# Patient Record
Sex: FEMALE | Race: BLACK OR AFRICAN AMERICAN | Employment: FULL TIME | URBAN - METROPOLITAN AREA
[De-identification: names, ages, dates, MRNs, and addresses within clinical notes are randomized per-mention and may not be internally consistent; named-entity substitution may affect disease eponyms.]

---

## 2017-08-14 ENCOUNTER — OFFICE VISIT (OUTPATIENT)
Dept: INTERNAL MEDICINE CLINIC | Age: 67
End: 2017-08-14

## 2017-08-14 VITALS
DIASTOLIC BLOOD PRESSURE: 83 MMHG | HEART RATE: 92 BPM | OXYGEN SATURATION: 98 % | SYSTOLIC BLOOD PRESSURE: 150 MMHG | TEMPERATURE: 97.9 F | RESPIRATION RATE: 18 BRPM | BODY MASS INDEX: 30.23 KG/M2 | WEIGHT: 204.1 LBS | HEIGHT: 69 IN

## 2017-08-14 DIAGNOSIS — E11.9 CONTROLLED TYPE 2 DIABETES MELLITUS WITHOUT COMPLICATION, WITHOUT LONG-TERM CURRENT USE OF INSULIN (HCC): Primary | ICD-10-CM

## 2017-08-14 DIAGNOSIS — I47.1 SVT (SUPRAVENTRICULAR TACHYCARDIA) (HCC): ICD-10-CM

## 2017-08-14 DIAGNOSIS — Z00.00 PHYSICAL EXAM: ICD-10-CM

## 2017-08-14 DIAGNOSIS — E78.5 DYSLIPIDEMIA: ICD-10-CM

## 2017-08-14 DIAGNOSIS — I10 ESSENTIAL HYPERTENSION WITH GOAL BLOOD PRESSURE LESS THAN 140/90: ICD-10-CM

## 2017-08-14 NOTE — PROGRESS NOTES
Chief Complaint   Patient presents with    Diabetes     routine follow up      1. Have you been to the ER, urgent care clinic since your last visit? Hospitalized since your last visit? No    2. Have you seen or consulted any other health care providers outside of the [address] [place] [address] since your last visit? Include any pap smears or colon screening.  No

## 2017-08-14 NOTE — MR AVS SNAPSHOT
Visit Information Date & Time Provider Department Dept. Phone Encounter #  
 8/14/2017  9:45 AM Nanda Ambriz [facility] [address] [id number] Upcoming Health Maintenance Date Due Hepatitis C Screening 1950 FOOT EXAM Q1 8/20/1959 MICROALBUMIN Q1 8/20/1959 FOBT Q 1 YEAR AGE 50-75 8/20/1999 MEDICARE YEARLY EXAM 8/20/2014 HEMOGLOBIN A1C Q6M 2/28/2017 LIPID PANEL Q1 8/29/2017 BREAST CANCER SCRN MAMMOGRAM 11/14/2017* EYE EXAM RETINAL OR DILATED Q1 12/14/2017* Pneumococcal 65+ Low/Medium Risk (2 of 2 - PPSV23) 8/14/2018 GLAUCOMA SCREENING Q2Y 10/20/2018 DTaP/Tdap/Td series (2 - Td) 8/14/2027 *Topic was postponed. The date shown is not the original due date. Allergies as of 8/14/2017  Review Complete On: 8/14/2017 By: Jimmy Maradiaga No Known Allergies Current Immunizations  Never Reviewed No immunizations on file. Not reviewed this visit You Were Diagnosed With   
  
 Codes Comments Controlled type 2 diabetes mellitus without complication, without long-term current use of insulin ([facility]    -  Primary ICD-10-CM: E11.9 ICD-9-CM: 250.00 Essential hypertension with goal blood pressure less than 140/90     ICD-10-CM: I10 
ICD-9-CM: 401.9 Dyslipidemia     ICD-10-CM: E78.5 ICD-9-CM: 272.4 SVT (supraventricular tachycardia) (HCC)     ICD-10-CM: I47.1 ICD-9-CM: 427.89 Physical exam     ICD-10-CM: Z00.00 ICD-9-CM: V70.9 Vitals BP Pulse Temp Resp Height(growth percentile) Weight(growth percentile) 150/83 (BP 1 Location: Left arm, BP Patient Position: Sitting) 92 97.9 °F (36.6 °C) (Oral) 18 5' 9\" (1.753 m) 204 lb 1.6 oz (92.6 kg) SpO2 BMI OB Status Smoking Status 98% 30.14 kg/m2 Postmenopausal Never Smoker Vitals History BMI and BSA Data Body Mass Index Body Surface Area  
 30.14 kg/m 2 2.12 m 2 Preferred Pharmacy Pharmacy Name Phone [id number] [place], [place] - [address] [facility] [phone] Your Updated Medication List  
  
   
This list is accurate as of: 8/14/17 12:49 PM.  Always use your most recent med list.  
  
  
  
  
 alcohol swabs Padm Commonly known as:  ALCOHOL PREP PADS  
1 Pad by Apply Externally route daily. DX.E11.9  
  
 amLODIPine 10 mg tablet Commonly known as:  Viet Rings Take 1 Tab by mouth daily. atorvastatin 40 mg tablet Commonly known as:  LIPITOR Take 1 Tab by mouth daily. glucose blood VI test strips strip Commonly known as:  Conda Grey Use to test blood sugar once daily. DX.E11.9 Lancets Misc Commonly known as: One Touch Gwynneth Sidles USE TO TEST BLOOD SUGAR ONCE DAILY. DX.E11.9  
  
 metoprolol succinate 50 mg XL tablet Commonly known as:  TOPROL-XL Take 1 Tab by mouth daily. pioglitazone 30 mg tablet Commonly known as:  ACTOS Take 1 tablet daily SITagliptin-metFORMIN 50-1,000 mg Tm24 Commonly known as:  JANUMET XR Take 1 tablet 2 times a day with meals  
  
 spironolactone-hydrochlorothiazide 25-25 mg per tablet Commonly known as:  ALDACTAZIDE Take 1 Tab by mouth daily. We Performed the Following AMB POC EKG ROUTINE W/ 12 LEADS, INTER & REP [73299 CPT(R)] APOLIPOPROTEIN B H288692 CPT(R)] CBC WITH AUTOMATED DIFF [76868 CPT(R)] CRP, HIGH SENSITIVITY [91974 CPT(R)] HEMOGLOBIN A1C WITH EAG [04880 CPT(R)] LIPID PANEL [68708 CPT(R)] METABOLIC PANEL, COMPREHENSIVE [20324 CPT(R)] MICROALBUMIN, UR, RAND W/ MICROALBUMIN/CREA RATIO [id number] CPT(R)] TN COLLECTION VENOUS BLOOD,VENIPUNCTURE [id number] CPT(R)] TSH 3RD GENERATION [34043 CPT(R)] URINALYSIS W/ RFLX MICROSCOPIC [54298 CPT(R)] Introducing [facility]!    
 David Newton introduces [employer] patient portal. Now you can access parts of your medical record, email your doctor's office, and request medication refills online. 1. In your internet browser, go to [email or web address] 2. Click on the First Time User? Click Here link in the Sign In box. You will see the New Member Sign Up page. 3. Enter your [employer] Access Code exactly as it appears below. You will not need to use this code after youve completed the sign-up process. If you do not sign up before the expiration date, you must request a new code. · [employer] Access Code: [email or web address] Expires: 11/12/2017 12:49 PM 
 
4. Enter the last four digits of your Social Security Number (xxxx) and Date of Birth (mm/dd/yyyy) as indicated and click Submit. You will be taken to the next sign-up page. 5. Create a [employer] ID. This will be your [employer] login ID and cannot be changed, so think of one that is secure and easy to remember. 6. Create a [employer] password. You can change your password at any time. 7. Enter your Password Reset Question and Answer. This can be used at a later time if you forget your password. 8. Enter your e-mail address. You will receive e-mail notification when new information is available in [address] 9. Click Sign Up. You can now view and download portions of your medical record. 10. Click the Download Summary menu link to download a portable copy of your medical information. If you have questions, please visit the Frequently Asked Questions section of the [employer] website. Remember, [employer] is NOT to be used for urgent needs. For medical emergencies, dial 911. Now available from your iPhone and Android! Please provide this summary of care documentation to your next provider. Your primary care clinician is listed as PROVIDER UNKNOWN. If you have any questions after today's visit, please call [phone].

## 2017-08-14 NOTE — PROGRESS NOTES
[address] [facility]  [place] [address]  [address] [place] [address]  Phone:  [phone]  Fax: [phone]       Chief Complaint   Patient presents with    Diabetes     routine follow up    . SUBJECTIVE:    Joanne Del Valle is a 75 y.o. female Comes in after a modest absence. She said that she has basically been doing well. Blood sugar has been excellent, usually in the double digit range. She has had no symptomatic hypoglycemia. She remains on Pioglitazone, as well as Janumet. She did not take her statin because of the adverse effect she read about on the package insert. She has been taking her antihypertensive medication as prescribed with no adverse effects. I also put her on a beta-blocker because of increased sympathetic tone manifest by tachycardia. This is also improved. She admits to weight gain. She is now officially obese based on her BMI. We talk about the need to minimize this. Finally, her right ankle is much better now. Current Outpatient Prescriptions   Medication Sig Dispense Refill    pioglitazone (ACTOS) 30 mg tablet Take 1 tablet daily 30 Tab 11    spironolactone-hydrochlorothiazide (ALDACTAZIDE) 25-25 mg per tablet Take 1 Tab by mouth daily. 30 Tab 11    amLODIPine (NORVASC) 10 mg tablet Take 1 Tab by mouth daily. 30 Tab 11    glucose blood VI test strips (ONETOUCH VERIO) strip Use to test blood sugar once daily. DX.E11.9 50 Strip 11    Lancets (ONE TOUCH DELICA) misc USE TO TEST BLOOD SUGAR ONCE DAILY. DX.E11.9 100 Each 11    alcohol swabs (ALCOHOL PREP PADS) padm 1 Pad by Apply Externally route daily. DX.E11.9 30 Pad 11    sitaGLIPtin-metFORMIN (JANUMET XR) 50-1,000 mg TM24 Take 1 tablet 2 times a day with meals 60 Tab 11    metoprolol succinate (TOPROL-XL) 50 mg XL tablet Take 1 Tab by mouth daily. 30 Tab 11    atorvastatin (LIPITOR) 40 mg tablet Take 1 Tab by mouth daily.  30 Tab 11     Past Medical History: Diagnosis Date    Diabetes ([facility]     Fibromyalgia     Treated with alternate medical therapy    Hypertension      Past Surgical History:   Procedure Laterality Date    HX COLONOSCOPY  2015    HX GYN      Pap 2015    HX ORTHOPAEDIC      fractured R ankle     No Known Allergies      REVIEW OF SYSTEMS:  General: negative for - chills or fever  ENT: negative for - headaches, nasal congestion or tinnitus  Respiratory: negative for - cough, hemoptysis, shortness of breath or wheezing  Cardiovascular : negative for - chest pain, edema, palpitations or shortness of breath  Gastrointestinal: negative for - abdominal pain, blood in stools, heartburn or nausea/vomiting  Genito-Urinary: no dysuria, trouble voiding, or hematuria  Musculoskeletal: negative for - gait disturbance, joint pain, joint stiffness or joint swelling  Neurological: no TIA or stroke symptoms  Hematologic: no bruises, no bleeding, no swollen glands  Integument: no lumps, mole changes, nail changes or rash  Endocrine: no malaise/lethargy or unexpected weight changes      Social History     Social History    Marital status: SINGLE     Spouse name: N/A    Number of children: 0    Years of education: N/A     Occupational History    Employed by a nonprofit organization           Social History Main Topics    Smoking status: Never Smoker    Smokeless tobacco: Never Used    Alcohol use No    Drug use: No    Sexual activity: Not Asked     Other Topics Concern    None     Social History Narrative    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    Postgraduate work [employer]    Honorary doctorate Charli Hernandez employed by [employer]     Family History   Problem Relation Age of Onset    Heart Disease Mother     Cancer Father     Heart Disease Brother        OBJECTIVE:    Visit Vitals    /83 (BP 1 Location: Left arm, BP Patient Position: Sitting)    Pulse 92    Temp 97.9 °F (36.6 °C) (Oral)    Resp 18    Ht 5' 9\" (1.753 m)    Wt 204 lb 1.6 oz (92.6 kg)    SpO2 98%    BMI 30.14 kg/m2     CONSTITUTIONAL: well , well nourished, appears age appropriate  EYES: perrla, eom intact  ENMT:moist mucous membranes, pharynx clear  NECK: supple. Thyroid normal  RESPIRATORY: Chest: clear to ascultation and percussion   CARDIOVASCULAR: Heart: regular rate and rhythm  GASTROINTESTINAL: Abdomen: soft, bowel sounds active  HEMATOLOGIC: no pathological lymph nodes palpated  MUSCULOSKELETAL: Extremities: no edema, pulse 1+   INTEGUMENT: No unusual rashes or suspicious skin lesions noted. Nails appear normal.  NEUROLOGIC: non-focal exam   MENTAL STATUS: alert and oriented, appropriate affect      ASSESSMENT:  1. Controlled type 2 diabetes mellitus without complication, without long-term current use of insulin ([address]    2. Essential hypertension with goal blood pressure less than 140/90    3. Dyslipidemia    4. SVT (supraventricular tachycardia) ([address]    5. Physical exam        PLAN:    1. Based on her current diabetic log, it appears that the diabetes is doing well. I will await the results of her hemoglobin A1c.    2. Blood pressure is 135/80. This is excellent. No adjustments are made   3. I am curious to see if her dyslipidemia remains as severe as it was originally at the time when her diabetes was totally out of control. She is definitely at increased risk. 4. Her SVT, was manifesting itself as a sinus tachycardia, has improved significantly since being on the beta-blocker. EKG confirms this, as does her manual heart rate. 5. Finally, I encourage her to remain as physically active as possible.       .  Orders Placed This Encounter    APOLIPOPROTEIN B    CBC WITH AUTOMATED DIFF    CRP, HIGH SENSITIVITY    LIPID PANEL    URINALYSIS W/ RFLX MICROSCOPIC    TSH 3RD GENERATION    METABOLIC PANEL, COMPREHENSIVE    HEMOGLOBIN A1C WITH EAG    MICROALBUMIN, UR, RAND W/ MICROALBUMIN/CREA RATIO    MICROSCOPIC EXAMINATION    AMB POC EKG ROUTINE W/ 12 LEADS, INTER & REP         Follow-up Disposition:  Return in about 4 months (around 12/14/2017).       Quincy Calderon MD

## 2017-08-15 LAB
ALBUMIN SERPL-MCNC: 4.6 G/DL (ref 3.6–4.8)
ALBUMIN/CREAT UR: 4.7 MG/G CREAT (ref 0–30)
ALBUMIN/GLOB SERPL: 1.3 {RATIO} (ref 1.2–2.2)
ALP SERPL-CCNC: 104 IU/L (ref 39–117)
ALT SERPL-CCNC: 11 IU/L (ref 0–32)
APO B SERPL-MCNC: 119 MG/DL (ref 54–133)
APPEARANCE UR: ABNORMAL
AST SERPL-CCNC: 14 IU/L (ref 0–40)
BACTERIA #/AREA URNS HPF: ABNORMAL /[HPF]
BASOPHILS # BLD AUTO: 0 X10E3/UL (ref 0–0.2)
BASOPHILS NFR BLD AUTO: 0 %
BILIRUB SERPL-MCNC: <0.2 MG/DL (ref 0–1.2)
BILIRUB UR QL STRIP: NEGATIVE
BUN SERPL-MCNC: 16 MG/DL (ref 8–27)
BUN/CREAT SERPL: 15 (ref 12–28)
CALCIUM SERPL-MCNC: 10.1 MG/DL (ref 8.7–10.3)
CASTS URNS QL MICRO: ABNORMAL /LPF
CHLORIDE SERPL-SCNC: 96 MMOL/L (ref 96–106)
CHOLEST SERPL-MCNC: 249 MG/DL (ref 100–199)
CO2 SERPL-SCNC: 25 MMOL/L (ref 18–29)
COLOR UR: YELLOW
CREAT SERPL-MCNC: 1.06 MG/DL (ref 0.57–1)
CREAT UR-MCNC: 149.4 MG/DL
CRP SERPL HS-MCNC: 5.15 MG/L (ref 0–3)
EOSINOPHIL # BLD AUTO: 0.2 X10E3/UL (ref 0–0.4)
EOSINOPHIL NFR BLD AUTO: 2 %
EPI CELLS #/AREA URNS HPF: >10 /HPF
ERYTHROCYTE [DISTWIDTH] IN BLOOD BY AUTOMATED COUNT: 13.7 % (ref 12.3–15.4)
EST. AVERAGE GLUCOSE BLD GHB EST-MCNC: 146 MG/DL
GLOBULIN SER CALC-MCNC: 3.6 G/DL (ref 1.5–4.5)
GLUCOSE SERPL-MCNC: 82 MG/DL (ref 65–99)
GLUCOSE UR QL: NEGATIVE
HBA1C MFR BLD: 6.7 % (ref 4.8–5.6)
HCT VFR BLD AUTO: 35.5 % (ref 34–46.6)
HDLC SERPL-MCNC: 61 MG/DL
HGB BLD-MCNC: 11.4 G/DL (ref 11.1–15.9)
HGB UR QL STRIP: NEGATIVE
IMM GRANULOCYTES # BLD: 0 X10E3/UL (ref 0–0.1)
IMM GRANULOCYTES NFR BLD: 0 %
KETONES UR QL STRIP: NEGATIVE
LDLC SERPL CALC-MCNC: 163 MG/DL (ref 0–99)
LEUKOCYTE ESTERASE UR QL STRIP: ABNORMAL
LYMPHOCYTES # BLD AUTO: 2.3 X10E3/UL (ref 0.7–3.1)
LYMPHOCYTES NFR BLD AUTO: 31 %
MCH RBC QN AUTO: 30.1 PG (ref 26.6–33)
MCHC RBC AUTO-ENTMCNC: 32.1 G/DL (ref 31.5–35.7)
MCV RBC AUTO: 94 FL (ref 79–97)
MICRO URNS: ABNORMAL
MICROALBUMIN UR-MCNC: 7 UG/ML
MONOCYTES # BLD AUTO: 0.5 X10E3/UL (ref 0.1–0.9)
MONOCYTES NFR BLD AUTO: 6 %
MUCOUS THREADS URNS QL MICRO: PRESENT
NEUTROPHILS # BLD AUTO: 4.5 X10E3/UL (ref 1.4–7)
NEUTROPHILS NFR BLD AUTO: 61 %
NITRITE UR QL STRIP: NEGATIVE
PH UR STRIP: 5.5 [PH] (ref 5–7.5)
PLATELET # BLD AUTO: 307 X10E3/UL (ref 150–379)
POTASSIUM SERPL-SCNC: 4.3 MMOL/L (ref 3.5–5.2)
PROT SERPL-MCNC: 8.2 G/DL (ref 6–8.5)
PROT UR QL STRIP: NEGATIVE
RBC # BLD AUTO: 3.79 X10E6/UL (ref 3.77–5.28)
RBC #/AREA URNS HPF: ABNORMAL /HPF
SODIUM SERPL-SCNC: 138 MMOL/L (ref 134–144)
SP GR UR: 1.02 (ref 1–1.03)
TRIGL SERPL-MCNC: 127 MG/DL (ref 0–149)
TSH SERPL DL<=0.005 MIU/L-ACNC: 2.37 UIU/ML (ref 0.45–4.5)
UROBILINOGEN UR STRIP-MCNC: 0.2 MG/DL (ref 0.2–1)
VLDLC SERPL CALC-MCNC: 25 MG/DL (ref 5–40)
WBC # BLD AUTO: 7.5 X10E3/UL (ref 3.4–10.8)
WBC #/AREA URNS HPF: ABNORMAL /HPF

## 2017-08-16 ENCOUNTER — TELEPHONE (OUTPATIENT)
Dept: INTERNAL MEDICINE CLINIC | Age: 67
End: 2017-08-16

## 2017-08-16 NOTE — TELEPHONE ENCOUNTER
Patient notified by phone and ask to return to the office for a URINE CULTURE. She says she is still thing about taking a cholesterol pill.

## 2017-08-16 NOTE — PROGRESS NOTES
Need a urine culture. Patient needs to be on a statin but has to make the decision.   If she agrees then start rosuvastatin 10mg

## 2017-08-22 ENCOUNTER — LAB ONLY (OUTPATIENT)
Dept: INTERNAL MEDICINE CLINIC | Age: 67
End: 2017-08-22

## 2017-08-22 DIAGNOSIS — N39.0 URINARY TRACT INFECTION WITHOUT HEMATURIA, SITE UNSPECIFIED: Primary | ICD-10-CM

## 2017-08-24 LAB — BACTERIA UR CULT: NORMAL

## 2018-01-01 ENCOUNTER — HOSPITAL ENCOUNTER (OUTPATIENT)
Age: 68
Setting detail: OUTPATIENT SURGERY
Discharge: HOME | End: 2018-09-12
Attending: SURGERY | Admitting: SURGERY
Payer: COMMERCIAL

## 2018-01-01 ENCOUNTER — ANESTHESIA (OUTPATIENT)
Dept: SURGERY | Age: 68
End: 2018-01-01
Payer: COMMERCIAL

## 2018-01-01 ENCOUNTER — TELEPHONE (OUTPATIENT)
Dept: SURGERY | Age: 68
End: 2018-01-01

## 2018-01-01 ENCOUNTER — HOSPITAL ENCOUNTER (OUTPATIENT)
Dept: CT IMAGING | Age: 68
Discharge: HOME | End: 2018-08-21
Attending: INTERNAL MEDICINE
Payer: COMMERCIAL

## 2018-01-01 ENCOUNTER — APPOINTMENT (OUTPATIENT)
Dept: GENERAL RADIOLOGY | Age: 68
End: 2018-01-01
Attending: SURGERY
Payer: COMMERCIAL

## 2018-01-01 ENCOUNTER — HOSPITAL ENCOUNTER (OUTPATIENT)
Age: 68
Setting detail: OUTPATIENT SURGERY
Discharge: HOME | End: 2018-08-27
Attending: SURGERY | Admitting: SURGERY
Payer: COMMERCIAL

## 2018-01-01 ENCOUNTER — ANESTHESIA EVENT (OUTPATIENT)
Dept: SURGERY | Age: 68
End: 2018-01-01
Payer: COMMERCIAL

## 2018-01-01 ENCOUNTER — OFFICE VISIT (OUTPATIENT)
Dept: SURGERY | Age: 68
End: 2018-01-01

## 2018-01-01 ENCOUNTER — HOSPITAL ENCOUNTER (OUTPATIENT)
Dept: PET IMAGING | Age: 68
Discharge: HOME | End: 2018-09-13
Attending: INTERNAL MEDICINE
Payer: MEDICARE

## 2018-01-01 ENCOUNTER — HOSPITAL ENCOUNTER (OUTPATIENT)
Dept: PET IMAGING | Age: 68
Discharge: HOME | End: 2018-11-15
Attending: INTERNAL MEDICINE
Payer: COMMERCIAL

## 2018-01-01 VITALS
HEART RATE: 80 BPM | BODY MASS INDEX: 27.28 KG/M2 | HEIGHT: 69 IN | RESPIRATION RATE: 16 BRPM | HEIGHT: 68 IN | TEMPERATURE: 97.7 F | WEIGHT: 201.06 LBS | SYSTOLIC BLOOD PRESSURE: 114 MMHG | OXYGEN SATURATION: 95 % | WEIGHT: 180 LBS | BODY MASS INDEX: 29.78 KG/M2 | DIASTOLIC BLOOD PRESSURE: 62 MMHG

## 2018-01-01 VITALS
RESPIRATION RATE: 20 BRPM | DIASTOLIC BLOOD PRESSURE: 81 MMHG | WEIGHT: 203.1 LBS | HEART RATE: 103 BPM | TEMPERATURE: 97 F | BODY MASS INDEX: 30.08 KG/M2 | OXYGEN SATURATION: 99 % | SYSTOLIC BLOOD PRESSURE: 149 MMHG | HEIGHT: 69 IN

## 2018-01-01 VITALS
HEIGHT: 69 IN | OXYGEN SATURATION: 96 % | WEIGHT: 201 LBS | DIASTOLIC BLOOD PRESSURE: 79 MMHG | SYSTOLIC BLOOD PRESSURE: 131 MMHG | BODY MASS INDEX: 29.77 KG/M2 | HEART RATE: 86 BPM | RESPIRATION RATE: 18 BRPM | TEMPERATURE: 98 F

## 2018-01-01 VITALS — WEIGHT: 198 LBS | HEIGHT: 68 IN | BODY MASS INDEX: 30.01 KG/M2

## 2018-01-01 VITALS
OXYGEN SATURATION: 98 % | HEIGHT: 69 IN | RESPIRATION RATE: 20 BRPM | SYSTOLIC BLOOD PRESSURE: 150 MMHG | HEART RATE: 106 BPM | BODY MASS INDEX: 29.65 KG/M2 | WEIGHT: 200.2 LBS | TEMPERATURE: 98.3 F | DIASTOLIC BLOOD PRESSURE: 76 MMHG

## 2018-01-01 DIAGNOSIS — C81.91 HODGKIN LYMPHOMA OF LYMPH NODES OF NECK, UNSPECIFIED HODGKIN LYMPHOMA TYPE (HCC): Primary | ICD-10-CM

## 2018-01-01 DIAGNOSIS — C81.11 NODULAR SCLEROSIS HODGKIN LYMPHOMA, LYMPH NODES OF HEAD, FACE, AND NECK (HCC): ICD-10-CM

## 2018-01-01 DIAGNOSIS — R59.1 LYMPHADENOPATHY: Primary | ICD-10-CM

## 2018-01-01 DIAGNOSIS — R22.2 LUMP IN CHEST: ICD-10-CM

## 2018-01-01 DIAGNOSIS — R22.2 SUPRACLAVICULAR MASS: Primary | ICD-10-CM

## 2018-01-01 DIAGNOSIS — C81.11 HODGKIN'S DISEASE, NODULAR SCLEROSIS, OF LYMPH NODES OF HEAD, FACE, AND NECK (HCC): ICD-10-CM

## 2018-01-01 LAB
ACID FAST STN SPEC: NEGATIVE
ALBUMIN SERPL-MCNC: 3.9 G/DL (ref 3.5–5)
ALBUMIN/GLOB SERPL: 0.8 {RATIO} (ref 1.1–2.2)
ALP SERPL-CCNC: 164 U/L (ref 45–117)
ALT SERPL-CCNC: 22 U/L (ref 12–78)
ANION GAP BLD CALC-SCNC: 17 MMOL/L (ref 10–20)
ANION GAP SERPL CALC-SCNC: 8 MMOL/L (ref 5–15)
APTT PPP: 30.2 SEC (ref 22.1–32)
AST SERPL-CCNC: 17 U/L (ref 15–37)
ATRIAL RATE: 81 BPM
BACTERIA SPEC CULT: NORMAL
BASOPHILS # BLD: 0 K/UL (ref 0–0.1)
BASOPHILS NFR BLD: 1 % (ref 0–1)
BILIRUB SERPL-MCNC: 0.3 MG/DL (ref 0.2–1)
BUN BLD-MCNC: 22 MG/DL (ref 9–20)
BUN SERPL-MCNC: 24 MG/DL (ref 6–20)
BUN/CREAT SERPL: 20 (ref 12–20)
CA-I BLD-MCNC: 1.23 MMOL/L (ref 1.12–1.32)
CALCIUM SERPL-MCNC: 9.7 MG/DL (ref 8.5–10.1)
CALCULATED P AXIS, ECG09: 46 DEGREES
CALCULATED R AXIS, ECG10: -2 DEGREES
CALCULATED T AXIS, ECG11: 34 DEGREES
CHLORIDE BLD-SCNC: 100 MMOL/L (ref 98–107)
CHLORIDE SERPL-SCNC: 100 MMOL/L (ref 97–108)
CO2 BLD-SCNC: 26 MMOL/L (ref 21–32)
CO2 SERPL-SCNC: 27 MMOL/L (ref 21–32)
COMMENT, HOLDF: NORMAL
CREAT BLD-MCNC: 1 MG/DL (ref 0.6–1.3)
CREAT SERPL-MCNC: 1.21 MG/DL (ref 0.55–1.02)
DIAGNOSIS, 93000: NORMAL
DIFFERENTIAL METHOD BLD: ABNORMAL
EOSINOPHIL # BLD: 0.2 K/UL (ref 0–0.4)
EOSINOPHIL NFR BLD: 2 % (ref 0–7)
ERYTHROCYTE [DISTWIDTH] IN BLOOD BY AUTOMATED COUNT: 13.8 % (ref 11.5–14.5)
GLOBULIN SER CALC-MCNC: 5.1 G/DL (ref 2–4)
GLUCOSE BLD STRIP.AUTO-MCNC: 105 MG/DL (ref 65–100)
GLUCOSE BLD STRIP.AUTO-MCNC: 127 MG/DL (ref 65–100)
GLUCOSE BLD STRIP.AUTO-MCNC: 129 MG/DL (ref 65–100)
GLUCOSE BLD-MCNC: 102 MG/DL (ref 65–100)
GLUCOSE SERPL-MCNC: 129 MG/DL (ref 65–100)
HCT VFR BLD AUTO: 32.4 % (ref 35–47)
HCT VFR BLD CALC: 34 % (ref 35–47)
HGB BLD-MCNC: 10.4 G/DL (ref 11.5–16)
IMM GRANULOCYTES # BLD: 0 K/UL (ref 0–0.04)
IMM GRANULOCYTES NFR BLD AUTO: 1 % (ref 0–0.5)
INR PPP: 1.1 (ref 0.9–1.1)
LYMPHOCYTES # BLD: 1.7 K/UL (ref 0.8–3.5)
LYMPHOCYTES NFR BLD: 20 % (ref 12–49)
MCH RBC QN AUTO: 29.5 PG (ref 26–34)
MCHC RBC AUTO-ENTMCNC: 32.1 G/DL (ref 30–36.5)
MCV RBC AUTO: 92 FL (ref 80–99)
MONOCYTES # BLD: 0.6 K/UL (ref 0–1)
MONOCYTES NFR BLD: 7 % (ref 5–13)
MYCOBACTERIUM SPEC QL CULT: NEGATIVE
NEUTS SEG # BLD: 5.7 K/UL (ref 1.8–8)
NEUTS SEG NFR BLD: 69 % (ref 32–75)
NRBC # BLD: 0 K/UL (ref 0–0.01)
NRBC BLD-RTO: 0 PER 100 WBC
P-R INTERVAL, ECG05: 184 MS
PLATELET # BLD AUTO: 284 K/UL (ref 150–400)
PMV BLD AUTO: 10 FL (ref 8.9–12.9)
POTASSIUM BLD-SCNC: 3.9 MMOL/L (ref 3.5–5.1)
POTASSIUM SERPL-SCNC: 4.2 MMOL/L (ref 3.5–5.1)
PROT SERPL-MCNC: 9 G/DL (ref 6.4–8.2)
PROTHROMBIN TIME: 11.7 SEC (ref 9–11.1)
Q-T INTERVAL, ECG07: 368 MS
QRS DURATION, ECG06: 80 MS
QTC CALCULATION (BEZET), ECG08: 427 MS
RBC # BLD AUTO: 3.52 M/UL (ref 3.8–5.2)
SAMPLES BEING HELD,HOLD: NORMAL
SERVICE CMNT-IMP: ABNORMAL
SERVICE CMNT-IMP: NORMAL
SODIUM BLD-SCNC: 138 MMOL/L (ref 136–145)
SODIUM SERPL-SCNC: 135 MMOL/L (ref 136–145)
SPECIMEN PREPARATION: NORMAL
SPECIMEN SOURCE: NORMAL
THERAPEUTIC RANGE,PTTT: NORMAL SECS (ref 58–77)
VENTRICULAR RATE, ECG03: 81 BPM
WBC # BLD AUTO: 8.2 K/UL (ref 3.6–11)

## 2018-01-01 PROCEDURE — C1788 PORT, INDWELLING, IMP: HCPCS | Performed by: SURGERY

## 2018-01-01 PROCEDURE — 77030011265 HC ELECTRD BLD HEX COVD -A: Performed by: SURGERY

## 2018-01-01 PROCEDURE — 77030021352 HC CBL LD SYS DISP COVD -B: Performed by: SURGERY

## 2018-01-01 PROCEDURE — 71260 CT THORAX DX C+: CPT

## 2018-01-01 PROCEDURE — 77030039266 HC ADH SKN EXOFIN S2SG -A: Performed by: SURGERY

## 2018-01-01 PROCEDURE — 77030020255 HC SOL INJ LR 1000ML BG: Performed by: SURGERY

## 2018-01-01 PROCEDURE — 76210000016 HC OR PH I REC 1 TO 1.5 HR: Performed by: SURGERY

## 2018-01-01 PROCEDURE — 74011000250 HC RX REV CODE- 250: Performed by: SURGERY

## 2018-01-01 PROCEDURE — 74011250636 HC RX REV CODE- 250/636: Performed by: SURGERY

## 2018-01-01 PROCEDURE — 82962 GLUCOSE BLOOD TEST: CPT

## 2018-01-01 PROCEDURE — 77030002916 HC SUT ETHLN J&J -A: Performed by: SURGERY

## 2018-01-01 PROCEDURE — 76210000040 HC AMBSU PH I REC FIRST 0.5 HR: Performed by: SURGERY

## 2018-01-01 PROCEDURE — 77030003029 HC SUT VCRL J&J -B: Performed by: SURGERY

## 2018-01-01 PROCEDURE — 80053 COMPREHEN METABOLIC PANEL: CPT | Performed by: SURGERY

## 2018-01-01 PROCEDURE — 74011250636 HC RX REV CODE- 250/636

## 2018-01-01 PROCEDURE — 85730 THROMBOPLASTIN TIME PARTIAL: CPT | Performed by: SURGERY

## 2018-01-01 PROCEDURE — 88342 IMHCHEM/IMCYTCHM 1ST ANTB: CPT | Performed by: SURGERY

## 2018-01-01 PROCEDURE — 74011000258 HC RX REV CODE- 258: Performed by: INTERNAL MEDICINE

## 2018-01-01 PROCEDURE — 76210000057 HC AMBSU PH II REC 1 TO 1.5 HR: Performed by: SURGERY

## 2018-01-01 PROCEDURE — 88341 IMHCHEM/IMCYTCHM EA ADD ANTB: CPT | Performed by: SURGERY

## 2018-01-01 PROCEDURE — 74011636320 HC RX REV CODE- 636/320: Performed by: INTERNAL MEDICINE

## 2018-01-01 PROCEDURE — 74011250636 HC RX REV CODE- 250/636: Performed by: ANESTHESIOLOGY

## 2018-01-01 PROCEDURE — 88305 TISSUE EXAM BY PATHOLOGIST: CPT

## 2018-01-01 PROCEDURE — 77030002986 HC SUT PROL J&J -A: Performed by: SURGERY

## 2018-01-01 PROCEDURE — 77030031139 HC SUT VCRL2 J&J -A: Performed by: SURGERY

## 2018-01-01 PROCEDURE — A9552 F18 FDG: HCPCS

## 2018-01-01 PROCEDURE — 80047 BASIC METABLC PNL IONIZED CA: CPT

## 2018-01-01 PROCEDURE — 88307 TISSUE EXAM BY PATHOLOGIST: CPT | Performed by: SURGERY

## 2018-01-01 PROCEDURE — 77030018836 HC SOL IRR NACL ICUM -A: Performed by: SURGERY

## 2018-01-01 PROCEDURE — 77030011640 HC PAD GRND REM COVD -A: Performed by: SURGERY

## 2018-01-01 PROCEDURE — 87102 FUNGUS ISOLATION CULTURE: CPT | Performed by: SURGERY

## 2018-01-01 PROCEDURE — 87116 MYCOBACTERIA CULTURE: CPT | Performed by: SURGERY

## 2018-01-01 PROCEDURE — 76030000000 HC AMB SURG OR TIME 0.5 TO 1: Performed by: SURGERY

## 2018-01-01 PROCEDURE — 77030008684 HC TU ET CUF COVD -B: Performed by: ANESTHESIOLOGY

## 2018-01-01 PROCEDURE — 77030026438 HC STYL ET INTUB CARD -A: Performed by: ANESTHESIOLOGY

## 2018-01-01 PROCEDURE — 77030018673: Performed by: SURGERY

## 2018-01-01 PROCEDURE — 76000 FLUOROSCOPY <1 HR PHYS/QHP: CPT

## 2018-01-01 PROCEDURE — 85025 COMPLETE CBC W/AUTO DIFF WBC: CPT | Performed by: SURGERY

## 2018-01-01 PROCEDURE — 85610 PROTHROMBIN TIME: CPT | Performed by: SURGERY

## 2018-01-01 PROCEDURE — 77030002933 HC SUT MCRYL J&J -A: Performed by: SURGERY

## 2018-01-01 PROCEDURE — 77030020782 HC GWN BAIR PAWS FLX 3M -B

## 2018-01-01 PROCEDURE — 36415 COLL VENOUS BLD VENIPUNCTURE: CPT | Performed by: SURGERY

## 2018-01-01 PROCEDURE — 77030020256 HC SOL INJ NACL 0.9%  500ML: Performed by: SURGERY

## 2018-01-01 PROCEDURE — 93005 ELECTROCARDIOGRAM TRACING: CPT

## 2018-01-01 PROCEDURE — 76060000061 HC AMB SURG ANES 0.5 TO 1 HR: Performed by: SURGERY

## 2018-01-01 PROCEDURE — 76210000021 HC REC RM PH II 0.5 TO 1 HR: Performed by: SURGERY

## 2018-01-01 PROCEDURE — 77030032490 HC SLV COMPR SCD KNE COVD -B: Performed by: SURGERY

## 2018-01-01 PROCEDURE — 76010000149 HC OR TIME 1 TO 1.5 HR: Performed by: SURGERY

## 2018-01-01 PROCEDURE — 71045 X-RAY EXAM CHEST 1 VIEW: CPT

## 2018-01-01 PROCEDURE — 76060000033 HC ANESTHESIA 1 TO 1.5 HR: Performed by: SURGERY

## 2018-01-01 DEVICE — SYSTEM INFUS PRT CATH 8FR L66CM INTRO 8FR CHST TI SGL LUMN: Type: IMPLANTABLE DEVICE | Site: CHEST | Status: FUNCTIONAL

## 2018-01-01 RX ORDER — SODIUM CHLORIDE 0.9 % (FLUSH) 0.9 %
5-10 SYRINGE (ML) INJECTION AS NEEDED
Status: DISCONTINUED | OUTPATIENT
Start: 2018-01-01 | End: 2018-01-01 | Stop reason: HOSPADM

## 2018-01-01 RX ORDER — MORPHINE SULFATE 10 MG/ML
2 INJECTION, SOLUTION INTRAMUSCULAR; INTRAVENOUS
Status: DISCONTINUED | OUTPATIENT
Start: 2018-01-01 | End: 2018-01-01 | Stop reason: HOSPADM

## 2018-01-01 RX ORDER — BUPIVACAINE HYDROCHLORIDE AND EPINEPHRINE 5; 5 MG/ML; UG/ML
INJECTION, SOLUTION EPIDURAL; INTRACAUDAL; PERINEURAL AS NEEDED
Status: DISCONTINUED | OUTPATIENT
Start: 2018-01-01 | End: 2018-01-01 | Stop reason: HOSPADM

## 2018-01-01 RX ORDER — SODIUM CHLORIDE, SODIUM LACTATE, POTASSIUM CHLORIDE, CALCIUM CHLORIDE 600; 310; 30; 20 MG/100ML; MG/100ML; MG/100ML; MG/100ML
25 INJECTION, SOLUTION INTRAVENOUS CONTINUOUS
Status: DISCONTINUED | OUTPATIENT
Start: 2018-01-01 | End: 2018-01-01 | Stop reason: HOSPADM

## 2018-01-01 RX ORDER — PHENYLEPHRINE HCL IN 0.9% NACL 0.4MG/10ML
SYRINGE (ML) INTRAVENOUS AS NEEDED
Status: DISCONTINUED | OUTPATIENT
Start: 2018-01-01 | End: 2018-01-01 | Stop reason: HOSPADM

## 2018-01-01 RX ORDER — FENTANYL CITRATE 50 UG/ML
INJECTION, SOLUTION INTRAMUSCULAR; INTRAVENOUS AS NEEDED
Status: DISCONTINUED | OUTPATIENT
Start: 2018-01-01 | End: 2018-01-01 | Stop reason: HOSPADM

## 2018-01-01 RX ORDER — ONDANSETRON 2 MG/ML
INJECTION INTRAMUSCULAR; INTRAVENOUS AS NEEDED
Status: DISCONTINUED | OUTPATIENT
Start: 2018-01-01 | End: 2018-01-01 | Stop reason: HOSPADM

## 2018-01-01 RX ORDER — SODIUM CHLORIDE 0.9 % (FLUSH) 0.9 %
5-10 SYRINGE (ML) INJECTION AS NEEDED
Status: CANCELLED | OUTPATIENT
Start: 2018-01-01

## 2018-01-01 RX ORDER — DIPHENHYDRAMINE HYDROCHLORIDE 50 MG/ML
12.5 INJECTION, SOLUTION INTRAMUSCULAR; INTRAVENOUS
Status: DISCONTINUED | OUTPATIENT
Start: 2018-01-01 | End: 2018-01-01 | Stop reason: HOSPADM

## 2018-01-01 RX ORDER — MIDAZOLAM HYDROCHLORIDE 1 MG/ML
INJECTION, SOLUTION INTRAMUSCULAR; INTRAVENOUS AS NEEDED
Status: DISCONTINUED | OUTPATIENT
Start: 2018-01-01 | End: 2018-01-01 | Stop reason: HOSPADM

## 2018-01-01 RX ORDER — LIDOCAINE HYDROCHLORIDE 10 MG/ML
0.1 INJECTION, SOLUTION EPIDURAL; INFILTRATION; INTRACAUDAL; PERINEURAL AS NEEDED
Status: DISCONTINUED | OUTPATIENT
Start: 2018-01-01 | End: 2018-01-01 | Stop reason: HOSPADM

## 2018-01-01 RX ORDER — FLUTICASONE PROPIONATE 50 MCG
2 SPRAY, SUSPENSION (ML) NASAL DAILY
COMMUNITY
End: 2019-01-01

## 2018-01-01 RX ORDER — PROPOFOL 10 MG/ML
INJECTION, EMULSION INTRAVENOUS
Status: DISCONTINUED | OUTPATIENT
Start: 2018-01-01 | End: 2018-01-01 | Stop reason: HOSPADM

## 2018-01-01 RX ORDER — DIPHENHYDRAMINE HYDROCHLORIDE 50 MG/ML
12.5 INJECTION, SOLUTION INTRAMUSCULAR; INTRAVENOUS AS NEEDED
Status: DISCONTINUED | OUTPATIENT
Start: 2018-01-01 | End: 2018-01-01 | Stop reason: HOSPADM

## 2018-01-01 RX ORDER — PROPOFOL 10 MG/ML
INJECTION, EMULSION INTRAVENOUS AS NEEDED
Status: DISCONTINUED | OUTPATIENT
Start: 2018-01-01 | End: 2018-01-01 | Stop reason: HOSPADM

## 2018-01-01 RX ORDER — DEXAMETHASONE SODIUM PHOSPHATE 4 MG/ML
INJECTION, SOLUTION INTRA-ARTICULAR; INTRALESIONAL; INTRAMUSCULAR; INTRAVENOUS; SOFT TISSUE AS NEEDED
Status: DISCONTINUED | OUTPATIENT
Start: 2018-01-01 | End: 2018-01-01 | Stop reason: HOSPADM

## 2018-01-01 RX ORDER — SODIUM CHLORIDE 0.9 % (FLUSH) 0.9 %
5-10 SYRINGE (ML) INJECTION EVERY 8 HOURS
Status: DISCONTINUED | OUTPATIENT
Start: 2018-01-01 | End: 2018-01-01 | Stop reason: HOSPADM

## 2018-01-01 RX ORDER — LIDOCAINE HYDROCHLORIDE 20 MG/ML
INJECTION, SOLUTION EPIDURAL; INFILTRATION; INTRACAUDAL; PERINEURAL AS NEEDED
Status: DISCONTINUED | OUTPATIENT
Start: 2018-01-01 | End: 2018-01-01 | Stop reason: HOSPADM

## 2018-01-01 RX ORDER — ONDANSETRON 2 MG/ML
4 INJECTION INTRAMUSCULAR; INTRAVENOUS AS NEEDED
Status: DISCONTINUED | OUTPATIENT
Start: 2018-01-01 | End: 2018-01-01 | Stop reason: HOSPADM

## 2018-01-01 RX ORDER — SODIUM CHLORIDE 0.9 % (FLUSH) 0.9 %
10 SYRINGE (ML) INJECTION
Status: COMPLETED | OUTPATIENT
Start: 2018-01-01 | End: 2018-01-01

## 2018-01-01 RX ORDER — FENTANYL CITRATE 50 UG/ML
25 INJECTION, SOLUTION INTRAMUSCULAR; INTRAVENOUS
Status: DISCONTINUED | OUTPATIENT
Start: 2018-01-01 | End: 2018-01-01 | Stop reason: HOSPADM

## 2018-01-01 RX ORDER — CEFAZOLIN SODIUM/WATER 2 G/20 ML
2 SYRINGE (ML) INTRAVENOUS ONCE
Status: COMPLETED | OUTPATIENT
Start: 2018-01-01 | End: 2018-01-01

## 2018-01-01 RX ORDER — LIDOCAINE HYDROCHLORIDE 10 MG/ML
0.1 INJECTION, SOLUTION EPIDURAL; INFILTRATION; INTRACAUDAL; PERINEURAL AS NEEDED
Status: CANCELLED | OUTPATIENT
Start: 2018-01-01

## 2018-01-01 RX ORDER — SODIUM CHLORIDE, SODIUM LACTATE, POTASSIUM CHLORIDE, CALCIUM CHLORIDE 600; 310; 30; 20 MG/100ML; MG/100ML; MG/100ML; MG/100ML
25 INJECTION, SOLUTION INTRAVENOUS CONTINUOUS
Status: CANCELLED | OUTPATIENT
Start: 2018-01-01 | End: 2018-01-01

## 2018-01-01 RX ORDER — HYDROMORPHONE HYDROCHLORIDE 1 MG/ML
0.2 INJECTION, SOLUTION INTRAMUSCULAR; INTRAVENOUS; SUBCUTANEOUS
Status: DISCONTINUED | OUTPATIENT
Start: 2018-01-01 | End: 2018-01-01 | Stop reason: HOSPADM

## 2018-01-01 RX ORDER — SUCCINYLCHOLINE CHLORIDE 20 MG/ML
INJECTION INTRAMUSCULAR; INTRAVENOUS AS NEEDED
Status: DISCONTINUED | OUTPATIENT
Start: 2018-01-01 | End: 2018-01-01 | Stop reason: HOSPADM

## 2018-01-01 RX ORDER — SODIUM CHLORIDE 0.9 % (FLUSH) 0.9 %
5-10 SYRINGE (ML) INJECTION EVERY 8 HOURS
Status: CANCELLED | OUTPATIENT
Start: 2018-01-01

## 2018-01-01 RX ORDER — IBUPROFEN 600 MG/1
600 TABLET ORAL
Qty: 40 TAB | Refills: 0 | Status: SHIPPED | OUTPATIENT
Start: 2018-01-01 | End: 2018-01-01 | Stop reason: SDUPTHER

## 2018-01-01 RX ORDER — IBUPROFEN 600 MG/1
600 TABLET ORAL
Qty: 40 TAB | Refills: 0 | Status: SHIPPED | OUTPATIENT
Start: 2018-01-01 | End: 2019-01-01

## 2018-01-01 RX ADMIN — Medication 120 MCG: at 08:59

## 2018-01-01 RX ADMIN — PROPOFOL 140 MCG/KG/MIN: 10 INJECTION, EMULSION INTRAVENOUS at 08:18

## 2018-01-01 RX ADMIN — PROPOFOL 20 MG: 10 INJECTION, EMULSION INTRAVENOUS at 08:29

## 2018-01-01 RX ADMIN — MIDAZOLAM HYDROCHLORIDE 1 MG: 1 INJECTION, SOLUTION INTRAMUSCULAR; INTRAVENOUS at 09:12

## 2018-01-01 RX ADMIN — PROPOFOL 20 MG: 10 INJECTION, EMULSION INTRAVENOUS at 08:26

## 2018-01-01 RX ADMIN — SUCCINYLCHOLINE CHLORIDE 140 MG: 20 INJECTION INTRAMUSCULAR; INTRAVENOUS at 09:12

## 2018-01-01 RX ADMIN — LIDOCAINE HYDROCHLORIDE 60 MG: 20 INJECTION, SOLUTION EPIDURAL; INFILTRATION; INTRACAUDAL; PERINEURAL at 09:12

## 2018-01-01 RX ADMIN — Medication 10 ML: at 15:22

## 2018-01-01 RX ADMIN — PROPOFOL 50 MG: 10 INJECTION, EMULSION INTRAVENOUS at 08:51

## 2018-01-01 RX ADMIN — SODIUM CHLORIDE, SODIUM LACTATE, POTASSIUM CHLORIDE, AND CALCIUM CHLORIDE 25 ML/HR: 600; 310; 30; 20 INJECTION, SOLUTION INTRAVENOUS at 08:31

## 2018-01-01 RX ADMIN — LIDOCAINE HYDROCHLORIDE 60 MG: 20 INJECTION, SOLUTION EPIDURAL; INFILTRATION; INTRACAUDAL; PERINEURAL at 08:17

## 2018-01-01 RX ADMIN — MIDAZOLAM HYDROCHLORIDE 1 MG: 1 INJECTION, SOLUTION INTRAMUSCULAR; INTRAVENOUS at 08:12

## 2018-01-01 RX ADMIN — SODIUM CHLORIDE, SODIUM LACTATE, POTASSIUM CHLORIDE, AND CALCIUM CHLORIDE 25 ML/HR: 600; 310; 30; 20 INJECTION, SOLUTION INTRAVENOUS at 07:20

## 2018-01-01 RX ADMIN — Medication 10 ML: at 11:54

## 2018-01-01 RX ADMIN — PROPOFOL 150 MG: 10 INJECTION, EMULSION INTRAVENOUS at 09:12

## 2018-01-01 RX ADMIN — Medication 2 G: at 08:18

## 2018-01-01 RX ADMIN — FENTANYL CITRATE 25 MCG: 50 INJECTION, SOLUTION INTRAMUSCULAR; INTRAVENOUS at 08:19

## 2018-01-01 RX ADMIN — MIDAZOLAM HYDROCHLORIDE 1 MG: 1 INJECTION, SOLUTION INTRAMUSCULAR; INTRAVENOUS at 08:15

## 2018-01-01 RX ADMIN — PROPOFOL 20 MG: 10 INJECTION, EMULSION INTRAVENOUS at 08:23

## 2018-01-01 RX ADMIN — Medication 2 G: at 09:19

## 2018-01-01 RX ADMIN — Medication 10 ML: at 11:29

## 2018-01-01 RX ADMIN — SODIUM CHLORIDE 100 ML: 900 INJECTION, SOLUTION INTRAVENOUS at 11:54

## 2018-01-01 RX ADMIN — MIDAZOLAM HYDROCHLORIDE 1 MG: 1 INJECTION, SOLUTION INTRAMUSCULAR; INTRAVENOUS at 09:08

## 2018-01-01 RX ADMIN — IOPAMIDOL 100 ML: 612 INJECTION, SOLUTION INTRAVENOUS at 11:54

## 2018-01-01 RX ADMIN — PROPOFOL 50 MG: 10 INJECTION, EMULSION INTRAVENOUS at 08:34

## 2018-01-01 RX ADMIN — Medication 120 MCG: at 08:36

## 2018-01-01 RX ADMIN — DEXAMETHASONE SODIUM PHOSPHATE 4 MG: 4 INJECTION, SOLUTION INTRA-ARTICULAR; INTRALESIONAL; INTRAMUSCULAR; INTRAVENOUS; SOFT TISSUE at 09:26

## 2018-01-01 RX ADMIN — FENTANYL CITRATE 50 MCG: 50 INJECTION, SOLUTION INTRAMUSCULAR; INTRAVENOUS at 09:12

## 2018-01-01 RX ADMIN — PROPOFOL 20 MG: 10 INJECTION, EMULSION INTRAVENOUS at 08:21

## 2018-01-01 RX ADMIN — PROPOFOL 50 MG: 10 INJECTION, EMULSION INTRAVENOUS at 08:18

## 2018-01-01 RX ADMIN — ONDANSETRON 4 MG: 2 INJECTION INTRAMUSCULAR; INTRAVENOUS at 09:26

## 2018-01-30 ENCOUNTER — OFFICE VISIT (OUTPATIENT)
Dept: INTERNAL MEDICINE CLINIC | Age: 68
End: 2018-01-30

## 2018-01-30 VITALS
BODY MASS INDEX: 29.77 KG/M2 | HEIGHT: 69 IN | DIASTOLIC BLOOD PRESSURE: 84 MMHG | RESPIRATION RATE: 19 BRPM | TEMPERATURE: 98 F | HEART RATE: 91 BPM | WEIGHT: 201 LBS | SYSTOLIC BLOOD PRESSURE: 144 MMHG | OXYGEN SATURATION: 99 %

## 2018-01-30 DIAGNOSIS — I47.1 SVT (SUPRAVENTRICULAR TACHYCARDIA) (HCC): ICD-10-CM

## 2018-01-30 DIAGNOSIS — E66.3 OVERWEIGHT (BMI 25.0-29.9): ICD-10-CM

## 2018-01-30 DIAGNOSIS — E11.9 CONTROLLED TYPE 2 DIABETES MELLITUS WITHOUT COMPLICATION, WITHOUT LONG-TERM CURRENT USE OF INSULIN (HCC): Primary | ICD-10-CM

## 2018-01-30 DIAGNOSIS — E78.5 DYSLIPIDEMIA: ICD-10-CM

## 2018-01-30 DIAGNOSIS — I10 ESSENTIAL HYPERTENSION WITH GOAL BLOOD PRESSURE LESS THAN 140/90: ICD-10-CM

## 2018-01-30 NOTE — PROGRESS NOTES
Chief Complaint   Patient presents with   [facility] Visit     doing well no concerns, brother recently passed dealing with those emotions, diet has been out of control      1. Have you been to the ER, urgent care clinic since your last visit? Hospitalized since your last visit? No    2. Have you seen or consulted any other health care providers outside of the [address] [place] [address] since your last visit? Include any pap smears or colon screening.  No

## 2018-01-30 NOTE — MR AVS SNAPSHOT
Mike Guzman 
 
 
 [address] [address] 
[phone] Patient: Enrrique Watts MRN: [id number] XMZ:[birth year] Visit Information Date & Time Provider Department Dept. Phone Encounter #  
 1/30/2018  9:15 AM Nanda Hanson [facility] [phone] [id number] Upcoming Health Maintenance Date Due  
 EYE EXAM RETINAL OR DILATED Q1 8/20/1960 BREAST CANCER SCRN MAMMOGRAM 8/20/1999 FOBT Q 1 YEAR AGE 50-75 8/20/1999 MEDICARE YEARLY EXAM 8/20/2014 HEMOGLOBIN A1C Q6M 2/14/2018 Pneumococcal 65+ Low/Medium Risk (2 of 2 - PPSV23) 8/14/2018 MICROALBUMIN Q1 8/14/2018 LIPID PANEL Q1 8/14/2018 GLAUCOMA SCREENING Q2Y 10/20/2018 FOOT EXAM Q1 1/30/2019 DTaP/Tdap/Td series (2 - Td) 8/14/2027 Allergies as of 1/30/2018  Review Complete On: 1/30/2018 By: Marina Martin No Known Allergies Current Immunizations  Never Reviewed No immunizations on file. Not reviewed this visit You Were Diagnosed With   
  
 Codes Comments Controlled type 2 diabetes mellitus without complication, without long-term current use of insulin ([facility]    -  Primary ICD-10-CM: E11.9 ICD-9-CM: 250.00 Essential hypertension with goal blood pressure less than 140/90     ICD-10-CM: I10 
ICD-9-CM: 401.9 Dyslipidemia     ICD-10-CM: E78.5 ICD-9-CM: 272.4 Vitals BP Pulse Temp Resp Height(growth percentile) Weight(growth percentile) 144/84 (BP 1 Location: Right arm, BP Patient Position: Sitting) 91 98 °F (36.7 °C) (Oral) 19 5' 9\" (1.753 m) 201 lb (91.2 kg) SpO2 BMI OB Status Smoking Status 99% 29.68 kg/m2 Postmenopausal Never Smoker BMI and BSA Data Body Mass Index Body Surface Area  
 29.68 kg/m 2 2.11 m 2 Preferred Pharmacy Pharmacy Name Phone [id number] [place] [address] [address] [phone] Your Updated Medication List  
  
   
This list is accurate as of: 1/30/18  1:34 PM.  Always use your most recent med list.  
  
  
  
  
 alcohol swabs Padm Commonly known as:  ALCOHOL PREP PADS  
1 Pad by Apply Externally route daily. DX.E11.9  
  
 amLODIPine 10 mg tablet Commonly known as:  Bosie Shield TAKE 1 TABLET BY MOUTH EVERY DAY  
  
 atorvastatin 40 mg tablet Commonly known as:  LIPITOR  
TAKE 1 TAB BY MOUTH DAILY. JANUMET XR 50-1,000 mg Tm24 Generic drug:  SITagliptin-metFORMIN  
TAKE 1 TABLET BY MOUTH TWICE A DAY WITH MEALS Lancets Misc Commonly known as: One Touch Chin Leopard USE TO TEST BLOOD SUGAR ONCE DAILY. DX.E11.9  
  
 metoprolol succinate 50 mg XL tablet Commonly known as:  TOPROL-XL  
TAKE 1 TAB BY MOUTH DAILY. ONETOUCH VERIO strip Generic drug:  glucose blood VI test strips TEST BLOOD SUGAR DAILY pioglitazone 30 mg tablet Commonly known as:  ACTOS  
TAKE 1 TABLET DAILY  
  
 spironolactone-hydrochlorothiazide 25-25 mg per tablet Commonly known as:  ALDACTAZIDE TAKE ONE TABLET BY MOUTH EVERY DAY We Performed the Following APOLIPOPROTEIN B [id number] CPT(R)] CRP, HIGH SENSITIVITY [70742 CPT(R)] HEMOGLOBIN A1C WITH EAG [98314 CPT(R)] METABOLIC PANEL, BASIC [90388 CPT(R)] Introducing [facility]! [address] [facility] introduces [employer] patient portal. Now you can access parts of your medical record, email your doctor's office, and request medication refills online. 1. In your internet browser, go to [email or web address] 2. Click on the First Time User? Click Here link in the Sign In box. You will see the New Member Sign Up page. 3. Enter your [employer] Access Code exactly as it appears below. You will not need to use this code after youve completed the sign-up process. If you do not sign up before the expiration date, you must request a new code.  
 
· [employer] Access Code: [email or web address] 
 Expires: 4/30/2018  1:34 PM 
 
4. Enter the last four digits of your Social Security Number (xxxx) and Date of Birth (mm/dd/yyyy) as indicated and click Submit. You will be taken to the next sign-up page. 5. Create a [employer] ID. This will be your [employer] login ID and cannot be changed, so think of one that is secure and easy to remember. 6. Create a [employer] password. You can change your password at any time. 7. Enter your Password Reset Question and Answer. This can be used at a later time if you forget your password. 8. Enter your e-mail address. You will receive e-mail notification when new information is available in [address] 9. Click Sign Up. You can now view and download portions of your medical record. 10. Click the Download Summary menu link to download a portable copy of your medical information. If you have questions, please visit the Frequently Asked Questions section of the [employer] website. Remember, [employer] is NOT to be used for urgent needs. For medical emergencies, dial 911. Now available from your iPhone and Android! Please provide this summary of care documentation to your next provider. Your primary care clinician is listed as PROVIDER UNKNOWN. If you have any questions after today's visit, please call [phone].

## 2018-01-31 LAB
APO B SERPL-MCNC: 72 MG/DL (ref 54–133)
BUN SERPL-MCNC: 14 MG/DL (ref 8–27)
BUN/CREAT SERPL: 16 (ref 12–28)
CALCIUM SERPL-MCNC: 10 MG/DL (ref 8.7–10.3)
CHLORIDE SERPL-SCNC: 98 MMOL/L (ref 96–106)
CO2 SERPL-SCNC: 25 MMOL/L (ref 18–29)
CREAT SERPL-MCNC: 0.87 MG/DL (ref 0.57–1)
CRP SERPL HS-MCNC: 2.97 MG/L (ref 0–3)
EST. AVERAGE GLUCOSE BLD GHB EST-MCNC: 148 MG/DL
GFR SERPLBLD CREATININE-BSD FMLA CKD-EPI: 69 ML/MIN/1.73
GFR SERPLBLD CREATININE-BSD FMLA CKD-EPI: 79 ML/MIN/1.73
GLUCOSE SERPL-MCNC: 89 MG/DL (ref 65–99)
HBA1C MFR BLD: 6.8 % (ref 4.8–5.6)
POTASSIUM SERPL-SCNC: 4 MMOL/L (ref 3.5–5.2)
SODIUM SERPL-SCNC: 138 MMOL/L (ref 134–144)

## 2018-01-31 NOTE — PROGRESS NOTES
[address] [facility]  [facility]  [address] [place] [id number]  Phone:  [phone]  Fax: [phone]       Chief Complaint   Patient presents with   [facility] Visit     doing well no concerns, brother recently passed dealing with those emotions, diet has been out of control    . SUBJECTIVE:    Danie Barrientos is a 75 y.o. female   Comes in for a return visit stating she is doing well. She had a tragedy in her life in that her brother  waiting for a cardiac transplant. He was on an LVAD for almost two years prior to this. He has been cared for at the [facility] in [place]. She has been able to maintain her weight which is great. She does have diabetes which historically has been doing quite well based on last HbA1c. She checks her blood sugars periodically and she has not had any interventional hypoglycemia. She finally accepted the statin and is taking on a regular basis in view of her increased cardiovascular risk. She is taking her antihypertensive medication with no adverse effects either. She had had a flare up of low back pain and is in physical therapy. Unfortunately, she continues to grieve over the death of her brother which is quite appropriate. MedDATA/gwo             Current Outpatient Prescriptions   Medication Sig Dispense Refill    pioglitazone (ACTOS) 30 mg tablet TAKE 1 TABLET DAILY 30 Tab 11    spironolactone-hydrochlorothiazide (ALDACTAZIDE) 25-25 mg per tablet TAKE ONE TABLET BY MOUTH EVERY DAY 30 Tab 11    atorvastatin (LIPITOR) 40 mg tablet TAKE 1 TAB BY MOUTH DAILY. 30 Tab 11    amLODIPine (NORVASC) 10 mg tablet TAKE 1 TABLET BY MOUTH EVERY DAY 30 Tab 11    ONETOUCH VERIO strip TEST BLOOD SUGAR DAILY 50 Strip 11    metoprolol succinate (TOPROL-XL) 50 mg XL tablet TAKE 1 TAB BY MOUTH DAILY.  30 Tab 11    JANUMET XR 50-1,000 mg TM24 TAKE 1 TABLET BY MOUTH TWICE A DAY WITH MEALS 60 Tab 11    Lancets (ONE TOUCH DELICA) misc USE TO TEST BLOOD SUGAR ONCE DAILY. DX.E11.9 100 Each 11    alcohol swabs (ALCOHOL PREP PADS) padm 1 Pad by Apply Externally route daily.  DX.E11.9 30 Pad 11     Past Medical History:   Diagnosis Date    Diabetes ([facility]     Fibromyalgia     Treated with alternate medical therapy    Hypertension      Past Surgical History:   Procedure Laterality Date    HX COLONOSCOPY  2015    HX GYN      Pap 2015    HX ORTHOPAEDIC      fractured R ankle     No Known Allergies      REVIEW OF SYSTEMS:  General: negative for - chills or fever  ENT: negative for - headaches, nasal congestion or tinnitus  Respiratory: negative for - cough, hemoptysis, shortness of breath or wheezing  Cardiovascular : negative for - chest pain, edema, palpitations or shortness of breath  Gastrointestinal: negative for - abdominal pain, blood in stools, heartburn or nausea/vomiting  Genito-Urinary: no dysuria, trouble voiding, or hematuria  Musculoskeletal: negative for - gait disturbance, joint pain, joint stiffness or joint swelling  Neurological: no TIA or stroke symptoms  Hematologic: no bruises, no bleeding, no swollen glands  Integument: no lumps, mole changes, nail changes or rash  Endocrine: no malaise/lethargy or unexpected weight changes      Social History     Social History    Marital status: SINGLE     Spouse name: N/A    Number of children: 0    Years of education: N/A     Occupational History    Employed by a nonprofit organization           Social History Main Topics    Smoking status: Never Smoker    Smokeless tobacco: Never Used    Alcohol use No    Drug use: No    Sexual activity: Yes     Other Topics Concern    None     Social History Narrative    Undergraduate [address] [facility] [employer]--- PhD    Postgraduate work [employer]    Honorary doctorate Charli Hernandez employed by [employer]     Family History   Problem Relation Age of Onset    Heart Disease Mother  Cancer Father     Cancer Sister      Breast cancer apparent DCIS    Heart Disease Brother      Brother  from congestive heart failure secondary to severe mitral disease       OBJECTIVE:    Visit Vitals    /84 (BP 1 Location: Right arm, BP Patient Position: Sitting)    Pulse 91    Temp 98 °F (36.7 °C) (Oral)    Resp 19    Ht 5' 9\" (1.753 m)    Wt 201 lb (91.2 kg)    SpO2 99%    BMI 29.68 kg/m2     CONSTITUTIONAL: well , well nourished, appears age appropriate  EYES: perrla, eom intact  ENMT:moist mucous membranes, pharynx clear  NECK: supple. Thyroid normal  RESPIRATORY: Chest: clear to ascultation and percussion   CARDIOVASCULAR: Heart: regular rate and rhythm  GASTROINTESTINAL: Abdomen: soft, bowel sounds active  HEMATOLOGIC: no pathological lymph nodes palpated  MUSCULOSKELETAL: Extremities: no edema, pulse 1+   INTEGUMENT: No unusual rashes or suspicious skin lesions noted. Nails appear normal.  NEUROLOGIC: non-focal exam   MENTAL STATUS: alert and oriented, appropriate affect      ASSESSMENT:  1. Controlled type 2 diabetes mellitus without complication, without long-term current use of insulin ([address]    2. Essential hypertension with goal blood pressure less than 140/90    3. Dyslipidemia    4. Overweight (BMI 25.0-29.9)    5. SVT (supraventricular tachycardia) ([address]      Diagnoses and all orders for this visit:    1. Controlled type 2 diabetes mellitus without complication, without long-term current use of insulin (HCC)  Assessment & Plan:  Stable, based on history, physical exam and review of pertinent labs, studies and medications; meds reconciled; continue current treatment plan.   Key Antihyperglycemic Medications             pioglitazone (ACTOS) 30 mg tablet  (Taking) TAKE 1 TABLET DAILY    JANUMET XR 50-1,000 mg TM24  (Taking) TAKE 1 TABLET BY MOUTH TWICE A DAY WITH MEALS        Other Key Diabetic Medications             atorvastatin (LIPITOR) 40 mg tablet  (Taking) TAKE 1 TAB BY MOUTH DAILY. Lab Results   Component Value Date/Time    Hemoglobin A1c 6.7 08/14/2017 12:37 PM    Glucose 82 08/14/2017 12:37 PM    Creatinine 1.06 08/14/2017 12:37 PM    Microalb/Creat ratio (ug/mg creat.) 4.7 08/14/2017 12:37 PM    Cholesterol, total 249 08/14/2017 12:37 PM    HDL Cholesterol 61 08/14/2017 12:37 PM    LDL, calculated 163 08/14/2017 12:37 PM    Triglyceride 127 08/14/2017 12:37 PM     Diabetic Foot and Eye Exam HM Status   Topic Date Due    Eye Exam  08/20/1960    Diabetic Foot Care  01/30/2019       Orders:  -     HEMOGLOBIN A1C WITH EAG    2. Essential hypertension with goal blood pressure less than 330/84  -     METABOLIC PANEL, BASIC    3. Dyslipidemia  -     APOLIPOPROTEIN B  -     CRP, HIGH SENSITIVITY    4. Overweight (BMI 25.0-29.9)    5. SVT (supraventricular tachycardia) (HCC)  Assessment & Plan:  Stable, based on history, physical exam and review of pertinent labs, studies and medications; meds reconciled; continue current treatment plan. Key CAD CHF Meds             spironolactone-hydrochlorothiazide (ALDACTAZIDE) 25-25 mg per tablet  (Taking) TAKE ONE TABLET BY MOUTH EVERY DAY    amLODIPine (NORVASC) 10 mg tablet  (Taking) TAKE 1 TABLET BY MOUTH EVERY DAY    metoprolol succinate (TOPROL-XL) 50 mg XL tablet  (Taking) TAKE 1 TAB BY MOUTH DAILY. Key Antihyperlipidemia Meds             atorvastatin (LIPITOR) 40 mg tablet  (Taking) TAKE 1 TAB BY MOUTH DAILY. Lab Results   Component Value Date/Time    Sodium 138 08/14/2017 12:37 PM    Potassium 4.3 08/14/2017 12:37 PM    Cholesterol, total 249 08/14/2017 12:37 PM    HDL Cholesterol 61 08/14/2017 12:37 PM    LDL, calculated 163 08/14/2017 12:37 PM    Triglyceride 127 08/14/2017 12:37 PM             PLAN:    1. Her diabetes hopefully is doing well but I will await results of HbA1c.   2. Blood pressure excellent today,no adjustments made. 3. She will continue statin as prescribed.  Efficacy and compliance will be assessed. 4. She is encouraged to lose weight by eating meals, minimizing snacking and avoiding consumption of processed carbohydrates. 5. I also encouraged her to increase her physical activity exercising on a consistent basis. MedDATA/gwo     . Orders Placed This Encounter    APOLIPOPROTEIN B    HEMOGLOBIN A1C WITH EAG    METABOLIC PANEL, BASIC    CRP, HIGH SENSITIVITY         Follow-up Disposition:  Return in about 6 months (around 7/30/2018).       Natan Merrill MD

## 2018-01-31 NOTE — ASSESSMENT & PLAN NOTE
Stable, based on history, physical exam and review of pertinent labs, studies and medications; meds reconciled; continue current treatment plan. Key CAD CHF Meds             spironolactone-hydrochlorothiazide (ALDACTAZIDE) 25-25 mg per tablet  (Taking) TAKE ONE TABLET BY MOUTH EVERY DAY    amLODIPine (NORVASC) 10 mg tablet  (Taking) TAKE 1 TABLET BY MOUTH EVERY DAY    metoprolol succinate (TOPROL-XL) 50 mg XL tablet  (Taking) TAKE 1 TAB BY MOUTH DAILY. Key Antihyperlipidemia Meds             atorvastatin (LIPITOR) 40 mg tablet  (Taking) TAKE 1 TAB BY MOUTH DAILY.         Lab Results   Component Value Date/Time    Sodium 138 08/14/2017 12:37 PM    Potassium 4.3 08/14/2017 12:37 PM    Cholesterol, total 249 08/14/2017 12:37 PM    HDL Cholesterol 61 08/14/2017 12:37 PM    LDL, calculated 163 08/14/2017 12:37 PM    Triglyceride 127 08/14/2017 12:37 PM

## 2018-01-31 NOTE — ASSESSMENT & PLAN NOTE
Stable, based on history, physical exam and review of pertinent labs, studies and medications; meds reconciled; continue current treatment plan. Key Antihyperglycemic Medications             pioglitazone (ACTOS) 30 mg tablet  (Taking) TAKE 1 TABLET DAILY    JANUMET XR 50-1,000 mg TM24  (Taking) TAKE 1 TABLET BY MOUTH TWICE A DAY WITH MEALS        Other Key Diabetic Medications             atorvastatin (LIPITOR) 40 mg tablet  (Taking) TAKE 1 TAB BY MOUTH DAILY.         Lab Results   Component Value Date/Time    Hemoglobin A1c 6.7 08/14/2017 12:37 PM    Glucose 82 08/14/2017 12:37 PM    Creatinine 1.06 08/14/2017 12:37 PM    Microalb/Creat ratio (ug/mg creat.) 4.7 08/14/2017 12:37 PM    Cholesterol, total 249 08/14/2017 12:37 PM    HDL Cholesterol 61 08/14/2017 12:37 PM    LDL, calculated 163 08/14/2017 12:37 PM    Triglyceride 127 08/14/2017 12:37 PM     Diabetic Foot and Eye Exam HM Status   Topic Date Due    Eye Exam  08/20/1960    Diabetic Foot Care  01/30/2019

## 2018-08-03 ENCOUNTER — OFFICE VISIT (OUTPATIENT)
Dept: INTERNAL MEDICINE CLINIC | Age: 68
End: 2018-08-03

## 2018-08-03 VITALS
HEART RATE: 83 BPM | DIASTOLIC BLOOD PRESSURE: 82 MMHG | HEIGHT: 69 IN | BODY MASS INDEX: 29.77 KG/M2 | RESPIRATION RATE: 16 BRPM | OXYGEN SATURATION: 98 % | SYSTOLIC BLOOD PRESSURE: 143 MMHG | TEMPERATURE: 98.5 F | WEIGHT: 201 LBS

## 2018-08-03 DIAGNOSIS — R19.5 OCCULT BLOOD IN STOOLS: ICD-10-CM

## 2018-08-03 DIAGNOSIS — I10 ESSENTIAL HYPERTENSION WITH GOAL BLOOD PRESSURE LESS THAN 140/90: ICD-10-CM

## 2018-08-03 DIAGNOSIS — E66.3 OVERWEIGHT (BMI 25.0-29.9): ICD-10-CM

## 2018-08-03 DIAGNOSIS — Z00.00 PHYSICAL EXAM: ICD-10-CM

## 2018-08-03 DIAGNOSIS — E78.5 DYSLIPIDEMIA: ICD-10-CM

## 2018-08-03 DIAGNOSIS — R22.1 NECK MASS: Primary | ICD-10-CM

## 2018-08-03 DIAGNOSIS — E11.9 CONTROLLED TYPE 2 DIABETES MELLITUS WITHOUT COMPLICATION, WITHOUT LONG-TERM CURRENT USE OF INSULIN (HCC): ICD-10-CM

## 2018-08-03 NOTE — MR AVS SNAPSHOT
Rowan Mchugh 
 
 
 . [address] [address] 
[phone] Patient: Faye Babin MRN: [id number] XAQ:[id number] Visit Information Date & Time Provider Department Dept. Phone Encounter #  
 8/3/2018 12:00 PM [facility] [phone] [id number] Upcoming Health Maintenance Date Due FOBT Q 1 YEAR AGE 50-75 8/20/1999 HEMOGLOBIN A1C Q6M 7/30/2018 Influenza Age 5 to Adult 8/1/2018 MICROALBUMIN Q1 8/14/2018 LIPID PANEL Q1 8/14/2018 EYE EXAM RETINAL OR DILATED Q1 10/3/2018* BREAST CANCER SCRN MAMMOGRAM 11/3/2018* Pneumococcal 65+ Low/Medium Risk (2 of 2 - PPSV23) 8/14/2018 GLAUCOMA SCREENING Q2Y 10/20/2018 FOOT EXAM Q1 1/30/2019 DTaP/Tdap/Td series (2 - Td) 8/14/2027 *Topic was postponed. The date shown is not the original due date. Allergies as of 8/3/2018  Review Complete On: 8/3/2018 By: Veronica Crystal No Known Allergies Current Immunizations  Never Reviewed No immunizations on file. Not reviewed this visit You Were Diagnosed With   
  
 Codes Comments Neck mass    -  Primary ICD-10-CM: R22.1 ICD-9-CM: 861. 2 Controlled type 2 diabetes mellitus without complication, without long-term current use of insulin ([facility]     ICD-10-CM: E11.9 ICD-9-CM: 250.00 Essential hypertension with goal blood pressure less than 140/90     ICD-10-CM: I10 
ICD-9-CM: 401.9 Dyslipidemia     ICD-10-CM: E78.5 ICD-9-CM: 272.4 Overweight (BMI 25.0-29. 9)     ICD-10-CM: T24.1 ICD-9-CM: 278.02 Occult blood in stools     ICD-10-CM: R19.5 ICD-9-CM: 792.1 Physical exam     ICD-10-CM: Z00.00 ICD-9-CM: V70.9 Vitals BP Pulse Temp Resp Height(growth percentile) Weight(growth percentile) 143/82 83 98.5 °F (36.9 °C) (Oral) 16 5' 9\" (1.753 m) 201 lb (91.2 kg) SpO2 BMI OB Status Smoking Status 98% 29.68 kg/m2 Postmenopausal Never Smoker BMI and BSA Data Body Mass Index Body Surface Area  
 29.68 kg/m 2 2.11 m 2 Preferred Pharmacy Pharmacy Name Phone [id number] [address] [place] [address] [phone] Your Updated Medication List  
  
   
This list is accurate as of 8/3/18  3:08 PM.  Always use your most recent med list.  
  
  
  
  
 alcohol swabs Padm Commonly known as:  ALCOHOL PREP PADS  
1 Pad by Apply Externally route daily. DX.E11.9  
  
 amLODIPine 10 mg tablet Commonly known as:  Arlyss [place] TAKE 1 TABLET BY MOUTH EVERY DAY  
  
 atorvastatin 40 mg tablet Commonly known as:  LIPITOR  
TAKE 1 TAB BY MOUTH DAILY. JANUMET XR 50-1,000 mg Tm24 Generic drug:  SITagliptin-metFORMIN  
TAKE 1 TABLET BY MOUTH TWICE A DAY WITH MEALS Lancets Misc Commonly known as: One Touch Juan Cleaves USE TO TEST BLOOD SUGAR ONCE DAILY. DX.E11.9  
  
 metoprolol succinate 50 mg XL tablet Commonly known as:  TOPROL-XL  
TAKE 1 TAB BY MOUTH DAILY. ONETOUCH VERIO strip Generic drug:  glucose blood VI test strips TEST BLOOD SUGAR DAILY pioglitazone 30 mg tablet Commonly known as:  ACTOS  
TAKE 1 TABLET DAILY  
  
 spironolactone-hydrochlorothiazide 25-25 mg per tablet Commonly known as:  ALDACTAZIDE TAKE ONE TABLET BY MOUTH EVERY DAY We Performed the Following APOLIPOPROTEIN B [id number] CPT(R)] CBC WITH AUTOMATED DIFF [53662 CPT(R)] COLLECTION VENOUS BLOOD,VENIPUNCTURE [id number] CPT(R)] CRP, HIGH SENSITIVITY [56459 CPT(R)] HEMOGLOBIN A1C WITH EAG [44070 CPT(R)] LIPID PANEL [40612 CPT(R)] METABOLIC PANEL, COMPREHENSIVE [16836 CPT(R)] MICROALBUMIN, UR, RAND W/ MICROALB/CREAT RATIO [id number] CPT(R)] OCCULT BLOOD IMMUNOASSAY,DIAGNOSTIC [65782 CPT(R)] TSH 3RD GENERATION [70334 CPT(R)] URINALYSIS W/ RFLX MICROSCOPIC [94347 CPT(R)] To-Do List   
 08/10/2018 Imaging:  CT NECK SOFT TISSUE W WO CONT Introducing [facility]! Jennyfer Lora introduces Press[employer] patient portal. Now you can access parts of your medical record, email your doctor's office, and request medication refills online. 1. In your internet browser, go to [email or web address] 2. Click on the First Time User? Click Here link in the Sign In box. You will see the New Member Sign Up page. 3. Enter your Pressglue Access Code exactly as it appears below. You will not need to use this code after youve completed the sign-up process. If you do not sign up before the expiration date, you must request a new code. · Press[employer]ue Access Code: [email or web address] Expires: 11/1/2018  1:12 PM 
 
4. Enter the last four digits of your Social Security Number (xxxx) and Date of Birth (mm/dd/yyyy) as indicated and click Submit. You will be taken to the next sign-up page. 5. Create a Pressglue ID. This will be your Pressglue login ID and cannot be changed, so think of one that is secure and easy to remember. 6. Create a Pressglue password. You can change your password at any time. 7. Enter your Password Reset Question and Answer. This can be used at a later time if you forget your password. 8. Enter your e-mail address. You will receive e-mail notification when new information is available in [address] 9. Click Sign Up. You can now view and download portions of your medical record. 10. Click the Download Summary menu link to download a portable copy of your medical information. If you have questions, please visit the Frequently Asked Questions section of the Pressglue website. Remember, Pressglue is NOT to be used for urgent needs. For medical emergencies, dial 911. Now available from your iPhone and Android! Please provide this summary of care documentation to your next provider. Your primary care clinician is listed as PROVIDER UNKNOWN.  If you have any questions after today's visit, please call [phone].

## 2018-08-03 NOTE — PROGRESS NOTES
Patient states that she has decreased Janumet to 1 tab once daily due to the lower glucose readings. Chief Complaint Patient presents with  Diabetes 6 month follow up 1. Have you been to the ER, urgent care clinic since your last visit? Hospitalized since your last visit? No 
 
2. Have you seen or consulted any other health care providers outside of the [address] [place] [address] since your last visit? Include any pap smears or colon screening.  No

## 2018-08-04 NOTE — PROGRESS NOTES
[address] [place] [address] and Primary Care 
[place] [address] 
[address] [facility] [address] Phone:  [phone]  Fax: [phone] Chief Complaint Patient presents with  Diabetes 6 month follow up Chasidy Novoa SUBJECTIVE: 
  Kylie Guerin is a 75 y.o. female who comes in concerned about a lesions noted on the right side of her neck. This occurred when she was in [place]. It has been present for about 1-1/2 months and has not changed in size. She denies any similar lesions in the past.  There is no history of cigarette smoking. From a diabetic standpoint she is doing well. Unfortunately, she reduced one of her pills because her blood sugars were excellent. She has been taking her blood pressure medications as presribed. She is at an increased cardiovascular risk and is maintained on a statin which she is complaint with. She remains physically active and her weight has remained stable. Current Outpatient Prescriptions Medication Sig Dispense Refill  pioglitazone (ACTOS) 30 mg tablet TAKE 1 TABLET DAILY 30 Tab 11  
 spironolactone-hydrochlorothiazide (ALDACTAZIDE) 25-25 mg per tablet TAKE ONE TABLET BY MOUTH EVERY DAY 30 Tab 11  
 atorvastatin (LIPITOR) 40 mg tablet TAKE 1 TAB BY MOUTH DAILY. 30 Tab 11  
 amLODIPine (NORVASC) 10 mg tablet TAKE 1 TABLET BY MOUTH EVERY DAY 30 Tab 11  
 ONETOUCH VERIO strip TEST BLOOD SUGAR DAILY 50 Strip 11  
 metoprolol succinate (TOPROL-XL) 50 mg XL tablet TAKE 1 TAB BY MOUTH DAILY. 30 Tab 11  
 JANUMET XR 50-1,000 mg TM24 TAKE 1 TABLET BY MOUTH TWICE A DAY WITH MEALS 60 Tab 11  Lancets (ONE TOUCH DELICA) misc USE TO TEST BLOOD SUGAR ONCE DAILY. DX.E11.9 100 Each 11  
 alcohol swabs (ALCOHOL PREP PADS) padm 1 Pad by Apply Externally route daily. DX.E11.9 30 Pad 11 Past Medical History:  
Diagnosis Date  Diabetes ([address]  Fibromyalgia Treated with alternate medical therapy  Hypertension Past Surgical History: Procedure Laterality Date  HX COLONOSCOPY    HX GYN Pap   HX ORTHOPAEDIC    
 fractured R ankle No Known Allergies REVIEW OF SYSTEMS: 
General: negative for - chills or fever ENT: negative for - headaches, nasal congestion or tinnitus Respiratory: negative for - cough, hemoptysis, shortness of breath or wheezing Cardiovascular : negative for - chest pain, edema, palpitations or shortness of breath Gastrointestinal: negative for - abdominal pain, blood in stools, heartburn or nausea/vomiting Genito-Urinary: no dysuria, trouble voiding, or hematuria Musculoskeletal: negative for - gait disturbance, joint pain, joint stiffness or joint swelling Neurological: no TIA or stroke symptoms Hematologic: no bruises, no bleeding, no swollen glands Integument: no lumps, mole changes, nail changes or rash Endocrine: no malaise/lethargy or unexpected weight changes Social History Social History  Marital status: SINGLE Spouse name: N/A  
 Number of children: 0  
 Years of education: N/A Occupational History  Employed by a nonprofit organization  Social History Main Topics  Smoking status: Never Smoker  Smokeless tobacco: Never Used  Alcohol use No  
 Drug use: No  
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  
 Postgraduate work [employer] Honorary doctorate [employer] Formally employed by [employer] Family History Problem Relation Age of Onset  Heart Disease Mother  Cancer Father  Cancer Sister Breast cancer apparent DCIS  
 Heart Disease Brother Brother  from congestive heart failure secondary to severe mitral disease OBJECTIVE: 
 
Visit Vitals  /82  Pulse 83  Temp 98.5 °F (36.9 °C) (Oral)  Resp 16  
 Ht 5' 9\" (1.753 m)  Wt 201 lb (91.2 kg)  
 SpO2 98%  BMI 29.68 kg/m2 CONSTITUTIONAL: well , well nourished, appears age appropriate EYES: perrla, eom intact ENMT:moist mucous membranes, pharynx clear NECK: supple. Thyroid normal, firm nodular structure measuring about 1/2 cm noted adjacent to a soft tissue mass measuring about 1-1/2 cm. RESPIRATORY: Chest: clear to ascultation and percussion CARDIOVASCULAR: Heart: regular rate and rhythm GASTROINTESTINAL: Abdomen: soft, bowel sounds active HEMATOLOGIC: no pathological lymph nodes palpated MUSCULOSKELETAL: Extremities: no edema, pulse 1+ INTEGUMENT: No unusual rashes or suspicious skin lesions noted. Nails appear normal. 
NEUROLOGIC: non-focal exam  
MENTAL STATUS: alert and oriented, appropriate affect ASSESSMENT: 
1. Neck mass 2. Controlled type 2 diabetes mellitus without complication, without long-term current use of insulin ([address] 3. Essential hypertension with goal blood pressure less than 140/90 4. Dyslipidemia 5. Overweight (BMI 25.0-29.9) 6. Occult blood in stools 7. Physical exam   
 
 
PLAN: 
1.  I am not entirely sure the origin of the lesion of her neck, 
but a CT will be obtained and she will be sent to ENT for 
evaluation. There is no history of cigarette smoking. I found 
no oral lesions, either. There are 2 adjacent lesions. One is 
firm and the other one is soft. 2.  Her diabetes historically has been doing well. I will await 
the results of her hemoglobin A1c before suggesting the potential 
of increasing her anti-diabetic medication back to the 2 tablets 
daily in divided doses. She will continue her pioglitazone. 3.  Blood pressure is excellent today. No adjustments are made. 4.  She will continue her statin as prescribed and efficacy and 
compliance will be assessed. 5.  I encouraged her to minimize weight gain, which she has 
historically been doing. 6.  Her next trip for her [place] is to [facility].  
. 
Orders Placed This Encounter  CT NECK SOFT TISSUE W WO CONT  MICROALBUMIN, UR, RAND  OCCULT BLOOD IMMUNOASSAY,DIAGNOSTIC  APOLIPOPROTEIN B  
 CBC WITH AUTOMATED DIFF  
 CRP, HIGH SENSITIVITY  LIPID PANEL  
 METABOLIC PANEL, COMPREHENSIVE  
 URINALYSIS W/ RFLX MICROSCOPIC  TSH 3RD GENERATION  
 HEMOGLOBIN A1C WITH EAG Follow-up Disposition: 
Return in about 4 months (around 12/3/2018).  
 
 
Deedee Higgins MD

## 2018-08-06 NOTE — PROGRESS NOTES
Comes in concerned about a mass noted on the right side of her  neck. This occurred when she was in [place]. It has been present  for about a month and half, and it has not changed in size. She  denies any similar lesions in the past.  There is no history of  cigarette smoking. From a diabetic standpoint, she is doing well. Unfortunately,  she reduced one of her pills because her blood sugars were  excellent. She has been taking her antihypertensive medication as  prescribed. She is also at an increased cardiovascular risk and is maintained  on her statin, which she is compliant with. She remains physically active, and her weight has been reasonably  stable.

## 2018-08-07 LAB
ALBUMIN SERPL-MCNC: 4.8 G/DL (ref 3.6–4.8)
ALBUMIN/CREAT UR: 6 MG/G CREAT (ref 0–30)
ALBUMIN/GLOB SERPL: 1.3 {RATIO} (ref 1.2–2.2)
ALP SERPL-CCNC: 159 IU/L (ref 39–117)
ALT SERPL-CCNC: 15 IU/L (ref 0–32)
APO B SERPL-MCNC: 67 MG/DL (ref 54–133)
APPEARANCE UR: ABNORMAL
AST SERPL-CCNC: 21 IU/L (ref 0–40)
BACTERIA #/AREA URNS HPF: ABNORMAL /[HPF]
BASOPHILS # BLD AUTO: 0 X10E3/UL (ref 0–0.2)
BASOPHILS NFR BLD AUTO: 0 %
BILIRUB SERPL-MCNC: 0.3 MG/DL (ref 0–1.2)
BILIRUB UR QL STRIP: NEGATIVE
BUN SERPL-MCNC: 21 MG/DL (ref 8–27)
BUN/CREAT SERPL: 20 (ref 12–28)
CALCIUM SERPL-MCNC: 10.5 MG/DL (ref 8.7–10.3)
CASTS URNS MICRO: ABNORMAL
CASTS URNS QL MICRO: PRESENT /LPF
CHLORIDE SERPL-SCNC: 97 MMOL/L (ref 96–106)
CHOLEST SERPL-MCNC: 134 MG/DL (ref 100–199)
CO2 SERPL-SCNC: 23 MMOL/L (ref 20–29)
COLOR UR: YELLOW
CREAT SERPL-MCNC: 1.04 MG/DL (ref 0.57–1)
CREAT UR-MCNC: 205.4 MG/DL
CRP SERPL HS-MCNC: 5.78 MG/L (ref 0–3)
CRYSTALS URNS MICRO: ABNORMAL
EOSINOPHIL # BLD AUTO: 0.1 X10E3/UL (ref 0–0.4)
EOSINOPHIL NFR BLD AUTO: 1 %
EPI CELLS #/AREA URNS HPF: >10 /HPF
ERYTHROCYTE [DISTWIDTH] IN BLOOD BY AUTOMATED COUNT: 14.2 % (ref 12.3–15.4)
EST. AVERAGE GLUCOSE BLD GHB EST-MCNC: 151 MG/DL
GLOBULIN SER CALC-MCNC: 3.8 G/DL (ref 1.5–4.5)
GLUCOSE SERPL-MCNC: 121 MG/DL (ref 65–99)
GLUCOSE UR QL: NEGATIVE
HBA1C MFR BLD: 6.9 % (ref 4.8–5.6)
HCT VFR BLD AUTO: 34.2 % (ref 34–46.6)
HDLC SERPL-MCNC: 56 MG/DL
HEMOCCULT STL QL IA: NORMAL
HGB BLD-MCNC: 11.1 G/DL (ref 11.1–15.9)
HGB UR QL STRIP: NEGATIVE
IMM GRANULOCYTES # BLD: 0 X10E3/UL (ref 0–0.1)
IMM GRANULOCYTES NFR BLD: 0 %
KETONES UR QL STRIP: ABNORMAL
LDLC SERPL CALC-MCNC: 64 MG/DL (ref 0–99)
LEUKOCYTE ESTERASE UR QL STRIP: ABNORMAL
LYMPHOCYTES # BLD AUTO: 1.8 X10E3/UL (ref 0.7–3.1)
LYMPHOCYTES NFR BLD AUTO: 23 %
MCH RBC QN AUTO: 29.8 PG (ref 26.6–33)
MCHC RBC AUTO-ENTMCNC: 32.5 G/DL (ref 31.5–35.7)
MCV RBC AUTO: 92 FL (ref 79–97)
MICRO URNS: ABNORMAL
MICROALBUMIN UR-MCNC: 12.4 UG/ML
MONOCYTES # BLD AUTO: 0.5 X10E3/UL (ref 0.1–0.9)
MONOCYTES NFR BLD AUTO: 7 %
MUCOUS THREADS URNS QL MICRO: PRESENT
NEUTROPHILS # BLD AUTO: 5.3 X10E3/UL (ref 1.4–7)
NEUTROPHILS NFR BLD AUTO: 69 %
NITRITE UR QL STRIP: NEGATIVE
PH UR STRIP: 5 [PH] (ref 5–7.5)
PLATELET # BLD AUTO: 308 X10E3/UL (ref 150–379)
POTASSIUM SERPL-SCNC: 4.6 MMOL/L (ref 3.5–5.2)
PROT SERPL-MCNC: 8.6 G/DL (ref 6–8.5)
PROT UR QL STRIP: NEGATIVE
RBC # BLD AUTO: 3.72 X10E6/UL (ref 3.77–5.28)
RBC #/AREA URNS HPF: ABNORMAL /HPF
SODIUM SERPL-SCNC: 138 MMOL/L (ref 134–144)
SP GR UR: 1.02 (ref 1–1.03)
TRIGL SERPL-MCNC: 71 MG/DL (ref 0–149)
TSH SERPL DL<=0.005 MIU/L-ACNC: 1.76 UIU/ML (ref 0.45–4.5)
UNIDENT CRYS URNS QL MICRO: PRESENT
UROBILINOGEN UR STRIP-MCNC: 1 MG/DL (ref 0.2–1)
VLDLC SERPL CALC-MCNC: 14 MG/DL (ref 5–40)
WBC # BLD AUTO: 7.8 X10E3/UL (ref 3.4–10.8)
WBC #/AREA URNS HPF: ABNORMAL /HPF

## 2018-08-07 NOTE — PROGRESS NOTES
1.  I am not entirely sure the origin of the lesion of her neck,  but a CT will be obtained and she will be sent to ENT for  evaluation. There is no history of cigarette smoking. I found  no oral lesions, either. There are 2 adjacent lesions. One is  firm and the other one is soft. 2.  Her diabetes historically has been doing well. I will await  the results of her hemoglobin A1c before suggesting the potential  of increasing her anti-diabetic medication back to the 2 tablets  daily in divided doses. She will continue her pioglitazone. 3.  Blood pressure is excellent today. No adjustments are made. 4.  She will continue her statin as prescribed and efficacy and  compliance will be assessed. 5.  I encouraged her to minimize weight gain, which she has  historically been doing. 6.  Her next trip for her [place] is to [facility].

## 2018-08-13 ENCOUNTER — HOSPITAL ENCOUNTER (OUTPATIENT)
Dept: CT IMAGING | Age: 68
Discharge: HOME | End: 2018-08-13
Attending: INTERNAL MEDICINE
Payer: COMMERCIAL

## 2018-08-13 DIAGNOSIS — R22.1 NECK MASS: ICD-10-CM

## 2018-08-13 PROCEDURE — 74011636320 HC RX REV CODE- 636/320: Performed by: INTERNAL MEDICINE

## 2018-08-13 PROCEDURE — 70491 CT SOFT TISSUE NECK W/DYE: CPT

## 2018-08-13 PROCEDURE — 74011000258 HC RX REV CODE- 258: Performed by: INTERNAL MEDICINE

## 2018-08-13 RX ORDER — SODIUM CHLORIDE 0.9 % (FLUSH) 0.9 %
10 SYRINGE (ML) INJECTION
Status: COMPLETED | OUTPATIENT
Start: 2018-08-13 | End: 2018-08-13

## 2018-08-13 RX ADMIN — SODIUM CHLORIDE 100 ML: 900 INJECTION, SOLUTION INTRAVENOUS at 10:23

## 2018-08-13 RX ADMIN — IOPAMIDOL 100 ML: 612 INJECTION, SOLUTION INTRAVENOUS at 10:23

## 2018-08-13 RX ADMIN — Medication 10 ML: at 10:23

## 2018-08-14 DIAGNOSIS — R22.2 SUPRACLAVICULAR MASS: Primary | ICD-10-CM

## 2018-08-23 NOTE — LETTER
NOTIFICATION RETURN TO WORK / SCHOOL 
 
8/23/2018 2:11 PM 
 
Ms. Ronal Carpio [facility] [id number] To Whom It May Concern: 
 
Ronal Carpio is currently under the care of [address]. She was seen in the office today. If there are questions or concerns please have the patient contact our office. Sincerely, Jose Alejandro Lozano MD

## 2018-08-23 NOTE — PROGRESS NOTES
Chief Complaint   Patient presents with    Mass     Pt seen at the request of Dr. Noah Caraballo to evaluate right supracavicular mass. 1. Have you been to the ER, urgent care clinic since your last visit? Hospitalized since your last visit?no    2. Have you seen or consulted any other health care providers outside of the [address] [place] [address] since your last visit? Dr.William Galindo/Chiropractor Include any pap smears or   colon screening. Discussed advanced directive. Patient states that she does not have an advanced directive.

## 2018-08-23 NOTE — MR AVS SNAPSHOT
[address] [facility] [address] [place] [address] [facility] 
[phone] Patient: Eleni Rodriguez MRN: [id number] JCO:[birth year] Visit Information Date & Time Provider Department Dept. Phone Encounter #  
 8/23/2018  1:40 PM Tonya Blair MD [facility] [id number] Upcoming Health Maintenance Date Due Influenza Age 5 to Adult 8/1/2018 Pneumococcal 65+ Low/Medium Risk (2 of 2 - PPSV23) 8/14/2018 GLAUCOMA SCREENING Q2Y 10/20/2018 EYE EXAM RETINAL OR DILATED Q1 10/3/2018* BREAST CANCER SCRN MAMMOGRAM 11/3/2018* FOOT EXAM Q1 1/30/2019 HEMOGLOBIN A1C Q6M 2/3/2019 MICROALBUMIN Q1 8/3/2019 LIPID PANEL Q1 8/3/2019 FOBT Q 1 YEAR AGE 50-75 8/3/2019 DTaP/Tdap/Td series (2 - Td) 8/14/2027 *Topic was postponed. The date shown is not the original due date. Allergies as of 8/23/2018  Review Complete On: 8/23/2018 By: Tonya Blair MD  
 No Known Allergies Current Immunizations  Never Reviewed No immunizations on file. Not reviewed this visit Vitals BP Pulse Temp Resp Height(growth percentile) Weight(growth percentile) 149/81 (BP 1 Location: Left arm, BP Patient Position: Sitting) (!) 103 97 °F (36.1 °C) (Oral) 20 5' 9\" (1.753 m) 203 lb 1.6 oz (92.1 kg) SpO2 BMI OB Status Smoking Status 99% 29.99 kg/m2 Postmenopausal Never Smoker BMI and BSA Data Body Mass Index Body Surface Area  
 29.99 kg/m 2 2.12 m 2 Preferred Pharmacy Pharmacy Name Phone [address], [facility] Your Updated Medication List  
  
   
This list is accurate as of 8/23/18  2:30 PM.  Always use your most recent med list.  
  
  
  
  
 alcohol swabs Padm Commonly known as:  ALCOHOL PREP PADS  
1 Pad by Apply Externally route daily. DX.E11.9  
  
 amLODIPine 10 mg tablet Commonly known as:  Melvin Ann TAKE 1 TABLET BY MOUTH EVERY DAY  
  
 atorvastatin 40 mg tablet Commonly known as:  LIPITOR  
TAKE 1 TAB BY MOUTH DAILY. JANUMET XR 50-1,000 mg Tm24 Generic drug:  SITagliptin-metFORMIN  
TAKE 1 TABLET BY MOUTH TWICE A DAY WITH MEALS Lancets Misc Commonly known as: One Touch Mohsen Lorenzo USE TO TEST BLOOD SUGAR ONCE DAILY. DX.E11.9  
  
 metoprolol succinate 50 mg XL tablet Commonly known as:  TOPROL-XL  
TAKE 1 TAB BY MOUTH DAILY. ONETOUCH VERIO strip Generic drug:  glucose blood VI test strips TEST BLOOD SUGAR DAILY pioglitazone 30 mg tablet Commonly known as:  ACTOS  
TAKE 1 TABLET DAILY  
  
 spironolactone-hydrochlorothiazide 25-25 mg per tablet Commonly known as:  ALDACTAZIDE TAKE ONE TABLET BY MOUTH EVERY DAY Introducing [facility]! [employer] introduces [employer] patient portal. Now you can access parts of your medical record, email your doctor's office, and request medication refills online. 1. In your internet browser, go to [email or web address] 2. Click on the First Time User? Click Here link in the Sign In box. You will see the New Member Sign Up page. 3. Enter your [employer] Access Code exactly as it appears below. You will not need to use this code after youve completed the sign-up process. If you do not sign up before the expiration date, you must request a new code. · [employer] Access Code: [email or web address] Expires: 11/1/2018  1:12 PM 
 
4. Enter the last four digits of your Social Security Number (xxxx) and Date of Birth (mm/dd/yyyy) as indicated and click Submit. You will be taken to the next sign-up page. 5. Create a [employer] ID. This will be your [employer] login ID and cannot be changed, so think of one that is secure and easy to remember. 6. Create a [employer] password. You can change your password at any time. 7. Enter your Password Reset Question and Answer.  This can be used at a later time if you forget your password. 8. Enter your e-mail address. You will receive e-mail notification when new information is available in [address] 9. Click Sign Up. You can now view and download portions of your medical record. 10. Click the Download Summary menu link to download a portable copy of your medical information. If you have questions, please visit the Frequently Asked Questions section of the [employer] website. Remember, [employer] is NOT to be used for urgent needs. For medical emergencies, dial 911. Now available from your iPhone and Android! Please provide this summary of care documentation to your next provider. Your primary care clinician is listed as Carlos Alberto Barajas. If you have any questions after today's visit, please call [phone].

## 2018-08-23 NOTE — PROGRESS NOTES
HISTORY OF PRESENT ILLNESS  Loyda Chery is a 70 y.o. female. 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 There is a right  supraclavicular lymph node that measures 3.1 x 1.9 cm.     There is extensive adenopathy in the mediastinum which is incompletely imaged. .   There is compression of the superior vena cava by the adenopathy. There are borderline enlarged left supraclavicular lymph nodes.     The carotid and jugular vessels enhance normally.      There is a 7 mm low-density in the right thyroid. Submandibular glands and  parotid glands are within normal limits. .     No nasopharyngeal, pharyngeal or laryngeal mass is identified.     No abnormalities are identified in the visualized portions of the brain or  orbits.      The visualized mastoid air cells and paranasal sinuses are clear.     There is a 6 mm left upper lobe nodule     There are degenerative changes throughout the cervical spine. .     IMPRESSION  IMPRESSION:   1. There is right level 3 and 4 adenopathy. There is extensive adenopathy  extending into the mediastinum compressing the SVC suspicious for neoplasm which  could be metastatic or lymphoma.      ____________________________________________________________________________  Patient presents with:  Mass: Pt seen at the request of Dr. Dana Rivera to evaluate right supracavicular mass.     /81 (BP 1 Location: Left arm, BP Patient Position: Sitting)  Pulse (!) 103  Temp 97 °F (36.1 °C) (Oral)   Resp 20  Ht 5' 9\" (1.753 m)  Wt 92.1 kg (203 lb 1.6 oz)  SpO2 99%  BMI 29.99 kg/m2  Past Medical History:  No date: Diabetes ([address]  No date: Fibromyalgia      Comment: Treated with alternate medical therapy  No date: Hypertension  Past Surgical History:  2015: HX COLONOSCOPY  No date: HX GYN      Comment: Pap 2015  No date: HX ORTHOPAEDIC      Comment: fractured R ankle/ no surgery  Social History    Marital status: SINGLE              Spouse name:                       Years of education:                 Number of children: 0             Occupational History  Occupation          Employer            Comment               Employed by a nonp*                         Social History Main Topics    Smoking status: Never Smoker                                                                Smokeless status: Never Used                        Alcohol use: Yes                Comment: socially    Drug use: No              Sexual activity: Yes                  Social History Narrative    Undergraduate [address]--- PhD    Postgraduate work Albino doctorate [facility] [employer]    Formally employed by [employer]      Review of patient's family history indicates:    Heart Disease                  Mother                    Cancer                         Father                      Comment: prostate cancer    Cancer                         Sister                      Comment: Breast cancer apparent DCIS    Heart Disease                  Brother                     Comment: Brother  from congestive heart                failure secondary to severe mitral                disease    Current Outpatient Prescriptions:  pioglitazone (ACTOS) 30 mg tablet, TAKE 1 TABLET DAILY  spironolactone-hydrochlorothiazide (ALDACTAZIDE) 25-25 mg per tablet, TAKE ONE TABLET BY MOUTH EVERY DAY  atorvastatin (LIPITOR) 40 mg tablet, TAKE 1 TAB BY MOUTH DAILY. amLODIPine (NORVASC) 10 mg tablet, TAKE 1 TABLET BY MOUTH EVERY DAY  metoprolol succinate (TOPROL-XL) 50 mg XL tablet, TAKE 1 TAB BY MOUTH DAILY.    JANUMET XR 50-1,000 mg TM24, TAKE 1 TABLET BY MOUTH TWICE A DAY WITH MEALS  Lancets (ONE TOUCH DELICA) misc, USE TO TEST BLOOD SUGAR ONCE DAILY. DX.E11.9   alcohol swabs (ALCOHOL PREP PADS) padm, 1 Pad by Apply Externally route daily. DX.E11.9  ONETOUCH VERIO strip, TEST BLOOD SUGAR DAILY    No current facility-administered medications for this visit. Allergies: No Known Allergies  _____________________________________________________________________________      Mass   The history is provided by the patient. This is a new problem. The current episode started more than 1 week ago. The problem occurs constantly. The problem has been gradually worsening. Pertinent negatives include no chest pain, no abdominal pain, no headaches and no shortness of breath. Nothing aggravates the symptoms. Nothing relieves the symptoms. The treatment provided no relief. Review of Systems   Constitutional: Negative for chills, fever and weight loss. HENT: Negative for ear pain. Eyes: Negative for pain. Respiratory: Negative for shortness of breath. Cardiovascular: Negative for chest pain. Gastrointestinal: Negative for abdominal pain and blood in stool. Genitourinary: Negative for hematuria. Musculoskeletal: Negative for joint pain. Skin: Negative for rash. Neurological: Negative for dizziness, focal weakness, seizures and headaches. Endo/Heme/Allergies: Does not bruise/bleed easily. Psychiatric/Behavioral: The patient does not have insomnia. Physical Exam   Constitutional: She is oriented to person, place, and time. She appears well-developed and well-nourished. No distress. HENT:   Head: Normocephalic and atraumatic. Mouth/Throat: No oropharyngeal exudate. Eyes: Pupils are equal, round, and reactive to light. Neck: Normal range of motion. No tracheal deviation present. Cardiovascular: Normal rate, regular rhythm and normal heart sounds. No murmur heard. Pulmonary/Chest: Effort normal and breath sounds normal. No respiratory distress. She has no wheezes. Abdominal: Soft.  Bowel sounds are normal. She exhibits no distension and no mass. There is no tenderness. There is no rebound and no guarding. Musculoskeletal: Normal range of motion. She exhibits no edema or tenderness. Lymphadenopathy:     She has cervical adenopathy. Right cervical: Superficial cervical and posterior cervical adenopathy present. Right: Supraclavicular adenopathy present. Neurological: She is alert and oriented to person, place, and time. Skin: Skin is warm. No rash noted. She is not diaphoretic. No erythema. Psychiatric: She has a normal mood and affect. Her behavior is normal.       ASSESSMENT and PLAN    ICD-10-CM ICD-9-CM    1. Lymphadenopathy R59.1 785. 6      Reviewed DDx including infections or lymphoma. Extensive travel history. I had an extensive discussion with Luis Carson regarding the risks, benefits, and alternatives of proceeding with an Excisional biopsy of a right neck lymph node. Risks of surgery including the risk of anesthesia, bleeding, infection, injury to underlying structures, recurrence, need for further surgery, and the lack of symptomatic improvement were discussed and she is in agreement to proceed. She will try to change her travel plans and we will schedule for next week. Thank you for this consult.

## 2018-08-24 PROBLEM — R59.1 LYMPHADENOPATHY: Status: ACTIVE | Noted: 2018-01-01

## 2018-08-24 NOTE — TELEPHONE ENCOUNTER
Pt agreeable LN [place] nurse. .NPO mn unless otherwise instructed by PAT nurse. Stop Nsaids today. Have responsible adult for d/c home. All questions answered with clarification.

## 2018-08-24 NOTE — PERIOP NOTES
[facility]  Ambulatory Surgery Unit  Pre-operative Instructions    Surgery/Procedure Date  Monday, August 27, 2018            Tentative Arrival Time 0800      1. On the day of your surgery/procedure, please report to the Ambulatory Surgery Unit Registration Desk and sign in at your designated time. The Ambulatory Surgery Unit is located in [facility] on the [facility] side of the [facility] across from the [facility]. Please have all of your health insurance cards and a photo ID. 2. You must have someone with you to drive you home, as you should not drive a car for 24 hours following anesthesia. Please make arrangements for a responsible adult friend or family member to stay with you for at least the first 24 hours after your surgery. 3. Do not have anything to eat or drink (including water, gum, mints, coffee, juice) after midnight, Sunday. This may not apply to medications prescribed by your physician. (Please note below the special instructions with medications to take the morning of surgery, if applicable.)    4. We recommend you do not drink any alcoholic beverages for 24 hours before and after your surgery. 5. Contact your surgeons office for instructions on the following medications: non-steroidal anti-inflammatory drugs (i.e. Advil, Aleve), vitamins, and supplements. (Some surgeons will want you to stop these medications prior to surgery and others may allow you to take them)   **If you are currently taking Plavix, Coumadin, Aspirin and/or other blood-thinning agents, contact your surgeon for instructions. ** Your surgeon will partner with the physician prescribing these medications to determine if it is safe to stop or if you need to continue taking. Please do not stop taking these medications without instructions from your surgeon.     6. In an effort to help prevent surgical site infection, we ask that you shower with an anti-bacterial soap (i.e. Dial or Safeguard) for 3 days prior to and on the morning of surgery, using a fresh towel after each shower. (Please begin this process with fresh bed linens.) Do not apply any lotions, powders, or deodorants after the shower on the day of your procedure. If applicable, please do not shave the operative site for 48 hours prior to surgery. 7. Wear comfortable clothes. Wear glasses instead of contacts. Do not bring any jewelry or money (other than copays or fees as instructed). Do not wear make-up, particularly mascara, the morning of your surgery. Do not wear nail polish, particularly if you are having foot /hand surgery. Wear your hair loose or down, no ponytails, buns, leyda pins or clips. All body piercings must be removed. 8. You should understand that if you do not follow these instructions your surgery may be cancelled. If your physical condition changes (i.e. fever, cold or flu) please contact your surgeon as soon as possible. 9. It is important that you be on time. If a situation occurs where you may be late, or if you have any questions or problems, please call [phone].    10. Your surgery time may be subject to change. You will receive a phone call the day prior to surgery to confirm your arrival time. 11. Pediatric patients: please bring a change of clothes, diapers, bottle/sippy cup, pacifier, etc.      Special Instructions: Take all medications and inhalers, as prescribed, on the morning of surgery with a sip of water EXCEPT: pt taking her medications as usual, day before at night      I understand a pre-operative phone call will be made to verify my surgery time. In the event that I am not available, I give permission for a message to be left on my answering service and/or with another person?       yes    Preop instructions reviewed  Pt verbalized understanding.      ___________________      ___________________      ________________  (Signature of Patient)          (Witness) (Date and Time)

## 2018-08-27 NOTE — INTERVAL H&P NOTE
H&P Update:  Anne Boateng was seen and examined. History and physical has been reviewed. The patient has been examined. There have been no significant clinical changes since the completion of the originally dated History and Physical.  Site marked.

## 2018-08-27 NOTE — DISCHARGE INSTRUCTIONS
Dr. Vandana Cooley Discharge Instructions for:  Lawyer Ojeda    MRN: [id number] : 1950    Admitted: 2018  Discharged: 2018       What to do at Home    Recommended diet: Resume your normal diet    Recommended activity: as tolerated    Follow-up with Dr. Kathy Rodas in 1-2 weeks. Call [phone] for an appointment. Wound Care:  · Replace outer gauze with new dry gauze daily starting today to protect the \"glue\". · See additional instructions below: How to Care for Your Wound After Its Treated With DERMABOND* Topical Skin Adhesive    DERMABOND* Topical Skin Adhesive (2-octyl cyanoacrylate) is a sterile, liquid skin adhesivethat holds wound edges together. The film will usually remain in place for 5 to 10 days, thennaturally fall off your skin. The following will answer some of your questions and provide instructions for proper care for yourwound while it is healing:    CHECK WOUND APPEARANCE   Some swelling, redness, and pain are common with all wounds and normally will go away as the wound heals. If swelling, redness, or pain increases or if the wound feels warm to the touch, contact a doctor. Also contact a doctor if the wound edges reopen or separate. REPLACE BANDAGES   If your wound is bandaged, keep the bandage dry.  Replace the dressing daily until the adhesive film has fallen off or if the bandage should become wet, unless otherwise instructed by your physician.  When changing the dressing, do not place tape directly over the DERMABOND adhesive film, because removing the tape later may also remove the film. AVOID TOPICAL MEDICATIONS   Do not apply liquid or ointment medications or any other product to your wound while the DERMABOND adhesive film is in place. These may loosen the film before your wound is healed. KEEP WOUND DRY AND PROTECTED   You may occasionally and briefly wet your wound in the shower or bath.  Do not soak or scrub your wound, do not swim, and avoid periods of heavy perspiration until the DERMABOND adhesive has naturally fallen off. After showering or bathing, gently blot your wound dry with a soft towel. If a protective dressing is being used, apply a fresh, dry bandage, being sure to keep the tape off the DERMABOND adhesive film.  Apply a clean, dry bandage over the wound if necessary to protect it.  Protect your wound from injury until the skin has had sufficient time to heal.   Do not scratch, rub, or pick at the DERMABOND adhesive film. This may loosen the film before your wound is healed.  Protect the wound from prolonged exposure to sunlight or tanning lamps while the film is in place. DO NOT TAKE TYLENOL/ACETAMINOPHEN WITH PERCOCET, [facility], [address] TAKE NARCOTIC PAIN MEDICATIONS WITH FOOD     If given 2 pain narcotics do NOT take together! Narcotics tend to be constipating, we suggest taking a stool softener such as Colace or Miralax (follow package instructions). DO NOT DRIVE WHILE TAKING NARCOTIC PAIN MEDICATIONS. DO NOT TAKE SLEEPING MEDICATIONS OR ANTIANXIETY MEDICATIONS WHILE TAKING NARCOTIC PAIN MEDICATIONS,  ESPECIALLY THE NIGHT OF ANESTHESIA. CPAP PATIENTS BE SURE TO WEAR MACHINE WHENEVER NAPPING OR SLEEPING. DISCHARGE SUMMARY from Nurse    The following personal items collected during your admission are returned to you:   Dental Appliance: Dental Appliances: None  Vision: Visual Aid: Glasses, With patient  Hearing Aid:    Jewelry: Jewelry: None  Clothing: Clothing: Other (comment) (belonging bag)  Other Valuables: Other Valuables: None  Valuables sent to safe:        PATIENT INSTRUCTIONS:    After General Anesthesia or Intravenous Sedation, for 24 hours or while taking prescription Narcotics:        Someone should be with you for the next 24 hours. For your own safety, a responsible adult must drive you home. · Limit your activities  · Recommended activity: Rest today, up with assistance today.  Do not climb stairs or shower unattended for the next 24 hours. · Please start with a soft bland diet and advance as tolerated (no nausea) to regular diet. · If you have a sore throat you should try the following: fluids, warm salt water gargles, or throat lozenges. If it does not improve after several days please follow up with your primary physician. · Do not drive and operate hazardous machinery  · Do not make important personal or business decisions  · Do  not drink alcoholic beverages  · If you have not urinated within 8 hours after discharge, please contact your surgeon on call. Report the following to your surgeon:  · Excessive pain, swelling, redness or odor of or around the surgical area  · Temperature over 100.5  · Nausea and vomiting lasting longer than 4 hours or if unable to take medications  · Any signs of decreased circulation or nerve impairment to extremity: change in color, persistent  numbness, tingling, coldness or increase pain      · You will receive a Post Operative Call from one of the Recovery Room Nurses on the day after your surgery to check on you. It is very important for us to know how you are recovering after your surgery. If you have an issue or need to speak with someone, please call your surgeon, do not wait for the post operative call. · You may receive an e-mail or letter in the mail from [employer] regarding your experience with us in the Ambulatory Surgery Unit. Your feedback is valuable to us and we appreciate your participation in the survey. · If the above instructions are not adequate, please contact Con Paige RN, [facility] anesthesia Nurse Manager or our Anesthesiologist.  If you are having problems after your surgery, call the physician at their office number. · We wish you a speedy recovery ? What to do at Home:      *  Please give a list of your current medications to your Primary Care Provider.     *  Please update this list whenever your medications are discontinued, doses are      changed, or new medications (including over-the-counter products) are added. *  Please carry medication information at all times in case of emergency situations. These are general instructions for a healthy lifestyle:    No smoking/ No tobacco products/ Avoid exposure to second hand smoke    Surgeon General's Warning:  Quitting smoking now greatly reduces serious risk to your health. Obesity, smoking, and sedentary lifestyle greatly increases your risk for illness    A healthy diet, regular physical exercise & weight monitoring are important for maintaining a healthy lifestyle    You may be retaining fluid if you have a history of heart failure or if you experience any of the following symptoms:  Weight gain of 3 pounds or more overnight or 5 pounds in a week, increased swelling in our hands or feet or shortness of breath while lying flat in bed. Please call your doctor as soon as you notice any of these symptoms; do not wait until your next office visit. Recognize signs and symptoms of STROKE:    B - Balance  E - Eyes    F-  Face looks uneven    A-  Arms unable to move or move even    S-  Speech slurred or non-existent    T-  Time-call 911 as soon as signs and symptoms begin-DO NOT go       Back to bed or wait to see if you get better-TIME IS BRAIN. If you have not received your influenza and/or pneumococcal vaccine, please follow up with your primary care physician. The discharge information has been reviewed with the patient and caregiver. The patient and caregiver verbalized understanding.

## 2018-08-27 NOTE — H&P (VIEW-ONLY)
HISTORY OF PRESENT ILLNESS  Ronal Carpio is a 70 y.o. female. HPI Comments:   Developed a neck mass while in [place]  No pain    Recent death of brother  Does a lot of travel   Mild elevation of ALP, other labs ok    Consults for the [facility] department, this year has visited: [place], [facility], [facility]      CT done: There is right level 3 and level 4 adenopathy right neck. There is a right  supraclavicular lymph node that measures 3.1 x 1.9 cm.     There is extensive adenopathy in the mediastinum which is incompletely imaged. .   There is compression of the superior vena cava by the adenopathy. There are borderline enlarged left supraclavicular lymph nodes.     The carotid and jugular vessels enhance normally.      There is a 7 mm low-density in the right thyroid. Submandibular glands and  parotid glands are within normal limits. .     No nasopharyngeal, pharyngeal or laryngeal mass is identified.     No abnormalities are identified in the visualized portions of the brain or  orbits.      The visualized mastoid air cells and paranasal sinuses are clear.     There is a 6 mm left upper lobe nodule     There are degenerative changes throughout the cervical spine. .     IMPRESSION  IMPRESSION:   1. There is right level 3 and 4 adenopathy. There is extensive adenopathy  extending into the mediastinum compressing the SVC suspicious for neoplasm which  could be metastatic or lymphoma.      ____________________________________________________________________________  Patient presents with:  Mass: Pt seen at the request of Dr. Noah Caraballo to evaluate right supracavicular mass.     /81 (BP 1 Location: Left arm, BP Patient Position: Sitting)  Pulse (!) 103  Temp 97 °F (36.1 °C) (Oral)   Resp 20  Ht 5' 9\" (1.753 m)  Wt 92.1 kg (203 lb 1.6 oz)  SpO2 99%  BMI 29.99 kg/m2  Past Medical History:  No date: Diabetes ([address]  No date: Fibromyalgia      Comment: Treated with alternate medical therapy  No date: Hypertension  Past Surgical History:  2015: HX COLONOSCOPY  No date: HX GYN      Comment: Pap 2015  No date: HX ORTHOPAEDIC      Comment: fractured R ankle/ no surgery  Social History    Marital status: SINGLE              Spouse name:                       Years of education:                 Number of children: 0             Occupational History  Occupation          Employer            Comment               Employed by a nonp*                         Social History Main Topics    Smoking status: Never Smoker                                                                Smokeless status: Never Used                        Alcohol use: Yes                Comment: socially    Drug use: No              Sexual activity: Yes                  Social History Narrative    Undergraduate [address]--- PhD    Postgraduate work [place] doctorate [facility] [employer]    Formally employed by [employer]      Review of patient's family history indicates:    Heart Disease                  Mother                    Cancer                         Father                      Comment: prostate cancer    Cancer                         Sister                      Comment: Breast cancer apparent DCIS    Heart Disease                  Brother                     Comment: Brother  from congestive heart                failure secondary to severe mitral                disease    Current Outpatient Prescriptions:  pioglitazone (ACTOS) 30 mg tablet, TAKE 1 TABLET DAILY  spironolactone-hydrochlorothiazide (ALDACTAZIDE) 25-25 mg per tablet, TAKE ONE TABLET BY MOUTH EVERY DAY  atorvastatin (LIPITOR) 40 mg tablet, TAKE 1 TAB BY MOUTH DAILY. amLODIPine (NORVASC) 10 mg tablet, TAKE 1 TABLET BY MOUTH EVERY DAY  metoprolol succinate (TOPROL-XL) 50 mg XL tablet, TAKE 1 TAB BY MOUTH DAILY.    JANUMET XR 50-1,000 mg TM24, TAKE 1 TABLET BY MOUTH TWICE A DAY WITH MEALS  Lancets (ONE TOUCH DELICA) misc, USE TO TEST BLOOD SUGAR ONCE DAILY. DX.E11.9   alcohol swabs (ALCOHOL PREP PADS) padm, 1 Pad by Apply Externally route daily. DX.E11.9  ONETOUCH VERIO strip, TEST BLOOD SUGAR DAILY    No current facility-administered medications for this visit. Allergies: No Known Allergies  _____________________________________________________________________________      Mass   The history is provided by the patient. This is a new problem. The current episode started more than 1 week ago. The problem occurs constantly. The problem has been gradually worsening. Pertinent negatives include no chest pain, no abdominal pain, no headaches and no shortness of breath. Nothing aggravates the symptoms. Nothing relieves the symptoms. The treatment provided no relief. Review of Systems   Constitutional: Negative for chills, fever and weight loss. HENT: Negative for ear pain. Eyes: Negative for pain. Respiratory: Negative for shortness of breath. Cardiovascular: Negative for chest pain. Gastrointestinal: Negative for abdominal pain and blood in stool. Genitourinary: Negative for hematuria. Musculoskeletal: Negative for joint pain. Skin: Negative for rash. Neurological: Negative for dizziness, focal weakness, seizures and headaches. Endo/Heme/Allergies: Does not bruise/bleed easily. Psychiatric/Behavioral: The patient does not have insomnia. Physical Exam   Constitutional: She is oriented to person, place, and time. She appears well-developed and well-nourished. No distress. HENT:   Head: Normocephalic and atraumatic. Mouth/Throat: No oropharyngeal exudate. Eyes: Pupils are equal, round, and reactive to light. Neck: Normal range of motion. No tracheal deviation present. Cardiovascular: Normal rate, regular rhythm and normal heart sounds. No murmur heard. Pulmonary/Chest: Effort normal and breath sounds normal. No respiratory distress. She has no wheezes. Abdominal: Soft.  Bowel sounds are normal. She exhibits no distension and no mass. There is no tenderness. There is no rebound and no guarding. Musculoskeletal: Normal range of motion. She exhibits no edema or tenderness. Lymphadenopathy:     She has cervical adenopathy. Right cervical: Superficial cervical and posterior cervical adenopathy present. Right: Supraclavicular adenopathy present. Neurological: She is alert and oriented to person, place, and time. Skin: Skin is warm. No rash noted. She is not diaphoretic. No erythema. Psychiatric: She has a normal mood and affect. Her behavior is normal.       ASSESSMENT and PLAN    ICD-10-CM ICD-9-CM    1. Lymphadenopathy R59.1 785. 6      Reviewed DDx including infections or lymphoma. Extensive travel history. I had an extensive discussion with Jonnathan Manuel regarding the risks, benefits, and alternatives of proceeding with an Excisional biopsy of a right neck lymph node. Risks of surgery including the risk of anesthesia, bleeding, infection, injury to underlying structures, recurrence, need for further surgery, and the lack of symptomatic improvement were discussed and she is in agreement to proceed. She will try to change her travel plans and we will schedule for next week. Thank you for this consult.

## 2018-08-27 NOTE — ANESTHESIA PREPROCEDURE EVALUATION
Anesthetic History   No history of anesthetic complications            Review of Systems / Medical History  Patient summary reviewed, nursing notes reviewed and pertinent labs reviewed    Pulmonary            Asthma : well controlled    Comments: Childhood Asthma   Neuro/Psych   Within defined limits           Cardiovascular    Hypertension        Dysrhythmias : SVT  Hyperlipidemia    Exercise tolerance: <4 METS     GI/Hepatic/Renal  Within defined limits              Endo/Other    Diabetes: type 2    Obesity  Pertinent negatives: No morbid obesity   Other Findings   Comments: Neck Mass    Fibromyalgia         Physical Exam    Airway  Mallampati: II  TM Distance: > 6 cm  Neck ROM: normal range of motion   Mouth opening: Normal     Cardiovascular  Regular rate and rhythm,  S1 and S2 normal,  no murmur, click, rub, or gallop             Dental         Pulmonary  Breath sounds clear to auscultation               Abdominal  GI exam deferred       Other Findings            Anesthetic Plan    ASA: 3  Anesthesia type: general          Induction: Intravenous  Anesthetic plan and risks discussed with: Patient

## 2018-08-27 NOTE — PERIOP NOTES
Katiedariuszfina Estrella  1950  [id number]    Situation:  Verbal report given from: Alex Bermudezna, [address], [facility]  Procedure: Procedure(s):   RIGHT NECK LYMPH NODE BIOPSY    Background:    Preoperative diagnosis: Neck Mass    Postoperative diagnosis: Neck Mass    :  Dr. Essie Durham    Assistant(s): Circ-1: Darrius Cabrera  Scrub Tech-1: Jerson Kwok  Scrub Tech-2: Rocio Negron    Specimens:   ID Type Source Tests Collected by Time Destination   1 : right deep posterior cervical lymhnode Fresh Lymph Node  Twila Luna MD 8/27/2018 7756 Pathology   1 : right deep posterior cevical lymph node Tissue Lymph Node CULTURE &amp; SMEAR, AFB, CULTURE, Kamla Barajas MD 8/27/2018 0932 Microbiology       Assessment:  Intra-procedure medications         Anesthesia gave intra-procedure sedation and medications, see anesthesia flow sheet     Intravenous fluids: LR@ KVO     Vital signs stable       Recommendation:    Permission to share finding with sister, Savana Ortiz.

## 2018-08-27 NOTE — OP NOTES
OPERATIVE NOTE  8/27/2018    Preperative Diagnosis: LYMPHADENOPATHY  Post-operative Diagnosis: LYMPHADENOPATHY      Procedure: Procedure(s):  Excision of right neck deep posterior cervical lymph node    Surgeon: Barrie Marie MD    Anesthesia: Gen + local   Estimated Blood Loss: Minimal  Specimens:   ID Type Source Tests Collected by Time Destination   1 : right deep posterior cervical lymhnode Fresh Lymph Node  Barrie Marie MD 8/27/2018 4548 Pathology   1 : right deep posterior cevical lymph node Tissue Lymph Node CULTURE &amp; SMEAR, AFB, CULTURE, FUNGUS Barrie Marie MD 8/27/2018 0932 Microbiology      Findings: large heterogeneous lymph node  Complications: None  Implants: None      Filippo Shaw is a 70 y.o. female who has been brought to the operating room for procedure as above. The risks, benefits, and alternatives were explained and consent was obtained for the procedure. Procedure was confirmed and site marked with the patient. After gen anesthesia, the patient was kept in the supine position. The right neck and upper chest was prepped and draped in the usual manner. Local anesthetic was infiltrated into the skin and soft tissue. A small transverse incision was made. The platysmas muscle was split vertically and the lymph node was reached posterior to the SCM. It was excised, divided and sent for path and culture as noted above. Hemostasis was noted. The superficial fascia was closed with interrupted 3-0 Vicryl; followed by closure of the skin with 4-0 Monocryl. The wound was covered with Dermabond. She remained stable during my presence in the operating room.

## 2018-08-27 NOTE — IP AVS SNAPSHOT
[address] [facility] 
[phone] Patient: Vasile Mason MRN: [id number] XCT:6/75/2950 About your hospitalization You were admitted on:  August 27, 2018 You last received care in the:  [facility] PACU You were discharged on:  August 27, 2018 Why you were hospitalized Your primary diagnosis was:  Not on File Follow-up Information Follow up With Details Comments Contact Info Kathleen Clarke MD   [facility] [address] [place] [address] 
[phone] Discharge Orders None A check helena indicates which time of day the medication should be taken. My Medications START taking these medications Instructions Each Dose to Equal  
 Morning Noon Evening Bedtime  
 ibuprofen 600 mg tablet Commonly known as:  MOTRIN Your last dose was: Your next dose is: Take 1 Tab by mouth every six (6) hours as needed for Pain. 600 mg CONTINUE taking these medications Instructions Each Dose to Equal  
 Morning Noon Evening Bedtime  
 alcohol swabs Padm Commonly known as:  ALCOHOL PREP PADS Your last dose was: Your next dose is:    
   
   
 1 Pad by Apply Externally route daily. DX.E11.9  
 1 Pad  
    
   
   
   
  
 amLODIPine 10 mg tablet Commonly known as:  Rosa Darting Your last dose was: Your next dose is: TAKE 1 TABLET BY MOUTH EVERY DAY  
     
   
   
   
  
 atorvastatin 40 mg tablet Commonly known as:  LIPITOR Your last dose was: Your next dose is: TAKE 1 TAB BY MOUTH DAILY. JANUMET XR 50-1,000 mg Tm24 Generic drug:  SITagliptin-metFORMIN Your last dose was: Your next dose is: TAKE 1 TABLET BY MOUTH TWICE A DAY WITH MEALS Lancets Misc Commonly known as: One Touch Robin Madan Your last dose was: Your next dose is:    
   
   
 USE TO TEST BLOOD SUGAR ONCE DAILY. DX.E11.9  
     
   
   
   
  
 metoprolol succinate 50 mg XL tablet Commonly known as:  TOPROL-XL Your last dose was: Your next dose is: TAKE 1 TAB BY MOUTH DAILY. ONETOUCH VERIO strip Generic drug:  glucose blood VI test strips Your last dose was: Your next dose is:    
   
   
 TEST BLOOD SUGAR DAILY pioglitazone 30 mg tablet Commonly known as:  ACTOS Your last dose was: Your next dose is: TAKE 1 TABLET DAILY  
     
   
   
   
  
 spironolactone-hydrochlorothiazide 25-25 mg per tablet Commonly known as:  ALDACTAZIDE Your last dose was: Your next dose is: TAKE ONE TABLET BY MOUTH EVERY DAY Where to Get Your Medications These medications were sent to [id number]  [address] [address] [place] [address] Phone:  [phone]  
  ibuprofen 600 mg tablet Discharge Instructions Dr. Anthony Ortiz Discharge Instructions for:  Filippo Shaw MRN: [id number] : 1950 Admitted: 2018  Discharged: 2018 What to do at [facility] Recommended diet: Resume your normal diet Recommended activity: as tolerated Follow-up with Dr. Gibson Dorsey in 1-2 weeks. Call [phone] for an appointment. Wound Care: · Replace outer gauze with new dry gauze daily starting today to protect the \"glue\". · See additional instructions below: How to Care for Your Wound After Its Treated With DERMABOND* Topical Skin Adhesive DERMABOND* Topical Skin Adhesive (2-octyl cyanoacrylate) is a sterile, liquid skin adhesivethat holds wound edges together. The film will usually remain in place for 5 to 10 days, thennaturally fall off your skin.   The following will answer some of your questions and provide instructions for proper care for yourwound while it is healing: CHECK WOUND APPEARANCE 
 Some swelling, redness, and pain are common with all wounds and normally will go away as the wound heals. If swelling, redness, or pain increases or if the wound feels warm to the touch, contact a doctor. Also contact a doctor if the wound edges reopen or separate. REPLACE BANDAGES 
 If your wound is bandaged, keep the bandage dry.  Replace the dressing daily until the adhesive film has fallen off or if the bandage should become wet, unless otherwise instructed by your physician.  When changing the dressing, do not place tape directly over the DERMABOND adhesive film, because removing the tape later may also remove the film. AVOID TOPICAL MEDICATIONS  Do not apply liquid or ointment medications or any other product to your wound while the DERMABOND adhesive film is in place. These may loosen the film before your wound is healed. KEEP WOUND DRY AND PROTECTED  You may occasionally and briefly wet your wound in the shower or bath. Do not soak or scrub your wound, do not swim, and avoid periods of heavy perspiration until the DERMABOND adhesive has naturally fallen off. After showering or bathing, gently blot your wound dry with a soft towel. If a protective dressing is being used, apply a fresh, dry bandage, being sure to keep the tape off the DERMABOND adhesive film.  Apply a clean, dry bandage over the wound if necessary to protect it.  Protect your wound from injury until the skin has had sufficient time to heal. 
 Do not scratch, rub, or pick at the DERMABOND adhesive film. This may loosen the film before your wound is healed.  Protect the wound from prolonged exposure to sunlight or tanning lamps while the film is in place. DO NOT TAKE TYLENOL/ACETAMINOPHEN WITH PERCOCET, LORTAB, [address] TAKE NARCOTIC PAIN MEDICATIONS WITH FOOD If given 2 pain narcotics do NOT take together! Narcotics tend to be constipating, we suggest taking a stool softener such as Colace or Miralax (follow package instructions). DO NOT DRIVE WHILE TAKING NARCOTIC PAIN MEDICATIONS. DO NOT TAKE SLEEPING MEDICATIONS OR ANTIANXIETY MEDICATIONS WHILE TAKING NARCOTIC PAIN MEDICATIONS,  ESPECIALLY THE NIGHT OF ANESTHESIA. CPAP PATIENTS BE SURE TO WEAR MACHINE WHENEVER NAPPING OR SLEEPING. DISCHARGE SUMMARY from Nurse The following personal items collected during your admission are returned to you:  
Dental Appliance: Dental Appliances: None Vision: Visual Aid: Glasses, With patient Hearing Aid:   
Jewelry: Jewelry: None Clothing: Clothing: Other (comment) (belonging bag) Other Valuables: Other Valuables: None Valuables sent to safe:   
 
 
PATIENT INSTRUCTIONS: 
 
 
B - Balance E - Eyes F-  Face looks uneven A-  Arms unable to move or move even S-  Speech slurred or non-existent T-  Time-call 911 as soon as signs and symptoms begin-DO NOT go Back to bed or wait to see if you get better-TIME IS BRAIN. If you have not received your influenza and/or pneumococcal vaccine, please follow up with your primary care physician. The discharge information has been reviewed with the patient and caregiver. The patient and caregiver verbalized understanding. Introducing [facility]! [facility] introduces [employer] patient portal. Now you can access parts of your medical record, email your doctor's office, and request medication refills online. 1. In your internet browser, go to [email or web address] 2. Click on the First Time User? Click Here link in the Sign In box. You will see the New Member Sign Up page. 3. Enter your Mercaux Access Code exactly as it appears below. You will not need to use this code after youve completed the sign-up process. If you do not sign up before the expiration date, you must request a new code. · Mercaux Access Code: [email or web address] Expires: 11/1/2018  1:12 PM 
 
4. Enter the last four digits of your Social Security Number (xxxx) and Date of Birth (mm/dd/yyyy) as indicated and click Submit. You will be taken to the next sign-up page. 5. Create a Mercaux ID. This will be your Mercaux login ID and cannot be changed, so think of one that is secure and easy to remember. 6. Create a [email or web address] password. You can change your password at any time. 7. Enter your Password Reset Question and Answer. This can be used at a later time if you forget your password. 8. Enter your e-mail address. You will receive e-mail notification when new information is available in [address] 9. Click Sign Up. You can now view and download portions of your medical record. 10. Click the Download Summary menu link to download a portable copy of your medical information. If you have questions, please visit the Frequently Asked Questions section of the [email or web address] website. Remember, [email or web address] is NOT to be used for urgent needs. For medical emergencies, dial 911. Now available from your iPhone and Android! Introducing Daniel Escalante As a [employer] patient, I wanted to make you aware of our electronic visit tool called Daniel Escalante. [employer] 24/7 allows you to connect within minutes with a medical provider 24 hours a day, seven days a week via a mobile device or tablet or logging into a secure website from your computer. You can access Daniel Escalante from anywhere in the [place]. A virtual visit might be right for you when you have a simple condition and feel like you just dont want to get out of bed, or cant get away from work for an appointment, when your regular [employer] provider is not available (evenings, weekends or holidays), or when youre out of town and need minor care. Electronic visits cost only $49 and if the [employer] 24/7 provider determines a prescription is needed to treat your condition, one can be electronically transmitted to a nearby pharmacy*. Please take a moment to enroll today if you have not already done so. The enrollment process is free and takes just a few minutes. To enroll, please download the [employer] 24/7 ravi to your tablet or phone, or visit [email or web address] to enroll on your computer. And, as an [address] [place] [address] patient with a [email or web address] account, the results of your visits will be scanned into your electronic medical record and your primary care provider will be able to view the scanned results. We urge you to continue to see your regular [facility] provider for your ongoing medical care. And while your primary care provider may not be the one available when you seek a Daniel Damonfin virtual visit, the peace of mind you get from getting a real diagnosis real time can be priceless. For more information on Daniel FX Alignedfeleciafin, view our Frequently Asked Questions (FAQs) at [email or web address]. Sincerely, 
 
Corona Foley MD 
Chief Medical Officer [address] *:  certain medications cannot be prescribed via gokit Unresulted Labs-Please follow up with your PCP about these lab tests Order Current Status CULTURE, ANAEROBIC In process CULTURE, TISSUE W GRAM STAIN In process Providers Seen During Your Hospitalization Provider Specialty Primary office phone Ayanna Dee MD General Surgery [phone] Your Primary Care Physician (PCP) Primary Care Physician Office Phone Office Fax [facility] [phone] You are allergic to the following No active allergies Recent Documentation Height Weight BMI OB Status Smoking Status 1.753 m 91.2 kg 29.68 kg/m2 Postmenopausal Never Smoker Emergency Contacts Name Discharge Info Relation Home Work Mobile MigelBetsy DISCHARGE CAREGIVER [3] Other Relative [6] [phone] [address] A CAREGIVER [3] Brother [24] [phone] Patient Belongings  The following personal items are in your possession at time of discharge: 
  Dental Appliances: None  Visual Aid: Glasses, With patient      Home Medications: None   Jewelry: None  Clothing: Other (comment) (belonging bag)    Other Valuables: None Discharge Instructions Attachments/References MEFS - IBUPROFEN (ADVIL, ADVIL CHILDREN'S, MOTRIN, CHILDREN'S IBUPROFEN) - (BY MOUTH) (ENGLISH) Patient Handouts Ibuprofen (Advil, Advil Children's, Motrin, Children's Ibuprofen) - (By mouth) Why this medicine is used:  
Treats pain and fever. This medicine is an NSAID. Contact a nurse or doctor right away if you have: 
· Change in how much or how often you urinate · Severe stomach pain, vomiting blood, bloody or black tarry stools · Swelling in your hands, ankles, or feet; rapid weight gain Common side effects: 
· Constipation, diarrhea, gas, mild upset stomach · Ringing in your ears, dizziness, headache © 2017 [facility] Information is for End User's use only and may not be sold, redistributed or otherwise used for commercial purposes. Please provide this summary of care documentation to your next provider. Signatures-by signing, you are acknowledging that this After Visit Summary has been reviewed with you and you have received a copy. Patient Signature:  ____________________________________________________________ Date:  ____________________________________________________________  
  
[facility] Provider Signature:  ____________________________________________________________ Date:  ____________________________________________________________

## 2018-08-27 NOTE — PERIOP NOTES
1005: Patient states that her sister, Susi Mederos, is with her today and it is okay to review dc instructions with her. Patient denies pain and nausea. Is resting with eyes closed. Easily arousable. Denies needs. 1012: Finger stick blood glucose currently 127. Given water to drink. Is tolerating well. 1015: Betsy, sister, now at bedside. Another pillow placed behind patients head and underneath legs, for comfort. Ice pack placed to right neck. Patient remains drowsy. Continues to deny pain/nausea. 1025: Extra blankets provided patient. Continues to deny pain/nausea. 1030: O2 removed from patient at this time. Sats currentlys 98% on RA. Patient is sitting upright on stretcher. Throat lozenge given to patient. Is complaining of \"a scratchy throat. \" Continues to deny pain/nausea. 1100: Dc instructions and prescription reviewed with patient and sister. Both voiced understanding. Neither have questions. Patient states that she is comfortable being dc. Patient did state that the pharmacy where ibuprofen was called into is not her regular pharmacy. Patient states that that pharmacy is \"out of my way and I'm not going there. \" With MD permission, called ibuprofen into [facility] that patient uses. 1115: Attempted to send sister out to get vehicle. She denies, stating \"I'm right out front. I'm going to stay with her until you're done. \" Attempted to assist patient with dressing. Patient refused, stating \"I will do it on my own. \" Patient continues to deny pain/nausea. 1128: Pt has all of her belongings she came with. Pt discharged home in stable condition via w/c to car. Instructed pt to get up with assistance today.

## 2018-08-27 NOTE — ANESTHESIA POSTPROCEDURE EVALUATION
Post-Anesthesia Evaluation and Assessment    Patient: Cassy Siddiqui MRN: [id number]  SSN: [id number]    YOB: 1950  Age: 70 y.o. Sex: female       Cardiovascular Function/Vital Signs  Visit Vitals    /73    Pulse 83    Temp 36.4 °C (97.6 °F)    Resp 14    Ht 5' 9\" (1.753 m)    Wt 91.2 kg (201 lb)    SpO2 100%    BMI 29.68 kg/m2       Patient is status post general anesthesia for Procedure(s):   RIGHT NECK LYMPH NODE BIOPSY. Nausea/Vomiting: None    Postoperative hydration reviewed and adequate. Pain:  Pain Scale 1: Numeric (0 - 10) (08/27/18 1010)  Pain Intensity 1: 0 (08/27/18 1010)   Managed    Neurological Status:   Neuro (WDL): Within Defined Limits (08/27/18 1000)  Neuro  Neurologic State: Alert (08/27/18 1010)  LUE Motor Response: Spontaneous ; Purposeful (08/27/18 1000)  LLE Motor Response: Purposeful;Spontaneous  (08/27/18 1000)  RUE Motor Response: Purposeful;Spontaneous  (08/27/18 1000)  RLE Motor Response: Purposeful;Spontaneous  (08/27/18 1000)   At baseline    Mental Status and Level of Consciousness: Arousable    Pulmonary Status:   O2 Device: Nasal cannula (08/27/18 1010)   Adequate oxygenation and airway patent    Complications related to anesthesia: None    Post-anesthesia assessment completed.  No concerns    Signed By: Brianna Blakely MD     August 27, 2018

## 2018-08-28 NOTE — TELEPHONE ENCOUNTER
Roz Trinidad [phone] called from Ambulatory Surgery, Ms. Brittanie Salmeron Ibuprofen went to [facility], she wants it at [facility] near her home.

## 2018-08-30 NOTE — TELEPHONE ENCOUNTER
Right deep posterior cervical lymph node, excisional biopsy:       Classic Hodgkin lymphoma, nodular sclerosing, see comment    Flow cytometry preformed at [facility] [employer], technical and  [id number]):  -No flow immunophenotypic evidence of a lymphoproliferative disorder      * * *Comment* * *    Normal lymph node architecture is distorted by fibrous bands forming  nodules with collection of small lymphocytes, classic Samuel-Endy cells  and lacunar cells.  To evaluate this process, immunohistochemical stains  are performed with appropriate controls and show positive staining within  the atypical cells for CD15 and CD30 (strong membranous staining,  occasional Golgi pattern), faint nuclear staining for PAX5 and negative  staining for CD20, lymphocyte common antigen and ALK 1.  The morphology  and staining pattern support the above diagnosis. Reviewed pathology with Loyda Chery. She is recovering from the bx without difficulty. Sinuses are clearing up too. Will need to stage and discuss treatment. Making apt with [facility].

## 2018-08-31 NOTE — TELEPHONE ENCOUNTER
Informed pt all notes, path report, etc faxed to Dr. Valente Remy (Oncologist) for consult regarding newly diagnosed Hodgkin Lymphoma. Dr. Gonzalez Maikol office will call you to set up the appointment. If not heard from them by middle of next week call me back immediately. She is doing well s/p excision of right neck deep posterior cervical LN on 8/27/18. Opportunity for questions with clarification.

## 2018-09-06 NOTE — TELEPHONE ENCOUNTER
Pt unable to see Melissaene Drop on vac for 1 week and will be covering hospital next week. She thought appointment on 9/5 with Dr. Jimmy Rivera initial consultation and she could see Za Shelby for treatment plan. I spoke with [place]oy  @ [facility] recommend she follow with Dr. Jimmy Rivera. I spoke with Dr. Dinorah Galvan and informed pt its ok to remain with Dr. Jimmy Rivera. Pt is upset and will remain with Dr. Jimmy Rivera. Keep fu appointment with Dr. Dinorah Galvan on 9/10.

## 2018-09-10 NOTE — MR AVS SNAPSHOT
[address] [place] [address] [address] [place] [address], [address] 
[phone] Patient: Tae Davidson MRN: [id number] HLY:[id number] Visit Information Date & Time Provider Department Dept. Phone Encounter #  
 9/10/2018  3:00 PM Jesus Waller MD [facility] [id number] Upcoming Health Maintenance Date Due Pneumococcal 65+ High/Highest Risk (1 of 2 - PCV13) 8/20/2014 Influenza Age 5 to Adult 8/1/2018 GLAUCOMA SCREENING Q2Y 10/20/2018 EYE EXAM RETINAL OR DILATED Q1 10/3/2018* BREAST CANCER SCRN MAMMOGRAM 11/3/2018* FOOT EXAM Q1 1/30/2019 HEMOGLOBIN A1C Q6M 2/3/2019 MICROALBUMIN Q1 8/3/2019 LIPID PANEL Q1 8/3/2019 FOBT Q 1 YEAR AGE 50-75 8/3/2019 DTaP/Tdap/Td series (2 - Td) 8/14/2027 *Topic was postponed. The date shown is not the original due date. Allergies as of 9/10/2018  Review Complete On: 9/10/2018 By: Jesus Waller MD  
 No Known Allergies Current Immunizations  Never Reviewed No immunizations on file. Not reviewed this visit Vitals BP Pulse Temp Resp Height(growth percentile) Weight(growth percentile) 150/76 (BP 1 Location: Left arm, BP Patient Position: Sitting) (!) 106 98.3 °F (36.8 °C) (Oral) 20 5' 9\" (1.753 m) 200 lb 3.2 oz (90.8 kg) SpO2 BMI OB Status Smoking Status 98% 29.56 kg/m2 Postmenopausal Never Smoker BMI and BSA Data Body Mass Index Body Surface Area  
 29.56 kg/m 2 2.1 m 2 Preferred Pharmacy Pharmacy Name Phone [id number] [address] [facility] [place] [address] [place] [address] [phone] Your Updated Medication List  
  
   
This list is accurate as of 9/10/18  3:42 PM.  Always use your most recent med list.  
  
  
  
  
 alcohol swabs Padm Commonly known as:  ALCOHOL PREP PADS  
1 Pad by Apply Externally route daily. DX.E11.9  
  
 amLODIPine 10 mg tablet Commonly known as:  Domenico Alstrom TAKE 1 TABLET BY MOUTH EVERY DAY  
  
 atorvastatin 40 mg tablet Commonly known as:  LIPITOR  
TAKE 1 TAB BY MOUTH DAILY. ibuprofen 600 mg tablet Commonly known as:  MOTRIN Take 1 Tab by mouth every six (6) hours as needed for Pain. JANUMET XR 50-1,000 mg Tm24 Generic drug:  SITagliptin-metFORMIN  
TAKE 1 TABLET BY MOUTH TWICE A DAY WITH MEALS Lancets Misc Commonly known as: One Touch Gerldine [place] USE TO TEST BLOOD SUGAR ONCE DAILY. DX.E11.9  
  
 metoprolol succinate 50 mg XL tablet Commonly known as:  TOPROL-XL  
TAKE 1 TAB BY MOUTH DAILY. ONETOUCH VERIO strip Generic drug:  glucose blood VI test strips TEST BLOOD SUGAR DAILY pioglitazone 30 mg tablet Commonly known as:  ACTOS  
TAKE 1 TABLET DAILY  
  
 spironolactone-hydrochlorothiazide 25-25 mg per tablet Commonly known as:  ALDACTAZIDE TAKE ONE TABLET BY MOUTH EVERY DAY To-Do List   
 09/11/2018 12:00 PM  
  Appointment with [facility] PET 1 at [facility] RAD PET ([phone]) FDG Instructions:  1. Patient should arrive 30 minutes early to register. Patient's entire visit to the hospital will last about 2 Hours. 2.  Eat a low carb/high protein diet the evening before your procedure. Stay away from food and drink that contains sugars the night before and ESPECIALLY the day of the test. 3.  Do Not eat 4 hours prior to your procedure. The patient may drink all the water they want, up until the time of their appointment. Encourage patient to be well hydrated. Coffee is ok, as long as an artificial sweetener is used instead of sugar. 4. Take meds with water or saltine crackers if food required. 5.  Do not exercise the morning of your procedure. 6.  If you take insulin, it must be taken at least 4 hours before your procedure. 7.  You should bring medications for pain, anxiety, or claustrophobia if you need them.   If you take medications for anxiety or claustrophobia, a ride needs to come with you. 8.  Materials for this procedure are ordered in advance and are time-sensitive. If you need to cancel or reschedule, you must call [phone] at least 48 hours prior to your appointment time. 9.  Bring recent CT scan results from other facilities with you.  
  
 09/12/2018 12:00 PM  
  Appointment with Mariya Jameson MD; [facility] ANGIO 1 at [address] ([phone]) DIET RESTRICTIONS NPO, do not eat or drink 8 hours prior to test. Take all medications not requiring food with sip of water, excluding blood thinners and diabetic medications. GENERAL INSTRUCTIONS 1. Bring any non [facility] facility films/images pertaining to the area of interest with you on the day of appointment. 2. Bring a list of all medications you are currently taking, including over the counter medications. 3. A valid order with Physicians signature is required for all scheduled tests. 4. Blood thinners and platelet inhibitors must be stopped 3-5 days prior to procedure. Consult your ordering physician prior to stopping them. 5. Time given is ARRIVAL time, not procedure time. 6. You must have a  present before a procedure is started. 7. The procedure may last 1-1 ½ hours. You may be required to stay 4-6 hours after the procedure for observation. If you have any questions please direct them to your ordering physician. Introducing [facility]! Amparo Pastrana introduces [employer] patient portal. Now you can access parts of your medical record, email your doctor's office, and request medication refills online. 1. In your internet browser, go to [email or web address] 2. Click on the First Time User? Click Here link in the Sign In box. You will see the New Member Sign Up page. 3. Enter your [employer] Access Code exactly as it appears below. You will not need to use this code after youve completed the sign-up process.  If you do not sign up before the expiration date, you must request a new code. · [employer] Access Code: [email or web address] Expires: 11/1/2018  1:12 PM 
 
4. Enter the last four digits of your Social Security Number (xxxx) and Date of Birth (mm/dd/yyyy) as indicated and click Submit. You will be taken to the next sign-up page. 5. Create a [employer] ID. This will be your [employer] login ID and cannot be changed, so think of one that is secure and easy to remember. 6. Create a [employer] password. You can change your password at any time. 7. Enter your Password Reset Question and Answer. This can be used at a later time if you forget your password. 8. Enter your e-mail address. You will receive e-mail notification when new information is available in [address] 9. Click Sign Up. You can now view and download portions of your medical record. 10. Click the Download Summary menu link to download a portable copy of your medical information. If you have questions, please visit the Frequently Asked Questions section of the [employer] website. Remember, [employer] is NOT to be used for urgent needs. For medical emergencies, dial 911. Now available from your iPhone and Android! Please provide this summary of care documentation to your next provider. Your primary care clinician is listed as Carlos Alberto Barajas. If you have any questions after today's visit, please call [phone].

## 2018-09-10 NOTE — LETTER
9/10/2018 3:51 PM 
 
[facility] [id number] Surgery Instruction Sheet You have been scheduled for surgery on 9/12/18 at 8:15am at [facility]. Please report to the Surgery Center at 6:15am, this is approximately 2 hours prior to your surgery time. The Surgery Center is located on the [address] [place] [address] Street side of the hospital, just next to the Emergency Room. Reserved parking is available and  parking if lot is full. You will not need to have a pre-op visit before surgery, but the Pre-op Nurse will call you before surgery. The Pre-op nurse will review your medical history, medications and give you additional instructions. They will also confirm your arrival time. Call your physician immediately if you notice a change in your health between the time you saw your physician and the day of surgery. If you take a blood thinner, please let us know. Call your ordering Doctor to make sure you can stop taking it prior to your surgery. STOP YOUR ASPIRIN Today PRIOR TO SURGERY. DO NOT TAKE  IBUPROFEN, ADVIL, MOTRIN, ALEVE, EXCEDRIN, BC POWDER, GOODIES, FISH OIL OR ANY MEDICATION CONTAINING ASPIRIN Today PRIOR TO YOUR SURGERY. MAY TAKE TYLENOL. Eat a light dinner the evening before your surgery. DO NOT EAT OR DRINK ANYTHING AFTER MIDNIGHT THE NIGHT BEFORE YOUR SURGERY. This includes water, chewing gum, lifesavers, etc.  The Pre op nurse will check with you about any medication that you may need to take the morning of surgery. Shower with a new bar of anti-bacterial soap (Dial, Safeguard) or solution given to you by Pre-op, the night before surgery. Do not use lotion, powder, deodorant on the skin after showering.   Wear loose, comfortable clothing the day of surgery and bring a container to store your contacts, eyeglasses, dentures, hearing aid, etc.  Do not bring money, valuables, jewelry, etc. to the hospital.   
 
 If you are having outpatient surgery, someone must come with you the morning of surgery to drive you home. You can not drive for 24 hours after any anesthesia. Sometimes it is necessary to stay overnight and leave the next morning. This is still considered outpatient for most insurance deductibles. Someone will still need to drive you home. If you have questions or concerns, please feel free to call /Edith at [phone]. If you need to cancel your surgery, please call as soon as possible. Sincerely, Jesus Waller MD

## 2018-09-10 NOTE — PROGRESS NOTES
Chief Complaint   Patient presents with    Surgical Follow-up     Post/op excision of deep posterior cervical LN on 8/27/18. 1. Have you been to the ER, urgent care clinic since your last visit? Hospitalized since your last visit?no    2. Have you seen or consulted any other health care providers outside of the [address] [place] [address] since your last visit? Yes/Dr. Fe HART. Next appointment on 7/14/18 Include any pap smears or colon screening.

## 2018-09-10 NOTE — PROGRESS NOTES
Chief Complaint   Patient presents with    Surgical Follow-up     Post/op excision of deep posterior cervical LN on 8/27/18. Classic Hodgkin lymphoma, nodular sclerosing     bx site is healing  Has met with Dr. Sandria Cogan    PFTs done  CT/PET scheduled for tomorrow  MUGA scheduled for next week  Wants me to do the port, will schedule around her other tests      Risks, Benefits, and Alternatives of Port-A-Cath placement was discussed with Sanjay Ackerman including:  the risk of the anesthesia, bleeding, infection, port malfunction, and pneumothorax. The patient is agreeable to proceed. I had an extensive and thorough discussion with Sanjay Ackerman regarding current diagnosis and treatment recommendations. Total face-to-face time spent with her was 25 minutes with the majority spent with counseling and coordination of care.

## 2018-09-11 NOTE — PERIOP NOTES
[facility]  Preoperative Instructions        Surgery Date 9/12/18         Time of Arrival 6:15am    1. On the day of your surgery, please report to the Surgical Services Registration Desk and sign in at your designated time. The Surgery Center is located to the right of the Emergency Room. 2. You must have someone with you to drive you home. You should not drive a car for 24 hours following surgery. Please make arrangements for a friend or family member to stay with you for the first 24 hours after your surgery. 3. Do not have anything to eat or drink (including water, gum, mints, coffee, juice) after midnight 9/11/18    . ? This may not apply to medications prescribed by your physician. ?(Please note below the special instructions with medications to take the morning of your procedure.)    4. We recommend you do not drink any alcoholic beverages for 24 hours before and after your surgery. 5. Contact your surgeons office for instructions on the following medications: non-steroidal anti-inflammatory drugs (i.e. Advil, Aleve), vitamins, and supplements. (Some surgeons will want you to stop these medications prior to surgery and others may allow you to take them)  **If you are currently taking Plavix, Coumadin, Aspirin and/or other blood-thinning agents, contact your surgeon for instructions. ** Your surgeon will partner with the physician prescribing these medications to determine if it is safe to stop or if you need to continue taking. Please do not stop taking these medications without instructions from your surgeon    6. Wear comfortable clothes. Wear glasses instead of contacts. Do not bring any money or jewelry. Please bring picture ID, insurance card, and any prearranged co-payment or hospital payment. Do not wear make-up, particularly mascara the morning of your surgery. Do not wear nail polish, particularly if you are having foot /hand surgery.   Wear your hair loose or down, no ponytails, buns, leyda pins or clips. All body piercings must be removed. Please shower with antibacterial soap for three consecutive days before and on the morning of surgery, but do not apply any lotions, powders or deodorants after the shower on the day of surgery. Please use a fresh towels after each shower. Please sleep in clean clothes and change bed linens the night before surgery. Please do not shave for 48 hours prior to surgery. Shaving of the face is acceptable. 7. You should understand that if you do not follow these instructions your surgery may be cancelled. If your physical condition changes (I.e. fever, cold or flu) please contact your surgeon as soon as possible. 8. It is important that you be on time. If a situation occurs where you may be late, please call [phone] (OR Holding Area). 9. If you have any questions and or problems, please call [phone] (Pre-admission Testing). 10. Your surgery time may be subject to change. You will receive a phone call the evening prior if your time changes. 11.  If having outpatient surgery, you must have someone to drive you here, stay with you during the duration of your stay, and to drive you home at time of discharge. 12.   In an effort to improve the efficiency, privacy, and safety for all of our Pre-op patients visitors are not allowed in the Holding area. Once you arrive and are registered your family/visitors will be asked to remain in the waiting room. The Pre-op staff will get you from the Surgical Waiting Area and will explain to you and your family/visitors that the Pre-op phase is beginning. The staff will answer any questions and provide instructions for tracking of the patient, by use of the existing tracking number and color-coded status board in the waiting room.   At this time the staff will also ask for your designated spokesperson information in the event that the physician or staff need to provide an update or obtain any pertinent information. The designated spokesperson will be notified if the physician needs to speak to family during the pre-operative phase. If at any time your family/visitors has questions or concerns they may approach the volunteer desk in the waiting area for assistance. Special Instructions:May use Flonase    MEDICATIONS TO TAKE THE MORNING OF SURGERY WITH A SIP OF WATER:None      I understand a pre-operative phone call will be made to verify my surgery time. In the event that I am not available, I give permission for a message to be left on my answering service and/or with another person?   Yes          ___________________      __________   _________    (Signature of Patient)             (Witness)                (Date and Time)

## 2018-09-11 NOTE — TELEPHONE ENCOUNTER
Spoke with pt need ekg prior to port placement on 9/12. Pt on her way to [place] won't be back until tonight. Will get ekg am of surgery.

## 2018-09-12 NOTE — DISCHARGE INSTRUCTIONS
Dr. Mesfin Mcfarland Discharge Instructions for:  Faye Babin    MRN: [id number] : 1950    Admitted: 2018  Discharged: 2018     ·       What to do at [address]: resume your normal diet    Recommended activity: as tolerated, do not drive while on pain medicine. Follow-up with Dr. Nolan Teixeira in 2 weeks. Call [phone] for an appointment. Implanted Port Care for Chemotherapy: After Your Visit  Your Care Instructions  An implanted port is a device placed under the skin of your chest or arm. It is made of plastic, stainless steel, or titanium. The port is about the size of a quarter, but thicker. A thin, flexible tube called a catheter runs from the port into a large vein. A membrane (septum) similar to a pencil eraser is in the center of the port. A nurse uses a needle to put chemotherapy or other medicine and fluids through the septum into a blood vessel. A health professional also can take blood for tests through the port. An implanted port can be used for months. A special needle (called a Blum needle) may stay in the port for a short time. The port and catheter need regular care to make sure they do not get blocked. Tell your doctor if you take any blood thinners such as warfarin (Coumadin) or heparin. These medicines can increase the chance of bleeding inside your body. Follow-up care is a key part of your treatment and safety. Be sure to make and go to all appointments, and call your doctor if you are having problems. Its also a good idea to know your test results and keep a list of the medicines you take. How can you care for yourself at home? · You can return to your normal activities. Showering is ok. Do not soak the incisino in a bath or pool for 2 weeks. But you may need to avoid some activities if a Blum needle is left in the port. · Some clothes may rub the skin over the port. Do not wear a bra or suspenders that irritate your skin near the port.  Do not have your blood pressure taken on the arm with the port. · You will get a medical alert card with information about your port. Carry this with you. It will tell health care workers you have a port in case you need emergency care. · Your port will need regular flushing to keep it open. A nurse or other health professional will do this for you. DISCHARGE SUMMARY from Nurse    PATIENT INSTRUCTIONS:    After general anesthesia or intravenous sedation, for 24 hours or while taking prescription Narcotics:  · Limit your activities  · Do not drive and operate hazardous machinery  · Do not make important personal or business decisions  · Do  not drink alcoholic beverages  · If you have not urinated within 8 hours after discharge, please contact your surgeon on call. Report the following to your surgeon:  · Excessive pain, swelling, redness or odor of or around the surgical area  · Temperature over 100.5  · Nausea and vomiting lasting longer than 4 hours or if unable to take medications  · Any signs of decreased circulation or nerve impairment to extremity: change in color, persistent  numbness, tingling, coldness or increase pain  · Any questions    What to do at Home:    *  Please give a list of your current medications to your Primary Care Provider. *  Please update this list whenever your medications are discontinued, doses are      changed, or new medications (including over-the-counter products) are added. *  Please carry medication information at all times in case of emergency situations. These are general instructions for a healthy lifestyle:    No smoking/ No tobacco products/ Avoid exposure to second hand smoke  Surgeon General's Warning:  Quitting smoking now greatly reduces serious risk to your health.     Obesity, smoking, and sedentary lifestyle greatly increases your risk for illness    A healthy diet, regular physical exercise & weight monitoring are important for maintaining a healthy lifestyle    You may be retaining fluid if you have a history of heart failure or if you experience any of the following symptoms:  Weight gain of 3 pounds or more overnight or 5 pounds in a week, increased swelling in our hands or feet or shortness of breath while lying flat in bed. Please call your doctor as soon as you notice any of these symptoms; do not wait until your next office visit. Recognize signs and symptoms of STROKE:    F-face looks uneven    A-arms unable to move or move unevenly    S-speech slurred or non-existent    T-time-call 911 as soon as signs and symptoms begin-DO NOT go       Back to bed or wait to see if you get better-TIME IS BRAIN. Warning Signs of HEART ATTACK     Call 911 if you have these symptoms:   Chest discomfort. Most heart attacks involve discomfort in the center of the chest that lasts more than a few minutes, or that goes away and comes back. It can feel like uncomfortable pressure, squeezing, fullness, or pain.  Discomfort in other areas of the upper body. Symptoms can include pain or discomfort in one or both arms, the back, neck, jaw, or stomach.  Shortness of breath with or without chest discomfort.  Other signs may include breaking out in a cold sweat, nausea, or lightheadedness. Don't wait more than five minutes to call 911 - MINUTES MATTER! Fast action can save your life. Calling 911 is almost always the fastest way to get lifesaving treatment. Emergency Medical Services staff can begin treatment when they arrive -- up to an hour sooner than if someone gets to the hospital by car. The discharge information has been reviewed with the patient and caregiver. The patient and caregiver verbalized understanding.   Discharge medications reviewed with the patient and caregiver and appropriate educational materials and side effects teaching were provided. ___________________________________________________________________________________________________________________________________    A common side effect of anesthesia following surgery is nausea and/or vomiting. In order to decrease symptoms, it is wise to avoid foods that are high in fat, greasy foods, milk products, and spicy foods for the first 24 hours. Acceptable foods for the first 24 hours following surgery include but are not limited to:     soup   broth    toast    crackers    applesauce    bananas    mashed potatoes,   soft or scrambled eggs   oatmeal    jello    It is important to eat when taking your pain medication. This will help to prevent nausea. If possible, please try to time your meals with your medications. It is very important to stay hydrated following surgery. Sip fluids frequently while awake. Avoid acidic drinks such as citrus juices and soda for 24 hours. Carbonated beverages may cause bloating and gas. Acceptable fluids include:    - water (flavor packets may add variety)  - coffee or tea (in moderation)  - Gatorade  - Abelardo-aid  - apple juice  - cranberry juice    You are encouraged to cough and deep breathe every hour when awake. This will help to prevent respiratory complications following anesthesia. You may want to hug a pillow when coughing and sneezing to add additional support to the surgical area and to decrease discomfort if you had abdominal or chest surgery. If you are discharged home with support stockings, you may remove them after 24 hours. Support stockings are used to help prevent blood clots in the legs following surgery. Please take time to review all of your Home Care Instructions and Medication Information sheets provided in your discharge packet. If you have any questions, please contact your surgeons office. Thank you.                 How to Care for Your Wound After Its Treated With  DERMABOND* Topical Skin Adhesive  DERMABOND* Topical Skin Adhesive (2-octyl cyanoacrylate) is a sterile, liquid skin adhesive  that holds wound edges together. The film will usually remain in place for 5 to 10 days, then  naturally fall off your skin. The following will answer some of your questions and provide instructions for proper care for your  wound while it is healing:    CHECK WOUND APPEARANCE   Some swelling, redness, and pain are common with all wounds and normally will go away as the  wound heals. If swelling, redness, or pain increases or if the wound feels warm to the touch,  contact a doctor. Also contact a doctor if the wound edges reopen or separate. REPLACE BANDAGES   If your wound is bandaged, keep the bandage dry.  Replace the dressing daily until the adhesive film has fallen off or if the  bandage should become wet, unless otherwise instructed by your  physician.  When changing the dressing, do not place tape directly over the  DERMABOND adhesive film, because removing the tape later may also  remove the film. AVOID TOPICAL MEDICATIONS   Do not apply liquid or ointment medications or any other product to your wound while the  DERMABOND adhesive film is in place. These may loosen the film before your wound is healed. KEEP WOUND DRY AND PROTECTED   You may occasionally and briefly wet your wound in the shower or bath. Do not soak or scrub  your wound, do not swim, and avoid periods of heavy perspiration until the DERMABOND  adhesive has naturally fallen off. After showering or bathing, gently blot your wound dry with a  soft towel. If a protective dressing is being used, apply a fresh, dry bandage, being sure to keep  the tape off the DERMABOND adhesive film.  Apply a clean, dry bandage over the wound if necessary to protect it.  Protect your wound from injury until the skin has had sufficient time to heal.   Do not scratch, rub, or pick at the DERMABOND adhesive film. This may loosen the film before  your wound is healed.    Protect the wound from prolonged exposure to sunlight or tanning lamps while the film is in  place. If you have any questions or concerns about this product, please consult your doctor. *Trademark ©[employer] 2002  TO PREVENT AN INFECTION      1. [address] YOUR HANDS     To prevent infection, good handwashing is the most important thing you or your caregiver can do.  Wash your hands with soap and water or use the hand  we gave you before you touch any wounds. 2. SHOWER     Use the antibacterial soap we gave you when you take a shower.  Shower with this soap until your wounds are healed.  To reach all areas of your body, you may need someone to help you.  Dont forget to clean your belly button with every shower. 3.  USE CLEAN SHEETS     Use freshly cleaned sheets on your bed after surgery.  To keep the surgery site clean, do not allow pets to sleep with you while your wound is still healing. 4. STOP SMOKING     Stop smoking, or at least cut back on smoking     Smoking slows your healing. 5.  CONTROL YOUR BLOOD SUGAR     High blood sugars slow wound healing.  If you are diabetic, control your blood sugar levels before and after your surgery.

## 2018-09-12 NOTE — PERIOP NOTES
0930: Xray at bedside    0950: Updated patients brother    36: Report given to [facility] RN in phase 2    1040: Notified Dr. Ann Meng patients xray image is available. Per Dr. Ann Meng patient is clear for discharge. 1045: Per Dr. Ca Khan if result of xray shows no pneumothorax patient is clear for discharge.     1055: Patients transported to phase 2 by [facility] RN via stretcher with belongings

## 2018-09-12 NOTE — ANESTHESIA POSTPROCEDURE EVALUATION
Post-Anesthesia Evaluation and Assessment Patient: Krishna Pederson MRN: [id number]  SSN: [id number] YOB: 1950  Age: 70 y.o. Sex: female Cardiovascular Function/Vital Signs Visit Vitals  /58 (BP 1 Location: Right arm, BP Patient Position: At rest)  Pulse 79  Temp 36.3 °C (97.4 °F)  Resp 17  Ht 5' 9\" (1.753 m)  Wt 91.2 kg (201 lb 1 oz)  SpO2 97%  BMI 29.69 kg/m2 Patient is status post MAC, total IV anesthesia anesthesia for Procedure(s): PORTACATH PLACEMENT . Nausea/Vomiting: None Postoperative hydration reviewed and adequate. Pain: 
Pain Scale 1: Numeric (0 - 10) (09/12/18 0945) Pain Intensity 1: 0 (09/12/18 0945) Managed Neurological Status:  
Neuro (WDL): Exceptions to WDL (09/12/18 0925) Neuro Neurologic State: Drowsy; Eyes open spontaneously; Eyes open to voice (09/12/18 0925) Orientation Level: Oriented to person;Oriented to place;Oriented to situation (09/12/18 0925) Cognition: Follows commands (09/12/18 9980) Speech: Clear (09/12/18 9288) LUE Motor Response: Purposeful (09/12/18 0925) LLE Motor Response: Purposeful (09/12/18 0925) RUE Motor Response: Purposeful (09/12/18 0925) RLE Motor Response: Purposeful (09/12/18 0925) At baseline Mental Status and Level of Consciousness: Arousable Pulmonary Status:  
O2 Device: Room air (09/12/18 0945) Adequate oxygenation and airway patent Complications related to anesthesia: None Post-anesthesia assessment completed. No concerns Signed By: Winnie Suh MD   
 September 12, 2018

## 2018-09-12 NOTE — ANESTHESIA PREPROCEDURE EVALUATION
Anesthetic History No history of anesthetic complications Review of Systems / Medical History Patient summary reviewed, nursing notes reviewed and pertinent labs reviewed Pulmonary Asthma : well controlled Comments: Childhood Asthma Neuro/Psych Within defined limits Cardiovascular Hypertension Dysrhythmias : SVT Hyperlipidemia Exercise tolerance: <4 METS 
  
GI/Hepatic/Renal 
  
 
 
Renal disease: CRI Endo/Other Diabetes: well controlled, type 2 Obesity Pertinent negatives: No morbid obesity Other Findings Comments: Neck Mass, Non Hodgkins Lymphoma Fibromyalgia Physical Exam 
 
Airway Mallampati: II 
TM Distance: > 6 cm Neck ROM: normal range of motion Mouth opening: Normal 
 
 Cardiovascular Regular rate and rhythm,  S1 and S2 normal,  no murmur, click, rub, or gallop Rhythm: regular Rate: normal 
 
 
 
 Dental 
 
Dentition: Lower dentition intact and Upper dentition intact Pulmonary Breath sounds clear to auscultation Abdominal 
GI exam deferred Other Findings Anesthetic Plan ASA: 3 Anesthesia type: MAC and total IV anesthesia Monitoring Plan: BIS Induction: Intravenous Anesthetic plan and risks discussed with: Patient

## 2018-09-12 NOTE — INTERVAL H&P NOTE
H&P Update:  Cassy Siddiqui was seen and examined. History and physical has been reviewed. The patient has been examined.  There have been no significant clinical changes since the completion of the originally dated History and Physical.

## 2018-09-12 NOTE — PERIOP NOTES
Handoff Report from Operating Room to PACU    Report received from [facility] CRNA and [address] [place] [address] regarding Corinda Snare. Surgeon(s):  Rosanne Farias MD  And Procedure(s) (LRB):  PORTACATH PLACEMENT  (Right)  confirmed   with allergies and dressings discussed. Anesthesia type, drugs, patient history, complications, estimated blood loss, vital signs, intake and output, and last pain medication, lines and temperature were reviewed.

## 2018-09-12 NOTE — IP AVS SNAPSHOT
[address] 
[phone] Patient: Aminah Liang MRN: [id number] About your hospitalization You were admitted on:  September 12, 2018 You last received care in the:  [facility] You were discharged on:  September 12, 2018 Why you were hospitalized Your primary diagnosis was:  Not on File Follow-up Information Follow up With Details Comments Contact Info [place]   [address] [address] [place] [address] 
[phone] Your Scheduled Appointments Thursday September 13, 2018  3:00 PM EDT  
[facility] PET/CT TMR IMG SKULL THIGH with [id number] PET 1 MRM RAD PET ([facility]) [address] [facility].  
[phone] FDG Instructions:  1. Patient should arrive 30 minutes early to register. Patient's entire visit to the hospital will last about 2 Hours. 2.  Eat a low carb/high protein diet the evening before your procedure. Stay away from food and drink that contains sugars the night before and ESPECIALLY the day of the test. 3.  Do Not eat 4 hours prior to your procedure. The patient may drink all the water they want, up until the time of their appointment. Encourage patient to be well hydrated. Coffee is ok, as long as an artificial sweetener is used instead of sugar. 4. Take meds with water or saltine crackers if food required. 5.  Do not exercise the morning of your procedure. 6.  If you take insulin, it must be taken at least 4 hours before your procedure. 7.  You should bring medications for pain, anxiety, or claustrophobia if you need them. If you take medications for anxiety or claustrophobia, a ride needs to come with you. 8.  Materials for this procedure are ordered in advance and are time-sensitive. If you need to cancel or reschedule, you must call [phone] at least 48 hours prior to your appointment time.  9.  Bring recent CT scan results from other facilities with you. Patient should report to Outpatient Registration, [facility] ([facility]) 30 minutes prior to the appointment time unless instructed otherwise. Discharge Orders None A check helena indicates which time of day the medication should be taken. My Medications CONTINUE taking these medications Instructions Each Dose to Equal  
 Morning Noon Evening Bedtime  
 alcohol swabs Padm Commonly known as:  ALCOHOL PREP PADS Your last dose was: Your next dose is:    
   
   
 1 Pad by Apply Externally route daily. DX.E11.9  
 1 Pad  
    
   
   
   
  
 amLODIPine 10 mg tablet Commonly known as:  Briana Lomeli Your last dose was: Your next dose is: TAKE 1 TABLET BY MOUTH EVERY DAY  
     
   
   
   
  
 atorvastatin 40 mg tablet Commonly known as:  LIPITOR Your last dose was: Your next dose is: TAKE 1 TAB BY MOUTH DAILY. FLONASE ALLERGY RELIEF 50 mcg/actuation nasal spray Generic drug:  fluticasone Your last dose was: Your next dose is: 2 Sprays by Both Nostrils route daily. 2 Spray  
    
   
   
   
  
 ibuprofen 600 mg tablet Commonly known as:  MOTRIN Your last dose was: Your next dose is: Take 1 Tab by mouth every six (6) hours as needed for Pain. 600 mg JANUMET XR 50-1,000 mg Tm24 Generic drug:  SITagliptin-metFORMIN Your last dose was: Your next dose is: TAKE 1 TABLET BY MOUTH TWICE A DAY WITH MEALS Lancets Misc Commonly known as: One Touch Jinger Moose Your last dose was: Your next dose is:    
   
   
 USE TO TEST BLOOD SUGAR ONCE DAILY. DX.E11.9  
     
   
   
   
  
 metoprolol succinate 50 mg XL tablet Commonly known as:  TOPROL-XL  
   
 Your last dose was: Your next dose is: TAKE 1 TAB BY MOUTH DAILY. ONETOUCH VERIO strip Generic drug:  glucose blood VI test strips Your last dose was: Your next dose is:    
   
   
 TEST BLOOD SUGAR DAILY pioglitazone 30 mg tablet Commonly known as:  ACTOS Your last dose was: Your next dose is: TAKE 1 TABLET DAILY  
     
   
   
   
  
 spironolactone-hydrochlorothiazide 25-25 mg per tablet Commonly known as:  ALDACTAZIDE Your last dose was: Your next dose is: TAKE ONE TABLET BY MOUTH EVERY DAY Discharge Instructions Dr. Victorino Yan Discharge Instructions for:  Tae Davidson MRN: [id number] : 1950 Admitted: 2018  Discharged: 2018 · What to do at [facility] Recommended diet: resume your normal diet Recommended activity: as tolerated, do not drive while on pain medicine. Follow-up with Dr. Casi Cardoza in 2 weeks. Call [phone] for an appointment. Implanted Port Care for Chemotherapy: After Your Visit Your Care Instructions An implanted port is a device placed under the skin of your chest or arm. It is made of plastic, stainless steel, or titanium. The port is about the size of a quarter, but thicker. A thin, flexible tube called a catheter runs from the port into a large vein. A membrane (septum) similar to a pencil eraser is in the center of the port. A nurse uses a needle to put chemotherapy or other medicine and fluids through the septum into a blood vessel. A health professional also can take blood for tests through the port. An implanted port can be used for months. A special needle (called a Blum needle) may stay in the port for a short time. The port and catheter need regular care to make sure they do not get blocked.  
Tell your doctor if you take any blood thinners such as warfarin (Coumadin) or heparin. These medicines can increase the chance of bleeding inside your body. Follow-up care is a key part of your treatment and safety. Be sure to make and go to all appointments, and call your doctor if you are having problems. Its also a good idea to know your test results and keep a list of the medicines you take. How can you care for yourself at home? · You can return to your normal activities. Showering is ok. Do not soak the incisino in a bath or pool for 2 weeks. But you may need to avoid some activities if a Blum needle is left in the port. · Some clothes may rub the skin over the port. Do not wear a bra or suspenders that irritate your skin near the port. Do not have your blood pressure taken on the arm with the port. · You will get a medical alert card with information about your port. Carry this with you. It will tell health care workers you have a port in case you need emergency care. · Your port will need regular flushing to keep it open. A nurse or other health professional will do this for you. DISCHARGE SUMMARY from Nurse PATIENT INSTRUCTIONS: 
 
After general anesthesia or intravenous sedation, for 24 hours or while taking prescription Narcotics: · Limit your activities · Do not drive and operate hazardous machinery · Do not make important personal or business decisions · Do  not drink alcoholic beverages · If you have not urinated within 8 hours after discharge, please contact your surgeon on call. Report the following to your surgeon: 
· Excessive pain, swelling, redness or odor of or around the surgical area · Temperature over 100.5 · Nausea and vomiting lasting longer than 4 hours or if unable to take medications · Any signs of decreased circulation or nerve impairment to extremity: change in color, persistent  numbness, tingling, coldness or increase pain · Any questions What to do at Home: *  Please give a list of your current medications to your Primary Care Provider. *  Please update this list whenever your medications are discontinued, doses are 
    changed, or new medications (including over-the-counter products) are added. *  Please carry medication information at all times in case of emergency situations. These are general instructions for a healthy lifestyle: No smoking/ No tobacco products/ Avoid exposure to second hand smoke Surgeon General's Warning:  Quitting smoking now greatly reduces serious risk to your health. Obesity, smoking, and sedentary lifestyle greatly increases your risk for illness A healthy diet, regular physical exercise & weight monitoring are important for maintaining a healthy lifestyle You may be retaining fluid if you have a history of heart failure or if you experience any of the following symptoms:  Weight gain of 3 pounds or more overnight or 5 pounds in a week, increased swelling in our hands or feet or shortness of breath while lying flat in bed. Please call your doctor as soon as you notice any of these symptoms; do not wait until your next office visit. Recognize signs and symptoms of STROKE: 
 
 
? soup 
? broth 
?  toast  
? crackers ? applesauce 
? bananas  
? mashed potatoes, 
? soft or scrambled eggs 
? oatmeal 
?  jello It is important to eat when taking your pain medication. This will help to prevent nausea. If possible, please try to time your meals with your medications. It is very important to stay hydrated following surgery. Sip fluids frequently while awake. Avoid acidic drinks such as citrus juices and soda for 24 hours. Carbonated beverages may cause bloating and gas. Acceptable fluids include: 
 
? water (flavor packets may add variety) ? coffee or tea (in moderation) ? Gatorade ? Traci Shana ? apple juice 
? cranberry juice You are encouraged to cough and deep breathe every hour when awake. This will help to prevent respiratory complications following anesthesia.  You may want to hug a pillow when coughing and sneezing to add additional support to the surgical area and to decrease discomfort if you had abdominal or chest surgery. If you are discharged home with support stockings, you may remove them after 24 hours. Support stockings are used to help prevent blood clots in the legs following surgery. Please take time to review all of your Home Care Instructions and Medication Information sheets provided in your discharge packet. If you have any questions, please contact your surgeons office. Thank you. How to Care for Your Wound After Its Treated With DERMABOND* Topical Skin Adhesive DERMABOND* Topical Skin Adhesive (2-octyl cyanoacrylate) is a sterile, liquid skin adhesive 
that holds wound edges together. The film will usually remain in place for 5 to 10 days, then 
naturally fall off your skin. The following will answer some of your questions and provide instructions for proper care for your 
wound while it is healing: CHECK WOUND APPEARANCE 
 Some swelling, redness, and pain are common with all wounds and normally will go away as the 
wound heals. If swelling, redness, or pain increases or if the wound feels warm to the touch, 
contact a doctor. Also contact a doctor if the wound edges reopen or separate. REPLACE BANDAGES 
 If your wound is bandaged, keep the bandage dry.  Replace the dressing daily until the adhesive film has fallen off or if the 
bandage should become wet, unless otherwise instructed by your 
physician.  When changing the dressing, do not place tape directly over the DERMABOND adhesive film, because removing the tape later may also 
remove the film. AVOID TOPICAL MEDICATIONS  Do not apply liquid or ointment medications or any other product to your wound while the DERMABOND adhesive film is in place. These may loosen the film before your wound is healed. KEEP WOUND DRY AND PROTECTED 
  You may occasionally and briefly wet your wound in the shower or bath. Do not soak or scrub 
your wound, do not swim, and avoid periods of heavy perspiration until the DERMABOND 
adhesive has naturally fallen off. After showering or bathing, gently blot your wound dry with a 
soft towel. If a protective dressing is being used, apply a fresh, dry bandage, being sure to keep 
the tape off the DERMABOND adhesive film.  Apply a clean, dry bandage over the wound if necessary to protect it.  Protect your wound from injury until the skin has had sufficient time to heal. 
 Do not scratch, rub, or pick at the DERMABOND adhesive film. This may loosen the film before 
your wound is healed.  Protect the wound from prolonged exposure to sunlight or tanning lamps while the film is in 
place. If you have any questions or concerns about this product, please consult your doctor. *Trademark ©[employer] 2002 TO PREVENT AN INFECTION 1. [address] YOUR HANDS 
 
? To prevent infection, good handwashing is the most important thing you or your caregiver can do.   
 
? Wash your hands with soap and water or use the hand  we gave you before you touch any wounds. 2. SHOWER ? Use the antibacterial soap we gave you when you take a shower. ? Shower with this soap until your wounds are healed. ? To reach all areas of your body, you may need someone to help you. ? Dont forget to clean your belly button with every shower. 3.  USE CLEAN SHEETS 
 
? Use freshly cleaned sheets on your bed after surgery. ? To keep the surgery site clean, do not allow pets to sleep with you while your wound is still healing. 4. STOP SMOKING ? Stop smoking, or at least cut back on smoking ? Smoking slows your healing. 5.  CONTROL YOUR BLOOD SUGAR 
 
? High blood sugars slow wound healing. ? If you are diabetic, control your blood sugar levels before and after your surgery. Introducing [facility]! Francine Koroma introduces TeraFirrma patient portal. Now you can access parts of your medical record, email your doctor's office, and request medication refills online. 1. In your internet browser, go to [email or web address] 2. Click on the First Time User? Click Here link in the Sign In box. You will see the New Member Sign Up page. 3. Enter your TeraFirrma Access Code exactly as it appears below. You will not need to use this code after youve completed the sign-up process. If you do not sign up before the expiration date, you must request a new code. · TeraFirrma Access Code: [email or web address] Expires: 11/1/2018  1:12 PM 
 
4. Enter the last four digits of your Social Security Number (xxxx) and Date of Birth (mm/dd/yyyy) as indicated and click Submit. You will be taken to the next sign-up page. 5. Create a TeraFirrma ID. This will be your TeraFirrma login ID and cannot be changed, so think of one that is secure and easy to remember. 6. Create a TeraFirrma password. You can change your password at any time. 7. Enter your Password Reset Question and Answer. This can be used at a later time if you forget your password. 8. Enter your e-mail address. You will receive e-mail notification when new information is available in [address] 9. Click Sign Up. You can now view and download portions of your medical record. 10. Click the Download Summary menu link to download a portable copy of your medical information. If you have questions, please visit the Frequently Asked Questions section of the TeraFirrma website. Remember, TeraFirrma is NOT to be used for urgent needs. For medical emergencies, dial 911. Now available from your iPhone and Android! Introducing Daniel Escalante As a Francine Koroma patient, I wanted to make you aware of our electronic visit tool called Daniel Escalante. Francine Koroma 24/7 allows you to connect within minutes with a medical provider 24 hours a day, seven days a week via a mobile device or tablet or logging into a secure website from your computer. You can access [facility] from anywhere in the [place]. A virtual visit might be right for you when you have a simple condition and feel like you just dont want to get out of bed, or cant get away from work for an appointment, when your regular [facility] provider is not available (evenings, weekends or holidays), or when youre out of town and need minor care. Electronic visits cost only $49 and if the eFolder [employer] provider determines a prescription is needed to treat your condition, one can be electronically transmitted to a nearby pharmacy*. Please take a moment to enroll today if you have not already done so. The enrollment process is free and takes just a few minutes. To enroll, please download the Baloonr ravi to your tablet or phone, or visit [email or web address] to enroll on your computer. And, as an [address] [place] [address] patient with a [employer] account, the results of your visits will be scanned into your electronic medical record and your primary care provider will be able to view the scanned results. We urge you to continue to see your regular [facility] provider for your ongoing medical care. And while your primary care provider may not be the one available when you seek a Daniel Escalante virtual visit, the peace of mind you get from getting a real diagnosis real time can be priceless. For more information on Daniel [email or web address], view our Frequently Asked Questions (FAQs) at [email or web address]. Sincerely, 
 
Rosa Tavarez MD 
Chief Medical Officer Unique Santoyo *:  certain medications cannot be prescribed via [facility] Unresulted Labs-Please follow up with your PCP about these lab tests Order Current Status EKG, 12 LEAD, INITIAL Preliminary result Providers Seen During Your Hospitalization Provider Specialty Primary office phone Nikki Wheeler MD General Surgery [phone] Your Primary Care Physician (PCP) Primary Care Physician Office Phone Office Fax [address] [facility] [phone] You are allergic to the following No active allergies Recent Documentation Height Weight BMI OB Status Smoking Status 1.753 m 91.2 kg 29.69 kg/m2 Postmenopausal Never Smoker Emergency Contacts Name Discharge Info Relation Home Work Mobile Betsy Lainez DISCHARGE CAREGIVER [3] Other Relative [6] [phone] [address] A CAREGIVER [3] Brother [24] [phone] Patient Belongings The following personal items are in your possession at time of discharge: 
  Dental Appliances: None  Visual Aid: Glasses   Hearing Aids/Status: Does not own  Home Medications: None   Jewelry: None  Clothing:  (clothing and shoes)    Other Valuables: Eyeglasses  Personal Items Sent to Safe: declined Please provide this summary of care documentation to your next provider. Signatures-by signing, you are acknowledging that this After Visit Summary has been reviewed with you and you have received a copy. Patient Signature:  ____________________________________________________________ Date:  ____________________________________________________________  
  
Maggy Bryant Provider Signature:  ____________________________________________________________ Date:  ____________________________________________________________

## 2018-09-12 NOTE — H&P (VIEW-ONLY)
HISTORY OF PRESENT ILLNESS Grecia De La Rosa is a 70 y.o. female. HPI Comments:  
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 There is a right 
supraclavicular lymph node that measures 3.1 x 1.9 cm. 
  
There is extensive adenopathy in the mediastinum which is incompletely imaged. Henley Junk There is compression of the superior vena cava by the adenopathy. There are borderline enlarged left supraclavicular lymph nodes. 
  
The carotid and jugular vessels enhance normally.  
  
There is a 7 mm low-density in the right thyroid. Submandibular glands and 
parotid glands are within normal limits. . 
  
No nasopharyngeal, pharyngeal or laryngeal mass is identified. 
  
No abnormalities are identified in the visualized portions of the brain or 
orbits.  
  
The visualized mastoid air cells and paranasal sinuses are clear. 
  
There is a 6 mm left upper lobe nodule 
  There are degenerative changes throughout the cervical spine. . 
  
IMPRESSION IMPRESSION:  
1. There is right level 3 and 4 adenopathy. There is extensive adenopathy 
extending into the mediastinum compressing the SVC suspicious for neoplasm which 
could be metastatic or lymphoma. 
 
 
____________________________________________________________________________ Patient presents with: 
Mass: Pt seen at the request of Dr. Wilfrido Gaspar to evaluate right supracavicular mass. /81 (BP 1 Location: Left arm, BP Patient Position: Sitting)  Pulse (!) 103  Temp 97 °F (36.1 °C) (Oral)   Resp 20  Ht 5' 9\" (1.753 m)  Wt 92.1 kg (203 lb 1.6 oz)  SpO2 99%  BMI 29.99 kg/m2 Past Medical History: 
No date: Diabetes ([address] No date: Fibromyalgia Comment: Treated with alternate medical therapy No date: Hypertension Past Surgical History: 
2015: HX COLONOSCOPY No date: HX GYN Comment: Pap 2015 No date: HX ORTHOPAEDIC Comment: fractured R ankle/ no surgery Social History Marital status: SINGLE              Spouse name:                    
  Years of education:                 Number of children: 0 Occupational History Occupation          Employer            Comment Employed by a nonp*                      Social History Main Topics Smoking status: Never Smoker Smokeless status: Never Used Alcohol use: Yes Comment: socially Drug use: No           
  Sexual activity: Yes               
 
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 
  Postgraduate work [employer] Honorary doctorate [employer] Formally employed by [employer] Review of patient's family history indicates: 
  Heart Disease                  Mother Cancer                         Father Comment: prostate cancer Cancer                         Sister Comment: Breast cancer apparent DCIS Heart Disease                  Brother Comment: Brother  from congestive heart  
             failure secondary to severe mitral  
             disease Current Outpatient Prescriptions: 
pioglitazone (ACTOS) 30 mg tablet, TAKE 1 TABLET DAILY 
spironolactone-hydrochlorothiazide (ALDACTAZIDE) 25-25 mg per tablet, TAKE ONE TABLET BY MOUTH EVERY DAY 
atorvastatin (LIPITOR) 40 mg tablet, TAKE 1 TAB BY MOUTH DAILY. amLODIPine (NORVASC) 10 mg tablet, TAKE 1 TABLET BY MOUTH EVERY DAY 
metoprolol succinate (TOPROL-XL) 50 mg XL tablet, TAKE 1 TAB BY MOUTH DAILY.  
 JANUMET XR 50-1,000 mg TM24, TAKE 1 TABLET BY MOUTH TWICE A DAY WITH MEALS 
 Lancets (ONE TOUCH DELICA) misc, USE TO TEST BLOOD SUGAR ONCE DAILY. DX.E11.9 
 alcohol swabs (ALCOHOL PREP PADS) padm, 1 Pad by Apply Externally route daily. DX.E11.9 ONETOUCH VERIO strip, TEST BLOOD SUGAR DAILY No current facility-administered medications for this visit. Allergies: No Known Allergies 
_____________________________________________________________________________ Mass The history is provided by the patient. This is a new problem. The current episode started more than 1 week ago. The problem occurs constantly. The problem has been gradually worsening. Pertinent negatives include no chest pain, no abdominal pain, no headaches and no shortness of breath. Nothing aggravates the symptoms. Nothing relieves the symptoms. The treatment provided no relief. Review of Systems Constitutional: Negative for chills, fever and weight loss. HENT: Negative for ear pain. Eyes: Negative for pain. Respiratory: Negative for shortness of breath. Cardiovascular: Negative for chest pain. Gastrointestinal: Negative for abdominal pain and blood in stool. Genitourinary: Negative for hematuria. Musculoskeletal: Negative for joint pain. Skin: Negative for rash. Neurological: Negative for dizziness, focal weakness, seizures and headaches. Endo/Heme/Allergies: Does not bruise/bleed easily. Psychiatric/Behavioral: The patient does not have insomnia. Physical Exam  
Constitutional: She is oriented to person, place, and time. She appears well-developed and well-nourished. No distress. HENT:  
Head: Normocephalic and atraumatic. Mouth/Throat: No oropharyngeal exudate. Eyes: Pupils are equal, round, and reactive to light. Neck: Normal range of motion. No tracheal deviation present. Cardiovascular: Normal rate, regular rhythm and normal heart sounds. No murmur heard. Pulmonary/Chest: Effort normal and breath sounds normal. No respiratory distress. She has no wheezes. Abdominal: Soft. Bowel sounds are normal. She exhibits no distension and no mass. There is no tenderness. There is no rebound and no guarding. Musculoskeletal: Normal range of motion. She exhibits no edema or tenderness. Lymphadenopathy:  
  She has cervical adenopathy. Right cervical: Superficial cervical and posterior cervical adenopathy present. Right: Supraclavicular adenopathy present. Neurological: She is alert and oriented to person, place, and time. Skin: Skin is warm. No rash noted. She is not diaphoretic. No erythema. Psychiatric: She has a normal mood and affect. Her behavior is normal.  
 
 
ASSESSMENT and PLAN 
  ICD-10-CM ICD-9-CM 1. Lymphadenopathy R59.1 785. 6 Reviewed DDx including infections or lymphoma. Extensive travel history. I had an extensive discussion with Barkley Climes regarding the risks, benefits, and alternatives of proceeding with an Excisional biopsy of a right neck lymph node. Risks of surgery including the risk of anesthesia, bleeding, infection, injury to underlying structures, recurrence, need for further surgery, and the lack of symptomatic improvement were discussed and she is in agreement to proceed. She will try to change her travel plans and we will schedule for next week. Thank you for this consult.

## 2018-09-12 NOTE — OP NOTES
OPERATIVE REPORT    9/12/2018    Pre-operative Diagnosis: Hodgkin's LYMPHOMA    Post-operative Diagnosis: Hodgkin's LYMPHOMA      OPERATIVE PROCEDURE:   Placement of left subclavian tunneled port-a-cath    Surgeon: Filomena Felix MD    Anesthesia: Mac plus Local    Estimated Blood Loss: Minimal    PROCEDURE:  After satisfactory induction of IV sedation, the patient was kept in the supine position with a towel roll behind the shoulders, bilateral arms tucked to the side, with appropriate padding. The patient's bilateral neck and bilateral chest were prepped and draped sterilely including the use of Ioban. Pt prefers placement on the right and that is the side we attempted first. The patient was given local anesthetic. Access to the subclavian vein was easily obtained with an 18-gauge needle and, using modified Seldinger technique, a wire placed within the vein and confirmed to be in place using intraoperative fluoroscopy, however wire would not tract distally presumable due to the bulky priscila disease in the upper chest.     I therefore converted to a left side placement. Access to the subclavian vein was easily obtained with an 18-gauge needle and, using modified Seldinger technique, a wire placed within the vein and confirmed to be in place using intraoperative fluoroscopy. The wire made the bend into the vena cava. A pocket was created on the chest wall. The catheter was inserted into the vein using the dilator sheath, cut to appropriate size, tunneled under the skin, attached to the port. The port was secured to the chest wall using Prolene suture. The port was noted to draw blood and flush easily. Final flush was performed with heparinized saline, and the final placement confirmed the tip of the port to be in the superior vena cava, without any excessive bending or kinking. The soft tissue was reapproximated with interrupted 3-0 Vicryl. This was followed by closure of the skin with Dermabond.  The patient was awoken and transferred to recovery room in stable condition. A chest x-ray will be performed in the recovery room to confirm placement. Findings discussed with patient's brother.     Ayanna Dee MD    cc:   Ayanna Dee MD

## 2019-01-01 ENCOUNTER — APPOINTMENT (OUTPATIENT)
Dept: NON INVASIVE DIAGNOSTICS | Age: 69
DRG: 205 | End: 2019-01-01
Attending: PHYSICIAN ASSISTANT
Payer: COMMERCIAL

## 2019-01-01 ENCOUNTER — APPOINTMENT (OUTPATIENT)
Dept: GENERAL RADIOLOGY | Age: 69
DRG: 205 | End: 2019-01-01
Attending: PHYSICIAN ASSISTANT
Payer: COMMERCIAL

## 2019-01-01 ENCOUNTER — HOSPITAL ENCOUNTER (OUTPATIENT)
Dept: PET IMAGING | Age: 69
Discharge: HOME | End: 2019-01-21
Attending: INTERNAL MEDICINE
Payer: COMMERCIAL

## 2019-01-01 ENCOUNTER — APPOINTMENT (OUTPATIENT)
Dept: GENERAL RADIOLOGY | Age: 69
DRG: 205 | End: 2019-01-01
Attending: INTERNAL MEDICINE
Payer: COMMERCIAL

## 2019-01-01 ENCOUNTER — APPOINTMENT (OUTPATIENT)
Dept: CT IMAGING | Age: 69
DRG: 205 | End: 2019-01-01
Attending: INTERNAL MEDICINE
Payer: COMMERCIAL

## 2019-01-01 ENCOUNTER — TELEPHONE (OUTPATIENT)
Dept: PALLATIVE CARE | Age: 69
End: 2019-01-01

## 2019-01-01 ENCOUNTER — APPOINTMENT (OUTPATIENT)
Dept: CT IMAGING | Age: 69
End: 2019-01-01
Attending: EMERGENCY MEDICINE
Payer: COMMERCIAL

## 2019-01-01 ENCOUNTER — APPOINTMENT (OUTPATIENT)
Dept: GENERAL RADIOLOGY | Age: 69
DRG: 205 | End: 2019-01-01
Attending: HOSPITALIST
Payer: COMMERCIAL

## 2019-01-01 ENCOUNTER — APPOINTMENT (OUTPATIENT)
Dept: CT IMAGING | Age: 69
DRG: 205 | End: 2019-01-01
Attending: HOSPITALIST
Payer: COMMERCIAL

## 2019-01-01 ENCOUNTER — TELEPHONE (OUTPATIENT)
Dept: PULMONOLOGY | Age: 69
End: 2019-01-01

## 2019-01-01 ENCOUNTER — APPOINTMENT (OUTPATIENT)
Dept: MRI IMAGING | Age: 69
DRG: 205 | End: 2019-01-01
Attending: NURSE PRACTITIONER
Payer: COMMERCIAL

## 2019-01-01 ENCOUNTER — APPOINTMENT (OUTPATIENT)
Dept: NON INVASIVE DIAGNOSTICS | Age: 69
DRG: 205 | End: 2019-01-01
Attending: INTERNAL MEDICINE
Payer: COMMERCIAL

## 2019-01-01 ENCOUNTER — APPOINTMENT (OUTPATIENT)
Dept: ULTRASOUND IMAGING | Age: 69
DRG: 205 | End: 2019-01-01
Attending: INTERNAL MEDICINE
Payer: COMMERCIAL

## 2019-01-01 ENCOUNTER — APPOINTMENT (OUTPATIENT)
Dept: MRI IMAGING | Age: 69
DRG: 205 | End: 2019-01-01
Attending: PSYCHIATRY & NEUROLOGY
Payer: COMMERCIAL

## 2019-01-01 ENCOUNTER — APPOINTMENT (OUTPATIENT)
Dept: GENERAL RADIOLOGY | Age: 69
DRG: 205 | End: 2019-01-01
Attending: NURSE PRACTITIONER
Payer: COMMERCIAL

## 2019-01-01 ENCOUNTER — PATIENT OUTREACH (OUTPATIENT)
Dept: INTERNAL MEDICINE CLINIC | Age: 69
End: 2019-01-01

## 2019-01-01 ENCOUNTER — HOSPITAL ENCOUNTER (INPATIENT)
Age: 69
LOS: 31 days | DRG: 205 | End: 2019-08-15
Attending: STUDENT IN AN ORGANIZED HEALTH CARE EDUCATION/TRAINING PROGRAM | Admitting: HOSPITALIST
Payer: COMMERCIAL

## 2019-01-01 ENCOUNTER — APPOINTMENT (OUTPATIENT)
Dept: GENERAL RADIOLOGY | Age: 69
DRG: 205 | End: 2019-01-01
Attending: STUDENT IN AN ORGANIZED HEALTH CARE EDUCATION/TRAINING PROGRAM
Payer: COMMERCIAL

## 2019-01-01 ENCOUNTER — HOSPITAL ENCOUNTER (EMERGENCY)
Age: 69
Discharge: HOME | End: 2019-04-11
Attending: EMERGENCY MEDICINE
Payer: COMMERCIAL

## 2019-01-01 ENCOUNTER — APPOINTMENT (OUTPATIENT)
Dept: CT IMAGING | Age: 69
DRG: 205 | End: 2019-01-01
Attending: EMERGENCY MEDICINE
Payer: COMMERCIAL

## 2019-01-01 ENCOUNTER — APPOINTMENT (OUTPATIENT)
Dept: GENERAL RADIOLOGY | Age: 69
DRG: 205 | End: 2019-01-01
Attending: EMERGENCY MEDICINE
Payer: COMMERCIAL

## 2019-01-01 ENCOUNTER — HOSPITAL ENCOUNTER (INPATIENT)
Age: 69
LOS: 38 days | Discharge: NURSING, REHAB OR LONG-TERM CARE | DRG: 205 | End: 2019-03-13
Attending: EMERGENCY MEDICINE | Admitting: INTERNAL MEDICINE
Payer: COMMERCIAL

## 2019-01-01 ENCOUNTER — APPOINTMENT (OUTPATIENT)
Dept: VASCULAR SURGERY | Age: 69
DRG: 205 | End: 2019-01-01
Attending: INTERNAL MEDICINE
Payer: COMMERCIAL

## 2019-01-01 VITALS
BODY MASS INDEX: 36.15 KG/M2 | DIASTOLIC BLOOD PRESSURE: 77 MMHG | SYSTOLIC BLOOD PRESSURE: 123 MMHG | HEART RATE: 93 BPM | OXYGEN SATURATION: 97 % | TEMPERATURE: 98.6 F | RESPIRATION RATE: 18 BRPM | HEIGHT: 68 IN | WEIGHT: 238.54 LBS

## 2019-01-01 VITALS
DIASTOLIC BLOOD PRESSURE: 48 MMHG | TEMPERATURE: 96.8 F | SYSTOLIC BLOOD PRESSURE: 89 MMHG | OXYGEN SATURATION: 97 % | HEART RATE: 115 BPM | WEIGHT: 161.8 LBS | BODY MASS INDEX: 23.96 KG/M2 | RESPIRATION RATE: 17 BRPM | HEIGHT: 69 IN

## 2019-01-01 VITALS
DIASTOLIC BLOOD PRESSURE: 82 MMHG | RESPIRATION RATE: 18 BRPM | OXYGEN SATURATION: 98 % | HEART RATE: 104 BPM | HEIGHT: 69 IN | SYSTOLIC BLOOD PRESSURE: 123 MMHG | TEMPERATURE: 97.3 F | BODY MASS INDEX: 35.74 KG/M2

## 2019-01-01 VITALS — BODY MASS INDEX: 27.28 KG/M2 | WEIGHT: 180 LBS | HEIGHT: 68 IN

## 2019-01-01 DIAGNOSIS — R00.0 TACHYCARDIA: ICD-10-CM

## 2019-01-01 DIAGNOSIS — R06.02 SHORTNESS OF BREATH: ICD-10-CM

## 2019-01-01 DIAGNOSIS — Z71.89 GOALS OF CARE, COUNSELING/DISCUSSION: ICD-10-CM

## 2019-01-01 DIAGNOSIS — M79.89 LEG SWELLING: ICD-10-CM

## 2019-01-01 DIAGNOSIS — R09.02 HYPOXEMIA: ICD-10-CM

## 2019-01-01 DIAGNOSIS — R41.89 AKINETIC MUTISM: ICD-10-CM

## 2019-01-01 DIAGNOSIS — J18.9 PNEUMONIA OF BOTH LUNGS DUE TO INFECTIOUS ORGANISM, UNSPECIFIED PART OF LUNG: ICD-10-CM

## 2019-01-01 DIAGNOSIS — J84.10 LUNG FIBROSIS (HCC): ICD-10-CM

## 2019-01-01 DIAGNOSIS — R41.82 ALTERED MENTAL STATUS, UNSPECIFIED ALTERED MENTAL STATUS TYPE: ICD-10-CM

## 2019-01-01 DIAGNOSIS — E87.6 HYPOKALEMIA: ICD-10-CM

## 2019-01-01 DIAGNOSIS — R91.8 BILATERAL PULMONARY INFILTRATES ON CXR: Primary | ICD-10-CM

## 2019-01-01 DIAGNOSIS — J98.4 BLEOMYCIN LUNG TOXICITY: ICD-10-CM

## 2019-01-01 DIAGNOSIS — R53.81 DEBILITY: ICD-10-CM

## 2019-01-01 DIAGNOSIS — T45.1X5A BLEOMYCIN LUNG TOXICITY: ICD-10-CM

## 2019-01-01 DIAGNOSIS — R56.9 SEIZURE-LIKE ACTIVITY (HCC): ICD-10-CM

## 2019-01-01 DIAGNOSIS — J96.01 ACUTE RESPIRATORY FAILURE WITH HYPOXIA (HCC): Primary | ICD-10-CM

## 2019-01-01 DIAGNOSIS — R63.30 FEEDING DIFFICULTIES: ICD-10-CM

## 2019-01-01 DIAGNOSIS — R40.1: ICD-10-CM

## 2019-01-01 DIAGNOSIS — I26.99 OTHER ACUTE PULMONARY EMBOLISM WITHOUT ACUTE COR PULMONALE (HCC): ICD-10-CM

## 2019-01-01 DIAGNOSIS — C81.11 HODGKIN'S DISEASE, NODULAR SCLEROSIS, OF LYMPH NODES OF HEAD, FACE, AND NECK (HCC): ICD-10-CM

## 2019-01-01 DIAGNOSIS — G93.40 ENCEPHALOPATHY: ICD-10-CM

## 2019-01-01 DIAGNOSIS — R41.89 UNRESPONSIVE: ICD-10-CM

## 2019-01-01 DIAGNOSIS — I10 HYPERTENSION, UNSPECIFIED TYPE: Primary | ICD-10-CM

## 2019-01-01 LAB
A FLAVUS IGE QN: NEGATIVE
A FUMIGATUS # 2 AB, 660126: NEGATIVE
A FUMIGATUS # 3 AB, 660142: NEGATIVE
A FUMIGATUS1 AB SER QL ID: NEGATIVE
A PULLULANS AB SER QL: NEGATIVE
ABO + RH BLD: NORMAL
ACE SERPL-CCNC: 19 U/L (ref 14–82)
ACID FAST STN SPEC: NEGATIVE
ALBUMIN SERPL-MCNC: 2.4 G/DL (ref 3.5–5)
ALBUMIN SERPL-MCNC: 2.6 G/DL (ref 3.5–5)
ALBUMIN SERPL-MCNC: 2.6 G/DL (ref 3.5–5)
ALBUMIN SERPL-MCNC: 2.8 G/DL (ref 3.5–5)
ALBUMIN SERPL-MCNC: 3 G/DL (ref 3.5–5)
ALBUMIN SERPL-MCNC: 3.2 G/DL (ref 3.5–5)
ALBUMIN SERPL-MCNC: 3.4 G/DL (ref 3.5–5)
ALBUMIN/GLOB SERPL: 0.6 {RATIO} (ref 1.1–2.2)
ALBUMIN/GLOB SERPL: 0.6 {RATIO} (ref 1.1–2.2)
ALBUMIN/GLOB SERPL: 0.7 {RATIO} (ref 1.1–2.2)
ALBUMIN/GLOB SERPL: 0.9 {RATIO} (ref 1.1–2.2)
ALBUMIN/GLOB SERPL: 1.1 {RATIO} (ref 1.1–2.2)
ALP SERPL-CCNC: 102 U/L (ref 45–117)
ALP SERPL-CCNC: 116 U/L (ref 45–117)
ALP SERPL-CCNC: 119 U/L (ref 45–117)
ALP SERPL-CCNC: 151 U/L (ref 45–117)
ALP SERPL-CCNC: 84 U/L (ref 45–117)
ALP SERPL-CCNC: 88 U/L (ref 45–117)
ALP SERPL-CCNC: 98 U/L (ref 45–117)
ALT SERPL-CCNC: 11 U/L (ref 12–78)
ALT SERPL-CCNC: 12 U/L (ref 12–78)
ALT SERPL-CCNC: 15 U/L (ref 12–78)
ALT SERPL-CCNC: 16 U/L (ref 12–78)
ALT SERPL-CCNC: 46 U/L (ref 12–78)
ALT SERPL-CCNC: 55 U/L (ref 12–78)
ALT SERPL-CCNC: 9 U/L (ref 12–78)
AMMONIA PLAS-SCNC: <10 UMOL/L
ANA SER QL: POSITIVE
ANION GAP SERPL CALC-SCNC: 10 MMOL/L (ref 5–15)
ANION GAP SERPL CALC-SCNC: 13 MMOL/L (ref 5–15)
ANION GAP SERPL CALC-SCNC: 4 MMOL/L (ref 5–15)
ANION GAP SERPL CALC-SCNC: 5 MMOL/L (ref 5–15)
ANION GAP SERPL CALC-SCNC: 6 MMOL/L (ref 5–15)
ANION GAP SERPL CALC-SCNC: 7 MMOL/L (ref 5–15)
ANION GAP SERPL CALC-SCNC: 8 MMOL/L (ref 5–15)
ANION GAP SERPL CALC-SCNC: 9 MMOL/L (ref 5–15)
ANION GAP SERPL CALC-SCNC: 9 MMOL/L (ref 5–15)
APPEARANCE UR: ABNORMAL
APPEARANCE UR: ABNORMAL
APPEARANCE UR: CLEAR
APPEARANCE UR: CLEAR
ARTERIAL PATENCY WRIST A: YES
AST SERPL-CCNC: 14 U/L (ref 15–37)
AST SERPL-CCNC: 14 U/L (ref 15–37)
AST SERPL-CCNC: 16 U/L (ref 15–37)
AST SERPL-CCNC: 17 U/L (ref 15–37)
AST SERPL-CCNC: 17 U/L (ref 15–37)
AST SERPL-CCNC: 21 U/L (ref 15–37)
AST SERPL-CCNC: 23 U/L (ref 15–37)
ATRIAL RATE: 103 BPM
ATRIAL RATE: 109 BPM
ATRIAL RATE: 121 BPM
ATRIAL RATE: 143 BPM
B PERT DNA SPEC QL NAA+PROBE: NOT DETECTED
BACTERIA SPEC CULT: ABNORMAL
BACTERIA SPEC CULT: NORMAL
BACTERIA URNS QL MICRO: ABNORMAL /HPF
BACTERIA URNS QL MICRO: ABNORMAL /HPF
BACTERIA URNS QL MICRO: NEGATIVE /HPF
BASE DEFICIT BLD-SCNC: 2 MMOL/L
BASE DEFICIT BLD-SCNC: 2 MMOL/L
BASE EXCESS BLD CALC-SCNC: 10 MMOL/L
BASE EXCESS BLD CALC-SCNC: 8 MMOL/L
BASE EXCESS BLD CALC-SCNC: 8 MMOL/L
BASOPHILS # BLD: 0 K/UL (ref 0–0.1)
BASOPHILS # BLD: 0.1 K/UL (ref 0–0.1)
BASOPHILS NFR BLD: 0 % (ref 0–1)
BASOPHILS NFR BLD: 2 % (ref 0–1)
BDY SITE: ABNORMAL
BILIRUB SERPL-MCNC: 0.2 MG/DL (ref 0.2–1)
BILIRUB SERPL-MCNC: 0.3 MG/DL (ref 0.2–1)
BILIRUB SERPL-MCNC: 0.5 MG/DL (ref 0.2–1)
BILIRUB UR QL: NEGATIVE
BLASTS NFR BLD MANUAL: 0 %
BLD PROD TYP BPU: NORMAL
BLOOD GROUP ANTIBODIES SERPL: NORMAL
BNP SERPL-MCNC: 1073 PG/ML
BNP SERPL-MCNC: 265 PG/ML (ref 0–125)
BNP SERPL-MCNC: 534 PG/ML
BNP SERPL-MCNC: 862 PG/ML
BPU ID: NORMAL
BUN SERPL-MCNC: 12 MG/DL (ref 6–20)
BUN SERPL-MCNC: 17 MG/DL (ref 6–20)
BUN SERPL-MCNC: 19 MG/DL (ref 6–20)
BUN SERPL-MCNC: 20 MG/DL (ref 6–20)
BUN SERPL-MCNC: 20 MG/DL (ref 6–20)
BUN SERPL-MCNC: 21 MG/DL (ref 6–20)
BUN SERPL-MCNC: 23 MG/DL (ref 6–20)
BUN SERPL-MCNC: 24 MG/DL (ref 6–20)
BUN SERPL-MCNC: 24 MG/DL (ref 6–20)
BUN SERPL-MCNC: 25 MG/DL (ref 6–20)
BUN SERPL-MCNC: 28 MG/DL (ref 6–20)
BUN SERPL-MCNC: 30 MG/DL (ref 6–20)
BUN SERPL-MCNC: 31 MG/DL (ref 6–20)
BUN SERPL-MCNC: 31 MG/DL (ref 6–20)
BUN SERPL-MCNC: 32 MG/DL (ref 6–20)
BUN SERPL-MCNC: 33 MG/DL (ref 6–20)
BUN SERPL-MCNC: 33 MG/DL (ref 6–20)
BUN SERPL-MCNC: 36 MG/DL (ref 6–20)
BUN SERPL-MCNC: 36 MG/DL (ref 6–20)
BUN SERPL-MCNC: 37 MG/DL (ref 6–20)
BUN SERPL-MCNC: 38 MG/DL (ref 6–20)
BUN SERPL-MCNC: 39 MG/DL (ref 6–20)
BUN SERPL-MCNC: 40 MG/DL (ref 6–20)
BUN SERPL-MCNC: 41 MG/DL (ref 6–20)
BUN SERPL-MCNC: 42 MG/DL (ref 6–20)
BUN SERPL-MCNC: 43 MG/DL (ref 6–20)
BUN SERPL-MCNC: 43 MG/DL (ref 6–20)
BUN SERPL-MCNC: 44 MG/DL (ref 6–20)
BUN SERPL-MCNC: 5 MG/DL (ref 6–20)
BUN SERPL-MCNC: 5 MG/DL (ref 6–20)
BUN SERPL-MCNC: 54 MG/DL (ref 6–20)
BUN SERPL-MCNC: 6 MG/DL (ref 6–20)
BUN SERPL-MCNC: 7 MG/DL (ref 6–20)
BUN SERPL-MCNC: 8 MG/DL (ref 6–20)
BUN/CREAT SERPL: 10 (ref 12–20)
BUN/CREAT SERPL: 10 (ref 12–20)
BUN/CREAT SERPL: 12 (ref 12–20)
BUN/CREAT SERPL: 13 (ref 12–20)
BUN/CREAT SERPL: 14 (ref 12–20)
BUN/CREAT SERPL: 20 (ref 12–20)
BUN/CREAT SERPL: 20 (ref 12–20)
BUN/CREAT SERPL: 22 (ref 12–20)
BUN/CREAT SERPL: 23 (ref 12–20)
BUN/CREAT SERPL: 23 (ref 12–20)
BUN/CREAT SERPL: 24 (ref 12–20)
BUN/CREAT SERPL: 25 (ref 12–20)
BUN/CREAT SERPL: 27 (ref 12–20)
BUN/CREAT SERPL: 27 (ref 12–20)
BUN/CREAT SERPL: 28 (ref 12–20)
BUN/CREAT SERPL: 29 (ref 12–20)
BUN/CREAT SERPL: 30 (ref 12–20)
BUN/CREAT SERPL: 30 (ref 12–20)
BUN/CREAT SERPL: 31 (ref 12–20)
BUN/CREAT SERPL: 32 (ref 12–20)
BUN/CREAT SERPL: 33 (ref 12–20)
BUN/CREAT SERPL: 34 (ref 12–20)
BUN/CREAT SERPL: 38 (ref 12–20)
BUN/CREAT SERPL: 39 (ref 12–20)
BUN/CREAT SERPL: 40 (ref 12–20)
BUN/CREAT SERPL: 41 (ref 12–20)
BUN/CREAT SERPL: 42 (ref 12–20)
BUN/CREAT SERPL: 43 (ref 12–20)
BUN/CREAT SERPL: 43 (ref 12–20)
BUN/CREAT SERPL: 45 (ref 12–20)
BUN/CREAT SERPL: 47 (ref 12–20)
BUN/CREAT SERPL: 48 (ref 12–20)
BUN/CREAT SERPL: 52 (ref 12–20)
BUN/CREAT SERPL: 6 (ref 12–20)
BUN/CREAT SERPL: 6 (ref 12–20)
C PNEUM DNA SPEC QL NAA+PROBE: NOT DETECTED
C-ANCA TITR SER IF: NORMAL TITER
C-ANCA TITR SER IF: NORMAL TITER
C3 SERPL-MCNC: 165 MG/DL (ref 82–167)
C4 SERPL-MCNC: 36 MG/DL (ref 14–44)
CALCIUM SERPL-MCNC: 8.5 MG/DL (ref 8.5–10.1)
CALCIUM SERPL-MCNC: 8.6 MG/DL (ref 8.5–10.1)
CALCIUM SERPL-MCNC: 8.7 MG/DL (ref 8.5–10.1)
CALCIUM SERPL-MCNC: 8.8 MG/DL (ref 8.5–10.1)
CALCIUM SERPL-MCNC: 8.9 MG/DL (ref 8.5–10.1)
CALCIUM SERPL-MCNC: 9 MG/DL (ref 8.5–10.1)
CALCIUM SERPL-MCNC: 9.1 MG/DL (ref 8.5–10.1)
CALCIUM SERPL-MCNC: 9.1 MG/DL (ref 8.5–10.1)
CALCIUM SERPL-MCNC: 9.2 MG/DL (ref 8.5–10.1)
CALCIUM SERPL-MCNC: 9.3 MG/DL (ref 8.5–10.1)
CALCIUM SERPL-MCNC: 9.4 MG/DL (ref 8.5–10.1)
CALCIUM SERPL-MCNC: 9.4 MG/DL (ref 8.5–10.1)
CALCIUM SERPL-MCNC: 9.5 MG/DL (ref 8.5–10.1)
CALCIUM SERPL-MCNC: 9.5 MG/DL (ref 8.5–10.1)
CALCIUM SERPL-MCNC: 9.6 MG/DL (ref 8.5–10.1)
CALCIUM SERPL-MCNC: 9.7 MG/DL (ref 8.5–10.1)
CALCIUM SERPL-MCNC: 9.8 MG/DL (ref 8.5–10.1)
CALCULATED P AXIS, ECG09: 28 DEGREES
CALCULATED P AXIS, ECG09: 30 DEGREES
CALCULATED P AXIS, ECG09: 31 DEGREES
CALCULATED P AXIS, ECG09: 37 DEGREES
CALCULATED R AXIS, ECG10: -11 DEGREES
CALCULATED R AXIS, ECG10: -25 DEGREES
CALCULATED R AXIS, ECG10: -28 DEGREES
CALCULATED R AXIS, ECG10: -32 DEGREES
CALCULATED T AXIS, ECG11: 11 DEGREES
CALCULATED T AXIS, ECG11: 21 DEGREES
CALCULATED T AXIS, ECG11: 25 DEGREES
CALCULATED T AXIS, ECG11: 27 DEGREES
CC UR VC: ABNORMAL
CC UR VC: ABNORMAL
CCP IGA+IGG SERPL IA-ACNC: 5 UNITS (ref 0–19)
CH50 SERPL-ACNC: >60 U/ML (ref 42–999999)
CHLORIDE SERPL-SCNC: 100 MMOL/L (ref 97–108)
CHLORIDE SERPL-SCNC: 101 MMOL/L (ref 97–108)
CHLORIDE SERPL-SCNC: 102 MMOL/L (ref 97–108)
CHLORIDE SERPL-SCNC: 103 MMOL/L (ref 97–108)
CHLORIDE SERPL-SCNC: 104 MMOL/L (ref 97–108)
CHLORIDE SERPL-SCNC: 105 MMOL/L (ref 97–108)
CHLORIDE SERPL-SCNC: 106 MMOL/L (ref 97–108)
CHLORIDE SERPL-SCNC: 110 MMOL/L (ref 97–108)
CHLORIDE SERPL-SCNC: 112 MMOL/L (ref 97–108)
CHLORIDE SERPL-SCNC: 113 MMOL/L (ref 97–108)
CHLORIDE SERPL-SCNC: 114 MMOL/L (ref 97–108)
CHLORIDE SERPL-SCNC: 114 MMOL/L (ref 97–108)
CHLORIDE SERPL-SCNC: 95 MMOL/L (ref 97–108)
CHLORIDE SERPL-SCNC: 97 MMOL/L (ref 97–108)
CHLORIDE SERPL-SCNC: 98 MMOL/L (ref 97–108)
CHLORIDE SERPL-SCNC: 98 MMOL/L (ref 97–108)
CHLORIDE SERPL-SCNC: 99 MMOL/L (ref 97–108)
CO2 SERPL-SCNC: 23 MMOL/L (ref 21–32)
CO2 SERPL-SCNC: 24 MMOL/L (ref 21–32)
CO2 SERPL-SCNC: 24 MMOL/L (ref 21–32)
CO2 SERPL-SCNC: 25 MMOL/L (ref 21–32)
CO2 SERPL-SCNC: 26 MMOL/L (ref 21–32)
CO2 SERPL-SCNC: 27 MMOL/L (ref 21–32)
CO2 SERPL-SCNC: 28 MMOL/L (ref 21–32)
CO2 SERPL-SCNC: 29 MMOL/L (ref 21–32)
CO2 SERPL-SCNC: 30 MMOL/L (ref 21–32)
CO2 SERPL-SCNC: 32 MMOL/L (ref 21–32)
CO2 SERPL-SCNC: 34 MMOL/L (ref 21–32)
COLOR UR: ABNORMAL
COLOR UR: NORMAL
COMMENT, HOLDF: NORMAL
CREAT SERPL-MCNC: 0.47 MG/DL (ref 0.55–1.02)
CREAT SERPL-MCNC: 0.49 MG/DL (ref 0.55–1.02)
CREAT SERPL-MCNC: 0.6 MG/DL (ref 0.55–1.02)
CREAT SERPL-MCNC: 0.65 MG/DL (ref 0.55–1.02)
CREAT SERPL-MCNC: 0.7 MG/DL (ref 0.55–1.02)
CREAT SERPL-MCNC: 0.71 MG/DL (ref 0.55–1.02)
CREAT SERPL-MCNC: 0.74 MG/DL (ref 0.55–1.02)
CREAT SERPL-MCNC: 0.75 MG/DL (ref 0.55–1.02)
CREAT SERPL-MCNC: 0.77 MG/DL (ref 0.55–1.02)
CREAT SERPL-MCNC: 0.78 MG/DL (ref 0.55–1.02)
CREAT SERPL-MCNC: 0.79 MG/DL (ref 0.55–1.02)
CREAT SERPL-MCNC: 0.79 MG/DL (ref 0.55–1.02)
CREAT SERPL-MCNC: 0.8 MG/DL (ref 0.55–1.02)
CREAT SERPL-MCNC: 0.81 MG/DL (ref 0.55–1.02)
CREAT SERPL-MCNC: 0.82 MG/DL (ref 0.55–1.02)
CREAT SERPL-MCNC: 0.83 MG/DL (ref 0.55–1.02)
CREAT SERPL-MCNC: 0.84 MG/DL (ref 0.55–1.02)
CREAT SERPL-MCNC: 0.84 MG/DL (ref 0.55–1.02)
CREAT SERPL-MCNC: 0.85 MG/DL (ref 0.55–1.02)
CREAT SERPL-MCNC: 0.88 MG/DL (ref 0.55–1.02)
CREAT SERPL-MCNC: 0.9 MG/DL (ref 0.55–1.02)
CREAT SERPL-MCNC: 0.9 MG/DL (ref 0.55–1.02)
CREAT SERPL-MCNC: 0.91 MG/DL (ref 0.55–1.02)
CREAT SERPL-MCNC: 0.99 MG/DL (ref 0.55–1.02)
CREAT SERPL-MCNC: 0.99 MG/DL (ref 0.55–1.02)
CREAT SERPL-MCNC: 1.02 MG/DL (ref 0.55–1.02)
CREAT SERPL-MCNC: 1.04 MG/DL (ref 0.55–1.02)
CREAT SERPL-MCNC: 1.05 MG/DL (ref 0.55–1.02)
CREAT SERPL-MCNC: 1.06 MG/DL (ref 0.55–1.02)
CREAT SERPL-MCNC: 1.07 MG/DL (ref 0.55–1.02)
CREAT SERPL-MCNC: 1.12 MG/DL (ref 0.55–1.02)
CREAT SERPL-MCNC: 1.14 MG/DL (ref 0.55–1.02)
CREAT SERPL-MCNC: 1.17 MG/DL (ref 0.55–1.02)
CREAT SERPL-MCNC: 1.2 MG/DL (ref 0.55–1.02)
CREAT SERPL-MCNC: 1.21 MG/DL (ref 0.55–1.02)
CREAT SERPL-MCNC: 1.25 MG/DL (ref 0.55–1.02)
CREAT SERPL-MCNC: 1.34 MG/DL (ref 0.55–1.02)
CREAT SERPL-MCNC: 1.39 MG/DL (ref 0.55–1.02)
CREAT SERPL-MCNC: 1.53 MG/DL (ref 0.55–1.02)
CREAT SERPL-MCNC: 1.55 MG/DL (ref 0.55–1.02)
CROSSMATCH RESULT,%XM: NORMAL
CRP SERPL-MCNC: 27.1 MG/DL (ref 0–0.6)
D DIMER PPP FEU-MCNC: 6.09 MG/L FEU (ref 0–0.65)
DATE LAST DOSE: ABNORMAL
DIAGNOSIS, 93000: NORMAL
DIFFERENTIAL METHOD BLD: ABNORMAL
ECHO AO ROOT DIAM: 3 CM
ECHO AV CUSP MM: 2.07 CM
ECHO AV PEAK GRADIENT: 15 MMHG
ECHO AV PEAK VELOCITY: 193.38 CM/S
ECHO EST RA PRESSURE: 10 MMHG
ECHO LA MAJOR AXIS: 2.79 CM
ECHO LA TO AORTIC ROOT RATIO: 0.93
ECHO LV INTERNAL DIMENSION DIASTOLIC MMODE: 4.74 CM
ECHO LV INTERNAL DIMENSION DIASTOLIC: 4.52 CM (ref 3.9–5.3)
ECHO LV INTERNAL DIMENSION SYSTOLIC MMODE: 3.08 CM
ECHO LV INTERNAL DIMENSION SYSTOLIC: 2.5 CM
ECHO LV IVSD MMODE: 1.11 CM
ECHO LV IVSD: 0.88 CM (ref 0.6–0.9)
ECHO LV MASS 2D: 145.1 G (ref 67–162)
ECHO LV MASS INDEX 2D: 76.5 G/M2 (ref 43–95)
ECHO LV POSTERIOR WALL DIASTOLIC MMODE: 1.03 CM
ECHO LV POSTERIOR WALL DIASTOLIC: 0.86 CM (ref 0.6–0.9)
ECHO LVOT PEAK GRADIENT: 8.2 MMHG
ECHO LVOT PEAK VELOCITY: 142.96 CM/S
ECHO LVOT VTI: 6.47 CM
ECHO MV E VELOCITY: 134.42 CM/S
ECHO PULMONARY ARTERY SYSTOLIC PRESSURE (PASP): 17.4 MMHG
ECHO PULMONARY ARTERY SYSTOLIC PRESSURE (PASP): 60 MMHG
ECHO PULMONARY ARTERY SYSTOLIC PRESSURE (PASP): 60 MMHG
ECHO PV MAX VELOCITY: 105.99 CM/S
ECHO PV PEAK GRADIENT: 4.5 MMHG
ECHO RIGHT VENTRICULAR SYSTOLIC PRESSURE (RVSP): 17.4 MMHG
ECHO RV INTERNAL DIMENSION: 3.22 CM
ECHO TV REGURGITANT MAX VELOCITY: 135.74 CM/S
ECHO TV REGURGITANT MAX VELOCITY: 392.12 CM/S
ECHO TV REGURGITANT PEAK GRADIENT: 61.5 MMHG
ECHO TV REGURGITANT PEAK GRADIENT: 7.4 MMHG
EOSINOPHIL # BLD: 0 K/UL (ref 0–0.4)
EOSINOPHIL # BLD: 0.1 K/UL (ref 0–0.4)
EOSINOPHIL # BLD: 0.2 K/UL (ref 0–0.4)
EOSINOPHIL # BLD: 0.3 K/UL (ref 0–0.4)
EOSINOPHIL # BLD: 0.6 K/UL (ref 0–0.4)
EOSINOPHIL NFR BLD: 0 % (ref 0–7)
EOSINOPHIL NFR BLD: 1 % (ref 0–7)
EOSINOPHIL NFR BLD: 2 % (ref 0–7)
EOSINOPHIL NFR BLD: 4 % (ref 0–7)
EPITH CASTS URNS QL MICRO: ABNORMAL /LPF
ERYTHROCYTE [DISTWIDTH] IN BLOOD BY AUTOMATED COUNT: 13.3 % (ref 11.5–14.5)
ERYTHROCYTE [DISTWIDTH] IN BLOOD BY AUTOMATED COUNT: 13.5 % (ref 11.5–14.5)
ERYTHROCYTE [DISTWIDTH] IN BLOOD BY AUTOMATED COUNT: 13.7 % (ref 11.5–14.5)
ERYTHROCYTE [DISTWIDTH] IN BLOOD BY AUTOMATED COUNT: 13.8 % (ref 11.5–14.5)
ERYTHROCYTE [DISTWIDTH] IN BLOOD BY AUTOMATED COUNT: 14 % (ref 11.5–14.5)
ERYTHROCYTE [DISTWIDTH] IN BLOOD BY AUTOMATED COUNT: 14.1 % (ref 11.5–14.5)
ERYTHROCYTE [DISTWIDTH] IN BLOOD BY AUTOMATED COUNT: 14.2 % (ref 11.5–14.5)
ERYTHROCYTE [DISTWIDTH] IN BLOOD BY AUTOMATED COUNT: 14.4 % (ref 11.5–14.5)
ERYTHROCYTE [DISTWIDTH] IN BLOOD BY AUTOMATED COUNT: 14.6 % (ref 11.5–14.5)
ERYTHROCYTE [DISTWIDTH] IN BLOOD BY AUTOMATED COUNT: 14.7 % (ref 11.5–14.5)
ERYTHROCYTE [DISTWIDTH] IN BLOOD BY AUTOMATED COUNT: 14.9 % (ref 11.5–14.5)
ERYTHROCYTE [DISTWIDTH] IN BLOOD BY AUTOMATED COUNT: 15.1 % (ref 11.5–14.5)
ERYTHROCYTE [DISTWIDTH] IN BLOOD BY AUTOMATED COUNT: 15.3 % (ref 11.5–14.5)
ERYTHROCYTE [DISTWIDTH] IN BLOOD BY AUTOMATED COUNT: 15.5 % (ref 11.5–14.5)
ERYTHROCYTE [DISTWIDTH] IN BLOOD BY AUTOMATED COUNT: 15.6 % (ref 11.5–14.5)
ERYTHROCYTE [DISTWIDTH] IN BLOOD BY AUTOMATED COUNT: 15.7 % (ref 11.5–14.5)
ERYTHROCYTE [DISTWIDTH] IN BLOOD BY AUTOMATED COUNT: 16.2 % (ref 11.5–14.5)
ERYTHROCYTE [DISTWIDTH] IN BLOOD BY AUTOMATED COUNT: 16.7 % (ref 11.5–14.5)
ERYTHROCYTE [DISTWIDTH] IN BLOOD BY AUTOMATED COUNT: 16.8 % (ref 11.5–14.5)
ERYTHROCYTE [DISTWIDTH] IN BLOOD BY AUTOMATED COUNT: 16.9 % (ref 11.5–14.5)
ERYTHROCYTE [DISTWIDTH] IN BLOOD BY AUTOMATED COUNT: 16.9 % (ref 11.5–14.5)
ERYTHROCYTE [DISTWIDTH] IN BLOOD BY AUTOMATED COUNT: 17 % (ref 11.5–14.5)
ERYTHROCYTE [DISTWIDTH] IN BLOOD BY AUTOMATED COUNT: 17.1 % (ref 11.5–14.5)
ERYTHROCYTE [DISTWIDTH] IN BLOOD BY AUTOMATED COUNT: 17.3 % (ref 11.5–14.5)
ERYTHROCYTE [DISTWIDTH] IN BLOOD BY AUTOMATED COUNT: 17.3 % (ref 11.5–14.5)
ERYTHROCYTE [DISTWIDTH] IN BLOOD BY AUTOMATED COUNT: 17.4 % (ref 11.5–14.5)
ERYTHROCYTE [DISTWIDTH] IN BLOOD BY AUTOMATED COUNT: 17.8 % (ref 11.5–14.5)
ERYTHROCYTE [DISTWIDTH] IN BLOOD BY AUTOMATED COUNT: 18.4 % (ref 11.5–14.5)
ERYTHROCYTE [SEDIMENTATION RATE] IN BLOOD: 106 MM/HR (ref 0–30)
FLUAV H1 2009 PAND RNA SPEC QL NAA+PROBE: NOT DETECTED
FLUAV H1 RNA SPEC QL NAA+PROBE: NOT DETECTED
FLUAV H3 RNA SPEC QL NAA+PROBE: NOT DETECTED
FLUAV SUBTYP SPEC NAA+PROBE: NOT DETECTED
FLUBV RNA SPEC QL NAA+PROBE: NOT DETECTED
FLUID CULTURE, SPNG2: NORMAL
FLUID CULTURE, SPNG2: NORMAL
GAS FLOW.O2 O2 DELIVERY SYS: ABNORMAL L/MIN
GAS FLOW.O2 SETTING OXYMISER: 15 L/M
GAS FLOW.O2 SETTING OXYMISER: 8 L/M
GBM IGG SER-ACNC: 3 UNITS (ref 0–20)
GLOBULIN SER CALC-MCNC: 3.2 G/DL (ref 2–4)
GLOBULIN SER CALC-MCNC: 3.5 G/DL (ref 2–4)
GLOBULIN SER CALC-MCNC: 3.9 G/DL (ref 2–4)
GLOBULIN SER CALC-MCNC: 4 G/DL (ref 2–4)
GLOBULIN SER CALC-MCNC: 4.2 G/DL (ref 2–4)
GLOBULIN SER CALC-MCNC: 4.2 G/DL (ref 2–4)
GLOBULIN SER CALC-MCNC: 4.9 G/DL (ref 2–4)
GLUCOSE BLD STRIP.AUTO-MCNC: 101 MG/DL (ref 65–100)
GLUCOSE BLD STRIP.AUTO-MCNC: 102 MG/DL (ref 65–100)
GLUCOSE BLD STRIP.AUTO-MCNC: 102 MG/DL (ref 65–100)
GLUCOSE BLD STRIP.AUTO-MCNC: 103 MG/DL (ref 65–100)
GLUCOSE BLD STRIP.AUTO-MCNC: 104 MG/DL (ref 65–100)
GLUCOSE BLD STRIP.AUTO-MCNC: 105 MG/DL (ref 65–100)
GLUCOSE BLD STRIP.AUTO-MCNC: 106 MG/DL (ref 65–100)
GLUCOSE BLD STRIP.AUTO-MCNC: 110 MG/DL (ref 65–100)
GLUCOSE BLD STRIP.AUTO-MCNC: 110 MG/DL (ref 65–100)
GLUCOSE BLD STRIP.AUTO-MCNC: 111 MG/DL (ref 65–100)
GLUCOSE BLD STRIP.AUTO-MCNC: 113 MG/DL (ref 65–100)
GLUCOSE BLD STRIP.AUTO-MCNC: 114 MG/DL (ref 65–100)
GLUCOSE BLD STRIP.AUTO-MCNC: 115 MG/DL (ref 65–100)
GLUCOSE BLD STRIP.AUTO-MCNC: 115 MG/DL (ref 65–100)
GLUCOSE BLD STRIP.AUTO-MCNC: 117 MG/DL (ref 65–100)
GLUCOSE BLD STRIP.AUTO-MCNC: 118 MG/DL (ref 65–100)
GLUCOSE BLD STRIP.AUTO-MCNC: 120 MG/DL (ref 65–100)
GLUCOSE BLD STRIP.AUTO-MCNC: 120 MG/DL (ref 65–100)
GLUCOSE BLD STRIP.AUTO-MCNC: 122 MG/DL (ref 65–100)
GLUCOSE BLD STRIP.AUTO-MCNC: 124 MG/DL (ref 65–100)
GLUCOSE BLD STRIP.AUTO-MCNC: 124 MG/DL (ref 65–100)
GLUCOSE BLD STRIP.AUTO-MCNC: 125 MG/DL (ref 65–100)
GLUCOSE BLD STRIP.AUTO-MCNC: 128 MG/DL (ref 65–100)
GLUCOSE BLD STRIP.AUTO-MCNC: 129 MG/DL (ref 65–100)
GLUCOSE BLD STRIP.AUTO-MCNC: 130 MG/DL (ref 65–100)
GLUCOSE BLD STRIP.AUTO-MCNC: 131 MG/DL (ref 65–100)
GLUCOSE BLD STRIP.AUTO-MCNC: 132 MG/DL (ref 65–100)
GLUCOSE BLD STRIP.AUTO-MCNC: 133 MG/DL (ref 65–100)
GLUCOSE BLD STRIP.AUTO-MCNC: 134 MG/DL (ref 65–100)
GLUCOSE BLD STRIP.AUTO-MCNC: 134 MG/DL (ref 65–100)
GLUCOSE BLD STRIP.AUTO-MCNC: 136 MG/DL (ref 65–100)
GLUCOSE BLD STRIP.AUTO-MCNC: 138 MG/DL (ref 65–100)
GLUCOSE BLD STRIP.AUTO-MCNC: 139 MG/DL (ref 65–100)
GLUCOSE BLD STRIP.AUTO-MCNC: 139 MG/DL (ref 65–100)
GLUCOSE BLD STRIP.AUTO-MCNC: 140 MG/DL (ref 65–100)
GLUCOSE BLD STRIP.AUTO-MCNC: 141 MG/DL (ref 65–100)
GLUCOSE BLD STRIP.AUTO-MCNC: 143 MG/DL (ref 65–100)
GLUCOSE BLD STRIP.AUTO-MCNC: 144 MG/DL (ref 65–100)
GLUCOSE BLD STRIP.AUTO-MCNC: 145 MG/DL (ref 65–100)
GLUCOSE BLD STRIP.AUTO-MCNC: 145 MG/DL (ref 65–100)
GLUCOSE BLD STRIP.AUTO-MCNC: 146 MG/DL (ref 65–100)
GLUCOSE BLD STRIP.AUTO-MCNC: 147 MG/DL (ref 65–100)
GLUCOSE BLD STRIP.AUTO-MCNC: 148 MG/DL (ref 65–100)
GLUCOSE BLD STRIP.AUTO-MCNC: 150 MG/DL (ref 65–100)
GLUCOSE BLD STRIP.AUTO-MCNC: 152 MG/DL (ref 65–100)
GLUCOSE BLD STRIP.AUTO-MCNC: 152 MG/DL (ref 65–100)
GLUCOSE BLD STRIP.AUTO-MCNC: 153 MG/DL (ref 65–100)
GLUCOSE BLD STRIP.AUTO-MCNC: 154 MG/DL (ref 65–100)
GLUCOSE BLD STRIP.AUTO-MCNC: 155 MG/DL (ref 65–100)
GLUCOSE BLD STRIP.AUTO-MCNC: 156 MG/DL (ref 65–100)
GLUCOSE BLD STRIP.AUTO-MCNC: 156 MG/DL (ref 65–100)
GLUCOSE BLD STRIP.AUTO-MCNC: 157 MG/DL (ref 65–100)
GLUCOSE BLD STRIP.AUTO-MCNC: 158 MG/DL (ref 65–100)
GLUCOSE BLD STRIP.AUTO-MCNC: 159 MG/DL (ref 65–100)
GLUCOSE BLD STRIP.AUTO-MCNC: 160 MG/DL (ref 65–100)
GLUCOSE BLD STRIP.AUTO-MCNC: 160 MG/DL (ref 65–100)
GLUCOSE BLD STRIP.AUTO-MCNC: 163 MG/DL (ref 65–100)
GLUCOSE BLD STRIP.AUTO-MCNC: 164 MG/DL (ref 65–100)
GLUCOSE BLD STRIP.AUTO-MCNC: 165 MG/DL (ref 65–100)
GLUCOSE BLD STRIP.AUTO-MCNC: 165 MG/DL (ref 65–100)
GLUCOSE BLD STRIP.AUTO-MCNC: 166 MG/DL (ref 65–100)
GLUCOSE BLD STRIP.AUTO-MCNC: 167 MG/DL (ref 65–100)
GLUCOSE BLD STRIP.AUTO-MCNC: 168 MG/DL (ref 65–100)
GLUCOSE BLD STRIP.AUTO-MCNC: 169 MG/DL (ref 65–100)
GLUCOSE BLD STRIP.AUTO-MCNC: 169 MG/DL (ref 65–100)
GLUCOSE BLD STRIP.AUTO-MCNC: 170 MG/DL (ref 65–100)
GLUCOSE BLD STRIP.AUTO-MCNC: 171 MG/DL (ref 65–100)
GLUCOSE BLD STRIP.AUTO-MCNC: 175 MG/DL (ref 65–100)
GLUCOSE BLD STRIP.AUTO-MCNC: 178 MG/DL (ref 65–100)
GLUCOSE BLD STRIP.AUTO-MCNC: 179 MG/DL (ref 65–100)
GLUCOSE BLD STRIP.AUTO-MCNC: 180 MG/DL (ref 65–100)
GLUCOSE BLD STRIP.AUTO-MCNC: 180 MG/DL (ref 65–100)
GLUCOSE BLD STRIP.AUTO-MCNC: 182 MG/DL (ref 65–100)
GLUCOSE BLD STRIP.AUTO-MCNC: 183 MG/DL (ref 65–100)
GLUCOSE BLD STRIP.AUTO-MCNC: 184 MG/DL (ref 65–100)
GLUCOSE BLD STRIP.AUTO-MCNC: 184 MG/DL (ref 65–100)
GLUCOSE BLD STRIP.AUTO-MCNC: 185 MG/DL (ref 65–100)
GLUCOSE BLD STRIP.AUTO-MCNC: 186 MG/DL (ref 65–100)
GLUCOSE BLD STRIP.AUTO-MCNC: 190 MG/DL (ref 65–100)
GLUCOSE BLD STRIP.AUTO-MCNC: 190 MG/DL (ref 65–100)
GLUCOSE BLD STRIP.AUTO-MCNC: 192 MG/DL (ref 65–100)
GLUCOSE BLD STRIP.AUTO-MCNC: 192 MG/DL (ref 65–100)
GLUCOSE BLD STRIP.AUTO-MCNC: 193 MG/DL (ref 65–100)
GLUCOSE BLD STRIP.AUTO-MCNC: 193 MG/DL (ref 65–100)
GLUCOSE BLD STRIP.AUTO-MCNC: 194 MG/DL (ref 65–100)
GLUCOSE BLD STRIP.AUTO-MCNC: 195 MG/DL (ref 65–100)
GLUCOSE BLD STRIP.AUTO-MCNC: 196 MG/DL (ref 65–100)
GLUCOSE BLD STRIP.AUTO-MCNC: 197 MG/DL (ref 65–100)
GLUCOSE BLD STRIP.AUTO-MCNC: 197 MG/DL (ref 65–100)
GLUCOSE BLD STRIP.AUTO-MCNC: 198 MG/DL (ref 65–100)
GLUCOSE BLD STRIP.AUTO-MCNC: 200 MG/DL (ref 65–100)
GLUCOSE BLD STRIP.AUTO-MCNC: 202 MG/DL (ref 65–100)
GLUCOSE BLD STRIP.AUTO-MCNC: 203 MG/DL (ref 65–100)
GLUCOSE BLD STRIP.AUTO-MCNC: 204 MG/DL (ref 65–100)
GLUCOSE BLD STRIP.AUTO-MCNC: 205 MG/DL (ref 65–100)
GLUCOSE BLD STRIP.AUTO-MCNC: 205 MG/DL (ref 65–100)
GLUCOSE BLD STRIP.AUTO-MCNC: 207 MG/DL (ref 65–100)
GLUCOSE BLD STRIP.AUTO-MCNC: 207 MG/DL (ref 65–100)
GLUCOSE BLD STRIP.AUTO-MCNC: 208 MG/DL (ref 65–100)
GLUCOSE BLD STRIP.AUTO-MCNC: 209 MG/DL (ref 65–100)
GLUCOSE BLD STRIP.AUTO-MCNC: 210 MG/DL (ref 65–100)
GLUCOSE BLD STRIP.AUTO-MCNC: 210 MG/DL (ref 65–100)
GLUCOSE BLD STRIP.AUTO-MCNC: 211 MG/DL (ref 65–100)
GLUCOSE BLD STRIP.AUTO-MCNC: 212 MG/DL (ref 65–100)
GLUCOSE BLD STRIP.AUTO-MCNC: 213 MG/DL (ref 65–100)
GLUCOSE BLD STRIP.AUTO-MCNC: 213 MG/DL (ref 65–100)
GLUCOSE BLD STRIP.AUTO-MCNC: 216 MG/DL (ref 65–100)
GLUCOSE BLD STRIP.AUTO-MCNC: 217 MG/DL (ref 65–100)
GLUCOSE BLD STRIP.AUTO-MCNC: 218 MG/DL (ref 65–100)
GLUCOSE BLD STRIP.AUTO-MCNC: 219 MG/DL (ref 65–100)
GLUCOSE BLD STRIP.AUTO-MCNC: 219 MG/DL (ref 65–100)
GLUCOSE BLD STRIP.AUTO-MCNC: 220 MG/DL (ref 65–100)
GLUCOSE BLD STRIP.AUTO-MCNC: 222 MG/DL (ref 65–100)
GLUCOSE BLD STRIP.AUTO-MCNC: 227 MG/DL (ref 65–100)
GLUCOSE BLD STRIP.AUTO-MCNC: 227 MG/DL (ref 65–100)
GLUCOSE BLD STRIP.AUTO-MCNC: 229 MG/DL (ref 65–100)
GLUCOSE BLD STRIP.AUTO-MCNC: 231 MG/DL (ref 65–100)
GLUCOSE BLD STRIP.AUTO-MCNC: 233 MG/DL (ref 65–100)
GLUCOSE BLD STRIP.AUTO-MCNC: 234 MG/DL (ref 65–100)
GLUCOSE BLD STRIP.AUTO-MCNC: 235 MG/DL (ref 65–100)
GLUCOSE BLD STRIP.AUTO-MCNC: 235 MG/DL (ref 65–100)
GLUCOSE BLD STRIP.AUTO-MCNC: 237 MG/DL (ref 65–100)
GLUCOSE BLD STRIP.AUTO-MCNC: 238 MG/DL (ref 65–100)
GLUCOSE BLD STRIP.AUTO-MCNC: 239 MG/DL (ref 65–100)
GLUCOSE BLD STRIP.AUTO-MCNC: 239 MG/DL (ref 65–100)
GLUCOSE BLD STRIP.AUTO-MCNC: 240 MG/DL (ref 65–100)
GLUCOSE BLD STRIP.AUTO-MCNC: 241 MG/DL (ref 65–100)
GLUCOSE BLD STRIP.AUTO-MCNC: 242 MG/DL (ref 65–100)
GLUCOSE BLD STRIP.AUTO-MCNC: 244 MG/DL (ref 65–100)
GLUCOSE BLD STRIP.AUTO-MCNC: 245 MG/DL (ref 65–100)
GLUCOSE BLD STRIP.AUTO-MCNC: 247 MG/DL (ref 65–100)
GLUCOSE BLD STRIP.AUTO-MCNC: 248 MG/DL (ref 65–100)
GLUCOSE BLD STRIP.AUTO-MCNC: 248 MG/DL (ref 65–100)
GLUCOSE BLD STRIP.AUTO-MCNC: 249 MG/DL (ref 65–100)
GLUCOSE BLD STRIP.AUTO-MCNC: 251 MG/DL (ref 65–100)
GLUCOSE BLD STRIP.AUTO-MCNC: 252 MG/DL (ref 65–100)
GLUCOSE BLD STRIP.AUTO-MCNC: 252 MG/DL (ref 65–100)
GLUCOSE BLD STRIP.AUTO-MCNC: 253 MG/DL (ref 65–100)
GLUCOSE BLD STRIP.AUTO-MCNC: 257 MG/DL (ref 65–100)
GLUCOSE BLD STRIP.AUTO-MCNC: 258 MG/DL (ref 65–100)
GLUCOSE BLD STRIP.AUTO-MCNC: 264 MG/DL (ref 65–100)
GLUCOSE BLD STRIP.AUTO-MCNC: 266 MG/DL (ref 65–100)
GLUCOSE BLD STRIP.AUTO-MCNC: 268 MG/DL (ref 65–100)
GLUCOSE BLD STRIP.AUTO-MCNC: 268 MG/DL (ref 65–100)
GLUCOSE BLD STRIP.AUTO-MCNC: 270 MG/DL (ref 65–100)
GLUCOSE BLD STRIP.AUTO-MCNC: 271 MG/DL (ref 65–100)
GLUCOSE BLD STRIP.AUTO-MCNC: 271 MG/DL (ref 65–100)
GLUCOSE BLD STRIP.AUTO-MCNC: 272 MG/DL (ref 65–100)
GLUCOSE BLD STRIP.AUTO-MCNC: 276 MG/DL (ref 65–100)
GLUCOSE BLD STRIP.AUTO-MCNC: 280 MG/DL (ref 65–100)
GLUCOSE BLD STRIP.AUTO-MCNC: 281 MG/DL (ref 65–100)
GLUCOSE BLD STRIP.AUTO-MCNC: 281 MG/DL (ref 65–100)
GLUCOSE BLD STRIP.AUTO-MCNC: 282 MG/DL (ref 65–100)
GLUCOSE BLD STRIP.AUTO-MCNC: 283 MG/DL (ref 65–100)
GLUCOSE BLD STRIP.AUTO-MCNC: 284 MG/DL (ref 65–100)
GLUCOSE BLD STRIP.AUTO-MCNC: 284 MG/DL (ref 65–100)
GLUCOSE BLD STRIP.AUTO-MCNC: 286 MG/DL (ref 65–100)
GLUCOSE BLD STRIP.AUTO-MCNC: 286 MG/DL (ref 65–100)
GLUCOSE BLD STRIP.AUTO-MCNC: 289 MG/DL (ref 65–100)
GLUCOSE BLD STRIP.AUTO-MCNC: 290 MG/DL (ref 65–100)
GLUCOSE BLD STRIP.AUTO-MCNC: 291 MG/DL (ref 65–100)
GLUCOSE BLD STRIP.AUTO-MCNC: 291 MG/DL (ref 65–100)
GLUCOSE BLD STRIP.AUTO-MCNC: 292 MG/DL (ref 65–100)
GLUCOSE BLD STRIP.AUTO-MCNC: 293 MG/DL (ref 65–100)
GLUCOSE BLD STRIP.AUTO-MCNC: 294 MG/DL (ref 65–100)
GLUCOSE BLD STRIP.AUTO-MCNC: 294 MG/DL (ref 65–100)
GLUCOSE BLD STRIP.AUTO-MCNC: 299 MG/DL (ref 65–100)
GLUCOSE BLD STRIP.AUTO-MCNC: 301 MG/DL (ref 65–100)
GLUCOSE BLD STRIP.AUTO-MCNC: 305 MG/DL (ref 65–100)
GLUCOSE BLD STRIP.AUTO-MCNC: 306 MG/DL (ref 65–100)
GLUCOSE BLD STRIP.AUTO-MCNC: 311 MG/DL (ref 65–100)
GLUCOSE BLD STRIP.AUTO-MCNC: 311 MG/DL (ref 65–100)
GLUCOSE BLD STRIP.AUTO-MCNC: 314 MG/DL (ref 65–100)
GLUCOSE BLD STRIP.AUTO-MCNC: 315 MG/DL (ref 65–100)
GLUCOSE BLD STRIP.AUTO-MCNC: 315 MG/DL (ref 65–100)
GLUCOSE BLD STRIP.AUTO-MCNC: 316 MG/DL (ref 65–100)
GLUCOSE BLD STRIP.AUTO-MCNC: 322 MG/DL (ref 65–100)
GLUCOSE BLD STRIP.AUTO-MCNC: 323 MG/DL (ref 65–100)
GLUCOSE BLD STRIP.AUTO-MCNC: 327 MG/DL (ref 65–100)
GLUCOSE BLD STRIP.AUTO-MCNC: 332 MG/DL (ref 65–100)
GLUCOSE BLD STRIP.AUTO-MCNC: 336 MG/DL (ref 65–100)
GLUCOSE BLD STRIP.AUTO-MCNC: 340 MG/DL (ref 65–100)
GLUCOSE BLD STRIP.AUTO-MCNC: 343 MG/DL (ref 65–100)
GLUCOSE BLD STRIP.AUTO-MCNC: 345 MG/DL (ref 65–100)
GLUCOSE BLD STRIP.AUTO-MCNC: 351 MG/DL (ref 65–100)
GLUCOSE BLD STRIP.AUTO-MCNC: 353 MG/DL (ref 65–100)
GLUCOSE BLD STRIP.AUTO-MCNC: 354 MG/DL (ref 65–100)
GLUCOSE BLD STRIP.AUTO-MCNC: 360 MG/DL (ref 65–100)
GLUCOSE BLD STRIP.AUTO-MCNC: 367 MG/DL (ref 65–100)
GLUCOSE BLD STRIP.AUTO-MCNC: 368 MG/DL (ref 65–100)
GLUCOSE BLD STRIP.AUTO-MCNC: 369 MG/DL (ref 65–100)
GLUCOSE BLD STRIP.AUTO-MCNC: 369 MG/DL (ref 65–100)
GLUCOSE BLD STRIP.AUTO-MCNC: 373 MG/DL (ref 65–100)
GLUCOSE BLD STRIP.AUTO-MCNC: 377 MG/DL (ref 65–100)
GLUCOSE BLD STRIP.AUTO-MCNC: 381 MG/DL (ref 65–100)
GLUCOSE BLD STRIP.AUTO-MCNC: 390 MG/DL (ref 65–100)
GLUCOSE BLD STRIP.AUTO-MCNC: 394 MG/DL (ref 65–100)
GLUCOSE BLD STRIP.AUTO-MCNC: 400 MG/DL (ref 65–100)
GLUCOSE BLD STRIP.AUTO-MCNC: 402 MG/DL (ref 65–100)
GLUCOSE BLD STRIP.AUTO-MCNC: 408 MG/DL (ref 65–100)
GLUCOSE BLD STRIP.AUTO-MCNC: 420 MG/DL (ref 65–100)
GLUCOSE BLD STRIP.AUTO-MCNC: 423 MG/DL (ref 65–100)
GLUCOSE BLD STRIP.AUTO-MCNC: 427 MG/DL (ref 65–100)
GLUCOSE BLD STRIP.AUTO-MCNC: 459 MG/DL (ref 65–100)
GLUCOSE BLD STRIP.AUTO-MCNC: 46 MG/DL (ref 65–100)
GLUCOSE BLD STRIP.AUTO-MCNC: 475 MG/DL (ref 65–100)
GLUCOSE BLD STRIP.AUTO-MCNC: 502 MG/DL (ref 65–100)
GLUCOSE BLD STRIP.AUTO-MCNC: 511 MG/DL (ref 65–100)
GLUCOSE BLD STRIP.AUTO-MCNC: 52 MG/DL (ref 65–100)
GLUCOSE BLD STRIP.AUTO-MCNC: 57 MG/DL (ref 65–100)
GLUCOSE BLD STRIP.AUTO-MCNC: 58 MG/DL (ref 65–100)
GLUCOSE BLD STRIP.AUTO-MCNC: 61 MG/DL (ref 65–100)
GLUCOSE BLD STRIP.AUTO-MCNC: 65 MG/DL (ref 65–100)
GLUCOSE BLD STRIP.AUTO-MCNC: 67 MG/DL (ref 65–100)
GLUCOSE BLD STRIP.AUTO-MCNC: 68 MG/DL (ref 65–100)
GLUCOSE BLD STRIP.AUTO-MCNC: 71 MG/DL (ref 65–100)
GLUCOSE BLD STRIP.AUTO-MCNC: 72 MG/DL (ref 65–100)
GLUCOSE BLD STRIP.AUTO-MCNC: 74 MG/DL (ref 65–100)
GLUCOSE BLD STRIP.AUTO-MCNC: 75 MG/DL (ref 65–100)
GLUCOSE BLD STRIP.AUTO-MCNC: 76 MG/DL (ref 65–100)
GLUCOSE BLD STRIP.AUTO-MCNC: 77 MG/DL (ref 65–100)
GLUCOSE BLD STRIP.AUTO-MCNC: 77 MG/DL (ref 65–100)
GLUCOSE BLD STRIP.AUTO-MCNC: 82 MG/DL (ref 65–100)
GLUCOSE BLD STRIP.AUTO-MCNC: 83 MG/DL (ref 65–100)
GLUCOSE BLD STRIP.AUTO-MCNC: 84 MG/DL (ref 65–100)
GLUCOSE BLD STRIP.AUTO-MCNC: 85 MG/DL (ref 65–100)
GLUCOSE BLD STRIP.AUTO-MCNC: 85 MG/DL (ref 65–100)
GLUCOSE BLD STRIP.AUTO-MCNC: 86 MG/DL (ref 65–100)
GLUCOSE BLD STRIP.AUTO-MCNC: 86 MG/DL (ref 65–100)
GLUCOSE BLD STRIP.AUTO-MCNC: 87 MG/DL (ref 65–100)
GLUCOSE BLD STRIP.AUTO-MCNC: 88 MG/DL (ref 65–100)
GLUCOSE BLD STRIP.AUTO-MCNC: 89 MG/DL (ref 65–100)
GLUCOSE BLD STRIP.AUTO-MCNC: 90 MG/DL (ref 65–100)
GLUCOSE BLD STRIP.AUTO-MCNC: 93 MG/DL (ref 65–100)
GLUCOSE BLD STRIP.AUTO-MCNC: 95 MG/DL (ref 65–100)
GLUCOSE BLD STRIP.AUTO-MCNC: 96 MG/DL (ref 65–100)
GLUCOSE BLD STRIP.AUTO-MCNC: 98 MG/DL (ref 65–100)
GLUCOSE SERPL-MCNC: 109 MG/DL (ref 65–100)
GLUCOSE SERPL-MCNC: 118 MG/DL (ref 65–100)
GLUCOSE SERPL-MCNC: 132 MG/DL (ref 65–100)
GLUCOSE SERPL-MCNC: 133 MG/DL (ref 65–100)
GLUCOSE SERPL-MCNC: 140 MG/DL (ref 65–100)
GLUCOSE SERPL-MCNC: 150 MG/DL (ref 65–100)
GLUCOSE SERPL-MCNC: 152 MG/DL (ref 65–100)
GLUCOSE SERPL-MCNC: 154 MG/DL (ref 65–100)
GLUCOSE SERPL-MCNC: 157 MG/DL (ref 65–100)
GLUCOSE SERPL-MCNC: 164 MG/DL (ref 65–100)
GLUCOSE SERPL-MCNC: 168 MG/DL (ref 65–100)
GLUCOSE SERPL-MCNC: 169 MG/DL (ref 65–100)
GLUCOSE SERPL-MCNC: 169 MG/DL (ref 65–100)
GLUCOSE SERPL-MCNC: 183 MG/DL (ref 65–100)
GLUCOSE SERPL-MCNC: 189 MG/DL (ref 65–100)
GLUCOSE SERPL-MCNC: 194 MG/DL (ref 65–100)
GLUCOSE SERPL-MCNC: 195 MG/DL (ref 65–100)
GLUCOSE SERPL-MCNC: 199 MG/DL (ref 65–100)
GLUCOSE SERPL-MCNC: 203 MG/DL (ref 65–100)
GLUCOSE SERPL-MCNC: 209 MG/DL (ref 65–100)
GLUCOSE SERPL-MCNC: 216 MG/DL (ref 65–100)
GLUCOSE SERPL-MCNC: 219 MG/DL (ref 65–100)
GLUCOSE SERPL-MCNC: 225 MG/DL (ref 65–100)
GLUCOSE SERPL-MCNC: 228 MG/DL (ref 65–100)
GLUCOSE SERPL-MCNC: 229 MG/DL (ref 65–100)
GLUCOSE SERPL-MCNC: 231 MG/DL (ref 65–100)
GLUCOSE SERPL-MCNC: 236 MG/DL (ref 65–100)
GLUCOSE SERPL-MCNC: 242 MG/DL (ref 65–100)
GLUCOSE SERPL-MCNC: 244 MG/DL (ref 65–100)
GLUCOSE SERPL-MCNC: 262 MG/DL (ref 65–100)
GLUCOSE SERPL-MCNC: 271 MG/DL (ref 65–100)
GLUCOSE SERPL-MCNC: 296 MG/DL (ref 65–100)
GLUCOSE SERPL-MCNC: 315 MG/DL (ref 65–100)
GLUCOSE SERPL-MCNC: 351 MG/DL (ref 65–100)
GLUCOSE SERPL-MCNC: 375 MG/DL (ref 65–100)
GLUCOSE SERPL-MCNC: 38 MG/DL (ref 65–100)
GLUCOSE SERPL-MCNC: 409 MG/DL (ref 65–100)
GLUCOSE SERPL-MCNC: 456 MG/DL (ref 65–100)
GLUCOSE SERPL-MCNC: 72 MG/DL (ref 65–100)
GLUCOSE SERPL-MCNC: 76 MG/DL (ref 65–100)
GLUCOSE SERPL-MCNC: 83 MG/DL (ref 65–100)
GLUCOSE SERPL-MCNC: 90 MG/DL (ref 65–100)
GLUCOSE UR STRIP.AUTO-MCNC: >1000 MG/DL
GLUCOSE UR STRIP.AUTO-MCNC: NEGATIVE MG/DL
GRAM STN SPEC: ABNORMAL
HADV DNA SPEC QL NAA+PROBE: NOT DETECTED
HCO3 BLD-SCNC: 22.3 MMOL/L (ref 22–26)
HCO3 BLD-SCNC: 22.7 MMOL/L (ref 22–26)
HCO3 BLD-SCNC: 30.9 MMOL/L (ref 22–26)
HCO3 BLD-SCNC: 31.6 MMOL/L (ref 22–26)
HCO3 BLD-SCNC: 34.1 MMOL/L (ref 22–26)
HCOV 229E RNA SPEC QL NAA+PROBE: DETECTED
HCOV HKU1 RNA SPEC QL NAA+PROBE: NOT DETECTED
HCOV NL63 RNA SPEC QL NAA+PROBE: NOT DETECTED
HCOV OC43 RNA SPEC QL NAA+PROBE: NOT DETECTED
HCT VFR BLD AUTO: 21.6 % (ref 35–47)
HCT VFR BLD AUTO: 22.3 % (ref 35–47)
HCT VFR BLD AUTO: 24.3 % (ref 35–47)
HCT VFR BLD AUTO: 24.3 % (ref 35–47)
HCT VFR BLD AUTO: 24.4 % (ref 35–47)
HCT VFR BLD AUTO: 24.5 % (ref 35–47)
HCT VFR BLD AUTO: 24.5 % (ref 35–47)
HCT VFR BLD AUTO: 24.7 % (ref 35–47)
HCT VFR BLD AUTO: 25 % (ref 35–47)
HCT VFR BLD AUTO: 25.4 % (ref 35–47)
HCT VFR BLD AUTO: 25.6 % (ref 35–47)
HCT VFR BLD AUTO: 25.6 % (ref 35–47)
HCT VFR BLD AUTO: 26.1 % (ref 35–47)
HCT VFR BLD AUTO: 26.3 % (ref 35–47)
HCT VFR BLD AUTO: 26.3 % (ref 35–47)
HCT VFR BLD AUTO: 26.6 % (ref 35–47)
HCT VFR BLD AUTO: 26.7 % (ref 35–47)
HCT VFR BLD AUTO: 26.8 % (ref 35–47)
HCT VFR BLD AUTO: 27.1 % (ref 35–47)
HCT VFR BLD AUTO: 27.3 % (ref 35–47)
HCT VFR BLD AUTO: 27.3 % (ref 35–47)
HCT VFR BLD AUTO: 27.6 % (ref 35–47)
HCT VFR BLD AUTO: 27.7 % (ref 35–47)
HCT VFR BLD AUTO: 27.7 % (ref 35–47)
HCT VFR BLD AUTO: 27.8 % (ref 35–47)
HCT VFR BLD AUTO: 27.8 % (ref 35–47)
HCT VFR BLD AUTO: 28 % (ref 35–47)
HCT VFR BLD AUTO: 28 % (ref 35–47)
HCT VFR BLD AUTO: 28.2 % (ref 35–47)
HCT VFR BLD AUTO: 28.3 % (ref 35–47)
HCT VFR BLD AUTO: 28.5 % (ref 35–47)
HCT VFR BLD AUTO: 28.8 % (ref 35–47)
HCT VFR BLD AUTO: 29.1 % (ref 35–47)
HCT VFR BLD AUTO: 29.3 % (ref 35–47)
HCT VFR BLD AUTO: 29.5 % (ref 35–47)
HCT VFR BLD AUTO: 29.8 % (ref 35–47)
HCT VFR BLD AUTO: 29.9 % (ref 35–47)
HCT VFR BLD AUTO: 31.2 % (ref 35–47)
HCT VFR BLD AUTO: 32 % (ref 35–47)
HCT VFR BLD AUTO: 32 % (ref 35–47)
HCT VFR BLD AUTO: 32.5 % (ref 35–47)
HCT VFR BLD AUTO: 32.9 % (ref 35–47)
HCT VFR BLD AUTO: 32.9 % (ref 35–47)
HGB BLD-MCNC: 10 G/DL (ref 11.5–16)
HGB BLD-MCNC: 10.1 G/DL (ref 11.5–16)
HGB BLD-MCNC: 10.4 G/DL (ref 11.5–16)
HGB BLD-MCNC: 10.7 G/DL (ref 11.5–16)
HGB BLD-MCNC: 10.8 G/DL (ref 11.5–16)
HGB BLD-MCNC: 11.1 G/DL (ref 11.5–16)
HGB BLD-MCNC: 6.6 G/DL (ref 11.5–16)
HGB BLD-MCNC: 7 G/DL (ref 11.5–16)
HGB BLD-MCNC: 7.1 G/DL (ref 11.5–16)
HGB BLD-MCNC: 7.3 G/DL (ref 11.5–16)
HGB BLD-MCNC: 7.4 G/DL (ref 11.5–16)
HGB BLD-MCNC: 7.4 G/DL (ref 11.5–16)
HGB BLD-MCNC: 7.6 G/DL (ref 11.5–16)
HGB BLD-MCNC: 7.7 G/DL (ref 11.5–16)
HGB BLD-MCNC: 7.8 G/DL (ref 11.5–16)
HGB BLD-MCNC: 7.9 G/DL (ref 11.5–16)
HGB BLD-MCNC: 7.9 G/DL (ref 11.5–16)
HGB BLD-MCNC: 8 G/DL (ref 11.5–16)
HGB BLD-MCNC: 8.1 G/DL (ref 11.5–16)
HGB BLD-MCNC: 8.1 G/DL (ref 11.5–16)
HGB BLD-MCNC: 8.2 G/DL (ref 11.5–16)
HGB BLD-MCNC: 8.4 G/DL (ref 11.5–16)
HGB BLD-MCNC: 8.5 G/DL (ref 11.5–16)
HGB BLD-MCNC: 8.6 G/DL (ref 11.5–16)
HGB BLD-MCNC: 8.8 G/DL (ref 11.5–16)
HGB BLD-MCNC: 8.8 G/DL (ref 11.5–16)
HGB BLD-MCNC: 8.9 G/DL (ref 11.5–16)
HGB BLD-MCNC: 9 G/DL (ref 11.5–16)
HGB BLD-MCNC: 9.2 G/DL (ref 11.5–16)
HGB BLD-MCNC: 9.5 G/DL (ref 11.5–16)
HGB BLD-MCNC: 9.5 G/DL (ref 11.5–16)
HGB BLD-MCNC: 9.6 G/DL (ref 11.5–16)
HGB BLD-MCNC: 9.6 G/DL (ref 11.5–16)
HGB BLD-MCNC: 9.7 G/DL (ref 11.5–16)
HGB BLD-MCNC: 9.7 G/DL (ref 11.5–16)
HGB UR QL STRIP: ABNORMAL
HGB UR QL STRIP: NEGATIVE
HMPV RNA SPEC QL NAA+PROBE: NOT DETECTED
HPIV1 RNA SPEC QL NAA+PROBE: NOT DETECTED
HPIV2 RNA SPEC QL NAA+PROBE: NOT DETECTED
HPIV3 RNA SPEC QL NAA+PROBE: NOT DETECTED
HPIV4 RNA SPEC QL NAA+PROBE: NOT DETECTED
HYALINE CASTS URNS QL MICRO: ABNORMAL /LPF (ref 0–5)
IGE SERPL-ACNC: 19 IU/ML (ref 6–495)
IMM GRANULOCYTES # BLD AUTO: 0 K/UL
IMM GRANULOCYTES # BLD AUTO: 0 K/UL (ref 0–0.04)
IMM GRANULOCYTES # BLD AUTO: 0.1 K/UL (ref 0–0.04)
IMM GRANULOCYTES # BLD AUTO: 0.2 K/UL (ref 0–0.04)
IMM GRANULOCYTES # BLD AUTO: 0.3 K/UL (ref 0–0.04)
IMM GRANULOCYTES # BLD AUTO: 0.4 K/UL (ref 0–0.04)
IMM GRANULOCYTES NFR BLD AUTO: 0 %
IMM GRANULOCYTES NFR BLD AUTO: 0 % (ref 0–0.5)
IMM GRANULOCYTES NFR BLD AUTO: 1 % (ref 0–0.5)
IMM GRANULOCYTES NFR BLD AUTO: 2 % (ref 0–0.5)
IMM GRANULOCYTES NFR BLD AUTO: 2 % (ref 0–0.5)
INR PPP: 1.3 (ref 0.9–1.1)
INR PPP: 1.4 (ref 0.9–1.1)
IRON SATN MFR SERPL: 12 % (ref 20–50)
IRON SERPL-MCNC: 24 UG/DL (ref 35–150)
KETONES UR QL STRIP.AUTO: ABNORMAL MG/DL
KETONES UR QL STRIP.AUTO: NEGATIVE MG/DL
L PNEUMO AG SPEC QL IF: NEGATIVE
L PNEUMO1 AG UR QL IA: NEGATIVE
L PNEUMO1 AG UR QL IA: NEGATIVE
LACEYELLA SACCHARI AB SER QL: NEGATIVE
LACEYELLA SACCHARI AB SER QL: NEGATIVE
LACTATE BLD-SCNC: 1.56 MMOL/L (ref 0.4–2)
LACTATE SERPL-SCNC: 0.9 MMOL/L (ref 0.4–2)
LACTATE SERPL-SCNC: 1.2 MMOL/L (ref 0.4–2)
LEUKOCYTE ESTERASE UR QL STRIP.AUTO: ABNORMAL
LEUKOCYTE ESTERASE UR QL STRIP.AUTO: NEGATIVE
LYMPHOCYTES # BLD: 0.3 K/UL (ref 0.8–3.5)
LYMPHOCYTES # BLD: 0.3 K/UL (ref 0.8–3.5)
LYMPHOCYTES # BLD: 0.4 K/UL (ref 0.8–3.5)
LYMPHOCYTES # BLD: 0.4 K/UL (ref 0.8–3.5)
LYMPHOCYTES # BLD: 0.5 K/UL (ref 0.8–3.5)
LYMPHOCYTES # BLD: 0.6 K/UL (ref 0.8–3.5)
LYMPHOCYTES # BLD: 0.7 K/UL (ref 0.8–3.5)
LYMPHOCYTES # BLD: 0.8 K/UL (ref 0.8–3.5)
LYMPHOCYTES # BLD: 0.9 K/UL (ref 0.8–3.5)
LYMPHOCYTES # BLD: 1 K/UL (ref 0.8–3.5)
LYMPHOCYTES # BLD: 1.1 K/UL (ref 0.8–3.5)
LYMPHOCYTES # BLD: 1.2 K/UL (ref 0.8–3.5)
LYMPHOCYTES # BLD: 1.3 K/UL (ref 0.8–3.5)
LYMPHOCYTES # BLD: 1.4 K/UL (ref 0.8–3.5)
LYMPHOCYTES # BLD: 1.4 K/UL (ref 0.8–3.5)
LYMPHOCYTES # BLD: 1.6 K/UL (ref 0.8–3.5)
LYMPHOCYTES # BLD: 1.6 K/UL (ref 0.8–3.5)
LYMPHOCYTES # BLD: 1.7 K/UL (ref 0.8–3.5)
LYMPHOCYTES # BLD: 1.7 K/UL (ref 0.8–3.5)
LYMPHOCYTES # BLD: 1.8 K/UL (ref 0.8–3.5)
LYMPHOCYTES # BLD: 2 K/UL (ref 0.8–3.5)
LYMPHOCYTES # BLD: 2.6 K/UL (ref 0.8–3.5)
LYMPHOCYTES # BLD: 3.1 K/UL (ref 0.8–3.5)
LYMPHOCYTES # BLD: 4.6 K/UL (ref 0.8–3.5)
LYMPHOCYTES NFR BLD: 10 % (ref 12–49)
LYMPHOCYTES NFR BLD: 12 % (ref 12–49)
LYMPHOCYTES NFR BLD: 12 % (ref 12–49)
LYMPHOCYTES NFR BLD: 13 % (ref 12–49)
LYMPHOCYTES NFR BLD: 13 % (ref 12–49)
LYMPHOCYTES NFR BLD: 18 % (ref 12–49)
LYMPHOCYTES NFR BLD: 19 % (ref 12–49)
LYMPHOCYTES NFR BLD: 2 % (ref 12–49)
LYMPHOCYTES NFR BLD: 2 % (ref 12–49)
LYMPHOCYTES NFR BLD: 20 % (ref 12–49)
LYMPHOCYTES NFR BLD: 21 % (ref 12–49)
LYMPHOCYTES NFR BLD: 22 % (ref 12–49)
LYMPHOCYTES NFR BLD: 25 % (ref 12–49)
LYMPHOCYTES NFR BLD: 3 % (ref 12–49)
LYMPHOCYTES NFR BLD: 4 % (ref 12–49)
LYMPHOCYTES NFR BLD: 5 % (ref 12–49)
LYMPHOCYTES NFR BLD: 6 % (ref 12–49)
LYMPHOCYTES NFR BLD: 7 % (ref 12–49)
LYMPHOCYTES NFR BLD: 8 % (ref 12–49)
M PNEUMO DNA SPEC QL NAA+PROBE: NOT DETECTED
MAGNESIUM SERPL-MCNC: 1.7 MG/DL (ref 1.6–2.4)
MAGNESIUM SERPL-MCNC: 1.7 MG/DL (ref 1.6–2.4)
MAGNESIUM SERPL-MCNC: 1.8 MG/DL (ref 1.6–2.4)
MAGNESIUM SERPL-MCNC: 2.1 MG/DL (ref 1.6–2.4)
MAGNESIUM SERPL-MCNC: 2.2 MG/DL (ref 1.6–2.4)
MAGNESIUM SERPL-MCNC: 2.5 MG/DL (ref 1.6–2.4)
MAGNESIUM SERPL-MCNC: 2.5 MG/DL (ref 1.6–2.4)
MAGNESIUM SERPL-MCNC: 2.6 MG/DL (ref 1.6–2.4)
MAGNESIUM SERPL-MCNC: 2.8 MG/DL (ref 1.6–2.4)
MCH RBC QN AUTO: 28.6 PG (ref 26–34)
MCH RBC QN AUTO: 28.7 PG (ref 26–34)
MCH RBC QN AUTO: 28.7 PG (ref 26–34)
MCH RBC QN AUTO: 28.8 PG (ref 26–34)
MCH RBC QN AUTO: 28.8 PG (ref 26–34)
MCH RBC QN AUTO: 28.9 PG (ref 26–34)
MCH RBC QN AUTO: 28.9 PG (ref 26–34)
MCH RBC QN AUTO: 29 PG (ref 26–34)
MCH RBC QN AUTO: 29.1 PG (ref 26–34)
MCH RBC QN AUTO: 29.2 PG (ref 26–34)
MCH RBC QN AUTO: 29.3 PG (ref 26–34)
MCH RBC QN AUTO: 29.4 PG (ref 26–34)
MCH RBC QN AUTO: 29.4 PG (ref 26–34)
MCH RBC QN AUTO: 29.5 PG (ref 26–34)
MCH RBC QN AUTO: 29.6 PG (ref 26–34)
MCH RBC QN AUTO: 29.6 PG (ref 26–34)
MCH RBC QN AUTO: 29.7 PG (ref 26–34)
MCH RBC QN AUTO: 29.8 PG (ref 26–34)
MCH RBC QN AUTO: 29.8 PG (ref 26–34)
MCH RBC QN AUTO: 29.9 PG (ref 26–34)
MCH RBC QN AUTO: 30 PG (ref 26–34)
MCH RBC QN AUTO: 30 PG (ref 26–34)
MCH RBC QN AUTO: 30.2 PG (ref 26–34)
MCHC RBC AUTO-ENTMCNC: 29.2 G/DL (ref 30–36.5)
MCHC RBC AUTO-ENTMCNC: 29.3 G/DL (ref 30–36.5)
MCHC RBC AUTO-ENTMCNC: 29.6 G/DL (ref 30–36.5)
MCHC RBC AUTO-ENTMCNC: 29.8 G/DL (ref 30–36.5)
MCHC RBC AUTO-ENTMCNC: 29.9 G/DL (ref 30–36.5)
MCHC RBC AUTO-ENTMCNC: 30.4 G/DL (ref 30–36.5)
MCHC RBC AUTO-ENTMCNC: 30.4 G/DL (ref 30–36.5)
MCHC RBC AUTO-ENTMCNC: 30.5 G/DL (ref 30–36.5)
MCHC RBC AUTO-ENTMCNC: 30.5 G/DL (ref 30–36.5)
MCHC RBC AUTO-ENTMCNC: 30.6 G/DL (ref 30–36.5)
MCHC RBC AUTO-ENTMCNC: 30.7 G/DL (ref 30–36.5)
MCHC RBC AUTO-ENTMCNC: 30.9 G/DL (ref 30–36.5)
MCHC RBC AUTO-ENTMCNC: 30.9 G/DL (ref 30–36.5)
MCHC RBC AUTO-ENTMCNC: 31 G/DL (ref 30–36.5)
MCHC RBC AUTO-ENTMCNC: 31.4 G/DL (ref 30–36.5)
MCHC RBC AUTO-ENTMCNC: 31.6 G/DL (ref 30–36.5)
MCHC RBC AUTO-ENTMCNC: 31.6 G/DL (ref 30–36.5)
MCHC RBC AUTO-ENTMCNC: 31.7 G/DL (ref 30–36.5)
MCHC RBC AUTO-ENTMCNC: 31.8 G/DL (ref 30–36.5)
MCHC RBC AUTO-ENTMCNC: 31.8 G/DL (ref 30–36.5)
MCHC RBC AUTO-ENTMCNC: 31.9 G/DL (ref 30–36.5)
MCHC RBC AUTO-ENTMCNC: 32 G/DL (ref 30–36.5)
MCHC RBC AUTO-ENTMCNC: 32.1 G/DL (ref 30–36.5)
MCHC RBC AUTO-ENTMCNC: 32.2 G/DL (ref 30–36.5)
MCHC RBC AUTO-ENTMCNC: 32.3 G/DL (ref 30–36.5)
MCHC RBC AUTO-ENTMCNC: 32.4 G/DL (ref 30–36.5)
MCHC RBC AUTO-ENTMCNC: 32.4 G/DL (ref 30–36.5)
MCHC RBC AUTO-ENTMCNC: 32.5 G/DL (ref 30–36.5)
MCHC RBC AUTO-ENTMCNC: 32.5 G/DL (ref 30–36.5)
MCHC RBC AUTO-ENTMCNC: 32.6 G/DL (ref 30–36.5)
MCHC RBC AUTO-ENTMCNC: 32.6 G/DL (ref 30–36.5)
MCHC RBC AUTO-ENTMCNC: 32.7 G/DL (ref 30–36.5)
MCHC RBC AUTO-ENTMCNC: 32.8 G/DL (ref 30–36.5)
MCHC RBC AUTO-ENTMCNC: 32.9 G/DL (ref 30–36.5)
MCHC RBC AUTO-ENTMCNC: 33 G/DL (ref 30–36.5)
MCHC RBC AUTO-ENTMCNC: 33.1 G/DL (ref 30–36.5)
MCHC RBC AUTO-ENTMCNC: 33.2 G/DL (ref 30–36.5)
MCHC RBC AUTO-ENTMCNC: 33.7 G/DL (ref 30–36.5)
MCHC RBC AUTO-ENTMCNC: 33.7 G/DL (ref 30–36.5)
MCV RBC AUTO: 100 FL (ref 80–99)
MCV RBC AUTO: 100 FL (ref 80–99)
MCV RBC AUTO: 86.9 FL (ref 80–99)
MCV RBC AUTO: 87.3 FL (ref 80–99)
MCV RBC AUTO: 88.6 FL (ref 80–99)
MCV RBC AUTO: 88.8 FL (ref 80–99)
MCV RBC AUTO: 89.2 FL (ref 80–99)
MCV RBC AUTO: 89.4 FL (ref 80–99)
MCV RBC AUTO: 89.4 FL (ref 80–99)
MCV RBC AUTO: 89.5 FL (ref 80–99)
MCV RBC AUTO: 89.6 FL (ref 80–99)
MCV RBC AUTO: 90 FL (ref 80–99)
MCV RBC AUTO: 90.2 FL (ref 80–99)
MCV RBC AUTO: 90.4 FL (ref 80–99)
MCV RBC AUTO: 90.5 FL (ref 80–99)
MCV RBC AUTO: 90.7 FL (ref 80–99)
MCV RBC AUTO: 90.9 FL (ref 80–99)
MCV RBC AUTO: 91 FL (ref 80–99)
MCV RBC AUTO: 91.1 FL (ref 80–99)
MCV RBC AUTO: 91.2 FL (ref 80–99)
MCV RBC AUTO: 91.7 FL (ref 80–99)
MCV RBC AUTO: 92 FL (ref 80–99)
MCV RBC AUTO: 92.2 FL (ref 80–99)
MCV RBC AUTO: 92.2 FL (ref 80–99)
MCV RBC AUTO: 92.3 FL (ref 80–99)
MCV RBC AUTO: 92.3 FL (ref 80–99)
MCV RBC AUTO: 92.8 FL (ref 80–99)
MCV RBC AUTO: 93.9 FL (ref 80–99)
MCV RBC AUTO: 94.6 FL (ref 80–99)
MCV RBC AUTO: 94.8 FL (ref 80–99)
MCV RBC AUTO: 94.9 FL (ref 80–99)
MCV RBC AUTO: 95.2 FL (ref 80–99)
MCV RBC AUTO: 95.3 FL (ref 80–99)
MCV RBC AUTO: 95.5 FL (ref 80–99)
MCV RBC AUTO: 95.9 FL (ref 80–99)
MCV RBC AUTO: 96.2 FL (ref 80–99)
MCV RBC AUTO: 96.4 FL (ref 80–99)
MCV RBC AUTO: 96.7 FL (ref 80–99)
MCV RBC AUTO: 97.1 FL (ref 80–99)
MCV RBC AUTO: 98.8 FL (ref 80–99)
MCV RBC AUTO: 98.9 FL (ref 80–99)
MCV RBC AUTO: 99 FL (ref 80–99)
MCV RBC AUTO: 99.6 FL (ref 80–99)
METAMYELOCYTES NFR BLD MANUAL: 0 %
METAMYELOCYTES NFR BLD MANUAL: 1 %
METAMYELOCYTES NFR BLD MANUAL: 2 %
MONOCYTES # BLD: 0 K/UL (ref 0–1)
MONOCYTES # BLD: 0.2 K/UL (ref 0–1)
MONOCYTES # BLD: 0.3 K/UL (ref 0–1)
MONOCYTES # BLD: 0.3 K/UL (ref 0–1)
MONOCYTES # BLD: 0.4 K/UL (ref 0–1)
MONOCYTES # BLD: 0.5 K/UL (ref 0–1)
MONOCYTES # BLD: 0.6 K/UL (ref 0–1)
MONOCYTES # BLD: 0.6 K/UL (ref 0–1)
MONOCYTES # BLD: 0.7 K/UL (ref 0–1)
MONOCYTES # BLD: 0.8 K/UL (ref 0–1)
MONOCYTES # BLD: 1 K/UL (ref 0–1)
MONOCYTES # BLD: 1.3 K/UL (ref 0–1)
MONOCYTES # BLD: 1.3 K/UL (ref 0–1)
MONOCYTES # BLD: 1.5 K/UL (ref 0–1)
MONOCYTES # BLD: 1.8 K/UL (ref 0–1)
MONOCYTES # BLD: 1.8 K/UL (ref 0–1)
MONOCYTES NFR BLD: 0 % (ref 5–13)
MONOCYTES NFR BLD: 1 % (ref 5–13)
MONOCYTES NFR BLD: 11 % (ref 5–13)
MONOCYTES NFR BLD: 12 % (ref 5–13)
MONOCYTES NFR BLD: 15 % (ref 5–13)
MONOCYTES NFR BLD: 18 % (ref 5–13)
MONOCYTES NFR BLD: 2 % (ref 5–13)
MONOCYTES NFR BLD: 3 % (ref 5–13)
MONOCYTES NFR BLD: 4 % (ref 5–13)
MONOCYTES NFR BLD: 5 % (ref 5–13)
MONOCYTES NFR BLD: 6 % (ref 5–13)
MONOCYTES NFR BLD: 8 % (ref 5–13)
MONOCYTES NFR BLD: 8 % (ref 5–13)
MONOCYTES NFR BLD: 9 % (ref 5–13)
MYCOBACTERIUM SPEC QL CULT: NEGATIVE
MYELOCYTES NFR BLD MANUAL: 0 %
MYELOPEROXIDASE AB SER IA-ACNC: <9 U/ML (ref 0–9)
NEUTS BAND NFR BLD MANUAL: 0 % (ref 0–6)
NEUTS BAND NFR BLD MANUAL: 1 %
NEUTS BAND NFR BLD MANUAL: 1 %
NEUTS BAND NFR BLD MANUAL: 1 % (ref 0–6)
NEUTS BAND NFR BLD MANUAL: 2 %
NEUTS BAND NFR BLD MANUAL: 2 % (ref 0–6)
NEUTS BAND NFR BLD MANUAL: 2 % (ref 0–6)
NEUTS SEG # BLD: 10 K/UL (ref 1.8–8)
NEUTS SEG # BLD: 11.1 K/UL (ref 1.8–8)
NEUTS SEG # BLD: 11.3 K/UL (ref 1.8–8)
NEUTS SEG # BLD: 11.5 K/UL (ref 1.8–8)
NEUTS SEG # BLD: 11.6 K/UL (ref 1.8–8)
NEUTS SEG # BLD: 11.7 K/UL (ref 1.8–8)
NEUTS SEG # BLD: 12.2 K/UL (ref 1.8–8)
NEUTS SEG # BLD: 13.2 K/UL (ref 1.8–8)
NEUTS SEG # BLD: 13.6 K/UL (ref 1.8–8)
NEUTS SEG # BLD: 14 K/UL (ref 1.8–8)
NEUTS SEG # BLD: 14.2 K/UL (ref 1.8–8)
NEUTS SEG # BLD: 14.3 K/UL (ref 1.8–8)
NEUTS SEG # BLD: 14.4 K/UL (ref 1.8–8)
NEUTS SEG # BLD: 14.5 K/UL (ref 1.8–8)
NEUTS SEG # BLD: 15.1 K/UL (ref 1.8–8)
NEUTS SEG # BLD: 15.2 K/UL (ref 1.8–8)
NEUTS SEG # BLD: 15.8 K/UL (ref 1.8–8)
NEUTS SEG # BLD: 15.9 K/UL (ref 1.8–8)
NEUTS SEG # BLD: 16.3 K/UL (ref 1.8–8)
NEUTS SEG # BLD: 17.2 K/UL (ref 1.8–8)
NEUTS SEG # BLD: 17.9 K/UL (ref 1.8–8)
NEUTS SEG # BLD: 18.3 K/UL (ref 1.8–8)
NEUTS SEG # BLD: 19.6 K/UL (ref 1.8–8)
NEUTS SEG # BLD: 20 K/UL (ref 1.8–8)
NEUTS SEG # BLD: 20.1 K/UL (ref 1.8–8)
NEUTS SEG # BLD: 20.5 K/UL (ref 1.8–8)
NEUTS SEG # BLD: 22.4 K/UL (ref 1.8–8)
NEUTS SEG # BLD: 3.9 K/UL (ref 1.8–8)
NEUTS SEG # BLD: 4.9 K/UL (ref 1.8–8)
NEUTS SEG # BLD: 6.7 K/UL (ref 1.8–8)
NEUTS SEG # BLD: 6.8 K/UL (ref 1.8–8)
NEUTS SEG # BLD: 6.9 K/UL (ref 1.8–8)
NEUTS SEG # BLD: 6.9 K/UL (ref 1.8–8)
NEUTS SEG # BLD: 7.2 K/UL (ref 1.8–8)
NEUTS SEG # BLD: 7.7 K/UL (ref 1.8–8)
NEUTS SEG # BLD: 7.9 K/UL (ref 1.8–8)
NEUTS SEG # BLD: 8.2 K/UL (ref 1.8–8)
NEUTS SEG # BLD: 8.3 K/UL (ref 1.8–8)
NEUTS SEG # BLD: 8.6 K/UL (ref 1.8–8)
NEUTS SEG # BLD: 8.8 K/UL (ref 1.8–8)
NEUTS SEG # BLD: 9.6 K/UL (ref 1.8–8)
NEUTS SEG # BLD: 9.7 K/UL (ref 1.8–8)
NEUTS SEG NFR BLD: 58 % (ref 32–75)
NEUTS SEG NFR BLD: 60 % (ref 32–75)
NEUTS SEG NFR BLD: 64 % (ref 32–75)
NEUTS SEG NFR BLD: 68 % (ref 32–75)
NEUTS SEG NFR BLD: 72 % (ref 32–75)
NEUTS SEG NFR BLD: 74 % (ref 32–75)
NEUTS SEG NFR BLD: 74 % (ref 32–75)
NEUTS SEG NFR BLD: 76 % (ref 32–75)
NEUTS SEG NFR BLD: 80 % (ref 32–75)
NEUTS SEG NFR BLD: 80 % (ref 32–75)
NEUTS SEG NFR BLD: 83 % (ref 32–75)
NEUTS SEG NFR BLD: 84 % (ref 32–75)
NEUTS SEG NFR BLD: 84 % (ref 32–75)
NEUTS SEG NFR BLD: 86 % (ref 32–75)
NEUTS SEG NFR BLD: 87 % (ref 32–75)
NEUTS SEG NFR BLD: 88 % (ref 32–75)
NEUTS SEG NFR BLD: 88 % (ref 32–75)
NEUTS SEG NFR BLD: 89 % (ref 32–75)
NEUTS SEG NFR BLD: 90 % (ref 32–75)
NEUTS SEG NFR BLD: 91 % (ref 32–75)
NEUTS SEG NFR BLD: 91 % (ref 32–75)
NEUTS SEG NFR BLD: 92 % (ref 32–75)
NEUTS SEG NFR BLD: 93 % (ref 32–75)
NEUTS SEG NFR BLD: 93 % (ref 32–75)
NEUTS SEG NFR BLD: 94 % (ref 32–75)
NEUTS SEG NFR BLD: 94 % (ref 32–75)
NEUTS SEG NFR BLD: 95 % (ref 32–75)
NEUTS SEG NFR BLD: 96 % (ref 32–75)
NITRITE UR QL STRIP.AUTO: NEGATIVE
NRBC # BLD: 0 K/UL (ref 0–0.01)
NRBC # BLD: 0.02 K/UL (ref 0–0.01)
NRBC # BLD: 0.03 K/UL (ref 0–0.01)
NRBC # BLD: 0.05 K/UL (ref 0–0.01)
NRBC # BLD: 0.05 K/UL (ref 0–0.01)
NRBC BLD-RTO: 0 PER 100 WBC
NRBC BLD-RTO: 0.1 PER 100 WBC
NRBC BLD-RTO: 0.2 PER 100 WBC
NRBC BLD-RTO: 0.2 PER 100 WBC
NRBC BLD-RTO: 0.6 PER 100 WBC
NRBC BLD-RTO: 0.8 PER 100 WBC
O2/TOTAL GAS SETTING VFR VENT: 0.5 %
O2/TOTAL GAS SETTING VFR VENT: 1 %
O2/TOTAL GAS SETTING VFR VENT: 100 %
ORGANISM ID, SPNG3: NORMAL
ORGANISM ID, SPNG3: NORMAL
OTHER CELLS NFR BLD MANUAL: 0 %
P-ANCA ATYPICAL TITR SER IF: NORMAL TITER
P-ANCA ATYPICAL TITR SER IF: NORMAL TITER
P-ANCA TITR SER IF: NORMAL TITER
P-ANCA TITR SER IF: NORMAL TITER
P-R INTERVAL, ECG05: 136 MS
P-R INTERVAL, ECG05: 144 MS
P-R INTERVAL, ECG05: 156 MS
P-R INTERVAL, ECG05: 164 MS
PCO2 BLD: 33.1 MMHG (ref 35–45)
PCO2 BLD: 36.7 MMHG (ref 35–45)
PCO2 BLD: 40.5 MMHG (ref 35–45)
PCO2 BLD: 41.6 MMHG (ref 35–45)
PCO2 BLD: 50.4 MMHG (ref 35–45)
PH BLD: 7.4 [PH] (ref 7.35–7.45)
PH BLD: 7.44 [PH] (ref 7.35–7.45)
PH BLD: 7.44 [PH] (ref 7.35–7.45)
PH BLD: 7.49 [PH] (ref 7.35–7.45)
PH BLD: 7.49 [PH] (ref 7.35–7.45)
PH UR STRIP: 5 [PH] (ref 5–8)
PH UR STRIP: 5 [PH] (ref 5–8)
PH UR STRIP: 6.5 [PH] (ref 5–8)
PH UR STRIP: 6.5 [PH] (ref 5–8)
PHOSPHATE SERPL-MCNC: 2.4 MG/DL (ref 2.6–4.7)
PHOSPHATE SERPL-MCNC: 2.7 MG/DL (ref 2.6–4.7)
PHOSPHATE SERPL-MCNC: 2.9 MG/DL (ref 2.6–4.7)
PIGEON SERUM AB QL ID: NEGATIVE
PLATELET # BLD AUTO: 100 K/UL (ref 150–400)
PLATELET # BLD AUTO: 101 K/UL (ref 150–400)
PLATELET # BLD AUTO: 106 K/UL (ref 150–400)
PLATELET # BLD AUTO: 107 K/UL (ref 150–400)
PLATELET # BLD AUTO: 114 K/UL (ref 150–400)
PLATELET # BLD AUTO: 117 K/UL (ref 150–400)
PLATELET # BLD AUTO: 124 K/UL (ref 150–400)
PLATELET # BLD AUTO: 126 K/UL (ref 150–400)
PLATELET # BLD AUTO: 133 K/UL (ref 150–400)
PLATELET # BLD AUTO: 136 K/UL (ref 150–400)
PLATELET # BLD AUTO: 140 K/UL (ref 150–400)
PLATELET # BLD AUTO: 141 K/UL (ref 150–400)
PLATELET # BLD AUTO: 143 K/UL (ref 150–400)
PLATELET # BLD AUTO: 144 K/UL (ref 150–400)
PLATELET # BLD AUTO: 161 K/UL (ref 150–400)
PLATELET # BLD AUTO: 179 K/UL (ref 150–400)
PLATELET # BLD AUTO: 193 K/UL (ref 150–400)
PLATELET # BLD AUTO: 209 K/UL (ref 150–400)
PLATELET # BLD AUTO: 226 K/UL (ref 150–400)
PLATELET # BLD AUTO: 233 K/UL (ref 150–400)
PLATELET # BLD AUTO: 238 K/UL (ref 150–400)
PLATELET # BLD AUTO: 258 K/UL (ref 150–400)
PLATELET # BLD AUTO: 285 K/UL (ref 150–400)
PLATELET # BLD AUTO: 328 K/UL (ref 150–400)
PLATELET # BLD AUTO: 360 K/UL (ref 150–400)
PLATELET # BLD AUTO: 368 K/UL (ref 150–400)
PLATELET # BLD AUTO: 368 K/UL (ref 150–400)
PLATELET # BLD AUTO: 388 K/UL (ref 150–400)
PLATELET # BLD AUTO: 389 K/UL (ref 150–400)
PLATELET # BLD AUTO: 390 K/UL (ref 150–400)
PLATELET # BLD AUTO: 393 K/UL (ref 150–400)
PLATELET # BLD AUTO: 398 K/UL (ref 150–400)
PLATELET # BLD AUTO: 409 K/UL (ref 150–400)
PLATELET # BLD AUTO: 421 K/UL (ref 150–400)
PLATELET # BLD AUTO: 435 K/UL (ref 150–400)
PLATELET # BLD AUTO: 435 K/UL (ref 150–400)
PLATELET # BLD AUTO: 444 K/UL (ref 150–400)
PLATELET # BLD AUTO: 464 K/UL (ref 150–400)
PLATELET # BLD AUTO: 478 K/UL (ref 150–400)
PLATELET # BLD AUTO: 479 K/UL (ref 150–400)
PLATELET # BLD AUTO: 498 K/UL (ref 150–400)
PLATELET # BLD AUTO: 511 K/UL (ref 150–400)
PLATELET # BLD AUTO: 519 K/UL (ref 150–400)
PLATELET COMMENTS,PCOM: ABNORMAL
PLEASE NOTE, SPNG4: NORMAL
PLEASE NOTE, SPNG4: NORMAL
PMV BLD AUTO: 10 FL (ref 8.9–12.9)
PMV BLD AUTO: 10.2 FL (ref 8.9–12.9)
PMV BLD AUTO: 10.3 FL (ref 8.9–12.9)
PMV BLD AUTO: 10.3 FL (ref 8.9–12.9)
PMV BLD AUTO: 10.5 FL (ref 8.9–12.9)
PMV BLD AUTO: 10.6 FL (ref 8.9–12.9)
PMV BLD AUTO: 10.7 FL (ref 8.9–12.9)
PMV BLD AUTO: 11 FL (ref 8.9–12.9)
PMV BLD AUTO: 11.6 FL (ref 8.9–12.9)
PMV BLD AUTO: 11.6 FL (ref 8.9–12.9)
PMV BLD AUTO: 11.7 FL (ref 8.9–12.9)
PMV BLD AUTO: 11.9 FL (ref 8.9–12.9)
PMV BLD AUTO: 12.1 FL (ref 8.9–12.9)
PMV BLD AUTO: 12.5 FL (ref 8.9–12.9)
PMV BLD AUTO: 12.7 FL (ref 8.9–12.9)
PMV BLD AUTO: 13 FL (ref 8.9–12.9)
PMV BLD AUTO: 13 FL (ref 8.9–12.9)
PMV BLD AUTO: 9.2 FL (ref 8.9–12.9)
PMV BLD AUTO: 9.5 FL (ref 8.9–12.9)
PMV BLD AUTO: 9.5 FL (ref 8.9–12.9)
PMV BLD AUTO: 9.6 FL (ref 8.9–12.9)
PMV BLD AUTO: 9.7 FL (ref 8.9–12.9)
PMV BLD AUTO: 9.8 FL (ref 8.9–12.9)
PMV BLD AUTO: 9.8 FL (ref 8.9–12.9)
PMV BLD AUTO: 9.9 FL (ref 8.9–12.9)
PO2 BLD: 143 MMHG (ref 80–100)
PO2 BLD: 185 MMHG (ref 80–100)
PO2 BLD: 49 MMHG (ref 80–100)
PO2 BLD: 55 MMHG (ref 80–100)
PO2 BLD: 72 MMHG (ref 80–100)
POTASSIUM SERPL-SCNC: 2.9 MMOL/L (ref 3.5–5.1)
POTASSIUM SERPL-SCNC: 3.1 MMOL/L (ref 3.5–5.1)
POTASSIUM SERPL-SCNC: 3.3 MMOL/L (ref 3.5–5.1)
POTASSIUM SERPL-SCNC: 3.3 MMOL/L (ref 3.5–5.1)
POTASSIUM SERPL-SCNC: 3.5 MMOL/L (ref 3.5–5.1)
POTASSIUM SERPL-SCNC: 3.6 MMOL/L (ref 3.5–5.1)
POTASSIUM SERPL-SCNC: 3.7 MMOL/L (ref 3.5–5.1)
POTASSIUM SERPL-SCNC: 3.8 MMOL/L (ref 3.5–5.1)
POTASSIUM SERPL-SCNC: 3.9 MMOL/L (ref 3.5–5.1)
POTASSIUM SERPL-SCNC: 4 MMOL/L (ref 3.5–5.1)
POTASSIUM SERPL-SCNC: 4.1 MMOL/L (ref 3.5–5.1)
POTASSIUM SERPL-SCNC: 4.2 MMOL/L (ref 3.5–5.1)
POTASSIUM SERPL-SCNC: 4.3 MMOL/L (ref 3.5–5.1)
POTASSIUM SERPL-SCNC: 4.3 MMOL/L (ref 3.5–5.1)
POTASSIUM SERPL-SCNC: 4.4 MMOL/L (ref 3.5–5.1)
POTASSIUM SERPL-SCNC: 4.6 MMOL/L (ref 3.5–5.1)
POTASSIUM SERPL-SCNC: 4.6 MMOL/L (ref 3.5–5.1)
POTASSIUM SERPL-SCNC: 4.7 MMOL/L (ref 3.5–5.1)
POTASSIUM SERPL-SCNC: 4.8 MMOL/L (ref 3.5–5.1)
POTASSIUM SERPL-SCNC: 4.8 MMOL/L (ref 3.5–5.1)
POTASSIUM SERPL-SCNC: 4.9 MMOL/L (ref 3.5–5.1)
POTASSIUM SERPL-SCNC: 5 MMOL/L (ref 3.5–5.1)
POTASSIUM SERPL-SCNC: 5 MMOL/L (ref 3.5–5.1)
PROCALCITONIN SERPL-MCNC: <0.1 NG/ML
PROMYELOCYTES NFR BLD MANUAL: 0 %
PROT SERPL-MCNC: 6.5 G/DL (ref 6.4–8.2)
PROT SERPL-MCNC: 6.6 G/DL (ref 6.4–8.2)
PROT SERPL-MCNC: 6.7 G/DL (ref 6.4–8.2)
PROT SERPL-MCNC: 6.8 G/DL (ref 6.4–8.2)
PROT SERPL-MCNC: 8.1 G/DL (ref 6.4–8.2)
PROT UR STRIP-MCNC: ABNORMAL MG/DL
PROT UR STRIP-MCNC: NEGATIVE MG/DL
PROTEINASE3 AB SER IA-ACNC: <3.5 U/ML (ref 0–3.5)
PROTHROMBIN TIME: 13.1 SEC (ref 9–11.1)
PROTHROMBIN TIME: 14.1 SEC (ref 9–11.1)
Q-T INTERVAL, ECG07: 286 MS
Q-T INTERVAL, ECG07: 304 MS
Q-T INTERVAL, ECG07: 370 MS
Q-T INTERVAL, ECG07: 372 MS
QRS DURATION, ECG06: 78 MS
QRS DURATION, ECG06: 78 MS
QRS DURATION, ECG06: 80 MS
QRS DURATION, ECG06: 82 MS
QTC CALCULATION (BEZET), ECG08: 431 MS
QTC CALCULATION (BEZET), ECG08: 441 MS
QTC CALCULATION (BEZET), ECG08: 487 MS
QTC CALCULATION (BEZET), ECG08: 498 MS
RBC # BLD AUTO: 2.24 M/UL (ref 3.8–5.2)
RBC # BLD AUTO: 2.34 M/UL (ref 3.8–5.2)
RBC # BLD AUTO: 2.44 M/UL (ref 3.8–5.2)
RBC # BLD AUTO: 2.47 M/UL (ref 3.8–5.2)
RBC # BLD AUTO: 2.5 M/UL (ref 3.8–5.2)
RBC # BLD AUTO: 2.56 M/UL (ref 3.8–5.2)
RBC # BLD AUTO: 2.59 M/UL (ref 3.8–5.2)
RBC # BLD AUTO: 2.61 M/UL (ref 3.8–5.2)
RBC # BLD AUTO: 2.66 M/UL (ref 3.8–5.2)
RBC # BLD AUTO: 2.7 M/UL (ref 3.8–5.2)
RBC # BLD AUTO: 2.73 M/UL (ref 3.8–5.2)
RBC # BLD AUTO: 2.73 M/UL (ref 3.8–5.2)
RBC # BLD AUTO: 2.75 M/UL (ref 3.8–5.2)
RBC # BLD AUTO: 2.75 M/UL (ref 3.8–5.2)
RBC # BLD AUTO: 2.76 M/UL (ref 3.8–5.2)
RBC # BLD AUTO: 2.83 M/UL (ref 3.8–5.2)
RBC # BLD AUTO: 2.87 M/UL (ref 3.8–5.2)
RBC # BLD AUTO: 2.88 M/UL (ref 3.8–5.2)
RBC # BLD AUTO: 2.9 M/UL (ref 3.8–5.2)
RBC # BLD AUTO: 2.9 M/UL (ref 3.8–5.2)
RBC # BLD AUTO: 2.91 M/UL (ref 3.8–5.2)
RBC # BLD AUTO: 2.92 M/UL (ref 3.8–5.2)
RBC # BLD AUTO: 2.94 M/UL (ref 3.8–5.2)
RBC # BLD AUTO: 3 M/UL (ref 3.8–5.2)
RBC # BLD AUTO: 3.01 M/UL (ref 3.8–5.2)
RBC # BLD AUTO: 3.06 M/UL (ref 3.8–5.2)
RBC # BLD AUTO: 3.07 M/UL (ref 3.8–5.2)
RBC # BLD AUTO: 3.13 M/UL (ref 3.8–5.2)
RBC # BLD AUTO: 3.14 M/UL (ref 3.8–5.2)
RBC # BLD AUTO: 3.22 M/UL (ref 3.8–5.2)
RBC # BLD AUTO: 3.23 M/UL (ref 3.8–5.2)
RBC # BLD AUTO: 3.24 M/UL (ref 3.8–5.2)
RBC # BLD AUTO: 3.25 M/UL (ref 3.8–5.2)
RBC # BLD AUTO: 3.25 M/UL (ref 3.8–5.2)
RBC # BLD AUTO: 3.33 M/UL (ref 3.8–5.2)
RBC # BLD AUTO: 3.39 M/UL (ref 3.8–5.2)
RBC # BLD AUTO: 3.52 M/UL (ref 3.8–5.2)
RBC # BLD AUTO: 3.57 M/UL (ref 3.8–5.2)
RBC # BLD AUTO: 3.58 M/UL (ref 3.8–5.2)
RBC # BLD AUTO: 3.61 M/UL (ref 3.8–5.2)
RBC # BLD AUTO: 3.67 M/UL (ref 3.8–5.2)
RBC #/AREA URNS HPF: ABNORMAL /HPF (ref 0–5)
RBC MORPH BLD: ABNORMAL
REPORTED DOSE,DOSE: ABNORMAL UNITS
REPORTED DOSE/TIME,TMG: ABNORMAL
RHEUMATOID FACT SERPL-ACNC: <10 IU/ML
RSV RNA SPEC QL NAA+PROBE: NOT DETECTED
RV+EV RNA SPEC QL NAA+PROBE: NOT DETECTED
S PNEUM AG SPEC QL LA: NEGATIVE
S PNEUM AG SPEC QL LA: NEGATIVE
S RECTIVIRGULA AB SER QL ID: NEGATIVE
S VIRIDIS AB SER-ACNC: NEGATIVE
SAMPLES BEING HELD,HOLD: NORMAL
SAO2 % BLD: 100 % (ref 92–97)
SAO2 % BLD: 84 % (ref 92–97)
SAO2 % BLD: 90 % (ref 92–97)
SAO2 % BLD: 95 % (ref 92–97)
SAO2 % BLD: 99 % (ref 92–97)
SERVICE CMNT-IMP: ABNORMAL
SERVICE CMNT-IMP: NORMAL
SODIUM SERPL-SCNC: 133 MMOL/L (ref 136–145)
SODIUM SERPL-SCNC: 133 MMOL/L (ref 136–145)
SODIUM SERPL-SCNC: 134 MMOL/L (ref 136–145)
SODIUM SERPL-SCNC: 135 MMOL/L (ref 136–145)
SODIUM SERPL-SCNC: 136 MMOL/L (ref 136–145)
SODIUM SERPL-SCNC: 137 MMOL/L (ref 136–145)
SODIUM SERPL-SCNC: 138 MMOL/L (ref 136–145)
SODIUM SERPL-SCNC: 139 MMOL/L (ref 136–145)
SODIUM SERPL-SCNC: 140 MMOL/L (ref 136–145)
SODIUM SERPL-SCNC: 140 MMOL/L (ref 136–145)
SODIUM SERPL-SCNC: 141 MMOL/L (ref 136–145)
SODIUM SERPL-SCNC: 142 MMOL/L (ref 136–145)
SODIUM SERPL-SCNC: 144 MMOL/L (ref 136–145)
SODIUM SERPL-SCNC: 145 MMOL/L (ref 136–145)
SODIUM SERPL-SCNC: 145 MMOL/L (ref 136–145)
SODIUM SERPL-SCNC: 147 MMOL/L (ref 136–145)
SODIUM SERPL-SCNC: 148 MMOL/L (ref 136–145)
SODIUM SERPL-SCNC: 148 MMOL/L (ref 136–145)
SODIUM SERPL-SCNC: 152 MMOL/L (ref 136–145)
SP GR UR REFRACTOMETRY: 1.01 (ref 1–1.03)
SP GR UR REFRACTOMETRY: 1.02 (ref 1–1.03)
SP GR UR REFRACTOMETRY: 1.03 (ref 1–1.03)
SP GR UR REFRACTOMETRY: >1.03 (ref 1–1.03)
SPECIMEN EXP DATE BLD: NORMAL
SPECIMEN PREPARATION: NORMAL
SPECIMEN SOURCE: NORMAL
SPECIMEN TYPE: ABNORMAL
SPECIMEN, SPNG1: NORMAL
SPECIMEN, SPNG1: NORMAL
STATUS OF UNIT,%ST: NORMAL
T CANDIDUS AB SER QL: NEGATIVE
T VULGARIS AB SER QL ID: NEGATIVE
T3FREE SERPL-MCNC: 1.6 PG/ML (ref 2.2–4)
T4 FREE SERPL-MCNC: 0.9 NG/DL (ref 0.8–1.5)
TIBC SERPL-MCNC: 200 UG/DL (ref 250–450)
TOTAL RESP. RATE, ITRR: 14
TOTAL RESP. RATE, ITRR: 24
TROPONIN I SERPL-MCNC: 0.06 NG/ML
TROPONIN I SERPL-MCNC: 0.08 NG/ML
TROPONIN I SERPL-MCNC: <0.05 NG/ML
TSH SERPL DL<=0.05 MIU/L-ACNC: 1.07 UIU/ML (ref 0.36–3.74)
TSH SERPL DL<=0.05 MIU/L-ACNC: 2.08 UIU/ML (ref 0.36–3.74)
UA: UC IF INDICATED,UAUC: ABNORMAL
UNIT DIVISION, %UDIV: 0
UR CULT HOLD, URHOLD: NORMAL
UR CULT HOLD, URHOLD: NORMAL
UROBILINOGEN UR QL STRIP.AUTO: 0.2 EU/DL (ref 0.2–1)
UROBILINOGEN UR QL STRIP.AUTO: 1 EU/DL (ref 0.2–1)
VANCOMYCIN TROUGH SERPL-MCNC: 12.2 UG/ML (ref 5–10)
VANCOMYCIN TROUGH SERPL-MCNC: 23.4 UG/ML (ref 5–10)
VANCOMYCIN TROUGH SERPL-MCNC: 27.8 UG/ML (ref 5–10)
VENTRICULAR RATE, ECG03: 103 BPM
VENTRICULAR RATE, ECG03: 109 BPM
VENTRICULAR RATE, ECG03: 121 BPM
VENTRICULAR RATE, ECG03: 143 BPM
VIRUS SPEC CULT: NORMAL
VIT B12 SERPL-MCNC: 637 PG/ML (ref 193–986)
WBC # BLD AUTO: 10.2 K/UL (ref 3.6–11)
WBC # BLD AUTO: 10.7 K/UL (ref 3.6–11)
WBC # BLD AUTO: 10.9 K/UL (ref 3.6–11)
WBC # BLD AUTO: 10.9 K/UL (ref 3.6–11)
WBC # BLD AUTO: 11.3 K/UL (ref 3.6–11)
WBC # BLD AUTO: 11.8 K/UL (ref 3.6–11)
WBC # BLD AUTO: 12.1 K/UL (ref 3.6–11)
WBC # BLD AUTO: 12.2 K/UL (ref 3.6–11)
WBC # BLD AUTO: 13 K/UL (ref 3.6–11)
WBC # BLD AUTO: 13.1 K/UL (ref 3.6–11)
WBC # BLD AUTO: 14.3 K/UL (ref 3.6–11)
WBC # BLD AUTO: 14.9 K/UL (ref 3.6–11)
WBC # BLD AUTO: 15 K/UL (ref 3.6–11)
WBC # BLD AUTO: 15.1 K/UL (ref 3.6–11)
WBC # BLD AUTO: 15.3 K/UL (ref 3.6–11)
WBC # BLD AUTO: 15.5 K/UL (ref 3.6–11)
WBC # BLD AUTO: 15.8 K/UL (ref 3.6–11)
WBC # BLD AUTO: 16.7 K/UL (ref 3.6–11)
WBC # BLD AUTO: 16.8 K/UL (ref 3.6–11)
WBC # BLD AUTO: 16.9 K/UL (ref 3.6–11)
WBC # BLD AUTO: 17.3 K/UL (ref 3.6–11)
WBC # BLD AUTO: 17.3 K/UL (ref 3.6–11)
WBC # BLD AUTO: 17.7 K/UL (ref 3.6–11)
WBC # BLD AUTO: 18.1 K/UL (ref 3.6–11)
WBC # BLD AUTO: 18.3 K/UL (ref 3.6–11)
WBC # BLD AUTO: 19.5 K/UL (ref 3.6–11)
WBC # BLD AUTO: 20.3 K/UL (ref 3.6–11)
WBC # BLD AUTO: 21.1 K/UL (ref 3.6–11)
WBC # BLD AUTO: 21.2 K/UL (ref 3.6–11)
WBC # BLD AUTO: 21.3 K/UL (ref 3.6–11)
WBC # BLD AUTO: 21.5 K/UL (ref 3.6–11)
WBC # BLD AUTO: 21.6 K/UL (ref 3.6–11)
WBC # BLD AUTO: 23.5 K/UL (ref 3.6–11)
WBC # BLD AUTO: 6.4 K/UL (ref 3.6–11)
WBC # BLD AUTO: 7.3 K/UL (ref 3.6–11)
WBC # BLD AUTO: 7.7 K/UL (ref 3.6–11)
WBC # BLD AUTO: 7.8 K/UL (ref 3.6–11)
WBC # BLD AUTO: 8.2 K/UL (ref 3.6–11)
WBC # BLD AUTO: 8.6 K/UL (ref 3.6–11)
WBC # BLD AUTO: 8.8 K/UL (ref 3.6–11)
WBC # BLD AUTO: 9.2 K/UL (ref 3.6–11)
WBC # BLD AUTO: 9.4 K/UL (ref 3.6–11)
WBC # BLD AUTO: 9.7 K/UL (ref 3.6–11)
WBC MORPH BLD: ABNORMAL
WBC URNS QL MICRO: ABNORMAL /HPF (ref 0–4)

## 2019-01-01 PROCEDURE — 74011250637 HC RX REV CODE- 250/637: Performed by: HOSPITALIST

## 2019-01-01 PROCEDURE — 83880 ASSAY OF NATRIURETIC PEPTIDE: CPT

## 2019-01-01 PROCEDURE — 80048 BASIC METABOLIC PNL TOTAL CA: CPT

## 2019-01-01 PROCEDURE — 65660000001 HC RM ICU INTERMED STEPDOWN

## 2019-01-01 PROCEDURE — 74011636637 HC RX REV CODE- 636/637: Performed by: INTERNAL MEDICINE

## 2019-01-01 PROCEDURE — 74011000258 HC RX REV CODE- 258: Performed by: INTERNAL MEDICINE

## 2019-01-01 PROCEDURE — 94762 N-INVAS EAR/PLS OXIMTRY CONT: CPT

## 2019-01-01 PROCEDURE — 97535 SELF CARE MNGMENT TRAINING: CPT | Performed by: OCCUPATIONAL THERAPIST

## 2019-01-01 PROCEDURE — 74011636637 HC RX REV CODE- 636/637: Performed by: NURSE PRACTITIONER

## 2019-01-01 PROCEDURE — 97116 GAIT TRAINING THERAPY: CPT

## 2019-01-01 PROCEDURE — 74011250636 HC RX REV CODE- 250/636: Performed by: HOSPITALIST

## 2019-01-01 PROCEDURE — 74011250637 HC RX REV CODE- 250/637: Performed by: PSYCHIATRY & NEUROLOGY

## 2019-01-01 PROCEDURE — 71045 X-RAY EXAM CHEST 1 VIEW: CPT

## 2019-01-01 PROCEDURE — 80202 ASSAY OF VANCOMYCIN: CPT

## 2019-01-01 PROCEDURE — 85025 COMPLETE CBC W/AUTO DIFF WBC: CPT

## 2019-01-01 PROCEDURE — 96360 HYDRATION IV INFUSION INIT: CPT

## 2019-01-01 PROCEDURE — 36415 COLL VENOUS BLD VENIPUNCTURE: CPT

## 2019-01-01 PROCEDURE — 65660000000 HC RM CCU STEPDOWN

## 2019-01-01 PROCEDURE — 93970 EXTREMITY STUDY: CPT

## 2019-01-01 PROCEDURE — 74011250637 HC RX REV CODE- 250/637: Performed by: INTERNAL MEDICINE

## 2019-01-01 PROCEDURE — 94761 N-INVAS EAR/PLS OXIMETRY MLT: CPT

## 2019-01-01 PROCEDURE — 77010033711 HC HIGH FLOW OXYGEN

## 2019-01-01 PROCEDURE — 77030038269 HC DRN EXT URIN PURWCK BARD -A

## 2019-01-01 PROCEDURE — 82962 GLUCOSE BLOOD TEST: CPT

## 2019-01-01 PROCEDURE — 94760 N-INVAS EAR/PLS OXIMETRY 1: CPT

## 2019-01-01 PROCEDURE — 80053 COMPREHEN METABOLIC PANEL: CPT

## 2019-01-01 PROCEDURE — 74011250636 HC RX REV CODE- 250/636: Performed by: INTERNAL MEDICINE

## 2019-01-01 PROCEDURE — 74011250636 HC RX REV CODE- 250/636: Performed by: PSYCHIATRY & NEUROLOGY

## 2019-01-01 PROCEDURE — 82164 ANGIOTENSIN I ENZYME TEST: CPT

## 2019-01-01 PROCEDURE — 97530 THERAPEUTIC ACTIVITIES: CPT

## 2019-01-01 PROCEDURE — 77010033678 HC OXYGEN DAILY

## 2019-01-01 PROCEDURE — 84484 ASSAY OF TROPONIN QUANT: CPT

## 2019-01-01 PROCEDURE — 36600 WITHDRAWAL OF ARTERIAL BLOOD: CPT

## 2019-01-01 PROCEDURE — 82803 BLOOD GASES ANY COMBINATION: CPT

## 2019-01-01 PROCEDURE — 85027 COMPLETE CBC AUTOMATED: CPT

## 2019-01-01 PROCEDURE — 97535 SELF CARE MNGMENT TRAINING: CPT

## 2019-01-01 PROCEDURE — 74011000250 HC RX REV CODE- 250: Performed by: INTERNAL MEDICINE

## 2019-01-01 PROCEDURE — 02HV33Z INSERTION OF INFUSION DEVICE INTO SUPERIOR VENA CAVA, PERCUTANEOUS APPROACH: ICD-10-PCS | Performed by: INTERNAL MEDICINE

## 2019-01-01 PROCEDURE — 74011636320 HC RX REV CODE- 636/320: Performed by: INTERNAL MEDICINE

## 2019-01-01 PROCEDURE — 76450000000

## 2019-01-01 PROCEDURE — 77030021668 HC NEB PREFIL KT VYRM -A

## 2019-01-01 PROCEDURE — 97110 THERAPEUTIC EXERCISES: CPT

## 2019-01-01 PROCEDURE — 94660 CPAP INITIATION&MGMT: CPT

## 2019-01-01 PROCEDURE — 74011636320 HC RX REV CODE- 636/320: Performed by: EMERGENCY MEDICINE

## 2019-01-01 PROCEDURE — 83605 ASSAY OF LACTIC ACID: CPT

## 2019-01-01 PROCEDURE — 94640 AIRWAY INHALATION TREATMENT: CPT

## 2019-01-01 PROCEDURE — 74011000258 HC RX REV CODE- 258: Performed by: HOSPITALIST

## 2019-01-01 PROCEDURE — 77030008771 HC TU NG SALEM SUMP -A

## 2019-01-01 PROCEDURE — 87077 CULTURE AEROBIC IDENTIFY: CPT

## 2019-01-01 PROCEDURE — 74011250636 HC RX REV CODE- 250/636: Performed by: PHYSICIAN ASSISTANT

## 2019-01-01 PROCEDURE — A9575 INJ GADOTERATE MEGLUMI 0.1ML: HCPCS | Performed by: HOSPITALIST

## 2019-01-01 PROCEDURE — 93306 TTE W/DOPPLER COMPLETE: CPT

## 2019-01-01 PROCEDURE — 82607 VITAMIN B-12: CPT

## 2019-01-01 PROCEDURE — 36591 DRAW BLOOD OFF VENOUS DEVICE: CPT

## 2019-01-01 PROCEDURE — 74011000250 HC RX REV CODE- 250: Performed by: HOSPITALIST

## 2019-01-01 PROCEDURE — 83735 ASSAY OF MAGNESIUM: CPT

## 2019-01-01 PROCEDURE — 84100 ASSAY OF PHOSPHORUS: CPT

## 2019-01-01 PROCEDURE — 77030003560 HC NDL HUBR BARD -A

## 2019-01-01 PROCEDURE — 71275 CT ANGIOGRAPHY CHEST: CPT

## 2019-01-01 PROCEDURE — 87899 AGENT NOS ASSAY W/OPTIC: CPT

## 2019-01-01 PROCEDURE — 93005 ELECTROCARDIOGRAM TRACING: CPT

## 2019-01-01 PROCEDURE — 65270000029 HC RM PRIVATE

## 2019-01-01 PROCEDURE — 0042T CT CODE NEURO PERF W CBF: CPT

## 2019-01-01 PROCEDURE — 87116 MYCOBACTERIA CULTURE: CPT

## 2019-01-01 PROCEDURE — 70552 MRI BRAIN STEM W/DYE: CPT

## 2019-01-01 PROCEDURE — 76040000008: Performed by: INTERNAL MEDICINE

## 2019-01-01 PROCEDURE — 87086 URINE CULTURE/COLONY COUNT: CPT

## 2019-01-01 PROCEDURE — 74011000258 HC RX REV CODE- 258: Performed by: STUDENT IN AN ORGANIZED HEALTH CARE EDUCATION/TRAINING PROGRAM

## 2019-01-01 PROCEDURE — 77030020186 HC BOOT HL PROTCT SAGE -B

## 2019-01-01 PROCEDURE — 86200 CCP ANTIBODY: CPT

## 2019-01-01 PROCEDURE — 95951 EEG 24 HR W/ VIDEO: CPT | Performed by: NURSE PRACTITIONER

## 2019-01-01 PROCEDURE — 71046 X-RAY EXAM CHEST 2 VIEWS: CPT

## 2019-01-01 PROCEDURE — 85379 FIBRIN DEGRADATION QUANT: CPT

## 2019-01-01 PROCEDURE — 86900 BLOOD TYPING SEROLOGIC ABO: CPT

## 2019-01-01 PROCEDURE — 77030029684 HC NEB SM VOL KT MONA -A

## 2019-01-01 PROCEDURE — 74011250636 HC RX REV CODE- 250/636: Performed by: EMERGENCY MEDICINE

## 2019-01-01 PROCEDURE — 86140 C-REACTIVE PROTEIN: CPT

## 2019-01-01 PROCEDURE — 0B938ZZ DRAINAGE OF RIGHT MAIN BRONCHUS, VIA NATURAL OR ARTIFICIAL OPENING ENDOSCOPIC: ICD-10-PCS | Performed by: INTERNAL MEDICINE

## 2019-01-01 PROCEDURE — 97161 PT EVAL LOW COMPLEX 20 MIN: CPT

## 2019-01-01 PROCEDURE — 96361 HYDRATE IV INFUSION ADD-ON: CPT

## 2019-01-01 PROCEDURE — 77030034850

## 2019-01-01 PROCEDURE — 77030020365 HC SOL INJ SOD CL 0.9% 50ML

## 2019-01-01 PROCEDURE — 70450 CT HEAD/BRAIN W/O DYE: CPT

## 2019-01-01 PROCEDURE — 99285 EMERGENCY DEPT VISIT HI MDM: CPT

## 2019-01-01 PROCEDURE — 77030012699 HC VLV SUC CNTRL OCOA -A: Performed by: INTERNAL MEDICINE

## 2019-01-01 PROCEDURE — 84443 ASSAY THYROID STIM HORMONE: CPT

## 2019-01-01 PROCEDURE — 87186 SC STD MICRODIL/AGAR DIL: CPT

## 2019-01-01 PROCEDURE — 88312 SPECIAL STAINS GROUP 1: CPT

## 2019-01-01 PROCEDURE — 65270000032 HC RM SEMIPRIVATE

## 2019-01-01 PROCEDURE — P9016 RBC LEUKOCYTES REDUCED: HCPCS

## 2019-01-01 PROCEDURE — 74011000258 HC RX REV CODE- 258: Performed by: RADIOLOGY

## 2019-01-01 PROCEDURE — 77030022556 HC FCPS BIOP TIS ENDOSC BSC -B: Performed by: INTERNAL MEDICINE

## 2019-01-01 PROCEDURE — 93308 TTE F-UP OR LMTD: CPT

## 2019-01-01 PROCEDURE — C1751 CATH, INF, PER/CENT/MIDLINE: HCPCS

## 2019-01-01 PROCEDURE — 87278 LEGION PNEUMOPHILIA AG IF: CPT

## 2019-01-01 PROCEDURE — 81003 URINALYSIS AUTO W/O SCOPE: CPT

## 2019-01-01 PROCEDURE — 84481 FREE ASSAY (FT-3): CPT

## 2019-01-01 PROCEDURE — 97530 THERAPEUTIC ACTIVITIES: CPT | Performed by: OCCUPATIONAL THERAPIST

## 2019-01-01 PROCEDURE — 95816 EEG AWAKE AND DROWSY: CPT | Performed by: HOSPITALIST

## 2019-01-01 PROCEDURE — 74011636320 HC RX REV CODE- 636/320: Performed by: RADIOLOGY

## 2019-01-01 PROCEDURE — 84439 ASSAY OF FREE THYROXINE: CPT

## 2019-01-01 PROCEDURE — 82785 ASSAY OF IGE: CPT

## 2019-01-01 PROCEDURE — 86038 ANTINUCLEAR ANTIBODIES: CPT

## 2019-01-01 PROCEDURE — 77030012794 HC KT NSL CANN/HGH TRAN -A

## 2019-01-01 PROCEDURE — 92526 ORAL FUNCTION THERAPY: CPT

## 2019-01-01 PROCEDURE — 82140 ASSAY OF AMMONIA: CPT

## 2019-01-01 PROCEDURE — 77030013140 HC MSK NEB VYRM -A

## 2019-01-01 PROCEDURE — 97166 OT EVAL MOD COMPLEX 45 MIN: CPT | Performed by: OCCUPATIONAL THERAPIST

## 2019-01-01 PROCEDURE — 74011636637 HC RX REV CODE- 636/637: Performed by: HOSPITALIST

## 2019-01-01 PROCEDURE — 74011250637 HC RX REV CODE- 250/637: Performed by: PHYSICIAN ASSISTANT

## 2019-01-01 PROCEDURE — 97165 OT EVAL LOW COMPLEX 30 MIN: CPT

## 2019-01-01 PROCEDURE — 83520 IMMUNOASSAY QUANT NOS NONAB: CPT

## 2019-01-01 PROCEDURE — 51798 US URINE CAPACITY MEASURE: CPT

## 2019-01-01 PROCEDURE — 96367 TX/PROPH/DG ADDL SEQ IV INF: CPT

## 2019-01-01 PROCEDURE — 74011250637 HC RX REV CODE- 250/637: Performed by: STUDENT IN AN ORGANIZED HEALTH CARE EDUCATION/TRAINING PROGRAM

## 2019-01-01 PROCEDURE — 74011250636 HC RX REV CODE- 250/636: Performed by: STUDENT IN AN ORGANIZED HEALTH CARE EDUCATION/TRAINING PROGRAM

## 2019-01-01 PROCEDURE — 92610 EVALUATE SWALLOWING FUNCTION: CPT | Performed by: SPEECH-LANGUAGE PATHOLOGIST

## 2019-01-01 PROCEDURE — 84145 PROCALCITONIN (PCT): CPT

## 2019-01-01 PROCEDURE — 83540 ASSAY OF IRON: CPT

## 2019-01-01 PROCEDURE — 77030037878 HC DRSG MEPILEX >48IN BORD MOLN -B

## 2019-01-01 PROCEDURE — 77030018798 HC PMP KT ENTRL FED COVD -A

## 2019-01-01 PROCEDURE — 97163 PT EVAL HIGH COMPLEX 45 MIN: CPT

## 2019-01-01 PROCEDURE — 74018 RADEX ABDOMEN 1 VIEW: CPT

## 2019-01-01 PROCEDURE — 86606 ASPERGILLUS ANTIBODY: CPT

## 2019-01-01 PROCEDURE — 70496 CT ANGIOGRAPHY HEAD: CPT

## 2019-01-01 PROCEDURE — 85610 PROTHROMBIN TIME: CPT

## 2019-01-01 PROCEDURE — 77030019563 HC DEV ATTCH FEED HOLL -A

## 2019-01-01 PROCEDURE — 96365 THER/PROPH/DIAG IV INF INIT: CPT

## 2019-01-01 PROCEDURE — 77030011256 HC DRSG MEPILEX <16IN NO BORD MOLN -A

## 2019-01-01 PROCEDURE — 86160 COMPLEMENT ANTIGEN: CPT

## 2019-01-01 PROCEDURE — 36430 TRANSFUSION BLD/BLD COMPNT: CPT

## 2019-01-01 PROCEDURE — 70551 MRI BRAIN STEM W/O DYE: CPT

## 2019-01-01 PROCEDURE — 86162 COMPLEMENT TOTAL (CH50): CPT

## 2019-01-01 PROCEDURE — 81001 URINALYSIS AUTO W/SCOPE: CPT

## 2019-01-01 PROCEDURE — 76937 US GUIDE VASCULAR ACCESS: CPT

## 2019-01-01 PROCEDURE — 85007 BL SMEAR W/DIFF WBC COUNT: CPT

## 2019-01-01 PROCEDURE — 74011000258 HC RX REV CODE- 258: Performed by: EMERGENCY MEDICINE

## 2019-01-01 PROCEDURE — P9040 RBC LEUKOREDUCED IRRADIATED: HCPCS

## 2019-01-01 PROCEDURE — 85652 RBC SED RATE AUTOMATED: CPT

## 2019-01-01 PROCEDURE — A9552 F18 FDG: HCPCS

## 2019-01-01 PROCEDURE — 94664 DEMO&/EVAL PT USE INHALER: CPT

## 2019-01-01 PROCEDURE — 87449 NOS EACH ORGANISM AG IA: CPT

## 2019-01-01 PROCEDURE — 83516 IMMUNOASSAY NONANTIBODY: CPT

## 2019-01-01 PROCEDURE — 88112 CYTOPATH CELL ENHANCE TECH: CPT

## 2019-01-01 PROCEDURE — 77030012794 HC KT NSL CANN/HGH TRAN -A: Performed by: INTERNAL MEDICINE

## 2019-01-01 PROCEDURE — 87102 FUNGUS ISOLATION CULTURE: CPT

## 2019-01-01 PROCEDURE — 74011636320 HC RX REV CODE- 636/320: Performed by: STUDENT IN AN ORGANIZED HEALTH CARE EDUCATION/TRAINING PROGRAM

## 2019-01-01 PROCEDURE — 86923 COMPATIBILITY TEST ELECTRIC: CPT

## 2019-01-01 PROCEDURE — 74011250636 HC RX REV CODE- 250/636

## 2019-01-01 PROCEDURE — 77030020847 HC STATLOK BARD -A

## 2019-01-01 PROCEDURE — 87040 BLOOD CULTURE FOR BACTERIA: CPT

## 2019-01-01 PROCEDURE — 86431 RHEUMATOID FACTOR QUANT: CPT

## 2019-01-01 PROCEDURE — 86602 ANTINOMYCES ANTIBODY: CPT

## 2019-01-01 PROCEDURE — 77030011943

## 2019-01-01 PROCEDURE — 92526 ORAL FUNCTION THERAPY: CPT | Performed by: SPEECH-LANGUAGE PATHOLOGIST

## 2019-01-01 PROCEDURE — 86256 FLUORESCENT ANTIBODY TITER: CPT

## 2019-01-01 PROCEDURE — 87633 RESP VIRUS 12-25 TARGETS: CPT

## 2019-01-01 PROCEDURE — 77030012939 HC DRSG HYDRCOIL BMS -A

## 2019-01-01 PROCEDURE — 87070 CULTURE OTHR SPECIMN AEROBIC: CPT

## 2019-01-01 PROCEDURE — 87252 VIRUS INOCULATION TISSUE: CPT

## 2019-01-01 RX ORDER — ACETYLCYSTEINE 200 MG/ML
400 SOLUTION ORAL; RESPIRATORY (INHALATION)
Status: DISCONTINUED | OUTPATIENT
Start: 2019-01-01 | End: 2019-01-01

## 2019-01-01 RX ORDER — HYDRALAZINE HYDROCHLORIDE 25 MG/1
25 TABLET, FILM COATED ORAL 3 TIMES DAILY
Status: DISCONTINUED | OUTPATIENT
Start: 2019-01-01 | End: 2019-01-01

## 2019-01-01 RX ORDER — METOPROLOL TARTRATE 25 MG/1
25 TABLET, FILM COATED ORAL EVERY 12 HOURS
Status: DISCONTINUED | OUTPATIENT
Start: 2019-01-01 | End: 2019-01-01

## 2019-01-01 RX ORDER — INSULIN GLARGINE 100 [IU]/ML
20 INJECTION, SOLUTION SUBCUTANEOUS
Status: DISCONTINUED | OUTPATIENT
Start: 2019-01-01 | End: 2019-01-01

## 2019-01-01 RX ORDER — POTASSIUM CHLORIDE 750 MG/1
20 TABLET, FILM COATED, EXTENDED RELEASE ORAL ONCE
Status: COMPLETED | OUTPATIENT
Start: 2019-01-01 | End: 2019-01-01

## 2019-01-01 RX ORDER — INSULIN LISPRO 100 [IU]/ML
INJECTION, SOLUTION INTRAVENOUS; SUBCUTANEOUS
Status: DISCONTINUED | OUTPATIENT
Start: 2019-01-01 | End: 2019-01-01

## 2019-01-01 RX ORDER — SODIUM CHLORIDE 0.9 % (FLUSH) 0.9 %
5-40 SYRINGE (ML) INJECTION AS NEEDED
Status: DISCONTINUED | OUTPATIENT
Start: 2019-01-01 | End: 2019-01-01 | Stop reason: SDUPTHER

## 2019-01-01 RX ORDER — MYCOPHENOLATE MOFETIL 250 MG/1
500 CAPSULE ORAL
Qty: 60 CAP | Refills: 11 | Status: SHIPPED
Start: 2019-01-01 | End: 2019-01-01

## 2019-01-01 RX ORDER — INSULIN GLARGINE 100 [IU]/ML
34 INJECTION, SOLUTION SUBCUTANEOUS DAILY
Status: DISCONTINUED | OUTPATIENT
Start: 2019-01-01 | End: 2019-01-01

## 2019-01-01 RX ORDER — INSULIN GLARGINE 100 [IU]/ML
40 INJECTION, SOLUTION SUBCUTANEOUS DAILY
Status: DISCONTINUED | OUTPATIENT
Start: 2019-01-01 | End: 2019-01-01

## 2019-01-01 RX ORDER — HALOPERIDOL 5 MG/ML
2 INJECTION INTRAMUSCULAR
Status: DISCONTINUED | OUTPATIENT
Start: 2019-01-01 | End: 2019-01-01 | Stop reason: HOSPADM

## 2019-01-01 RX ORDER — ONDANSETRON 2 MG/ML
4 INJECTION INTRAMUSCULAR; INTRAVENOUS
Status: DISCONTINUED | OUTPATIENT
Start: 2019-01-01 | End: 2019-01-01 | Stop reason: HOSPADM

## 2019-01-01 RX ORDER — INSULIN GLARGINE 100 [IU]/ML
10 INJECTION, SOLUTION SUBCUTANEOUS
Status: DISCONTINUED | OUTPATIENT
Start: 2019-01-01 | End: 2019-01-01

## 2019-01-01 RX ORDER — POLYETHYLENE GLYCOL 3350 17 G/17G
17 POWDER, FOR SOLUTION ORAL 2 TIMES DAILY
COMMUNITY
Start: 2019-01-01 | End: 2019-01-01

## 2019-01-01 RX ORDER — INSULIN GLARGINE 100 [IU]/ML
30 INJECTION, SOLUTION SUBCUTANEOUS
Status: DISCONTINUED | OUTPATIENT
Start: 2019-01-01 | End: 2019-01-01

## 2019-01-01 RX ORDER — NALOXONE HYDROCHLORIDE 0.4 MG/ML
0.4 INJECTION, SOLUTION INTRAMUSCULAR; INTRAVENOUS; SUBCUTANEOUS
Status: COMPLETED | OUTPATIENT
Start: 2019-01-01 | End: 2019-01-01

## 2019-01-01 RX ORDER — INSULIN GLARGINE 100 [IU]/ML
50 INJECTION, SOLUTION SUBCUTANEOUS DAILY
Status: DISCONTINUED | OUTPATIENT
Start: 2019-01-01 | End: 2019-01-01

## 2019-01-01 RX ORDER — IPRATROPIUM BROMIDE AND ALBUTEROL SULFATE 2.5; .5 MG/3ML; MG/3ML
3 SOLUTION RESPIRATORY (INHALATION)
Status: DISCONTINUED | OUTPATIENT
Start: 2019-01-01 | End: 2019-01-01

## 2019-01-01 RX ORDER — METOPROLOL TARTRATE 5 MG/5ML
2.5 INJECTION INTRAVENOUS 2 TIMES DAILY
Status: DISCONTINUED | OUTPATIENT
Start: 2019-01-01 | End: 2019-01-01

## 2019-01-01 RX ORDER — ENOXAPARIN SODIUM 100 MG/ML
80 INJECTION SUBCUTANEOUS
Status: COMPLETED | OUTPATIENT
Start: 2019-01-01 | End: 2019-01-01

## 2019-01-01 RX ORDER — INSULIN GLARGINE 100 [IU]/ML
60 INJECTION, SOLUTION SUBCUTANEOUS DAILY
Status: DISCONTINUED | OUTPATIENT
Start: 2019-01-01 | End: 2019-01-01

## 2019-01-01 RX ORDER — FUROSEMIDE 10 MG/ML
20 INJECTION INTRAMUSCULAR; INTRAVENOUS ONCE
Status: COMPLETED | OUTPATIENT
Start: 2019-01-01 | End: 2019-01-01

## 2019-01-01 RX ORDER — SULFAMETHOXAZOLE AND TRIMETHOPRIM 800; 160 MG/1; MG/1
1 TABLET ORAL
Status: DISCONTINUED | OUTPATIENT
Start: 2019-01-01 | End: 2019-01-01 | Stop reason: HOSPADM

## 2019-01-01 RX ORDER — FLUMAZENIL 0.1 MG/ML
0.2 INJECTION INTRAVENOUS
Status: DISCONTINUED | OUTPATIENT
Start: 2019-01-01 | End: 2019-01-01 | Stop reason: HOSPADM

## 2019-01-01 RX ORDER — MORPHINE SULFATE 2 MG/ML
2 INJECTION, SOLUTION INTRAMUSCULAR; INTRAVENOUS
Status: DISCONTINUED | OUTPATIENT
Start: 2019-01-01 | End: 2019-01-01

## 2019-01-01 RX ORDER — FENTANYL CITRATE 50 UG/ML
50 INJECTION, SOLUTION INTRAMUSCULAR; INTRAVENOUS
Status: DISCONTINUED | OUTPATIENT
Start: 2019-01-01 | End: 2019-01-01 | Stop reason: HOSPADM

## 2019-01-01 RX ORDER — INSULIN GLARGINE 100 [IU]/ML
20 INJECTION, SOLUTION SUBCUTANEOUS ONCE
Status: COMPLETED | OUTPATIENT
Start: 2019-01-01 | End: 2019-01-01

## 2019-01-01 RX ORDER — LIDOCAINE HYDROCHLORIDE 10 MG/ML
200 INJECTION, SOLUTION EPIDURAL; INFILTRATION; INTRACAUDAL; PERINEURAL
Status: DISCONTINUED | OUTPATIENT
Start: 2019-01-01 | End: 2019-01-01 | Stop reason: HOSPADM

## 2019-01-01 RX ORDER — HALOPERIDOL 5 MG/ML
1 INJECTION INTRAMUSCULAR
Status: DISCONTINUED | OUTPATIENT
Start: 2019-01-01 | End: 2019-01-01 | Stop reason: HOSPADM

## 2019-01-01 RX ORDER — INSULIN GLARGINE 100 [IU]/ML
32 INJECTION, SOLUTION SUBCUTANEOUS DAILY
Status: DISCONTINUED | OUTPATIENT
Start: 2019-01-01 | End: 2019-01-01

## 2019-01-01 RX ORDER — POTASSIUM CHLORIDE 750 MG/1
40 TABLET, FILM COATED, EXTENDED RELEASE ORAL
Status: COMPLETED | OUTPATIENT
Start: 2019-01-01 | End: 2019-01-01

## 2019-01-01 RX ORDER — LEVOFLOXACIN 5 MG/ML
750 INJECTION, SOLUTION INTRAVENOUS EVERY 24 HOURS
Status: DISCONTINUED | OUTPATIENT
Start: 2019-01-01 | End: 2019-01-01

## 2019-01-01 RX ORDER — VANCOMYCIN HYDROCHLORIDE
1250 EVERY 12 HOURS
Status: DISCONTINUED | OUTPATIENT
Start: 2019-01-01 | End: 2019-01-01

## 2019-01-01 RX ORDER — AMANTADINE HYDROCHLORIDE 100 MG/1
100 CAPSULE, GELATIN COATED ORAL 2 TIMES DAILY
Status: DISCONTINUED | OUTPATIENT
Start: 2019-01-01 | End: 2019-01-01

## 2019-01-01 RX ORDER — DEXTROSE 50 % IN WATER (D50W) INTRAVENOUS SYRINGE
12.5-25 AS NEEDED
Status: DISCONTINUED | OUTPATIENT
Start: 2019-01-01 | End: 2019-01-01 | Stop reason: CLARIF

## 2019-01-01 RX ORDER — ACETAMINOPHEN 325 MG/1
650 TABLET ORAL
Status: COMPLETED | OUTPATIENT
Start: 2019-01-01 | End: 2019-01-01

## 2019-01-01 RX ORDER — INSULIN GLARGINE 100 [IU]/ML
20 INJECTION, SOLUTION SUBCUTANEOUS
Status: DISCONTINUED | OUTPATIENT
Start: 2019-01-01 | End: 2019-01-01 | Stop reason: HOSPADM

## 2019-01-01 RX ORDER — ALBUTEROL SULFATE 0.83 MG/ML
2.5 SOLUTION RESPIRATORY (INHALATION)
Status: DISCONTINUED | OUTPATIENT
Start: 2019-01-01 | End: 2019-01-01

## 2019-01-01 RX ORDER — INSULIN GLARGINE 100 [IU]/ML
20 INJECTION, SOLUTION SUBCUTANEOUS DAILY
Status: DISCONTINUED | OUTPATIENT
Start: 2019-01-01 | End: 2019-01-01

## 2019-01-01 RX ORDER — METOPROLOL TARTRATE 5 MG/5ML
2.5 INJECTION INTRAVENOUS EVERY 6 HOURS
Status: DISCONTINUED | OUTPATIENT
Start: 2019-01-01 | End: 2019-01-01

## 2019-01-01 RX ORDER — SODIUM CHLORIDE 9 MG/ML
1000 INJECTION, SOLUTION INTRAVENOUS CONTINUOUS
Status: DISCONTINUED | OUTPATIENT
Start: 2019-01-01 | End: 2019-01-01 | Stop reason: HOSPADM

## 2019-01-01 RX ORDER — DILTIAZEM HYDROCHLORIDE 60 MG/1
120 TABLET, FILM COATED ORAL 4 TIMES DAILY
Status: DISCONTINUED | OUTPATIENT
Start: 2019-01-01 | End: 2019-01-01

## 2019-01-01 RX ORDER — SODIUM CHLORIDE 9 MG/ML
50 INJECTION, SOLUTION INTRAVENOUS CONTINUOUS
Status: DISPENSED | OUTPATIENT
Start: 2019-01-01 | End: 2019-01-01

## 2019-01-01 RX ORDER — INSULIN GLARGINE 100 [IU]/ML
6 INJECTION, SOLUTION SUBCUTANEOUS
Status: DISCONTINUED | OUTPATIENT
Start: 2019-01-01 | End: 2019-01-01

## 2019-01-01 RX ORDER — DEXTROMETHORPHAN HYDROBROMIDE, GUAIFENESIN 5; 100 MG/5ML; MG/5ML
650 LIQUID ORAL AS NEEDED
COMMUNITY
Start: 2019-01-01

## 2019-01-01 RX ORDER — INSULIN LISPRO 100 [IU]/ML
4 INJECTION, SOLUTION INTRAVENOUS; SUBCUTANEOUS ONCE
Status: COMPLETED | OUTPATIENT
Start: 2019-01-01 | End: 2019-01-01

## 2019-01-01 RX ORDER — INSULIN GLARGINE 100 [IU]/ML
8 INJECTION, SOLUTION SUBCUTANEOUS
COMMUNITY
End: 2019-01-01

## 2019-01-01 RX ORDER — INSULIN GLARGINE 100 [IU]/ML
14 INJECTION, SOLUTION SUBCUTANEOUS ONCE
Status: COMPLETED | OUTPATIENT
Start: 2019-01-01 | End: 2019-01-01

## 2019-01-01 RX ORDER — IPRATROPIUM BROMIDE AND ALBUTEROL SULFATE 2.5; .5 MG/3ML; MG/3ML
3 SOLUTION RESPIRATORY (INHALATION)
Status: DISCONTINUED | OUTPATIENT
Start: 2019-01-01 | End: 2019-01-01 | Stop reason: HOSPADM

## 2019-01-01 RX ORDER — VANCOMYCIN/0.9 % SOD CHLORIDE 1.5G/250ML
1500 PLASTIC BAG, INJECTION (ML) INTRAVENOUS ONCE
Status: DISCONTINUED | OUTPATIENT
Start: 2019-01-01 | End: 2019-01-01

## 2019-01-01 RX ORDER — METOPROLOL TARTRATE 25 MG/1
12.5 TABLET, FILM COATED ORAL EVERY 12 HOURS
Status: DISCONTINUED | OUTPATIENT
Start: 2019-01-01 | End: 2019-01-01

## 2019-01-01 RX ORDER — SODIUM CHLORIDE 0.9 % (FLUSH) 0.9 %
10 SYRINGE (ML) INJECTION
Status: COMPLETED | OUTPATIENT
Start: 2019-01-01 | End: 2019-01-01

## 2019-01-01 RX ORDER — INSULIN GLARGINE 100 [IU]/ML
50 INJECTION, SOLUTION SUBCUTANEOUS
Status: DISCONTINUED | OUTPATIENT
Start: 2019-01-01 | End: 2019-01-01

## 2019-01-01 RX ORDER — ENOXAPARIN SODIUM 100 MG/ML
1 INJECTION SUBCUTANEOUS EVERY 12 HOURS
Status: DISCONTINUED | OUTPATIENT
Start: 2019-01-01 | End: 2019-01-01

## 2019-01-01 RX ORDER — POTASSIUM CHLORIDE 750 MG/1
20 TABLET, FILM COATED, EXTENDED RELEASE ORAL 2 TIMES DAILY
Status: COMPLETED | OUTPATIENT
Start: 2019-01-01 | End: 2019-01-01

## 2019-01-01 RX ORDER — HYDRALAZINE HYDROCHLORIDE 20 MG/ML
10 INJECTION INTRAMUSCULAR; INTRAVENOUS
Status: DISCONTINUED | OUTPATIENT
Start: 2019-01-01 | End: 2019-01-01

## 2019-01-01 RX ORDER — MYCOPHENOLATE MOFETIL 250 MG/1
500 CAPSULE ORAL
Status: DISCONTINUED | OUTPATIENT
Start: 2019-01-01 | End: 2019-01-01 | Stop reason: HOSPADM

## 2019-01-01 RX ORDER — LORAZEPAM 2 MG/ML
INJECTION INTRAMUSCULAR
Status: DISPENSED
Start: 2019-01-01 | End: 2019-01-01

## 2019-01-01 RX ORDER — POTASSIUM CHLORIDE 750 MG/1
40 TABLET, FILM COATED, EXTENDED RELEASE ORAL 2 TIMES DAILY
Status: DISCONTINUED | OUTPATIENT
Start: 2019-01-01 | End: 2019-01-01

## 2019-01-01 RX ORDER — MIDAZOLAM HYDROCHLORIDE 1 MG/ML
INJECTION, SOLUTION INTRAMUSCULAR; INTRAVENOUS
Status: COMPLETED
Start: 2019-01-01 | End: 2019-01-01

## 2019-01-01 RX ORDER — DEXTROSE 50 % IN WATER (D50W) INTRAVENOUS SYRINGE
12.5-25 AS NEEDED
Status: DISCONTINUED | OUTPATIENT
Start: 2019-01-01 | End: 2019-01-01

## 2019-01-01 RX ORDER — MYCOPHENOLATE MOFETIL 250 MG/1
250 CAPSULE ORAL
Status: DISCONTINUED | OUTPATIENT
Start: 2019-01-01 | End: 2019-01-01

## 2019-01-01 RX ORDER — AMLODIPINE BESYLATE 5 MG/1
2.5 TABLET ORAL DAILY
Status: DISCONTINUED | OUTPATIENT
Start: 2019-01-01 | End: 2019-01-01 | Stop reason: HOSPADM

## 2019-01-01 RX ORDER — VANCOMYCIN/0.9 % SOD CHLORIDE 1.5G/250ML
1500 PLASTIC BAG, INJECTION (ML) INTRAVENOUS EVERY 12 HOURS
Status: DISCONTINUED | OUTPATIENT
Start: 2019-01-01 | End: 2019-01-01

## 2019-01-01 RX ORDER — INSULIN GLARGINE 100 [IU]/ML
80 INJECTION, SOLUTION SUBCUTANEOUS
Status: DISCONTINUED | OUTPATIENT
Start: 2019-01-01 | End: 2019-01-01

## 2019-01-01 RX ORDER — SODIUM CHLORIDE 0.9 % (FLUSH) 0.9 %
5-40 SYRINGE (ML) INJECTION EVERY 8 HOURS
Status: DISCONTINUED | OUTPATIENT
Start: 2019-01-01 | End: 2019-01-01 | Stop reason: SDUPTHER

## 2019-01-01 RX ORDER — SODIUM CHLORIDE 0.9 % (FLUSH) 0.9 %
5-10 SYRINGE (ML) INJECTION AS NEEDED
Status: DISCONTINUED | OUTPATIENT
Start: 2019-01-01 | End: 2019-01-01 | Stop reason: HOSPADM

## 2019-01-01 RX ORDER — INSULIN GLARGINE 100 [IU]/ML
14 INJECTION, SOLUTION SUBCUTANEOUS DAILY
Status: DISCONTINUED | OUTPATIENT
Start: 2019-01-01 | End: 2019-01-01

## 2019-01-01 RX ORDER — FUROSEMIDE 10 MG/ML
20 INJECTION INTRAMUSCULAR; INTRAVENOUS ONCE
Status: DISCONTINUED | OUTPATIENT
Start: 2019-01-01 | End: 2019-01-01

## 2019-01-01 RX ORDER — PREDNISONE 20 MG/1
20 TABLET ORAL 2 TIMES DAILY
COMMUNITY
End: 2019-01-01

## 2019-01-01 RX ORDER — INSULIN GLARGINE 100 [IU]/ML
70 INJECTION, SOLUTION SUBCUTANEOUS DAILY
Status: DISCONTINUED | OUTPATIENT
Start: 2019-01-01 | End: 2019-01-01

## 2019-01-01 RX ORDER — GADOTERATE MEGLUMINE 376.9 MG/ML
15 INJECTION INTRAVENOUS
Status: COMPLETED | OUTPATIENT
Start: 2019-01-01 | End: 2019-01-01

## 2019-01-01 RX ORDER — METOPROLOL SUCCINATE 50 MG/1
50 TABLET, EXTENDED RELEASE ORAL DAILY
Status: DISCONTINUED | OUTPATIENT
Start: 2019-01-01 | End: 2019-01-01 | Stop reason: HOSPADM

## 2019-01-01 RX ORDER — METFORMIN HYDROCHLORIDE 500 MG/1
500 TABLET ORAL 2 TIMES DAILY WITH MEALS
Status: DISCONTINUED | OUTPATIENT
Start: 2019-01-01 | End: 2019-01-01

## 2019-01-01 RX ORDER — INSULIN GLARGINE 100 [IU]/ML
48 INJECTION, SOLUTION SUBCUTANEOUS DAILY
Status: DISCONTINUED | OUTPATIENT
Start: 2019-01-01 | End: 2019-01-01

## 2019-01-01 RX ORDER — ONDANSETRON 2 MG/ML
4 INJECTION INTRAMUSCULAR; INTRAVENOUS
Status: DISCONTINUED | OUTPATIENT
Start: 2019-01-01 | End: 2019-01-01

## 2019-01-01 RX ORDER — POTASSIUM CHLORIDE 750 MG/1
20 TABLET, FILM COATED, EXTENDED RELEASE ORAL 2 TIMES DAILY
Status: DISPENSED | OUTPATIENT
Start: 2019-01-01 | End: 2019-01-01

## 2019-01-01 RX ORDER — FENTANYL CITRATE 50 UG/ML
INJECTION, SOLUTION INTRAMUSCULAR; INTRAVENOUS
Status: COMPLETED
Start: 2019-01-01 | End: 2019-01-01

## 2019-01-01 RX ORDER — INSULIN GLARGINE 100 [IU]/ML
44 INJECTION, SOLUTION SUBCUTANEOUS DAILY
Status: DISCONTINUED | OUTPATIENT
Start: 2019-01-01 | End: 2019-01-01

## 2019-01-01 RX ORDER — INSULIN GLARGINE 100 [IU]/ML
16 INJECTION, SOLUTION SUBCUTANEOUS ONCE
Status: COMPLETED | OUTPATIENT
Start: 2019-01-01 | End: 2019-01-01

## 2019-01-01 RX ORDER — LANOLIN ALCOHOL/MO/W.PET/CERES
100 CREAM (GRAM) TOPICAL DAILY
Status: COMPLETED | OUTPATIENT
Start: 2019-01-01 | End: 2019-01-01

## 2019-01-01 RX ORDER — MAGNESIUM SULFATE 100 %
4 CRYSTALS MISCELLANEOUS AS NEEDED
Status: DISCONTINUED | OUTPATIENT
Start: 2019-01-01 | End: 2019-01-01

## 2019-01-01 RX ORDER — NALOXONE HYDROCHLORIDE 0.4 MG/ML
0.1 INJECTION, SOLUTION INTRAMUSCULAR; INTRAVENOUS; SUBCUTANEOUS
Status: DISCONTINUED | OUTPATIENT
Start: 2019-01-01 | End: 2019-01-01 | Stop reason: HOSPADM

## 2019-01-01 RX ORDER — VANCOMYCIN HYDROCHLORIDE
1250 EVERY 8 HOURS
Status: DISCONTINUED | OUTPATIENT
Start: 2019-01-01 | End: 2019-01-01

## 2019-01-01 RX ORDER — SODIUM CHLORIDE 9 MG/ML
250 INJECTION, SOLUTION INTRAVENOUS AS NEEDED
Status: DISCONTINUED | OUTPATIENT
Start: 2019-01-01 | End: 2019-01-01 | Stop reason: HOSPADM

## 2019-01-01 RX ORDER — INSULIN GLARGINE 100 [IU]/ML
44 INJECTION, SOLUTION SUBCUTANEOUS
Status: DISCONTINUED | OUTPATIENT
Start: 2019-01-01 | End: 2019-01-01

## 2019-01-01 RX ORDER — ONDANSETRON 2 MG/ML
4 INJECTION INTRAMUSCULAR; INTRAVENOUS
Status: ACTIVE | OUTPATIENT
Start: 2019-01-01 | End: 2019-01-01

## 2019-01-01 RX ORDER — FLUCONAZOLE 2 MG/ML
400 INJECTION, SOLUTION INTRAVENOUS EVERY 24 HOURS
Status: DISCONTINUED | OUTPATIENT
Start: 2019-01-01 | End: 2019-01-01

## 2019-01-01 RX ORDER — METOPROLOL TARTRATE 5 MG/5ML
5 INJECTION INTRAVENOUS ONCE
Status: COMPLETED | OUTPATIENT
Start: 2019-01-01 | End: 2019-01-01

## 2019-01-01 RX ORDER — METFORMIN HYDROCHLORIDE 500 MG/1
500 TABLET ORAL 2 TIMES DAILY WITH MEALS
COMMUNITY
Start: 2019-01-01

## 2019-01-01 RX ORDER — INSULIN GLARGINE 100 [IU]/ML
26 INJECTION, SOLUTION SUBCUTANEOUS DAILY
Status: DISCONTINUED | OUTPATIENT
Start: 2019-01-01 | End: 2019-01-01

## 2019-01-01 RX ORDER — LIDOCAINE HYDROCHLORIDE 20 MG/ML
5 SOLUTION OROPHARYNGEAL ONCE
Status: COMPLETED | OUTPATIENT
Start: 2019-01-01 | End: 2019-01-01

## 2019-01-01 RX ORDER — MAGNESIUM SULFATE 100 %
4 CRYSTALS MISCELLANEOUS AS NEEDED
Status: DISCONTINUED | OUTPATIENT
Start: 2019-01-01 | End: 2019-01-01 | Stop reason: HOSPADM

## 2019-01-01 RX ORDER — INSULIN GLARGINE 100 [IU]/ML
10 INJECTION, SOLUTION SUBCUTANEOUS
Status: COMPLETED | OUTPATIENT
Start: 2019-01-01 | End: 2019-01-01

## 2019-01-01 RX ORDER — POTASSIUM CHLORIDE 20 MEQ/1
40 TABLET, EXTENDED RELEASE ORAL
Status: COMPLETED | OUTPATIENT
Start: 2019-01-01 | End: 2019-01-01

## 2019-01-01 RX ORDER — INSULIN GLARGINE 100 [IU]/ML
28 INJECTION, SOLUTION SUBCUTANEOUS DAILY
Status: DISCONTINUED | OUTPATIENT
Start: 2019-01-01 | End: 2019-01-01

## 2019-01-01 RX ORDER — LEVOFLOXACIN 5 MG/ML
750 INJECTION, SOLUTION INTRAVENOUS
Status: COMPLETED | OUTPATIENT
Start: 2019-01-01 | End: 2019-01-01

## 2019-01-01 RX ORDER — LEVOFLOXACIN 5 MG/ML
750 INJECTION, SOLUTION INTRAVENOUS EVERY 24 HOURS
Status: COMPLETED | OUTPATIENT
Start: 2019-01-01 | End: 2019-01-01

## 2019-01-01 RX ORDER — AMLODIPINE BESYLATE 2.5 MG/1
TABLET ORAL DAILY
COMMUNITY
End: 2019-01-01 | Stop reason: DRUGHIGH

## 2019-01-01 RX ORDER — LORAZEPAM 2 MG/ML
1 INJECTION INTRAMUSCULAR
Status: COMPLETED | OUTPATIENT
Start: 2019-01-01 | End: 2019-01-01

## 2019-01-01 RX ORDER — ALPRAZOLAM 0.25 MG/1
0.25 TABLET ORAL
Status: ACTIVE | OUTPATIENT
Start: 2019-01-01 | End: 2019-01-01

## 2019-01-01 RX ORDER — MYCOPHENOLATE MOFETIL 200 MG/ML
500 POWDER, FOR SUSPENSION ORAL EVERY 12 HOURS
Status: DISCONTINUED | OUTPATIENT
Start: 2019-01-01 | End: 2019-01-01

## 2019-01-01 RX ORDER — INSULIN GLARGINE 100 [IU]/ML
64 INJECTION, SOLUTION SUBCUTANEOUS
Status: DISCONTINUED | OUTPATIENT
Start: 2019-01-01 | End: 2019-01-01

## 2019-01-01 RX ORDER — ACETAMINOPHEN 325 MG/1
650 TABLET ORAL
Status: DISCONTINUED | OUTPATIENT
Start: 2019-01-01 | End: 2019-01-01

## 2019-01-01 RX ORDER — POTASSIUM CHLORIDE 750 MG/1
40 TABLET, FILM COATED, EXTENDED RELEASE ORAL 2 TIMES DAILY
Status: COMPLETED | OUTPATIENT
Start: 2019-01-01 | End: 2019-01-01

## 2019-01-01 RX ORDER — VANCOMYCIN 2 GRAM/500 ML IN 0.9 % SODIUM CHLORIDE INTRAVENOUS
2000 ONCE
Status: COMPLETED | OUTPATIENT
Start: 2019-01-01 | End: 2019-01-01

## 2019-01-01 RX ORDER — MORPHINE SULFATE 2 MG/ML
2 INJECTION, SOLUTION INTRAMUSCULAR; INTRAVENOUS ONCE
Status: COMPLETED | OUTPATIENT
Start: 2019-01-01 | End: 2019-01-01

## 2019-01-01 RX ORDER — FLUOXETINE HYDROCHLORIDE 20 MG/1
20 CAPSULE ORAL DAILY
Status: DISCONTINUED | OUTPATIENT
Start: 2019-01-01 | End: 2019-01-01

## 2019-01-01 RX ORDER — LEVOFLOXACIN 5 MG/ML
750 INJECTION, SOLUTION INTRAVENOUS
Status: DISCONTINUED | OUTPATIENT
Start: 2019-01-01 | End: 2019-01-01

## 2019-01-01 RX ORDER — HEPARIN SODIUM 5000 [USP'U]/ML
5000 INJECTION, SOLUTION INTRAVENOUS; SUBCUTANEOUS EVERY 8 HOURS
Status: DISCONTINUED | OUTPATIENT
Start: 2019-01-01 | End: 2019-01-01

## 2019-01-01 RX ORDER — FACIAL-BODY WIPES
10 EACH TOPICAL DAILY PRN
Status: DISCONTINUED | OUTPATIENT
Start: 2019-01-01 | End: 2019-01-01 | Stop reason: HOSPADM

## 2019-01-01 RX ORDER — MORPHINE SULFATE 5 MG/ML
INJECTION, SOLUTION INTRAVENOUS CONTINUOUS
Status: DISCONTINUED | OUTPATIENT
Start: 2019-01-01 | End: 2019-01-01 | Stop reason: HOSPADM

## 2019-01-01 RX ORDER — SULFAMETHOXAZOLE AND TRIMETHOPRIM 800; 160 MG/1; MG/1
1 TABLET ORAL
Qty: 90 TAB | Refills: 11 | Status: SHIPPED | OUTPATIENT
Start: 2019-01-01 | End: 2019-01-01 | Stop reason: ALTCHOICE

## 2019-01-01 RX ORDER — ATORVASTATIN CALCIUM 40 MG/1
40 TABLET, FILM COATED ORAL
Status: DISCONTINUED | OUTPATIENT
Start: 2019-01-01 | End: 2019-01-01

## 2019-01-01 RX ORDER — KETOROLAC TROMETHAMINE 30 MG/ML
30 INJECTION, SOLUTION INTRAMUSCULAR; INTRAVENOUS
Status: DISPENSED | OUTPATIENT
Start: 2019-01-01 | End: 2019-01-01

## 2019-01-01 RX ORDER — ALPRAZOLAM 0.25 MG/1
0.25 TABLET ORAL
Status: DISCONTINUED | OUTPATIENT
Start: 2019-01-01 | End: 2019-01-01

## 2019-01-01 RX ORDER — SODIUM CHLORIDE 0.9 % (FLUSH) 0.9 %
5-10 SYRINGE (ML) INJECTION AS NEEDED
Status: CANCELLED | OUTPATIENT
Start: 2019-01-01

## 2019-01-01 RX ORDER — NYSTATIN 100000 [USP'U]/ML
500000 SUSPENSION ORAL 4 TIMES DAILY
Status: DISCONTINUED | OUTPATIENT
Start: 2019-01-01 | End: 2019-01-01

## 2019-01-01 RX ORDER — SODIUM CHLORIDE 0.9 % (FLUSH) 0.9 %
5-40 SYRINGE (ML) INJECTION EVERY 8 HOURS
Status: DISCONTINUED | OUTPATIENT
Start: 2019-01-01 | End: 2019-01-01 | Stop reason: HOSPADM

## 2019-01-01 RX ORDER — LORAZEPAM 2 MG/ML
0.5 INJECTION INTRAMUSCULAR ONCE
Status: DISCONTINUED | OUTPATIENT
Start: 2019-01-01 | End: 2019-01-01

## 2019-01-01 RX ORDER — AMLODIPINE BESYLATE 5 MG/1
5 TABLET ORAL DAILY
COMMUNITY

## 2019-01-01 RX ORDER — SODIUM CHLORIDE 9 MG/ML
50 INJECTION, SOLUTION INTRAVENOUS CONTINUOUS
Status: DISCONTINUED | OUTPATIENT
Start: 2019-01-01 | End: 2019-01-01 | Stop reason: HOSPADM

## 2019-01-01 RX ORDER — PREDNISONE 20 MG/1
40 TABLET ORAL
Status: DISCONTINUED | OUTPATIENT
Start: 2019-01-01 | End: 2019-01-01

## 2019-01-01 RX ORDER — LEVALBUTEROL INHALATION SOLUTION 1.25 MG/3ML
1.25 SOLUTION RESPIRATORY (INHALATION)
Status: DISCONTINUED | OUTPATIENT
Start: 2019-01-01 | End: 2019-01-01 | Stop reason: HOSPADM

## 2019-01-01 RX ORDER — ACETAMINOPHEN 325 MG/1
650 TABLET ORAL
Status: DISCONTINUED | OUTPATIENT
Start: 2019-01-01 | End: 2019-01-01 | Stop reason: HOSPADM

## 2019-01-01 RX ORDER — INSULIN LISPRO 100 [IU]/ML
5 INJECTION, SOLUTION INTRAVENOUS; SUBCUTANEOUS
Status: DISCONTINUED | OUTPATIENT
Start: 2019-01-01 | End: 2019-01-01

## 2019-01-01 RX ORDER — INSULIN GLARGINE 100 [IU]/ML
40 INJECTION, SOLUTION SUBCUTANEOUS
Status: DISCONTINUED | OUTPATIENT
Start: 2019-01-01 | End: 2019-01-01

## 2019-01-01 RX ORDER — DEXTROSE, SODIUM CHLORIDE, AND POTASSIUM CHLORIDE 5; .45; .075 G/100ML; G/100ML; G/100ML
60 INJECTION INTRAVENOUS CONTINUOUS
Status: DISCONTINUED | OUTPATIENT
Start: 2019-01-01 | End: 2019-01-01

## 2019-01-01 RX ORDER — DILTIAZEM HYDROCHLORIDE 60 MG/1
60 TABLET, FILM COATED ORAL 4 TIMES DAILY
Status: DISCONTINUED | OUTPATIENT
Start: 2019-01-01 | End: 2019-01-01

## 2019-01-01 RX ORDER — INSULIN GLARGINE 100 [IU]/ML
10 INJECTION, SOLUTION SUBCUTANEOUS ONCE
Status: COMPLETED | OUTPATIENT
Start: 2019-01-01 | End: 2019-01-01

## 2019-01-01 RX ORDER — FLUCONAZOLE 2 MG/ML
400 INJECTION, SOLUTION INTRAVENOUS EVERY 24 HOURS
Status: COMPLETED | OUTPATIENT
Start: 2019-01-01 | End: 2019-01-01

## 2019-01-01 RX ORDER — ATORVASTATIN CALCIUM 40 MG/1
40 TABLET, FILM COATED ORAL
Status: DISCONTINUED | OUTPATIENT
Start: 2019-01-01 | End: 2019-01-01 | Stop reason: HOSPADM

## 2019-01-01 RX ORDER — SODIUM CHLORIDE 0.9 % (FLUSH) 0.9 %
5-40 SYRINGE (ML) INJECTION AS NEEDED
Status: DISCONTINUED | OUTPATIENT
Start: 2019-01-01 | End: 2019-01-01 | Stop reason: HOSPADM

## 2019-01-01 RX ORDER — GLYCOPYRROLATE 0.2 MG/ML
0.2 INJECTION INTRAMUSCULAR; INTRAVENOUS
Status: DISCONTINUED | OUTPATIENT
Start: 2019-01-01 | End: 2019-01-01 | Stop reason: HOSPADM

## 2019-01-01 RX ORDER — FUROSEMIDE 40 MG/1
40 TABLET ORAL DAILY
Status: DISCONTINUED | OUTPATIENT
Start: 2019-01-01 | End: 2019-01-01

## 2019-01-01 RX ORDER — MYCOPHENOLATE MOFETIL 250 MG/1
500 CAPSULE ORAL
Status: DISCONTINUED | OUTPATIENT
Start: 2019-01-01 | End: 2019-01-01

## 2019-01-01 RX ORDER — DILTIAZEM HYDROCHLORIDE 30 MG/1
30 TABLET, FILM COATED ORAL
Status: DISCONTINUED | OUTPATIENT
Start: 2019-01-01 | End: 2019-01-01

## 2019-01-01 RX ORDER — INSULIN LISPRO 100 [IU]/ML
INJECTION, SOLUTION INTRAVENOUS; SUBCUTANEOUS
Status: DISCONTINUED | OUTPATIENT
Start: 2019-01-01 | End: 2019-01-01 | Stop reason: ALTCHOICE

## 2019-01-01 RX ORDER — ADHESIVE BANDAGE
30 BANDAGE TOPICAL DAILY PRN
Status: DISCONTINUED | OUTPATIENT
Start: 2019-01-01 | End: 2019-01-01

## 2019-01-01 RX ORDER — SULFAMETHOXAZOLE AND TRIMETHOPRIM 800; 160 MG/1; MG/1
1 TABLET ORAL
COMMUNITY

## 2019-01-01 RX ORDER — LEVALBUTEROL 1.25 MG/.5ML
1.25 SOLUTION, CONCENTRATE RESPIRATORY (INHALATION)
COMMUNITY
Start: 2019-01-01

## 2019-01-01 RX ORDER — FUROSEMIDE 20 MG/1
20 TABLET ORAL DAILY
Status: DISCONTINUED | OUTPATIENT
Start: 2019-01-01 | End: 2019-01-01

## 2019-01-01 RX ORDER — DEXTROSE 50 % IN WATER (D50W) INTRAVENOUS SYRINGE
12.5-25 AS NEEDED
Status: DISCONTINUED | OUTPATIENT
Start: 2019-01-01 | End: 2019-01-01 | Stop reason: HOSPADM

## 2019-01-01 RX ORDER — INSULIN GLARGINE 100 [IU]/ML
12 INJECTION, SOLUTION SUBCUTANEOUS
Status: DISCONTINUED | OUTPATIENT
Start: 2019-01-01 | End: 2019-01-01

## 2019-01-01 RX ORDER — INSULIN GLARGINE 100 [IU]/ML
INJECTION, SOLUTION SUBCUTANEOUS
Qty: 1 VIAL | Refills: 11 | Status: SHIPPED | OUTPATIENT
Start: 2019-01-01 | End: 2019-01-01

## 2019-01-01 RX ORDER — FENTANYL CITRATE 50 UG/ML
25 INJECTION, SOLUTION INTRAMUSCULAR; INTRAVENOUS
Status: DISCONTINUED | OUTPATIENT
Start: 2019-01-01 | End: 2019-01-01

## 2019-01-01 RX ORDER — ACETAMINOPHEN 650 MG/1
650 SUPPOSITORY RECTAL
COMMUNITY

## 2019-01-01 RX ORDER — MIDAZOLAM HYDROCHLORIDE 1 MG/ML
.25-5 INJECTION, SOLUTION INTRAMUSCULAR; INTRAVENOUS
Status: DISCONTINUED | OUTPATIENT
Start: 2019-01-01 | End: 2019-01-01 | Stop reason: HOSPADM

## 2019-01-01 RX ORDER — POLYETHYLENE GLYCOL 3350 17 G/17G
17 POWDER, FOR SOLUTION ORAL
Status: DISCONTINUED | OUTPATIENT
Start: 2019-01-01 | End: 2019-01-01 | Stop reason: HOSPADM

## 2019-01-01 RX ORDER — ENOXAPARIN SODIUM 100 MG/ML
1 INJECTION SUBCUTANEOUS EVERY 12 HOURS
Status: COMPLETED | OUTPATIENT
Start: 2019-01-01 | End: 2019-01-01

## 2019-01-01 RX ORDER — AMITRIPTYLINE HYDROCHLORIDE 25 MG/1
25 TABLET, FILM COATED ORAL
Status: DISCONTINUED | OUTPATIENT
Start: 2019-01-01 | End: 2019-01-01 | Stop reason: HOSPADM

## 2019-01-01 RX ORDER — CEFEPIME HYDROCHLORIDE 1 G/1
1 INJECTION, POWDER, FOR SOLUTION INTRAMUSCULAR; INTRAVENOUS EVERY 12 HOURS
Status: DISCONTINUED | OUTPATIENT
Start: 2019-01-01 | End: 2019-01-01

## 2019-01-01 RX ORDER — INSULIN GLARGINE 100 [IU]/ML
52 INJECTION, SOLUTION SUBCUTANEOUS DAILY
Status: DISCONTINUED | OUTPATIENT
Start: 2019-01-01 | End: 2019-01-01 | Stop reason: HOSPADM

## 2019-01-01 RX ORDER — LORAZEPAM 2 MG/ML
1 INJECTION INTRAMUSCULAR
Status: DISCONTINUED | OUTPATIENT
Start: 2019-01-01 | End: 2019-01-01

## 2019-01-01 RX ORDER — INSULIN LISPRO 100 [IU]/ML
INJECTION, SOLUTION INTRAVENOUS; SUBCUTANEOUS EVERY 6 HOURS
Status: DISCONTINUED | OUTPATIENT
Start: 2019-01-01 | End: 2019-01-01

## 2019-01-01 RX ORDER — INSULIN GLARGINE 100 [IU]/ML
16 INJECTION, SOLUTION SUBCUTANEOUS
Status: DISCONTINUED | OUTPATIENT
Start: 2019-01-01 | End: 2019-01-01

## 2019-01-01 RX ORDER — MORPHINE SULFATE 2 MG/ML
2 INJECTION, SOLUTION INTRAMUSCULAR; INTRAVENOUS
Status: DISCONTINUED | OUTPATIENT
Start: 2019-01-01 | End: 2019-01-01 | Stop reason: HOSPADM

## 2019-01-01 RX ORDER — INSULIN GLARGINE 100 [IU]/ML
25 INJECTION, SOLUTION SUBCUTANEOUS
Status: DISCONTINUED | OUTPATIENT
Start: 2019-01-01 | End: 2019-01-01

## 2019-01-01 RX ORDER — VANCOMYCIN/0.9 % SOD CHLORIDE 1.5G/250ML
1500 PLASTIC BAG, INJECTION (ML) INTRAVENOUS ONCE
Status: COMPLETED | OUTPATIENT
Start: 2019-01-01 | End: 2019-01-01

## 2019-01-01 RX ORDER — BENZONATATE 100 MG/1
200 CAPSULE ORAL
Status: DISCONTINUED | OUTPATIENT
Start: 2019-01-01 | End: 2019-01-01 | Stop reason: HOSPADM

## 2019-01-01 RX ORDER — FLUTICASONE PROPIONATE 50 MCG
2 SPRAY, SUSPENSION (ML) NASAL DAILY
Status: DISCONTINUED | OUTPATIENT
Start: 2019-01-01 | End: 2019-01-01 | Stop reason: HOSPADM

## 2019-01-01 RX ORDER — INSULIN GLARGINE 100 [IU]/ML
30 INJECTION, SOLUTION SUBCUTANEOUS DAILY
Status: DISCONTINUED | OUTPATIENT
Start: 2019-01-01 | End: 2019-01-01

## 2019-01-01 RX ORDER — INSULIN LISPRO 100 [IU]/ML
8 INJECTION, SOLUTION INTRAVENOUS; SUBCUTANEOUS ONCE
Status: COMPLETED | OUTPATIENT
Start: 2019-01-01 | End: 2019-01-01

## 2019-01-01 RX ORDER — PREDNISONE 10 MG/1
30 TABLET ORAL 2 TIMES DAILY WITH MEALS
Qty: 60 TAB | Refills: 11 | Status: SHIPPED
Start: 2019-01-01 | End: 2019-01-01

## 2019-01-01 RX ADMIN — METHYLPREDNISOLONE SODIUM SUCCINATE 60 MG: 40 INJECTION, POWDER, FOR SOLUTION INTRAMUSCULAR; INTRAVENOUS at 11:32

## 2019-01-01 RX ADMIN — Medication 10 ML: at 14:41

## 2019-01-01 RX ADMIN — METHYLPREDNISOLONE SODIUM SUCCINATE 40 MG: 40 INJECTION, POWDER, FOR SOLUTION INTRAMUSCULAR; INTRAVENOUS at 17:45

## 2019-01-01 RX ADMIN — FLUCONAZOLE, SODIUM CHLORIDE 400 MG: 2 INJECTION INTRAVENOUS at 14:24

## 2019-01-01 RX ADMIN — HEPARIN SODIUM 5000 UNITS: 5000 INJECTION INTRAVENOUS; SUBCUTANEOUS at 13:07

## 2019-01-01 RX ADMIN — Medication 10 ML: at 21:00

## 2019-01-01 RX ADMIN — FUROSEMIDE 40 MG: 40 TABLET ORAL at 19:41

## 2019-01-01 RX ADMIN — INSULIN LISPRO 7 UNITS: 100 INJECTION, SOLUTION INTRAVENOUS; SUBCUTANEOUS at 12:41

## 2019-01-01 RX ADMIN — APIXABAN 10 MG: 5 TABLET, FILM COATED ORAL at 09:28

## 2019-01-01 RX ADMIN — INSULIN LISPRO 5 UNITS: 100 INJECTION, SOLUTION INTRAVENOUS; SUBCUTANEOUS at 08:48

## 2019-01-01 RX ADMIN — ALPRAZOLAM 0.25 MG: 0.25 TABLET ORAL at 17:17

## 2019-01-01 RX ADMIN — Medication 10 ML: at 22:09

## 2019-01-01 RX ADMIN — Medication 10 ML: at 14:00

## 2019-01-01 RX ADMIN — INSULIN GLARGINE 40 UNITS: 100 INJECTION, SOLUTION SUBCUTANEOUS at 08:28

## 2019-01-01 RX ADMIN — CEFEPIME HYDROCHLORIDE 2 G: 2 INJECTION, POWDER, FOR SOLUTION INTRAVENOUS at 18:14

## 2019-01-01 RX ADMIN — INSULIN LISPRO 3 UNITS: 100 INJECTION, SOLUTION INTRAVENOUS; SUBCUTANEOUS at 11:32

## 2019-01-01 RX ADMIN — INSULIN GLARGINE 16 UNITS: 100 INJECTION, SOLUTION SUBCUTANEOUS at 21:46

## 2019-01-01 RX ADMIN — AMLODIPINE BESYLATE 2.5 MG: 5 TABLET ORAL at 09:22

## 2019-01-01 RX ADMIN — Medication 10 ML: at 06:00

## 2019-01-01 RX ADMIN — METHYLPREDNISOLONE SODIUM SUCCINATE 30 MG: 40 INJECTION, POWDER, FOR SOLUTION INTRAMUSCULAR; INTRAVENOUS at 15:05

## 2019-01-01 RX ADMIN — SODIUM CHLORIDE 1000 ML: 900 INJECTION, SOLUTION INTRAVENOUS at 18:45

## 2019-01-01 RX ADMIN — Medication 10 ML: at 21:11

## 2019-01-01 RX ADMIN — Medication 10 ML: at 13:49

## 2019-01-01 RX ADMIN — Medication 10 ML: at 05:57

## 2019-01-01 RX ADMIN — IPRATROPIUM BROMIDE AND ALBUTEROL SULFATE 3 ML: .5; 3 SOLUTION RESPIRATORY (INHALATION) at 19:19

## 2019-01-01 RX ADMIN — AMLODIPINE BESYLATE 2.5 MG: 5 TABLET ORAL at 10:03

## 2019-01-01 RX ADMIN — INSULIN GLARGINE 30 UNITS: 100 INJECTION, SOLUTION SUBCUTANEOUS at 21:33

## 2019-01-01 RX ADMIN — SODIUM CHLORIDE 1000 ML: 900 INJECTION, SOLUTION INTRAVENOUS at 11:38

## 2019-01-01 RX ADMIN — METFORMIN HYDROCHLORIDE 500 MG: 500 TABLET ORAL at 19:22

## 2019-01-01 RX ADMIN — INSULIN GLARGINE 50 UNITS: 100 INJECTION, SOLUTION SUBCUTANEOUS at 08:44

## 2019-01-01 RX ADMIN — ENOXAPARIN SODIUM 80 MG: 80 INJECTION, SOLUTION INTRAVENOUS; SUBCUTANEOUS at 16:18

## 2019-01-01 RX ADMIN — Medication 1 CAPSULE: at 08:17

## 2019-01-01 RX ADMIN — INSULIN LISPRO 10 UNITS: 100 INJECTION, SOLUTION INTRAVENOUS; SUBCUTANEOUS at 16:28

## 2019-01-01 RX ADMIN — APIXABAN 10 MG: 5 TABLET, FILM COATED ORAL at 21:43

## 2019-01-01 RX ADMIN — Medication 10 ML: at 11:32

## 2019-01-01 RX ADMIN — PREDNISONE 30 MG: 20 TABLET ORAL at 17:49

## 2019-01-01 RX ADMIN — Medication 1 CAPSULE: at 08:43

## 2019-01-01 RX ADMIN — POTASSIUM CHLORIDE 20 MEQ: 10 TABLET, FILM COATED, EXTENDED RELEASE ORAL at 17:49

## 2019-01-01 RX ADMIN — Medication 10 ML: at 08:47

## 2019-01-01 RX ADMIN — ATORVASTATIN CALCIUM 40 MG: 40 TABLET, FILM COATED ORAL at 21:26

## 2019-01-01 RX ADMIN — Medication 10 ML: at 06:28

## 2019-01-01 RX ADMIN — DILTIAZEM HYDROCHLORIDE 120 MG: 60 TABLET, FILM COATED ORAL at 11:45

## 2019-01-01 RX ADMIN — AMLODIPINE BESYLATE 2.5 MG: 5 TABLET ORAL at 09:59

## 2019-01-01 RX ADMIN — HYDRALAZINE HYDROCHLORIDE 25 MG: 25 TABLET, FILM COATED ORAL at 21:15

## 2019-01-01 RX ADMIN — PIPERACILLIN SODIUM,TAZOBACTAM SODIUM 3.38 G: 3; .375 INJECTION, POWDER, FOR SOLUTION INTRAVENOUS at 00:11

## 2019-01-01 RX ADMIN — Medication 10 ML: at 04:20

## 2019-01-01 RX ADMIN — Medication 10 ML: at 06:15

## 2019-01-01 RX ADMIN — ATORVASTATIN CALCIUM 40 MG: 40 TABLET, FILM COATED ORAL at 21:49

## 2019-01-01 RX ADMIN — METFORMIN HYDROCHLORIDE 500 MG: 500 TABLET ORAL at 07:21

## 2019-01-01 RX ADMIN — INSULIN GLARGINE 16 UNITS: 100 INJECTION, SOLUTION SUBCUTANEOUS at 21:36

## 2019-01-01 RX ADMIN — PREDNISONE 30 MG: 20 TABLET ORAL at 08:54

## 2019-01-01 RX ADMIN — APIXABAN 5 MG: 5 TABLET, FILM COATED ORAL at 08:37

## 2019-01-01 RX ADMIN — PREDNISONE 30 MG: 20 TABLET ORAL at 09:20

## 2019-01-01 RX ADMIN — PREDNISONE 30 MG: 20 TABLET ORAL at 17:19

## 2019-01-01 RX ADMIN — Medication 10 ML: at 15:36

## 2019-01-01 RX ADMIN — DILTIAZEM HYDROCHLORIDE 90 MG: 60 TABLET, FILM COATED ORAL at 20:48

## 2019-01-01 RX ADMIN — INSULIN LISPRO 12 UNITS: 100 INJECTION, SOLUTION INTRAVENOUS; SUBCUTANEOUS at 12:21

## 2019-01-01 RX ADMIN — ALBUTEROL SULFATE 2.5 MG: 2.5 SOLUTION RESPIRATORY (INHALATION) at 08:40

## 2019-01-01 RX ADMIN — APIXABAN 5 MG: 5 TABLET, FILM COATED ORAL at 21:03

## 2019-01-01 RX ADMIN — MORPHINE SULFATE 2 MG: 2 INJECTION, SOLUTION INTRAMUSCULAR; INTRAVENOUS at 12:24

## 2019-01-01 RX ADMIN — METOPROLOL SUCCINATE 50 MG: 50 TABLET, EXTENDED RELEASE ORAL at 08:27

## 2019-01-01 RX ADMIN — Medication 1 CAPSULE: at 10:01

## 2019-01-01 RX ADMIN — Medication 10 ML: at 22:04

## 2019-01-01 RX ADMIN — AMLODIPINE BESYLATE 2.5 MG: 5 TABLET ORAL at 09:21

## 2019-01-01 RX ADMIN — INSULIN GLARGINE 30 UNITS: 100 INJECTION, SOLUTION SUBCUTANEOUS at 21:43

## 2019-01-01 RX ADMIN — Medication 1 CAPSULE: at 08:58

## 2019-01-01 RX ADMIN — INSULIN GLARGINE 40 UNITS: 100 INJECTION, SOLUTION SUBCUTANEOUS at 08:16

## 2019-01-01 RX ADMIN — Medication 10 ML: at 13:11

## 2019-01-01 RX ADMIN — ALBUTEROL SULFATE 2.5 MG: 2.5 SOLUTION RESPIRATORY (INHALATION) at 08:07

## 2019-01-01 RX ADMIN — Medication 10 ML: at 18:52

## 2019-01-01 RX ADMIN — Medication 10 ML: at 15:37

## 2019-01-01 RX ADMIN — METOPROLOL SUCCINATE 50 MG: 50 TABLET, EXTENDED RELEASE ORAL at 09:34

## 2019-01-01 RX ADMIN — APIXABAN 10 MG: 5 TABLET, FILM COATED ORAL at 21:09

## 2019-01-01 RX ADMIN — MULTIPLE VITAMINS W/ MINERALS TAB 1 TABLET: TAB at 09:03

## 2019-01-01 RX ADMIN — ALBUTEROL SULFATE 2.5 MG: 2.5 SOLUTION RESPIRATORY (INHALATION) at 13:50

## 2019-01-01 RX ADMIN — METOPROLOL SUCCINATE 50 MG: 50 TABLET, EXTENDED RELEASE ORAL at 09:20

## 2019-01-01 RX ADMIN — METOPROLOL SUCCINATE 50 MG: 50 TABLET, EXTENDED RELEASE ORAL at 08:37

## 2019-01-01 RX ADMIN — Medication 10 ML: at 05:59

## 2019-01-01 RX ADMIN — INSULIN GLARGINE 12 UNITS: 100 INJECTION, SOLUTION SUBCUTANEOUS at 21:42

## 2019-01-01 RX ADMIN — MAGNESIUM SULFATE HEPTAHYDRATE 500 MG: 500 INJECTION, SOLUTION INTRAMUSCULAR; INTRAVENOUS at 09:01

## 2019-01-01 RX ADMIN — PIPERACILLIN SODIUM,TAZOBACTAM SODIUM 3.38 G: 3; .375 INJECTION, POWDER, FOR SOLUTION INTRAVENOUS at 11:37

## 2019-01-01 RX ADMIN — APIXABAN 5 MG: 5 TABLET, FILM COATED ORAL at 09:14

## 2019-01-01 RX ADMIN — MYCOPHENOLATE MOFETIL 500 MG: 250 CAPSULE ORAL at 06:47

## 2019-01-01 RX ADMIN — FLUTICASONE PROPIONATE 2 SPRAY: 50 SPRAY, METERED NASAL at 09:27

## 2019-01-01 RX ADMIN — PREDNISONE 30 MG: 20 TABLET ORAL at 08:56

## 2019-01-01 RX ADMIN — AMLODIPINE BESYLATE 2.5 MG: 5 TABLET ORAL at 08:59

## 2019-01-01 RX ADMIN — Medication 10 ML: at 21:54

## 2019-01-01 RX ADMIN — Medication 10 ML: at 05:28

## 2019-01-01 RX ADMIN — IPRATROPIUM BROMIDE AND ALBUTEROL SULFATE 3 ML: .5; 3 SOLUTION RESPIRATORY (INHALATION) at 14:35

## 2019-01-01 RX ADMIN — APIXABAN 5 MG: 5 TABLET, FILM COATED ORAL at 18:26

## 2019-01-01 RX ADMIN — PREDNISONE 30 MG: 20 TABLET ORAL at 18:36

## 2019-01-01 RX ADMIN — APIXABAN 5 MG: 5 TABLET, FILM COATED ORAL at 08:27

## 2019-01-01 RX ADMIN — METHYLPREDNISOLONE SODIUM SUCCINATE 20 MG: 40 INJECTION, POWDER, FOR SOLUTION INTRAMUSCULAR; INTRAVENOUS at 09:22

## 2019-01-01 RX ADMIN — APIXABAN 5 MG: 5 TABLET, FILM COATED ORAL at 21:58

## 2019-01-01 RX ADMIN — INSULIN GLARGINE 16 UNITS: 100 INJECTION, SOLUTION SUBCUTANEOUS at 22:36

## 2019-01-01 RX ADMIN — METHYLPREDNISOLONE SODIUM SUCCINATE 60 MG: 125 INJECTION, POWDER, FOR SOLUTION INTRAMUSCULAR; INTRAVENOUS at 12:42

## 2019-01-01 RX ADMIN — HEPARIN SODIUM 5000 UNITS: 5000 INJECTION INTRAVENOUS; SUBCUTANEOUS at 05:43

## 2019-01-01 RX ADMIN — Medication 10 ML: at 06:55

## 2019-01-01 RX ADMIN — MYCOPHENOLATE MOFETIL 500 MG: 250 CAPSULE ORAL at 09:11

## 2019-01-01 RX ADMIN — PREDNISONE 30 MG: 20 TABLET ORAL at 18:13

## 2019-01-01 RX ADMIN — METHYLPREDNISOLONE SODIUM SUCCINATE 30 MG: 40 INJECTION, POWDER, FOR SOLUTION INTRAMUSCULAR; INTRAVENOUS at 21:39

## 2019-01-01 RX ADMIN — AMLODIPINE BESYLATE 2.5 MG: 5 TABLET ORAL at 08:38

## 2019-01-01 RX ADMIN — Medication 1 CAPSULE: at 08:39

## 2019-01-01 RX ADMIN — IPRATROPIUM BROMIDE AND ALBUTEROL SULFATE 3 ML: .5; 3 SOLUTION RESPIRATORY (INHALATION) at 03:44

## 2019-01-01 RX ADMIN — Medication 10 ML: at 20:25

## 2019-01-01 RX ADMIN — MYCOPHENOLATE MOFETIL 500 MG: 250 CAPSULE ORAL at 07:40

## 2019-01-01 RX ADMIN — Medication 10 ML: at 15:05

## 2019-01-01 RX ADMIN — METOPROLOL TARTRATE 2.5 MG: 5 INJECTION, SOLUTION INTRAVENOUS at 18:37

## 2019-01-01 RX ADMIN — MULTIPLE VITAMINS W/ MINERALS TAB 1 TABLET: TAB at 10:40

## 2019-01-01 RX ADMIN — CEFEPIME HYDROCHLORIDE 2 G: 2 INJECTION, POWDER, FOR SOLUTION INTRAVENOUS at 03:15

## 2019-01-01 RX ADMIN — METOPROLOL TARTRATE 2.5 MG: 5 INJECTION, SOLUTION INTRAVENOUS at 17:44

## 2019-01-01 RX ADMIN — METOPROLOL SUCCINATE 50 MG: 50 TABLET, EXTENDED RELEASE ORAL at 09:33

## 2019-01-01 RX ADMIN — INSULIN LISPRO 5 UNITS: 100 INJECTION, SOLUTION INTRAVENOUS; SUBCUTANEOUS at 09:25

## 2019-01-01 RX ADMIN — INSULIN LISPRO 8 UNITS: 100 INJECTION, SOLUTION INTRAVENOUS; SUBCUTANEOUS at 22:33

## 2019-01-01 RX ADMIN — PREDNISONE 30 MG: 20 TABLET ORAL at 08:46

## 2019-01-01 RX ADMIN — INSULIN LISPRO 3 UNITS: 100 INJECTION, SOLUTION INTRAVENOUS; SUBCUTANEOUS at 09:10

## 2019-01-01 RX ADMIN — LEVOFLOXACIN 750 MG: 5 INJECTION, SOLUTION INTRAVENOUS at 18:51

## 2019-01-01 RX ADMIN — METHYLPREDNISOLONE SODIUM SUCCINATE 60 MG: 125 INJECTION, POWDER, FOR SOLUTION INTRAMUSCULAR; INTRAVENOUS at 00:11

## 2019-01-01 RX ADMIN — Medication 100 MG: at 09:07

## 2019-01-01 RX ADMIN — APIXABAN 5 MG: 5 TABLET, FILM COATED ORAL at 21:14

## 2019-01-01 RX ADMIN — APIXABAN 5 MG: 5 TABLET, FILM COATED ORAL at 09:02

## 2019-01-01 RX ADMIN — APIXABAN 5 MG: 5 TABLET, FILM COATED ORAL at 21:41

## 2019-01-01 RX ADMIN — AMITRIPTYLINE HYDROCHLORIDE 25 MG: 25 TABLET, FILM COATED ORAL at 21:44

## 2019-01-01 RX ADMIN — METHYLPREDNISOLONE SODIUM SUCCINATE 40 MG: 40 INJECTION, POWDER, FOR SOLUTION INTRAMUSCULAR; INTRAVENOUS at 17:24

## 2019-01-01 RX ADMIN — APIXABAN 10 MG: 5 TABLET, FILM COATED ORAL at 20:29

## 2019-01-01 RX ADMIN — MULTIPLE VITAMINS W/ MINERALS TAB 1 TABLET: TAB at 08:38

## 2019-01-01 RX ADMIN — ALPRAZOLAM 0.25 MG: 0.25 TABLET ORAL at 20:51

## 2019-01-01 RX ADMIN — Medication 1 CAPSULE: at 08:54

## 2019-01-01 RX ADMIN — APIXABAN 5 MG: 5 TABLET, FILM COATED ORAL at 09:25

## 2019-01-01 RX ADMIN — INSULIN LISPRO 2 UNITS: 100 INJECTION, SOLUTION INTRAVENOUS; SUBCUTANEOUS at 18:31

## 2019-01-01 RX ADMIN — APIXABAN 10 MG: 5 TABLET, FILM COATED ORAL at 08:58

## 2019-01-01 RX ADMIN — APIXABAN 5 MG: 5 TABLET, FILM COATED ORAL at 08:23

## 2019-01-01 RX ADMIN — Medication 10 ML: at 15:21

## 2019-01-01 RX ADMIN — ATORVASTATIN CALCIUM 40 MG: 40 TABLET, FILM COATED ORAL at 21:39

## 2019-01-01 RX ADMIN — METHYLPREDNISOLONE SODIUM SUCCINATE 80 MG: 125 INJECTION, POWDER, FOR SOLUTION INTRAMUSCULAR; INTRAVENOUS at 18:00

## 2019-01-01 RX ADMIN — PREDNISONE 30 MG: 20 TABLET ORAL at 09:15

## 2019-01-01 RX ADMIN — INSULIN LISPRO 7 UNITS: 100 INJECTION, SOLUTION INTRAVENOUS; SUBCUTANEOUS at 06:40

## 2019-01-01 RX ADMIN — INSULIN LISPRO 7 UNITS: 100 INJECTION, SOLUTION INTRAVENOUS; SUBCUTANEOUS at 21:53

## 2019-01-01 RX ADMIN — Medication 10 ML: at 21:53

## 2019-01-01 RX ADMIN — Medication 1 CAPSULE: at 09:20

## 2019-01-01 RX ADMIN — PREDNISONE 30 MG: 20 TABLET ORAL at 09:04

## 2019-01-01 RX ADMIN — Medication 10 ML: at 21:29

## 2019-01-01 RX ADMIN — INSULIN LISPRO 7 UNITS: 100 INJECTION, SOLUTION INTRAVENOUS; SUBCUTANEOUS at 12:47

## 2019-01-01 RX ADMIN — INSULIN GLARGINE 44 UNITS: 100 INJECTION, SOLUTION SUBCUTANEOUS at 09:08

## 2019-01-01 RX ADMIN — IPRATROPIUM BROMIDE AND ALBUTEROL SULFATE 3 ML: .5; 3 SOLUTION RESPIRATORY (INHALATION) at 20:13

## 2019-01-01 RX ADMIN — INSULIN LISPRO 2 UNITS: 100 INJECTION, SOLUTION INTRAVENOUS; SUBCUTANEOUS at 13:45

## 2019-01-01 RX ADMIN — BISACODYL 10 MG: 10 SUPPOSITORY RECTAL at 11:25

## 2019-01-01 RX ADMIN — AMLODIPINE BESYLATE 2.5 MG: 5 TABLET ORAL at 08:54

## 2019-01-01 RX ADMIN — INSULIN GLARGINE 44 UNITS: 100 INJECTION, SOLUTION SUBCUTANEOUS at 09:33

## 2019-01-01 RX ADMIN — Medication 1 CAPSULE: at 09:33

## 2019-01-01 RX ADMIN — DILTIAZEM HYDROCHLORIDE 90 MG: 60 TABLET, FILM COATED ORAL at 15:26

## 2019-01-01 RX ADMIN — ALBUTEROL SULFATE 2.5 MG: 2.5 SOLUTION RESPIRATORY (INHALATION) at 20:09

## 2019-01-01 RX ADMIN — SODIUM CHLORIDE 400 ML: 900 INJECTION, SOLUTION INTRAVENOUS at 11:38

## 2019-01-01 RX ADMIN — MYCOPHENOLATE MOFETIL 500 MG: 250 CAPSULE ORAL at 10:58

## 2019-01-01 RX ADMIN — Medication 1 CAPSULE: at 08:23

## 2019-01-01 RX ADMIN — INSULIN GLARGINE 44 UNITS: 100 INJECTION, SOLUTION SUBCUTANEOUS at 08:37

## 2019-01-01 RX ADMIN — METOPROLOL SUCCINATE 50 MG: 50 TABLET, EXTENDED RELEASE ORAL at 08:25

## 2019-01-01 RX ADMIN — Medication: at 10:41

## 2019-01-01 RX ADMIN — PIPERACILLIN SODIUM,TAZOBACTAM SODIUM 3.38 G: 3; .375 INJECTION, POWDER, FOR SOLUTION INTRAVENOUS at 08:32

## 2019-01-01 RX ADMIN — HEPARIN SODIUM 5000 UNITS: 5000 INJECTION INTRAVENOUS; SUBCUTANEOUS at 06:14

## 2019-01-01 RX ADMIN — SODIUM CHLORIDE 500 ML: 900 INJECTION, SOLUTION INTRAVENOUS at 14:52

## 2019-01-01 RX ADMIN — INSULIN LISPRO 5 UNITS: 100 INJECTION, SOLUTION INTRAVENOUS; SUBCUTANEOUS at 16:38

## 2019-01-01 RX ADMIN — Medication 1 CAPSULE: at 08:47

## 2019-01-01 RX ADMIN — INSULIN LISPRO 3 UNITS: 100 INJECTION, SOLUTION INTRAVENOUS; SUBCUTANEOUS at 07:00

## 2019-01-01 RX ADMIN — MAGNESIUM SULFATE HEPTAHYDRATE 500 MG: 500 INJECTION, SOLUTION INTRAMUSCULAR; INTRAVENOUS at 16:44

## 2019-01-01 RX ADMIN — INSULIN LISPRO 3 UNITS: 100 INJECTION, SOLUTION INTRAVENOUS; SUBCUTANEOUS at 11:38

## 2019-01-01 RX ADMIN — SODIUM CHLORIDE 1000 MG: 900 INJECTION, SOLUTION INTRAVENOUS at 15:05

## 2019-01-01 RX ADMIN — INSULIN LISPRO 8 UNITS: 100 INJECTION, SOLUTION INTRAVENOUS; SUBCUTANEOUS at 01:56

## 2019-01-01 RX ADMIN — METHYLPREDNISOLONE SODIUM SUCCINATE 60 MG: 125 INJECTION, POWDER, FOR SOLUTION INTRAMUSCULAR; INTRAVENOUS at 17:27

## 2019-01-01 RX ADMIN — MYCOPHENOLATE MOFETIL 500 MG: 200 POWDER, FOR SUSPENSION ORAL at 21:45

## 2019-01-01 RX ADMIN — MULTIPLE VITAMINS W/ MINERALS TAB 1 TABLET: TAB at 08:46

## 2019-01-01 RX ADMIN — METOPROLOL TARTRATE 2.5 MG: 5 INJECTION, SOLUTION INTRAVENOUS at 18:18

## 2019-01-01 RX ADMIN — PREDNISONE 30 MG: 20 TABLET ORAL at 17:46

## 2019-01-01 RX ADMIN — APIXABAN 5 MG: 5 TABLET, FILM COATED ORAL at 11:41

## 2019-01-01 RX ADMIN — Medication 1 CAPSULE: at 09:10

## 2019-01-01 RX ADMIN — METHYLPREDNISOLONE SODIUM SUCCINATE 60 MG: 40 INJECTION, POWDER, FOR SOLUTION INTRAMUSCULAR; INTRAVENOUS at 04:19

## 2019-01-01 RX ADMIN — Medication 10 ML: at 22:59

## 2019-01-01 RX ADMIN — INSULIN LISPRO 5 UNITS: 100 INJECTION, SOLUTION INTRAVENOUS; SUBCUTANEOUS at 09:15

## 2019-01-01 RX ADMIN — PREDNISONE 30 MG: 20 TABLET ORAL at 09:25

## 2019-01-01 RX ADMIN — PIPERACILLIN SODIUM,TAZOBACTAM SODIUM 3.38 G: 3; .375 INJECTION, POWDER, FOR SOLUTION INTRAVENOUS at 08:37

## 2019-01-01 RX ADMIN — METHYLPREDNISOLONE SODIUM SUCCINATE 40 MG: 40 INJECTION, POWDER, FOR SOLUTION INTRAMUSCULAR; INTRAVENOUS at 23:00

## 2019-01-01 RX ADMIN — FUROSEMIDE 20 MG: 10 INJECTION, SOLUTION INTRAMUSCULAR; INTRAVENOUS at 14:20

## 2019-01-01 RX ADMIN — Medication 10 ML: at 17:54

## 2019-01-01 RX ADMIN — INSULIN LISPRO 3 UNITS: 100 INJECTION, SOLUTION INTRAVENOUS; SUBCUTANEOUS at 07:22

## 2019-01-01 RX ADMIN — APIXABAN 5 MG: 5 TABLET, FILM COATED ORAL at 08:54

## 2019-01-01 RX ADMIN — FLUTICASONE PROPIONATE 2 SPRAY: 50 SPRAY, METERED NASAL at 09:29

## 2019-01-01 RX ADMIN — VANCOMYCIN HYDROCHLORIDE 1500 MG: 10 INJECTION, POWDER, LYOPHILIZED, FOR SOLUTION INTRAVENOUS at 21:00

## 2019-01-01 RX ADMIN — INSULIN GLARGINE 10 UNITS: 100 INJECTION, SOLUTION SUBCUTANEOUS at 21:07

## 2019-01-01 RX ADMIN — DILTIAZEM HYDROCHLORIDE 90 MG: 60 TABLET, FILM COATED ORAL at 15:06

## 2019-01-01 RX ADMIN — INSULIN LISPRO 4 UNITS: 100 INJECTION, SOLUTION INTRAVENOUS; SUBCUTANEOUS at 16:51

## 2019-01-01 RX ADMIN — LEVOFLOXACIN 750 MG: 5 INJECTION, SOLUTION INTRAVENOUS at 16:01

## 2019-01-01 RX ADMIN — Medication 10 ML: at 08:55

## 2019-01-01 RX ADMIN — MYCOPHENOLATE MOFETIL 500 MG: 250 CAPSULE ORAL at 15:34

## 2019-01-01 RX ADMIN — MYCOPHENOLATE MOFETIL 500 MG: 250 CAPSULE ORAL at 16:52

## 2019-01-01 RX ADMIN — Medication 10 ML: at 21:39

## 2019-01-01 RX ADMIN — APIXABAN 5 MG: 5 TABLET, FILM COATED ORAL at 09:21

## 2019-01-01 RX ADMIN — DILTIAZEM HYDROCHLORIDE 60 MG: 60 TABLET, FILM COATED ORAL at 09:04

## 2019-01-01 RX ADMIN — Medication 10 ML: at 21:33

## 2019-01-01 RX ADMIN — CEFEPIME HYDROCHLORIDE 2 G: 2 INJECTION, POWDER, FOR SOLUTION INTRAVENOUS at 14:07

## 2019-01-01 RX ADMIN — INSULIN GLARGINE 40 UNITS: 100 INJECTION, SOLUTION SUBCUTANEOUS at 11:04

## 2019-01-01 RX ADMIN — PREDNISONE 30 MG: 20 TABLET ORAL at 16:28

## 2019-01-01 RX ADMIN — INSULIN LISPRO 2 UNITS: 100 INJECTION, SOLUTION INTRAVENOUS; SUBCUTANEOUS at 10:00

## 2019-01-01 RX ADMIN — INSULIN GLARGINE 40 UNITS: 100 INJECTION, SOLUTION SUBCUTANEOUS at 09:17

## 2019-01-01 RX ADMIN — APIXABAN 10 MG: 5 TABLET, FILM COATED ORAL at 08:17

## 2019-01-01 RX ADMIN — APIXABAN 5 MG: 5 TABLET, FILM COATED ORAL at 09:10

## 2019-01-01 RX ADMIN — HEPARIN SODIUM 5000 UNITS: 5000 INJECTION INTRAVENOUS; SUBCUTANEOUS at 12:37

## 2019-01-01 RX ADMIN — METHYLPREDNISOLONE SODIUM SUCCINATE 60 MG: 125 INJECTION, POWDER, FOR SOLUTION INTRAMUSCULAR; INTRAVENOUS at 01:08

## 2019-01-01 RX ADMIN — DEXTROSE MONOHYDRATE 25 G: 500 INJECTION PARENTERAL at 06:43

## 2019-01-01 RX ADMIN — KETOROLAC TROMETHAMINE 30 MG: 30 INJECTION, SOLUTION INTRAMUSCULAR at 01:44

## 2019-01-01 RX ADMIN — ATORVASTATIN CALCIUM 40 MG: 40 TABLET, FILM COATED ORAL at 21:03

## 2019-01-01 RX ADMIN — AMANTADINE HYDROCHLORIDE 100 MG: 100 CAPSULE ORAL at 18:11

## 2019-01-01 RX ADMIN — ATORVASTATIN CALCIUM 40 MG: 40 TABLET, FILM COATED ORAL at 21:29

## 2019-01-01 RX ADMIN — Medication 10 ML: at 09:30

## 2019-01-01 RX ADMIN — APIXABAN 5 MG: 5 TABLET, FILM COATED ORAL at 08:47

## 2019-01-01 RX ADMIN — POTASSIUM CHLORIDE 20 MEQ: 10 TABLET, FILM COATED, EXTENDED RELEASE ORAL at 08:32

## 2019-01-01 RX ADMIN — INSULIN LISPRO 3 UNITS: 100 INJECTION, SOLUTION INTRAVENOUS; SUBCUTANEOUS at 12:22

## 2019-01-01 RX ADMIN — PREDNISONE 30 MG: 20 TABLET ORAL at 08:48

## 2019-01-01 RX ADMIN — PREDNISONE 30 MG: 20 TABLET ORAL at 17:54

## 2019-01-01 RX ADMIN — Medication 10 ML: at 21:36

## 2019-01-01 RX ADMIN — METOPROLOL TARTRATE 2.5 MG: 5 INJECTION INTRAVENOUS at 02:30

## 2019-01-01 RX ADMIN — INSULIN LISPRO 12 UNITS: 100 INJECTION, SOLUTION INTRAVENOUS; SUBCUTANEOUS at 17:50

## 2019-01-01 RX ADMIN — METHYLPREDNISOLONE SODIUM SUCCINATE 40 MG: 40 INJECTION, POWDER, FOR SOLUTION INTRAMUSCULAR; INTRAVENOUS at 17:41

## 2019-01-01 RX ADMIN — INSULIN LISPRO 2 UNITS: 100 INJECTION, SOLUTION INTRAVENOUS; SUBCUTANEOUS at 17:42

## 2019-01-01 RX ADMIN — FENTANYL CITRATE 25 MCG: 50 INJECTION, SOLUTION INTRAMUSCULAR; INTRAVENOUS at 17:20

## 2019-01-01 RX ADMIN — INSULIN LISPRO 5 UNITS: 100 INJECTION, SOLUTION INTRAVENOUS; SUBCUTANEOUS at 08:24

## 2019-01-01 RX ADMIN — APIXABAN 5 MG: 5 TABLET, FILM COATED ORAL at 21:54

## 2019-01-01 RX ADMIN — Medication 10 ML: at 14:55

## 2019-01-01 RX ADMIN — ENOXAPARIN SODIUM 80 MG: 80 INJECTION, SOLUTION INTRAVENOUS; SUBCUTANEOUS at 03:14

## 2019-01-01 RX ADMIN — LEVOFLOXACIN 750 MG: 5 INJECTION, SOLUTION INTRAVENOUS at 15:48

## 2019-01-01 RX ADMIN — CEFEPIME HYDROCHLORIDE 2 G: 2 INJECTION, POWDER, FOR SOLUTION INTRAVENOUS at 02:27

## 2019-01-01 RX ADMIN — INSULIN LISPRO 2 UNITS: 100 INJECTION, SOLUTION INTRAVENOUS; SUBCUTANEOUS at 07:30

## 2019-01-01 RX ADMIN — GLUCAGON HYDROCHLORIDE 1 MG: 1 INJECTION, POWDER, FOR SOLUTION INTRAMUSCULAR; INTRAVENOUS; SUBCUTANEOUS at 11:29

## 2019-01-01 RX ADMIN — PIPERACILLIN SODIUM,TAZOBACTAM SODIUM 3.38 G: 3; .375 INJECTION, POWDER, FOR SOLUTION INTRAVENOUS at 15:25

## 2019-01-01 RX ADMIN — Medication 1 CAPSULE: at 08:13

## 2019-01-01 RX ADMIN — Medication 1 CAPSULE: at 09:03

## 2019-01-01 RX ADMIN — MORPHINE SULFATE 2 MG: 2 INJECTION, SOLUTION INTRAMUSCULAR; INTRAVENOUS at 23:00

## 2019-01-01 RX ADMIN — FLUTICASONE PROPIONATE 2 SPRAY: 50 SPRAY, METERED NASAL at 08:28

## 2019-01-01 RX ADMIN — INSULIN LISPRO 16 UNITS: 100 INJECTION, SOLUTION INTRAVENOUS; SUBCUTANEOUS at 10:52

## 2019-01-01 RX ADMIN — ATORVASTATIN CALCIUM 40 MG: 40 TABLET, FILM COATED ORAL at 21:07

## 2019-01-01 RX ADMIN — PREDNISONE 30 MG: 20 TABLET ORAL at 17:45

## 2019-01-01 RX ADMIN — Medication 10 ML: at 06:04

## 2019-01-01 RX ADMIN — Medication 10 ML: at 21:38

## 2019-01-01 RX ADMIN — CEFEPIME HYDROCHLORIDE 2 G: 2 INJECTION, POWDER, FOR SOLUTION INTRAVENOUS at 18:27

## 2019-01-01 RX ADMIN — HYDRALAZINE HYDROCHLORIDE 25 MG: 25 TABLET, FILM COATED ORAL at 16:58

## 2019-01-01 RX ADMIN — PREDNISONE 30 MG: 20 TABLET ORAL at 08:36

## 2019-01-01 RX ADMIN — Medication 10 ML: at 05:54

## 2019-01-01 RX ADMIN — VANCOMYCIN HYDROCHLORIDE 1500 MG: 10 INJECTION, POWDER, LYOPHILIZED, FOR SOLUTION INTRAVENOUS at 20:35

## 2019-01-01 RX ADMIN — MULTIPLE VITAMINS W/ MINERALS TAB 1 TABLET: TAB at 08:42

## 2019-01-01 RX ADMIN — ALBUTEROL SULFATE 2.5 MG: 2.5 SOLUTION RESPIRATORY (INHALATION) at 14:17

## 2019-01-01 RX ADMIN — Medication 10 ML: at 07:23

## 2019-01-01 RX ADMIN — INSULIN GLARGINE 30 UNITS: 100 INJECTION, SOLUTION SUBCUTANEOUS at 08:58

## 2019-01-01 RX ADMIN — INSULIN GLARGINE 32 UNITS: 100 INJECTION, SOLUTION SUBCUTANEOUS at 09:32

## 2019-01-01 RX ADMIN — VANCOMYCIN HYDROCHLORIDE 1500 MG: 10 INJECTION, POWDER, LYOPHILIZED, FOR SOLUTION INTRAVENOUS at 08:25

## 2019-01-01 RX ADMIN — PREDNISONE 30 MG: 20 TABLET ORAL at 08:35

## 2019-01-01 RX ADMIN — INSULIN LISPRO 3 UNITS: 100 INJECTION, SOLUTION INTRAVENOUS; SUBCUTANEOUS at 08:44

## 2019-01-01 RX ADMIN — Medication 1 CAPSULE: at 09:18

## 2019-01-01 RX ADMIN — FLUTICASONE PROPIONATE 2 SPRAY: 50 SPRAY, METERED NASAL at 11:36

## 2019-01-01 RX ADMIN — ATORVASTATIN CALCIUM 40 MG: 40 TABLET, FILM COATED ORAL at 20:21

## 2019-01-01 RX ADMIN — Medication 10 ML: at 22:00

## 2019-01-01 RX ADMIN — ATORVASTATIN CALCIUM 40 MG: 40 TABLET, FILM COATED ORAL at 21:33

## 2019-01-01 RX ADMIN — METHYLPREDNISOLONE SODIUM SUCCINATE 40 MG: 40 INJECTION, POWDER, FOR SOLUTION INTRAMUSCULAR; INTRAVENOUS at 02:07

## 2019-01-01 RX ADMIN — Medication 10 ML: at 05:35

## 2019-01-01 RX ADMIN — AMLODIPINE BESYLATE 2.5 MG: 5 TABLET ORAL at 09:04

## 2019-01-01 RX ADMIN — METOPROLOL TARTRATE 2.5 MG: 5 INJECTION, SOLUTION INTRAVENOUS at 08:24

## 2019-01-01 RX ADMIN — HEPARIN SODIUM 5000 UNITS: 5000 INJECTION INTRAVENOUS; SUBCUTANEOUS at 23:03

## 2019-01-01 RX ADMIN — INSULIN LISPRO 4 UNITS: 100 INJECTION, SOLUTION INTRAVENOUS; SUBCUTANEOUS at 08:53

## 2019-01-01 RX ADMIN — Medication 1 CAPSULE: at 09:14

## 2019-01-01 RX ADMIN — INSULIN LISPRO 3 UNITS: 100 INJECTION, SOLUTION INTRAVENOUS; SUBCUTANEOUS at 17:35

## 2019-01-01 RX ADMIN — ALBUTEROL SULFATE 2.5 MG: 2.5 SOLUTION RESPIRATORY (INHALATION) at 19:50

## 2019-01-01 RX ADMIN — HEPARIN SODIUM 5000 UNITS: 5000 INJECTION INTRAVENOUS; SUBCUTANEOUS at 21:36

## 2019-01-01 RX ADMIN — Medication 1 CAPSULE: at 08:35

## 2019-01-01 RX ADMIN — INSULIN GLARGINE 30 UNITS: 100 INJECTION, SOLUTION SUBCUTANEOUS at 08:47

## 2019-01-01 RX ADMIN — INSULIN LISPRO 4 UNITS: 100 INJECTION, SOLUTION INTRAVENOUS; SUBCUTANEOUS at 21:09

## 2019-01-01 RX ADMIN — MYCOPHENOLATE MOFETIL 500 MG: 200 POWDER, FOR SUSPENSION ORAL at 22:45

## 2019-01-01 RX ADMIN — INSULIN LISPRO 2 UNITS: 100 INJECTION, SOLUTION INTRAVENOUS; SUBCUTANEOUS at 16:38

## 2019-01-01 RX ADMIN — Medication 10 ML: at 06:19

## 2019-01-01 RX ADMIN — APIXABAN 5 MG: 5 TABLET, FILM COATED ORAL at 21:38

## 2019-01-01 RX ADMIN — Medication 10 ML: at 16:33

## 2019-01-01 RX ADMIN — DILTIAZEM HYDROCHLORIDE 90 MG: 60 TABLET, FILM COATED ORAL at 00:05

## 2019-01-01 RX ADMIN — DEXTROSE MONOHYDRATE 250 ML: 10 INJECTION, SOLUTION INTRAVENOUS at 00:10

## 2019-01-01 RX ADMIN — HYDRALAZINE HYDROCHLORIDE 25 MG: 25 TABLET, FILM COATED ORAL at 16:33

## 2019-01-01 RX ADMIN — HYDRALAZINE HYDROCHLORIDE 10 MG: 20 INJECTION INTRAMUSCULAR; INTRAVENOUS at 13:18

## 2019-01-01 RX ADMIN — INSULIN LISPRO 14 UNITS: 100 INJECTION, SOLUTION INTRAVENOUS; SUBCUTANEOUS at 06:18

## 2019-01-01 RX ADMIN — METHYLPREDNISOLONE SODIUM SUCCINATE 40 MG: 40 INJECTION, POWDER, FOR SOLUTION INTRAMUSCULAR; INTRAVENOUS at 17:34

## 2019-01-01 RX ADMIN — APIXABAN 5 MG: 5 TABLET, FILM COATED ORAL at 08:59

## 2019-01-01 RX ADMIN — ALPRAZOLAM 0.25 MG: 0.25 TABLET ORAL at 18:30

## 2019-01-01 RX ADMIN — AMANTADINE HYDROCHLORIDE 100 MG: 100 CAPSULE ORAL at 17:32

## 2019-01-01 RX ADMIN — PIPERACILLIN SODIUM,TAZOBACTAM SODIUM 3.38 G: 3; .375 INJECTION, POWDER, FOR SOLUTION INTRAVENOUS at 16:09

## 2019-01-01 RX ADMIN — NYSTATIN 500000 UNITS: 500000 SUSPENSION ORAL at 18:00

## 2019-01-01 RX ADMIN — METHYLPREDNISOLONE SODIUM SUCCINATE 80 MG: 125 INJECTION, POWDER, FOR SOLUTION INTRAMUSCULAR; INTRAVENOUS at 01:24

## 2019-01-01 RX ADMIN — MYCOPHENOLATE MOFETIL 500 MG: 200 POWDER, FOR SUSPENSION ORAL at 10:21

## 2019-01-01 RX ADMIN — IPRATROPIUM BROMIDE AND ALBUTEROL SULFATE 3 ML: .5; 3 SOLUTION RESPIRATORY (INHALATION) at 08:04

## 2019-01-01 RX ADMIN — APIXABAN 5 MG: 5 TABLET, FILM COATED ORAL at 21:15

## 2019-01-01 RX ADMIN — Medication 10 ML: at 21:27

## 2019-01-01 RX ADMIN — Medication 10 ML: at 11:41

## 2019-01-01 RX ADMIN — PREDNISONE 30 MG: 20 TABLET ORAL at 08:37

## 2019-01-01 RX ADMIN — METOPROLOL SUCCINATE 50 MG: 50 TABLET, EXTENDED RELEASE ORAL at 09:14

## 2019-01-01 RX ADMIN — ALPRAZOLAM 0.25 MG: 0.25 TABLET ORAL at 07:28

## 2019-01-01 RX ADMIN — DEXTROSE MONOHYDRATE 250 ML: 10 INJECTION, SOLUTION INTRAVENOUS at 21:36

## 2019-01-01 RX ADMIN — CEFEPIME HYDROCHLORIDE 2 G: 2 INJECTION, POWDER, FOR SOLUTION INTRAVENOUS at 11:27

## 2019-01-01 RX ADMIN — ALPRAZOLAM 0.25 MG: 0.25 TABLET ORAL at 14:54

## 2019-01-01 RX ADMIN — PREDNISONE 30 MG: 20 TABLET ORAL at 17:28

## 2019-01-01 RX ADMIN — APIXABAN 5 MG: 5 TABLET, FILM COATED ORAL at 08:44

## 2019-01-01 RX ADMIN — Medication 10 ML: at 05:50

## 2019-01-01 RX ADMIN — FLUTICASONE PROPIONATE 2 SPRAY: 50 SPRAY, METERED NASAL at 08:46

## 2019-01-01 RX ADMIN — CEFEPIME HYDROCHLORIDE 2 G: 2 INJECTION, POWDER, FOR SOLUTION INTRAVENOUS at 03:16

## 2019-01-01 RX ADMIN — INSULIN GLARGINE 50 UNITS: 100 INJECTION, SOLUTION SUBCUTANEOUS at 21:38

## 2019-01-01 RX ADMIN — ALBUTEROL SULFATE 2.5 MG: 2.5 SOLUTION RESPIRATORY (INHALATION) at 20:32

## 2019-01-01 RX ADMIN — DILTIAZEM HYDROCHLORIDE 120 MG: 60 TABLET, FILM COATED ORAL at 00:26

## 2019-01-01 RX ADMIN — METHYLPREDNISOLONE SODIUM SUCCINATE 40 MG: 40 INJECTION, POWDER, FOR SOLUTION INTRAMUSCULAR; INTRAVENOUS at 09:41

## 2019-01-01 RX ADMIN — PIPERACILLIN SODIUM,TAZOBACTAM SODIUM 3.38 G: 3; .375 INJECTION, POWDER, FOR SOLUTION INTRAVENOUS at 00:05

## 2019-01-01 RX ADMIN — ATORVASTATIN CALCIUM 40 MG: 40 TABLET, FILM COATED ORAL at 21:38

## 2019-01-01 RX ADMIN — SODIUM CHLORIDE 1500 MG: 900 INJECTION, SOLUTION INTRAVENOUS at 13:25

## 2019-01-01 RX ADMIN — APIXABAN 5 MG: 5 TABLET, FILM COATED ORAL at 21:27

## 2019-01-01 RX ADMIN — AMLODIPINE BESYLATE 2.5 MG: 5 TABLET ORAL at 08:44

## 2019-01-01 RX ADMIN — ACETAMINOPHEN 650 MG: 325 TABLET ORAL at 11:36

## 2019-01-01 RX ADMIN — AMANTADINE HYDROCHLORIDE 100 MG: 100 CAPSULE ORAL at 08:42

## 2019-01-01 RX ADMIN — Medication 10 ML: at 16:03

## 2019-01-01 RX ADMIN — ATORVASTATIN CALCIUM 40 MG: 40 TABLET, FILM COATED ORAL at 21:23

## 2019-01-01 RX ADMIN — METHYLPREDNISOLONE SODIUM SUCCINATE 80 MG: 125 INJECTION, POWDER, FOR SOLUTION INTRAMUSCULAR; INTRAVENOUS at 06:21

## 2019-01-01 RX ADMIN — Medication 10 ML: at 21:34

## 2019-01-01 RX ADMIN — Medication 10 ML: at 15:58

## 2019-01-01 RX ADMIN — DILTIAZEM HYDROCHLORIDE 120 MG: 60 TABLET, FILM COATED ORAL at 21:11

## 2019-01-01 RX ADMIN — APIXABAN 5 MG: 5 TABLET, FILM COATED ORAL at 21:34

## 2019-01-01 RX ADMIN — Medication 10 ML: at 21:44

## 2019-01-01 RX ADMIN — CEFEPIME HYDROCHLORIDE 2 G: 2 INJECTION, POWDER, FOR SOLUTION INTRAVENOUS at 14:40

## 2019-01-01 RX ADMIN — METHYLPREDNISOLONE SODIUM SUCCINATE 80 MG: 125 INJECTION, POWDER, FOR SOLUTION INTRAMUSCULAR; INTRAVENOUS at 05:50

## 2019-01-01 RX ADMIN — Medication 100 MG: at 10:15

## 2019-01-01 RX ADMIN — APIXABAN 5 MG: 5 TABLET, FILM COATED ORAL at 10:10

## 2019-01-01 RX ADMIN — METHYLPREDNISOLONE SODIUM SUCCINATE 40 MG: 40 INJECTION, POWDER, FOR SOLUTION INTRAMUSCULAR; INTRAVENOUS at 00:53

## 2019-01-01 RX ADMIN — ATORVASTATIN CALCIUM 40 MG: 40 TABLET, FILM COATED ORAL at 21:14

## 2019-01-01 RX ADMIN — FLUCONAZOLE, SODIUM CHLORIDE 400 MG: 2 INJECTION INTRAVENOUS at 14:33

## 2019-01-01 RX ADMIN — VANCOMYCIN HYDROCHLORIDE 1250 MG: 10 INJECTION, POWDER, LYOPHILIZED, FOR SOLUTION INTRAVENOUS at 08:33

## 2019-01-01 RX ADMIN — PREDNISONE 30 MG: 20 TABLET ORAL at 09:19

## 2019-01-01 RX ADMIN — Medication 10 ML: at 14:23

## 2019-01-01 RX ADMIN — Medication 1 CAPSULE: at 09:05

## 2019-01-01 RX ADMIN — INSULIN LISPRO 5 UNITS: 100 INJECTION, SOLUTION INTRAVENOUS; SUBCUTANEOUS at 11:40

## 2019-01-01 RX ADMIN — FUROSEMIDE 20 MG: 20 TABLET ORAL at 08:13

## 2019-01-01 RX ADMIN — CEFEPIME HYDROCHLORIDE 2 G: 2 INJECTION, POWDER, FOR SOLUTION INTRAVENOUS at 09:32

## 2019-01-01 RX ADMIN — MULTIPLE VITAMINS W/ MINERALS TAB 1 TABLET: TAB at 09:06

## 2019-01-01 RX ADMIN — Medication 10 ML: at 15:22

## 2019-01-01 RX ADMIN — PIPERACILLIN SODIUM,TAZOBACTAM SODIUM 3.38 G: 3; .375 INJECTION, POWDER, FOR SOLUTION INTRAVENOUS at 00:24

## 2019-01-01 RX ADMIN — FUROSEMIDE 20 MG: 20 TABLET ORAL at 09:22

## 2019-01-01 RX ADMIN — METHYLPREDNISOLONE SODIUM SUCCINATE 60 MG: 125 INJECTION, POWDER, FOR SOLUTION INTRAMUSCULAR; INTRAVENOUS at 12:05

## 2019-01-01 RX ADMIN — METHYLPREDNISOLONE SODIUM SUCCINATE 60 MG: 40 INJECTION, POWDER, FOR SOLUTION INTRAMUSCULAR; INTRAVENOUS at 04:00

## 2019-01-01 RX ADMIN — ATORVASTATIN CALCIUM 40 MG: 40 TABLET, FILM COATED ORAL at 21:46

## 2019-01-01 RX ADMIN — MYCOPHENOLATE MOFETIL 500 MG: 250 CAPSULE ORAL at 16:31

## 2019-01-01 RX ADMIN — DILTIAZEM HYDROCHLORIDE 120 MG: 60 TABLET, FILM COATED ORAL at 17:06

## 2019-01-01 RX ADMIN — Medication 10 ML: at 05:07

## 2019-01-01 RX ADMIN — IPRATROPIUM BROMIDE AND ALBUTEROL SULFATE 3 ML: .5; 3 SOLUTION RESPIRATORY (INHALATION) at 11:27

## 2019-01-01 RX ADMIN — ATORVASTATIN CALCIUM 40 MG: 40 TABLET, FILM COATED ORAL at 20:36

## 2019-01-01 RX ADMIN — INSULIN LISPRO 3 UNITS: 100 INJECTION, SOLUTION INTRAVENOUS; SUBCUTANEOUS at 08:25

## 2019-01-01 RX ADMIN — Medication 10 ML: at 22:05

## 2019-01-01 RX ADMIN — IPRATROPIUM BROMIDE AND ALBUTEROL SULFATE 3 ML: .5; 3 SOLUTION RESPIRATORY (INHALATION) at 23:43

## 2019-01-01 RX ADMIN — INSULIN GLARGINE 10 UNITS: 100 INJECTION, SOLUTION SUBCUTANEOUS at 21:38

## 2019-01-01 RX ADMIN — INSULIN LISPRO 2 UNITS: 100 INJECTION, SOLUTION INTRAVENOUS; SUBCUTANEOUS at 17:32

## 2019-01-01 RX ADMIN — ALPRAZOLAM 0.25 MG: 0.25 TABLET ORAL at 21:43

## 2019-01-01 RX ADMIN — MYCOPHENOLATE MOFETIL 500 MG: 250 CAPSULE ORAL at 17:06

## 2019-01-01 RX ADMIN — MORPHINE SULFATE 2 MG: 2 INJECTION, SOLUTION INTRAMUSCULAR; INTRAVENOUS at 11:04

## 2019-01-01 RX ADMIN — METOPROLOL SUCCINATE 50 MG: 50 TABLET, EXTENDED RELEASE ORAL at 08:48

## 2019-01-01 RX ADMIN — MORPHINE SULFATE 2 MG: 2 INJECTION, SOLUTION INTRAMUSCULAR; INTRAVENOUS at 21:45

## 2019-01-01 RX ADMIN — Medication 10 ML: at 06:48

## 2019-01-01 RX ADMIN — MORPHINE SULFATE 2 MG: 2 INJECTION, SOLUTION INTRAMUSCULAR; INTRAVENOUS at 06:31

## 2019-01-01 RX ADMIN — INSULIN LISPRO 12 UNITS: 100 INJECTION, SOLUTION INTRAVENOUS; SUBCUTANEOUS at 00:53

## 2019-01-01 RX ADMIN — ATORVASTATIN CALCIUM 40 MG: 40 TABLET, FILM COATED ORAL at 22:18

## 2019-01-01 RX ADMIN — METHYLPREDNISOLONE SODIUM SUCCINATE 40 MG: 40 INJECTION, POWDER, FOR SOLUTION INTRAMUSCULAR; INTRAVENOUS at 21:31

## 2019-01-01 RX ADMIN — INSULIN LISPRO 8 UNITS: 100 INJECTION, SOLUTION INTRAVENOUS; SUBCUTANEOUS at 05:57

## 2019-01-01 RX ADMIN — METFORMIN HYDROCHLORIDE 500 MG: 500 TABLET ORAL at 18:00

## 2019-01-01 RX ADMIN — APIXABAN 5 MG: 5 TABLET, FILM COATED ORAL at 08:39

## 2019-01-01 RX ADMIN — AMANTADINE HYDROCHLORIDE 100 MG: 100 CAPSULE ORAL at 17:41

## 2019-01-01 RX ADMIN — MORPHINE SULFATE 2 MG: 2 INJECTION, SOLUTION INTRAMUSCULAR; INTRAVENOUS at 06:21

## 2019-01-01 RX ADMIN — ATORVASTATIN CALCIUM 40 MG: 40 TABLET, FILM COATED ORAL at 21:37

## 2019-01-01 RX ADMIN — Medication 1 CAPSULE: at 09:45

## 2019-01-01 RX ADMIN — Medication 10 ML: at 14:33

## 2019-01-01 RX ADMIN — INSULIN GLARGINE 16 UNITS: 100 INJECTION, SOLUTION SUBCUTANEOUS at 21:32

## 2019-01-01 RX ADMIN — ALBUTEROL SULFATE 2.5 MG: 2.5 SOLUTION RESPIRATORY (INHALATION) at 20:19

## 2019-01-01 RX ADMIN — Medication 1 CAPSULE: at 08:49

## 2019-01-01 RX ADMIN — PREDNISONE 30 MG: 20 TABLET ORAL at 17:05

## 2019-01-01 RX ADMIN — INSULIN GLARGINE 16 UNITS: 100 INJECTION, SOLUTION SUBCUTANEOUS at 21:58

## 2019-01-01 RX ADMIN — PREDNISONE 30 MG: 20 TABLET ORAL at 09:08

## 2019-01-01 RX ADMIN — INSULIN LISPRO 5 UNITS: 100 INJECTION, SOLUTION INTRAVENOUS; SUBCUTANEOUS at 17:12

## 2019-01-01 RX ADMIN — AMANTADINE HYDROCHLORIDE 100 MG: 100 CAPSULE ORAL at 09:06

## 2019-01-01 RX ADMIN — Medication 10 ML: at 21:35

## 2019-01-01 RX ADMIN — ATORVASTATIN CALCIUM 40 MG: 40 TABLET, FILM COATED ORAL at 21:36

## 2019-01-01 RX ADMIN — ATORVASTATIN CALCIUM 40 MG: 40 TABLET, FILM COATED ORAL at 22:10

## 2019-01-01 RX ADMIN — POTASSIUM CHLORIDE 40 MEQ: 20 TABLET, EXTENDED RELEASE ORAL at 19:41

## 2019-01-01 RX ADMIN — INSULIN GLARGINE 70 UNITS: 100 INJECTION, SOLUTION SUBCUTANEOUS at 09:09

## 2019-01-01 RX ADMIN — POTASSIUM CHLORIDE 20 MEQ: 750 TABLET, EXTENDED RELEASE ORAL at 09:37

## 2019-01-01 RX ADMIN — METHYLPREDNISOLONE SODIUM SUCCINATE 40 MG: 40 INJECTION, POWDER, FOR SOLUTION INTRAMUSCULAR; INTRAVENOUS at 17:36

## 2019-01-01 RX ADMIN — LEVOFLOXACIN 750 MG: 5 INJECTION, SOLUTION INTRAVENOUS at 18:16

## 2019-01-01 RX ADMIN — Medication 10 ML: at 07:40

## 2019-01-01 RX ADMIN — DILTIAZEM HYDROCHLORIDE 60 MG: 60 TABLET, FILM COATED ORAL at 21:42

## 2019-01-01 RX ADMIN — Medication 10 ML: at 21:16

## 2019-01-01 RX ADMIN — Medication 100 MG: at 10:40

## 2019-01-01 RX ADMIN — DILTIAZEM HYDROCHLORIDE 120 MG: 60 TABLET, FILM COATED ORAL at 21:15

## 2019-01-01 RX ADMIN — DILTIAZEM HYDROCHLORIDE 90 MG: 60 TABLET, FILM COATED ORAL at 16:15

## 2019-01-01 RX ADMIN — PIPERACILLIN SODIUM,TAZOBACTAM SODIUM 3.38 G: 3; .375 INJECTION, POWDER, FOR SOLUTION INTRAVENOUS at 15:54

## 2019-01-01 RX ADMIN — ATORVASTATIN CALCIUM 40 MG: 40 TABLET, FILM COATED ORAL at 22:05

## 2019-01-01 RX ADMIN — INSULIN LISPRO 2 UNITS: 100 INJECTION, SOLUTION INTRAVENOUS; SUBCUTANEOUS at 08:47

## 2019-01-01 RX ADMIN — MYCOPHENOLATE MOFETIL 500 MG: 250 CAPSULE ORAL at 16:32

## 2019-01-01 RX ADMIN — HYDRALAZINE HYDROCHLORIDE 10 MG: 20 INJECTION INTRAMUSCULAR; INTRAVENOUS at 07:07

## 2019-01-01 RX ADMIN — Medication 10 ML: at 13:44

## 2019-01-01 RX ADMIN — NYSTATIN 500000 UNITS: 500000 SUSPENSION ORAL at 13:00

## 2019-01-01 RX ADMIN — Medication 1 CAPSULE: at 09:07

## 2019-01-01 RX ADMIN — CEFEPIME HYDROCHLORIDE 2 G: 2 INJECTION, POWDER, FOR SOLUTION INTRAVENOUS at 09:56

## 2019-01-01 RX ADMIN — METHYLPREDNISOLONE SODIUM SUCCINATE 30 MG: 40 INJECTION, POWDER, FOR SOLUTION INTRAMUSCULAR; INTRAVENOUS at 06:48

## 2019-01-01 RX ADMIN — MORPHINE SULFATE 2 MG: 2 INJECTION, SOLUTION INTRAMUSCULAR; INTRAVENOUS at 02:53

## 2019-01-01 RX ADMIN — METHYLPREDNISOLONE SODIUM SUCCINATE 80 MG: 125 INJECTION, POWDER, FOR SOLUTION INTRAMUSCULAR; INTRAVENOUS at 12:31

## 2019-01-01 RX ADMIN — DILTIAZEM HYDROCHLORIDE 120 MG: 60 TABLET, FILM COATED ORAL at 11:04

## 2019-01-01 RX ADMIN — FLUTICASONE PROPIONATE 2 SPRAY: 50 SPRAY, METERED NASAL at 09:00

## 2019-01-01 RX ADMIN — FLUTICASONE PROPIONATE 2 SPRAY: 50 SPRAY, METERED NASAL at 12:01

## 2019-01-01 RX ADMIN — APIXABAN 10 MG: 5 TABLET, FILM COATED ORAL at 08:48

## 2019-01-01 RX ADMIN — APIXABAN 5 MG: 5 TABLET, FILM COATED ORAL at 22:22

## 2019-01-01 RX ADMIN — METOPROLOL SUCCINATE 50 MG: 50 TABLET, EXTENDED RELEASE ORAL at 10:01

## 2019-01-01 RX ADMIN — VANCOMYCIN HYDROCHLORIDE 2000 MG: 10 INJECTION, POWDER, LYOPHILIZED, FOR SOLUTION INTRAVENOUS at 12:24

## 2019-01-01 RX ADMIN — Medication 10 ML: at 16:28

## 2019-01-01 RX ADMIN — INSULIN LISPRO 7 UNITS: 100 INJECTION, SOLUTION INTRAVENOUS; SUBCUTANEOUS at 06:30

## 2019-01-01 RX ADMIN — APIXABAN 5 MG: 5 TABLET, FILM COATED ORAL at 22:12

## 2019-01-01 RX ADMIN — AMLODIPINE BESYLATE 2.5 MG: 5 TABLET ORAL at 09:19

## 2019-01-01 RX ADMIN — IPRATROPIUM BROMIDE AND ALBUTEROL SULFATE 3 ML: .5; 3 SOLUTION RESPIRATORY (INHALATION) at 15:10

## 2019-01-01 RX ADMIN — METHYLPREDNISOLONE SODIUM SUCCINATE 80 MG: 125 INJECTION, POWDER, FOR SOLUTION INTRAMUSCULAR; INTRAVENOUS at 18:08

## 2019-01-01 RX ADMIN — METOPROLOL TARTRATE 25 MG: 25 TABLET ORAL at 08:46

## 2019-01-01 RX ADMIN — INSULIN LISPRO 5 UNITS: 100 INJECTION, SOLUTION INTRAVENOUS; SUBCUTANEOUS at 12:46

## 2019-01-01 RX ADMIN — Medication 10 ML: at 14:17

## 2019-01-01 RX ADMIN — Medication 1 CAPSULE: at 08:34

## 2019-01-01 RX ADMIN — INSULIN LISPRO 2 UNITS: 100 INJECTION, SOLUTION INTRAVENOUS; SUBCUTANEOUS at 11:30

## 2019-01-01 RX ADMIN — AMLODIPINE BESYLATE 2.5 MG: 5 TABLET ORAL at 08:36

## 2019-01-01 RX ADMIN — VANCOMYCIN HYDROCHLORIDE 1500 MG: 10 INJECTION, POWDER, LYOPHILIZED, FOR SOLUTION INTRAVENOUS at 22:03

## 2019-01-01 RX ADMIN — INSULIN LISPRO 4 UNITS: 100 INJECTION, SOLUTION INTRAVENOUS; SUBCUTANEOUS at 07:06

## 2019-01-01 RX ADMIN — INSULIN GLARGINE 16 UNITS: 100 INJECTION, SOLUTION SUBCUTANEOUS at 21:42

## 2019-01-01 RX ADMIN — METHYLPREDNISOLONE SODIUM SUCCINATE 20 MG: 40 INJECTION, POWDER, FOR SOLUTION INTRAMUSCULAR; INTRAVENOUS at 22:44

## 2019-01-01 RX ADMIN — PREDNISONE 30 MG: 20 TABLET ORAL at 09:18

## 2019-01-01 RX ADMIN — METOPROLOL TARTRATE 2.5 MG: 5 INJECTION INTRAVENOUS at 08:47

## 2019-01-01 RX ADMIN — METOPROLOL SUCCINATE 50 MG: 50 TABLET, EXTENDED RELEASE ORAL at 09:03

## 2019-01-01 RX ADMIN — Medication 10 ML: at 01:24

## 2019-01-01 RX ADMIN — Medication 10 ML: at 21:47

## 2019-01-01 RX ADMIN — CEFEPIME HYDROCHLORIDE 2 G: 2 INJECTION, POWDER, FOR SOLUTION INTRAVENOUS at 02:55

## 2019-01-01 RX ADMIN — INSULIN GLARGINE 40 UNITS: 100 INJECTION, SOLUTION SUBCUTANEOUS at 22:19

## 2019-01-01 RX ADMIN — MYCOPHENOLATE MOFETIL 250 MG: 250 CAPSULE ORAL at 06:43

## 2019-01-01 RX ADMIN — APIXABAN 5 MG: 5 TABLET, FILM COATED ORAL at 10:00

## 2019-01-01 RX ADMIN — METOPROLOL TARTRATE 5 MG: 5 INJECTION, SOLUTION INTRAVENOUS at 15:03

## 2019-01-01 RX ADMIN — IOPAMIDOL 80 ML: 755 INJECTION, SOLUTION INTRAVENOUS at 19:19

## 2019-01-01 RX ADMIN — AMLODIPINE BESYLATE 2.5 MG: 5 TABLET ORAL at 09:18

## 2019-01-01 RX ADMIN — ATORVASTATIN CALCIUM 40 MG: 40 TABLET, FILM COATED ORAL at 21:41

## 2019-01-01 RX ADMIN — METHYLPREDNISOLONE SODIUM SUCCINATE 30 MG: 40 INJECTION, POWDER, FOR SOLUTION INTRAMUSCULAR; INTRAVENOUS at 21:15

## 2019-01-01 RX ADMIN — PIPERACILLIN SODIUM,TAZOBACTAM SODIUM 3.38 G: 3; .375 INJECTION, POWDER, FOR SOLUTION INTRAVENOUS at 15:27

## 2019-01-01 RX ADMIN — METHYLPREDNISOLONE SODIUM SUCCINATE 30 MG: 40 INJECTION, POWDER, FOR SOLUTION INTRAMUSCULAR; INTRAVENOUS at 14:00

## 2019-01-01 RX ADMIN — DILTIAZEM HYDROCHLORIDE 120 MG: 60 TABLET, FILM COATED ORAL at 01:23

## 2019-01-01 RX ADMIN — POTASSIUM CHLORIDE 40 MEQ: 750 TABLET, EXTENDED RELEASE ORAL at 17:50

## 2019-01-01 RX ADMIN — INSULIN GLARGINE 20 UNITS: 100 INJECTION, SOLUTION SUBCUTANEOUS at 03:30

## 2019-01-01 RX ADMIN — Medication 10 ML: at 15:38

## 2019-01-01 RX ADMIN — METHYLPREDNISOLONE SODIUM SUCCINATE 40 MG: 40 INJECTION, POWDER, FOR SOLUTION INTRAMUSCULAR; INTRAVENOUS at 11:35

## 2019-01-01 RX ADMIN — ALPRAZOLAM 0.25 MG: 0.25 TABLET ORAL at 21:27

## 2019-01-01 RX ADMIN — Medication 1 CAPSULE: at 08:41

## 2019-01-01 RX ADMIN — DILTIAZEM HYDROCHLORIDE 60 MG: 60 TABLET, FILM COATED ORAL at 21:26

## 2019-01-01 RX ADMIN — VANCOMYCIN HYDROCHLORIDE 1500 MG: 10 INJECTION, POWDER, LYOPHILIZED, FOR SOLUTION INTRAVENOUS at 17:47

## 2019-01-01 RX ADMIN — Medication 10 ML: at 06:37

## 2019-01-01 RX ADMIN — INSULIN LISPRO 3 UNITS: 100 INJECTION, SOLUTION INTRAVENOUS; SUBCUTANEOUS at 12:43

## 2019-01-01 RX ADMIN — INSULIN LISPRO 3 UNITS: 100 INJECTION, SOLUTION INTRAVENOUS; SUBCUTANEOUS at 18:25

## 2019-01-01 RX ADMIN — CEFEPIME HYDROCHLORIDE 2 G: 2 INJECTION, POWDER, FOR SOLUTION INTRAVENOUS at 18:46

## 2019-01-01 RX ADMIN — APIXABAN 5 MG: 5 TABLET, FILM COATED ORAL at 08:42

## 2019-01-01 RX ADMIN — INSULIN GLARGINE 14 UNITS: 100 INJECTION, SOLUTION SUBCUTANEOUS at 16:17

## 2019-01-01 RX ADMIN — MYCOPHENOLATE MOFETIL 250 MG: 250 CAPSULE ORAL at 06:26

## 2019-01-01 RX ADMIN — ACETYLCYSTEINE 400 MG: 200 SOLUTION ORAL; RESPIRATORY (INHALATION) at 18:48

## 2019-01-01 RX ADMIN — APIXABAN 5 MG: 5 TABLET, FILM COATED ORAL at 21:52

## 2019-01-01 RX ADMIN — APIXABAN 10 MG: 5 TABLET, FILM COATED ORAL at 20:28

## 2019-01-01 RX ADMIN — METHYLPREDNISOLONE SODIUM SUCCINATE 40 MG: 40 INJECTION, POWDER, FOR SOLUTION INTRAMUSCULAR; INTRAVENOUS at 10:38

## 2019-01-01 RX ADMIN — AMANTADINE HYDROCHLORIDE 100 MG: 100 CAPSULE ORAL at 09:45

## 2019-01-01 RX ADMIN — INSULIN LISPRO 2 UNITS: 100 INJECTION, SOLUTION INTRAVENOUS; SUBCUTANEOUS at 16:44

## 2019-01-01 RX ADMIN — APIXABAN 5 MG: 5 TABLET, FILM COATED ORAL at 08:18

## 2019-01-01 RX ADMIN — Medication 10 ML: at 14:16

## 2019-01-01 RX ADMIN — Medication 10 ML: at 16:19

## 2019-01-01 RX ADMIN — Medication 10 ML: at 05:37

## 2019-01-01 RX ADMIN — Medication 10 ML: at 06:45

## 2019-01-01 RX ADMIN — MYCOPHENOLATE MOFETIL 500 MG: 250 CAPSULE ORAL at 06:46

## 2019-01-01 RX ADMIN — METHYLPREDNISOLONE SODIUM SUCCINATE 40 MG: 40 INJECTION, POWDER, FOR SOLUTION INTRAMUSCULAR; INTRAVENOUS at 00:50

## 2019-01-01 RX ADMIN — PIPERACILLIN SODIUM,TAZOBACTAM SODIUM 3.38 G: 3; .375 INJECTION, POWDER, FOR SOLUTION INTRAVENOUS at 00:34

## 2019-01-01 RX ADMIN — METOPROLOL TARTRATE 25 MG: 25 TABLET ORAL at 10:10

## 2019-01-01 RX ADMIN — MYCOPHENOLATE MOFETIL 500 MG: 200 POWDER, FOR SUSPENSION ORAL at 11:13

## 2019-01-01 RX ADMIN — IPRATROPIUM BROMIDE AND ALBUTEROL SULFATE 3 ML: .5; 3 SOLUTION RESPIRATORY (INHALATION) at 15:41

## 2019-01-01 RX ADMIN — INSULIN LISPRO 4 UNITS: 100 INJECTION, SOLUTION INTRAVENOUS; SUBCUTANEOUS at 07:14

## 2019-01-01 RX ADMIN — ATORVASTATIN CALCIUM 40 MG: 40 TABLET, FILM COATED ORAL at 21:18

## 2019-01-01 RX ADMIN — INSULIN GLARGINE 40 UNITS: 100 INJECTION, SOLUTION SUBCUTANEOUS at 08:54

## 2019-01-01 RX ADMIN — ATORVASTATIN CALCIUM 40 MG: 40 TABLET, FILM COATED ORAL at 21:25

## 2019-01-01 RX ADMIN — DILTIAZEM HYDROCHLORIDE 90 MG: 60 TABLET, FILM COATED ORAL at 11:11

## 2019-01-01 RX ADMIN — HYDRALAZINE HYDROCHLORIDE 25 MG: 25 TABLET, FILM COATED ORAL at 09:05

## 2019-01-01 RX ADMIN — POTASSIUM CHLORIDE 20 MEQ: 10 TABLET, FILM COATED, EXTENDED RELEASE ORAL at 09:52

## 2019-01-01 RX ADMIN — METOPROLOL SUCCINATE 50 MG: 50 TABLET, EXTENDED RELEASE ORAL at 08:18

## 2019-01-01 RX ADMIN — METOPROLOL SUCCINATE 50 MG: 50 TABLET, EXTENDED RELEASE ORAL at 10:00

## 2019-01-01 RX ADMIN — INSULIN LISPRO 3 UNITS: 100 INJECTION, SOLUTION INTRAVENOUS; SUBCUTANEOUS at 12:15

## 2019-01-01 RX ADMIN — Medication 10 ML: at 14:07

## 2019-01-01 RX ADMIN — Medication 10 ML: at 14:45

## 2019-01-01 RX ADMIN — METOPROLOL TARTRATE 2.5 MG: 5 INJECTION, SOLUTION INTRAVENOUS at 08:32

## 2019-01-01 RX ADMIN — METHYLPREDNISOLONE SODIUM SUCCINATE 40 MG: 40 INJECTION, POWDER, FOR SOLUTION INTRAMUSCULAR; INTRAVENOUS at 18:19

## 2019-01-01 RX ADMIN — CEFEPIME HYDROCHLORIDE 2 G: 2 INJECTION, POWDER, FOR SOLUTION INTRAVENOUS at 11:31

## 2019-01-01 RX ADMIN — METOPROLOL SUCCINATE 50 MG: 50 TABLET, EXTENDED RELEASE ORAL at 09:11

## 2019-01-01 RX ADMIN — HEPARIN SODIUM 5000 UNITS: 5000 INJECTION INTRAVENOUS; SUBCUTANEOUS at 21:29

## 2019-01-01 RX ADMIN — MULTIPLE VITAMINS W/ MINERALS TAB 1 TABLET: TAB at 10:15

## 2019-01-01 RX ADMIN — PREDNISONE 30 MG: 20 TABLET ORAL at 17:13

## 2019-01-01 RX ADMIN — LORAZEPAM 1 MG: 2 INJECTION INTRAMUSCULAR; INTRAVENOUS at 14:40

## 2019-01-01 RX ADMIN — DILTIAZEM HYDROCHLORIDE 120 MG: 60 TABLET, FILM COATED ORAL at 21:38

## 2019-01-01 RX ADMIN — Medication 10 ML: at 21:26

## 2019-01-01 RX ADMIN — MYCOPHENOLATE MOFETIL 500 MG: 250 CAPSULE ORAL at 07:29

## 2019-01-01 RX ADMIN — INSULIN LISPRO 4 UNITS: 100 INJECTION, SOLUTION INTRAVENOUS; SUBCUTANEOUS at 06:49

## 2019-01-01 RX ADMIN — DEXTROSE MONOHYDRATE, SODIUM CHLORIDE, AND POTASSIUM CHLORIDE 60 ML/HR: 50; 4.5; .745 INJECTION, SOLUTION INTRAVENOUS at 17:04

## 2019-01-01 RX ADMIN — DILTIAZEM HYDROCHLORIDE 90 MG: 60 TABLET, FILM COATED ORAL at 00:11

## 2019-01-01 RX ADMIN — INSULIN GLARGINE 40 UNITS: 100 INJECTION, SOLUTION SUBCUTANEOUS at 10:05

## 2019-01-01 RX ADMIN — PIPERACILLIN SODIUM,TAZOBACTAM SODIUM 3.38 G: 3; .375 INJECTION, POWDER, FOR SOLUTION INTRAVENOUS at 08:19

## 2019-01-01 RX ADMIN — AMANTADINE HYDROCHLORIDE 100 MG: 100 CAPSULE ORAL at 17:17

## 2019-01-01 RX ADMIN — CEFEPIME HYDROCHLORIDE 2 G: 2 INJECTION, POWDER, FOR SOLUTION INTRAVENOUS at 17:13

## 2019-01-01 RX ADMIN — Medication 10 ML: at 08:43

## 2019-01-01 RX ADMIN — AMANTADINE HYDROCHLORIDE 100 MG: 100 CAPSULE ORAL at 15:32

## 2019-01-01 RX ADMIN — Medication 10 ML: at 13:21

## 2019-01-01 RX ADMIN — INSULIN LISPRO 7 UNITS: 100 INJECTION, SOLUTION INTRAVENOUS; SUBCUTANEOUS at 06:54

## 2019-01-01 RX ADMIN — INSULIN LISPRO 3 UNITS: 100 INJECTION, SOLUTION INTRAVENOUS; SUBCUTANEOUS at 16:53

## 2019-01-01 RX ADMIN — ENOXAPARIN SODIUM 80 MG: 80 INJECTION, SOLUTION INTRAVENOUS; SUBCUTANEOUS at 16:02

## 2019-01-01 RX ADMIN — ATORVASTATIN CALCIUM 40 MG: 40 TABLET, FILM COATED ORAL at 22:21

## 2019-01-01 RX ADMIN — METOPROLOL SUCCINATE 50 MG: 50 TABLET, EXTENDED RELEASE ORAL at 08:32

## 2019-01-01 RX ADMIN — INSULIN GLARGINE 20 UNITS: 100 INJECTION, SOLUTION SUBCUTANEOUS at 21:13

## 2019-01-01 RX ADMIN — PIPERACILLIN SODIUM,TAZOBACTAM SODIUM 3.38 G: 3; .375 INJECTION, POWDER, FOR SOLUTION INTRAVENOUS at 15:21

## 2019-01-01 RX ADMIN — Medication 10 ML: at 22:14

## 2019-01-01 RX ADMIN — AMLODIPINE BESYLATE 2.5 MG: 5 TABLET ORAL at 08:35

## 2019-01-01 RX ADMIN — FENTANYL CITRATE 25 MCG: 50 INJECTION, SOLUTION INTRAMUSCULAR; INTRAVENOUS at 07:35

## 2019-01-01 RX ADMIN — METOPROLOL SUCCINATE 50 MG: 50 TABLET, EXTENDED RELEASE ORAL at 09:17

## 2019-01-01 RX ADMIN — FLUTICASONE PROPIONATE 2 SPRAY: 50 SPRAY, METERED NASAL at 08:58

## 2019-01-01 RX ADMIN — INSULIN LISPRO 5 UNITS: 100 INJECTION, SOLUTION INTRAVENOUS; SUBCUTANEOUS at 17:33

## 2019-01-01 RX ADMIN — METOPROLOL TARTRATE 25 MG: 25 TABLET ORAL at 08:42

## 2019-01-01 RX ADMIN — Medication 1 CAPSULE: at 09:34

## 2019-01-01 RX ADMIN — VANCOMYCIN HYDROCHLORIDE 1250 MG: 10 INJECTION, POWDER, LYOPHILIZED, FOR SOLUTION INTRAVENOUS at 12:49

## 2019-01-01 RX ADMIN — APIXABAN 5 MG: 5 TABLET, FILM COATED ORAL at 21:23

## 2019-01-01 RX ADMIN — APIXABAN 5 MG: 5 TABLET, FILM COATED ORAL at 21:13

## 2019-01-01 RX ADMIN — ATORVASTATIN CALCIUM 40 MG: 40 TABLET, FILM COATED ORAL at 21:50

## 2019-01-01 RX ADMIN — Medication 10 ML: at 14:38

## 2019-01-01 RX ADMIN — METHYLPREDNISOLONE SODIUM SUCCINATE 60 MG: 40 INJECTION, POWDER, FOR SOLUTION INTRAMUSCULAR; INTRAVENOUS at 21:08

## 2019-01-01 RX ADMIN — Medication 10 ML: at 11:11

## 2019-01-01 RX ADMIN — SODIUM CHLORIDE 5 MG/HR: 900 INJECTION, SOLUTION INTRAVENOUS at 13:02

## 2019-01-01 RX ADMIN — FLUCONAZOLE, SODIUM CHLORIDE 400 MG: 2 INJECTION INTRAVENOUS at 13:44

## 2019-01-01 RX ADMIN — AMLODIPINE BESYLATE 2.5 MG: 5 TABLET ORAL at 08:17

## 2019-01-01 RX ADMIN — POTASSIUM CHLORIDE 20 MEQ: 750 TABLET, EXTENDED RELEASE ORAL at 14:20

## 2019-01-01 RX ADMIN — METHYLPREDNISOLONE SODIUM SUCCINATE 40 MG: 40 INJECTION, POWDER, FOR SOLUTION INTRAMUSCULAR; INTRAVENOUS at 07:02

## 2019-01-01 RX ADMIN — ATORVASTATIN CALCIUM 40 MG: 40 TABLET, FILM COATED ORAL at 22:12

## 2019-01-01 RX ADMIN — ATORVASTATIN CALCIUM 40 MG: 40 TABLET, FILM COATED ORAL at 22:37

## 2019-01-01 RX ADMIN — IPRATROPIUM BROMIDE AND ALBUTEROL SULFATE 3 ML: .5; 3 SOLUTION RESPIRATORY (INHALATION) at 21:14

## 2019-01-01 RX ADMIN — DILTIAZEM HYDROCHLORIDE 120 MG: 60 TABLET, FILM COATED ORAL at 23:27

## 2019-01-01 RX ADMIN — INSULIN LISPRO 10 UNITS: 100 INJECTION, SOLUTION INTRAVENOUS; SUBCUTANEOUS at 23:42

## 2019-01-01 RX ADMIN — METHYLPREDNISOLONE SODIUM SUCCINATE 60 MG: 40 INJECTION, POWDER, FOR SOLUTION INTRAMUSCULAR; INTRAVENOUS at 11:55

## 2019-01-01 RX ADMIN — AMANTADINE HYDROCHLORIDE 100 MG: 100 CAPSULE ORAL at 10:10

## 2019-01-01 RX ADMIN — APIXABAN 10 MG: 5 TABLET, FILM COATED ORAL at 21:34

## 2019-01-01 RX ADMIN — PREDNISONE 40 MG: 20 TABLET ORAL at 07:21

## 2019-01-01 RX ADMIN — AMLODIPINE BESYLATE 2.5 MG: 5 TABLET ORAL at 09:16

## 2019-01-01 RX ADMIN — FLUTICASONE PROPIONATE 2 SPRAY: 50 SPRAY, METERED NASAL at 08:18

## 2019-01-01 RX ADMIN — AMLODIPINE BESYLATE 2.5 MG: 5 TABLET ORAL at 09:33

## 2019-01-01 RX ADMIN — METHYLPREDNISOLONE SODIUM SUCCINATE 40 MG: 40 INJECTION, POWDER, FOR SOLUTION INTRAMUSCULAR; INTRAVENOUS at 13:16

## 2019-01-01 RX ADMIN — INSULIN LISPRO 3 UNITS: 100 INJECTION, SOLUTION INTRAVENOUS; SUBCUTANEOUS at 12:34

## 2019-01-01 RX ADMIN — APIXABAN 5 MG: 5 TABLET, FILM COATED ORAL at 09:05

## 2019-01-01 RX ADMIN — DEXTROSE MONOHYDRATE 125 ML: 10 INJECTION, SOLUTION INTRAVENOUS at 16:56

## 2019-01-01 RX ADMIN — FUROSEMIDE 40 MG: 40 TABLET ORAL at 08:32

## 2019-01-01 RX ADMIN — AMLODIPINE BESYLATE 2.5 MG: 5 TABLET ORAL at 08:48

## 2019-01-01 RX ADMIN — MYCOPHENOLATE MOFETIL 500 MG: 250 CAPSULE ORAL at 09:18

## 2019-01-01 RX ADMIN — SODIUM CHLORIDE 1000 MG: 900 INJECTION, SOLUTION INTRAVENOUS at 13:00

## 2019-01-01 RX ADMIN — SULFAMETHOXAZOLE AND TRIMETHOPRIM 1 TABLET: 800; 160 TABLET ORAL at 18:36

## 2019-01-01 RX ADMIN — INSULIN LISPRO 1 UNITS: 100 INJECTION, SOLUTION INTRAVENOUS; SUBCUTANEOUS at 21:28

## 2019-01-01 RX ADMIN — METOPROLOL SUCCINATE 50 MG: 50 TABLET, EXTENDED RELEASE ORAL at 09:19

## 2019-01-01 RX ADMIN — PIPERACILLIN SODIUM,TAZOBACTAM SODIUM 3.38 G: 3; .375 INJECTION, POWDER, FOR SOLUTION INTRAVENOUS at 08:23

## 2019-01-01 RX ADMIN — APIXABAN 5 MG: 5 TABLET, FILM COATED ORAL at 21:42

## 2019-01-01 RX ADMIN — Medication 10 ML: at 21:14

## 2019-01-01 RX ADMIN — INSULIN GLARGINE 16 UNITS: 100 INJECTION, SOLUTION SUBCUTANEOUS at 21:43

## 2019-01-01 RX ADMIN — ALBUTEROL SULFATE 2.5 MG: 2.5 SOLUTION RESPIRATORY (INHALATION) at 14:09

## 2019-01-01 RX ADMIN — INSULIN LISPRO 5 UNITS: 100 INJECTION, SOLUTION INTRAVENOUS; SUBCUTANEOUS at 12:00

## 2019-01-01 RX ADMIN — DILTIAZEM HYDROCHLORIDE 120 MG: 60 TABLET, FILM COATED ORAL at 21:16

## 2019-01-01 RX ADMIN — PREDNISONE 30 MG: 20 TABLET ORAL at 16:16

## 2019-01-01 RX ADMIN — MYCOPHENOLATE MOFETIL 500 MG: 200 POWDER, FOR SUSPENSION ORAL at 11:41

## 2019-01-01 RX ADMIN — ENOXAPARIN SODIUM 80 MG: 80 INJECTION, SOLUTION INTRAVENOUS; SUBCUTANEOUS at 17:43

## 2019-01-01 RX ADMIN — MORPHINE SULFATE 2 MG: 2 INJECTION, SOLUTION INTRAMUSCULAR; INTRAVENOUS at 02:28

## 2019-01-01 RX ADMIN — POTASSIUM CHLORIDE 40 MEQ: 750 TABLET, EXTENDED RELEASE ORAL at 18:31

## 2019-01-01 RX ADMIN — METOPROLOL SUCCINATE 50 MG: 50 TABLET, EXTENDED RELEASE ORAL at 08:58

## 2019-01-01 RX ADMIN — AMLODIPINE BESYLATE 2.5 MG: 5 TABLET ORAL at 09:15

## 2019-01-01 RX ADMIN — ALBUTEROL SULFATE 2.5 MG: 2.5 SOLUTION RESPIRATORY (INHALATION) at 13:24

## 2019-01-01 RX ADMIN — Medication 10 ML: at 13:18

## 2019-01-01 RX ADMIN — APIXABAN 5 MG: 5 TABLET, FILM COATED ORAL at 21:08

## 2019-01-01 RX ADMIN — INSULIN GLARGINE 20 UNITS: 100 INJECTION, SOLUTION SUBCUTANEOUS at 21:53

## 2019-01-01 RX ADMIN — Medication: at 12:07

## 2019-01-01 RX ADMIN — MYCOPHENOLATE MOFETIL 250 MG: 250 CAPSULE ORAL at 17:26

## 2019-01-01 RX ADMIN — ATORVASTATIN CALCIUM 40 MG: 40 TABLET, FILM COATED ORAL at 21:10

## 2019-01-01 RX ADMIN — APIXABAN 10 MG: 5 TABLET, FILM COATED ORAL at 09:34

## 2019-01-01 RX ADMIN — FLUTICASONE PROPIONATE 2 SPRAY: 50 SPRAY, METERED NASAL at 08:26

## 2019-01-01 RX ADMIN — PREDNISONE 40 MG: 20 TABLET ORAL at 07:40

## 2019-01-01 RX ADMIN — Medication 1 CAPSULE: at 18:51

## 2019-01-01 RX ADMIN — METOPROLOL SUCCINATE 50 MG: 50 TABLET, EXTENDED RELEASE ORAL at 09:28

## 2019-01-01 RX ADMIN — APIXABAN 5 MG: 5 TABLET, FILM COATED ORAL at 08:36

## 2019-01-01 RX ADMIN — AMLODIPINE BESYLATE 2.5 MG: 5 TABLET ORAL at 08:46

## 2019-01-01 RX ADMIN — PIPERACILLIN SODIUM,TAZOBACTAM SODIUM 3.38 G: 3; .375 INJECTION, POWDER, FOR SOLUTION INTRAVENOUS at 18:51

## 2019-01-01 RX ADMIN — INSULIN LISPRO 12 UNITS: 100 INJECTION, SOLUTION INTRAVENOUS; SUBCUTANEOUS at 13:06

## 2019-01-01 RX ADMIN — INSULIN GLARGINE 14 UNITS: 100 INJECTION, SOLUTION SUBCUTANEOUS at 08:24

## 2019-01-01 RX ADMIN — Medication 10 ML: at 15:42

## 2019-01-01 RX ADMIN — ATORVASTATIN CALCIUM 40 MG: 40 TABLET, FILM COATED ORAL at 20:31

## 2019-01-01 RX ADMIN — INSULIN LISPRO 7 UNITS: 100 INJECTION, SOLUTION INTRAVENOUS; SUBCUTANEOUS at 12:05

## 2019-01-01 RX ADMIN — FENTANYL CITRATE 25 MCG: 50 INJECTION, SOLUTION INTRAMUSCULAR; INTRAVENOUS at 17:23

## 2019-01-01 RX ADMIN — APIXABAN 5 MG: 5 TABLET, FILM COATED ORAL at 09:20

## 2019-01-01 RX ADMIN — MYCOPHENOLATE MOFETIL 500 MG: 250 CAPSULE ORAL at 21:38

## 2019-01-01 RX ADMIN — METHYLPREDNISOLONE SODIUM SUCCINATE 60 MG: 40 INJECTION, POWDER, FOR SOLUTION INTRAMUSCULAR; INTRAVENOUS at 12:22

## 2019-01-01 RX ADMIN — INSULIN LISPRO 7 UNITS: 100 INJECTION, SOLUTION INTRAVENOUS; SUBCUTANEOUS at 14:54

## 2019-01-01 RX ADMIN — MULTIPLE VITAMINS W/ MINERALS TAB 1 TABLET: TAB at 10:10

## 2019-01-01 RX ADMIN — METOPROLOL SUCCINATE 50 MG: 50 TABLET, EXTENDED RELEASE ORAL at 09:10

## 2019-01-01 RX ADMIN — DILTIAZEM HYDROCHLORIDE 120 MG: 60 TABLET, FILM COATED ORAL at 19:10

## 2019-01-01 RX ADMIN — METOPROLOL TARTRATE 25 MG: 25 TABLET ORAL at 10:15

## 2019-01-01 RX ADMIN — SODIUM CHLORIDE 100 ML: 900 INJECTION, SOLUTION INTRAVENOUS at 19:20

## 2019-01-01 RX ADMIN — AMLODIPINE BESYLATE 2.5 MG: 5 TABLET ORAL at 09:28

## 2019-01-01 RX ADMIN — MORPHINE SULFATE 2 MG: 2 INJECTION, SOLUTION INTRAMUSCULAR; INTRAVENOUS at 17:57

## 2019-01-01 RX ADMIN — ALBUTEROL SULFATE 2.5 MG: 2.5 SOLUTION RESPIRATORY (INHALATION) at 08:10

## 2019-01-01 RX ADMIN — Medication 100 MG: at 08:47

## 2019-01-01 RX ADMIN — ALBUTEROL SULFATE 2.5 MG: 2.5 SOLUTION RESPIRATORY (INHALATION) at 08:04

## 2019-01-01 RX ADMIN — SODIUM CHLORIDE 1000 MG: 900 INJECTION, SOLUTION INTRAVENOUS at 01:31

## 2019-01-01 RX ADMIN — ATORVASTATIN CALCIUM 40 MG: 40 TABLET, FILM COATED ORAL at 21:21

## 2019-01-01 RX ADMIN — HYDRALAZINE HYDROCHLORIDE 25 MG: 25 TABLET, FILM COATED ORAL at 21:07

## 2019-01-01 RX ADMIN — AMANTADINE HYDROCHLORIDE 100 MG: 100 CAPSULE ORAL at 09:03

## 2019-01-01 RX ADMIN — HEPARIN SODIUM 5000 UNITS: 5000 INJECTION INTRAVENOUS; SUBCUTANEOUS at 15:05

## 2019-01-01 RX ADMIN — Medication 10 ML: at 21:32

## 2019-01-01 RX ADMIN — IPRATROPIUM BROMIDE AND ALBUTEROL SULFATE 3 ML: .5; 3 SOLUTION RESPIRATORY (INHALATION) at 08:43

## 2019-01-01 RX ADMIN — METHYLPREDNISOLONE SODIUM SUCCINATE 60 MG: 125 INJECTION, POWDER, FOR SOLUTION INTRAMUSCULAR; INTRAVENOUS at 00:34

## 2019-01-01 RX ADMIN — METHYLPREDNISOLONE SODIUM SUCCINATE 30 MG: 40 INJECTION, POWDER, FOR SOLUTION INTRAMUSCULAR; INTRAVENOUS at 21:13

## 2019-01-01 RX ADMIN — ATORVASTATIN CALCIUM 40 MG: 40 TABLET, FILM COATED ORAL at 21:09

## 2019-01-01 RX ADMIN — DILTIAZEM HYDROCHLORIDE 60 MG: 60 TABLET, FILM COATED ORAL at 10:45

## 2019-01-01 RX ADMIN — INSULIN LISPRO 3 UNITS: 100 INJECTION, SOLUTION INTRAVENOUS; SUBCUTANEOUS at 17:43

## 2019-01-01 RX ADMIN — MYCOPHENOLATE MOFETIL 250 MG: 250 CAPSULE ORAL at 17:20

## 2019-01-01 RX ADMIN — INSULIN LISPRO 3 UNITS: 100 INJECTION, SOLUTION INTRAVENOUS; SUBCUTANEOUS at 17:24

## 2019-01-01 RX ADMIN — CEFEPIME HYDROCHLORIDE 2 G: 2 INJECTION, POWDER, FOR SOLUTION INTRAVENOUS at 02:30

## 2019-01-01 RX ADMIN — SALINE NASAL SPRAY 2 SPRAY: 1.5 SOLUTION NASAL at 21:47

## 2019-01-01 RX ADMIN — DEXTROSE MONOHYDRATE, SODIUM CHLORIDE, AND POTASSIUM CHLORIDE 60 ML/HR: 50; 4.5; .745 INJECTION, SOLUTION INTRAVENOUS at 13:23

## 2019-01-01 RX ADMIN — Medication 10 ML: at 06:16

## 2019-01-01 RX ADMIN — Medication 10 ML: at 06:30

## 2019-01-01 RX ADMIN — IPRATROPIUM BROMIDE AND ALBUTEROL SULFATE 3 ML: .5; 3 SOLUTION RESPIRATORY (INHALATION) at 21:31

## 2019-01-01 RX ADMIN — FENTANYL CITRATE 50 MCG: 50 INJECTION, SOLUTION INTRAMUSCULAR; INTRAVENOUS at 13:53

## 2019-01-01 RX ADMIN — DILTIAZEM HYDROCHLORIDE 30 MG: 30 TABLET, FILM COATED ORAL at 06:30

## 2019-01-01 RX ADMIN — Medication 20 ML: at 14:00

## 2019-01-01 RX ADMIN — METHYLPREDNISOLONE SODIUM SUCCINATE 60 MG: 40 INJECTION, POWDER, FOR SOLUTION INTRAMUSCULAR; INTRAVENOUS at 20:35

## 2019-01-01 RX ADMIN — ALBUTEROL SULFATE 2.5 MG: 2.5 SOLUTION RESPIRATORY (INHALATION) at 08:01

## 2019-01-01 RX ADMIN — Medication 10 ML: at 21:42

## 2019-01-01 RX ADMIN — APIXABAN 5 MG: 5 TABLET, FILM COATED ORAL at 09:34

## 2019-01-01 RX ADMIN — Medication 10 ML: at 15:56

## 2019-01-01 RX ADMIN — GLYCOPYRROLATE 0.2 MG: 0.2 INJECTION, SOLUTION INTRAMUSCULAR; INTRAVENOUS at 00:42

## 2019-01-01 RX ADMIN — FLUCONAZOLE, SODIUM CHLORIDE 400 MG: 2 INJECTION INTRAVENOUS at 14:00

## 2019-01-01 RX ADMIN — ATORVASTATIN CALCIUM 40 MG: 40 TABLET, FILM COATED ORAL at 21:08

## 2019-01-01 RX ADMIN — ALBUTEROL SULFATE 2.5 MG: 2.5 SOLUTION RESPIRATORY (INHALATION) at 13:03

## 2019-01-01 RX ADMIN — FENTANYL CITRATE 25 MCG: 50 INJECTION, SOLUTION INTRAMUSCULAR; INTRAVENOUS at 05:50

## 2019-01-01 RX ADMIN — INSULIN GLARGINE 16 UNITS: 100 INJECTION, SOLUTION SUBCUTANEOUS at 21:54

## 2019-01-01 RX ADMIN — Medication 10 ML: at 14:24

## 2019-01-01 RX ADMIN — DILTIAZEM HYDROCHLORIDE 120 MG: 60 TABLET, FILM COATED ORAL at 00:17

## 2019-01-01 RX ADMIN — IPRATROPIUM BROMIDE AND ALBUTEROL SULFATE 3 ML: .5; 3 SOLUTION RESPIRATORY (INHALATION) at 19:56

## 2019-01-01 RX ADMIN — PREDNISONE 30 MG: 20 TABLET ORAL at 17:17

## 2019-01-01 RX ADMIN — MORPHINE SULFATE 2 MG: 2 INJECTION, SOLUTION INTRAMUSCULAR; INTRAVENOUS at 15:22

## 2019-01-01 RX ADMIN — Medication 1 CAPSULE: at 10:40

## 2019-01-01 RX ADMIN — INSULIN LISPRO 5 UNITS: 100 INJECTION, SOLUTION INTRAVENOUS; SUBCUTANEOUS at 08:28

## 2019-01-01 RX ADMIN — MYCOPHENOLATE MOFETIL 500 MG: 250 CAPSULE ORAL at 07:02

## 2019-01-01 RX ADMIN — Medication: at 16:33

## 2019-01-01 RX ADMIN — INSULIN LISPRO 3 UNITS: 100 INJECTION, SOLUTION INTRAVENOUS; SUBCUTANEOUS at 12:05

## 2019-01-01 RX ADMIN — Medication 10 ML: at 14:47

## 2019-01-01 RX ADMIN — AMANTADINE HYDROCHLORIDE 100 MG: 100 CAPSULE ORAL at 17:34

## 2019-01-01 RX ADMIN — MYCOPHENOLATE MOFETIL 500 MG: 250 CAPSULE ORAL at 06:59

## 2019-01-01 RX ADMIN — Medication 10 ML: at 15:09

## 2019-01-01 RX ADMIN — METOPROLOL SUCCINATE 50 MG: 50 TABLET, EXTENDED RELEASE ORAL at 08:17

## 2019-01-01 RX ADMIN — INSULIN LISPRO 14 UNITS: 100 INJECTION, SOLUTION INTRAVENOUS; SUBCUTANEOUS at 00:50

## 2019-01-01 RX ADMIN — SODIUM CHLORIDE 100 ML: 900 INJECTION, SOLUTION INTRAVENOUS at 13:00

## 2019-01-01 RX ADMIN — INSULIN LISPRO 5 UNITS: 100 INJECTION, SOLUTION INTRAVENOUS; SUBCUTANEOUS at 12:34

## 2019-01-01 RX ADMIN — METHYLPREDNISOLONE SODIUM SUCCINATE 30 MG: 40 INJECTION, POWDER, FOR SOLUTION INTRAMUSCULAR; INTRAVENOUS at 13:21

## 2019-01-01 RX ADMIN — Medication 10 ML: at 21:46

## 2019-01-01 RX ADMIN — IPRATROPIUM BROMIDE AND ALBUTEROL SULFATE 3 ML: .5; 3 SOLUTION RESPIRATORY (INHALATION) at 07:55

## 2019-01-01 RX ADMIN — Medication 10 ML: at 06:29

## 2019-01-01 RX ADMIN — METHYLPREDNISOLONE SODIUM SUCCINATE 60 MG: 125 INJECTION, POWDER, FOR SOLUTION INTRAMUSCULAR; INTRAVENOUS at 06:29

## 2019-01-01 RX ADMIN — INSULIN GLARGINE 20 UNITS: 100 INJECTION, SOLUTION SUBCUTANEOUS at 09:16

## 2019-01-01 RX ADMIN — ATORVASTATIN CALCIUM 40 MG: 40 TABLET, FILM COATED ORAL at 21:12

## 2019-01-01 RX ADMIN — METHYLPREDNISOLONE SODIUM SUCCINATE 60 MG: 125 INJECTION, POWDER, FOR SOLUTION INTRAMUSCULAR; INTRAVENOUS at 17:26

## 2019-01-01 RX ADMIN — FENTANYL CITRATE 25 MCG: 50 INJECTION, SOLUTION INTRAMUSCULAR; INTRAVENOUS at 03:07

## 2019-01-01 RX ADMIN — Medication 10 ML: at 18:14

## 2019-01-01 RX ADMIN — INSULIN LISPRO 5 UNITS: 100 INJECTION, SOLUTION INTRAVENOUS; SUBCUTANEOUS at 17:28

## 2019-01-01 RX ADMIN — METOPROLOL TARTRATE 2.5 MG: 5 INJECTION, SOLUTION INTRAVENOUS at 17:36

## 2019-01-01 RX ADMIN — AMLODIPINE BESYLATE 2.5 MG: 5 TABLET ORAL at 08:58

## 2019-01-01 RX ADMIN — FUROSEMIDE 20 MG: 10 INJECTION, SOLUTION INTRAMUSCULAR; INTRAVENOUS at 15:55

## 2019-01-01 RX ADMIN — METOPROLOL TARTRATE 12.5 MG: 25 TABLET ORAL at 09:13

## 2019-01-01 RX ADMIN — ALBUTEROL SULFATE 2.5 MG: 2.5 SOLUTION RESPIRATORY (INHALATION) at 08:21

## 2019-01-01 RX ADMIN — DEXTROSE MONOHYDRATE 25 G: 500 INJECTION PARENTERAL at 11:55

## 2019-01-01 RX ADMIN — ALPRAZOLAM 0.25 MG: 0.25 TABLET ORAL at 10:45

## 2019-01-01 RX ADMIN — INSULIN GLARGINE 20 UNITS: 100 INJECTION, SOLUTION SUBCUTANEOUS at 22:12

## 2019-01-01 RX ADMIN — Medication 10 ML: at 21:40

## 2019-01-01 RX ADMIN — DILTIAZEM HYDROCHLORIDE 120 MG: 60 TABLET, FILM COATED ORAL at 10:44

## 2019-01-01 RX ADMIN — Medication 10 ML: at 18:08

## 2019-01-01 RX ADMIN — IPRATROPIUM BROMIDE AND ALBUTEROL SULFATE 3 ML: .5; 3 SOLUTION RESPIRATORY (INHALATION) at 12:28

## 2019-01-01 RX ADMIN — ALBUTEROL SULFATE 2.5 MG: 2.5 SOLUTION RESPIRATORY (INHALATION) at 20:56

## 2019-01-01 RX ADMIN — AMANTADINE HYDROCHLORIDE 100 MG: 100 CAPSULE ORAL at 17:24

## 2019-01-01 RX ADMIN — MORPHINE SULFATE 2 MG: 2 INJECTION, SOLUTION INTRAMUSCULAR; INTRAVENOUS at 18:08

## 2019-01-01 RX ADMIN — IPRATROPIUM BROMIDE AND ALBUTEROL SULFATE 3 ML: .5; 3 SOLUTION RESPIRATORY (INHALATION) at 08:35

## 2019-01-01 RX ADMIN — APIXABAN 5 MG: 5 TABLET, FILM COATED ORAL at 21:46

## 2019-01-01 RX ADMIN — ATORVASTATIN CALCIUM 40 MG: 40 TABLET, FILM COATED ORAL at 21:43

## 2019-01-01 RX ADMIN — APIXABAN 5 MG: 5 TABLET, FILM COATED ORAL at 21:31

## 2019-01-01 RX ADMIN — HEPARIN SODIUM 5000 UNITS: 5000 INJECTION INTRAVENOUS; SUBCUTANEOUS at 05:27

## 2019-01-01 RX ADMIN — APIXABAN 5 MG: 5 TABLET, FILM COATED ORAL at 08:48

## 2019-01-01 RX ADMIN — INSULIN LISPRO 3 UNITS: 100 INJECTION, SOLUTION INTRAVENOUS; SUBCUTANEOUS at 12:45

## 2019-01-01 RX ADMIN — ATORVASTATIN CALCIUM 40 MG: 40 TABLET, FILM COATED ORAL at 21:35

## 2019-01-01 RX ADMIN — ATORVASTATIN CALCIUM 40 MG: 40 TABLET, FILM COATED ORAL at 21:13

## 2019-01-01 RX ADMIN — HYDRALAZINE HYDROCHLORIDE 25 MG: 25 TABLET, FILM COATED ORAL at 09:21

## 2019-01-01 RX ADMIN — PREDNISONE 30 MG: 20 TABLET ORAL at 18:12

## 2019-01-01 RX ADMIN — Medication 10 ML: at 22:19

## 2019-01-01 RX ADMIN — FENTANYL CITRATE 25 MCG: 50 INJECTION, SOLUTION INTRAMUSCULAR; INTRAVENOUS at 07:17

## 2019-01-01 RX ADMIN — INSULIN GLARGINE 14 UNITS: 100 INJECTION, SOLUTION SUBCUTANEOUS at 10:00

## 2019-01-01 RX ADMIN — INSULIN LISPRO 3 UNITS: 100 INJECTION, SOLUTION INTRAVENOUS; SUBCUTANEOUS at 08:15

## 2019-01-01 RX ADMIN — METHYLPREDNISOLONE SODIUM SUCCINATE 60 MG: 40 INJECTION, POWDER, FOR SOLUTION INTRAMUSCULAR; INTRAVENOUS at 03:16

## 2019-01-01 RX ADMIN — AMLODIPINE BESYLATE 2.5 MG: 5 TABLET ORAL at 09:20

## 2019-01-01 RX ADMIN — INSULIN LISPRO 2 UNITS: 100 INJECTION, SOLUTION INTRAVENOUS; SUBCUTANEOUS at 18:35

## 2019-01-01 RX ADMIN — Medication 1 CAPSULE: at 09:11

## 2019-01-01 RX ADMIN — POTASSIUM CHLORIDE 40 MEQ: 750 TABLET, EXTENDED RELEASE ORAL at 09:04

## 2019-01-01 RX ADMIN — LEVOFLOXACIN 750 MG: 5 INJECTION, SOLUTION INTRAVENOUS at 15:27

## 2019-01-01 RX ADMIN — ALPRAZOLAM 0.25 MG: 0.25 TABLET ORAL at 10:26

## 2019-01-01 RX ADMIN — MYCOPHENOLATE MOFETIL 500 MG: 250 CAPSULE ORAL at 16:53

## 2019-01-01 RX ADMIN — NALOXONE HYDROCHLORIDE 0.4 MG: 0.4 INJECTION, SOLUTION INTRAMUSCULAR; INTRAVENOUS; SUBCUTANEOUS at 03:16

## 2019-01-01 RX ADMIN — METHYLPREDNISOLONE SODIUM SUCCINATE 40 MG: 40 INJECTION, POWDER, FOR SOLUTION INTRAMUSCULAR; INTRAVENOUS at 16:57

## 2019-01-01 RX ADMIN — Medication 10 ML: at 17:32

## 2019-01-01 RX ADMIN — FENTANYL CITRATE 25 MCG: 50 INJECTION, SOLUTION INTRAMUSCULAR; INTRAVENOUS at 13:58

## 2019-01-01 RX ADMIN — METHYLPREDNISOLONE SODIUM SUCCINATE 40 MG: 40 INJECTION, POWDER, FOR SOLUTION INTRAMUSCULAR; INTRAVENOUS at 03:04

## 2019-01-01 RX ADMIN — Medication 1 CAPSULE: at 08:37

## 2019-01-01 RX ADMIN — DILTIAZEM HYDROCHLORIDE 120 MG: 60 TABLET, FILM COATED ORAL at 16:54

## 2019-01-01 RX ADMIN — METOPROLOL SUCCINATE 50 MG: 50 TABLET, EXTENDED RELEASE ORAL at 08:49

## 2019-01-01 RX ADMIN — PREDNISONE 30 MG: 20 TABLET ORAL at 09:10

## 2019-01-01 RX ADMIN — PREDNISONE 30 MG: 20 TABLET ORAL at 09:28

## 2019-01-01 RX ADMIN — INSULIN LISPRO 4 UNITS: 100 INJECTION, SOLUTION INTRAVENOUS; SUBCUTANEOUS at 06:25

## 2019-01-01 RX ADMIN — METHYLPREDNISOLONE SODIUM SUCCINATE 80 MG: 125 INJECTION, POWDER, FOR SOLUTION INTRAMUSCULAR; INTRAVENOUS at 01:50

## 2019-01-01 RX ADMIN — INSULIN GLARGINE 80 UNITS: 100 INJECTION, SOLUTION SUBCUTANEOUS at 21:47

## 2019-01-01 RX ADMIN — HEPARIN SODIUM 5000 UNITS: 5000 INJECTION INTRAVENOUS; SUBCUTANEOUS at 22:08

## 2019-01-01 RX ADMIN — Medication 10 ML: at 21:07

## 2019-01-01 RX ADMIN — FUROSEMIDE 20 MG: 10 INJECTION, SOLUTION INTRAMUSCULAR; INTRAVENOUS at 08:13

## 2019-01-01 RX ADMIN — METOPROLOL TARTRATE 25 MG: 25 TABLET ORAL at 09:07

## 2019-01-01 RX ADMIN — SODIUM CHLORIDE 1000 MG: 900 INJECTION, SOLUTION INTRAVENOUS at 13:26

## 2019-01-01 RX ADMIN — Medication 10 ML: at 20:32

## 2019-01-01 RX ADMIN — INSULIN LISPRO 10 UNITS: 100 INJECTION, SOLUTION INTRAVENOUS; SUBCUTANEOUS at 18:25

## 2019-01-01 RX ADMIN — MYCOPHENOLATE MOFETIL 500 MG: 250 CAPSULE ORAL at 17:01

## 2019-01-01 RX ADMIN — FUROSEMIDE 40 MG: 40 TABLET ORAL at 09:16

## 2019-01-01 RX ADMIN — ALBUTEROL SULFATE 2.5 MG: 2.5 SOLUTION RESPIRATORY (INHALATION) at 15:08

## 2019-01-01 RX ADMIN — METOPROLOL SUCCINATE 50 MG: 50 TABLET, EXTENDED RELEASE ORAL at 08:35

## 2019-01-01 RX ADMIN — METFORMIN HYDROCHLORIDE 500 MG: 500 TABLET ORAL at 17:13

## 2019-01-01 RX ADMIN — DILTIAZEM HYDROCHLORIDE 120 MG: 60 TABLET, FILM COATED ORAL at 11:22

## 2019-01-01 RX ADMIN — INSULIN LISPRO 10 UNITS: 100 INJECTION, SOLUTION INTRAVENOUS; SUBCUTANEOUS at 12:31

## 2019-01-01 RX ADMIN — METOPROLOL SUCCINATE 50 MG: 50 TABLET, EXTENDED RELEASE ORAL at 09:21

## 2019-01-01 RX ADMIN — MORPHINE SULFATE 2 MG: 2 INJECTION, SOLUTION INTRAMUSCULAR; INTRAVENOUS at 00:42

## 2019-01-01 RX ADMIN — PREDNISONE 30 MG: 20 TABLET ORAL at 09:34

## 2019-01-01 RX ADMIN — VANCOMYCIN HYDROCHLORIDE 1250 MG: 10 INJECTION, POWDER, LYOPHILIZED, FOR SOLUTION INTRAVENOUS at 22:34

## 2019-01-01 RX ADMIN — DILTIAZEM HYDROCHLORIDE 120 MG: 60 TABLET, FILM COATED ORAL at 15:14

## 2019-01-01 RX ADMIN — PREDNISONE 30 MG: 20 TABLET ORAL at 08:59

## 2019-01-01 RX ADMIN — AMLODIPINE BESYLATE 2.5 MG: 5 TABLET ORAL at 09:11

## 2019-01-01 RX ADMIN — AMLODIPINE BESYLATE 2.5 MG: 5 TABLET ORAL at 08:19

## 2019-01-01 RX ADMIN — Medication 1 CAPSULE: at 09:21

## 2019-01-01 RX ADMIN — FENTANYL CITRATE 50 MCG: 50 INJECTION, SOLUTION INTRAMUSCULAR; INTRAVENOUS at 13:56

## 2019-01-01 RX ADMIN — APIXABAN 5 MG: 5 TABLET, FILM COATED ORAL at 21:11

## 2019-01-01 RX ADMIN — METOPROLOL TARTRATE 25 MG: 25 TABLET ORAL at 21:21

## 2019-01-01 RX ADMIN — METHYLPREDNISOLONE SODIUM SUCCINATE 20 MG: 40 INJECTION, POWDER, FOR SOLUTION INTRAMUSCULAR; INTRAVENOUS at 08:57

## 2019-01-01 RX ADMIN — VANCOMYCIN HYDROCHLORIDE 1500 MG: 10 INJECTION, POWDER, LYOPHILIZED, FOR SOLUTION INTRAVENOUS at 10:37

## 2019-01-01 RX ADMIN — MYCOPHENOLATE MOFETIL 500 MG: 250 CAPSULE ORAL at 16:49

## 2019-01-01 RX ADMIN — Medication 1 CAPSULE: at 09:04

## 2019-01-01 RX ADMIN — MYCOPHENOLATE MOFETIL 500 MG: 200 POWDER, FOR SUSPENSION ORAL at 10:24

## 2019-01-01 RX ADMIN — APIXABAN 5 MG: 5 TABLET, FILM COATED ORAL at 21:45

## 2019-01-01 RX ADMIN — CEFEPIME HYDROCHLORIDE 2 G: 2 INJECTION, POWDER, FOR SOLUTION INTRAVENOUS at 15:35

## 2019-01-01 RX ADMIN — CEFEPIME HYDROCHLORIDE 2 G: 2 INJECTION, POWDER, FOR SOLUTION INTRAVENOUS at 02:25

## 2019-01-01 RX ADMIN — INSULIN GLARGINE 16 UNITS: 100 INJECTION, SOLUTION SUBCUTANEOUS at 21:03

## 2019-01-01 RX ADMIN — DEXTROSE MONOHYDRATE 25 G: 500 INJECTION PARENTERAL at 21:15

## 2019-01-01 RX ADMIN — INSULIN LISPRO 3 UNITS: 100 INJECTION, SOLUTION INTRAVENOUS; SUBCUTANEOUS at 21:42

## 2019-01-01 RX ADMIN — PIPERACILLIN SODIUM,TAZOBACTAM SODIUM 3.38 G: 3; .375 INJECTION, POWDER, FOR SOLUTION INTRAVENOUS at 09:15

## 2019-01-01 RX ADMIN — HEPARIN SODIUM 5000 UNITS: 5000 INJECTION INTRAVENOUS; SUBCUTANEOUS at 06:18

## 2019-01-01 RX ADMIN — Medication 10 ML: at 21:13

## 2019-01-01 RX ADMIN — METOPROLOL TARTRATE 25 MG: 25 TABLET ORAL at 21:09

## 2019-01-01 RX ADMIN — METHYLPREDNISOLONE SODIUM SUCCINATE 40 MG: 40 INJECTION, POWDER, FOR SOLUTION INTRAMUSCULAR; INTRAVENOUS at 08:47

## 2019-01-01 RX ADMIN — MIDAZOLAM HYDROCHLORIDE 2 MG: 1 INJECTION, SOLUTION INTRAMUSCULAR; INTRAVENOUS at 13:50

## 2019-01-01 RX ADMIN — Medication 10 ML: at 05:51

## 2019-01-01 RX ADMIN — DILTIAZEM HYDROCHLORIDE 120 MG: 60 TABLET, FILM COATED ORAL at 15:25

## 2019-01-01 RX ADMIN — PREDNISONE 30 MG: 20 TABLET ORAL at 17:26

## 2019-01-01 RX ADMIN — INSULIN LISPRO 7 UNITS: 100 INJECTION, SOLUTION INTRAVENOUS; SUBCUTANEOUS at 17:25

## 2019-01-01 RX ADMIN — METHYLPREDNISOLONE SODIUM SUCCINATE 20 MG: 40 INJECTION, POWDER, FOR SOLUTION INTRAMUSCULAR; INTRAVENOUS at 06:45

## 2019-01-01 RX ADMIN — Medication 10 ML: at 21:51

## 2019-01-01 RX ADMIN — IPRATROPIUM BROMIDE AND ALBUTEROL SULFATE 3 ML: .5; 3 SOLUTION RESPIRATORY (INHALATION) at 15:23

## 2019-01-01 RX ADMIN — METHYLPREDNISOLONE SODIUM SUCCINATE 80 MG: 125 INJECTION, POWDER, FOR SOLUTION INTRAMUSCULAR; INTRAVENOUS at 23:48

## 2019-01-01 RX ADMIN — CEFEPIME HYDROCHLORIDE 2 G: 2 INJECTION, POWDER, FOR SOLUTION INTRAVENOUS at 14:30

## 2019-01-01 RX ADMIN — METHYLPREDNISOLONE SODIUM SUCCINATE 40 MG: 40 INJECTION, POWDER, FOR SOLUTION INTRAMUSCULAR; INTRAVENOUS at 19:26

## 2019-01-01 RX ADMIN — METOPROLOL TARTRATE 2.5 MG: 5 INJECTION INTRAVENOUS at 22:20

## 2019-01-01 RX ADMIN — Medication 1 CAPSULE: at 09:13

## 2019-01-01 RX ADMIN — Medication 10 ML: at 01:59

## 2019-01-01 RX ADMIN — INSULIN GLARGINE 14 UNITS: 100 INJECTION, SOLUTION SUBCUTANEOUS at 09:19

## 2019-01-01 RX ADMIN — APIXABAN 5 MG: 5 TABLET, FILM COATED ORAL at 09:04

## 2019-01-01 RX ADMIN — ATORVASTATIN CALCIUM 40 MG: 40 TABLET, FILM COATED ORAL at 21:58

## 2019-01-01 RX ADMIN — INSULIN GLARGINE 40 UNITS: 100 INJECTION, SOLUTION SUBCUTANEOUS at 09:28

## 2019-01-01 RX ADMIN — IPRATROPIUM BROMIDE AND ALBUTEROL SULFATE 3 ML: .5; 3 SOLUTION RESPIRATORY (INHALATION) at 18:47

## 2019-01-01 RX ADMIN — DILTIAZEM HYDROCHLORIDE 90 MG: 60 TABLET, FILM COATED ORAL at 21:30

## 2019-01-01 RX ADMIN — METHYLPREDNISOLONE SODIUM SUCCINATE 30 MG: 40 INJECTION, POWDER, FOR SOLUTION INTRAMUSCULAR; INTRAVENOUS at 06:47

## 2019-01-01 RX ADMIN — Medication 1 CAPSULE: at 08:19

## 2019-01-01 RX ADMIN — METHYLPREDNISOLONE SODIUM SUCCINATE 80 MG: 125 INJECTION, POWDER, FOR SOLUTION INTRAMUSCULAR; INTRAVENOUS at 17:31

## 2019-01-01 RX ADMIN — MYCOPHENOLATE MOFETIL 250 MG: 250 CAPSULE ORAL at 06:54

## 2019-01-01 RX ADMIN — Medication 10 ML: at 21:45

## 2019-01-01 RX ADMIN — VANCOMYCIN HYDROCHLORIDE 1250 MG: 10 INJECTION, POWDER, LYOPHILIZED, FOR SOLUTION INTRAVENOUS at 13:43

## 2019-01-01 RX ADMIN — INSULIN LISPRO 10 UNITS: 100 INJECTION, SOLUTION INTRAVENOUS; SUBCUTANEOUS at 06:36

## 2019-01-01 RX ADMIN — INSULIN LISPRO 5 UNITS: 100 INJECTION, SOLUTION INTRAVENOUS; SUBCUTANEOUS at 16:37

## 2019-01-01 RX ADMIN — ALPRAZOLAM 0.25 MG: 0.25 TABLET ORAL at 06:40

## 2019-01-01 RX ADMIN — METHYLPREDNISOLONE SODIUM SUCCINATE 80 MG: 125 INJECTION, POWDER, FOR SOLUTION INTRAMUSCULAR; INTRAVENOUS at 11:04

## 2019-01-01 RX ADMIN — FUROSEMIDE 20 MG: 10 INJECTION, SOLUTION INTRAMUSCULAR; INTRAVENOUS at 09:15

## 2019-01-01 RX ADMIN — IPRATROPIUM BROMIDE AND ALBUTEROL SULFATE 3 ML: .5; 3 SOLUTION RESPIRATORY (INHALATION) at 19:36

## 2019-01-01 RX ADMIN — METHYLPREDNISOLONE SODIUM SUCCINATE 80 MG: 125 INJECTION, POWDER, FOR SOLUTION INTRAMUSCULAR; INTRAVENOUS at 00:03

## 2019-01-01 RX ADMIN — Medication 10 ML: at 17:17

## 2019-01-01 RX ADMIN — GADOTERATE MEGLUMINE 15 ML: 376.9 INJECTION INTRAVENOUS at 16:55

## 2019-01-01 RX ADMIN — Medication 10 ML: at 13:00

## 2019-01-01 RX ADMIN — FUROSEMIDE 40 MG: 40 TABLET ORAL at 08:24

## 2019-01-01 RX ADMIN — INSULIN LISPRO 4 UNITS: 100 INJECTION, SOLUTION INTRAVENOUS; SUBCUTANEOUS at 07:26

## 2019-01-01 RX ADMIN — ALBUTEROL SULFATE 2.5 MG: 2.5 SOLUTION RESPIRATORY (INHALATION) at 07:43

## 2019-01-01 RX ADMIN — CEFEPIME HYDROCHLORIDE 2 G: 2 INJECTION, POWDER, FOR SOLUTION INTRAVENOUS at 11:23

## 2019-01-01 RX ADMIN — MULTIPLE VITAMINS W/ MINERALS TAB 1 TABLET: TAB at 09:45

## 2019-01-01 RX ADMIN — Medication 100 MG: at 08:38

## 2019-01-01 RX ADMIN — PREDNISONE 30 MG: 20 TABLET ORAL at 18:22

## 2019-01-01 RX ADMIN — Medication 10 ML: at 22:52

## 2019-01-01 RX ADMIN — Medication 10 ML: at 13:43

## 2019-01-01 RX ADMIN — INSULIN LISPRO 2 UNITS: 100 INJECTION, SOLUTION INTRAVENOUS; SUBCUTANEOUS at 01:26

## 2019-01-01 RX ADMIN — AMANTADINE HYDROCHLORIDE 100 MG: 100 CAPSULE ORAL at 08:38

## 2019-01-01 RX ADMIN — APIXABAN 5 MG: 5 TABLET, FILM COATED ORAL at 21:37

## 2019-01-01 RX ADMIN — Medication 1 CAPSULE: at 09:00

## 2019-01-01 RX ADMIN — METHYLPREDNISOLONE SODIUM SUCCINATE 40 MG: 40 INJECTION, POWDER, FOR SOLUTION INTRAMUSCULAR; INTRAVENOUS at 20:29

## 2019-01-01 RX ADMIN — DILTIAZEM HYDROCHLORIDE 90 MG: 60 TABLET, FILM COATED ORAL at 00:41

## 2019-01-01 RX ADMIN — METOPROLOL SUCCINATE 50 MG: 50 TABLET, EXTENDED RELEASE ORAL at 08:34

## 2019-01-01 RX ADMIN — INSULIN LISPRO 12 UNITS: 100 INJECTION, SOLUTION INTRAVENOUS; SUBCUTANEOUS at 00:17

## 2019-01-01 RX ADMIN — INSULIN GLARGINE 50 UNITS: 100 INJECTION, SOLUTION SUBCUTANEOUS at 09:00

## 2019-01-01 RX ADMIN — PREDNISONE 40 MG: 20 TABLET ORAL at 09:36

## 2019-01-01 RX ADMIN — DILTIAZEM HYDROCHLORIDE 30 MG: 30 TABLET, FILM COATED ORAL at 18:51

## 2019-01-01 RX ADMIN — METHYLPREDNISOLONE SODIUM SUCCINATE 30 MG: 40 INJECTION, POWDER, FOR SOLUTION INTRAMUSCULAR; INTRAVENOUS at 06:01

## 2019-01-01 RX ADMIN — METHYLPREDNISOLONE SODIUM SUCCINATE 40 MG: 40 INJECTION, POWDER, FOR SOLUTION INTRAMUSCULAR; INTRAVENOUS at 01:31

## 2019-01-01 RX ADMIN — Medication 10 ML: at 14:18

## 2019-01-01 RX ADMIN — ALPRAZOLAM 0.25 MG: 0.25 TABLET ORAL at 18:26

## 2019-01-01 RX ADMIN — INSULIN LISPRO 16 UNITS: 100 INJECTION, SOLUTION INTRAVENOUS; SUBCUTANEOUS at 08:47

## 2019-01-01 RX ADMIN — LEVOFLOXACIN 750 MG: 5 INJECTION, SOLUTION INTRAVENOUS at 17:49

## 2019-01-01 RX ADMIN — METHYLPREDNISOLONE SODIUM SUCCINATE 60 MG: 125 INJECTION, POWDER, FOR SOLUTION INTRAMUSCULAR; INTRAVENOUS at 12:46

## 2019-01-01 RX ADMIN — APIXABAN 5 MG: 5 TABLET, FILM COATED ORAL at 09:07

## 2019-01-01 RX ADMIN — Medication 10 ML: at 13:48

## 2019-01-01 RX ADMIN — INSULIN LISPRO 4 UNITS: 100 INJECTION, SOLUTION INTRAVENOUS; SUBCUTANEOUS at 17:17

## 2019-01-01 RX ADMIN — Medication 1 CAPSULE: at 09:28

## 2019-01-01 RX ADMIN — Medication 10 ML: at 06:25

## 2019-01-01 RX ADMIN — APIXABAN 5 MG: 5 TABLET, FILM COATED ORAL at 21:50

## 2019-01-01 RX ADMIN — Medication 1 CAPSULE: at 09:25

## 2019-01-01 RX ADMIN — METHYLPREDNISOLONE SODIUM SUCCINATE 60 MG: 40 INJECTION, POWDER, FOR SOLUTION INTRAMUSCULAR; INTRAVENOUS at 11:40

## 2019-01-01 RX ADMIN — ENOXAPARIN SODIUM 80 MG: 80 INJECTION, SOLUTION INTRAVENOUS; SUBCUTANEOUS at 03:01

## 2019-01-01 RX ADMIN — DILTIAZEM HYDROCHLORIDE 120 MG: 60 TABLET, FILM COATED ORAL at 15:34

## 2019-01-01 RX ADMIN — INSULIN LISPRO 4 UNITS: 100 INJECTION, SOLUTION INTRAVENOUS; SUBCUTANEOUS at 11:30

## 2019-01-01 RX ADMIN — METHYLPREDNISOLONE SODIUM SUCCINATE 80 MG: 125 INJECTION, POWDER, FOR SOLUTION INTRAMUSCULAR; INTRAVENOUS at 17:34

## 2019-01-01 RX ADMIN — MYCOPHENOLATE MOFETIL 500 MG: 200 POWDER, FOR SUSPENSION ORAL at 22:37

## 2019-01-01 RX ADMIN — IPRATROPIUM BROMIDE AND ALBUTEROL SULFATE 3 ML: .5; 3 SOLUTION RESPIRATORY (INHALATION) at 21:32

## 2019-01-01 RX ADMIN — Medication 10 ML: at 06:08

## 2019-01-01 RX ADMIN — POTASSIUM CHLORIDE 20 MEQ: 10 TABLET, FILM COATED, EXTENDED RELEASE ORAL at 08:24

## 2019-01-01 RX ADMIN — INSULIN LISPRO 5 UNITS: 100 INJECTION, SOLUTION INTRAVENOUS; SUBCUTANEOUS at 16:34

## 2019-01-01 RX ADMIN — METHYLPREDNISOLONE SODIUM SUCCINATE 40 MG: 40 INJECTION, POWDER, FOR SOLUTION INTRAMUSCULAR; INTRAVENOUS at 00:17

## 2019-01-01 RX ADMIN — Medication 10 ML: at 05:26

## 2019-01-01 RX ADMIN — PREDNISONE 30 MG: 20 TABLET ORAL at 10:02

## 2019-01-01 RX ADMIN — METOPROLOL SUCCINATE 50 MG: 50 TABLET, EXTENDED RELEASE ORAL at 08:54

## 2019-01-01 RX ADMIN — DEXTROSE MONOHYDRATE 25 G: 500 INJECTION PARENTERAL at 05:37

## 2019-01-01 RX ADMIN — Medication 10 ML: at 21:50

## 2019-01-01 RX ADMIN — INSULIN GLARGINE 10 UNITS: 100 INJECTION, SOLUTION SUBCUTANEOUS at 21:53

## 2019-01-01 RX ADMIN — APIXABAN 5 MG: 5 TABLET, FILM COATED ORAL at 17:31

## 2019-01-01 RX ADMIN — FUROSEMIDE 40 MG: 40 TABLET ORAL at 08:25

## 2019-01-01 RX ADMIN — CEFEPIME HYDROCHLORIDE 2 G: 2 INJECTION, POWDER, FOR SOLUTION INTRAVENOUS at 14:14

## 2019-01-01 RX ADMIN — METHYLPREDNISOLONE SODIUM SUCCINATE 40 MG: 40 INJECTION, POWDER, FOR SOLUTION INTRAMUSCULAR; INTRAVENOUS at 09:15

## 2019-01-01 RX ADMIN — MORPHINE SULFATE 2 MG: 2 INJECTION, SOLUTION INTRAMUSCULAR; INTRAVENOUS at 08:06

## 2019-01-01 RX ADMIN — INSULIN GLARGINE 52 UNITS: 100 INJECTION, SOLUTION SUBCUTANEOUS at 08:35

## 2019-01-01 RX ADMIN — METHYLPREDNISOLONE SODIUM SUCCINATE 60 MG: 125 INJECTION, POWDER, FOR SOLUTION INTRAMUSCULAR; INTRAVENOUS at 06:43

## 2019-01-01 RX ADMIN — DILTIAZEM HYDROCHLORIDE 90 MG: 60 TABLET, FILM COATED ORAL at 20:29

## 2019-01-01 RX ADMIN — CEFEPIME HYDROCHLORIDE 2 G: 2 INJECTION, POWDER, FOR SOLUTION INTRAVENOUS at 02:44

## 2019-01-01 RX ADMIN — VANCOMYCIN HYDROCHLORIDE 1250 MG: 10 INJECTION, POWDER, LYOPHILIZED, FOR SOLUTION INTRAVENOUS at 08:52

## 2019-01-01 RX ADMIN — MAGNESIUM SULFATE HEPTAHYDRATE 500 MG: 500 INJECTION, SOLUTION INTRAMUSCULAR; INTRAVENOUS at 23:11

## 2019-01-01 RX ADMIN — INSULIN GLARGINE 44 UNITS: 100 INJECTION, SOLUTION SUBCUTANEOUS at 08:53

## 2019-01-01 RX ADMIN — ALBUTEROL SULFATE 2.5 MG: 2.5 SOLUTION RESPIRATORY (INHALATION) at 02:22

## 2019-01-01 RX ADMIN — CEFEPIME HYDROCHLORIDE 2 G: 2 INJECTION, POWDER, FOR SOLUTION INTRAVENOUS at 15:55

## 2019-01-01 RX ADMIN — VANCOMYCIN HYDROCHLORIDE 1250 MG: 10 INJECTION, POWDER, LYOPHILIZED, FOR SOLUTION INTRAVENOUS at 12:35

## 2019-01-01 RX ADMIN — PREDNISONE 30 MG: 20 TABLET ORAL at 17:32

## 2019-01-01 RX ADMIN — ALPRAZOLAM 0.25 MG: 0.25 TABLET ORAL at 07:16

## 2019-01-01 RX ADMIN — INSULIN LISPRO 5 UNITS: 100 INJECTION, SOLUTION INTRAVENOUS; SUBCUTANEOUS at 08:59

## 2019-01-01 RX ADMIN — METHYLPREDNISOLONE SODIUM SUCCINATE 40 MG: 40 INJECTION, POWDER, FOR SOLUTION INTRAMUSCULAR; INTRAVENOUS at 09:39

## 2019-01-01 RX ADMIN — INSULIN LISPRO 5 UNITS: 100 INJECTION, SOLUTION INTRAVENOUS; SUBCUTANEOUS at 17:43

## 2019-01-01 RX ADMIN — INSULIN GLARGINE 20 UNITS: 100 INJECTION, SOLUTION SUBCUTANEOUS at 09:26

## 2019-01-01 RX ADMIN — PREDNISONE 30 MG: 20 TABLET ORAL at 09:17

## 2019-01-01 RX ADMIN — APIXABAN 5 MG: 5 TABLET, FILM COATED ORAL at 10:05

## 2019-01-01 RX ADMIN — ATORVASTATIN CALCIUM 40 MG: 40 TABLET, FILM COATED ORAL at 21:52

## 2019-01-01 RX ADMIN — PREDNISONE 30 MG: 20 TABLET ORAL at 08:19

## 2019-01-01 RX ADMIN — SODIUM CHLORIDE 50 ML/HR: 900 INJECTION, SOLUTION INTRAVENOUS at 13:20

## 2019-01-01 RX ADMIN — MULTIPLE VITAMINS W/ MINERALS TAB 1 TABLET: TAB at 09:14

## 2019-01-01 RX ADMIN — APIXABAN 5 MG: 5 TABLET, FILM COATED ORAL at 09:15

## 2019-01-01 RX ADMIN — AMLODIPINE BESYLATE 2.5 MG: 5 TABLET ORAL at 09:24

## 2019-01-01 RX ADMIN — ACETYLCYSTEINE 400 MG: 200 SOLUTION ORAL; RESPIRATORY (INHALATION) at 15:51

## 2019-01-01 RX ADMIN — METHYLPREDNISOLONE SODIUM SUCCINATE 60 MG: 40 INJECTION, POWDER, FOR SOLUTION INTRAMUSCULAR; INTRAVENOUS at 02:45

## 2019-01-01 RX ADMIN — FLUCONAZOLE, SODIUM CHLORIDE 400 MG: 2 INJECTION INTRAVENOUS at 14:04

## 2019-01-01 RX ADMIN — DILTIAZEM HYDROCHLORIDE 60 MG: 60 TABLET, FILM COATED ORAL at 10:26

## 2019-01-01 RX ADMIN — IOPAMIDOL 100 ML: 755 INJECTION, SOLUTION INTRAVENOUS at 14:33

## 2019-01-01 RX ADMIN — Medication 10 ML: at 17:52

## 2019-01-01 RX ADMIN — APIXABAN 5 MG: 5 TABLET, FILM COATED ORAL at 08:13

## 2019-01-01 RX ADMIN — INSULIN LISPRO 3 UNITS: 100 INJECTION, SOLUTION INTRAVENOUS; SUBCUTANEOUS at 06:44

## 2019-01-01 RX ADMIN — INSULIN GLARGINE 40 UNITS: 100 INJECTION, SOLUTION SUBCUTANEOUS at 22:38

## 2019-01-01 RX ADMIN — CEFEPIME HYDROCHLORIDE 2 G: 2 INJECTION, POWDER, FOR SOLUTION INTRAVENOUS at 09:26

## 2019-01-01 RX ADMIN — CEFEPIME HYDROCHLORIDE 2 G: 2 INJECTION, POWDER, FOR SOLUTION INTRAVENOUS at 03:08

## 2019-01-01 RX ADMIN — CEFEPIME HYDROCHLORIDE 2 G: 2 INJECTION, POWDER, FOR SOLUTION INTRAVENOUS at 02:00

## 2019-01-01 RX ADMIN — PREDNISONE 40 MG: 20 TABLET ORAL at 06:59

## 2019-01-01 RX ADMIN — LIDOCAINE HYDROCHLORIDE 9 ML: 10 INJECTION, SOLUTION EPIDURAL; INFILTRATION; INTRACAUDAL; PERINEURAL at 14:00

## 2019-01-01 RX ADMIN — PREDNISONE 30 MG: 20 TABLET ORAL at 10:00

## 2019-01-01 RX ADMIN — DILTIAZEM HYDROCHLORIDE 90 MG: 60 TABLET, FILM COATED ORAL at 11:07

## 2019-01-01 RX ADMIN — APIXABAN 5 MG: 5 TABLET, FILM COATED ORAL at 08:35

## 2019-01-01 RX ADMIN — METOPROLOL TARTRATE 25 MG: 25 TABLET ORAL at 21:18

## 2019-01-01 RX ADMIN — METOPROLOL SUCCINATE 50 MG: 50 TABLET, EXTENDED RELEASE ORAL at 08:46

## 2019-01-01 RX ADMIN — Medication 10 ML: at 12:32

## 2019-01-01 RX ADMIN — INSULIN GLARGINE 16 UNITS: 100 INJECTION, SOLUTION SUBCUTANEOUS at 21:37

## 2019-01-01 RX ADMIN — MORPHINE SULFATE 2 MG: 2 INJECTION, SOLUTION INTRAMUSCULAR; INTRAVENOUS at 11:11

## 2019-01-01 RX ADMIN — APIXABAN 5 MG: 5 TABLET, FILM COATED ORAL at 21:53

## 2019-01-01 RX ADMIN — INSULIN LISPRO 3 UNITS: 100 INJECTION, SOLUTION INTRAVENOUS; SUBCUTANEOUS at 08:57

## 2019-01-01 RX ADMIN — METOPROLOL TARTRATE 2.5 MG: 5 INJECTION INTRAVENOUS at 14:06

## 2019-01-01 RX ADMIN — HYDRALAZINE HYDROCHLORIDE 25 MG: 25 TABLET, FILM COATED ORAL at 09:34

## 2019-01-01 RX ADMIN — FUROSEMIDE 20 MG: 10 INJECTION, SOLUTION INTRAMUSCULAR; INTRAVENOUS at 09:06

## 2019-01-01 RX ADMIN — Medication 1 CAPSULE: at 10:45

## 2019-01-01 RX ADMIN — INSULIN LISPRO 4 UNITS: 100 INJECTION, SOLUTION INTRAVENOUS; SUBCUTANEOUS at 17:32

## 2019-01-01 RX ADMIN — PIPERACILLIN SODIUM,TAZOBACTAM SODIUM 3.38 G: 3; .375 INJECTION, POWDER, FOR SOLUTION INTRAVENOUS at 09:02

## 2019-01-01 RX ADMIN — INSULIN GLARGINE 50 UNITS: 100 INJECTION, SOLUTION SUBCUTANEOUS at 21:15

## 2019-01-01 RX ADMIN — ATORVASTATIN CALCIUM 40 MG: 40 TABLET, FILM COATED ORAL at 21:53

## 2019-01-01 RX ADMIN — METOPROLOL TARTRATE 12.5 MG: 25 TABLET ORAL at 22:10

## 2019-01-01 RX ADMIN — AMANTADINE HYDROCHLORIDE 100 MG: 100 CAPSULE ORAL at 19:22

## 2019-01-01 RX ADMIN — CEFEPIME HYDROCHLORIDE 2 G: 2 INJECTION, POWDER, FOR SOLUTION INTRAVENOUS at 02:13

## 2019-01-01 RX ADMIN — Medication 10 ML: at 21:20

## 2019-01-01 RX ADMIN — FLUTICASONE PROPIONATE 2 SPRAY: 50 SPRAY, METERED NASAL at 10:07

## 2019-01-01 RX ADMIN — INSULIN LISPRO 3 UNITS: 100 INJECTION, SOLUTION INTRAVENOUS; SUBCUTANEOUS at 17:54

## 2019-01-01 RX ADMIN — Medication 10 ML: at 17:47

## 2019-01-01 RX ADMIN — Medication 10 ML: at 15:08

## 2019-01-01 RX ADMIN — Medication 1 CAPSULE: at 09:17

## 2019-01-01 RX ADMIN — Medication 10 ML: at 06:02

## 2019-01-01 RX ADMIN — Medication 10 ML: at 17:41

## 2019-01-01 RX ADMIN — INSULIN GLARGINE 16 UNITS: 100 INJECTION, SOLUTION SUBCUTANEOUS at 21:45

## 2019-01-01 RX ADMIN — INSULIN LISPRO 5 UNITS: 100 INJECTION, SOLUTION INTRAVENOUS; SUBCUTANEOUS at 17:27

## 2019-01-01 RX ADMIN — AMLODIPINE BESYLATE 2.5 MG: 5 TABLET ORAL at 08:25

## 2019-01-01 RX ADMIN — METHYLPREDNISOLONE SODIUM SUCCINATE 20 MG: 40 INJECTION, POWDER, FOR SOLUTION INTRAMUSCULAR; INTRAVENOUS at 21:08

## 2019-01-01 RX ADMIN — AMITRIPTYLINE HYDROCHLORIDE 25 MG: 25 TABLET, FILM COATED ORAL at 21:36

## 2019-01-01 RX ADMIN — APIXABAN 5 MG: 5 TABLET, FILM COATED ORAL at 09:03

## 2019-01-01 RX ADMIN — AMLODIPINE BESYLATE 2.5 MG: 5 TABLET ORAL at 08:32

## 2019-01-01 RX ADMIN — IPRATROPIUM BROMIDE AND ALBUTEROL SULFATE 3 ML: .5; 3 SOLUTION RESPIRATORY (INHALATION) at 20:59

## 2019-01-01 RX ADMIN — METHYLPREDNISOLONE SODIUM SUCCINATE 60 MG: 40 INJECTION, POWDER, FOR SOLUTION INTRAMUSCULAR; INTRAVENOUS at 12:00

## 2019-01-01 RX ADMIN — METHYLPREDNISOLONE SODIUM SUCCINATE 40 MG: 40 INJECTION, POWDER, FOR SOLUTION INTRAMUSCULAR; INTRAVENOUS at 04:58

## 2019-01-01 RX ADMIN — Medication 10 ML: at 15:13

## 2019-01-01 RX ADMIN — NYSTATIN 500000 UNITS: 500000 SUSPENSION ORAL at 09:32

## 2019-01-01 RX ADMIN — ATORVASTATIN CALCIUM 40 MG: 40 TABLET, FILM COATED ORAL at 21:54

## 2019-01-01 RX ADMIN — PREDNISONE 30 MG: 20 TABLET ORAL at 17:43

## 2019-01-01 RX ADMIN — ENOXAPARIN SODIUM 80 MG: 80 INJECTION, SOLUTION INTRAVENOUS; SUBCUTANEOUS at 04:14

## 2019-01-01 RX ADMIN — AMANTADINE HYDROCHLORIDE 100 MG: 100 CAPSULE ORAL at 09:13

## 2019-01-01 RX ADMIN — AMLODIPINE BESYLATE 2.5 MG: 5 TABLET ORAL at 08:34

## 2019-01-01 RX ADMIN — Medication 10 ML: at 16:18

## 2019-01-01 RX ADMIN — APIXABAN 5 MG: 5 TABLET, FILM COATED ORAL at 21:29

## 2019-01-01 RX ADMIN — INSULIN LISPRO 3 UNITS: 100 INJECTION, SOLUTION INTRAVENOUS; SUBCUTANEOUS at 12:37

## 2019-01-01 RX ADMIN — DILTIAZEM HYDROCHLORIDE 60 MG: 60 TABLET, FILM COATED ORAL at 18:16

## 2019-01-01 RX ADMIN — PREDNISONE 30 MG: 20 TABLET ORAL at 09:33

## 2019-01-01 RX ADMIN — DILTIAZEM HYDROCHLORIDE 60 MG: 60 TABLET, FILM COATED ORAL at 18:39

## 2019-01-01 RX ADMIN — Medication 1 CAPSULE: at 08:32

## 2019-01-01 RX ADMIN — Medication 10 ML: at 12:24

## 2019-01-01 RX ADMIN — IPRATROPIUM BROMIDE AND ALBUTEROL SULFATE 3 ML: .5; 3 SOLUTION RESPIRATORY (INHALATION) at 11:48

## 2019-01-01 RX ADMIN — FUROSEMIDE 20 MG: 20 TABLET ORAL at 09:34

## 2019-01-01 RX ADMIN — CEFEPIME HYDROCHLORIDE 2 G: 2 INJECTION, POWDER, FOR SOLUTION INTRAVENOUS at 09:14

## 2019-01-01 RX ADMIN — HEPARIN SODIUM 5000 UNITS: 5000 INJECTION INTRAVENOUS; SUBCUTANEOUS at 05:00

## 2019-01-01 RX ADMIN — Medication 10 ML: at 13:06

## 2019-01-01 RX ADMIN — INSULIN GLARGINE 28 UNITS: 100 INJECTION, SOLUTION SUBCUTANEOUS at 08:25

## 2019-01-01 RX ADMIN — INSULIN GLARGINE 16 UNITS: 100 INJECTION, SOLUTION SUBCUTANEOUS at 21:27

## 2019-01-01 RX ADMIN — INSULIN LISPRO 4 UNITS: 100 INJECTION, SOLUTION INTRAVENOUS; SUBCUTANEOUS at 13:59

## 2019-01-01 RX ADMIN — INSULIN GLARGINE 44 UNITS: 100 INJECTION, SOLUTION SUBCUTANEOUS at 08:20

## 2019-01-01 RX ADMIN — APIXABAN 5 MG: 5 TABLET, FILM COATED ORAL at 08:07

## 2019-01-01 RX ADMIN — CEFEPIME HYDROCHLORIDE 2 G: 2 INJECTION, POWDER, FOR SOLUTION INTRAVENOUS at 18:11

## 2019-01-01 RX ADMIN — METHYLPREDNISOLONE SODIUM SUCCINATE 60 MG: 125 INJECTION, POWDER, FOR SOLUTION INTRAMUSCULAR; INTRAVENOUS at 06:26

## 2019-01-01 RX ADMIN — PREDNISONE 30 MG: 20 TABLET ORAL at 17:12

## 2019-01-01 RX ADMIN — AMANTADINE HYDROCHLORIDE 100 MG: 100 CAPSULE ORAL at 10:40

## 2019-01-01 RX ADMIN — MYCOPHENOLATE MOFETIL 250 MG: 250 CAPSULE ORAL at 15:37

## 2019-01-01 RX ADMIN — INSULIN LISPRO 14 UNITS: 100 INJECTION, SOLUTION INTRAVENOUS; SUBCUTANEOUS at 06:58

## 2019-01-01 RX ADMIN — PREDNISONE 40 MG: 20 TABLET ORAL at 07:05

## 2019-01-01 RX ADMIN — CEFEPIME HYDROCHLORIDE 2 G: 2 INJECTION, POWDER, FOR SOLUTION INTRAVENOUS at 02:36

## 2019-01-01 RX ADMIN — METOPROLOL SUCCINATE 50 MG: 50 TABLET, EXTENDED RELEASE ORAL at 08:23

## 2019-01-01 RX ADMIN — CEFEPIME HYDROCHLORIDE 2 G: 2 INJECTION, POWDER, FOR SOLUTION INTRAVENOUS at 02:23

## 2019-01-01 RX ADMIN — Medication 1 CAPSULE: at 08:46

## 2019-01-01 RX ADMIN — DILTIAZEM HYDROCHLORIDE 90 MG: 60 TABLET, FILM COATED ORAL at 15:27

## 2019-01-01 RX ADMIN — CEFEPIME HYDROCHLORIDE 2 G: 2 INJECTION, POWDER, FOR SOLUTION INTRAVENOUS at 17:24

## 2019-01-01 RX ADMIN — MYCOPHENOLATE MOFETIL 500 MG: 200 POWDER, FOR SUSPENSION ORAL at 10:40

## 2019-01-01 RX ADMIN — PREDNISONE 30 MG: 20 TABLET ORAL at 09:21

## 2019-01-01 RX ADMIN — INSULIN LISPRO 7 UNITS: 100 INJECTION, SOLUTION INTRAVENOUS; SUBCUTANEOUS at 07:16

## 2019-01-01 RX ADMIN — Medication 10 ML: at 20:36

## 2019-01-01 RX ADMIN — Medication 1 CAPSULE: at 10:15

## 2019-01-01 RX ADMIN — HEPARIN SODIUM 5000 UNITS: 5000 INJECTION INTRAVENOUS; SUBCUTANEOUS at 12:14

## 2019-01-01 RX ADMIN — Medication 10 ML: at 21:58

## 2019-01-01 RX ADMIN — Medication 10 ML: at 18:32

## 2019-01-01 RX ADMIN — VANCOMYCIN HYDROCHLORIDE 1250 MG: 10 INJECTION, POWDER, LYOPHILIZED, FOR SOLUTION INTRAVENOUS at 23:51

## 2019-01-01 RX ADMIN — MYCOPHENOLATE MOFETIL 250 MG: 250 CAPSULE ORAL at 06:30

## 2019-01-01 RX ADMIN — Medication 10 ML: at 06:14

## 2019-01-01 RX ADMIN — PREDNISONE 30 MG: 20 TABLET ORAL at 08:34

## 2019-01-01 RX ADMIN — PREDNISONE 40 MG: 20 TABLET ORAL at 10:21

## 2019-01-01 RX ADMIN — Medication 10 ML: at 14:36

## 2019-01-01 RX ADMIN — MYCOPHENOLATE MOFETIL 500 MG: 200 POWDER, FOR SUSPENSION ORAL at 22:08

## 2019-01-01 RX ADMIN — METHYLPREDNISOLONE SODIUM SUCCINATE 40 MG: 40 INJECTION, POWDER, FOR SOLUTION INTRAMUSCULAR; INTRAVENOUS at 00:36

## 2019-01-01 RX ADMIN — Medication 10 ML: at 05:43

## 2019-01-01 RX ADMIN — INSULIN LISPRO 7 UNITS: 100 INJECTION, SOLUTION INTRAVENOUS; SUBCUTANEOUS at 11:45

## 2019-01-01 RX ADMIN — Medication 1 CAPSULE: at 08:18

## 2019-01-01 RX ADMIN — AMLODIPINE BESYLATE 2.5 MG: 5 TABLET ORAL at 08:24

## 2019-01-01 RX ADMIN — CEFEPIME HYDROCHLORIDE 2 G: 2 INJECTION, POWDER, FOR SOLUTION INTRAVENOUS at 03:53

## 2019-01-01 RX ADMIN — HEPARIN SODIUM 5000 UNITS: 5000 INJECTION INTRAVENOUS; SUBCUTANEOUS at 20:39

## 2019-01-01 RX ADMIN — IPRATROPIUM BROMIDE AND ALBUTEROL SULFATE 3 ML: .5; 3 SOLUTION RESPIRATORY (INHALATION) at 23:38

## 2019-01-01 RX ADMIN — APIXABAN 10 MG: 5 TABLET, FILM COATED ORAL at 09:17

## 2019-01-01 RX ADMIN — Medication 10 ML: at 15:27

## 2019-01-01 RX ADMIN — ATORVASTATIN CALCIUM 40 MG: 40 TABLET, FILM COATED ORAL at 23:05

## 2019-01-01 RX ADMIN — INSULIN GLARGINE 10 UNITS: 100 INJECTION, SOLUTION SUBCUTANEOUS at 22:22

## 2019-01-01 RX ADMIN — Medication 1 CAPSULE: at 15:32

## 2019-01-01 RX ADMIN — INSULIN GLARGINE 44 UNITS: 100 INJECTION, SOLUTION SUBCUTANEOUS at 09:00

## 2019-01-01 RX ADMIN — MYCOPHENOLATE MOFETIL 500 MG: 250 CAPSULE ORAL at 06:48

## 2019-01-01 RX ADMIN — Medication 10 ML: at 13:56

## 2019-01-01 RX ADMIN — LEVOFLOXACIN 750 MG: 5 INJECTION, SOLUTION INTRAVENOUS at 14:20

## 2019-01-01 RX ADMIN — ATORVASTATIN CALCIUM 40 MG: 40 TABLET, FILM COATED ORAL at 21:27

## 2019-01-01 RX ADMIN — INSULIN GLARGINE 10 UNITS: 100 INJECTION, SOLUTION SUBCUTANEOUS at 15:54

## 2019-01-01 RX ADMIN — ATORVASTATIN CALCIUM 40 MG: 40 TABLET, FILM COATED ORAL at 21:31

## 2019-01-01 RX ADMIN — POLYETHYLENE GLYCOL 3350 17 G: 17 POWDER, FOR SOLUTION ORAL at 18:07

## 2019-01-01 RX ADMIN — METHYLPREDNISOLONE SODIUM SUCCINATE 40 MG: 40 INJECTION, POWDER, FOR SOLUTION INTRAMUSCULAR; INTRAVENOUS at 01:59

## 2019-01-01 RX ADMIN — MORPHINE SULFATE 2 MG: 2 INJECTION, SOLUTION INTRAMUSCULAR; INTRAVENOUS at 10:05

## 2019-01-01 RX ADMIN — Medication 5 ML: at 22:00

## 2019-01-01 RX ADMIN — CEFEPIME HYDROCHLORIDE 2 G: 2 INJECTION, POWDER, FOR SOLUTION INTRAVENOUS at 15:43

## 2019-01-01 RX ADMIN — Medication 10 ML: at 19:20

## 2019-01-01 RX ADMIN — FLUTICASONE PROPIONATE 2 SPRAY: 50 SPRAY, METERED NASAL at 08:55

## 2019-01-01 RX ADMIN — Medication 10 ML: at 21:31

## 2019-01-01 RX ADMIN — Medication 1 CAPSULE: at 08:07

## 2019-01-01 RX ADMIN — IPRATROPIUM BROMIDE AND ALBUTEROL SULFATE 3 ML: .5; 3 SOLUTION RESPIRATORY (INHALATION) at 08:08

## 2019-01-01 RX ADMIN — IOPAMIDOL 80 ML: 755 INJECTION, SOLUTION INTRAVENOUS at 15:38

## 2019-01-01 RX ADMIN — APIXABAN 5 MG: 5 TABLET, FILM COATED ORAL at 09:22

## 2019-01-01 RX ADMIN — SODIUM CHLORIDE 100 ML: 900 INJECTION, SOLUTION INTRAVENOUS at 15:38

## 2019-01-01 RX ADMIN — FLUOXETINE 20 MG: 20 CAPSULE ORAL at 10:18

## 2019-01-01 RX ADMIN — METOPROLOL TARTRATE 2.5 MG: 5 INJECTION INTRAVENOUS at 14:12

## 2019-01-01 RX ADMIN — APIXABAN 5 MG: 5 TABLET, FILM COATED ORAL at 08:32

## 2019-01-01 RX ADMIN — Medication 10 ML: at 22:46

## 2019-01-01 RX ADMIN — Medication 10 ML: at 22:16

## 2019-01-01 RX ADMIN — INSULIN LISPRO 5 UNITS: 100 INJECTION, SOLUTION INTRAVENOUS; SUBCUTANEOUS at 08:32

## 2019-01-01 RX ADMIN — FLUTICASONE PROPIONATE 2 SPRAY: 50 SPRAY, METERED NASAL at 08:54

## 2019-01-01 RX ADMIN — INSULIN GLARGINE 10 UNITS: 100 INJECTION, SOLUTION SUBCUTANEOUS at 21:20

## 2019-01-01 RX ADMIN — ATORVASTATIN CALCIUM 40 MG: 40 TABLET, FILM COATED ORAL at 23:00

## 2019-01-01 RX ADMIN — IPRATROPIUM BROMIDE AND ALBUTEROL SULFATE 3 ML: .5; 3 SOLUTION RESPIRATORY (INHALATION) at 15:26

## 2019-01-01 RX ADMIN — AMANTADINE HYDROCHLORIDE 100 MG: 100 CAPSULE ORAL at 18:26

## 2019-01-01 RX ADMIN — CEFEPIME HYDROCHLORIDE 2 G: 2 INJECTION, POWDER, FOR SOLUTION INTRAVENOUS at 18:01

## 2019-01-01 RX ADMIN — ATORVASTATIN CALCIUM 40 MG: 40 TABLET, FILM COATED ORAL at 21:45

## 2019-01-01 RX ADMIN — IPRATROPIUM BROMIDE AND ALBUTEROL SULFATE 3 ML: .5; 3 SOLUTION RESPIRATORY (INHALATION) at 07:14

## 2019-01-01 RX ADMIN — METHYLPREDNISOLONE SODIUM SUCCINATE 40 MG: 40 INJECTION, POWDER, FOR SOLUTION INTRAMUSCULAR; INTRAVENOUS at 06:42

## 2019-01-01 RX ADMIN — MYCOPHENOLATE MOFETIL 500 MG: 250 CAPSULE ORAL at 16:16

## 2019-01-01 RX ADMIN — Medication 10 ML: at 13:16

## 2019-01-01 RX ADMIN — IPRATROPIUM BROMIDE AND ALBUTEROL SULFATE 3 ML: .5; 3 SOLUTION RESPIRATORY (INHALATION) at 07:37

## 2019-01-01 RX ADMIN — DILTIAZEM HYDROCHLORIDE 120 MG: 60 TABLET, FILM COATED ORAL at 12:47

## 2019-01-01 RX ADMIN — AMLODIPINE BESYLATE 2.5 MG: 5 TABLET ORAL at 09:34

## 2019-01-01 RX ADMIN — HEPARIN SODIUM 5000 UNITS: 5000 INJECTION INTRAVENOUS; SUBCUTANEOUS at 12:28

## 2019-01-01 RX ADMIN — METOPROLOL TARTRATE 2.5 MG: 5 INJECTION INTRAVENOUS at 02:27

## 2019-01-01 RX ADMIN — INSULIN GLARGINE 26 UNITS: 100 INJECTION, SOLUTION SUBCUTANEOUS at 10:15

## 2019-01-01 RX ADMIN — AMANTADINE HYDROCHLORIDE 100 MG: 100 CAPSULE ORAL at 08:46

## 2019-01-01 RX ADMIN — WHITE PETROLATUM 57.7 %-MINERAL OIL 31.9 % EYE OINTMENT: at 12:09

## 2019-01-01 RX ADMIN — DILTIAZEM HYDROCHLORIDE 90 MG: 60 TABLET, FILM COATED ORAL at 00:33

## 2019-01-01 RX ADMIN — METHYLPREDNISOLONE SODIUM SUCCINATE 40 MG: 40 INJECTION, POWDER, FOR SOLUTION INTRAMUSCULAR; INTRAVENOUS at 03:14

## 2019-01-01 RX ADMIN — MORPHINE SULFATE 2 MG: 2 INJECTION, SOLUTION INTRAMUSCULAR; INTRAVENOUS at 09:33

## 2019-01-01 RX ADMIN — METHYLPREDNISOLONE SODIUM SUCCINATE 20 MG: 40 INJECTION, POWDER, FOR SOLUTION INTRAMUSCULAR; INTRAVENOUS at 21:39

## 2019-01-01 RX ADMIN — VANCOMYCIN HYDROCHLORIDE 1250 MG: 10 INJECTION, POWDER, LYOPHILIZED, FOR SOLUTION INTRAVENOUS at 20:00

## 2019-01-01 RX ADMIN — APIXABAN 10 MG: 5 TABLET, FILM COATED ORAL at 22:59

## 2019-01-01 RX ADMIN — ALBUTEROL SULFATE 2.5 MG: 2.5 SOLUTION RESPIRATORY (INHALATION) at 01:48

## 2019-01-01 RX ADMIN — LEVOFLOXACIN 750 MG: 5 INJECTION, SOLUTION INTRAVENOUS at 17:26

## 2019-01-01 RX ADMIN — INSULIN LISPRO 3 UNITS: 100 INJECTION, SOLUTION INTRAVENOUS; SUBCUTANEOUS at 06:58

## 2019-01-01 RX ADMIN — INSULIN LISPRO 12 UNITS: 100 INJECTION, SOLUTION INTRAVENOUS; SUBCUTANEOUS at 17:41

## 2019-01-01 RX ADMIN — FLUTICASONE PROPIONATE 2 SPRAY: 50 SPRAY, METERED NASAL at 09:35

## 2019-01-01 RX ADMIN — ATORVASTATIN CALCIUM 40 MG: 40 TABLET, FILM COATED ORAL at 21:44

## 2019-01-01 RX ADMIN — IPRATROPIUM BROMIDE AND ALBUTEROL SULFATE 3 ML: .5; 3 SOLUTION RESPIRATORY (INHALATION) at 03:17

## 2019-01-01 RX ADMIN — Medication 100 MG: at 09:03

## 2019-01-01 RX ADMIN — VANCOMYCIN HYDROCHLORIDE 1250 MG: 10 INJECTION, POWDER, LYOPHILIZED, FOR SOLUTION INTRAVENOUS at 00:11

## 2019-01-01 RX ADMIN — SODIUM CHLORIDE 1000 MG: 900 INJECTION, SOLUTION INTRAVENOUS at 01:59

## 2019-01-01 RX ADMIN — Medication 10 ML: at 16:46

## 2019-01-01 RX ADMIN — FLUTICASONE PROPIONATE 2 SPRAY: 50 SPRAY, METERED NASAL at 09:25

## 2019-01-01 RX ADMIN — PREDNISONE 30 MG: 20 TABLET ORAL at 18:06

## 2019-01-01 RX ADMIN — Medication 1 CAPSULE: at 11:04

## 2019-01-01 RX ADMIN — CEFEPIME HYDROCHLORIDE 2 G: 2 INJECTION, POWDER, FOR SOLUTION INTRAVENOUS at 10:16

## 2019-01-01 RX ADMIN — Medication 10 ML: at 13:35

## 2019-01-01 RX ADMIN — INSULIN LISPRO 3 UNITS: 100 INJECTION, SOLUTION INTRAVENOUS; SUBCUTANEOUS at 17:57

## 2019-01-01 RX ADMIN — Medication 10 ML: at 13:47

## 2019-01-01 RX ADMIN — HYDRALAZINE HYDROCHLORIDE 25 MG: 25 TABLET, FILM COATED ORAL at 21:37

## 2019-01-01 RX ADMIN — PIPERACILLIN SODIUM,TAZOBACTAM SODIUM 3.38 G: 3; .375 INJECTION, POWDER, FOR SOLUTION INTRAVENOUS at 09:03

## 2019-01-01 RX ADMIN — INSULIN GLARGINE 16 UNITS: 100 INJECTION, SOLUTION SUBCUTANEOUS at 21:00

## 2019-01-01 RX ADMIN — Medication 1 CAPSULE: at 08:38

## 2019-01-01 RX ADMIN — Medication 10 ML: at 06:06

## 2019-01-01 RX ADMIN — SODIUM CHLORIDE 1000 MG: 900 INJECTION, SOLUTION INTRAVENOUS at 12:54

## 2019-01-01 RX ADMIN — ALBUTEROL SULFATE 2.5 MG: 2.5 SOLUTION RESPIRATORY (INHALATION) at 01:00

## 2019-01-01 RX ADMIN — PREDNISONE 30 MG: 20 TABLET ORAL at 08:27

## 2019-01-01 RX ADMIN — INSULIN LISPRO 5 UNITS: 100 INJECTION, SOLUTION INTRAVENOUS; SUBCUTANEOUS at 11:28

## 2019-01-01 RX ADMIN — HEPARIN SODIUM 5000 UNITS: 5000 INJECTION INTRAVENOUS; SUBCUTANEOUS at 12:47

## 2019-01-01 RX ADMIN — MYCOPHENOLATE MOFETIL 250 MG: 250 CAPSULE ORAL at 17:27

## 2019-01-01 RX ADMIN — PREDNISONE 30 MG: 20 TABLET ORAL at 17:44

## 2019-01-01 RX ADMIN — ALPRAZOLAM 0.25 MG: 0.25 TABLET ORAL at 07:01

## 2019-01-01 RX ADMIN — APIXABAN 5 MG: 5 TABLET, FILM COATED ORAL at 09:33

## 2019-01-01 RX ADMIN — HYDRALAZINE HYDROCHLORIDE 25 MG: 25 TABLET, FILM COATED ORAL at 09:10

## 2019-01-01 RX ADMIN — METHYLPREDNISOLONE SODIUM SUCCINATE 40 MG: 40 INJECTION, POWDER, FOR SOLUTION INTRAMUSCULAR; INTRAVENOUS at 08:24

## 2019-01-01 RX ADMIN — Medication 10 ML: at 05:15

## 2019-01-01 RX ADMIN — APIXABAN 5 MG: 5 TABLET, FILM COATED ORAL at 21:32

## 2019-01-01 RX ADMIN — FENTANYL CITRATE 25 MCG: 50 INJECTION, SOLUTION INTRAMUSCULAR; INTRAVENOUS at 21:21

## 2019-01-01 RX ADMIN — Medication 10 ML: at 02:46

## 2019-01-01 RX ADMIN — FENTANYL CITRATE 25 MCG: 50 INJECTION, SOLUTION INTRAMUSCULAR; INTRAVENOUS at 01:51

## 2019-01-01 RX ADMIN — APIXABAN 5 MG: 5 TABLET, FILM COATED ORAL at 18:00

## 2019-01-01 RX ADMIN — MORPHINE SULFATE 2 MG: 2 INJECTION, SOLUTION INTRAMUSCULAR; INTRAVENOUS at 04:50

## 2019-01-01 RX ADMIN — Medication 10 ML: at 02:42

## 2019-01-01 RX ADMIN — Medication 10 ML: at 14:42

## 2019-01-01 RX ADMIN — METHYLPREDNISOLONE SODIUM SUCCINATE 80 MG: 125 INJECTION, POWDER, FOR SOLUTION INTRAMUSCULAR; INTRAVENOUS at 13:49

## 2019-01-01 RX ADMIN — HYDRALAZINE HYDROCHLORIDE 25 MG: 25 TABLET, FILM COATED ORAL at 17:06

## 2019-01-01 RX ADMIN — Medication 1 EACH: at 22:34

## 2019-01-01 RX ADMIN — MULTIPLE VITAMINS W/ MINERALS TAB 1 TABLET: TAB at 08:47

## 2019-01-01 RX ADMIN — APIXABAN 5 MG: 5 TABLET, FILM COATED ORAL at 17:34

## 2019-01-01 RX ADMIN — METHYLPREDNISOLONE SODIUM SUCCINATE 60 MG: 125 INJECTION, POWDER, FOR SOLUTION INTRAMUSCULAR; INTRAVENOUS at 17:20

## 2019-01-01 RX ADMIN — FLUTICASONE PROPIONATE 2 SPRAY: 50 SPRAY, METERED NASAL at 08:47

## 2019-01-01 RX ADMIN — INSULIN LISPRO 4 UNITS: 100 INJECTION, SOLUTION INTRAVENOUS; SUBCUTANEOUS at 12:21

## 2019-01-01 RX ADMIN — Medication 10 ML: at 06:47

## 2019-01-01 RX ADMIN — APIXABAN 5 MG: 5 TABLET, FILM COATED ORAL at 19:23

## 2019-01-01 RX ADMIN — Medication 10 ML: at 17:28

## 2019-01-01 RX ADMIN — INSULIN GLARGINE 52 UNITS: 100 INJECTION, SOLUTION SUBCUTANEOUS at 09:09

## 2019-01-01 RX ADMIN — Medication 1 CAPSULE: at 09:16

## 2019-01-01 RX ADMIN — HYDRALAZINE HYDROCHLORIDE 25 MG: 25 TABLET, FILM COATED ORAL at 21:38

## 2019-01-01 RX ADMIN — ATORVASTATIN CALCIUM 40 MG: 40 TABLET, FILM COATED ORAL at 20:28

## 2019-01-01 RX ADMIN — APIXABAN 5 MG: 5 TABLET, FILM COATED ORAL at 08:46

## 2019-01-01 RX ADMIN — INSULIN LISPRO 2 UNITS: 100 INJECTION, SOLUTION INTRAVENOUS; SUBCUTANEOUS at 12:32

## 2019-01-01 RX ADMIN — MORPHINE SULFATE 2 MG: 2 INJECTION, SOLUTION INTRAMUSCULAR; INTRAVENOUS at 20:18

## 2019-01-01 RX ADMIN — FUROSEMIDE 20 MG: 20 TABLET ORAL at 11:04

## 2019-01-01 RX ADMIN — PIPERACILLIN SODIUM,TAZOBACTAM SODIUM 3.38 G: 3; .375 INJECTION, POWDER, FOR SOLUTION INTRAVENOUS at 16:15

## 2019-01-01 RX ADMIN — INSULIN GLARGINE 20 UNITS: 100 INJECTION, SOLUTION SUBCUTANEOUS at 21:35

## 2019-01-01 RX ADMIN — APIXABAN 5 MG: 5 TABLET, FILM COATED ORAL at 09:11

## 2019-01-01 RX ADMIN — METOPROLOL TARTRATE 12.5 MG: 25 TABLET ORAL at 21:46

## 2019-01-01 RX ADMIN — METOPROLOL TARTRATE 25 MG: 25 TABLET ORAL at 08:38

## 2019-01-01 RX ADMIN — IPRATROPIUM BROMIDE AND ALBUTEROL SULFATE 3 ML: .5; 3 SOLUTION RESPIRATORY (INHALATION) at 15:52

## 2019-01-01 RX ADMIN — FLUTICASONE PROPIONATE 2 SPRAY: 50 SPRAY, METERED NASAL at 08:59

## 2019-01-01 RX ADMIN — INSULIN GLARGINE 48 UNITS: 100 INJECTION, SOLUTION SUBCUTANEOUS at 09:18

## 2019-01-01 RX ADMIN — FLUTICASONE PROPIONATE 2 SPRAY: 50 SPRAY, METERED NASAL at 09:17

## 2019-01-01 RX ADMIN — APIXABAN 5 MG: 5 TABLET, FILM COATED ORAL at 21:07

## 2019-01-01 RX ADMIN — INSULIN LISPRO 5 UNITS: 100 INJECTION, SOLUTION INTRAVENOUS; SUBCUTANEOUS at 17:32

## 2019-01-01 RX ADMIN — Medication 1 CAPSULE: at 08:44

## 2019-01-01 RX ADMIN — METHYLPREDNISOLONE SODIUM SUCCINATE 80 MG: 125 INJECTION, POWDER, FOR SOLUTION INTRAMUSCULAR; INTRAVENOUS at 11:23

## 2019-01-01 RX ADMIN — APIXABAN 5 MG: 5 TABLET, FILM COATED ORAL at 21:25

## 2019-01-01 RX ADMIN — Medication 10 ML: at 21:19

## 2019-01-01 RX ADMIN — CEFEPIME HYDROCHLORIDE 2 G: 2 INJECTION, POWDER, FOR SOLUTION INTRAVENOUS at 16:04

## 2019-01-01 RX ADMIN — METHYLPREDNISOLONE SODIUM SUCCINATE 80 MG: 125 INJECTION, POWDER, FOR SOLUTION INTRAMUSCULAR; INTRAVENOUS at 06:04

## 2019-01-01 RX ADMIN — SODIUM CHLORIDE 50 ML/HR: 900 INJECTION, SOLUTION INTRAVENOUS at 22:43

## 2019-01-01 RX ADMIN — PREDNISONE 30 MG: 20 TABLET ORAL at 16:52

## 2019-01-01 RX ADMIN — VANCOMYCIN HYDROCHLORIDE 1250 MG: 10 INJECTION, POWDER, LYOPHILIZED, FOR SOLUTION INTRAVENOUS at 00:50

## 2019-01-01 RX ADMIN — METHYLPREDNISOLONE SODIUM SUCCINATE 60 MG: 125 INJECTION, POWDER, FOR SOLUTION INTRAMUSCULAR; INTRAVENOUS at 06:54

## 2019-01-01 RX ADMIN — AMANTADINE HYDROCHLORIDE 100 MG: 100 CAPSULE ORAL at 10:16

## 2019-01-01 RX ADMIN — APIXABAN 5 MG: 5 TABLET, FILM COATED ORAL at 09:28

## 2019-01-01 RX ADMIN — INSULIN LISPRO 4 UNITS: 100 INJECTION, SOLUTION INTRAVENOUS; SUBCUTANEOUS at 17:13

## 2019-01-01 RX ADMIN — INSULIN GLARGINE 30 UNITS: 100 INJECTION, SOLUTION SUBCUTANEOUS at 21:31

## 2019-01-01 RX ADMIN — CEFEPIME HYDROCHLORIDE 2 G: 2 INJECTION, POWDER, FOR SOLUTION INTRAVENOUS at 02:21

## 2019-01-01 RX ADMIN — METFORMIN HYDROCHLORIDE 500 MG: 500 TABLET ORAL at 17:17

## 2019-01-01 RX ADMIN — INSULIN GLARGINE 64 UNITS: 100 INJECTION, SOLUTION SUBCUTANEOUS at 22:10

## 2019-01-01 RX ADMIN — HEPARIN SODIUM 5000 UNITS: 5000 INJECTION INTRAVENOUS; SUBCUTANEOUS at 22:18

## 2019-01-01 RX ADMIN — MIDAZOLAM HYDROCHLORIDE 2 MG: 1 INJECTION, SOLUTION INTRAMUSCULAR; INTRAVENOUS at 13:55

## 2019-01-01 RX ADMIN — VANCOMYCIN HYDROCHLORIDE 1500 MG: 10 INJECTION, POWDER, LYOPHILIZED, FOR SOLUTION INTRAVENOUS at 09:00

## 2019-01-01 RX ADMIN — Medication 10 ML: at 06:44

## 2019-01-01 RX ADMIN — METHYLPREDNISOLONE SODIUM SUCCINATE 60 MG: 40 INJECTION, POWDER, FOR SOLUTION INTRAMUSCULAR; INTRAVENOUS at 03:53

## 2019-01-01 RX ADMIN — IPRATROPIUM BROMIDE AND ALBUTEROL SULFATE 3 ML: .5; 3 SOLUTION RESPIRATORY (INHALATION) at 11:21

## 2019-01-01 RX ADMIN — Medication 10 ML: at 04:59

## 2019-01-01 RX ADMIN — IOPAMIDOL 120 ML: 755 INJECTION, SOLUTION INTRAVENOUS at 13:00

## 2019-01-01 RX ADMIN — ALPRAZOLAM 0.25 MG: 0.25 TABLET ORAL at 07:11

## 2019-01-01 RX ADMIN — METHYLPREDNISOLONE SODIUM SUCCINATE 60 MG: 125 INJECTION, POWDER, FOR SOLUTION INTRAMUSCULAR; INTRAVENOUS at 13:52

## 2019-01-01 RX ADMIN — Medication 10 ML: at 03:18

## 2019-01-01 RX ADMIN — ATORVASTATIN CALCIUM 40 MG: 40 TABLET, FILM COATED ORAL at 21:15

## 2019-01-01 RX ADMIN — METHYLPREDNISOLONE SODIUM SUCCINATE 40 MG: 40 INJECTION, POWDER, FOR SOLUTION INTRAMUSCULAR; INTRAVENOUS at 12:07

## 2019-01-01 RX ADMIN — METOPROLOL TARTRATE 5 MG: 5 INJECTION INTRAVENOUS at 12:25

## 2019-01-01 RX ADMIN — DILTIAZEM HYDROCHLORIDE 90 MG: 60 TABLET, FILM COATED ORAL at 11:44

## 2019-01-01 RX ADMIN — VANCOMYCIN HYDROCHLORIDE 1250 MG: 10 INJECTION, POWDER, LYOPHILIZED, FOR SOLUTION INTRAVENOUS at 00:24

## 2019-01-01 RX ADMIN — ATORVASTATIN CALCIUM 40 MG: 40 TABLET, FILM COATED ORAL at 21:00

## 2019-01-01 RX ADMIN — SULFAMETHOXAZOLE AND TRIMETHOPRIM 1 TABLET: 800; 160 TABLET ORAL at 16:17

## 2019-01-01 RX ADMIN — METOPROLOL TARTRATE 2.5 MG: 5 INJECTION INTRAVENOUS at 09:40

## 2019-01-01 RX ADMIN — METHYLPREDNISOLONE SODIUM SUCCINATE 60 MG: 125 INJECTION, POWDER, FOR SOLUTION INTRAMUSCULAR; INTRAVENOUS at 00:05

## 2019-01-01 RX ADMIN — MYCOPHENOLATE MOFETIL 500 MG: 250 CAPSULE ORAL at 16:17

## 2019-01-01 RX ADMIN — METOPROLOL TARTRATE 25 MG: 25 TABLET ORAL at 09:03

## 2019-01-01 RX ADMIN — Medication 1 CAPSULE: at 10:00

## 2019-01-01 RX ADMIN — MORPHINE SULFATE 2 MG: 2 INJECTION, SOLUTION INTRAMUSCULAR; INTRAVENOUS at 03:08

## 2019-01-01 RX ADMIN — PREDNISONE 30 MG: 20 TABLET ORAL at 16:40

## 2019-01-01 RX ADMIN — METHYLPREDNISOLONE SODIUM SUCCINATE 80 MG: 125 INJECTION, POWDER, FOR SOLUTION INTRAMUSCULAR; INTRAVENOUS at 07:07

## 2019-01-01 RX ADMIN — ATORVASTATIN CALCIUM 40 MG: 40 TABLET, FILM COATED ORAL at 20:29

## 2019-01-01 RX ADMIN — Medication 10 ML: at 22:23

## 2019-01-01 RX ADMIN — METHYLPREDNISOLONE SODIUM SUCCINATE 40 MG: 40 INJECTION, POWDER, FOR SOLUTION INTRAMUSCULAR; INTRAVENOUS at 08:32

## 2019-01-01 RX ADMIN — INSULIN GLARGINE 34 UNITS: 100 INJECTION, SOLUTION SUBCUTANEOUS at 08:35

## 2019-01-01 RX ADMIN — PIPERACILLIN SODIUM,TAZOBACTAM SODIUM 3.38 G: 3; .375 INJECTION, POWDER, FOR SOLUTION INTRAVENOUS at 15:36

## 2019-01-01 RX ADMIN — APIXABAN 5 MG: 5 TABLET, FILM COATED ORAL at 17:24

## 2019-01-01 RX ADMIN — METOPROLOL SUCCINATE 50 MG: 50 TABLET, EXTENDED RELEASE ORAL at 09:25

## 2019-01-01 RX ADMIN — Medication 100 MG: at 09:45

## 2019-01-01 RX ADMIN — INSULIN LISPRO 8 UNITS: 100 INJECTION, SOLUTION INTRAVENOUS; SUBCUTANEOUS at 17:34

## 2019-01-01 RX ADMIN — IPRATROPIUM BROMIDE AND ALBUTEROL SULFATE 3 ML: .5; 3 SOLUTION RESPIRATORY (INHALATION) at 19:42

## 2019-01-01 RX ADMIN — INSULIN LISPRO 14 UNITS: 100 INJECTION, SOLUTION INTRAVENOUS; SUBCUTANEOUS at 12:28

## 2019-01-01 RX ADMIN — PREDNISONE 30 MG: 20 TABLET ORAL at 08:44

## 2019-01-01 RX ADMIN — METOPROLOL TARTRATE 2.5 MG: 5 INJECTION INTRAVENOUS at 09:26

## 2019-01-01 RX ADMIN — SODIUM CHLORIDE 1000 MG: 900 INJECTION, SOLUTION INTRAVENOUS at 00:37

## 2019-01-01 RX ADMIN — Medication 100 MG: at 08:42

## 2019-01-01 RX ADMIN — DILTIAZEM HYDROCHLORIDE 60 MG: 60 TABLET, FILM COATED ORAL at 12:49

## 2019-01-01 RX ADMIN — FLUOXETINE 20 MG: 20 CAPSULE ORAL at 09:03

## 2019-01-01 RX ADMIN — PIPERACILLIN SODIUM,TAZOBACTAM SODIUM 3.38 G: 3; .375 INJECTION, POWDER, FOR SOLUTION INTRAVENOUS at 01:23

## 2019-01-01 RX ADMIN — CEFEPIME HYDROCHLORIDE 2 G: 2 INJECTION, POWDER, FOR SOLUTION INTRAVENOUS at 20:26

## 2019-01-01 RX ADMIN — APIXABAN 10 MG: 5 TABLET, FILM COATED ORAL at 20:31

## 2019-01-01 RX ADMIN — PIPERACILLIN SODIUM,TAZOBACTAM SODIUM 3.38 G: 3; .375 INJECTION, POWDER, FOR SOLUTION INTRAVENOUS at 15:06

## 2019-01-01 RX ADMIN — MORPHINE SULFATE 2 MG: 2 INJECTION, SOLUTION INTRAMUSCULAR; INTRAVENOUS at 00:04

## 2019-01-01 RX ADMIN — PREDNISONE 30 MG: 20 TABLET ORAL at 18:31

## 2019-01-01 RX ADMIN — DILTIAZEM HYDROCHLORIDE 60 MG: 60 TABLET, FILM COATED ORAL at 12:38

## 2019-01-01 RX ADMIN — METHYLPREDNISOLONE SODIUM SUCCINATE 60 MG: 125 INJECTION, POWDER, FOR SOLUTION INTRAMUSCULAR; INTRAVENOUS at 18:02

## 2019-01-01 RX ADMIN — Medication 10 ML: at 07:02

## 2019-01-01 RX ADMIN — DILTIAZEM HYDROCHLORIDE 60 MG: 60 TABLET, FILM COATED ORAL at 09:15

## 2019-01-01 RX ADMIN — PREDNISONE 30 MG: 20 TABLET ORAL at 16:31

## 2019-01-01 RX ADMIN — Medication 10 ML: at 05:09

## 2019-01-01 RX ADMIN — INSULIN GLARGINE 16 UNITS: 100 INJECTION, SOLUTION SUBCUTANEOUS at 09:36

## 2019-01-01 RX ADMIN — METOPROLOL TARTRATE 2.5 MG: 5 INJECTION, SOLUTION INTRAVENOUS at 09:41

## 2019-01-01 RX ADMIN — INSULIN GLARGINE 20 UNITS: 100 INJECTION, SOLUTION SUBCUTANEOUS at 21:39

## 2019-01-01 RX ADMIN — Medication 100 MG: at 09:13

## 2019-01-01 RX ADMIN — AMLODIPINE BESYLATE 2.5 MG: 5 TABLET ORAL at 08:28

## 2019-01-01 RX ADMIN — CEFEPIME HYDROCHLORIDE 2 G: 2 INJECTION, POWDER, FOR SOLUTION INTRAVENOUS at 03:01

## 2019-01-01 RX ADMIN — ATORVASTATIN CALCIUM 40 MG: 40 TABLET, FILM COATED ORAL at 21:47

## 2019-01-01 RX ADMIN — Medication 1 CAPSULE: at 09:15

## 2019-01-01 RX ADMIN — APIXABAN 5 MG: 5 TABLET, FILM COATED ORAL at 21:12

## 2019-01-01 RX ADMIN — Medication 10 ML: at 05:44

## 2019-01-01 RX ADMIN — FENTANYL CITRATE 25 MCG: 50 INJECTION, SOLUTION INTRAMUSCULAR; INTRAVENOUS at 11:22

## 2019-01-01 RX ADMIN — AMLODIPINE BESYLATE 2.5 MG: 5 TABLET ORAL at 08:49

## 2019-01-01 RX ADMIN — METHYLPREDNISOLONE SODIUM SUCCINATE 40 MG: 40 INJECTION, POWDER, FOR SOLUTION INTRAMUSCULAR; INTRAVENOUS at 20:21

## 2019-01-01 RX ADMIN — METOPROLOL TARTRATE 25 MG: 25 TABLET ORAL at 22:05

## 2019-01-01 RX ADMIN — INSULIN LISPRO 2 UNITS: 100 INJECTION, SOLUTION INTRAVENOUS; SUBCUTANEOUS at 12:37

## 2019-01-01 RX ADMIN — METHYLPREDNISOLONE SODIUM SUCCINATE 60 MG: 40 INJECTION, POWDER, FOR SOLUTION INTRAMUSCULAR; INTRAVENOUS at 20:31

## 2019-01-01 RX ADMIN — ATORVASTATIN CALCIUM 40 MG: 40 TABLET, FILM COATED ORAL at 21:42

## 2019-01-01 RX ADMIN — APIXABAN 5 MG: 5 TABLET, FILM COATED ORAL at 09:19

## 2019-01-01 RX ADMIN — HYDRALAZINE HYDROCHLORIDE 10 MG: 20 INJECTION INTRAMUSCULAR; INTRAVENOUS at 19:04

## 2019-01-01 RX ADMIN — METHYLPREDNISOLONE SODIUM SUCCINATE 40 MG: 40 INJECTION, POWDER, FOR SOLUTION INTRAMUSCULAR; INTRAVENOUS at 11:38

## 2019-01-01 RX ADMIN — FLUTICASONE PROPIONATE 2 SPRAY: 50 SPRAY, METERED NASAL at 08:33

## 2019-01-01 RX ADMIN — INSULIN GLARGINE 52 UNITS: 100 INJECTION, SOLUTION SUBCUTANEOUS at 09:19

## 2019-01-01 RX ADMIN — INSULIN GLARGINE 80 UNITS: 100 INJECTION, SOLUTION SUBCUTANEOUS at 21:51

## 2019-01-01 RX ADMIN — METHYLPREDNISOLONE SODIUM SUCCINATE 20 MG: 40 INJECTION, POWDER, FOR SOLUTION INTRAMUSCULAR; INTRAVENOUS at 15:06

## 2019-01-01 RX ADMIN — INSULIN LISPRO 2 UNITS: 100 INJECTION, SOLUTION INTRAVENOUS; SUBCUTANEOUS at 11:54

## 2019-01-01 RX ADMIN — AMANTADINE HYDROCHLORIDE 100 MG: 100 CAPSULE ORAL at 17:31

## 2019-01-01 RX ADMIN — Medication 10 ML: at 13:30

## 2019-01-01 RX ADMIN — ENOXAPARIN SODIUM 80 MG: 80 INJECTION, SOLUTION INTRAVENOUS; SUBCUTANEOUS at 17:47

## 2019-01-01 RX ADMIN — INSULIN LISPRO 10 UNITS: 100 INJECTION, SOLUTION INTRAVENOUS; SUBCUTANEOUS at 18:36

## 2019-01-01 RX ADMIN — INSULIN LISPRO 10 UNITS: 100 INJECTION, SOLUTION INTRAVENOUS; SUBCUTANEOUS at 17:20

## 2019-01-01 RX ADMIN — Medication 10 ML: at 21:37

## 2019-01-01 RX ADMIN — HYDRALAZINE HYDROCHLORIDE 25 MG: 25 TABLET, FILM COATED ORAL at 08:13

## 2019-01-01 RX ADMIN — SODIUM CHLORIDE 1000 ML: 900 INJECTION, SOLUTION INTRAVENOUS at 11:37

## 2019-01-01 RX ADMIN — METOPROLOL SUCCINATE 50 MG: 50 TABLET, EXTENDED RELEASE ORAL at 08:59

## 2019-01-01 RX ADMIN — METHYLPREDNISOLONE SODIUM SUCCINATE 60 MG: 40 INJECTION, POWDER, FOR SOLUTION INTRAMUSCULAR; INTRAVENOUS at 20:28

## 2019-01-01 RX ADMIN — Medication 10 ML: at 14:02

## 2019-01-01 RX ADMIN — CEFEPIME HYDROCHLORIDE 2 G: 2 INJECTION, POWDER, FOR SOLUTION INTRAVENOUS at 02:08

## 2019-01-01 RX ADMIN — CEFEPIME HYDROCHLORIDE 2 G: 2 INJECTION, POWDER, FOR SOLUTION INTRAVENOUS at 02:49

## 2019-01-01 RX ADMIN — Medication 100 MG: at 10:10

## 2019-01-01 RX ADMIN — AMANTADINE HYDROCHLORIDE 100 MG: 100 CAPSULE ORAL at 08:47

## 2019-01-01 RX ADMIN — POTASSIUM CHLORIDE 20 MEQ: 750 TABLET, EXTENDED RELEASE ORAL at 17:25

## 2019-01-01 RX ADMIN — METHYLPREDNISOLONE SODIUM SUCCINATE 60 MG: 40 INJECTION, POWDER, FOR SOLUTION INTRAMUSCULAR; INTRAVENOUS at 21:29

## 2019-01-01 RX ADMIN — HYDRALAZINE HYDROCHLORIDE 10 MG: 20 INJECTION INTRAMUSCULAR; INTRAVENOUS at 03:07

## 2019-01-01 RX ADMIN — SODIUM CHLORIDE 1000 MG: 900 INJECTION, SOLUTION INTRAVENOUS at 01:22

## 2019-01-01 RX ADMIN — INSULIN GLARGINE 50 UNITS: 100 INJECTION, SOLUTION SUBCUTANEOUS at 09:02

## 2019-01-01 RX ADMIN — INSULIN GLARGINE 30 UNITS: 100 INJECTION, SOLUTION SUBCUTANEOUS at 21:51

## 2019-01-01 RX ADMIN — Medication 1 CAPSULE: at 08:25

## 2019-01-01 RX ADMIN — DILTIAZEM HYDROCHLORIDE 120 MG: 60 TABLET, FILM COATED ORAL at 12:20

## 2019-01-01 RX ADMIN — INSULIN GLARGINE 44 UNITS: 100 INJECTION, SOLUTION SUBCUTANEOUS at 21:14

## 2019-01-01 RX ADMIN — METOPROLOL TARTRATE 2.5 MG: 5 INJECTION INTRAVENOUS at 15:35

## 2019-01-01 RX ADMIN — HEPARIN SODIUM 5000 UNITS: 5000 INJECTION INTRAVENOUS; SUBCUTANEOUS at 05:47

## 2019-01-01 RX ADMIN — INSULIN LISPRO 5 UNITS: 100 INJECTION, SOLUTION INTRAVENOUS; SUBCUTANEOUS at 12:11

## 2019-01-01 RX ADMIN — INSULIN GLARGINE 32 UNITS: 100 INJECTION, SOLUTION SUBCUTANEOUS at 09:28

## 2019-01-01 RX ADMIN — INSULIN LISPRO 6 UNITS: 100 INJECTION, SOLUTION INTRAVENOUS; SUBCUTANEOUS at 11:38

## 2019-01-01 RX ADMIN — CEFEPIME HYDROCHLORIDE 2 G: 2 INJECTION, POWDER, FOR SOLUTION INTRAVENOUS at 17:44

## 2019-01-01 RX ADMIN — AMLODIPINE BESYLATE 2.5 MG: 5 TABLET ORAL at 09:09

## 2019-01-01 RX ADMIN — Medication 1 CAPSULE: at 08:27

## 2019-01-01 RX ADMIN — METOPROLOL TARTRATE 2.5 MG: 5 INJECTION INTRAVENOUS at 21:36

## 2019-01-01 RX ADMIN — INSULIN LISPRO 5 UNITS: 100 INJECTION, SOLUTION INTRAVENOUS; SUBCUTANEOUS at 06:40

## 2019-01-01 RX ADMIN — ENOXAPARIN SODIUM 80 MG: 80 INJECTION, SOLUTION INTRAVENOUS; SUBCUTANEOUS at 15:46

## 2019-01-01 RX ADMIN — APIXABAN 5 MG: 5 TABLET, FILM COATED ORAL at 21:44

## 2019-01-01 RX ADMIN — METOPROLOL TARTRATE 25 MG: 25 TABLET ORAL at 21:36

## 2019-01-01 RX ADMIN — INSULIN LISPRO 7 UNITS: 100 INJECTION, SOLUTION INTRAVENOUS; SUBCUTANEOUS at 12:13

## 2019-01-01 RX ADMIN — FLUTICASONE PROPIONATE 2 SPRAY: 50 SPRAY, METERED NASAL at 08:50

## 2019-01-01 RX ADMIN — APIXABAN 10 MG: 5 TABLET, FILM COATED ORAL at 09:11

## 2019-01-01 RX ADMIN — CEFEPIME HYDROCHLORIDE 2 G: 2 INJECTION, POWDER, FOR SOLUTION INTRAVENOUS at 17:27

## 2019-01-01 RX ADMIN — HYDRALAZINE HYDROCHLORIDE 25 MG: 25 TABLET, FILM COATED ORAL at 15:05

## 2019-01-01 RX ADMIN — ALPRAZOLAM 0.25 MG: 0.25 TABLET ORAL at 20:34

## 2019-01-01 RX ADMIN — ATORVASTATIN CALCIUM 40 MG: 40 TABLET, FILM COATED ORAL at 21:32

## 2019-01-01 RX ADMIN — METOPROLOL SUCCINATE 50 MG: 50 TABLET, EXTENDED RELEASE ORAL at 08:44

## 2019-01-01 RX ADMIN — POTASSIUM CHLORIDE 40 MEQ: 750 TABLET, EXTENDED RELEASE ORAL at 12:20

## 2019-01-01 RX ADMIN — VANCOMYCIN HYDROCHLORIDE 1500 MG: 10 INJECTION, POWDER, LYOPHILIZED, FOR SOLUTION INTRAVENOUS at 21:45

## 2019-01-01 RX ADMIN — CEFEPIME HYDROCHLORIDE 2 G: 2 INJECTION, POWDER, FOR SOLUTION INTRAVENOUS at 10:41

## 2019-01-01 RX ADMIN — INSULIN LISPRO 7 UNITS: 100 INJECTION, SOLUTION INTRAVENOUS; SUBCUTANEOUS at 16:49

## 2019-01-01 RX ADMIN — Medication 10 ML: at 02:44

## 2019-01-01 RX ADMIN — IOPAMIDOL 100 ML: 755 INJECTION, SOLUTION INTRAVENOUS at 14:23

## 2019-01-01 RX ADMIN — INSULIN LISPRO 5 UNITS: 100 INJECTION, SOLUTION INTRAVENOUS; SUBCUTANEOUS at 11:30

## 2019-01-01 RX ADMIN — INSULIN LISPRO 5 UNITS: 100 INJECTION, SOLUTION INTRAVENOUS; SUBCUTANEOUS at 08:35

## 2019-01-01 RX ADMIN — Medication 10 ML: at 06:40

## 2019-01-01 RX ADMIN — METOPROLOL SUCCINATE 50 MG: 50 TABLET, EXTENDED RELEASE ORAL at 08:36

## 2019-01-01 RX ADMIN — Medication 10 ML: at 14:04

## 2019-01-01 RX ADMIN — INSULIN GLARGINE 20 UNITS: 100 INJECTION, SOLUTION SUBCUTANEOUS at 23:00

## 2019-01-01 RX ADMIN — GLYCOPYRROLATE 0.2 MG: 0.2 INJECTION, SOLUTION INTRAMUSCULAR; INTRAVENOUS at 04:50

## 2019-01-01 RX ADMIN — METOPROLOL TARTRATE 25 MG: 25 TABLET ORAL at 21:49

## 2019-01-01 RX ADMIN — VANCOMYCIN HYDROCHLORIDE 1250 MG: 10 INJECTION, POWDER, LYOPHILIZED, FOR SOLUTION INTRAVENOUS at 12:38

## 2019-01-01 RX ADMIN — PREDNISONE 30 MG: 20 TABLET ORAL at 16:34

## 2019-01-01 RX ADMIN — INSULIN LISPRO 2 UNITS: 100 INJECTION, SOLUTION INTRAVENOUS; SUBCUTANEOUS at 17:28

## 2019-01-01 RX ADMIN — POTASSIUM CHLORIDE 40 MEQ: 750 TABLET, EXTENDED RELEASE ORAL at 14:42

## 2019-01-01 RX ADMIN — Medication 10 ML: at 03:06

## 2019-01-01 RX ADMIN — AMANTADINE HYDROCHLORIDE 100 MG: 100 CAPSULE ORAL at 18:00

## 2019-01-01 RX ADMIN — INSULIN LISPRO 2 UNITS: 100 INJECTION, SOLUTION INTRAVENOUS; SUBCUTANEOUS at 07:22

## 2019-01-01 RX ADMIN — INSULIN GLARGINE 34 UNITS: 100 INJECTION, SOLUTION SUBCUTANEOUS at 08:47

## 2019-01-01 RX ADMIN — MYCOPHENOLATE MOFETIL 500 MG: 250 CAPSULE ORAL at 06:02

## 2019-01-01 RX ADMIN — ATORVASTATIN CALCIUM 40 MG: 40 TABLET, FILM COATED ORAL at 21:34

## 2019-01-01 RX ADMIN — Medication 1 CAPSULE: at 09:08

## 2019-01-01 RX ADMIN — LIDOCAINE HYDROCHLORIDE 10 ML: 20 SOLUTION ORAL; TOPICAL at 14:00

## 2019-01-01 RX ADMIN — INSULIN LISPRO 2 UNITS: 100 INJECTION, SOLUTION INTRAVENOUS; SUBCUTANEOUS at 17:25

## 2019-01-01 RX ADMIN — HYDRALAZINE HYDROCHLORIDE 10 MG: 20 INJECTION INTRAMUSCULAR; INTRAVENOUS at 12:14

## 2019-01-01 RX ADMIN — KETOROLAC TROMETHAMINE 30 MG: 30 INJECTION, SOLUTION INTRAMUSCULAR at 12:24

## 2019-01-01 RX ADMIN — AMLODIPINE BESYLATE 2.5 MG: 5 TABLET ORAL at 09:10

## 2019-01-01 RX ADMIN — INSULIN LISPRO 2 UNITS: 100 INJECTION, SOLUTION INTRAVENOUS; SUBCUTANEOUS at 09:28

## 2019-02-03 PROBLEM — J96.00 ACUTE RESPIRATORY FAILURE (HCC): Status: ACTIVE | Noted: 2019-01-01

## 2019-02-03 PROBLEM — C81.90 HODGKIN LYMPHOMA (HCC): Status: ACTIVE | Noted: 2019-01-01

## 2019-02-03 PROBLEM — J18.9 CAP (COMMUNITY ACQUIRED PNEUMONIA): Status: ACTIVE | Noted: 2019-01-01

## 2019-02-03 PROBLEM — M79.89 LEG SWELLING: Status: ACTIVE | Noted: 2019-01-01

## 2019-02-03 NOTE — ED NOTES
Pt ambulated to restroom to void. Upon return to bed, 02 68% on room air,use of accessory muscles. Advised we will use purewik and BSC now. 02 increased to 6L n/c up to 93% Instructed on deep breathing.

## 2019-02-03 NOTE — H&P
Hospitalist Admission NoteNAME: Denise Castillo :  1950 MRN:  [id number] Date/Time:  2/3/2019 4:44 PM 
 
Patient PCP: Donald Ratliff MD 
______________________________________________________________________ Given the patient's current clinical presentation, I have a high level of concern for decompensation if discharged from the emergency department. Complex decision making was performed, which includes reviewing the patient's available past medical records, laboratory results, and x-ray films. My assessment of this patient's clinical condition and my plan of care is as follows. Assessment / Plan: 
Acute respiratory failure with hypoxia POA with respiratory distress / Tachypnea Due to Acute Pulmonary Embolism In settings of Hodgkins Lymphoma on Chemotherapy Admit to PCU Lovenox 1mg/kg Sq BID Hematology consult O2 support Check 2D echo Check LE venous dopplers Hypokalemia Replete and monitor Check Mg Recent CAP Received Levaquin for 4 days, will extend coarse of 4 more days for now H/o SVT (supraventricular tachycardia) Continue BB Essential hypertension Continue BB Doesn't want her to become hypotensive in settings of PE so will lower Norvasc to 2.5mg and Hold HCTZ and Aldactone and start lasix as below Leg Swelling Possible due to right heart strain from above 2D echo as above Switch diuretics to lasix Check TSH Diabetes mellitus type 2, controlled Hold PO meds SSI only for now Dyslipidemia Continue statins Code Status: Full but doesn't want multiple CPR attempts and Doesn't want prolon life support Surrogate Decision Maker: Sister and Brother DVT Prophylaxis: Lovenox Baseline: functional  
  
Subjective: CHIEF COMPLAINT: shortness of breath HISTORY OF PRESENT ILLNESS:    
Denise Castillo is a 70 y.o.  female who presents with shortness of breath.  As per patient, she has recent PET scan which showed her lymphoma is improving but she had PNA and was started on Levaquin. 2 days later she started having difficulty breathing, non productive cough and 3 days ago she had one time 101 fever associated with chills but none afterwards. Pt also noted her legs been swelling for past few days. Pt denies any nausea, vomiting, diarrhea, chest pain, problems urination. In ED pt noted to be hypoxic, CTA chest showed PEs and bilateral PNA. We were asked to admit for work up and evaluation of the above problems. Past Medical History:  
Diagnosis Date  Asthma   
 as a child, nothing since menopause  Cancer ([facility] [address] 2018 Non-Hodgkin's  Cataracts, bilateral   
 Diabetes ([facility] [address] type II  
 Environmental allergies  Fibromyalgia Treated with alternate medical therapy  Hypertension Past Surgical History:  
Procedure Laterality Date  HX BREAST BIOPSY Right 2006  HX COLONOSCOPY  2015  HX GYN Pap   HX ORTHOPAEDIC    
 fractured R ankle/ no surgery  HX OTHER SURGICAL Right 2018  
 excision of deep neck cervical LN. Social History Tobacco Use  Smoking status: Never Smoker  Smokeless tobacco: Never Used Substance Use Topics  Alcohol use: Yes Comment: socially Family History Problem Relation Age of Onset  Heart Disease Mother  Cancer Father   
     prostate cancer  Cancer Sister Breast cancer apparent DCIS  
 Heart Disease Brother Brother  from congestive heart failure secondary to severe mitral disease No Known Allergies Prior to Admission medications Medication Sig Start Date End Date Taking?  Authorizing Provider  
pioglitazone (ACTOS) 30 mg tablet TAKE 1 TABLET DAILY 19   Alley Mendoza MD  
[facility] VER strip USE 1 STRIP IN VITRO TO TEST BLOOD SUGAR ONCE DAILY 10/17/18   Alley Mendoza MD  
metoprolol succinate (TOPROL-XL) 50 mg XL tablet TAKE 1 TAB BY MOUTH DAILY. 10/12/18   Yesi Montano MD  
JANUMET XR 50-1,000 mg TM24 TAKE 1 TABLET BY MOUTH TWICE A DAY WITH MEALS 10/12/18   Yesi Montano MD  
fluticasone [facility] ALLERGY RELIEF) 50 mcg/actuation nasal spray 2 Sprays by Both Nostrils route daily. Provider, Historical  
ibuprofen (MOTRIN) 600 mg tablet Take 1 Tab by mouth every six (6) hours as needed for Pain. 8/28/18   Jarek Garsia MD  
spironolactone-hydrochlorothiazide (ALDACTAZIDE) 25-25 mg per tablet TAKE ONE TABLET BY MOUTH EVERY DAY 12/3/17   Yesi Montano MD  
atorvastatin (LIPITOR) 40 mg tablet TAKE 1 TAB BY MOUTH DAILY. 12/3/17   Yesi Montano MD  
amLODIPine (NORVASC) 10 mg tablet TAKE 1 TABLET BY MOUTH EVERY DAY 11/7/17   Yesi Montano MD  
Lancets (ONE TOUCH DELICA) misc USE TO TEST BLOOD SUGAR ONCE DAILY. DX.E11.9 10/13/16   Yesi Montano MD  
alcohol swabs (ALCOHOL PREP PADS) padm 1 Pad by Apply Externally route daily. DX.E11.9 10/13/16   Yesi Montano MD  
 
 
REVIEW OF SYSTEMS:    
I am not able to complete the review of systems because: The patient is intubated and sedated The patient has altered mental status due to his acute medical problems The patient has baseline aphasia from prior stroke(s) The patient has baseline dementia and is not reliable historian The patient is in acute medical distress and unable to provide information Total of 12 systems reviewed as follows:   
   POSITIVE= underlined text  Negative = text not underlined General:  fever, chills, sweats, generalized weakness, weight loss/gain,  
   loss of appetite Eyes:    blurred vision, eye pain, loss of vision, double vision ENT:    rhinorrhea, pharyngitis Respiratory:   cough, sputum production, SOB, SOLIZ, wheezing, pleuritic pain  
Cardiology:   chest pain, palpitations, orthopnea, PND, edema, syncope Gastrointestinal:  abdominal pain , N/V, diarrhea, dysphagia, constipation, bleeding Genitourinary:  frequency, urgency, dysuria, hematuria, incontinence Muskuloskeletal :  arthralgia, myalgia, back pain Hematology:  easy bruising, nose or gum bleeding, lymphadenopathy Dermatological: rash, ulceration, pruritis, color change / jaundice Endocrine:   hot flashes or polydipsia Neurological:  headache, dizziness, confusion, focal weakness, paresthesia, Speech difficulties, memory loss, gait difficulty Psychological: Feelings of anxiety, depression, agitation Objective: VITALS:   
Visit Vitals /70 Pulse 98 Temp 98.2 °F (36.8 °C) Resp (!) 34 Wt 81.9 kg (180 lb 8 oz) SpO2 96% BMI 27.44 kg/m² PHYSICAL EXAM: 
 
General:    Alert, cooperative, no distress, appears stated age. HEENT: Atraumatic, anicteric sclerae, pink conjunctivae No oral ulcers, mucosa moist, throat clear, dentition fair Neck:  Supple, symmetrical,  thyroid: non tender Lungs:   crackles. No Wheezing or Rhonchi. No rales. Chest wall:  No tenderness  No Accessory muscle use. Heart:   Regular  rhythm,  No  murmur   +++ edema Abdomen:   Soft, non-tender. Not distended. Bowel sounds normal 
Extremities: No cyanosis. No clubbing,   
  Skin turgor normal, Capillary refill normal, Radial dial pulse 2+ Skin:     Not pale. Not Jaundiced  No rashes Psych:  Good insight. Not depressed. Not anxious or agitated. Neurologic: EOMs intact. No facial asymmetry. No aphasia or slurred speech. Symmetrical strength, Sensation grossly intact. Alert and oriented X 4.  
 
_______________________________________________________________________ Care Plan discussed with: 
  Comments Patient y Family RN y   
Care Manager Consultant:     
_______________________________________________________________________ Expected  Disposition:  
Home with Family y HH/PT/OT/RN   
SNF/LTC   
LUCHO   
________________________________________________________________________ TOTAL TIME: 60 Minutes Critical Care Provided     Minutes non procedure based Comments  
 y Reviewed previous records  
>50% of visit spent in counseling and coordination of care y Discussion with patient and family and questions answered 
  
 
________________________________________________________________________ Signed: Janna Trejo MD 
 
Procedures: see electronic medical records for all procedures/Xrays and details which were not copied into this note but were reviewed prior to creation of Plan. LAB DATA REVIEWED:   
Recent Results (from the past 24 hour(s)) EKG, 12 LEAD, INITIAL Collection Time: 02/03/19  1:10 PM  
Result Value Ref Range Ventricular Rate 109 BPM  
 Atrial Rate 109 BPM  
 P-R Interval 164 ms QRS Duration 80 ms  
 Q-T Interval 370 ms QTC Calculation (Bezet) 498 ms Calculated P Axis 30 degrees Calculated R Axis -11 degrees Calculated T Axis 11 degrees Diagnosis Sinus tachycardia Minimal voltage criteria for LVH, may be normal variant When compared with ECG of 12-SEP-2018 07:00, 
QT has lengthened CBC WITH AUTOMATED DIFF Collection Time: 02/03/19  1:19 PM  
Result Value Ref Range WBC 6.4 3.6 - 11.0 K/uL  
 RBC 3.58 (L) 3.80 - 5.20 M/uL  
 HGB 10.4 (L) 11.5 - 16.0 g/dL HCT 32.0 (L) 35.0 - 47.0 % MCV 89.4 80.0 - 99.0 FL  
 MCH 29.1 26.0 - 34.0 PG  
 MCHC 32.5 30.0 - 36.5 g/dL  
 RDW 13.8 11.5 - 14.5 % PLATELET 933 (H) 221 - 400 K/uL MPV 9.6 8.9 - 12.9 FL  
 NRBC 0.8 (H) 0  WBC ABSOLUTE NRBC 0.05 (H) 0.00 - 0.01 K/uL NEUTROPHILS 60 32 - 75 % BAND NEUTROPHILS 1 % LYMPHOCYTES 20 12 - 49 % MONOCYTES 15 (H) 5 - 13 % EOSINOPHILS 2 0 - 7 % BASOPHILS 2 (H) 0 - 1 % IMMATURE GRANULOCYTES 0 0.0 - 0.5 % ABS. NEUTROPHILS 3.9 1.8 - 8.0 K/UL  
 ABS. LYMPHOCYTES 1.3 0.8 - 3.5 K/UL  
 ABS. MONOCYTES 1.0 0.0 - 1.0 K/UL  
 ABS. EOSINOPHILS 0.1 0.0 - 0.4 K/UL  
 ABS. BASOPHILS 0.1 0.0 - 0.1 K/UL ABS. IMM. GRANS. 0.0 0.00 - 0.04 K/UL  
 DF MANUAL    
 RBC COMMENTS NORMOCYTIC, NORMOCHROMIC METABOLIC PANEL, COMPREHENSIVE Collection Time: 02/03/19  1:19 PM  
Result Value Ref Range Sodium 133 (L) 136 - 145 mmol/L Potassium 2.9 (L) 3.5 - 5.1 mmol/L Chloride 95 (L) 97 - 108 mmol/L  
 CO2 30 21 - 32 mmol/L Anion gap 8 5 - 15 mmol/L Glucose 168 (H) 65 - 100 mg/dL BUN 5 (L) 6 - 20 MG/DL Creatinine 0.83 0.55 - 1.02 MG/DL  
 BUN/Creatinine ratio 6 (L) 12 - 20 GFR est AA >60 >60 ml/min/1.73m2 GFR est non-AA >60 >60 ml/min/1.73m2 Calcium 9.8 8.5 - 10.1 MG/DL Bilirubin, total 0.3 0.2 - 1.0 MG/DL  
 ALT (SGPT) 11 (L) 12 - 78 U/L  
 AST (SGOT) 17 15 - 37 U/L Alk. phosphatase 119 (H) 45 - 117 U/L Protein, total 8.1 6.4 - 8.2 g/dL Albumin 3.2 (L) 3.5 - 5.0 g/dL Globulin 4.9 (H) 2.0 - 4.0 g/dL A-G Ratio 0.7 (L) 1.1 - 2.2 NT-PRO BNP Collection Time: 02/03/19  1:19 PM  
Result Value Ref Range NT pro- (H) 0 - 125 PG/ML  
TROPONIN I Collection Time: 02/03/19  1:19 PM  
Result Value Ref Range Troponin-I, Qt. <0.05 <0.05 ng/mL POC LACTIC ACID Collection Time: 02/03/19  2:25 PM  
Result Value Ref Range  Lactic Acid (POC) 1.56 0.40 - 2.00 mmol/L

## 2019-02-03 NOTE — ED PROVIDER NOTES
EMERGENCY DEPARTMENT HISTORY AND PHYSICAL EXAM 
 
 
Date: 2/3/2019 Patient Name: Osmar Cota History of Presenting Illness Chief Complaint Patient presents with  Shortness of Breath  
  was dx with PNA on Thursday and is not feeling better. History Provided By: Patient HPI: Osmar Cota, 70 y.o. female with PMHx significant for HTN, Fibromyalgia, Asthma, DM, presents via private vehicle to the ED with cc of SOB x 3 days. Pt reports an associated cough. Pt reports \"I had my last chemo treatment 10 days ago. On Thursday they did at PET scan to check if the cancer was gone and they found PNA according to Dr. Swati Hudson" Pt notes she is not normally on oxygen at home. Pt reports she has not been eating well but believes it is due to her chemotherapy treatment. Pt denies any pain at this time. There are no other complaints, changes, or physical findings at this time. PCP: Angelika Shrestha MD 
 
No current facility-administered medications on file prior to encounter. Current Outpatient Medications on File Prior to Encounter Medication Sig Dispense Refill  pioglitazone (ACTOS) 30 mg tablet TAKE 1 TABLET DAILY 30 Tab 11  
 ONETOUCH VERIO strip USE 1 STRIP IN VITRO TO TEST BLOOD SUGAR ONCE DAILY 50 Strip 11  
 metoprolol succinate (TOPROL-XL) 50 mg XL tablet TAKE 1 TAB BY MOUTH DAILY. 30 Tab 11  
 JANUMET XR 50-1,000 mg TM24 TAKE 1 TABLET BY MOUTH TWICE A DAY WITH MEALS 60 Tab 11  
 fluticasone (FLONASE ALLERGY RELIEF) 50 mcg/actuation nasal spray 2 Sprays by Both Nostrils route daily.  ibuprofen (MOTRIN) 600 mg tablet Take 1 Tab by mouth every six (6) hours as needed for Pain. 40 Tab 0  
 spironolactone-hydrochlorothiazide (ALDACTAZIDE) 25-25 mg per tablet TAKE ONE TABLET BY MOUTH EVERY DAY 30 Tab 11  
 atorvastatin (LIPITOR) 40 mg tablet TAKE 1 TAB BY MOUTH DAILY.  30 Tab 11  
 amLODIPine (NORVASC) 10 mg tablet TAKE 1 TABLET BY MOUTH EVERY DAY 30 Tab 11  
  Lancets (ONE TOUCH DELICA) misc USE TO TEST BLOOD SUGAR ONCE DAILY. DX.E11.9 100 Each 11  
 alcohol swabs (ALCOHOL PREP PADS) padm 1 Pad by Apply Externally route daily. DX.E11.9 30 Pad 11 Past History Past Medical History: 
Past Medical History:  
Diagnosis Date  Asthma   
 as a child, nothing since menopause  Cancer ([facility] [address] 2018 Non-Hodgkin's  Cataracts, bilateral   
 Diabetes ([facility] [address] type II  
 Environmental allergies  Fibromyalgia Treated with alternate medical therapy  Hypertension Past Surgical History: 
Past Surgical History:  
Procedure Laterality Date  HX BREAST BIOPSY Right 2006  HX COLONOSCOPY  2015  HX GYN Pap   HX ORTHOPAEDIC    
 fractured R ankle/ no surgery  HX OTHER SURGICAL Right 2018  
 excision of deep neck cervical LN. Family History: 
Family History Problem Relation Age of Onset  Heart Disease Mother  Cancer Father   
     prostate cancer  Cancer Sister Breast cancer apparent DCIS  
 Heart Disease Brother Brother  from congestive heart failure secondary to severe mitral disease Social History: 
Social History Tobacco Use  Smoking status: Never Smoker  Smokeless tobacco: Never Used Substance Use Topics  Alcohol use: Yes Comment: socially  Drug use: No  
 
 
Allergies: 
No Known Allergies Review of Systems Review of Systems Constitutional: Negative. Negative for chills and fever. HENT: Negative. Negative for congestion and rhinorrhea. Respiratory: Positive for cough and shortness of breath. Negative for chest tightness and wheezing. Cardiovascular: Negative. Negative for chest pain and palpitations. Gastrointestinal: Negative. Negative for abdominal pain, constipation, nausea and vomiting. Endocrine: Negative. Genitourinary: Negative. Negative for decreased urine volume, flank pain, hematuria and pelvic pain. Musculoskeletal: Negative. Negative for back pain and neck pain. Skin: Negative. Negative for color change, pallor and rash. Neurological: Negative. Negative for dizziness, seizures, weakness, numbness and headaches. Hematological: Negative. Negative for adenopathy. Psychiatric/Behavioral: Negative. All other systems reviewed and are negative. Physical Exam  
Physical Exam  
Constitutional: She is oriented to person, place, and time. She appears well-developed and well-nourished. HENT:  
Head: Normocephalic and atraumatic. Dry mucous membrane Eyes: Conjunctivae are normal. Pupils are equal, round, and reactive to light. Right eye exhibits no discharge. Left eye exhibits no discharge. Neck: Normal range of motion. Neck supple. No tracheal deviation present. Cardiovascular: Normal rate, regular rhythm and normal heart sounds. No murmur heard. Pulmonary/Chest: Effort normal and breath sounds normal. No respiratory distress. She has no wheezes. She has no rales. Abdominal: Soft. Bowel sounds are normal. There is no tenderness. There is no rebound and no guarding. Musculoskeletal: Normal range of motion. She exhibits no tenderness or deformity. Pitting edema b/l, mild Neurological: She is alert and oriented to person, place, and time. Skin: Skin is warm and dry. No rash noted. No erythema. Psychiatric: Her behavior is normal.  
Nursing note and vitals reviewed. Diagnostic Study Results Labs - Recent Results (from the past 12 hour(s)) EKG, 12 LEAD, INITIAL Collection Time: 02/03/19  1:10 PM  
Result Value Ref Range Ventricular Rate 109 BPM  
 Atrial Rate 109 BPM  
 P-R Interval 164 ms QRS Duration 80 ms  
 Q-T Interval 370 ms QTC Calculation (Bezet) 498 ms Calculated P Axis 30 degrees Calculated R Axis -11 degrees Calculated T Axis 11 degrees Diagnosis Sinus tachycardia Minimal voltage criteria for LVH, may be normal variant When compared with ECG of 12-SEP-2018 07:00, 
QT has lengthened CBC WITH AUTOMATED DIFF Collection Time: 02/03/19  1:19 PM  
Result Value Ref Range WBC 6.4 3.6 - 11.0 K/uL  
 RBC 3.58 (L) 3.80 - 5.20 M/uL  
 HGB 10.4 (L) 11.5 - 16.0 g/dL HCT 32.0 (L) 35.0 - 47.0 % MCV 89.4 80.0 - 99.0 FL  
 MCH 29.1 26.0 - 34.0 PG  
 MCHC 32.5 30.0 - 36.5 g/dL  
 RDW 13.8 11.5 - 14.5 % PLATELET 919 (H) 104 - 400 K/uL MPV 9.6 8.9 - 12.9 FL  
 NRBC 0.8 (H) 0  WBC ABSOLUTE NRBC 0.05 (H) 0.00 - 0.01 K/uL NEUTROPHILS 60 32 - 75 % BAND NEUTROPHILS 1 % LYMPHOCYTES 20 12 - 49 % MONOCYTES 15 (H) 5 - 13 % EOSINOPHILS 2 0 - 7 % BASOPHILS 2 (H) 0 - 1 % IMMATURE GRANULOCYTES 0 0.0 - 0.5 % ABS. NEUTROPHILS 3.9 1.8 - 8.0 K/UL  
 ABS. LYMPHOCYTES 1.3 0.8 - 3.5 K/UL  
 ABS. MONOCYTES 1.0 0.0 - 1.0 K/UL  
 ABS. EOSINOPHILS 0.1 0.0 - 0.4 K/UL  
 ABS. BASOPHILS 0.1 0.0 - 0.1 K/UL  
 ABS. IMM. GRANS. 0.0 0.00 - 0.04 K/UL  
 DF MANUAL    
 RBC COMMENTS NORMOCYTIC, NORMOCHROMIC METABOLIC PANEL, COMPREHENSIVE Collection Time: 02/03/19  1:19 PM  
Result Value Ref Range Sodium 133 (L) 136 - 145 mmol/L Potassium 2.9 (L) 3.5 - 5.1 mmol/L Chloride 95 (L) 97 - 108 mmol/L  
 CO2 30 21 - 32 mmol/L Anion gap 8 5 - 15 mmol/L Glucose 168 (H) 65 - 100 mg/dL BUN 5 (L) 6 - 20 MG/DL Creatinine 0.83 0.55 - 1.02 MG/DL  
 BUN/Creatinine ratio 6 (L) 12 - 20 GFR est AA >60 >60 ml/min/1.73m2 GFR est non-AA >60 >60 ml/min/1.73m2 Calcium 9.8 8.5 - 10.1 MG/DL Bilirubin, total 0.3 0.2 - 1.0 MG/DL  
 ALT (SGPT) 11 (L) 12 - 78 U/L  
 AST (SGOT) 17 15 - 37 U/L Alk. phosphatase 119 (H) 45 - 117 U/L Protein, total 8.1 6.4 - 8.2 g/dL Albumin 3.2 (L) 3.5 - 5.0 g/dL Globulin 4.9 (H) 2.0 - 4.0 g/dL A-G Ratio 0.7 (L) 1.1 - 2.2 NT-PRO BNP Collection Time: 02/03/19  1:19 PM  
Result Value Ref Range  NT pro- (H) 0 - 125 PG/ML  
TROPONIN I  
 Collection Time: 02/03/19  1:19 PM  
Result Value Ref Range Troponin-I, Qt. <0.05 <0.05 ng/mL POC LACTIC ACID Collection Time: 02/03/19  2:25 PM  
Result Value Ref Range Lactic Acid (POC) 1.56 0.40 - 2.00 mmol/L Radiologic Studies -  
CTA CHEST W OR W WO CONT Final Result IMPRESSION: Right upper and left lower lobe pulmonary emboli. Bilateral airspace  
disease. Improved mediastinal lymphadenopathy. The findings were called to Dr. Isaura Aponte on 2/3/2019 at 2:45 PM by myself. [facility] 128 XR CHEST PORT    (Results Pending) CT Results  (Last 48 hours) 02/03/19 1433  CTA 1144 [facility] CONT Final result Impression:  IMPRESSION: Right upper and left lower lobe pulmonary emboli. Bilateral airspace  
disease. Improved mediastinal lymphadenopathy. The findings were called to Dr. Isaura Aponte on 2/3/2019 at 2:45 PM by myself. [facility] Narrative:  EXAM:  CTA CHEST W OR W WO CONT INDICATION:  Acute shortness of breath COMPARISON: 8/21/2018. TECHNIQUE:   
Precontrast  images were obtained to localize the volume for acquisition. Multislice helical CT arteriography was performed from the diaphragm to the  
thoracic inlet during uneventful rapid bolus of 100 cc Isovue-370. Lung and soft  
tissue windows were generated. Coronal and sagittal images were generated and 3D post processing consisting of coronal maximum intensity images was performed. CT dose reduction was achieved through use of a standardized protocol tailored  
for this examination and automatic exposure control for dose modulation. FINDINGS:  
CHEST:  
THYROID: No nodule. MEDIASTINUM: Mediastinal lymphadenopathy has improved compared to the prior  
exam. Transverse dimension at the level of the aortic arch was previously 5.9 cm  
and is now 4.2 cm. SERJIO: No mass or lymphadenopathy. THORACIC AORTA: No dissection or aneurysm. MAIN PULMONARY ARTERY: Right upper and left lower lobe pulmonary emboli are  
noted. TRACHEA/BRONCHI: Patent. ESOPHAGUS: No wall thickening or dilatation. HEART: Normal in size. PLEURA: No effusion or pneumothorax. LUNGS: Infiltrates are noted throughout the right lung and left lower lobe. INCIDENTALLY IMAGED UPPER ABDOMEN: 17 mm right adrenal adenoma. BONES: Degenerative changes are seen in the thoracic spine. CXR Results  (Last 48 hours) None Medical Decision Making I am the first provider for this patient. I reviewed the vital signs, available nursing notes, past medical history, past surgical history, family history and social history. Vital Signs-Reviewed the patient's vital signs. Patient Vitals for the past 12 hrs: 
 Temp Pulse Resp BP SpO2  
02/03/19 1415  96 (!) 36 134/72 100 % 02/03/19 1411  96 (!) 34  100 % 02/03/19 1345  99 (!) 36 130/70 100 % 02/03/19 1330  (!) 103 (!) 33 120/67 99 % 02/03/19 1322  (!) 106 (!) 40 146/73 98 % 02/03/19 1241     96 % 02/03/19 1226     (!) 79 % 02/03/19 1219 98.2 °F (36.8 °C) (!) 111 18 153/88 (!) 83 % Pulse Oximetry Analysis - 100% on 02 Cardiac Monitor:  
Rate: 96 bpm 
 
EKG interpretation: (Preliminary) 13:10 Rhythm: sinus tachycardia; Rate (approx.): 109; KS interval: 164; QRS interval: 80; QT/QTc: 370/498; Other findings: Non-ischemic. Written by PETER Stocktonibstephanie, as dictated by Conner Prather DO. Records Reviewed: Nursing Notes, Old Medical Records, Previous electrocardiograms, Previous Radiology Studies and Previous Laboratory Studies Provider Notes (Medical Decision Making): DDx: PNA, PE, bronchitis, pneumothorax, pleural effusion, CHF 
 
 
1445: discussed with radiologist. CTA shows PE. Will admit to hospitalist. Will start lovenox. Will treat what appears to be PNA with broad spectrum ABX given recent abx and health care exposures. ED Course: Initial assessment performed. The patients presenting problems have been discussed, and they are in agreement with the care plan formulated and outlined with them. I have encouraged them to ask questions as they arise throughout their visit. CRITICAL CARE NOTE : 
1:19 PM 
IMPENDING DETERIORATION -Respiratory and Cardiovascular ASSOCIATED RISK FACTORS - Hypoxia and Metabolic changes MANAGEMENT- Bedside Assessment and Supervision of Care INTERPRETATION -  CT Scan, ECG and Blood Pressure INTERVENTIONS - vent mngmt, Metobolic interventions and blood thinners CASE REVIEW - Hospitalist, Nursing and Family TREATMENT RESPONSE -Improved PERFORMED BY - Self NOTES   : 
I have spent 55 minutes of critical care time involved in lab review, consultations with specialist, family decision- making, bedside attention and documentation. During this entire length of time I was immediately available to the patient . Dionte Melendez DO 
 
Progress Notes: 
CONSULT NOTE:  
3:21 PM 
Dionte Melendez DO spoke with Dr. Alexandre Juarez, Specialty: Hospitalist 
Discussed pt's hx, disposition, and available diagnostic and imaging results. Reviewed care plans. Consultant will evaluate pt for admission. Written by Lora Linares ED Scribe, as dictated by Dionte Melendez DO. Disposition: 
ADMIT NOTE: 
3:21 PM 
The patient is being admitted to the hospital by Dr. Alexandre Juarez. The results of their tests and reasons for their admission have been discussed with the patient and/or available family. They convey agreement and understanding for the need to be admitted and for their admission diagnosis. Diagnosis Clinical Impression: 1. Acute respiratory failure with hypoxia ([address] 2. Pneumonia of both lungs due to infectious organism, unspecified part of lung 3. Other acute pulmonary embolism without acute cor pulmonale ([address] 4. Hypokalemia Attestations:  
 
This note is prepared by Deisy Chavez, acting as Scribe for Dionte Melendez DO. 
 
 Topher Morton DO: The scribe's documentation has been prepared under my direction and personally reviewed by me in its entirety. I confirm that the note above accurately reflects all work, treatment, procedures, and medical decision making performed by me.

## 2019-02-04 NOTE — PROGRESS NOTES
1341: Received report from [place] [id number] verify consults called to Hemonc or Pulmnology. 1425: U  called consult to hematology and pulmonology. 1500: Dr. Iggy Membrenoumber up to see patient. Received only one consult. Patient resting in bed no complaints of pain, nausea, or shortness of breath.  5.5 L NC

## 2019-02-04 NOTE — CONSULTS
[address] [place] [address] [address] 
MR#: [id number] : 1950 ACCOUNT #: [de-identified] DATE OF SERVICE: 2019 REASON FOR CONSULTATION:  Hodgkin disease, now with PE, pneumonia. REFERRING PHYSICIAN:  Teena La MD 
 
HISTORY OF PRESENT ILLNESS:  The patient is a 65-year-old [facility] [place] female who is a patient of one of my partners, Dr. Magda Courtney.  Dr. Magda Courtney treated her for Hodgkin disease. She had 4 cycles of ABVD. She had a PET scan done on 2019 that showed some airspace disease scattered in the right lung, a few small foci of airspace disease in the left lung, mediastinal adenopathy was stable and demonstrated no increased metabolic activity. The patient had been treated as an outpatient for pneumonia, but she was getting more short of breath. She came to the hospital.  She was found on CTA of her chest to have right upper and left lower lobe pulmonary emboli, bilateral airspace disease, improved mediastinal lymphadenopathy. The patient was admitted to the hospital and started on IV antibiotics and Lovenox. Pulmonary has been consulted for the questionable inflammatory process in her lungs, which could just be pneumonia. The patient reports she is feeling a little better, and we were asked to see her for further evaluation. PAST MEDICAL HISTORY:  Significant for the Hodgkin disease as above, diabetes, fibromyalgia, hypertension. SOCIAL HISTORY:  She does not smoke. She occasionally drinks. FAMILY HISTORY:  Sister had breast cancer, apparently DCIS. Father had prostate cancer. CURRENT MEDICATIONS:  She is on amlodipine, Lipitor. She is on Lovenox, Flonase, Lasix, Humalog, Levaquin, metoprolol, potassium chloride. ALLERGIES:  NO KNOWN DRUG ALLERGIES. REVIEW OF SYSTEMS:  Twelve point systems done and negative except for as above.  
 
PHYSICAL EXAMINATION: 
 VITAL SIGNS:  Temperature 98.0, pulse 94, blood pressure 129/77, respirations 20, satting 96% on 5.5 liters nasal cannula. GENERAL:  Lying in bed in no acute distress. HEENT:  Normocephalic, atraumatic. Oropharynx is clear without lesions. NECK:  Supple. CARDIOVASCULAR:  Regular rate and rhythm. LUNGS:  Decreased breath sounds bilaterally. ABDOMEN:  Soft, nontender. EXTREMITIES:  No clubbing, cyanosis, or edema. NEUROLOGIC:  Nonfocal. 
 
LABORATORY VALUES:  White blood cell count 8.2, hemoglobin 8.5, platelets 660. Chemistry:  Sodium 135, BUN 5, creatinine 0.78, total bilirubin 0.3, ALT 9, AST 14, alkaline phosphatase 102. IMPRESSION AND PLAN:  The patient is a 21-year-old [facility] [place] female who was treated with 4 cycles of ABVD for Hodgkin disease by one of my partners, Dr. Natalia Johnson.  She appears by PET scan to have had a complete response. She will need to follow up with Dr. Natalia Johnson for that. In terms of her pneumonia, she continues on IV antibiotics. We will see what Pulmonary has to say. Assuming that no further diagnostic testing is needed, she could conceivably be switched over to one of the NOACs, either Xarelto or Eliquis, for treatment of her pulmonary embolism. Her hemoglobin is down some, most likely due to her chemotherapy. We will keep an eye on that. We will continue to follow along with you and make further recommendations as needed. MD PAMELA Mata / MATHEW 
D: 02/04/2019 14:37 T: 02/04/2019 15:42 JOB #: [id number]

## 2019-02-04 NOTE — CONSULTS
[facility] [place], Critical Care, and Sleep Medicine Initial Patient Consult Name: Lord Closs MRN: [id number] : 1950 Hospital: [address] Date: 2019 IMPRESSION:  
· Acute hypoxic resp failure - 6 liters and sat 96% ·  bilateral pulm infiltrates- tx with augmentin from  to  and then place on levaquin- did have a preceding fever 101. First noted on pet  and  Seen on ct on admit - differential is pna, opportunistic infection and toxicity ( ? bleo ) from chemo ·   hodkins lymphoma- tx wiith 4 cyles of abvd and  Positive response to chemo · pulm htn - pap  59 with nl lvef · Anemia-  ? Related to chemo ·   Hx of asthma from childhood to age 24 ·  low k  
· NEWLY DISCOVERED BILATERAL PTE WITH NEG DUPLEX OF LEG 
 
 
  
  
RECOMMENDATIONS:  
· AB COVERAGE  For immunosuppressed pt ·  lovenox and stay on for now in case we need to do  Procedure ·  o2 · Will start steroids as infiltrates have not improved on ab ·  will do fob if does not improve within the next couple day - discussed  This with pt- - would need to hold lovenox overnight tomorrow night to do Wednesday ·  gi prophylaxis Subjective: This patient has been seen and evaluated at the request of Dr. Patricia Cantor  For hypoxia. Patient is a 70 y.o. female  Who was dx with hodgkin lymphoma  In august when she was getting a massage in [facility] and the masseuse note  At rt neck ln. She is s/p chemo with abvd. She  Was noted to have bilateral pulm infiltrates on pet/ct On   And was started on augmenin on  - seen in  Oncology office on  - sats high 80's but increased to 90 % on fluids and was started on levaquin on . Noted sig  Increased [place] and said sats drop to 70's with exertion . She says she  Feels much better today. NO cough, heme , phlegm. NO cp. She has noted some ankle swelling. Past Medical History: Diagnosis Date  Asthma   
 as a child, nothing since menopause  Cancer ([facility] [address] 08/21/2018 Non-Hodgkin's  Cataracts, bilateral   
 Diabetes ([facility] [address] type II  
 Environmental allergies  Fibromyalgia Treated with alternate medical therapy  Hypertension   
 no asthma since age 24  // Dr. Radha Lopez said hodgkins  And pt confirms Past Surgical History:  
Procedure Laterality Date  HX BREAST BIOPSY Right 2006  HX COLONOSCOPY  2015  HX GYN Pap 2015  HX ORTHOPAEDIC    
 fractured R ankle/ no surgery  HX OTHER SURGICAL Right 08/27/2018  
 excision of deep neck cervical LN. Prior to Admission medications Medication Sig Start Date End Date Taking? Authorizing Provider  
pioglitazone (ACTOS) 30 mg tablet TAKE 1 TABLET DAILY 1/6/19   Sussy Durham MD  
[facility] VERIO strip USE 1 STRIP IN VITRO TO TEST BLOOD SUGAR ONCE DAILY 10/17/18   Sussy Durham MD  
metoprolol succinate (TOPROL-XL) 50 mg XL tablet TAKE 1 TAB BY MOUTH DAILY. 10/12/18   Sussy Durham MD  
JANUMET XR 50-1,000 mg TM24 TAKE 1 TABLET BY MOUTH TWICE A DAY WITH MEALS 10/12/18   Sussy Durham MD  
fluticasone [facility] ALLERGY RELIEF) 50 mcg/actuation nasal spray 2 Sprays by Both Nostrils route daily. Provider, Historical  
ibuprofen (MOTRIN) 600 mg tablet Take 1 Tab by mouth every six (6) hours as needed for Pain. 8/28/18   Spencer Shelby MD  
spironolactone-hydrochlorothiazide (ALDACTAZIDE) 25-25 mg per tablet TAKE ONE TABLET BY MOUTH EVERY DAY 12/3/17   Sussy Durham MD  
atorvastatin (LIPITOR) 40 mg tablet TAKE 1 TAB BY MOUTH DAILY. 12/3/17   Sussy Durham MD  
amLODIPine (NORVASC) 10 mg tablet TAKE 1 TABLET BY MOUTH EVERY DAY 11/7/17   Sussy Durham MD  
Lancets (ONE TOUCH DELICA) misc USE TO TEST BLOOD SUGAR ONCE DAILY. DX.E11.9 10/13/16   Sussy Durham MD  
alcohol swabs (ALCOHOL PREP PADS) padm 1 Pad by Apply Externally route daily.  DX.E11.9 10/13/16   Sussy Durham MD  
 
 No Known Allergies Social History Tobacco Use  Smoking status: Never Smoker  Smokeless tobacco: Never Used Substance Use Topics  Alcohol use: Yes Comment: socially  
 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   
     prostate cancer  Cancer Sister Breast cancer apparent DCIS  
 Heart Disease Brother Brother  from congestive heart failure secondary to severe mitral disease Current Facility-Administered Medications Medication Dose Route Frequency  potassium chloride SR (KLOR-CON 10) tablet 20 mEq  20 mEq Oral BID  lactobac ac& pc-s.therm-b.anim (JUNI Q/RISAQUAD)  1 Cap Oral DAILY  insulin lispro (HUMALOG) injection   SubCUTAneous TIDAC  levoFLOXacin (LEVAQUIN) 750 mg in D5W IVPB  750 mg IntraVENous Q24H  
 sodium chloride (NS) flush 5-40 mL  5-40 mL IntraVENous Q8H  
 enoxaparin (LOVENOX) injection 80 mg  1 mg/kg SubCUTAneous Q12H  
 amLODIPine (NORVASC) tablet 2.5 mg  2.5 mg Oral DAILY  atorvastatin (LIPITOR) tablet 40 mg  40 mg Oral QHS  fluticasone (FLONASE) 50 mcg/actuation nasal spray 2 Spray  2 Spray Both Nostrils DAILY  metoprolol succinate (TOPROL-XL) XL tablet 50 mg  50 mg Oral DAILY  furosemide (LASIX) tablet 40 mg  40 mg Oral DAILY Review of Systems: A comprehensive review of systems was negative except for that written in the HPI. Objective: 
Vital Signs:   
Visit Vitals /65 (BP 1 Location: Right arm, BP Patient Position: At rest) Pulse (!) 103 Temp 98 °F (36.7 °C) Resp 20 Ht 5' 8\" (1.727 m) Wt 81.6 kg (180 lb) SpO2 98% BMI 27.37 kg/m² O2 Device: Nasal cannula O2 Flow Rate (L/min): 5.5 l/min Temp (24hrs), Av.1 °F (36.7 °C), Min:98 °F (36.7 °C), Max:98.3 °F (36.8 °C) Intake/Output:  
Last shift:       0701 -  1900 In: -  
Out: 700 [Urine:700] Last 3 shifts: 02/02 1901 - 02/04 0700 In: 36 [P.O.:480; I.V.:1150] Out: 700 [Urine:700] Intake/Output Summary (Last 24 hours) at 2/4/2019 1643 Last data filed at 2/4/2019 4232 Gross per 24 hour Intake 1130 ml Output 1400 ml Net -270 ml Physical Exam:  
General:  Alert, cooperative, no distress, appears stated age. Head:  Normocephalic, without obvious abnormality, atraumatic. alopecia Eyes:  Conjunctivae/corneas clear. PERRL, EOMs intact. Nose: Nares normal. Septum midline. Mucosa normal. No drainage or sinus tenderness. Throat: Lips, mucosa, and tongue normal. Teeth and gums normal.- dry Neck: Supple, symmetrical, trachea midline, no adenopathy, thyroid: no enlargment/tenderness/nodules, no carotid bruit and no JVD. Back:   Symmetric, no curvature. ROM normal.  
Lungs:   Bilateral rales . Chest wall:  No tenderness or deformity. Heart:  Regular rate and rhythm, S1, S2 normal, no murmur, click, rub or gallop.- mild tachy Abdomen:   Soft, non-tender. Bowel sounds normal. No masses,  No organomegaly. Extremities: Extremities normal, atraumatic, no cyanosis - does have ankle edema Pulses: 2+ and symmetric all extremities. Skin: Skin color, texture, turgor normal. No rashes or lesions Lymph nodes: Cervical, supraclavicular, and axillary nodes normal.  
Neurologic: Grossly nonfocal  
 
Data review:  
 
Recent Results (from the past 24 hour(s)) URINALYSIS W/ REFLEX CULTURE Collection Time: 02/03/19  5:54 PM  
Result Value Ref Range Color YELLOW/STRAW Appearance CLEAR CLEAR Specific gravity >1.030 (H) 1.003 - 1.030  
 pH (UA) 6.5 5.0 - 8.0 Protein NEGATIVE  NEG mg/dL Glucose NEGATIVE  NEG mg/dL Ketone NEGATIVE  NEG mg/dL Bilirubin NEGATIVE  NEG Blood NEGATIVE  NEG Urobilinogen 1.0 0.2 - 1.0 EU/dL Nitrites NEGATIVE  NEG Leukocyte Esterase NEGATIVE  NEG    
 UA:UC IF INDICATED CULTURE NOT INDICATED BY UA RESULT CNI WBC 0-4 0 - 4 /hpf  
 RBC 0-5 0 - 5 /hpf Epithelial cells MODERATE (A) FEW /lpf Bacteria NEGATIVE  NEG /hpf Hyaline cast 2-5 0 - 5 /lpf  
GLUCOSE, POC Collection Time: 02/03/19 10:09 PM  
Result Value Ref Range Glucose (POC) 110 (H) 65 - 100 mg/dL Performed by Consuelo Jimenez METABOLIC PANEL, COMPREHENSIVE Collection Time: 02/04/19  2:51 AM  
Result Value Ref Range Sodium 135 (L) 136 - 145 mmol/L Potassium 3.1 (L) 3.5 - 5.1 mmol/L Chloride 98 97 - 108 mmol/L  
 CO2 30 21 - 32 mmol/L Anion gap 7 5 - 15 mmol/L Glucose 109 (H) 65 - 100 mg/dL BUN 5 (L) 6 - 20 MG/DL Creatinine 0.78 0.55 - 1.02 MG/DL  
 BUN/Creatinine ratio 6 (L) 12 - 20 GFR est AA >60 >60 ml/min/1.73m2 GFR est non-AA >60 >60 ml/min/1.73m2 Calcium 8.9 8.5 - 10.1 MG/DL Bilirubin, total 0.3 0.2 - 1.0 MG/DL  
 ALT (SGPT) 9 (L) 12 - 78 U/L  
 AST (SGOT) 14 (L) 15 - 37 U/L Alk. phosphatase 102 45 - 117 U/L Protein, total 6.8 6.4 - 8.2 g/dL Albumin 2.6 (L) 3.5 - 5.0 g/dL Globulin 4.2 (H) 2.0 - 4.0 g/dL A-G Ratio 0.6 (L) 1.1 - 2.2    
CBC WITH AUTOMATED DIFF Collection Time: 02/04/19  2:51 AM  
Result Value Ref Range WBC 8.2 3.6 - 11.0 K/uL  
 RBC 2.92 (L) 3.80 - 5.20 M/uL HGB 8.5 (L) 11.5 - 16.0 g/dL HCT 26.1 (L) 35.0 - 47.0 % MCV 89.4 80.0 - 99.0 FL  
 MCH 29.1 26.0 - 34.0 PG  
 MCHC 32.6 30.0 - 36.5 g/dL  
 RDW 14.1 11.5 - 14.5 % PLATELET 947 (H) 893 - 400 K/uL MPV 9.7 8.9 - 12.9 FL  
 NRBC 0.6 (H) 0  WBC ABSOLUTE NRBC 0.05 (H) 0.00 - 0.01 K/uL NEUTROPHILS 58 32 - 75 % BAND NEUTROPHILS 2 % LYMPHOCYTES 19 12 - 49 % MONOCYTES 18 (H) 5 - 13 % EOSINOPHILS 2 0 - 7 % BASOPHILS 0 0 - 1 % METAMYELOCYTES 1 % IMMATURE GRANULOCYTES 0 0.0 - 0.5 % ABS. NEUTROPHILS 4.9 1.8 - 8.0 K/UL  
 ABS. LYMPHOCYTES 1.6 0.8 - 3.5 K/UL  
 ABS. MONOCYTES 1.5 (H) 0.0 - 1.0 K/UL  
 ABS. EOSINOPHILS 0.2 0.0 - 0.4 K/UL  
 ABS. BASOPHILS 0.0 0.0 - 0.1 K/UL ABS. IMM. GRANS. 0.0 0.00 - 0.04 K/UL  
 DF MANUAL    
 RBC COMMENTS NORMOCYTIC, NORMOCHROMIC MAGNESIUM Collection Time: 02/04/19  2:51 AM  
Result Value Ref Range Magnesium 1.7 1.6 - 2.4 mg/dL TSH 3RD GENERATION Collection Time: 02/04/19  2:51 AM  
Result Value Ref Range TSH 2.08 0.36 - 3.74 uIU/mL SAMPLES BEING HELD Collection Time: 02/04/19  2:51 AM  
Result Value Ref Range SAMPLES BEING HELD 1RED COMMENT Add-on orders for these samples will be processed based on acceptable specimen integrity and analyte stability, which may vary by analyte. GLUCOSE, POC Collection Time: 02/04/19  7:57 AM  
Result Value Ref Range Glucose (POC) 113 (H) 65 - 100 mg/dL Performed by Ambar Pisano ECHO ADULT COMPLETE Collection Time: 02/04/19 10:07 AM  
Result Value Ref Range PASP 60.0 mmHg GLUCOSE, POC Collection Time: 02/04/19 11:39 AM  
Result Value Ref Range Glucose (POC) 117 (H) 65 - 100 mg/dL Performed by Ambar Pisano GLUCOSE, POC Collection Time: 02/04/19  4:22 PM  
Result Value Ref Range Glucose (POC) 118 (H) 65 - 100 mg/dL Performed by Bethany Harden Imaging: 
I have personally reviewed the patients radiographs and have reviewed the reports: 
 see above Abner Vasquez MD

## 2019-02-04 NOTE — PROGRESS NOTES
Pharmacy Automatic Renal Dosing Protocol - Antimicrobials Indication for Antimicrobials: CAP Current Regimen of Each Antimicrobial:  mg IV q24h (Start Date 2/3/19; Day # 25) Previous Antimicrobial Therapy: 
 
Goal Level: n/a Date Dose & Interval Measured (mcg/mL) Extrapolated (mcg/mL) Date & time of next level:  n/a Significant Cultures:  
2/3/19 PBC - NGTD - pending Radiology / Imaging results: (X-ray, CT scan or MRI): n/a 
2/3 CTa chest - IMPRESSION: Right upper and left lower lobe pulmonary emboli. Bilateral airspace disease. Improved mediastinal lymphadenopathy. Labs: 
Recent Labs 19 
0251 19 
1319 CREA 0.78 0.83  
BUN 5* 5* WBC 8.2 6.4 Temp (24hrs), Av.2 °F (36.8 °C), Min:98.2 °F (36.8 °C), Max:98.3 °F (36.8 °C) Creatinine Clearance (mL/min) or Dialysis: 76 ml/min Impression/Plan:  
· Dose appropriate · Antimicrobial stop date X5 days Pharmacy will follow daily and adjust medications as appropriate for renal function and/or serum levels. Thank you, Fabio Inman PHARMD 
 
Recommended duration of therapy 
[email or web address]/Pharmacy/Clinical%20Companion/[email or web address]. docx Renal Dosing 
[email or web address]/[place]/[facility]/Pharmacy/Clinical%20Companion/Renal%[id number]. pdf

## 2019-02-04 NOTE — ED NOTES
Bedside shift change report given to Roya Rausch 68 (oncoming nurse) by Dangelo Reyes RN (offgoing nurse). Report included the following information SBAR, Kardex, ED Summary, MAR and Cardiac Rhythm sinus tach.

## 2019-02-04 NOTE — PROGRESS NOTES
General Daily Progress Note Admit Date: 2/3/2019 Subjective:  
 
Patient has no complaint . Current Facility-Administered Medications Medication Dose Route Frequency  potassium chloride SR (KLOR-CON 10) tablet 20 mEq  20 mEq Oral BID  lactobac ac& pc-s.therm-b.anim (JUNI Q/RISAQUAD)  1 Cap Oral DAILY  insulin lispro (HUMALOG) injection   SubCUTAneous TIDAC  ondansetron (ZOFRAN) injection 4 mg  4 mg IntraVENous ONCE PRN  
 levoFLOXacin (LEVAQUIN) 750 mg in D5W IVPB  750 mg IntraVENous Q24H  
 sodium chloride (NS) flush 5-40 mL  5-40 mL IntraVENous Q8H  
 sodium chloride (NS) flush 5-40 mL  5-40 mL IntraVENous PRN  
 acetaminophen (TYLENOL) tablet 650 mg  650 mg Oral Q6H PRN  
 ondansetron (ZOFRAN) injection 4 mg  4 mg IntraVENous Q4H PRN  
 enoxaparin (LOVENOX) injection 80 mg  1 mg/kg SubCUTAneous Q12H  
 amLODIPine (NORVASC) tablet 2.5 mg  2.5 mg Oral DAILY  atorvastatin (LIPITOR) tablet 40 mg  40 mg Oral QHS  fluticasone (FLONASE) 50 mcg/actuation nasal spray 2 Spray  2 Spray Both Nostrils DAILY  metoprolol succinate (TOPROL-XL) XL tablet 50 mg  50 mg Oral DAILY  glucose chewable tablet 16 g  4 Tab Oral PRN  
 dextrose (D50W) injection syrg 12.5-25 g  12.5-25 g IntraVENous PRN  
 glucagon (GLUCAGEN) injection 1 mg  1 mg IntraMUSCular PRN  
 nitroglycerin (NITROBID) 2 % ointment 1 Inch  1 Inch Topical Q6H PRN  
 furosemide (LASIX) tablet 40 mg  40 mg Oral DAILY Current Outpatient Medications Medication Sig  pioglitazone (ACTOS) 30 mg tablet TAKE 1 TABLET DAILY  ONETOUCH VERIO strip USE 1 STRIP IN VITRO TO TEST BLOOD SUGAR ONCE DAILY  metoprolol succinate (TOPROL-XL) 50 mg XL tablet TAKE 1 TAB BY MOUTH DAILY.  JANUMET XR 50-1,000 mg TM24 TAKE 1 TABLET BY MOUTH TWICE A DAY WITH MEALS  fluticasone (FLONASE ALLERGY RELIEF) 50 mcg/actuation nasal spray 2 Sprays by Both Nostrils route daily.  ibuprofen (MOTRIN) 600 mg tablet Take 1 Tab by mouth every six (6) hours as needed for Pain.  spironolactone-hydrochlorothiazide (ALDACTAZIDE) 25-25 mg per tablet TAKE ONE TABLET BY MOUTH EVERY DAY  atorvastatin (LIPITOR) 40 mg tablet TAKE 1 TAB BY MOUTH DAILY.  amLODIPine (NORVASC) 10 mg tablet TAKE 1 TABLET BY MOUTH EVERY DAY  Lancets (ONE TOUCH DELICA) misc USE TO TEST BLOOD SUGAR ONCE DAILY. DX.E11.9  
 alcohol swabs (ALCOHOL PREP PADS) padm 1 Pad by Apply Externally route daily. DX.E11.9 Review of Systems A comprehensive review of systems was negative.  
 
Objective:  
 
Patient Vitals for the past 24 hrs: 
 BP Temp Pulse Resp SpO2 Weight  
02/04/19 0800 138/78  98 14 91 %   
02/04/19 0750 121/69 98.2 °F (36.8 °C) 99 26 93 %   
02/04/19 0600 104/66  94 30 93 %   
02/04/19 0400 119/73  98 8 96 %   
02/04/19 0245 101/62  96 (!) 34 94 %   
02/04/19 0230 109/61  96 (!) 32 95 %   
02/04/19 0215 116/65  100 (!) 38 95 %   
02/04/19 0200 107/60  95 28 97 %   
02/04/19 0145 110/67  96 (!) 34 97 %   
02/03/19 2215 111/54  (!) 104 (!) 33 93 %   
02/03/19 2210   (!) 104 29 95 %   
02/03/19 2200 130/66 98.3 °F (36.8 °C) (!) 102 30 96 %   
02/03/19 2100 133/64  100 29 95 %   
02/03/19 2045 129/69  (!) 102 30 98 %   
02/03/19 2043   (!) 101 26 96 %   
02/03/19 2030 128/70  100 (!) 35 94 %   
02/03/19 2015 122/66  98 29 91 %   
02/03/19 2000 127/69  97 27 99 %   
02/03/19 1945 121/63  (!) 104 28 92 %   
02/03/19 1930 128/68  96 25 98 %   
02/03/19 1922   97 26 99 %   
02/03/19 1915 132/68  99 27 97 %   
02/03/19 1850     (!) 89 %   
02/03/19 1845 134/87  99 (!) 31 99 %   
02/03/19 1832   (!) 101 (!) 31 97 %   
02/03/19 1750     93 %   
02/03/19 1743   (!) 112 20 (!) 68 %   
02/03/19 1737   96 22 95 %   
02/03/19 1651   96 24 97 %   
02/03/19 1645 123/71  97 24 97 %   
02/03/19 1600 128/70  98 (!) 34 96 %   
 02/03/19 1549   100 (!) 32 96 %   
02/03/19 1545 122/68  (!) 101 30 95 %   
02/03/19 1543   (!) 102 (!) 33 93 %   
02/03/19 1537   100 26 97 %   
02/03/19 1530 114/63  (!) 102 30 96 %   
02/03/19 1515 133/75  (!) 102 (!) 33 97 %   
02/03/19 1500 125/67  (!) 101 (!) 32 97 %   
02/03/19 1452   (!) 103 (!) 32 93 % 180 lb 8 oz (81.9 kg) 02/03/19 1450 131/80  (!) 105 22 95 %   
02/03/19 1415 134/72  96 (!) 36 100 %   
02/03/19 1411   96 (!) 34 100 %   
02/03/19 1345 130/70  99 (!) 36 100 %   
02/03/19 1330 120/67  (!) 103 (!) 33 99 %   
02/03/19 1322 146/73  (!) 106 (!) 40 98 %   
02/03/19 1241     96 %   
02/03/19 1226     (!) 79 %   
02/03/19 1219 153/88 98.2 °F (36.8 °C) (!) 111 18 (!) 83 %  No intake/output data recorded. 02/02 1901 - 02/04 0700 In: 453 4632 [P.O.:480; I.V.:1150] Out: 700 [Urine:700] Physical Exam:  
Visit Vitals /78 Pulse 98 Temp 98.2 °F (36.8 °C) Resp 14 Wt 180 lb 8 oz (81.9 kg) SpO2 91% BMI 27.44 kg/m² General appearance: alert, cooperative, no distress, appears stated age Neck: supple, symmetrical, trachea midline, no adenopathy, thyroid: not enlarged, symmetric, no tenderness/mass/nodules, no carotid bruit and no JVD Lungs: rales R base, L base Heart: regular rate and rhythm, S1, S2 normal, no murmur, click, rub or gallop Abdomen: soft, non-tender. Bowel sounds normal. No masses,  no organomegaly Extremities: extremities normal, atraumatic, no cyanosis or edema Data Review Recent Results (from the past 24 hour(s)) EKG, 12 LEAD, INITIAL Collection Time: 02/03/19  1:10 PM  
Result Value Ref Range Ventricular Rate 109 BPM  
 Atrial Rate 109 BPM  
 P-R Interval 164 ms QRS Duration 80 ms  
 Q-T Interval 370 ms QTC Calculation (Bezet) 498 ms Calculated P Axis 30 degrees Calculated R Axis -11 degrees Calculated T Axis 11 degrees Diagnosis Sinus tachycardia Minimal voltage criteria for LVH, may be normal variant When compared with ECG of 12-SEP-2018 07:00, 
QT has lengthened CBC WITH AUTOMATED DIFF Collection Time: 02/03/19  1:19 PM  
Result Value Ref Range WBC 6.4 3.6 - 11.0 K/uL  
 RBC 3.58 (L) 3.80 - 5.20 M/uL  
 HGB 10.4 (L) 11.5 - 16.0 g/dL HCT 32.0 (L) 35.0 - 47.0 % MCV 89.4 80.0 - 99.0 FL  
 MCH 29.1 26.0 - 34.0 PG  
 MCHC 32.5 30.0 - 36.5 g/dL  
 RDW 13.8 11.5 - 14.5 % PLATELET 499 (H) 113 - 400 K/uL MPV 9.6 8.9 - 12.9 FL  
 NRBC 0.8 (H) 0  WBC ABSOLUTE NRBC 0.05 (H) 0.00 - 0.01 K/uL NEUTROPHILS 60 32 - 75 % BAND NEUTROPHILS 1 % LYMPHOCYTES 20 12 - 49 % MONOCYTES 15 (H) 5 - 13 % EOSINOPHILS 2 0 - 7 % BASOPHILS 2 (H) 0 - 1 % IMMATURE GRANULOCYTES 0 0.0 - 0.5 % ABS. NEUTROPHILS 3.9 1.8 - 8.0 K/UL  
 ABS. LYMPHOCYTES 1.3 0.8 - 3.5 K/UL  
 ABS. MONOCYTES 1.0 0.0 - 1.0 K/UL  
 ABS. EOSINOPHILS 0.1 0.0 - 0.4 K/UL  
 ABS. BASOPHILS 0.1 0.0 - 0.1 K/UL  
 ABS. IMM. GRANS. 0.0 0.00 - 0.04 K/UL  
 DF MANUAL    
 RBC COMMENTS NORMOCYTIC, NORMOCHROMIC METABOLIC PANEL, COMPREHENSIVE Collection Time: 02/03/19  1:19 PM  
Result Value Ref Range Sodium 133 (L) 136 - 145 mmol/L Potassium 2.9 (L) 3.5 - 5.1 mmol/L Chloride 95 (L) 97 - 108 mmol/L  
 CO2 30 21 - 32 mmol/L Anion gap 8 5 - 15 mmol/L Glucose 168 (H) 65 - 100 mg/dL BUN 5 (L) 6 - 20 MG/DL Creatinine 0.83 0.55 - 1.02 MG/DL  
 BUN/Creatinine ratio 6 (L) 12 - 20 GFR est AA >60 >60 ml/min/1.73m2 GFR est non-AA >60 >60 ml/min/1.73m2 Calcium 9.8 8.5 - 10.1 MG/DL Bilirubin, total 0.3 0.2 - 1.0 MG/DL  
 ALT (SGPT) 11 (L) 12 - 78 U/L  
 AST (SGOT) 17 15 - 37 U/L Alk. phosphatase 119 (H) 45 - 117 U/L Protein, total 8.1 6.4 - 8.2 g/dL Albumin 3.2 (L) 3.5 - 5.0 g/dL Globulin 4.9 (H) 2.0 - 4.0 g/dL A-G Ratio 0.7 (L) 1.1 - 2.2 CULTURE, BLOOD, PAIRED Collection Time: 02/03/19  1:19 PM  
Result Value Ref Range Special Requests: NO SPECIAL REQUESTS Culture result: NO GROWTH AFTER 16 HOURS    
NT-PRO BNP Collection Time: 02/03/19  1:19 PM  
Result Value Ref Range NT pro- (H) 0 - 125 PG/ML  
TROPONIN I Collection Time: 02/03/19  1:19 PM  
Result Value Ref Range Troponin-I, Qt. <0.05 <0.05 ng/mL POC LACTIC ACID Collection Time: 02/03/19  2:25 PM  
Result Value Ref Range Lactic Acid (POC) 1.56 0.40 - 2.00 mmol/L  
URINALYSIS W/ REFLEX CULTURE Collection Time: 02/03/19  5:54 PM  
Result Value Ref Range Color YELLOW/STRAW Appearance CLEAR CLEAR Specific gravity >1.030 (H) 1.003 - 1.030  
 pH (UA) 6.5 5.0 - 8.0 Protein NEGATIVE  NEG mg/dL Glucose NEGATIVE  NEG mg/dL Ketone NEGATIVE  NEG mg/dL Bilirubin NEGATIVE  NEG Blood NEGATIVE  NEG Urobilinogen 1.0 0.2 - 1.0 EU/dL Nitrites NEGATIVE  NEG Leukocyte Esterase NEGATIVE  NEG    
 UA:UC IF INDICATED CULTURE NOT INDICATED BY UA RESULT CNI    
 WBC 0-4 0 - 4 /hpf  
 RBC 0-5 0 - 5 /hpf Epithelial cells MODERATE (A) FEW /lpf Bacteria NEGATIVE  NEG /hpf Hyaline cast 2-5 0 - 5 /lpf  
GLUCOSE, POC Collection Time: 02/03/19 10:09 PM  
Result Value Ref Range Glucose (POC) 110 (H) 65 - 100 mg/dL Performed by Karmen Castillo METABOLIC PANEL, COMPREHENSIVE Collection Time: 02/04/19  2:51 AM  
Result Value Ref Range Sodium 135 (L) 136 - 145 mmol/L Potassium 3.1 (L) 3.5 - 5.1 mmol/L Chloride 98 97 - 108 mmol/L  
 CO2 30 21 - 32 mmol/L Anion gap 7 5 - 15 mmol/L Glucose 109 (H) 65 - 100 mg/dL BUN 5 (L) 6 - 20 MG/DL Creatinine 0.78 0.55 - 1.02 MG/DL  
 BUN/Creatinine ratio 6 (L) 12 - 20 GFR est AA >60 >60 ml/min/1.73m2 GFR est non-AA >60 >60 ml/min/1.73m2 Calcium 8.9 8.5 - 10.1 MG/DL Bilirubin, total 0.3 0.2 - 1.0 MG/DL  
 ALT (SGPT) 9 (L) 12 - 78 U/L  
 AST (SGOT) 14 (L) 15 - 37 U/L Alk. phosphatase 102 45 - 117 U/L Protein, total 6.8 6.4 - 8.2 g/dL Albumin 2.6 (L) 3.5 - 5.0 g/dL Globulin 4.2 (H) 2.0 - 4.0 g/dL A-G Ratio 0.6 (L) 1.1 - 2.2    
CBC WITH AUTOMATED DIFF Collection Time: 02/04/19  2:51 AM  
Result Value Ref Range WBC 8.2 3.6 - 11.0 K/uL  
 RBC 2.92 (L) 3.80 - 5.20 M/uL HGB 8.5 (L) 11.5 - 16.0 g/dL HCT 26.1 (L) 35.0 - 47.0 % MCV 89.4 80.0 - 99.0 FL  
 MCH 29.1 26.0 - 34.0 PG  
 MCHC 32.6 30.0 - 36.5 g/dL  
 RDW 14.1 11.5 - 14.5 % PLATELET 779 (H) 446 - 400 K/uL MPV 9.7 8.9 - 12.9 FL  
 NRBC 0.6 (H) 0  WBC ABSOLUTE NRBC 0.05 (H) 0.00 - 0.01 K/uL NEUTROPHILS 58 32 - 75 % BAND NEUTROPHILS 2 % LYMPHOCYTES 19 12 - 49 % MONOCYTES 18 (H) 5 - 13 % EOSINOPHILS 2 0 - 7 % BASOPHILS 0 0 - 1 % METAMYELOCYTES 1 % IMMATURE GRANULOCYTES 0 0.0 - 0.5 % ABS. NEUTROPHILS 4.9 1.8 - 8.0 K/UL  
 ABS. LYMPHOCYTES 1.6 0.8 - 3.5 K/UL  
 ABS. MONOCYTES 1.5 (H) 0.0 - 1.0 K/UL  
 ABS. EOSINOPHILS 0.2 0.0 - 0.4 K/UL  
 ABS. BASOPHILS 0.0 0.0 - 0.1 K/UL  
 ABS. IMM. GRANS. 0.0 0.00 - 0.04 K/UL  
 DF MANUAL    
 RBC COMMENTS NORMOCYTIC, NORMOCHROMIC MAGNESIUM Collection Time: 02/04/19  2:51 AM  
Result Value Ref Range Magnesium 1.7 1.6 - 2.4 mg/dL TSH 3RD GENERATION Collection Time: 02/04/19  2:51 AM  
Result Value Ref Range TSH 2.08 0.36 - 3.74 uIU/mL SAMPLES BEING HELD Collection Time: 02/04/19  2:51 AM  
Result Value Ref Range SAMPLES BEING HELD 1RED COMMENT Add-on orders for these samples will be processed based on acceptable specimen integrity and analyte stability, which may vary by analyte. GLUCOSE, POC Collection Time: 02/04/19  7:57 AM  
Result Value Ref Range Glucose (POC) 113 (H) 65 - 100 mg/dL Performed by Epifanio [place] Assessment:  
 
Active Problems: 
  SVT (supraventricular tachycardia) ([facility] [address] (8/29/2016) Essential hypertension with goal blood pressure less than 140/90 (8/29/2016) Diabetes mellitus type 2, controlled ([facility] [address] (9/8/2016) Dyslipidemia (9/8/2016) Acute respiratory failure ([facility] [address] (2/3/2019) Leg swelling (2/3/2019) Hodgkin lymphoma ([facility] [address] (2/3/2019) CAP (community acquired pneumonia) (2/3/2019) Plan: 1. Events noted. Patient admitted for acute pulmonary emboli. She additionally has acute inflammatory process of her lungs which is not well defined. Will have pulmonary assist with care. 2.  Oncologic aspect per Dr. Corinne randle. She has completed her chemotherapy. 3.  She is reasonably stable currently.

## 2019-02-04 NOTE — PROGRESS NOTES
PCU SHIFT NURSING NOTE Bedside and Verbal shift change report given to [address] [facility] (oncoming nurse) by Pro Keene RN (offgoing nurse). Report included the following information SBAR, Kardex, ED Summary, Intake/Output, MAR, Accordion, Recent Results, Med Rec Status and Cardiac Rhythm NSR. Shift Summary:  
Overall uneventful shift. Unable to wean O2 as pts O2 sats were in lower 90s throughout night at rest.  
 
Bedside and Verbal shift change report given to [facility] (oncoming nurse) by [facility] (offgoing nurse). Report included the following information SBAR, Kardex, ED Summary, Intake/Output, MAR, Accordion, Recent Results, Med Rec Status and Cardiac Rhythm NSR. Admission Date 2/3/2019 Admission Diagnosis Acute respiratory failure ([facility] [address] Consults IP CONSULT TO HEMATOLOGY 
IP CONSULT TO PRIMARY CARE PROVIDER 
IP CONSULT TO PULMONOLOGY Consults []PT []OT []Speech  
[]Case Management  
  
[] Palliative Cardiac Monitoring Order []Yes []No  
 
IV drips []Yes Drip:                            Dose: 
Drip:                            Dose: 
Drip:                            Dose:  
[]No  
 
GI Prophylaxis []Yes []No  
 
 
 
DVT Prophylaxis SCDs:     
     
 Shawn stockings:     
  
[] Medication []Contraindicated []None Activity Level Activity Level: Bed Rest   
 Activity Assistance: Partial (two people) Purposeful Rounding every 1-2 hour? []Yes Spence Score  Total Score: 2 Bed Alarm (If score 3 or >) []Yes  
[] Refused (See signed refusal form in chart) Dwayne Score  Dwayne Score: 15 Dwayne Score (if score 14 or less) []PMT consult  
[]Wound Care consult []Specialty bed  
[] Nutrition consult Needs prior to discharge:  
Home O2 required:   
[]Yes []No  
 If yes, how much O2 required? Other:  
 Last Bowel Movement:    
  
Influenza Vaccine Received Flu Vaccine for Current Season (usually Sept-March):  No  
 Patient/Guardian Refused (Notify MD): Yes Pneumonia Vaccine Diet Active Orders Diet DIET DIABETIC CONSISTENT CARB Regular LDAs Venous Access Device portacath  09/12/18 Upper chest (subclavicular area), left (Active) Peripheral IV 02/03/19 Right Antecubital (Active) Site Assessment Clean, dry, & intact 2/4/2019  2:35 PM  
Phlebitis Assessment 0 2/4/2019  2:35 PM  
Infiltration Assessment 0 2/4/2019  2:35 PM  
Dressing Status Clean, dry, & intact 2/4/2019  2:35 PM  
Dressing Type Tape;Transparent 2/4/2019  2:35 PM  
                  
Urinary Catheter Intake & Output Date 02/03/19 0700 - 02/04/19 3722 02/04/19 0700 - 02/05/19 0658 Shift 5523-9834 4611-1908 24 Hour Total 3741-0131 8989-5169 24 Hour Total  
INTAKE  
P.O. 480  480     
  P. O. 480  480     
I. V.(mL/kg/hr) 650(0.7) 500(0.5) 1150(0.6) Volume (sodium chloride 0.9 % bolus infusion 500 mL) 500  500 Volume (levoFLOXacin (LEVAQUIN) 750 mg in D5W IVPB) 150  150 Volume (vancomycin (VANCOCIN) 1500 mg in  ml infusion)  500 500 Shift Total(mL/kg) 2904(36.4) 500(6.1) [id number]) OUTPUT Urine(mL/kg/hr)  700(0.7) 700(0.4) 700  700 Urine Voided  700 700 700  700 Shift Total(mL/kg)  700(8.5) 700(8.5) 700(8.6)  700(8.6) NET 1130 -200 930 -700  -700 Weight (kg) 81.9 81.9 81.9 81.6 81.6 81.6 Readmission Risk Assessment Tool Score Medium Risk 15 Total Score 3 Has Seen PCP in Last 6 Months (Yes=3, No=0)  
 5 Pt. Coverage (Medicare=5 , Medicaid, or Self-Pay=4) 7 Charlson Comorbidity Score (Age + Comorbid Conditions) Criteria that do not apply:  
 . Living with Significant Other. Assisted Living. LTAC. SNF. or  
Rehab Patient Length of Stay (>5 days = 3) IP Visits Last 12 Months (1-3=4, 4=9, >4=11) Expected Length of Stay 4d 7h Actual Length of Stay 1

## 2019-02-05 NOTE — PROGRESS NOTES
Problem: Falls - Risk of 
Goal: *Absence of Falls Document Gillian Rivas Fall Risk and appropriate interventions in the flowsheet. Outcome: Progressing Towards Goal 
Fall Risk Interventions: 
Mobility Interventions: Bed/chair exit alarm, Communicate number of staff needed for ambulation/transfer, OT consult for ADLs, Patient to call before getting OOB, PT Consult for mobility concerns, PT Consult for assist device competence Medication Interventions: Evaluate medications/consider consulting pharmacy, Assess postural VS orthostatic hypotension, Bed/chair exit alarm, Patient to call before getting OOB, Teach patient to arise slowly Elimination Interventions: Bed/chair exit alarm, Call light in reach, Patient to call for help with toileting needs, Toilet paper/wipes in reach, Toileting schedule/hourly rounds History of Falls Interventions: Bed/chair exit alarm, Door open when patient unattended, Evaluate medications/consider consulting pharmacy, Investigate reason for fall, Room close to nurse's station

## 2019-02-05 NOTE — PROGRESS NOTES
PCU SHIFT NURSING NOTE Bedside and Verbal shift change report given to [address] [facility] (oncoming nurse) by George Feliciano RN (offgoing nurse). Report included the following information SBAR, Kardex, ED Summary, Intake/Output, MAR, Accordion, Recent Results, Med Rec Status and Cardiac Rhythm NSR. Shift Summary:  
2057: Pts . However, pts current insulin orders only for 3xday before meals. Paged MD to request insulin orders be changed to 4xday before meals and bedtime. Spoke with Dr. Matt Pace at 2126 and made aware of pt condition. Per Dr. Matt Pace, no new orders at this time. 2140: Consent form signed and on chart for bronchoscopy @ 1330 on 2/6. Bedside and Verbal shift change report given to Yaya Oreilly RN (oncoming nurse) [facility] Report included the following information SBAR, Kardex, ED Summary, Procedure Summary, Intake/Output, MAR, Accordion, Recent Results, Med Rec Status and Cardiac Rhythm NSR/ST. Admission Date 2/3/2019 Admission Diagnosis Acute respiratory failure ([facility] [address] Consults IP CONSULT TO HEMATOLOGY 
IP CONSULT TO PRIMARY CARE PROVIDER 
IP CONSULT TO PULMONOLOGY Consults []PT []OT []Speech  
[]Case Management  
  
[] Palliative Cardiac Monitoring Order []Yes []No  
 
IV drips []Yes Drip:                            Dose: 
Drip:                            Dose: 
Drip:                            Dose:  
[]No  
 
GI Prophylaxis []Yes []No  
 
 
 
DVT Prophylaxis SCDs:     
     
 Shawn stockings:     
  
[] Medication []Contraindicated []None Activity Level Activity Level: Bed Rest   
 Activity Assistance: Partial (two people) Purposeful Rounding every 1-2 hour? []Yes Spence Score  Total Score: 4 Bed Alarm (If score 3 or >) []Yes  
[] Refused (See signed refusal form in chart) Dwayne Score  Dwayne Score: 17 Dwayne Score (if score 14 or less) []PMT consult  
[]Wound Care consult []Specialty bed  
[] Nutrition consult Needs prior to discharge:  
Home O2 required:   
[]Yes []No  
 If yes, how much O2 required? Other:  
 Last Bowel Movement:    
  
Influenza Vaccine Received Flu Vaccine for Current Season (usually Sept-March): No  
 Patient/Guardian Refused (Notify MD): Yes Pneumonia Vaccine Diet Active Orders Diet DIET DIABETIC CONSISTENT CARB Regular; 2 GM NA (House Low NA) DIET NPO Except Meds LDAs Venous Access Device portacath  09/12/18 Upper chest (subclavicular area), left (Active) Venous Access Device portacath 02/04/19 Upper chest (subclavicular area), left (Active) Central Line Being Utilized Yes 2/5/2019  3:06 AM  
Criteria for Appropriate Use Limited/no vessel suitable for conventional peripheral access 2/5/2019  3:06 AM  
Site Assessment Clean, dry, & intact 2/5/2019  3:06 AM  
Dressing Status Clean, dry, & intact 2/5/2019  3:06 AM  
Dressing Type Disk with Chlorhexadine gluconate (CHG); Transparent 2/5/2019  3:06 AM  
Action Taken Open ports on tubing capped 2/5/2019  3:06 AM  
Positive Blood Return (Medial Site) Yes 2/5/2019  3:06 AM  
Alcohol Cap Used Yes 2/5/2019  3:06 AM  
  
Peripheral IV 02/03/19 Right Antecubital (Active) Site Assessment Clean, dry, & intact 2/5/2019  3:06 AM  
Phlebitis Assessment 0 2/5/2019  3:06 AM  
Infiltration Assessment 0 2/5/2019  3:06 AM  
Dressing Status Clean, dry, & intact 2/5/2019  3:06 AM  
Dressing Type Tape;Transparent 2/5/2019  3:06 AM  
Hub Color/Line Status Pink;Capped;Flushed 2/5/2019  3:06 AM  
Action Taken Open ports on tubing capped 2/5/2019  3:06 AM  
Alcohol Cap Used Yes 2/5/2019  3:06 AM  
      
External Female Catheter 02/04/19 (Active) Site Assessment Clean, dry, & intact 2/5/2019  3:06 AM  
Repositioned Yes 2/5/2019  3:06 AM  
Perineal Care Yes 2/5/2019  3:06 AM  
Wick Changed Yes 2/5/2019  3:06 AM  
Suction Canister/Tubing Changed Yes 2/5/2019  3:06 AM  
Urine Output (mL) 200 ml 2/5/2019  3:06 AM  
 Urinary Catheter Intake & Output Date 02/04/19 0700 - 02/05/19 5959 02/05/19 0700 - 02/06/19 5964 Shift [id number] 1900-0659 24 Hour Total 4569-70931859 1900-0659 24 Hour Total  
INTAKE Shift Total(mL/kg) OUTPUT Urine(mL/kg/hr) 1000(1) 300(0.3) 1300(0.7) Urine Voided 1000  1000 Urine Occurrence(s) 2 x  2 x Urine Output (mL) (External Female Catheter 02/04/19)  300 300 Shift Total(mL/kg) 3631(34.4) 300(3.7) 1300(15.9) NET -1000 -300 -1300 Weight (kg) 81.6 81.6 81.6 81.6 81.6 81.6 Readmission Risk Assessment Tool Score Medium Risk 15 Total Score 3 Has Seen PCP in Last 6 Months (Yes=3, No=0)  
 5 Pt. Coverage (Medicare=5 , Medicaid, or Self-Pay=4) 7 Charlson Comorbidity Score (Age + Comorbid Conditions) Criteria that do not apply:  
 . Living with Significant Other. Assisted Living. LTAC. SNF. or  
Rehab Patient Length of Stay (>5 days = 3) IP Visits Last 12 Months (1-3=4, 4=9, >4=11) Expected Length of Stay 4d 7h Actual Length of Stay 2

## 2019-02-05 NOTE — CONSULTS
[facility], Critical Care, and Sleep Medicine Initial Patient Consult Name: Coolidge Felty MRN: [id number] : 1950 Hospital: [address] Date: 2019 IMPRESSION:  
· Acute hypoxic resp failure - 5 liters and 95% ·  bilateral pulm infiltrates- tx with augmentin from  to  and then place on levaquin- did have a preceding fever 101. First noted on pet  and  Seen on ct on admit - differential is pna, opportunistic infection and toxicity ( ? bleo ) from chemo- suspect  Toxicity most 
· Corona virus positive on screen ·   hodkins lymphoma- tx wiith 4 cyles of abvd and  Positive response to chemo · pulm htn - pap  59 with nl lvef · Anemia-  ? Related to chemo- stable ·   Hx of asthma from childhood to age 24 ·  low k - resolved · NEWLY DISCOVERED BILATERAL PTE WITH NEG DUPLEX OF LEG 
 elevated bsl with  Illness and on steroids  
 rhinitis- predates  The admit - flonase helps RECOMMENDATIONS:  
· AB COVERAGE  For immunosuppressed pt - broad spectrum ·  lovenox and stay on for now in case we need to do  Procedure ·  o2- wean as able · Will start steroids as infiltrates have not improved on ab ·  will do fob if does not improve within the next couple day - discussed  This with pt- - would need to hold lovenox overnight tomorrow night to do Wednesday - waiting on cxr ·  gi prophylaxis · ssi and accuchecks 
 fu cultures- so far bc ng at day 2 and positive  carona viirus Subjective: This patient has been seen and evaluated at the request of Dr. Donnie Cheney  For hypoxia. Patient is a 70 y.o. female  Who was dx with hodgkin lymphoma  In august when she was getting a massage in [facility] and the masseuse note  At rt neck ln. She is s/p chemo with abvd.   She  Was noted to have bilateral pulm infiltrates on pet/ct On   And was started on augmenin on  - seen in  Oncology office on Jan 30 - sats high 80's but increased to 90 % on fluids and was started on levaquin on Jan 30. Noted sig  Increased [place] and said sats drop to 70's with exertion . She says she  Feels much better today. NO cough, heme , phlegm. NO cp. She has noted some ankle swelling. Feb 5 - report decreased sob, reports decreased ankle swelling . reports rhinitis with clear liquid Past Medical History:  
Diagnosis Date  Asthma   
 as a child, nothing since menopause  Cancer ([facility] [address] 08/21/2018 Non-Hodgkin's  Cataracts, bilateral   
 Diabetes ([facility] [address] type II  
 Environmental allergies  Fibromyalgia Treated with alternate medical therapy  Hypertension   
 no asthma since age 24  // Dr. Claudell Session said hodgkins  And pt confirms Past Surgical History:  
Procedure Laterality Date  HX BREAST BIOPSY Right 2006  HX COLONOSCOPY  2015  HX GYN Pap 2015  HX ORTHOPAEDIC    
 fractured R ankle/ no surgery  HX OTHER SURGICAL Right 08/27/2018  
 excision of deep neck cervical LN. Prior to Admission medications Medication Sig Start Date End Date Taking? Authorizing Provider ONETOUCH VERIO strip USE 1 STRIP IN VITRO TO TEST BLOOD SUGAR ONCE DAILY 10/17/18  Yes Wyatt Lewis MD  
ibuprofen (MOTRIN) 600 mg tablet Take 1 Tab by mouth every six (6) hours as needed for Pain. 8/28/18  Yes Brien Moreno MD  
Lancets (ONE TOUCH DELICA) misc USE TO TEST BLOOD SUGAR ONCE DAILY. DX.E11.9 10/13/16  Yes Wyatt Lewis MD  
pioglitazone (ACTOS) 30 mg tablet TAKE 1 TABLET DAILY 1/6/19   Wyatt Lewis MD  
metoprolol succinate (TOPROL-XL) 50 mg XL tablet TAKE 1 TAB BY MOUTH DAILY. 10/12/18   Wyatt Lewis MD  
JANUMET XR 50-1,000 mg TM24 TAKE 1 TABLET BY MOUTH TWICE A DAY WITH MEALS 10/12/18   Wyatt Lewis MD  
fluticasone [facility] ALLERGY RELIEF) 50 mcg/actuation nasal spray 2 Sprays by Both Nostrils route daily.     Provider, Historical  
 spironolactone-hydrochlorothiazide (ALDACTAZIDE) 25-25 mg per tablet TAKE ONE TABLET BY MOUTH EVERY DAY 12/3/17   Mickie Berry MD  
atorvastatin (LIPITOR) 40 mg tablet TAKE 1 TAB BY MOUTH DAILY. 12/3/17   Mickie Berry MD  
amLODIPine (NORVASC) 10 mg tablet TAKE 1 TABLET BY MOUTH EVERY DAY 17   Mickie Berry MD  
alcohol swabs (ALCOHOL PREP PADS) padm 1 Pad by Apply Externally route daily. DX.E11.9 10/13/16   Mickie Berry MD  
 
No Known Allergies Social History Tobacco Use  Smoking status: Never Smoker  Smokeless tobacco: Never Used Substance Use Topics  Alcohol use: Yes Comment: socially  
 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   
     prostate cancer  Cancer Sister Breast cancer apparent DCIS  
 Heart Disease Brother Brother  from congestive heart failure secondary to severe mitral disease Current Facility-Administered Medications Medication Dose Route Frequency  potassium chloride SR (KLOR-CON 10) tablet 20 mEq  20 mEq Oral BID  lactobac ac& pc-s.therm-b.anim (JUNI Q/RISAQUAD)  1 Cap Oral DAILY  methylPREDNISolone (PF) (SOLU-MEDROL) injection 40 mg  40 mg IntraVENous Q8H  
 insulin lispro (HUMALOG) injection   SubCUTAneous TIDAC  cefepime (MAXIPIME) 2 g in 0.9% sodium chloride (MBP/ADV) 100 mL  2 g IntraVENous Q8H  
 vancomycin (VANCOCIN) 1250 mg in  ml infusion  1,250 mg IntraVENous Q12H  levoFLOXacin (LEVAQUIN) 750 mg in D5W IVPB  750 mg IntraVENous Q24H  
 sodium chloride (NS) flush 5-40 mL  5-40 mL IntraVENous Q8H  
 enoxaparin (LOVENOX) injection 80 mg  1 mg/kg SubCUTAneous Q12H  
 amLODIPine (NORVASC) tablet 2.5 mg  2.5 mg Oral DAILY  atorvastatin (LIPITOR) tablet 40 mg  40 mg Oral QHS  fluticasone (FLONASE) 50 mcg/actuation nasal spray 2 Spray  2 Spray Both Nostrils DAILY  metoprolol succinate (TOPROL-XL) XL tablet 50 mg  50 mg Oral DAILY  furosemide (LASIX) tablet 40 mg  40 mg Oral DAILY Review of Systems: A comprehensive review of systems was negative except for that written in the HPI. Objective: 
Vital Signs:   
Visit Vitals /71 Pulse 94 Temp 98 °F (36.7 °C) Resp 20 Ht 5' 8\" (1.727 m) Wt 81.6 kg (180 lb) SpO2 93% BMI 27.37 kg/m² O2 Device: Nasal cannula O2 Flow Rate (L/min): 5.5 l/min Temp (24hrs), Av.1 °F (36.7 °C), Min:98 °F (36.7 °C), Max:98.4 °F (36.9 °C) Intake/Output:  
Last shift:      No intake/output data recorded. Last 3 shifts:  1901 -  0700 In: 500 [I.V.:500] Out: 2000 [TPWLP:0320] Intake/Output Summary (Last 24 hours) at 2019 0750 Last data filed at 2019 3780 Gross per 24 hour Intake  Output 1300 ml Net -1300 ml Physical Exam:  
General:  Alert, cooperative, no distress, appears stated age. Head:  Normocephalic, without obvious abnormality, atraumatic. alopecia Eyes:  Conjunctivae/corneas clear. PERRL, EOMs intact. Nose: Nares normal. Septum midline. Mucosa normal. No drainage or sinus tenderness. Throat: Lips, mucosa, and tongue normal. Teeth and gums normal.- dry Neck: Supple, symmetrical, trachea midline, no adenopathy, thyroid: no enlargment/tenderness/nodules, no carotid bruit and no JVD. Back:   Symmetric, no curvature. ROM normal.  
Lungs:   Bilateral rales . - but left is clearer than yesterday Chest wall:  No tenderness or deformity. Heart:  Regular rate and rhythm, S1, S2 normal, no murmur, click, rub or gallop.- mild tachy Abdomen:   Soft, non-tender. Bowel sounds normal. No masses,  No organomegaly. Extremities: Extremities normal, atraumatic, no cyanosis - edema gone Pulses: 2+ and symmetric all extremities. Skin: Skin color, texture, turgor normal. No rashes or lesions Lymph nodes: Cervical, supraclavicular, and axillary nodes normal.  
 Neurologic: Grossly nonfocal  
 
Data review:  
 
Recent Results (from the past 24 hour(s)) GLUCOSE, POC Collection Time: 02/04/19  7:57 AM  
Result Value Ref Range Glucose (POC) 113 (H) 65 - 100 mg/dL Performed by Peggy Elkins ECHO ADULT COMPLETE Collection Time: 02/04/19 10:07 AM  
Result Value Ref Range PASP 60.0 mmHg GLUCOSE, POC Collection Time: 02/04/19 11:39 AM  
Result Value Ref Range Glucose (POC) 117 (H) 65 - 100 mg/dL Performed by Peggy Elkins GLUCOSE, POC Collection Time: 02/04/19  4:22 PM  
Result Value Ref Range Glucose (POC) 118 (H) 65 - 100 mg/dL Performed by Dale Lugo RESPIRATORY PANEL,PCR,NASOPHARYNGEAL Collection Time: 02/04/19  8:14 PM  
Result Value Ref Range Adenovirus NOT DETECTED NOTD Coronavirus 229E DETECTED (A) NOTD Coronavirus HKU1 NOT DETECTED NOTD Coronavirus CVNL63 NOT DETECTED NOTD Coronavirus OC43 NOT DETECTED NOTD Metapneumovirus NOT DETECTED NOTD Rhinovirus and Enterovirus NOT DETECTED NOTD Influenza A NOT DETECTED NOTD Influenza A, subtype H1 NOT DETECTED NOTD Influenza A, subtype H3 NOT DETECTED NOTD INFLUENZA A H1N1 PCR NOT DETECTED NOTD Influenza B NOT DETECTED NOTD Parainfluenza 1 NOT DETECTED NOTD Parainfluenza 2 NOT DETECTED NOTD Parainfluenza 3 NOT DETECTED NOTD Parainfluenza virus 4 NOT DETECTED NOTD    
 RSV by PCR NOT DETECTED NOTD Bordetella pertussis - PCR NOT DETECTED NOTD Chlamydophila pneumoniae DNA, QL, PCR NOT DETECTED NOTD Mycoplasma pneumoniae DNA, QL, PCR NOT DETECTED NOTD    
GLUCOSE, POC Collection Time: 02/04/19  8:39 PM  
Result Value Ref Range Glucose (POC) 130 (H) 65 - 100 mg/dL Performed by Abbie Goltz (PCT) METABOLIC PANEL, BASIC Collection Time: 02/05/19  3:07 AM  
Result Value Ref Range Sodium 135 (L) 136 - 145 mmol/L Potassium 3.9 3.5 - 5.1 mmol/L  Chloride 98 97 - 108 mmol/L  
 CO2 30 21 - 32 mmol/L Anion gap 7 5 - 15 mmol/L Glucose 169 (H) 65 - 100 mg/dL BUN 8 6 - 20 MG/DL Creatinine 0.81 0.55 - 1.02 MG/DL  
 BUN/Creatinine ratio 10 (L) 12 - 20 GFR est AA >60 >60 ml/min/1.73m2 GFR est non-AA >60 >60 ml/min/1.73m2 Calcium 8.9 8.5 - 10.1 MG/DL MAGNESIUM Collection Time: 02/05/19  3:07 AM  
Result Value Ref Range Magnesium 1.8 1.6 - 2.4 mg/dL CBC WITH AUTOMATED DIFF Collection Time: 02/05/19  3:07 AM  
Result Value Ref Range WBC 8.8 3.6 - 11.0 K/uL  
 RBC 3.01 (L) 3.80 - 5.20 M/uL HGB 8.6 (L) 11.5 - 16.0 g/dL HCT 27.1 (L) 35.0 - 47.0 % MCV 90.0 80.0 - 99.0 FL  
 MCH 28.6 26.0 - 34.0 PG  
 MCHC 31.7 30.0 - 36.5 g/dL  
 RDW 14.0 11.5 - 14.5 % PLATELET 623 (H) 838 - 400 K/uL MPV 9.9 8.9 - 12.9 FL  
 NRBC 0.0 0  WBC ABSOLUTE NRBC 0.00 0.00 - 0.01 K/uL NEUTROPHILS 87 (H) 32 - 75 % LYMPHOCYTES 6 (L) 12 - 49 % MONOCYTES 6 5 - 13 % EOSINOPHILS 0 0 - 7 % BASOPHILS 0 0 - 1 % METAMYELOCYTES 1 % IMMATURE GRANULOCYTES 0 0.0 - 0.5 % ABS. NEUTROPHILS 7.7 1.8 - 8.0 K/UL  
 ABS. LYMPHOCYTES 0.5 (L) 0.8 - 3.5 K/UL  
 ABS. MONOCYTES 0.5 0.0 - 1.0 K/UL  
 ABS. EOSINOPHILS 0.0 0.0 - 0.4 K/UL  
 ABS. BASOPHILS 0.0 0.0 - 0.1 K/UL  
 ABS. IMM. GRANS. 0.0 0.00 - 0.04 K/UL  
 DF MANUAL    
 RBC COMMENTS NORMOCYTIC, NORMOCHROMIC PROTHROMBIN TIME + INR Collection Time: 02/05/19  3:07 AM  
Result Value Ref Range INR 1.3 (H) 0.9 - 1.1 Prothrombin time 13.1 (H) 9.0 - 11.1 sec GLUCOSE, POC Collection Time: 02/05/19  7:36 AM  
Result Value Ref Range Glucose (POC) 195 (H) 65 - 100 mg/dL Performed by Familia Cash (PCT) Imaging: 
I have personally reviewed the patients radiographs and have reviewed the reports: 
 see above . .cxr not yet done   
cxr seen -  I think  We need to proceed with fob  . Discussed with pt risks and benefits and she agrees to procedure.   She is scheduled for fob with me at 130 pm tomorrow.   
 
  
Landry Pearl MD

## 2019-02-05 NOTE — PROGRESS NOTES
Pharmacy Automatic Renal Dosing Protocol - Antimicrobials Indication for Antimicrobials:  PNA (bilateral) on immunocompromised patient Current Regimen of Each Antimicrobial: 
Vancomycin 1.5 g X1, then 1.25 g q 12 hours (Start Date ; Day # 2 
levofloxacin 750 mg q24h (Start Date ; Day # 2 Cefepime 2 gm q8h, 4 (Start Date ; Day # 2 Previous Antimicrobial Therapy: 
None Goal Level: VANCOMYCIN TROUGH GOAL RANGE Vancomycin Trough: 15 - 20 mcg/mL Date Dose & Interval Measured (mcg/mL) Extrapolated (mcg/mL) Date & time of next level:  at 8 AM 
 
Significant Cultures:  
2/3: Blood cx: pending : Respiratory panel: Coronavirus 229E Radiology / Imaging results: (X-ray, CT scan or MRI):  
: CXR: Bilateral airspace disease right greater than left is consistent with pneumonia Paralysis, amputations, malnutrition:   
 
Labs: 
Recent Labs 19 
8504 19 
0251 19 
1319 CREA 0.81 0.78 0.83  
BUN 8 5* 5* WBC 8.8 8.2 6.4 Temp (24hrs), Av.2 °F (36.8 °C), Min:98 °F (36.7 °C), Max:98.4 °F (36.9 °C) Creatinine Clearance (mL/min) or Dialysis: >50 Impression/Plan:  
· Continue with the current dose of vancomycin, LVQ and cefepime · Vancomycin trough on  at 8 AM 
· WBC stable · SCr stable · Afebrile · Antimicrobial stop date pending Pharmacy will follow daily and adjust medications as appropriate for renal function and/or serum levels. Thank you, Rere Estevez, PHARMD 
 
Recommended duration of therapy 
[email or web address]/Pharmacy/Clinical%20Companion/[email or web address]. docx Renal Dosing 
[email or web address]/Pharmacy/Clinical%20Companion/Renal%[email or web address]. pdf

## 2019-02-05 NOTE — PROGRESS NOTES
Hematology Oncology Progress Note Follow up for: HD Chart notes reviewed since last visit. Case discussed with following:  
 
Patient complains of the following: feels better Additional concerns noted by the staff:  
 
Patient Vitals for the past 24 hrs: 
 BP Temp Pulse Resp SpO2  
02/05/19 0833 144/76 98.3 °F (36.8 °C) 99 20 93 % 02/05/19 0306 124/71 98 °F (36.7 °C) 94 20 93 % 02/04/19 2218 138/84 98 °F (36.7 °C) 100 20 96 % 02/04/19 2011 114/69 98.4 °F (36.9 °C) 100 20 99 % 02/04/19 1730 120/59      
02/04/19 1435 122/65 98 °F (36.7 °C) (!) 103 20 98 % 02/04/19 1200 129/77 98 °F (36.7 °C) 94 20 96 % Review of Systems: 
12 point ROS done and negative except as above Physical Examination: 
gen NAD 
CV reg Lungs clear 
abd benign Labs: 
Recent Results (from the past 24 hour(s)) ECHO ADULT COMPLETE Collection Time: 02/04/19 10:07 AM  
Result Value Ref Range PASP 60.0 mmHg GLUCOSE, POC Collection Time: 02/04/19 11:39 AM  
Result Value Ref Range Glucose (POC) 117 (H) 65 - 100 mg/dL Performed by Flor Guido GLUCOSE, POC Collection Time: 02/04/19  4:22 PM  
Result Value Ref Range Glucose (POC) 118 (H) 65 - 100 mg/dL Performed by Jorje Ulloa RESPIRATORY PANEL,PCR,NASOPHARYNGEAL Collection Time: 02/04/19  8:14 PM  
Result Value Ref Range Adenovirus NOT DETECTED NOTD Coronavirus 229E DETECTED (A) NOTD Coronavirus HKU1 NOT DETECTED NOTD Coronavirus CVNL63 NOT DETECTED NOTD Coronavirus OC43 NOT DETECTED NOTD Metapneumovirus NOT DETECTED NOTD Rhinovirus and Enterovirus NOT DETECTED NOTD Influenza A NOT DETECTED NOTD Influenza A, subtype H1 NOT DETECTED NOTD Influenza A, subtype H3 NOT DETECTED NOTD INFLUENZA A H1N1 PCR NOT DETECTED NOTD Influenza B NOT DETECTED NOTD Parainfluenza 1 NOT DETECTED NOTD Parainfluenza 2 NOT DETECTED NOTD Parainfluenza 3 NOT DETECTED NOTD Parainfluenza virus 4 NOT DETECTED NOTD    
 RSV by PCR NOT DETECTED NOTD Bordetella pertussis - PCR NOT DETECTED NOTD Chlamydophila pneumoniae DNA, QL, PCR NOT DETECTED NOTD Mycoplasma pneumoniae DNA, QL, PCR NOT DETECTED NOTD    
GLUCOSE, POC Collection Time: 02/04/19  8:39 PM  
Result Value Ref Range Glucose (POC) 130 (H) 65 - 100 mg/dL Performed by Dea Lauren (PCT) METABOLIC PANEL, BASIC Collection Time: 02/05/19  3:07 AM  
Result Value Ref Range Sodium 135 (L) 136 - 145 mmol/L Potassium 3.9 3.5 - 5.1 mmol/L Chloride 98 97 - 108 mmol/L  
 CO2 30 21 - 32 mmol/L Anion gap 7 5 - 15 mmol/L Glucose 169 (H) 65 - 100 mg/dL BUN 8 6 - 20 MG/DL Creatinine 0.81 0.55 - 1.02 MG/DL  
 BUN/Creatinine ratio 10 (L) 12 - 20 GFR est AA >60 >60 ml/min/1.73m2 GFR est non-AA >60 >60 ml/min/1.73m2 Calcium 8.9 8.5 - 10.1 MG/DL MAGNESIUM Collection Time: 02/05/19  3:07 AM  
Result Value Ref Range Magnesium 1.8 1.6 - 2.4 mg/dL CBC WITH AUTOMATED DIFF Collection Time: 02/05/19  3:07 AM  
Result Value Ref Range WBC 8.8 3.6 - 11.0 K/uL  
 RBC 3.01 (L) 3.80 - 5.20 M/uL HGB 8.6 (L) 11.5 - 16.0 g/dL HCT 27.1 (L) 35.0 - 47.0 % MCV 90.0 80.0 - 99.0 FL  
 MCH 28.6 26.0 - 34.0 PG  
 MCHC 31.7 30.0 - 36.5 g/dL  
 RDW 14.0 11.5 - 14.5 % PLATELET 456 (H) 125 - 400 K/uL MPV 9.9 8.9 - 12.9 FL  
 NRBC 0.0 0  WBC ABSOLUTE NRBC 0.00 0.00 - 0.01 K/uL NEUTROPHILS 87 (H) 32 - 75 % LYMPHOCYTES 6 (L) 12 - 49 % MONOCYTES 6 5 - 13 % EOSINOPHILS 0 0 - 7 % BASOPHILS 0 0 - 1 % METAMYELOCYTES 1 % IMMATURE GRANULOCYTES 0 0.0 - 0.5 % ABS. NEUTROPHILS 7.7 1.8 - 8.0 K/UL  
 ABS. LYMPHOCYTES 0.5 (L) 0.8 - 3.5 K/UL  
 ABS. MONOCYTES 0.5 0.0 - 1.0 K/UL  
 ABS. EOSINOPHILS 0.0 0.0 - 0.4 K/UL  
 ABS. BASOPHILS 0.0 0.0 - 0.1 K/UL  
 ABS. IMM.  GRANS. 0.0 0.00 - 0.04 K/UL  
 DF MANUAL    
 RBC COMMENTS NORMOCYTIC, NORMOCHROMIC    
 PROTHROMBIN TIME + INR Collection Time: 02/05/19  3:07 AM  
Result Value Ref Range INR 1.3 (H) 0.9 - 1.1 Prothrombin time 13.1 (H) 9.0 - 11.1 sec GLUCOSE, POC Collection Time: 02/05/19  7:36 AM  
Result Value Ref Range Glucose (POC) 195 (H) 65 - 100 mg/dL Performed by Familia Cash (PCT) Assessment and Plan: HD 
-treated by my partner Dr Sana Ortiz,  Had great response to tx 
-f/u with her Pneumonia 
-On abx per primary and pulmonary PE 
-Cont lovenox until sure no procedures are needed, then could switch to a NOAC

## 2019-02-05 NOTE — PROGRESS NOTES
Problem: Pressure Injury - Risk of 
Goal: *Prevention of pressure injury Document Dwayne Scale and appropriate interventions in the flowsheet. Outcome: Progressing Towards Goal 
Pressure Injury Interventions: 
  
 
Moisture Interventions: Absorbent underpads, Internal/External urinary devices, Limit adult briefs, Minimize layers, Moisture barrier, Offer toileting Q_hr, Check for incontinence Q2 hours and as needed Activity Interventions: Increase time out of bed, Pressure redistribution bed/mattress(bed type), PT/OT evaluation Mobility Interventions: Float heels, HOB 30 degrees or less, Pressure redistribution bed/mattress (bed type), PT/OT evaluation, Turn and reposition approx. every two hours(pillow and wedges) Nutrition Interventions: Document food/fluid/supplement intake, Offer support with meals,snacks and hydration Friction and Shear Interventions: Foam dressings/transparent film/skin sealants, HOB 30 degrees or less, Lift sheet, Lift team/patient mobility team, Minimize layers, Transferring/repositioning devices

## 2019-02-05 NOTE — PROGRESS NOTES
Pharmacy  Vancomycin Dosing Pharmacy consulted to dose vancomycin for this 70 y.o. female being treated for CAP Other Current Antimicrobials: levofloxacin 750 mg q24h, day #1 Cefepime 2 gm q8h, day #1 Significant Cultures: 2/4/19 - blood culture pending Paralysis, amputations, malnutrition N/A Height and Weight  Ht Readings from Last 1 Encounters:  
02/04/19 172.7 cm (68\") Wt Readings from Last 1 Encounters:  
02/04/19 81.6 kg (180 lb) Renal Function Creatinine Clearance (ml/min): ~ 76 ml/min Recent Labs 02/04/19 
0251 02/03/19 
1319 CREA 0.78 0.83  
BUN 5* 5* WBC Recent Labs 02/04/19 
0251 02/03/19 
1319 WBC 8.2 6.4 Temp 98 °F (36.7 °C) Loading dose: 1500 mg x 1 Maintenance dose: 1250 mg q12h Goal Level:  vanc trough 15-20 Pharmacy will follow daily and adjust as appropriate. Thanks for the consult, Sterling [place]ne

## 2019-02-05 NOTE — PROGRESS NOTES
Initial Nutrition Assessment: 
 
INTERVENTIONS/RECOMMENDATIONS:  
· Meals/Snacks: General/healthful diet: Continue current diet · Supplements: Commercial supplement: Try mighty shake BID ASSESSMENT:  
Patient medically noted for acute respiratory failure, pneumonia, pulmonary embolism, and Hodgkin's lymphoma. PMH for HTN and DM. Patient reports her appetite hasn't been great recently. Drinking boost PTA. Does not like ensure or Glucerna and refuses these. Agreeable to trying mighty shake instead. Encouraged use of menu and room service to receive preferences. Patient with significant weight loss of 10% x 6 months. Patient currently meets criteria for acute moderate malnutrition. Will monitor intake and acceptance of ONS. Meets Criteria for Acute Malnutrition  
[] Severe Malnutrition, as evidenced by: 
 [] Moderate muscle wasting, loss of subcutaneous fat 
 [] Nutritional intake of <50% of recommended intake for >5 days 
 [] Weight loss of >1-2% in 1 week, >5% in 1 month, >7.5% in 3 months, or >10% in 6 months 
 [] Moderate-severe edema [x]Moderate Malnutrition, as evidenced by: 
 [] Mild muscle wasting, loss of subcutaneous fat 
 [x] Nutritional intake <75% of recommended intake for >1 week [x] Weight loss of 1-2% in 1 week, 5% in 1 month, 7.5% in 3 months, or 10% in 6 months 
 [] Mild edema Diet Order: Consistent carb(2g Na) 
% Eaten:   
Patient Vitals for the past 72 hrs: 
 % Diet Eaten 02/03/19 1847 25 % Pertinent Medications: [x]Reviewed []Other: Atorvastatin, Lasix, Lantus, Humalog, Shantel Q, Solumedrol, KCl Pertinent Labs: [x]Reviewed []Other:  [phone] Food Allergies: [x]None []Other Last BM:    [x]Active     []Hyperactive  []Hypoactive       [] Absent BS Skin:    [x] Intact   [] Incision  [] Breakdown: [x] Edema []Other: Anthropometrics:  
Height: 5' 8\" (172.7 cm) Weight: 81.6 kg (180 lb) IBW (%IBW):   ( ) UBW (%UBW):   (  %) Last Weight Metrics: Weight Loss Metrics 2/4/2019 1/21/2019 11/15/2018 9/13/2018 9/12/2018 9/10/2018 8/27/2018 Today's Wt 180 lb 180 lb 180 lb 198 lb 201 lb 1 oz 200 lb 3.2 oz 201 lb BMI 27.37 kg/m2 27.37 kg/m2 27.37 kg/m2 30.11 kg/m2 29.69 kg/m2 29.56 kg/m2 29.68 kg/m2 BMI: Body mass index is 27.37 kg/m². This BMI is indicative of: 
 []Underweight    []Normal    [x]Overweight    [] Obesity   [] Extreme Obesity (BMI>40) Estimated Nutrition Needs (Based on):  
2214 Kcals/day(BMR (1394) x 1. 3AF) , 82 g(-90g (1.0-1.1 g/kg bw)) Protein Carbohydrate: At Least 130 g/day  Fluids: 1800 mL/day (1ml/kcal) Pt expected to meet estimated nutrient needs: [x]Yes []No 
 
NUTRITION DIAGNOSES:  
Problem:  Unintended weight loss Etiology: related to chemo, decreased PO Signs/Symptoms: as evidenced by 10.4% weight loss x 6 months NUTRITION INTERVENTIONS: 
Meals/Snacks: General/healthful diet   Supplements: Commercial supplement GOAL:  
PO intake >50% of meals and 70% of ONS next 3-5 days LEARNING NEEDS (Diet, Food/Nutrient-Drug Interaction):  
 [x] None Identified 
 [] Identified and Education Provided/Documented 
 [] Identified and Pt declined/was not appropriate Cultural, Yazidism, OR Ethnic Dietary Needs:  
 [x] None Identified 
 [] Identified and Addressed 
 
 [x] Interdisciplinary Care Plan Reviewed/Documented  
 [x] Discharge Planning:  Consistent carb/low Na diet MONITORING /EVALUATION:  
Food/Nutrient Intake Outcomes: Total energy intake Physical Signs/Symptoms Outcomes: Weight/weight change, Glucose profile NUTRITION RISK:  
 [] High              [x] Moderate           []  Low  []  Minimal/Uncompromised PT SEEN FOR:  
 []  MD Consult: []Calorie Count []Diabetic Diet Education []Diet Education []Electrolyte Management []General Nutrition Management and Supplements []Management of Tube Feeding []TPN Recommendations  [x]  RN Referral:  [x]MST score >=2 
 []Enteral/Parenteral Nutrition PTA []Pregnant: Gestational DM or Multigestation 
   []Pressure Ulcer/Wound Care needs 
     
[]  Low BMI 
[]  JIMMY Horner Pager [phone] Weekend Pager [phone]

## 2019-02-05 NOTE — PROGRESS NOTES
General Daily Progress Note Admit Date: 2/3/2019 Subjective:  
 
Patient has no complaint . Current Facility-Administered Medications Medication Dose Route Frequency  insulin glargine (LANTUS) injection 14 Units  14 Units SubCUTAneous DAILY  potassium chloride SR (KLOR-CON 10) tablet 20 mEq  20 mEq Oral BID  lactobac ac& pc-s.therm-b.anim (JUNI Q/RISAQUAD)  1 Cap Oral DAILY  methylPREDNISolone (PF) (SOLU-MEDROL) injection 40 mg  40 mg IntraVENous Q8H  
 insulin lispro (HUMALOG) injection   SubCUTAneous TIDAC  cefepime (MAXIPIME) 2 g in 0.9% sodium chloride (MBP/ADV) 100 mL  2 g IntraVENous Q8H  
 vancomycin (VANCOCIN) 1250 mg in  ml infusion  1,250 mg IntraVENous Q12H  levoFLOXacin (LEVAQUIN) 750 mg in D5W IVPB  750 mg IntraVENous Q24H  
 sodium chloride (NS) flush 5-40 mL  5-40 mL IntraVENous Q8H  
 sodium chloride (NS) flush 5-40 mL  5-40 mL IntraVENous PRN  
 acetaminophen (TYLENOL) tablet 650 mg  650 mg Oral Q6H PRN  
 ondansetron (ZOFRAN) injection 4 mg  4 mg IntraVENous Q4H PRN  
 enoxaparin (LOVENOX) injection 80 mg  1 mg/kg SubCUTAneous Q12H  
 amLODIPine (NORVASC) tablet 2.5 mg  2.5 mg Oral DAILY  atorvastatin (LIPITOR) tablet 40 mg  40 mg Oral QHS  fluticasone (FLONASE) 50 mcg/actuation nasal spray 2 Spray  2 Spray Both Nostrils DAILY  metoprolol succinate (TOPROL-XL) XL tablet 50 mg  50 mg Oral DAILY  glucose chewable tablet 16 g  4 Tab Oral PRN  
 dextrose (D50W) injection syrg 12.5-25 g  12.5-25 g IntraVENous PRN  
 glucagon (GLUCAGEN) injection 1 mg  1 mg IntraMUSCular PRN  
 nitroglycerin (NITROBID) 2 % ointment 1 Inch  1 Inch Topical Q6H PRN  
 furosemide (LASIX) tablet 40 mg  40 mg Oral DAILY Review of Systems A comprehensive review of systems was negative. Objective:  
 
Patient Vitals for the past 24 hrs: 
 BP Temp Pulse Resp SpO2 Height Weight  
02/05/19 0833 144/76 98.3 °F (36.8 °C) 99 20 93 %   02/05/19 0306 124/71 98 °F (36.7 °C) 94 20 93 %    
02/04/19 2218 138/84 98 °F (36.7 °C) 100 20 96 %    
02/04/19 2011 114/69 98.4 °F (36.9 °C) 100 20 99 %    
02/04/19 1730 120/59        
02/04/19 1435 122/65 98 °F (36.7 °C) (!) 103 20 98 %    
02/04/19 1200 129/77 98 °F (36.7 °C) 94 20 96 %    
02/04/19 1000 120/72    97 %    
02/04/19 0911 121/69     5' 8\" (1.727 m) 180 lb (81.6 kg) No intake/output data recorded. 02/03 1901 - 02/05 0700 In: 500 [I.V.:500] Out: 2000 [facility] Physical Exam:  
Visit Vitals /76 Pulse 99 Temp 98.3 °F (36.8 °C) Resp 20 Ht 5' 8\" (1.727 m) Wt 180 lb (81.6 kg) SpO2 93% BMI 27.37 kg/m² General appearance: alert, cooperative, no distress, appears stated age Neck: supple, symmetrical, trachea midline, no adenopathy, thyroid: not enlarged, symmetric, no tenderness/mass/nodules, no carotid bruit and JVD - 3 cm above sternal notch Lungs: rales R base, L base Heart: regular rate and rhythm, S1, S2 normal, no murmur, click, rub or gallop Extremities: edema trace Data Review Recent Results (from the past 24 hour(s)) ECHO ADULT COMPLETE Collection Time: 02/04/19 10:07 AM  
Result Value Ref Range PASP 60.0 mmHg GLUCOSE, POC Collection Time: 02/04/19 11:39 AM  
Result Value Ref Range Glucose (POC) 117 (H) 65 - 100 mg/dL Performed by Anni Schaeffer GLUCOSE, POC Collection Time: 02/04/19  4:22 PM  
Result Value Ref Range Glucose (POC) 118 (H) 65 - 100 mg/dL Performed by Juani Gates RESPIRATORY PANEL,PCR,NASOPHARYNGEAL Collection Time: 02/04/19  8:14 PM  
Result Value Ref Range Adenovirus NOT DETECTED NOTD Coronavirus 229E DETECTED (A) NOTD Coronavirus HKU1 NOT DETECTED NOTD Coronavirus CVNL63 NOT DETECTED NOTD Coronavirus OC43 NOT DETECTED NOTD Metapneumovirus NOT DETECTED NOTD Rhinovirus and Enterovirus NOT DETECTED NOTD Influenza A NOT DETECTED NOTD Influenza A, subtype H1 NOT DETECTED NOTD Influenza A, subtype H3 NOT DETECTED NOTD INFLUENZA A H1N1 PCR NOT DETECTED NOTD Influenza B NOT DETECTED NOTD Parainfluenza 1 NOT DETECTED NOTD Parainfluenza 2 NOT DETECTED NOTD Parainfluenza 3 NOT DETECTED NOTD Parainfluenza virus 4 NOT DETECTED NOTD    
 RSV by PCR NOT DETECTED NOTD Bordetella pertussis - PCR NOT DETECTED NOTD Chlamydophila pneumoniae DNA, QL, PCR NOT DETECTED NOTD Mycoplasma pneumoniae DNA, QL, PCR NOT DETECTED NOTD    
GLUCOSE, POC Collection Time: 02/04/19  8:39 PM  
Result Value Ref Range Glucose (POC) 130 (H) 65 - 100 mg/dL Performed by Fortunato Raya (PCT) METABOLIC PANEL, BASIC Collection Time: 02/05/19  3:07 AM  
Result Value Ref Range Sodium 135 (L) 136 - 145 mmol/L Potassium 3.9 3.5 - 5.1 mmol/L Chloride 98 97 - 108 mmol/L  
 CO2 30 21 - 32 mmol/L Anion gap 7 5 - 15 mmol/L Glucose 169 (H) 65 - 100 mg/dL BUN 8 6 - 20 MG/DL Creatinine 0.81 0.55 - 1.02 MG/DL  
 BUN/Creatinine ratio 10 (L) 12 - 20 GFR est AA >60 >60 ml/min/1.73m2 GFR est non-AA >60 >60 ml/min/1.73m2 Calcium 8.9 8.5 - 10.1 MG/DL MAGNESIUM Collection Time: 02/05/19  3:07 AM  
Result Value Ref Range Magnesium 1.8 1.6 - 2.4 mg/dL CBC WITH AUTOMATED DIFF Collection Time: 02/05/19  3:07 AM  
Result Value Ref Range WBC 8.8 3.6 - 11.0 K/uL  
 RBC 3.01 (L) 3.80 - 5.20 M/uL HGB 8.6 (L) 11.5 - 16.0 g/dL HCT 27.1 (L) 35.0 - 47.0 % MCV 90.0 80.0 - 99.0 FL  
 MCH 28.6 26.0 - 34.0 PG  
 MCHC 31.7 30.0 - 36.5 g/dL  
 RDW 14.0 11.5 - 14.5 % PLATELET 299 (H) 361 - 400 K/uL MPV 9.9 8.9 - 12.9 FL  
 NRBC 0.0 0  WBC ABSOLUTE NRBC 0.00 0.00 - 0.01 K/uL NEUTROPHILS 87 (H) 32 - 75 % LYMPHOCYTES 6 (L) 12 - 49 % MONOCYTES 6 5 - 13 % EOSINOPHILS 0 0 - 7 % BASOPHILS 0 0 - 1 % METAMYELOCYTES 1 % IMMATURE GRANULOCYTES 0 0.0 - 0.5 % ABS. NEUTROPHILS 7.7 1.8 - 8.0 K/UL  
 ABS. LYMPHOCYTES 0.5 (L) 0.8 - 3.5 K/UL  
 ABS. MONOCYTES 0.5 0.0 - 1.0 K/UL  
 ABS. EOSINOPHILS 0.0 0.0 - 0.4 K/UL  
 ABS. BASOPHILS 0.0 0.0 - 0.1 K/UL  
 ABS. IMM. GRANS. 0.0 0.00 - 0.04 K/UL  
 DF MANUAL    
 RBC COMMENTS NORMOCYTIC, NORMOCHROMIC PROTHROMBIN TIME + INR Collection Time: 02/05/19  3:07 AM  
Result Value Ref Range INR 1.3 (H) 0.9 - 1.1 Prothrombin time 13.1 (H) 9.0 - 11.1 sec GLUCOSE, POC Collection Time: 02/05/19  7:36 AM  
Result Value Ref Range Glucose (POC) 195 (H) 65 - 100 mg/dL Performed by Pratima Aguayo (PCT) Assessment:  
 
Active Problems: 
  SVT (supraventricular tachycardia) ([facility] [address] (8/29/2016) Essential hypertension with goal blood pressure less than 140/90 (8/29/2016) Diabetes mellitus type 2, controlled ([address] (9/8/2016) Dyslipidemia (9/8/2016) Acute respiratory failure ([address] (2/3/2019) Leg swelling (2/3/2019) Hodgkin lymphoma ([address] (2/3/2019) CAP (community acquired pneumonia) (2/3/2019) Plan: 1. An apparent interstitial pneumonitis currently being treated with systemic steroids. Possible endoscopy with biopsy depending on progress or lack of. 
2.  Continue diabetic control in view of steroid usage. 3.  Because of her immunocompromised state broad-spectrum antibiotic coverage continues.

## 2019-02-06 NOTE — PROGRESS NOTES
Problem: Pressure Injury - Risk of 
Goal: *Prevention of pressure injury Document Dwayne Scale and appropriate interventions in the flowsheet. Outcome: Progressing Towards Goal 
Pressure Injury Interventions: 
  
 
Moisture Interventions: Absorbent underpads Activity Interventions: Increase time out of bed, Pressure redistribution bed/mattress(bed type), PT/OT evaluation Mobility Interventions: Float heels, HOB 30 degrees or less, Pressure redistribution bed/mattress (bed type), PT/OT evaluation, Turn and reposition approx. every two hours(pillow and wedges) Nutrition Interventions: Document food/fluid/supplement intake, Offer support with meals,snacks and hydration Friction and Shear Interventions: Foam dressings/transparent film/skin sealants, HOB 30 degrees or less, Lift sheet, Lift team/patient mobility team, Minimize layers, Transferring/repositioning devices, Feet elevated on foot rest

## 2019-02-06 NOTE — PROGRESS NOTES
PCU SHIFT NURSING NOTE Bedside and Verbal shift change [facility] [address] [facility] ([facility]) by Roberth Watson RN (offgoing nurse). Report included the following information SBAR, Kardex, ED Summary, Procedure Summary, Intake/Output, MAR, Accordion, Recent Results, Med Rec Status and Cardiac Rhythm NSR. Shift Summary:  
1904: Pt sleeping & responds to voice. Pt still drowsy from anesthesia post bronch, but oriented x4. VSS. HR SR. O2 sats mid 90s on 3L NC. No complaints at this time. Purewick in place and pt voiding. Will continue to monitor. Overall uneventful shift. Bedside and Verbal shift change report given to [address] [facility] (oncoming nurse) by Ines Granado RN (offgoing nurse). Report included the following information SBAR, Kardex, ED Summary, Procedure Summary, Intake/Output, MAR, Accordion, Recent Results, Med Rec Status and Cardiac Rhythm NSR/ST. Admission Date 2/3/2019 Admission Diagnosis Acute respiratory failure ([facility] [address] Consults IP CONSULT TO HEMATOLOGY 
IP CONSULT TO PRIMARY CARE PROVIDER 
IP CONSULT TO PULMONOLOGY Consults []PT []OT []Speech  
[]Case Management  
  
[] Palliative Cardiac Monitoring Order []Yes []No  
 
IV drips []Yes Drip:                            Dose: 
Drip:                            Dose: 
Drip:                            Dose:  
[]No  
 
GI Prophylaxis []Yes []No  
 
 
 
DVT Prophylaxis SCDs:     
     
 Shawn stockings:     
  
[] Medication []Contraindicated []None Activity Level Activity Level: Bed Rest   
 Activity Assistance: Partial (two people) Purposeful Rounding every 1-2 hour? []Yes Spence Score  Total Score: 4 Bed Alarm (If score 3 or >) []Yes  
[] Refused (See signed refusal form in chart) Dwayne Score  Dwayne Score: 17 Dwayne Score (if score 14 or less) []PMT consult  
[]Wound Care consult []Specialty bed  
[] Nutrition consult Needs prior to discharge:  
Home O2 required:   
[]Yes []No  
 If yes, how much O2 required? Other:  
 Last Bowel Movement:    
  
Influenza Vaccine Received Flu Vaccine for Current Season (usually Sept-March): No  
 Patient/Guardian Refused (Notify MD): Yes Pneumonia Vaccine Diet Active Orders Diet DIET DIABETIC CONSISTENT CARB Regular; 2 GM NA (House Low NA) LDAs Venous Access Device portacath  09/12/18 Upper chest (subclavicular area), left (Active) Venous Access Device portacath 02/04/19 Upper chest (subclavicular area), left (Active) Central Line Being Utilized Yes 2/5/2019  7:24 PM  
Criteria for Appropriate Use Limited/no vessel suitable for conventional peripheral access 2/5/2019  7:24 PM  
Site Assessment Clean, dry, & intact 2/5/2019  7:24 PM  
Dressing Status Clean, dry, & intact 2/5/2019  7:24 PM  
Dressing Type Disk with Chlorhexadine gluconate (CHG); Transparent 2/5/2019  7:24 PM  
Action Taken Open ports on tubing capped 2/5/2019  7:24 PM  
Positive Blood Return (Medial Site) Yes 2/5/2019  7:24 PM  
Alcohol Cap Used Yes 2/5/2019  7:24 PM  
  
Peripheral IV 02/03/19 Right Antecubital (Active) Site Assessment Clean, dry, & intact 2/6/2019  1:18 PM  
Phlebitis Assessment 0 2/6/2019  1:18 PM  
Infiltration Assessment 0 2/6/2019  1:18 PM  
Dressing Status Clean, dry, & intact 2/6/2019  1:18 PM  
Dressing Type Transparent;Tape 2/6/2019  1:18 PM  
Hub Color/Line Status Pink 2/6/2019  1:18 PM  
Action Taken Open ports on tubing capped 2/5/2019  7:24 PM  
Alcohol Cap Used Yes 2/5/2019  7:24 PM  
      
External Female Catheter 02/04/19 (Active) Site Assessment Clean, dry, & intact 2/5/2019 11:21 PM  
Repositioned Yes 2/5/2019 11:21 PM  
Perineal Care Yes 2/5/2019 11:21 PM  
Wick Changed Yes 2/5/2019 11:21 PM  
Suction Canister/Tubing Changed Yes 2/5/2019 11:21 PM  
Urine Output (mL) 300 ml 2/6/2019  6:36 AM  
            
Urinary Catheter Intake & Output Date 02/05/19 0700 - 02/06/19 0659 02/06/19 0700 - 02/07/19 0884 Shift [id number] 1677-4458 24 Hour Total 8325-9888 6793-7199 24 Hour Total  
INTAKE  
P.O.  240 240     
  P. O.  240 240 Shift Total(mL/kg)  240(2.9) 240(2.9) OUTPUT Urine(mL/kg/hr)  900(0.9) 900(0.5) Urine Output (mL) (External Female Catheter 02/04/19)  900 900 Shift Total(mL/kg)  900(11) 900(11) NET  -660 -660 Weight (kg) 81.6 81.6 81.6 81.6 81.6 81.6 Readmission Risk Assessment Tool Score Medium Risk 15 Total Score 3 Has Seen PCP in Last 6 Months (Yes=3, No=0)  
 5 Pt. Coverage (Medicare=5 , Medicaid, or Self-Pay=4) 7 Charlson Comorbidity Score (Age + Comorbid Conditions) Criteria that do not apply:  
 . Living with Significant Other. Assisted Living. LTAC. SNF. or  
Rehab Patient Length of Stay (>5 days = 3) IP Visits Last 12 Months (1-3=4, 4=9, >4=11) Expected Length of Stay 4d 7h Actual Length of Stay 3

## 2019-02-06 NOTE — PERIOP NOTES
The risks and benefits of the bite block have been explained to patient. Patient verbalizes understanding. Patient states her teeth are crumbling due to chemo. Informed Dr. Eleni Dawson of this situation and risk of more breaking

## 2019-02-06 NOTE — PERIOP NOTES
TRANSFER - OUT REPORT: 
 
Verbal report given to Janes(name) on Denton Bolanos  being transferred to [facility](unit) for routine progression of care Report consisted of patients Situation, Background, Assessment and  
Recommendations(SBAR). Information from the following report(s) Procedure Summary was reviewed with the receiving nurse. Lines:  
Venous Access Device portacath  09/12/18 Upper chest (subclavicular area), left (Active) Venous Access Device portacath 02/04/19 Upper chest (subclavicular area), left (Active) Central Line Being Utilized Yes 2/5/2019  7:24 PM  
Criteria for Appropriate Use Limited/no vessel suitable for conventional peripheral access 2/5/2019  7:24 PM  
Site Assessment Clean, dry, & intact 2/5/2019  7:24 PM  
Dressing Status Clean, dry, & intact 2/5/2019  7:24 PM  
Dressing Type Disk with Chlorhexadine gluconate (CHG); Transparent 2/5/2019  7:24 PM  
Action Taken Open ports on tubing capped 2/5/2019  7:24 PM  
Positive Blood Return (Medial Site) Yes 2/5/2019  7:24 PM  
Alcohol Cap Used Yes 2/5/2019  7:24 PM  
   
Peripheral IV 02/03/19 Right Antecubital (Active) Site Assessment Clean, dry, & intact 2/6/2019  1:18 PM  
Phlebitis Assessment 0 2/6/2019  1:18 PM  
Infiltration Assessment 0 2/6/2019  1:18 PM  
Dressing Status Clean, dry, & intact 2/6/2019  1:18 PM  
Dressing Type Transparent;Tape 2/6/2019  1:18 PM  
Hub Color/Line Status Pink 2/6/2019  1:18 PM  
Action Taken Open ports on tubing capped 2/5/2019  7:24 PM  
Alcohol Cap Used Yes 2/5/2019  7:24 PM  
  
 
Opportunity for questions and clarification was provided. Patient transported with: 
 O2 @ 10 liters

## 2019-02-06 NOTE — PROCEDURES
Fob with bronchial wash was done in endo using the usual cp monitoring. Sedation was provided in titrated doses with versed 4 mg and fentanyl 100 ucg. Bite block was used . Scope was advanced thru the mouth and into the tracheal.    Throat was numbed with lidocaine and vc and airways were numbed with lidocaine . She had a moderate amt of coughing. Pt was on 10 liters mid flow to start but at the beginning of the procedure with scope in airways she desat - mid flow increased to 15 and 100% mrbfm was added and very quickly I advanced to the rt bronchial tree and did bronchial wash. Sats did not allow full airway exam or bal . Pt tolerated fairly well. Had moderate coughing. She appears to be recovering her sats post fob.

## 2019-02-06 NOTE — CONSULTS
[facility], Critical Care, and Sleep Medicine Initial Patient progress note Name: Leonela Espinoza MRN: [id number] : 1950 Hospital: [facility] Date: 2019 IMPRESSION:  
· Acute hypoxic resp failure - 4 liters and 95% ·  bilateral pulm infiltrates- tx with augmentin from  to  and then place on levaquin- did have a preceding fever 101. First noted on pet  and  Seen on ct on admit - differential is pna, opportunistic infection and toxicity ( ? bleo ) from chemo- suspect  Toxicity most 
· Corona virus positive on screen ·   hodkins lymphoma- tx with 4 cyles of abvd and  Positive response to chemo · pulm htn - pap  59 with nl lvef · Anemia-  ? Related to chemo- stable ·   Hx of asthma from childhood to age 24 ·  low k - resolved · NEWLY DISCOVERED BILATERAL PTE WITH NEG DUPLEX OF LEG 
 elevated bsl- with  Illness and on steroids  
 rhinitis- predates  The admit - flonase helps RECOMMENDATIONS:  
· AB COVERAGE  For immunosuppressed pt - broad spectrum - if no mrsa plan to stop vanco after 5 days ·  lovenox - held this am for fob and restart afterwards ·  o2- wean as able · Will start steroids as infiltrates have not improved on ab ·  will do fob  With bw and bal today  - discussed  with pt- -risks and benefits discussed in detail and she agrees ·  gi prophylaxis · ssi and accuchecks 
 fu cultures- so far bc ng at day 3 and positive  carona viirus Subjective: This patient has been seen and evaluated at the request of Dr. Carlos Shine  For hypoxia. Patient is a 70 y.o. female  Who was dx with hodgkin lymphoma  In august when she was getting a massage in [facility] and the masseuse note  At rt neck ln. She is s/p chemo with abvd.   She  Was noted to have bilateral pulm infiltrates on pet/ct On   And was started on augmenin on  - seen in  Oncology office on Jan 30 - sats high 80's but increased to 90 % on fluids and was started on levaquin on Jan 30. Noted sig  Increased landaver[place] and said sats drop to 70's with exertion . She says she  Feels much better today. NO cough, heme , phlegm. NO cp. She has noted some ankle swelling. Feb 5 - report decreased sob, reports decreased ankle swelling . reports rhinitis with clear liquid Feb 6- says she gets a cough and sob with talking. Slept well. Past Medical History:  
Diagnosis Date  Asthma   
 as a child, nothing since menopause  Cancer ([facility] [address] 08/21/2018 Non-Hodgkin's  Cataracts, bilateral   
 Diabetes ([facility] [address] type II  
 Environmental allergies  Fibromyalgia Treated with alternate medical therapy  Hypertension   
 no asthma since age 24  // Dr. Gregory Manzo said hodgkins  And pt confirms Past Surgical History:  
Procedure Laterality Date  HX BREAST BIOPSY Right 2006  HX COLONOSCOPY  2015  HX GYN Pap 2015  HX ORTHOPAEDIC    
 fractured R ankle/ no surgery  HX OTHER SURGICAL Right 08/27/2018  
 excision of deep neck cervical LN. Prior to Admission medications Medication Sig Start Date End Date Taking? Authorizing Provider ONETOUCH VERIO strip USE 1 STRIP IN VITRO TO TEST BLOOD SUGAR ONCE DAILY 10/17/18  Yes Jean Calloway MD  
ibuprofen (MOTRIN) 600 mg tablet Take 1 Tab by mouth every six (6) hours as needed for Pain. 8/28/18  Yes Naren Novoa MD  
Lancets (ONE TOUCH DELICA) misc USE TO TEST BLOOD SUGAR ONCE DAILY. DX.E11.9 10/13/16  Yes Jean Calloway MD  
pioglitazone (ACTOS) 30 mg tablet TAKE 1 TABLET DAILY 1/6/19   Jean Calloway MD  
metoprolol succinate (TOPROL-XL) 50 mg XL tablet TAKE 1 TAB BY MOUTH DAILY.  10/12/18   Jean Calloway MD  
JANUMET XR 50-1,000 mg TM24 TAKE 1 TABLET BY MOUTH TWICE A DAY WITH MEALS 10/12/18   Jean Calloway MD  
fluticasone [facility]) 50 mcg/actuation nasal spray 2 Sprays by Both Nostrils route daily. Provider, Historical  
spironolactone-hydrochlorothiazide (ALDACTAZIDE) 25-25 mg per tablet TAKE ONE TABLET BY MOUTH EVERY DAY 12/3/17   Regla Lopez MD  
atorvastatin (LIPITOR) 40 mg tablet TAKE 1 TAB BY MOUTH DAILY. 12/3/17   Regla Lopez MD  
amLODIPine (NORVASC) 10 mg tablet TAKE 1 TABLET BY MOUTH EVERY DAY 17   Regla Lopez MD  
alcohol swabs (ALCOHOL PREP PADS) padm 1 Pad by Apply Externally route daily. DX.E11.9 10/13/16   Regla Lopez MD  
 
No Known Allergies Social History Tobacco Use  Smoking status: Never Smoker  Smokeless tobacco: Never Used Substance Use Topics  Alcohol use: Yes Comment: socially  
 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   
     prostate cancer  Cancer Sister Breast cancer apparent DCIS  
 Heart Disease Brother Brother  from congestive heart failure secondary to severe mitral disease Current Facility-Administered Medications Medication Dose Route Frequency  insulin glargine (LANTUS) injection 14 Units  14 Units SubCUTAneous DAILY  VANCOMYCIN INFORMATION NOTE   Other ONCE  potassium chloride SR (KLOR-CON 10) tablet 20 mEq  20 mEq Oral BID  lactobac ac& pc-s.therm-b.anim (JUNI Q/RISAQUAD)  1 Cap Oral DAILY  methylPREDNISolone (PF) (SOLU-MEDROL) injection 40 mg  40 mg IntraVENous Q8H  
 insulin lispro (HUMALOG) injection   SubCUTAneous TIDAC  cefepime (MAXIPIME) 2 g in 0.9% sodium chloride (MBP/ADV) 100 mL  2 g IntraVENous Q8H  
 vancomycin (VANCOCIN) 1250 mg in  ml infusion  1,250 mg IntraVENous Q12H  levoFLOXacin (LEVAQUIN) 750 mg in D5W IVPB  750 mg IntraVENous Q24H  
 sodium chloride (NS) flush 5-40 mL  5-40 mL IntraVENous Q8H  
 enoxaparin (LOVENOX) injection 80 mg  1 mg/kg SubCUTAneous Q12H  amLODIPine (NORVASC) tablet 2.5 mg  2.5 mg Oral DAILY  atorvastatin (LIPITOR) tablet 40 mg  40 mg Oral QHS  fluticasone (FLONASE) 50 mcg/actuation nasal spray 2 Spray  2 Spray Both Nostrils DAILY  metoprolol succinate (TOPROL-XL) XL tablet 50 mg  50 mg Oral DAILY  furosemide (LASIX) tablet 40 mg  40 mg Oral DAILY Review of Systems: A comprehensive review of systems was negative except for that written in the HPI. Objective: 
Vital Signs:   
Visit Vitals /67 Pulse 97 Temp 98.3 °F (36.8 °C) Resp 18 Ht 5' 8\" (1.727 m) Wt 81.6 kg (180 lb) SpO2 93% BMI 27.37 kg/m² O2 Device: Nasal cannula O2 Flow Rate (L/min): 4 l/min Temp (24hrs), Av.3 °F (36.8 °C), Min:98.1 °F (36.7 °C), Max:98.4 °F (36.9 °C) Intake/Output:  
Last shift:      No intake/output data recorded. Last 3 shifts:  1901 -  0700 In: 240 [P.O.:240] Out: 1200 [Urine:1200] Intake/Output Summary (Last 24 hours) at 2019 1184 Last data filed at 2019 2103 Gross per 24 hour Intake 240 ml Output 900 ml Net -660 ml Physical Exam:  
General:  Alert, cooperative, no distress, appears stated age. Head:  Normocephalic, without obvious abnormality, atraumatic. alopecia Eyes:  Conjunctivae/corneas clear. PERRL, EOMs intact. Nose: Nares normal. Septum midline. Mucosa normal. No drainage or sinus tenderness. Throat: Lips, mucosa, and tongue normal. Teeth and gums normal.- Neck: Supple, symmetrical, trachea midline, no adenopathy, thyroid: no enlargment/tenderness/nodules, no carotid bruit and no JVD. Back:   Symmetric, no curvature. ROM normal.  
Lungs:   Bilateral rales . -  Rt greater than left Chest wall:  No tenderness or deformity. Heart:  Regular rate and rhythm, S1, S2 normal, no murmur, click, rub or gallop.- mild tachy Abdomen:   Soft, non-tender. Bowel sounds normal. No masses,  No organomegaly. Extremities: Extremities normal, atraumatic, no cyanosis - edema gone Pulses: 2+ and symmetric all extremities. Skin: Skin color, texture, turgor normal. No rashes or lesions Lymph nodes: Cervical, supraclavicular, and axillary nodes normal.  
Neurologic: Grossly nonfocal  
 
Data review:  
 
Recent Results (from the past 24 hour(s)) GLUCOSE, POC Collection Time: 02/05/19  7:36 AM  
Result Value Ref Range Glucose (POC) 195 (H) 65 - 100 mg/dL Performed by Familia Cash (PCT) GLUCOSE, POC Collection Time: 02/05/19 11:30 AM  
Result Value Ref Range Glucose (POC) 213 (H) 65 - 100 mg/dL Performed by Familia Cash (PCT) GLUCOSE, POC Collection Time: 02/05/19  4:31 PM  
Result Value Ref Range Glucose (POC) 198 (H) 65 - 100 mg/dL Performed by Familia Cash (PCT) GLUCOSE, POC Collection Time: 02/05/19  8:53 PM  
Result Value Ref Range Glucose (POC) 249 (H) 65 - 100 mg/dL Performed by Phan Wetzel (PCT) CBC WITH MANUAL DIFF Collection Time: 02/06/19  2:38 AM  
Result Value Ref Range WBC 14.9 (H) 3.6 - 11.0 K/uL  
 RBC 2.90 (L) 3.80 - 5.20 M/uL HGB 8.5 (L) 11.5 - 16.0 g/dL HCT 26.3 (L) 35.0 - 47.0 % MCV 90.7 80.0 - 99.0 FL  
 MCH 29.3 26.0 - 34.0 PG  
 MCHC 32.3 30.0 - 36.5 g/dL  
 RDW 13.7 11.5 - 14.5 % PLATELET 680 (H) 324 - 400 K/uL MPV 9.6 8.9 - 12.9 FL  
 NRBC 0.0 0  WBC ABSOLUTE NRBC 0.00 0.00 - 0.01 K/uL NEUTROPHILS 76 (H) 32 - 75 % BAND NEUTROPHILS 0 0 - 6 % LYMPHOCYTES 12 12 - 49 % MONOCYTES 12 5 - 13 % EOSINOPHILS 0 0 - 7 % BASOPHILS 0 0 - 1 % METAMYELOCYTES 0 0 % MYELOCYTES 0 0 % PROMYELOCYTES 0 0 % BLASTS 0 0 % OTHER CELL 0 0 IMMATURE GRANULOCYTES 0 %  
 ABS. NEUTROPHILS 11.3 (H) 1.8 - 8.0 K/UL  
 ABS. LYMPHOCYTES 1.8 0.8 - 3.5 K/UL  
 ABS. MONOCYTES 1.8 (H) 0.0 - 1.0 K/UL  
 ABS. EOSINOPHILS 0.0 0.0 - 0.4 K/UL  
 ABS. BASOPHILS 0.0 0.0 - 0.1 K/UL  
 ABS. IMM. GRANS. 0.0 K/UL DF MANUAL    
 RBC COMMENTS NORMOCYTIC, NORMOCHROMIC    
GLUCOSE, POC Collection Time: 02/06/19  7:24 AM  
Result Value Ref Range Glucose (POC) 270 (H) 65 - 100 mg/dL Performed by [employer]: 
I have personally reviewed the patients radiographs and have reviewed the reports: 
 see above . cxr seen -  I think  We need to proceed with fob  . Discussed with pt risks and benefits and she agrees to procedure. She is scheduled for fob with me at 130 pm today Luz Maria German MD

## 2019-02-06 NOTE — PROGRESS NOTES
Reason for Admission:  Acute Respiratory Failure RRAT Score:     14 Do you (patient/family) have any concerns for transition/discharge? Pt reported that she will like to work with therapy, while at [facility], to determine her baseline Plan for utilizing home health:    Home with home health Likelihood of readmission? MODERATE Transition of Care Plan:       
 
CM completed room visit, with pt with granddaughter by bedside. Pt reported that she lives with family. Pt reported that she is independent with ADLs, and she drives. Pt reported that she is active with PCP, and completed chemo 2 weeks ago, and she uses [facility] ([address] Pt reported DME at home:wheelchair. Pt reported noHH/SNF. Pt listed her son and daughter: Jennifer Pressley and Myesha Lo, as her medical decision makers when discussing Advance Care Planning. Pt currently on O2, which she does not wear O2 at home. Pt will need to be weaned off. Pt reported that she is interested in therapy at d/c, due to weakness. CM will leave MD STERLING for MD to enter therapy consult. CM will follow. RAND Huntley, [address] [facility]

## 2019-02-06 NOTE — PROGRESS NOTES
Pharmacy Automatic Renal Dosing Protocol - Antimicrobials Goal Level: VANCOMYCIN TROUGH GOAL RANGE 
  
Vancomycin Trough: 15 - 20 mcg/mL 
  
Date Dose & Interval Measured (mcg/mL) Extrapolated (mcg/mL)  
 2/6/19 1250mg q12h 12.2 12.2  
         
         
  
Date & time of next level: 2/8 at 1900 Dose adjusted to 1500mg q12h.   Projected peak of 37.9 and trough of 14.7. 
 
 
 Pharmacy will follow daily and adjust medications as appropriate for renal function and/or serum levels. 
  
Thank you, 
Lori Chavez, PHARMD

## 2019-02-06 NOTE — PROGRESS NOTES
Problem: Falls - Risk of 
Goal: *Absence of Falls Document Brittany End Fall Risk and appropriate interventions in the flowsheet. Outcome: Progressing Towards Goal 
Fall Risk Interventions: 
Mobility Interventions: Bed/chair exit alarm, Communicate number of staff needed for ambulation/transfer, OT consult for ADLs, Patient to call before getting OOB, PT Consult for mobility concerns, PT Consult for assist device competence Medication Interventions: Assess postural VS orthostatic hypotension, Bed/chair exit alarm, Evaluate medications/consider consulting pharmacy, Patient to call before getting OOB, Teach patient to arise slowly Elimination Interventions: Bed/chair exit alarm, Call light in reach, Patient to call for help with toileting needs, Toilet paper/wipes in reach, Toileting schedule/hourly rounds History of Falls Interventions: Bed/chair exit alarm, Door open when patient unattended, Evaluate medications/consider consulting pharmacy, Investigate reason for fall, Room close to nurse's station

## 2019-02-06 NOTE — PROGRESS NOTES
Hematology Oncology Progress Note Follow up for: HD Chart notes reviewed since last visit. Case discussed with following:  
 
Patient complains of the following: feels better Additional concerns noted by the staff:  
 
Patient Vitals for the past 24 hrs: 
 BP Temp Pulse Resp SpO2  
02/05/19 2244 113/67 98.3 °F (36.8 °C) 97 18 93 % 02/05/19 1924 136/66 98.1 °F (36.7 °C) 98 18 95 % 02/05/19 1539 125/71 98.4 °F (36.9 °C) (!) 105 18 96 % 02/05/19 1033 127/59 98.4 °F (36.9 °C) (!) 105 20 90 % Review of Systems: 
12 point ROS done and negative except as above Physical Examination: 
gen NAD 
CV reg Lungs clear 
abd benign Labs: 
Recent Results (from the past 24 hour(s)) GLUCOSE, POC Collection Time: 02/05/19 11:30 AM  
Result Value Ref Range Glucose (POC) 213 (H) 65 - 100 mg/dL Performed by Vonda Hernández (PCT) GLUCOSE, POC Collection Time: 02/05/19  4:31 PM  
Result Value Ref Range Glucose (POC) 198 (H) 65 - 100 mg/dL Performed by Vonda Hernández (PCT) GLUCOSE, POC Collection Time: 02/05/19  8:53 PM  
Result Value Ref Range Glucose (POC) 249 (H) 65 - 100 mg/dL Performed by Marian Brewster (PCT) CBC WITH MANUAL DIFF Collection Time: 02/06/19  2:38 AM  
Result Value Ref Range WBC 14.9 (H) 3.6 - 11.0 K/uL  
 RBC 2.90 (L) 3.80 - 5.20 M/uL HGB 8.5 (L) 11.5 - 16.0 g/dL HCT 26.3 (L) 35.0 - 47.0 % MCV 90.7 80.0 - 99.0 FL  
 MCH 29.3 26.0 - 34.0 PG  
 MCHC 32.3 30.0 - 36.5 g/dL  
 RDW 13.7 11.5 - 14.5 % PLATELET 681 (H) 757 - 400 K/uL MPV 9.6 8.9 - 12.9 FL  
 NRBC 0.0 0  WBC ABSOLUTE NRBC 0.00 0.00 - 0.01 K/uL NEUTROPHILS 76 (H) 32 - 75 % BAND NEUTROPHILS 0 0 - 6 % LYMPHOCYTES 12 12 - 49 % MONOCYTES 12 5 - 13 % EOSINOPHILS 0 0 - 7 % BASOPHILS 0 0 - 1 % METAMYELOCYTES 0 0 % MYELOCYTES 0 0 % PROMYELOCYTES 0 0 % BLASTS 0 0 % OTHER CELL 0 0 IMMATURE GRANULOCYTES 0 % ABS. NEUTROPHILS 11.3 (H) 1.8 - 8.0 K/UL  
 ABS. LYMPHOCYTES 1.8 0.8 - 3.5 K/UL  
 ABS. MONOCYTES 1.8 (H) 0.0 - 1.0 K/UL  
 ABS. EOSINOPHILS 0.0 0.0 - 0.4 K/UL  
 ABS. BASOPHILS 0.0 0.0 - 0.1 K/UL  
 ABS. IMM. GRANS. 0.0 K/UL  
 DF MANUAL    
 RBC COMMENTS NORMOCYTIC, NORMOCHROMIC    
GLUCOSE, POC Collection Time: 02/06/19  7:24 AM  
Result Value Ref Range Glucose (POC) 270 (H) 65 - 100 mg/dL Performed by GORAN LUI   
CBC WITH MANUAL DIFF Collection Time: 02/06/19  8:36 AM  
Result Value Ref Range WBC 16.8 (H) 3.6 - 11.0 K/uL  
 RBC 3.07 (L) 3.80 - 5.20 M/uL HGB 8.9 (L) 11.5 - 16.0 g/dL HCT 26.8 (L) 35.0 - 47.0 % MCV 87.3 80.0 - 99.0 FL  
 MCH 29.0 26.0 - 34.0 PG  
 MCHC 33.2 30.0 - 36.5 g/dL  
 RDW 13.8 11.5 - 14.5 % PLATELET 651 (H) 185 - 400 K/uL MPV 9.5 8.9 - 12.9 FL  
 NRBC 0.0 0  WBC ABSOLUTE NRBC 0.00 0.00 - 0.01 K/uL NEUTROPHILS 83 (H) 32 - 75 % BAND NEUTROPHILS 1 0 - 6 % LYMPHOCYTES 8 (L) 12 - 49 % MONOCYTES 8 5 - 13 % EOSINOPHILS 0 0 - 7 % BASOPHILS 0 0 - 1 % METAMYELOCYTES 0 0 % MYELOCYTES 0 0 % PROMYELOCYTES 0 0 % BLASTS 0 0 % OTHER CELL 0 0 IMMATURE GRANULOCYTES 0 %  
 ABS. NEUTROPHILS 14.2 (H) 1.8 - 8.0 K/UL  
 ABS. LYMPHOCYTES 1.3 0.8 - 3.5 K/UL  
 ABS. MONOCYTES 1.3 (H) 0.0 - 1.0 K/UL  
 ABS. EOSINOPHILS 0.0 0.0 - 0.4 K/UL  
 ABS. BASOPHILS 0.0 0.0 - 0.1 K/UL  
 ABS. IMM. GRANS. 0.0 K/UL  
 DF MANUAL    
 RBC COMMENTS POLYCHROMASIA 1+ Assessment and Plan: HD 
-treated by my partner Dr Carter Jim,  Had great response to tx 
-f/u with her Pneumonia 
-On abx per primary and pulmonary 
-She is getting Bronch today PE 
-Cont lovenox until sure no procedures are needed, then could switch to a NOAC

## 2019-02-06 NOTE — PROGRESS NOTES
General Daily Progress Note Admit Date: 2/3/2019 Subjective:  
 
Patient has no complaint . Current Facility-Administered Medications Medication Dose Route Frequency  insulin glargine (LANTUS) injection 28 Units  28 Units SubCUTAneous DAILY  potassium chloride SR (KLOR-CON 10) tablet 20 mEq  20 mEq Oral BID  lactobac ac& pc-s.therm-b.anim (JUNI Q/RISAQUAD)  1 Cap Oral DAILY  methylPREDNISolone (PF) (SOLU-MEDROL) injection 40 mg  40 mg IntraVENous Q8H  
 insulin lispro (HUMALOG) injection   SubCUTAneous TIDAC  cefepime (MAXIPIME) 2 g in 0.9% sodium chloride (MBP/ADV) 100 mL  2 g IntraVENous Q8H  
 vancomycin (VANCOCIN) 1250 mg in  ml infusion  1,250 mg IntraVENous Q12H  levoFLOXacin (LEVAQUIN) 750 mg in D5W IVPB  750 mg IntraVENous Q24H  
 sodium chloride (NS) flush 5-40 mL  5-40 mL IntraVENous Q8H  
 sodium chloride (NS) flush 5-40 mL  5-40 mL IntraVENous PRN  
 acetaminophen (TYLENOL) tablet 650 mg  650 mg Oral Q6H PRN  
 ondansetron (ZOFRAN) injection 4 mg  4 mg IntraVENous Q4H PRN  
 enoxaparin (LOVENOX) injection 80 mg  1 mg/kg SubCUTAneous Q12H  
 amLODIPine (NORVASC) tablet 2.5 mg  2.5 mg Oral DAILY  atorvastatin (LIPITOR) tablet 40 mg  40 mg Oral QHS  fluticasone (FLONASE) 50 mcg/actuation nasal spray 2 Spray  2 Spray Both Nostrils DAILY  metoprolol succinate (TOPROL-XL) XL tablet 50 mg  50 mg Oral DAILY  glucose chewable tablet 16 g  4 Tab Oral PRN  
 dextrose (D50W) injection syrg 12.5-25 g  12.5-25 g IntraVENous PRN  
 glucagon (GLUCAGEN) injection 1 mg  1 mg IntraMUSCular PRN  
 nitroglycerin (NITROBID) 2 % ointment 1 Inch  1 Inch Topical Q6H PRN  
 furosemide (LASIX) tablet 40 mg  40 mg Oral DAILY Review of Systems A comprehensive review of systems was negative. Objective:  
 
Patient Vitals for the past 24 hrs: 
 BP Temp Pulse Resp SpO2  
02/05/19 2244 113/67 98.3 °F (36.8 °C) 97 18 93 % 02/05/19 1924 136/66 98.1 °F (36.7 °C) 98 18 95 % 02/05/19 1539 125/71 98.4 °F (36.9 °C) (!) 105 18 96 % 02/05/19 1033 127/59 98.4 °F (36.9 °C) (!) 105 20 90 % No intake/output data recorded. 02/04 1901 - 02/06 0700 In: 240 [P.O.:240] Out: 1200 [Urine:1200] Physical Exam:  
Visit Vitals /67 Pulse 97 Temp 98.3 °F (36.8 °C) Resp 18 Ht 5' 8\" (1.727 m) Wt 180 lb (81.6 kg) SpO2 93% BMI 27.37 kg/m² General appearance: alert, cooperative, no distress, appears stated age Neck: supple, symmetrical, trachea midline, no adenopathy, thyroid: not enlarged, symmetric, no tenderness/mass/nodules, no carotid bruit and no JVD Lungs: rales R base, L base Heart: regular rate and rhythm, S1, S2 normal, no murmur, click, rub or gallop Abdomen: soft, non-tender. Bowel sounds normal. No masses,  no organomegaly Extremities: extremities normal, atraumatic, no cyanosis or edema Data Review Recent Results (from the past 24 hour(s)) GLUCOSE, POC Collection Time: 02/05/19 11:30 AM  
Result Value Ref Range Glucose (POC) 213 (H) 65 - 100 mg/dL Performed by Ora Painter (PCT) GLUCOSE, POC Collection Time: 02/05/19  4:31 PM  
Result Value Ref Range Glucose (POC) 198 (H) 65 - 100 mg/dL Performed by Ora Painter (PCT) GLUCOSE, POC Collection Time: 02/05/19  8:53 PM  
Result Value Ref Range Glucose (POC) 249 (H) 65 - 100 mg/dL Performed by Abhay Sharp (PCT) CBC WITH MANUAL DIFF Collection Time: 02/06/19  2:38 AM  
Result Value Ref Range WBC 14.9 (H) 3.6 - 11.0 K/uL  
 RBC 2.90 (L) 3.80 - 5.20 M/uL HGB 8.5 (L) 11.5 - 16.0 g/dL HCT 26.3 (L) 35.0 - 47.0 % MCV 90.7 80.0 - 99.0 FL  
 MCH 29.3 26.0 - 34.0 PG  
 MCHC 32.3 30.0 - 36.5 g/dL  
 RDW 13.7 11.5 - 14.5 % PLATELET 388 (H) 583 - 400 K/uL MPV 9.6 8.9 - 12.9 FL  
 NRBC 0.0 0  WBC ABSOLUTE NRBC 0.00 0.00 - 0.01 K/uL NEUTROPHILS 76 (H) 32 - 75 % BAND NEUTROPHILS 0 0 - 6 % LYMPHOCYTES 12 12 - 49 % MONOCYTES 12 5 - 13 % EOSINOPHILS 0 0 - 7 % BASOPHILS 0 0 - 1 % METAMYELOCYTES 0 0 % MYELOCYTES 0 0 % PROMYELOCYTES 0 0 % BLASTS 0 0 % OTHER CELL 0 0 IMMATURE GRANULOCYTES 0 %  
 ABS. NEUTROPHILS 11.3 (H) 1.8 - 8.0 K/UL  
 ABS. LYMPHOCYTES 1.8 0.8 - 3.5 K/UL  
 ABS. MONOCYTES 1.8 (H) 0.0 - 1.0 K/UL  
 ABS. EOSINOPHILS 0.0 0.0 - 0.4 K/UL  
 ABS. BASOPHILS 0.0 0.0 - 0.1 K/UL  
 ABS. IMM. GRANS. 0.0 K/UL  
 DF MANUAL    
 RBC COMMENTS NORMOCYTIC, NORMOCHROMIC    
GLUCOSE, POC Collection Time: 02/06/19  7:24 AM  
Result Value Ref Range Glucose (POC) 270 (H) 65 - 100 mg/dL Performed by Ambarella Assessment:  
 
Active Problems: 
  SVT (supraventricular tachycardia) ([address] (8/29/2016) Essential hypertension with goal blood pressure less than 140/90 (8/29/2016) Diabetes mellitus type 2, controlled ([address] (9/8/2016) Dyslipidemia (9/8/2016) Acute respiratory failure ([address] (2/3/2019) Leg swelling (2/3/2019) Hodgkin lymphoma ([address] (2/3/2019) CAP (community acquired pneumonia) (2/3/2019) Plan: 1. Bronchoscopy today. Presumed inflammatory pneumonitis. Continue steroids for now per pulmonary. 2.  Continue diabetic control. 3.  Antibiotics also continued in view of immunocompromised state possibility of an infection.

## 2019-02-06 NOTE — PROGRESS NOTES
1454- Assumed patient care. Patient tolerating high-flow nasal cannula. 1458- patient placed on non-rebreather for additional O2 support. 1505- denies pain or discomfort at current time. 1511- Reassessment, pt sats stable on high-flow nasal cannula. 1512- Patient placed on non-rebreather for additional O2 support while transporting back to room. High-flow tubing/equipment sent with patient.

## 2019-02-07 NOTE — PROGRESS NOTES
PCU SHIFT NURSING NOTE Bedside shift change report given to Daryl Montoya (oncoming nurse) by Matilda Chinchilla (offgoing nurse). Report included the following information SBAR, Kardex, Intake/Output, MAR and Recent Results. Shift Summary: 719  MEWS 1  Pt in bed resting  No complaints at this time  2L O2 NC  NSR on monitor  Purewick in place  L port  Need PT/OT orders 0827  Increased O2 to 4L  Pt dyspneic while talking on phone  SPO2 88% while on 2L  92% on 4L   
1129  MEWS 1  Pt has no complaints or needs at this time 1223  Pt working with PT  SPO2 87/88%  Pt SOB  Increased O2 to 6L and will monitor  Pt now up in chair 1400  Pt assisted back to bed, purewick changed 1525  MEWS 2   Pt comfortable with no complaints or needs at this time 1900  Report given to night RN Admission Date 2/3/2019 Admission Diagnosis Acute respiratory failure ([facility] [address] Consults IP CONSULT TO HEMATOLOGY 
IP CONSULT TO PRIMARY CARE PROVIDER 
IP CONSULT TO PULMONOLOGY Consults []PT []OT []Speech  
[]Case Management  
  
[] Palliative Cardiac Monitoring Order []Yes []No  
 
IV drips []Yes Drip:                            Dose: 
Drip:                            Dose: 
Drip:                            Dose:  
[]No  
 
GI Prophylaxis []Yes []No  
 
 
 
DVT Prophylaxis SCDs:     
     
 Shawn stockings:     
  
[] Medication []Contraindicated []None Activity Level Activity Level: Up with Assistance Activity Assistance: Partial (two people) Purposeful Rounding every 1-2 hour? []Yes Spence Score  Total Score: 4 Bed Alarm (If score 3 or >) []Yes  
[] Refused (See signed refusal form in chart) Dwayne Score  Dwayne Score: 16 Dwayne Score (if score 14 or less) []PMT consult  
[]Wound Care consult []Specialty bed  
[] Nutrition consult Needs prior to discharge:  
Home O2 required:   
[]Yes []No  
 If yes, how much O2 required? Other:  
 Last Bowel Movement: Influenza Vaccine Received Flu Vaccine for Current Season (usually Sept-March): No  
 Patient/Guardian Refused (Notify MD): Yes Pneumonia Vaccine Diet Active Orders Diet DIET DIABETIC CONSISTENT CARB Regular; 2 GM NA (House Low NA) LDAs Venous Access Device portacath  09/12/18 Upper chest (subclavicular area), left (Active) Venous Access Device portacath 02/04/19 Upper chest (subclavicular area), left (Active) Central Line Being Utilized Yes 2/7/2019  3:32 AM  
Criteria for Appropriate Use Limited/no vessel suitable for conventional peripheral access 2/7/2019  3:32 AM  
Site Assessment Clean, dry, & intact 2/7/2019  3:32 AM  
Dressing Status Clean, dry, & intact 2/7/2019  3:32 AM  
Dressing Type Disk with Chlorhexadine gluconate (CHG); Transparent 2/7/2019  3:32 AM  
Action Taken Open ports on tubing capped 2/7/2019  3:32 AM  
Positive Blood Return (Medial Site) Yes 2/7/2019  3:32 AM  
Alcohol Cap Used Yes 2/7/2019  3:32 AM  
  
Peripheral IV 02/03/19 Right Antecubital (Active) Site Assessment Clean, dry, & intact 2/7/2019  3:32 AM  
Phlebitis Assessment 0 2/7/2019  3:32 AM  
Infiltration Assessment 0 2/7/2019  3:32 AM  
Dressing Status Clean, dry, & intact 2/7/2019  3:32 AM  
Dressing Type Tape;Transparent 2/7/2019  3:32 AM  
Hub Color/Line Status Pink;Capped;Flushed 2/7/2019  3:32 AM  
Action Taken Open ports on tubing capped 2/7/2019  3:32 AM  
Alcohol Cap Used Yes 2/7/2019  3:32 AM  
      
External Female Catheter 02/04/19 (Active) Site Assessment Clean, dry, & intact 2/7/2019  3:32 AM  
Repositioned Yes 2/7/2019  3:32 AM  
Perineal Care Yes 2/7/2019  3:32 AM  
Wick Changed Yes 2/7/2019  3:32 AM  
Suction Canister/Tubing Changed Yes 2/7/2019  3:32 AM  
Urine Output (mL) 200 ml 2/7/2019  6:36 AM  
            
Urinary Catheter Intake & Output Date 02/06/19 0700 - 02/07/19 5873 02/07/19 0700 - 02/08/19 4981 Shift 1408-8973 3533-2690 24 Hour Total 6470-3710 5346-6111 24 Hour Total  
INTAKE  
P.O.  120 120     
  P. O.  120 120 Shift Total(mL/kg)  120(1.5) 120(1.5) OUTPUT Urine(mL/kg/hr)  600(0.6) 600(0.3) Urine Output (mL) (External Female Catheter 02/04/19)  600 600 Shift Total(mL/kg)  600(7.3) 600(7.3) NET  -480 -480 Weight (kg) 81.6 81.6 81.6 81.6 81.6 81.6 Readmission Risk Assessment Tool Score Medium Risk 15 Total Score 3 Has Seen PCP in Last 6 Months (Yes=3, No=0)  
 5 Pt. Coverage (Medicare=5 , Medicaid, or Self-Pay=4) 7 Charlson Comorbidity Score (Age + Comorbid Conditions) Criteria that do not apply:  
 . Living with Significant Other. Assisted Living. LTAC. SNF. or  
Rehab Patient Length of Stay (>5 days = 3) IP Visits Last 12 Months (1-3=4, 4=9, >4=11) Expected Length of Stay 4d 7h Actual Length of Stay 4

## 2019-02-07 NOTE — PROGRESS NOTES
Pharmacy Automatic Direct Oral Anticoagulant Renal Dosing Protocol Patient currently receiving Apixaban 10 mg bid for 7 days then 5 mg bid  with an indication of PE. Start date: 2/8 Major interacting medications: none Platelet inhibitors (dose/frequency): none Labs: 
Recent Labs 02/07/19 
0158 02/06/19 
9538  02/05/19 
7984 CREA 0.84  --   --  0.81 HGB  --  8.9*   < > 8.6* PLT  --  511*   < > 479*  
 < > = values in this interval not displayed. Wt Readings from Last 1 Encounters:  
02/04/19 81.6 kg (180 lb) Creatinine Clearance: >50 Impression/Plan:  
? Last dose of Lovenox will be today and to start with apixaban 10 mg BID from 2/8 to 2/14 ? Apixaban 5 mg BID starting 2/15 Pharmacy will follow daily and adjust as appropriate. Thank you, Stuart Grier, PHARMD  
 
?  Child Palomo Score calculator 
[facility]. ? Apixaban ? Dabigatran 
? Edoxaban ? Rivaroxaban 
 
[email or web address]/[place]/[facility]/Pharmacy/Clinical%20Companion/DOAC%20Dosing. pdf

## 2019-02-07 NOTE — PROGRESS NOTES
Pharmacy Automatic Renal Dosing Protocol - Antimicrobials Indication for Antimicrobials:  PNA (bilateral) on immunocompromised patient Current Regimen of Each Antimicrobial: 
Vancomycin 1.5 g X1, then 1.25 g q 12 hours (Start Date /; Day # 4 
levofloxacin 750 mg q24h (Start Date /; Day # 4 Cefepime 2 gm q8h, 2 (Start Date ; Day # 4 Previous Antimicrobial Therapy: 
None Goal Level: VANCOMYCIN TROUGH GOAL RANGE Vancomycin Trough: 15 - 20 mcg/mL Date Dose & Interval Measured (mcg/mL) Extrapolated (mcg/mL)  
 @8:36 1250 mg q 12 H 12.2 14.7 Date & time of next level:   
 
Significant Cultures:  
2/3: Blood cx: pending : Respiratory panel: Coronavirus 229E 
: AFB cx + smear: pending : Respiratory cx: pending : Fungus cx: pending : Viral cx: pending Radiology / Imaging results: (X-ray, CT scan or MRI):  
: CXR: Bilateral airspace disease right greater than left is consistent with pneumonia Paralysis, amputations, malnutrition:   
 
Labs: 
Recent Labs 19 
0158 19 
8669 19 
6226 19 
6955 CREA 0.84  --   --  0.81 BUN 17  --   --  8  
WBC  --  16.8* 14.9* 8.8 Temp (24hrs), Av.1 °F (36.7 °C), Min:97.6 °F (36.4 °C), Max:98.8 °F (37.1 °C) Creatinine Clearance (mL/min) or Dialysis: >50 Impression/Plan: · Vancomycin dose adjusted based on the trough level from . · Will recheck trough over the weekend · Continue with the current dose of LVQ and cefepime · SCr stable · Afebrile · Antimicrobial stop date pending Pharmacy will follow daily and adjust medications as appropriate for renal function and/or serum levels. Thank you, Rere Estevez, PHARMD 
 
Recommended duration of therapy 
http://[facility]/[place]/[facility]/Pharmacy/Clinical%20Companion/Duration%20of%20ABX%20therapy. docx Renal Dosing 
http://[facility]/[place]/[facility]/Pharmacy/Clinical%20Compa nion/Renal%[email or web address]. pdf

## 2019-02-07 NOTE — PROGRESS NOTES
Hematology Oncology Progress Note Follow up for: HD Chart notes reviewed since last visit. Case discussed with following:  
 
Patient complains of the following: feels better Additional concerns noted by the staff:  
 
Patient Vitals for the past 24 hrs: 
 BP Temp Pulse Resp SpO2  
02/07/19 0829     92 % 02/07/19 0828     (!) 88 % 02/07/19 0719 124/55 97.8 °F (36.6 °C) 94 18 95 % 02/07/19 0332 120/56 98.8 °F (37.1 °C) 84 18 95 % 02/06/19 2251 111/50 98.4 °F (36.9 °C) 89 20 97 % 02/06/19 1904 109/57 98.2 °F (36.8 °C) 100 20 97 % 02/06/19 1728   100  (!) 87 % 02/06/19 1727 120/58 97.9 °F (36.6 °C) 100 22 95 % 02/06/19 1625   88  100 % 02/06/19 1624 110/52      
02/06/19 1530 130/60  94  94 % 02/06/19 1511 121/63  88 (!) 35 95 % 02/06/19 1508 121/61  86  97 % 02/06/19 1507   87 18 97 % 02/06/19 1503 136/66  88 17 97 % 02/06/19 1501 116/69  89 15 96 % 02/06/19 1458 109/60  93 20   
02/06/19 1457   98 (!) 45   
02/06/19 1456 114/72   (!) 57   
02/06/19 1453 116/61  (!) 105  90 % 02/06/19 1452   96  91 % 02/06/19 1451 108/64  89 (!) 32 94 % 02/06/19 1448 118/57  91 (!) 33 93 % 02/06/19 1446 99/63  93 (!) 38 90 % 02/06/19 1442 136/77  (!) 102 (!) 65 (!) 83 % 02/06/19 1436 109/50  (!) 102 (!) 49 90 % 02/06/19 1431 (!) 85/29  98 (!) 58 93 % 02/06/19 1426 120/62  94 (!) 65 94 % 02/06/19 1421 120/72  93 27 97 % 02/06/19 1416 127/69  93 26 91 % 02/06/19 1411 149/89  (!) 103 30 91 % 02/06/19 1406 148/86  100 (!) 0 (!) 87 % 02/06/19 1401 135/84  99 20 (!) 86 % 02/06/19 1356 118/61  91 30 98 % 02/06/19 1351 140/66  92 24 99 % 02/06/19 1346 130/70  91 22 96 % 02/06/19 1341 (!) 143/35  98 18 93 % 02/06/19 1316 135/75 97.6 °F (36.4 °C) 96 11 90 % 02/06/19 1125 121/59 97.9 °F (36.6 °C) 90 18 92 % Review of Systems: 
12 point ROS done and negative except as above Physical Examination: 
gen NAD 
 CV reg Lungs clear 
abd benign Labs: 
Recent Results (from the past 24 hour(s)) GLUCOSE, POC Collection Time: 02/06/19 11:32 AM  
Result Value Ref Range Glucose (POC) 213 (H) 65 - 100 mg/dL Performed by Maya Hanks GLUCOSE, POC Collection Time: 02/06/19  4:34 PM  
Result Value Ref Range Glucose (POC) 157 (H) 65 - 100 mg/dL Performed by Fuad Lorenzo GLUCOSE, POC Collection Time: 02/06/19  8:46 PM  
Result Value Ref Range Glucose (POC) 118 (H) 65 - 100 mg/dL Performed by Ignacio Clark (PCT) METABOLIC PANEL, BASIC Collection Time: 02/07/19  1:58 AM  
Result Value Ref Range Sodium 134 (L) 136 - 145 mmol/L Potassium 3.7 3.5 - 5.1 mmol/L Chloride 100 97 - 108 mmol/L  
 CO2 28 21 - 32 mmol/L Anion gap 6 5 - 15 mmol/L Glucose 150 (H) 65 - 100 mg/dL BUN 17 6 - 20 MG/DL Creatinine 0.84 0.55 - 1.02 MG/DL  
 BUN/Creatinine ratio 20 12 - 20 GFR est AA >60 >60 ml/min/1.73m2 GFR est non-AA >60 >60 ml/min/1.73m2 Calcium 9.1 8.5 - 10.1 MG/DL  
GLUCOSE, POC Collection Time: 02/07/19  7:33 AM  
Result Value Ref Range Glucose (POC) 231 (H) 65 - 100 mg/dL Performed by William El Assessment and Plan: HD 
-treated by my partner Dr Eyad Boston,  Had great response to tx 
-f/u with her Pneumonia 
-On abx per primary and pulmonary 
-bronch yesterday, she says she feels better PE 
-Cont lovenox, can switch to a NOAC when ready

## 2019-02-07 NOTE — PROGRESS NOTES
General Daily Progress Note Admit Date: 2/3/2019 Subjective:  
 
Patient has no complaint Current Facility-Administered Medications Medication Dose Route Frequency  [START ON 2/8/2019] insulin glargine (LANTUS) injection 32 Units  32 Units SubCUTAneous DAILY  sodium chloride (NS) flush 5-40 mL  5-40 mL IntraVENous Q8H  
 sodium chloride (NS) flush 5-40 mL  5-40 mL IntraVENous PRN  
 methylPREDNISolone (PF) (SOLU-MEDROL) injection 60 mg  60 mg IntraVENous Q8H  
 benzonatate (TESSALON) capsule 200 mg  200 mg Oral TID PRN  
 vancomycin (VANCOCIN) 1500 mg in  ml infusion  1,500 mg IntraVENous Q12H  
 lactobac ac& pc-s.therm-b.anim (JUNI Q/RISAQUAD)  1 Cap Oral DAILY  insulin lispro (HUMALOG) injection   SubCUTAneous TIDAC  cefepime (MAXIPIME) 2 g in 0.9% sodium chloride (MBP/ADV) 100 mL  2 g IntraVENous Q8H  
 levoFLOXacin (LEVAQUIN) 750 mg in D5W IVPB  750 mg IntraVENous Q24H  
 sodium chloride (NS) flush 5-40 mL  5-40 mL IntraVENous Q8H  
 sodium chloride (NS) flush 5-40 mL  5-40 mL IntraVENous PRN  
 acetaminophen (TYLENOL) tablet 650 mg  650 mg Oral Q6H PRN  
 ondansetron (ZOFRAN) injection 4 mg  4 mg IntraVENous Q4H PRN  
 enoxaparin (LOVENOX) injection 80 mg  1 mg/kg SubCUTAneous Q12H  
 amLODIPine (NORVASC) tablet 2.5 mg  2.5 mg Oral DAILY  atorvastatin (LIPITOR) tablet 40 mg  40 mg Oral QHS  fluticasone (FLONASE) 50 mcg/actuation nasal spray 2 Spray  2 Spray Both Nostrils DAILY  metoprolol succinate (TOPROL-XL) XL tablet 50 mg  50 mg Oral DAILY  glucose chewable tablet 16 g  4 Tab Oral PRN  
 dextrose (D50W) injection syrg 12.5-25 g  12.5-25 g IntraVENous PRN  
 glucagon (GLUCAGEN) injection 1 mg  1 mg IntraMUSCular PRN  
 nitroglycerin (NITROBID) 2 % ointment 1 Inch  1 Inch Topical Q6H PRN Review of Systems A comprehensive review of systems was negative. Objective:  
 
Patient Vitals for the past 24 hrs: 
 BP Temp Pulse Resp SpO2 02/07/19 0829     92 % 02/07/19 0828     (!) 88 % 02/07/19 0719 124/55 97.8 °F (36.6 °C) 94 18 95 % 02/07/19 0332 120/56 98.8 °F (37.1 °C) 84 18 95 % 02/06/19 2251 111/50 98.4 °F (36.9 °C) 89 20 97 % 02/06/19 1904 109/57 98.2 °F (36.8 °C) 100 20 97 % 02/06/19 1728   100  (!) 87 % 02/06/19 1727 120/58 97.9 °F (36.6 °C) 100 22 95 % 02/06/19 1625   88  100 % 02/06/19 1624 110/52      
02/06/19 1530 130/60  94  94 % 02/06/19 1511 121/63  88 (!) 35 95 % 02/06/19 1508 121/61  86  97 % 02/06/19 1507   87 18 97 % 02/06/19 1503 136/66  88 17 97 % 02/06/19 1501 116/69  89 15 96 % 02/06/19 1458 109/60  93 20   
02/06/19 1457   98 (!) 45   
02/06/19 1456 114/72   (!) 57   
02/06/19 1453 116/61  (!) 105  90 % 02/06/19 1452   96  91 % 02/06/19 1451 108/64  89 (!) 32 94 % 02/06/19 1448 118/57  91 (!) 33 93 % 02/06/19 1446 99/63  93 (!) 38 90 % 02/06/19 1442 136/77  (!) 102 (!) 65 (!) 83 % 02/06/19 1436 109/50  (!) 102 (!) 49 90 % 02/06/19 1431 (!) 85/29  98 (!) 58 93 % 02/06/19 1426 120/62  94 (!) 65 94 % 02/06/19 1421 120/72  93 27 97 % 02/06/19 1416 127/69  93 26 91 % 02/06/19 1411 149/89  (!) 103 30 91 % 02/06/19 1406 148/86  100 (!) 0 (!) 87 % 02/06/19 1401 135/84  99 20 (!) 86 % 02/06/19 1356 118/61  91 30 98 % 02/06/19 1351 140/66  92 24 99 % 02/06/19 1346 130/70  91 22 96 % 02/06/19 1341 (!) 143/35  98 18 93 % 02/06/19 1316 135/75 97.6 °F (36.4 °C) 96 11 90 % 02/06/19 1125 121/59 97.9 °F (36.6 °C) 90 18 92 % 02/06/19 0952 125/68  96  90 % 02/07 0701 - 02/07 1900 In: 240 [P.O.:240] Out: -  
02/05 1901 - 02/07 0700 In: 360 [P.O.:360] Out: 1500 [Urine:1500] Physical Exam:  
Visit Vitals /55 (BP 1 Location: Right arm, BP Patient Position: At rest) Pulse 94 Temp 97.8 °F (36.6 °C) Resp 18 Ht 5' 8\" (1.727 m) Wt 180 lb (81.6 kg) SpO2 92% BMI 27.37 kg/m² General appearance: alert, cooperative, no distress, appears stated age Neck: supple, symmetrical, trachea midline, no adenopathy, thyroid: not enlarged, symmetric, no tenderness/mass/nodules, no carotid bruit and no JVD Lungs: rales R base, L base Heart: regular rate and rhythm, S1, S2 normal, no murmur, click, rub or gallop Extremities: extremities normal, atraumatic, no cyanosis or edema Data Review Recent Results (from the past 24 hour(s)) GLUCOSE, POC Collection Time: 02/06/19 11:32 AM  
Result Value Ref Range Glucose (POC) 213 (H) 65 - 100 mg/dL Performed by Derrick Valles GLUCOSE, POC Collection Time: 02/06/19  4:34 PM  
Result Value Ref Range Glucose (POC) 157 (H) 65 - 100 mg/dL Performed by Charley Horowitz GLUCOSE, POC Collection Time: 02/06/19  8:46 PM  
Result Value Ref Range Glucose (POC) 118 (H) 65 - 100 mg/dL Performed by Shane Klein (PCT) METABOLIC PANEL, BASIC Collection Time: 02/07/19  1:58 AM  
Result Value Ref Range Sodium 134 (L) 136 - 145 mmol/L Potassium 3.7 3.5 - 5.1 mmol/L Chloride 100 97 - 108 mmol/L  
 CO2 28 21 - 32 mmol/L Anion gap 6 5 - 15 mmol/L Glucose 150 (H) 65 - 100 mg/dL BUN 17 6 - 20 MG/DL Creatinine 0.84 0.55 - 1.02 MG/DL  
 BUN/Creatinine ratio 20 12 - 20 GFR est AA >60 >60 ml/min/1.73m2 GFR est non-AA >60 >60 ml/min/1.73m2 Calcium 9.1 8.5 - 10.1 MG/DL  
GLUCOSE, POC Collection Time: 02/07/19  7:33 AM  
Result Value Ref Range Glucose (POC) 231 (H) 65 - 100 mg/dL Performed by Bethany Harden Assessment:  
 
Active Problems: 
  SVT (supraventricular tachycardia) ([facility] [address] (8/29/2016) Essential hypertension with goal blood pressure less than 140/90 (8/29/2016) Diabetes mellitus type 2, controlled ([facility] [address] (9/8/2016) Dyslipidemia (9/8/2016) Acute respiratory failure ([address] (2/3/2019) Leg swelling (2/3/2019) [facility] (2/3/2019) CAP (community acquired pneumonia) (2/3/2019) Plan: 1. Pulmonary status appears to be improving. O2 requirements decreasing maintenance of reasonable saturations. Await biopsy report. 2.  Continue diabetic control. 3.  Will change to DOAC tomorrow. 4.  Antibiotic coverage continues for now. 5.  Will attempt to mobilize.

## 2019-02-07 NOTE — PROGRESS NOTES
Problem: Mobility Impaired (Adult and Pediatric) Goal: *Acute Goals and Plan of Care (Insert Text) Physical Therapy Goals Initiated 2/7/2019 1. Patient will move from supine to sit and sit to supine , scoot up and down and roll side to side in bed with modified independence within 7 day(s). 2.  Patient will transfer from bed to chair and chair to bed with supervision/set-up using the least restrictive device within 7 day(s). 3.  Patient will perform sit to stand with supervision/set-up within 7 day(s). 4.  Patient will ambulate with supervision/set-up for 150 feet with the least restrictive device within 7 day(s). physical Therapy EVALUATION Patient: Lasha Birmingham (67 y.o. female) Date: 2/7/2019 Primary Diagnosis: Acute respiratory failure ([address] Procedure(s) (LRB): BRONCHOSCOPY (N/A) BRONCHIAL WASHINGS FLEXIBLE (N/A) 1 Day Post-Op Precautions:     
 
ASSESSMENT : 
Based on the objective data described below, the patient presents with increased weakness, decreased endurance/activity tolerance, SOLIZ, impaired static and dynamic standing balance, and overall impaired functional mobility. Pt with bilateral PEs however cleared for mobilization by MD and RN. Pt received supine in bed on 5 LPM O2, agreeable to participation with therapy. Pt required additional time however assumed seated position EOB at supervision level, intact sitting balance. O2 sats decreased to 81% on 5 LPM O2 while seated EOB therefore increased O2 to 6 LPM O2 for remainder of mobility. Pt required x5 minutes of seated rest secondary to SOLIZ prior to mobilizing to bedside chair. Pt sit>>stand w/ CGAx1 and ambulated 2ft from EOB to bedside chair w/ CGAx1. O2 sats 83% post activity on 6 LPM O2 with pt requiring x1 minute of seated rest for recovery. At this time, pt is functioning well below her baseline independent level with respiratory status being biggest limitation at this time.  Recommend continued x1 person assist during all OOB mobility/ambulation. Upon discharge, recommend pt return home w/ assist of family and [facility] PT. Patient will benefit from skilled intervention to address the above impairments. Patients rehabilitation potential is considered to be Good Factors which may influence rehabilitation potential include:  
[]         None noted 
[]         Mental ability/status []         Medical condition 
[]         Home/family situation and support systems 
[]         Safety awareness 
[]         Pain tolerance/management 
[]         Other: PLAN : 
Recommendations and Planned Interventions: 
[x]           Bed Mobility Training             []    Neuromuscular Re-Education 
[x]           Transfer Training                   []    Orthotic/Prosthetic Training 
[x]           Gait Training                         []    Modalities [x]           Therapeutic Exercises           []    Edema Management/Control 
[x]           Therapeutic Activities            [x]    Patient and Family Training/Education 
[]           Other (comment): Frequency/Duration: Patient will be followed by physical therapy  4 times a week to address goals. Discharge Recommendations: Home Health Further Equipment Recommendations for Discharge: TBD, states she owns cane SUBJECTIVE:  
Patient stated The chemo has made me so WEAK.  OBJECTIVE DATA SUMMARY:  
HISTORY:   
Past Medical History:  
Diagnosis Date  Asthma   
 as a child, nothing since menopause  Cancer ([facility] [address] 08/21/2018 Non-Hodgkin's  Cataracts, bilateral   
 Diabetes ([facility] [address] type II  
 Environmental allergies  Fibromyalgia Treated with alternate medical therapy  Hypertension Past Surgical History:  
Procedure Laterality Date  HX BREAST BIOPSY Right 2006  HX COLONOSCOPY  2015  HX GYN Pap 2015  HX ORTHOPAEDIC    
 fractured R ankle/ no surgery  HX OTHER SURGICAL Right 08/27/2018  
 excision of deep neck cervical LN. Prior Level of Function/Home Situation: Pt lives at home alone (elevator to reach 1-story apartment). States her brother lives locally and assists with groceries, transportation, and as needed. Reports history of 1 fall. States she has been using as cane or wheelchair, as needed, following chemo treatments, for community mobility. States she is independent with ambulation within her apartment. Denies home O2 use. Personal factors and/or comorbidities impacting plan of care:  
 
Home Situation Home Environment: Apartment # Steps to Enter: (elevator to 3rd floor) One/Two Story Residence: One story Living Alone: Yes Support Systems: Family member(s) Patient Expects to be Discharged to[de-identified] Private residence Current DME Used/Available at Home: Cane, straight, Wheelchair Tub or Shower Type: Tub/Shower combination EXAMINATION/PRESENTATION/DECISION MAKING: Critical Behavior: 
Neurologic State: Alert, Eyes open spontaneously Hearing: Auditory Auditory Impairment: NoneSkin:  intact Edema: none noted Range Of Motion: 
AROM: Within functional limits Strength:   
Strength: Generally decreased, functional 
  
  
  
  
  
  
Tone & Sensation:  
Tone: Normal 
  
  
  
  
Sensation: Intact Coordination: 
Coordination: Within functional limits Vision:  
  
Functional Mobility: 
Bed Mobility: 
Rolling: Supervision Supine to Sit: Supervision Scooting: Supervision Transfers: 
Sit to Stand: Contact guard assistance Stand to Sit: Contact guard assistance Bed to Chair: Contact guard assistance Balance:  
Sitting: Intact Standing: Impaired Standing - Static: Fair Standing - Dynamic : FairAmbulation/Gait Training:Distance (ft): 2 Feet (ft) Assistive Device: Gait belt Ambulation - Level of Assistance: Contact guard assistance;Assist x1 Gait Abnormalities: Decreased step clearance Base of Support: Widened Speed/Carmita: Shuffled Step Length: Left shortened;Right shortened Functional Measure: 
Barthel Index: 
 
Bathin Bladder: 5 Bowels: 10 
Groomin Dressin Feeding: 10 Mobility: 0 Stairs: 0 Toilet Use: 5 Transfer (Bed to Chair and Back): 10 Total: 45 The Barthel ADL Index: Guidelines 1. The index should be used as a record of what a patient does, not as a record of what a patient could do. 2. The main aim is to establish degree of independence from any help, physical or verbal, however minor and for whatever reason. 3. The need for supervision renders the patient not independent. 4. A patient's performance should be established using the best available evidence. Asking the patient, friends/relatives and nurses are the usual sources, but direct observation and common sense are also important. However direct testing is not needed. 5. Usually the patient's performance over the preceding 24-48 hours is important, but occasionally longer periods will be relevant. 6. Middle categories imply that the patient supplies over 50 per cent of the effort. 7. Use of aids to be independent is allowed. Ana Lilia Patterson., Barthel, D.W. (0626). Functional evaluation: the Barthel Index. [facility] (14)2. AustinFELA Aervalo, Caron Hager., Edgar Lopez., [place], [address] [place] [address] (). Measuring the change indisability after inpatient rehabilitation; comparison of the responsiveness of the Barthel Index and Functional [place] Measure. Journal of Neurology, Neurosurgery, and Psychiatry, 66(4), 506-117. DARIEN Bradley.AUGUSTINA, JONNATHAN Owens, & Jeanette Jiang MFrannyA. (2004.) Assessment of post-stroke quality of life in cost-effectiveness studies: The usefulness of the Barthel Index and the EuroQoL-5D. [facility], 13, 462-25 Physical Therapy Evaluation Charge Determination History Examination Presentation Decision-Making MEDIUM  Complexity : 1-2 comorbidities / personal factors will impact the outcome/ POC  MEDIUM Complexity : 3 Standardized tests and measures addressing body structure, function, activity limitation and / or participation in recreation  MEDIUM Complexity : Evolving with changing characteristics  MEDIUM Complexity : FOTO score of 26-74 Based on the above components, the patient evaluation is determined to be of the following complexity level: MEDIUM Pain: 
Pain Scale 1: Numeric (0 - 10) Pain Intensity 1: 0 Activity Tolerance:  
O2 sats 83% post activity on 6 LPM O2, requiring 1 minute of seated rest for recovery > 90% Please refer to the flowsheet for vital signs taken during this treatment. After treatment:  
[x]         Patient left in no apparent distress sitting up in chair 
[]         Patient left in no apparent distress in bed 
[x]         Call bell left within reach [x]         Nursing notified 
[x]         Caregiver present 
[]         Bed alarm activated COMMUNICATION/EDUCATION:  
The patients plan of care was discussed with: Registered Nurse and Physician. [x]         Fall prevention education was provided and the patient/caregiver indicated understanding. [x]         Patient/family have participated as able in goal setting and plan of care. [x]         Patient/family agree to work toward stated goals and plan of care. []         Patient understands intent and goals of therapy, but is neutral about his/her participation. []         Patient is unable to participate in goal setting and plan of care. Thank you for this referral. 
Davia Gaucher, PT, DPT Time Calculation: 24 mins

## 2019-02-08 NOTE — CONSULTS
[facility], Critical Care, and Sleep Medicine Initial Patient progress note Name: Ephraim Barajas MRN: [id number] : 1950 Hospital:  70 Date: 2019 IMPRESSION:  
· Acute hypoxic resp failure - currently on 4 liters. Was on o2 earlier today on 2 but increased to 6 while doing business on the phone and now on 4 
·  bilateral pulm infiltrates- tx with augmentin from  to  and then place on levaquin- did have a preceding fever 101. First noted on pet  and  Seen on ct on admit - differential is pna, opportunistic infection and toxicity ( ? bleo ) from chemo- suspect  Toxicity most 
· Corona virus positive on screen ·   hodkins lymphoma- tx with 4 cyles of abvd and  Positive response to chemo · pulm htn - pap  59 with nl lvef · Anemia-  ? Related to chemo- stable ·   Hx of asthma from childhood to age 24 ·  low k - resolved · NEWLY DISCOVERED BILATERAL PTE WITH NEG DUPLEX OF LEG 
 elevated bsl- with  Illness and on steroids  
 rhinitis- predates  The admit - flonase helps RECOMMENDATIONS:  
· AB COVERAGE  For immunosuppressed pt - broad spectrum - if no mrsa plan to stop vanco after 5 days ·  lovenox - held this am for fob and restart afterwards ·  o2- wean as able · Will start steroids as infiltrates have not improved on ab  
·  day  1 post fob   With bw and bal  - discussed  with pt- results are pd   Called cytology at [facility] and they do have specimen - prelim neg for pcp but additional and final results are pd  
·  gi prophylaxis · ssi and accuchecks 
 fu cultures- so far bc ng at day 3 and positive  carona viirus ,   Called by pathologist  Who says there was  No pnemocytstis and positive for candida. Subjective: This patient has been seen and evaluated at the request of Dr. Madan Álvarez  For hypoxia.  Patient is a 70 y.o. female  Who was dx with hodgkin lymphoma  In august when she was getting a massage in [facility] and the masseuse note  At rt neck ln. She is s/p chemo with abvd. She  Was noted to have bilateral pulm infiltrates on pet/ct On jan 21  And was started on augmenin on jan 25 - seen in  Oncology office on Jan 30 - sats high 80's but increased to 90 % on fluids and was started on levaquin on Jan 30. Noted sig  Increased landaverde and said sats drop to 70's with exertion . She says she  Feels much better today. NO cough, heme , phlegm. NO cp. She has noted some ankle swelling. Feb 5 - report decreased sob, reports decreased ankle swelling . reports rhinitis with clear liquid Feb 6- says she gets a cough and sob with talking. Slept well. Feb 7- says she feels better . Past Medical History:  
Diagnosis Date  Asthma   
 as a child, nothing since menopause  Cancer ([facility] [address] 08/21/2018 Non-Hodgkin's  Cataracts, bilateral   
 Diabetes ([facility] [address] type II  
 Environmental allergies  Fibromyalgia Treated with alternate medical therapy  Hypertension   
 no asthma since age 24  // Dr. Lopez Dates said hodgkins  And pt confirms Past Surgical History:  
Procedure Laterality Date  HX BREAST BIOPSY Right 2006  HX COLONOSCOPY  2015  HX GYN Pap 2015  HX ORTHOPAEDIC    
 fractured R ankle/ no surgery  HX OTHER SURGICAL Right 08/27/2018  
 excision of deep neck cervical LN. Prior to Admission medications Medication Sig Start Date End Date Taking? Authorizing Provider ONETOUCH VERIO strip USE 1 STRIP IN VITRO TO TEST BLOOD SUGAR ONCE DAILY 10/17/18  Yes Jenelle Canada MD  
ibuprofen (MOTRIN) 600 mg tablet Take 1 Tab by mouth every six (6) hours as needed for Pain. 8/28/18  Yes Rajeev Morillo MD  
Lancets (ONE TOUCH DELICA) misc USE TO TEST BLOOD SUGAR ONCE DAILY. DX.E11.9 10/13/16  Yes Jenelle Canada MD  
pioglitazone (ACTOS) 30 mg tablet TAKE 1 TABLET DAILY 1/6/19   Jenelle Canada MD  
 metoprolol succinate (TOPROL-XL) 50 mg XL tablet TAKE 1 TAB BY MOUTH DAILY. 10/12/18   Jenelle Canada MD  
JANUMET XR 50-1,000 mg TM24 TAKE 1 TABLET BY MOUTH TWICE A DAY WITH MEALS 10/12/18   Jenelle Canada MD  
fluticasone [facility] ALLERGY RELIEF) 50 mcg/actuation nasal spray 2 Sprays by Both Nostrils route daily. Provider, Historical  
spironolactone-hydrochlorothiazide (ALDACTAZIDE) 25-25 mg per tablet TAKE ONE TABLET BY MOUTH EVERY DAY 12/3/17   Jenelle Canada MD  
atorvastatin (LIPITOR) 40 mg tablet TAKE 1 TAB BY MOUTH DAILY. 12/3/17   Jenelle Canada MD  
amLODIPine (NORVASC) 10 mg tablet TAKE 1 TABLET BY MOUTH EVERY DAY 17   Jenelle Canada MD  
alcohol swabs (ALCOHOL PREP PADS) padm 1 Pad by Apply Externally route daily. DX.E11.9 10/13/16   Jenelle Canada MD  
 
No Known Allergies Social History Tobacco Use  Smoking status: Never Smoker  Smokeless tobacco: Never Used Substance Use Topics  Alcohol use: Yes Comment: socially  
 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   
     prostate cancer  Cancer Sister Breast cancer apparent DCIS  
 Heart Disease Brother Brother  from congestive heart failure secondary to severe mitral disease Current Facility-Administered Medications Medication Dose Route Frequency  [START ON 2019] insulin glargine (LANTUS) injection 32 Units  32 Units SubCUTAneous DAILY  [START ON 2/15/2019] apixaban (ELIQUIS) tablet 5 mg  5 mg Oral Q12H And  
 [START ON 2019] apixaban (ELIQUIS) tablet 10 mg  10 mg Oral Q12H  
 sodium chloride (NS) flush 5-40 mL  5-40 mL IntraVENous Q8H  
 methylPREDNISolone (PF) (SOLU-MEDROL) injection 60 mg  60 mg IntraVENous Q8H  
 vancomycin (VANCOCIN) 1500 mg in  ml infusion  1,500 mg IntraVENous Q12H  meg ac& pc-s.therm-b.anim (JUNI Q/RISAQUAD)  1 Cap Oral DAILY  insulin lispro (HUMALOG) injection   SubCUTAneous TIDAC  cefepime (MAXIPIME) 2 g in 0.9% sodium chloride (MBP/ADV) 100 mL  2 g IntraVENous Q8H  
 sodium chloride (NS) flush 5-40 mL  5-40 mL IntraVENous Q8H  
 amLODIPine (NORVASC) tablet 2.5 mg  2.5 mg Oral DAILY  atorvastatin (LIPITOR) tablet 40 mg  40 mg Oral QHS  fluticasone (FLONASE) 50 mcg/actuation nasal spray 2 Spray  2 Spray Both Nostrils DAILY  metoprolol succinate (TOPROL-XL) XL tablet 50 mg  50 mg Oral DAILY Review of Systems: A comprehensive review of systems was negative except for that written in the HPI. Objective: 
Vital Signs:   
Visit Vitals /70 Pulse 91 Temp 98.1 °F (36.7 °C) Resp 18 Ht 5' 8\" (1.727 m) Wt 81.6 kg (180 lb) SpO2 93% BMI 27.37 kg/m² O2 Device: Nasal cannula O2 Flow Rate (L/min): 4 l/min Temp (24hrs), Av.2 °F (36.8 °C), Min:97.8 °F (36.6 °C), Max:98.8 °F (37.1 °C) Intake/Output:  
Last shift:       190 -  0700 In: 200 [P.O.:200] Out: - Last 3 shifts:  07 -  1900 In: 720 [P.O.:720] Out: 1600 [Urine:1600] Intake/Output Summary (Last 24 hours) at 2019 Last data filed at 2019 5110 Gross per 24 hour Intake 920 ml Output 1600 ml Net -680 ml Physical Exam:  
General:  Alert, cooperative, no distress, appears stated age. Head:  Normocephalic, without obvious abnormality, atraumatic. alopecia Eyes:  Conjunctivae/corneas clear. PERRL, EOMs intact. Nose: Nares normal. Septum midline. Mucosa normal. No drainage or sinus tenderness. Throat: Lips, mucosa, and tongue normal. Teeth and gums normal.- Neck: Supple, symmetrical, trachea midline, no adenopathy, thyroid: no enlargment/tenderness/nodules, no carotid bruit and no JVD. Back:   Symmetric, no curvature. ROM normal.  
Lungs:   Bilateral rales . -  Rt greater than left Chest wall:  No tenderness or deformity. Heart:  Regular rate and rhythm, S1, S2 normal, no murmur, click, rub or gallop.- mild tachy Abdomen:   Soft, non-tender. Bowel sounds normal. No masses,  No organomegaly. Extremities: Extremities normal, atraumatic, no cyanosis - edema gone Pulses: 2+ and symmetric all extremities. Skin: Skin color, texture, turgor normal. No rashes or lesions Lymph nodes: Cervical, supraclavicular, and axillary nodes normal.  
Neurologic: Grossly nonfocal  
 
Data review:  
 
Recent Results (from the past 24 hour(s)) GLUCOSE, POC Collection Time: 02/06/19  8:46 PM  
Result Value Ref Range Glucose (POC) 118 (H) 65 - 100 mg/dL Performed by Debbie Campbell (CHENCHO) METABOLIC PANEL, BASIC Collection Time: 02/07/19  1:58 AM  
Result Value Ref Range Sodium 134 (L) 136 - 145 mmol/L Potassium 3.7 3.5 - 5.1 mmol/L Chloride 100 97 - 108 mmol/L  
 CO2 28 21 - 32 mmol/L Anion gap 6 5 - 15 mmol/L Glucose 150 (H) 65 - 100 mg/dL BUN 17 6 - 20 MG/DL Creatinine 0.84 0.55 - 1.02 MG/DL  
 BUN/Creatinine ratio 20 12 - 20 GFR est AA >60 >60 ml/min/1.73m2 GFR est non-AA >60 >60 ml/min/1.73m2 Calcium 9.1 8.5 - 10.1 MG/DL  
GLUCOSE, POC Collection Time: 02/07/19  7:33 AM  
Result Value Ref Range Glucose (POC) 231 (H) 65 - 100 mg/dL Performed by Manuel Magaña GLUCOSE, POC Collection Time: 02/07/19 10:41 AM  
Result Value Ref Range Glucose (POC) 209 (H) 65 - 100 mg/dL Performed by Manuel Magaña GLUCOSE, POC Collection Time: 02/07/19  3:56 PM  
Result Value Ref Range Glucose (POC) 148 (H) 65 - 100 mg/dL Performed by Manuel Magaña Imaging: 
I have personally reviewed the patients radiographs and have reviewed the reports: 
 see above . cxr seen -  I think  We need to proceed with fob  . Discussed with pt risks and benefits and she agrees to procedure.   She is scheduled for fob with me at 130 pm today Familia Bae MD

## 2019-02-08 NOTE — PROGRESS NOTES
PCU SHIFT NURSING NOTE Shift Summary:  
Uneventful shift: 
/77 (BP 1 Location: Right arm, BP Patient Position: At rest)   Pulse (!) 110   Temp 98.3 °F (36.8 °C)   Resp 19   Ht 5' 8\" (1.727 m)   Wt 81.6 kg (180 lb)   SpO2 92%   BMI 27.37 kg/m² Patient resting comfortably with no report of pain. Admission Date 2/3/2019 Admission Diagnosis Acute respiratory failure ([address] Consults IP CONSULT TO HEMATOLOGY 
IP CONSULT TO PRIMARY CARE PROVIDER 
IP CONSULT TO PULMONOLOGY Consults [x]PT []OT []Speech [x]Case Management  
  
[] Palliative Cardiac Monitoring Order  
[x]Yes []No  
 
IV drips []Yes Drip:                            Dose: 
Drip:                            Dose: 
Drip:                            Dose:  
[]No  
 
GI Prophylaxis [x]Yes []No  
 
 
 
DVT Prophylaxis SCDs:     
     
 Shawn stockings:     
  
[x] Medication []Contraindicated []None Activity Level Activity Level: Up with Assistance Activity Assistance: Partial (two people) Purposeful Rounding every 1-2 hour? [x]Yes Spence Score  Total Score: 4 Bed Alarm (If score 3 or >) []Yes  
[] Refused (See signed refusal form in chart) Dwayne Score  Dwayne Score: 16 Dwayne Score (if score 14 or less) []PMT consult  
[]Wound Care consult []Specialty bed  
[] Nutrition consult Needs prior to discharge:  
Home O2 required:   
[]Yes  
[x]No  
 If yes, how much O2 required? Other:  
 Last Bowel Movement:    
  
Influenza Vaccine Received Flu Vaccine for Current Season (usually Sept-March): No  
 Patient/Guardian Refused (Notify MD): Yes Pneumonia Vaccine Diet Active Orders Diet DIET DIABETIC CONSISTENT CARB Regular LDAs Venous Access Device portacath  09/12/18 Upper chest (subclavicular area), left (Active) Venous Access Device portacath 02/04/19 Upper chest (subclavicular area), left (Active) Central Line Being Utilized Yes 2/8/2019 10:36 AM  
Criteria for Appropriate Use Irritant/vesicant medication 2/8/2019 10:36 AM  
Site Assessment Clean, dry, & intact 2/8/2019 10:36 AM  
Date of Last Dressing Change 02/03/19 2/7/2019  7:27 PM  
Dressing Status Clean, dry, & intact 2/8/2019 10:36 AM  
Dressing Type Transparent 2/8/2019 10:36 AM  
Action Taken Blood drawn 2/8/2019 10:36 AM  
Positive Blood Return (Medial Site) Yes 2/8/2019 10:36 AM  
Alcohol Cap Used Yes 2/8/2019 10:36 AM  
  
Peripheral IV 02/03/19 Right Antecubital (Active) Site Assessment Clean, dry, & intact 2/8/2019 10:36 AM  
Phlebitis Assessment 0 2/8/2019 10:36 AM  
Infiltration Assessment 0 2/8/2019 10:36 AM  
Dressing Status Clean, dry, & intact 2/8/2019 10:36 AM  
Dressing Type Transparent 2/8/2019 10:36 AM  
Hub Color/Line Status Flushed 2/8/2019 10:36 AM  
Action Taken Open ports on tubing capped 2/7/2019  3:32 AM  
Alcohol Cap Used Yes 2/7/2019  3:32 AM  
      
External Female Catheter 02/04/19 (Active) Site Assessment Clean, dry, & intact 2/8/2019 10:36 AM  
Repositioned Yes 2/8/2019 10:36 AM  
Perineal Care Yes 2/8/2019 10:36 AM  
Wick Changed Yes 2/8/2019 10:36 AM  
Suction Canister/Tubing Changed No 2/8/2019 10:36 AM  
Urine Output (mL) 600 ml 2/7/2019  3:27 PM  
            
Urinary Catheter Intake & Output Date 02/07/19 0700 - 02/08/19 3904 02/08/19 0700 - 02/09/19 8537 Shift [id number] 1900-0659 24 Hour Total 1172-9723 3742-9604 24 Hour Total  
INTAKE  
P.O. 600 200 800 360  360  
  P. O. 600 200 800 360  360  
I. V.(mL/kg/hr)    600  600 Volume (cefepime (MAXIPIME) 2 g in 0.9% sodium chloride (MBP/ADV) 100 mL)    100  100 Volume (vancomycin (VANCOCIN) 1500 mg in  ml infusion)    500  500 Shift Total(mL/kg) 600(7.3) 200(2.4) 800(9.8) 960(11.8)  960(11.8) OUTPUT Urine(mL/kg/hr) 1000(1) 400(0.4) 1400(0.7) Urine Voided  400 400 Urine Occurrence(s)    2 x  2 x Urine Output (mL) (External Female Catheter 02/04/19) 1000  1000 Shift Total(mL/kg) 4088(01.3) 400(4.9) 1400(17.1) NET -400 -741 -914 524  244 Weight (kg) 81.6 81.6 81.6 81.6 81.6 81.6 Readmission Risk Assessment Tool Score Medium Risk 25 Total Score 3 Has Seen PCP in Last 6 Months (Yes=3, No=0) 3 Patient Length of Stay (>5 days = 3)  
 5 Pt. Coverage (Medicare=5 , Medicaid, or Self-Pay=4) 7 Charlson Comorbidity Score (Age + Comorbid Conditions) Criteria that do not apply:  
 . Living with Significant Other. Assisted Living. LTAC. SNF. or  
Rehab  
 IP Visits Last 12 Months (1-3=4, 4=9, >4=11) Expected Length of Stay 4d 7h Actual Length of Stay 5

## 2019-02-08 NOTE — PROGRESS NOTES
General Daily Progress Note Admit Date: 2/3/2019 Subjective:  
 
Patient has complains of coughing. Current Facility-Administered Medications Medication Dose Route Frequency  potassium chloride SR (KLOR-CON 10) tablet 20 mEq  20 mEq Oral BID  insulin glargine (LANTUS) injection 32 Units  32 Units SubCUTAneous DAILY  [START ON 2/15/2019] apixaban (ELIQUIS) tablet 5 mg  5 mg Oral Q12H And  
 apixaban (ELIQUIS) tablet 10 mg  10 mg Oral Q12H  nystatin (MYCOSTATIN) 100,000 unit/mL oral suspension 500,000 Units  500,000 Units Oral QID  sodium chloride (NS) flush 5-40 mL  5-40 mL IntraVENous Q8H  
 sodium chloride (NS) flush 5-40 mL  5-40 mL IntraVENous PRN  
 methylPREDNISolone (PF) (SOLU-MEDROL) injection 60 mg  60 mg IntraVENous Q8H  
 benzonatate (TESSALON) capsule 200 mg  200 mg Oral TID PRN  
 vancomycin (VANCOCIN) 1500 mg in  ml infusion  1,500 mg IntraVENous Q12H  
 lactobac ac& pc-s.therm-b.anim (JUNI Q/RISAQUAD)  1 Cap Oral DAILY  insulin lispro (HUMALOG) injection   SubCUTAneous TIDAC  cefepime (MAXIPIME) 2 g in 0.9% sodium chloride (MBP/ADV) 100 mL  2 g IntraVENous Q8H  
 sodium chloride (NS) flush 5-40 mL  5-40 mL IntraVENous Q8H  
 sodium chloride (NS) flush 5-40 mL  5-40 mL IntraVENous PRN  
 acetaminophen (TYLENOL) tablet 650 mg  650 mg Oral Q6H PRN  
 ondansetron (ZOFRAN) injection 4 mg  4 mg IntraVENous Q4H PRN  
 amLODIPine (NORVASC) tablet 2.5 mg  2.5 mg Oral DAILY  atorvastatin (LIPITOR) tablet 40 mg  40 mg Oral QHS  fluticasone (FLONASE) 50 mcg/actuation nasal spray 2 Spray  2 Spray Both Nostrils DAILY  metoprolol succinate (TOPROL-XL) XL tablet 50 mg  50 mg Oral DAILY  glucose chewable tablet 16 g  4 Tab Oral PRN  
 dextrose (D50W) injection syrg 12.5-25 g  12.5-25 g IntraVENous PRN  
 glucagon (GLUCAGEN) injection 1 mg  1 mg IntraMUSCular PRN  
 nitroglycerin (NITROBID) 2 % ointment 1 Inch  1 Inch Topical Q6H PRN  
  
 
 Review of Systems A comprehensive review of systems was negative. Objective:  
 
Patient Vitals for the past 24 hrs: 
 BP Temp Pulse Resp SpO2  
02/08/19 0804 146/80 98.4 °F (36.9 °C) 92 18   
02/08/19 0255 140/89 98.2 °F (36.8 °C) 93 18 94 % 02/08/19 0208     99 % 02/07/19 2329 133/72 98.1 °F (36.7 °C) 92 18 94 % 02/07/19 2032 120/77 98.3 °F (36.8 °C) 90 18 93 % 02/07/19 1526 118/70    93 % 02/07/19 1525 92/66 98.1 °F (36.7 °C) 91 18 94 % 02/07/19 1129 124/52 98.1 °F (36.7 °C) 99 20 91 % No intake/output data recorded. 02/06 1901 - 02/08 0700 In: 189 [P.O.:920] Out: 2000 [IQLPR:6502] Physical Exam:  
Visit Vitals /80 Pulse 92 Temp 98.4 °F (36.9 °C) Resp 18 Ht 5' 8\" (1.727 m) Wt 180 lb (81.6 kg) SpO2 94% BMI 27.37 kg/m² General appearance: alert, cooperative, no distress, appears stated age Neck: supple, symmetrical, trachea midline, no adenopathy, thyroid: not enlarged, symmetric, no tenderness/mass/nodules, no carotid bruit and no JVD Lungs: rales R base, L base Heart: regular rate and rhythm, S1, S2 normal, no murmur, click, rub or gallop Abdomen: soft, non-tender. Bowel sounds normal. No masses,  no organomegaly Extremities: extremities normal, atraumatic, no cyanosis or edema Data Review Recent Results (from the past 24 hour(s)) GLUCOSE, POC Collection Time: 02/07/19 10:41 AM  
Result Value Ref Range Glucose (POC) 209 (H) 65 - 100 mg/dL Performed by [facility] GLUCOSE, POC Collection Time: 02/07/19  3:56 PM  
Result Value Ref Range Glucose (POC) 148 (H) 65 - 100 mg/dL Performed by [employer] GLUCOSE, POC Collection Time: 02/07/19  9:50 PM  
Result Value Ref Range Glucose (POC) 164 (H) 65 - 100 mg/dL Performed by Westly Baumgarten (PCT) GLUCOSE, POC Collection Time: 02/08/19  7:12 AM  
Result Value Ref Range Glucose (POC) 147 (H) 65 - 100 mg/dL Performed by STAN LOWE (PCT) CON Assessment:  
 
Active Problems: 
  SVT (supraventricular tachycardia) ([facility] [address] (8/29/2016) Essential hypertension with goal blood pressure less than 140/90 (8/29/2016) Diabetes mellitus type 2, controlled ([facility] [address] (9/8/2016) Dyslipidemia (9/8/2016) Acute respiratory failure ([facility] [address] (2/3/2019) Leg swelling (2/3/2019) [facility] (2/3/2019) CAP (community acquired pneumonia) (2/3/2019) Plan: 1. Continue treatment of interstitial pneumonitis. Await biopsy report. O2 requirements reasonably stable. Her pulmonary. 2.  Continue diabetic control. 3.  Will mobilize.

## 2019-02-08 NOTE — ROUTINE PROCESS
Bedside and Verbal shift change report given to Ryan Philip RN (oncoming nurse) by Anni Morton RN (offgoing nurse). Report included the following information SBAR, ED Summary, Intake/Output, MAR and Recent Results.

## 2019-02-08 NOTE — PROGRESS NOTES
[facility] Progress Note Name: Gregoria Davenport MRN: [id number] : 1950 Hospital: On license of [facility] Date: 2019 IMPRESSION:  
· Acute hypoxic resp failure - decreasing o2 needs · Bilateral pulm infiltrates-  First noted on PET  and seen on ct on admit - tx with augmentin -, then Levaquin - Preceding fever 101. S/P bronch . DDX: PNA, opportunistic infection and toxicity (? bleo) from chemo - suspect toxicity · Coronavirus positive RVP · Hodkins lymphoma- tx with 4 cyles of abvd- positive response to chemo · Pulm htn - pap 60 with nl lvef · Anemia-  ? Related to chemo- stable · Hx of asthma from childhood to age 24  
· NEWLY DISCOVERED BILATERAL PTE WITH NEG DUPLEX OF LEG  
  
RECOMMENDATIONS:  
· IV ABX - immunosuppressed pt - broad spectrum - if no MRSA plan to stop vanco after 5 days · IV fluconazole · On Eliquis · Wean o2 as able · IV steroids · F/U cultures · Will see as needed over the weekend, please call · F/U CXR Monday Subjective: This patient has been seen and evaluated at the request of Dr. Romi Sanz  For hypoxia. Patient is a 70 y.o. female  Who was dx with hodgkin lymphoma  In august when she was getting a massage in [facility] and the masseuse note  At rt neck ln. She is s/p chemo with abvd. She  Was noted to have bilateral pulm infiltrates on pet/ct On   And was started on augmenin on  - seen in  Oncology office on  - sats high 80's but increased to 90 % on fluids and was started on levaquin on . Noted sig  Increased [place] and said sats drop to 70's with exertion . She says she  Feels much better today. NO cough, heme , phlegm. NO cp. She has noted some ankle swelling. Feb 5 - report decreased sob, reports decreased ankle swelling . reports rhinitis with clear liquid Feb 6- says she gets a cough and sob with talking.   Slept well.  
Feb 7- says she feels better 2/8: Resting in bed talking on the telephone. No current complaints. On 3LPM.   
 
Past Medical History:  
Diagnosis Date  Asthma   
 as a child, nothing since menopause  Cancer ([facility] [address] 08/21/2018 Non-Hodgkin's  Cataracts, bilateral   
 Diabetes ([facility] [address] type II  
 Environmental allergies  Fibromyalgia Treated with alternate medical therapy  Hypertension   
 no asthma since age 24  // Dr. Suarez Learn said hodgkins  And pt confirms Past Surgical History:  
Procedure Laterality Date  HX BREAST BIOPSY Right 2006  HX COLONOSCOPY  2015  HX GYN Pap 2015  HX ORTHOPAEDIC    
 fractured R ankle/ no surgery  HX OTHER SURGICAL Right 08/27/2018  
 excision of deep neck cervical LN. Prior to Admission medications Medication Sig Start Date End Date Taking? Authorizing Provider ONETOUCH VERIO strip USE 1 STRIP IN VITRO TO TEST BLOOD SUGAR ONCE DAILY 10/17/18  Yes Osmel Russo MD  
ibuprofen (MOTRIN) 600 mg tablet Take 1 Tab by mouth every six (6) hours as needed for Pain. 8/28/18  Yes Ghanshyam Conrad MD  
Lancets (ONE TOUCH DELICA) misc USE TO TEST BLOOD SUGAR ONCE DAILY. DX.E11.9 10/13/16  Yes Osmel Russo MD  
pioglitazone (ACTOS) 30 mg tablet TAKE 1 TABLET DAILY 1/6/19   Osmel Russo MD  
metoprolol succinate (TOPROL-XL) 50 mg XL tablet TAKE 1 TAB BY MOUTH DAILY. 10/12/18   Osmel Russo MD  
JANUMET XR 50-1,000 mg TM24 TAKE 1 TABLET BY MOUTH TWICE A DAY WITH MEALS 10/12/18   Osmel Russo MD  
fluticasone [facility]) 50 mcg/actuation nasal spray 2 Sprays by Both Nostrils route daily. Provider, Historical  
spironolactone-hydrochlorothiazide (ALDACTAZIDE) 25-25 mg per tablet TAKE ONE TABLET BY MOUTH EVERY DAY 12/3/17   Osmel Russo MD  
atorvastatin (LIPITOR) 40 mg tablet TAKE 1 TAB BY MOUTH DAILY.  12/3/17   Osmel Russo MD  
amLODIPine (NORVASC) 10 mg tablet TAKE 1 TABLET BY MOUTH EVERY DAY 11/7/17 Lawyer Cher MD  
alcohol swabs (ALCOHOL PREP PADS) padm 1 Pad by Apply Externally route daily. DX.E11.9 10/13/16   Lawyer Cher MD  
 
No Known Allergies Social History Tobacco Use  Smoking status: Never Smoker  Smokeless tobacco: Never Used Substance Use Topics  Alcohol use: Yes Comment: socially  
 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   
     prostate cancer  Cancer Sister Breast cancer apparent DCIS  
 Heart Disease Brother Brother  from congestive heart failure secondary to severe mitral disease Current Facility-Administered Medications Medication Dose Route Frequency  potassium chloride SR (KLOR-CON 10) tablet 20 mEq  20 mEq Oral BID  fluconazole (DIFLUCAN) 400mg/200 mL IVPB (premix)  400 mg IntraVENous Q24H  
 insulin glargine (LANTUS) injection 32 Units  32 Units SubCUTAneous DAILY  [START ON 2/15/2019] apixaban (ELIQUIS) tablet 5 mg  5 mg Oral Q12H And  
 apixaban (ELIQUIS) tablet 10 mg  10 mg Oral Q12H  nystatin (MYCOSTATIN) 100,000 unit/mL oral suspension 500,000 Units  500,000 Units Oral QID  sodium chloride (NS) flush 5-40 mL  5-40 mL IntraVENous Q8H  
 methylPREDNISolone (PF) (SOLU-MEDROL) injection 60 mg  60 mg IntraVENous Q8H  
 vancomycin (VANCOCIN) 1500 mg in  ml infusion  1,500 mg IntraVENous Q12H  
 lactobac ac& pc-s.therm-b.anim (JUNI Q/RISAQUAD)  1 Cap Oral DAILY  insulin lispro (HUMALOG) injection   SubCUTAneous TIDAC  cefepime (MAXIPIME) 2 g in 0.9% sodium chloride (MBP/ADV) 100 mL  2 g IntraVENous Q8H  
 sodium chloride (NS) flush 5-40 mL  5-40 mL IntraVENous Q8H  
 amLODIPine (NORVASC) tablet 2.5 mg  2.5 mg Oral DAILY  atorvastatin (LIPITOR) tablet 40 mg  40 mg Oral QHS  fluticasone (FLONASE) 50 mcg/actuation nasal spray 2 Spray  2 Spray Both Nostrils DAILY  metoprolol succinate (TOPROL-XL) XL tablet 50 mg  50 mg Oral DAILY Review of Systems: A comprehensive review of systems was negative except for that written in the HPI. Objective:  
Vital Signs:   
Visit Vitals /77 (BP 1 Location: Right arm, BP Patient Position: At rest) Pulse (!) 110 Temp 98.3 °F (36.8 °C) Resp 19 Ht 5' 8\" (1.727 m) Wt 81.6 kg (180 lb) SpO2 92% BMI 27.37 kg/m² O2 Device: Nasal cannula O2 Flow Rate (L/min): 3 l/min Temp (24hrs), Av.3 °F (36.8 °C), Min:98.1 °F (36.7 °C), Max:98.4 °F (36.9 °C) Intake/Output:  
Last shift:      701 - 1900 In: 960 [P.O.:360; I.V.:600] Out: - Last 3 shifts: 1901 -  0700 In: 615 [P.O.:920] Out: 2000 [VMOPM:1060] Intake/Output Summary (Last 24 hours) at 2019 1615 Last data filed at 2019 1037 Gross per 24 hour Intake 1400 ml Output 400 ml Net 1000 ml Physical Exam:  
General:  Alert, cooperative, no distress, appears stated age. Head:  Normocephalic, without obvious abnormality, atraumatic. alopecia Eyes:  Conjunctivae/corneas clear. PERRL, EOMs intact. Nose: Nares normal. Septum midline. Mucosa normal. No drainage or sinus tenderness. Throat: Lips, mucosa, and tongue normal. Teeth and gums normal.- Neck: Supple, symmetrical, trachea midline, no adenopathy, thyroid: no enlargment/tenderness/nodules, no carotid bruit and no JVD. Back:   Symmetric, no curvature. ROM normal.  
Lungs:   Bilateral rales . -  Rt greater than left Chest wall:  No tenderness or deformity. Heart:  Regular rate and rhythm, S1, S2 normal, no murmur, click, rub or gallop.- mild tachy Abdomen:   Soft, non-tender. Bowel sounds normal. No masses,  No organomegaly. Extremities: Extremities normal, atraumatic, no cyanosis - edema gone Pulses: 2+ and symmetric all extremities. Skin: Skin color, texture, turgor normal. No rashes or lesions Lymph nodes: Cervical, supraclavicular, and axillary nodes normal.  
Neurologic: Grossly nonfocal  
 
Data review:  
 
Recent Results (from the past 24 hour(s)) GLUCOSE, POC Collection Time: 02/07/19  9:50 PM  
Result Value Ref Range Glucose (POC) 164 (H) 65 - 100 mg/dL Performed by Dami Stewart (PCT) GLUCOSE, POC Collection Time: 02/08/19  7:12 AM  
Result Value Ref Range Glucose (POC) 147 (H) 65 - 100 mg/dL Performed by STAN LOWE (PCT) CON   
CBC WITH MANUAL DIFF Collection Time: 02/08/19 10:50 AM  
Result Value Ref Range WBC 20.3 (H) 3.6 - 11.0 K/uL  
 RBC 3.13 (L) 3.80 - 5.20 M/uL HGB 9.0 (L) 11.5 - 16.0 g/dL HCT 27.8 (L) 35.0 - 47.0 % MCV 88.8 80.0 - 99.0 FL  
 MCH 28.8 26.0 - 34.0 PG  
 MCHC 32.4 30.0 - 36.5 g/dL  
 RDW 13.8 11.5 - 14.5 % PLATELET 327 (H) 198 - 400 K/uL MPV 9.2 8.9 - 12.9 FL  
 NRBC 0.1 (H) 0  WBC ABSOLUTE NRBC 0.03 (H) 0.00 - 0.01 K/uL NEUTROPHILS 90 (H) 32 - 75 % BAND NEUTROPHILS 0 0 - 6 % LYMPHOCYTES 8 (L) 12 - 49 % MONOCYTES 2 (L) 5 - 13 % EOSINOPHILS 0 0 - 7 % BASOPHILS 0 0 - 1 % METAMYELOCYTES 0 0 % MYELOCYTES 0 0 % PROMYELOCYTES 0 0 % BLASTS 0 0 % OTHER CELL 0 0 IMMATURE GRANULOCYTES 0 %  
 ABS. NEUTROPHILS 18.3 (H) 1.8 - 8.0 K/UL  
 ABS. LYMPHOCYTES 1.6 0.8 - 3.5 K/UL  
 ABS. MONOCYTES 0.4 0.0 - 1.0 K/UL  
 ABS. EOSINOPHILS 0.0 0.0 - 0.4 K/UL  
 ABS. BASOPHILS 0.0 0.0 - 0.1 K/UL  
 ABS. IMM. GRANS. 0.0 K/UL  
 DF MANUAL    
 RBC COMMENTS NORMOCYTIC, NORMOCHROMIC METABOLIC PANEL, BASIC Collection Time: 02/08/19 10:50 AM  
Result Value Ref Range Sodium 136 136 - 145 mmol/L Potassium 3.9 3.5 - 5.1 mmol/L Chloride 102 97 - 108 mmol/L  
 CO2 27 21 - 32 mmol/L Anion gap 7 5 - 15 mmol/L Glucose 194 (H) 65 - 100 mg/dL BUN 19 6 - 20 MG/DL Creatinine 0.82 0.55 - 1.02 MG/DL  
 BUN/Creatinine ratio 23 (H) 12 - 20 GFR est AA >60 >60 ml/min/1.73m2 GFR est non-AA >60 >60 ml/min/1.73m2  Calcium 9.3 8.5 - 10.1 MG/DL  
GLUCOSE, POC Collection Time: 02/08/19 11:52 AM  
Result Value Ref Range Glucose (POC) 184 (H) 65 - 100 mg/dL Performed by STAN LOWE (PCT) CON Imaging: 
I have personally reviewed the patients radiographs and have reviewed the reports: 
 see above . cxr seen -  I think  We need to proceed with fob  . Discussed with pt risks and benefits and she agrees to procedure. She is scheduled for fob with me at 130 pm today Lucian Solis NP

## 2019-02-08 NOTE — PROGRESS NOTES
Problem: Falls - Risk of 
Goal: *Absence of Falls Document Ebb Occitan Fall Risk and appropriate interventions in the flowsheet. Outcome: Progressing Towards Goal 
Fall Risk Interventions: 
Mobility Interventions: Patient to call before getting OOB Mentation Interventions: Adequate sleep, hydration, pain control Medication Interventions: Patient to call before getting OOB Elimination Interventions: Bed/chair exit alarm History of Falls Interventions: Bed/chair exit alarm

## 2019-02-08 NOTE — PROGRESS NOTES
1900--bedside report received from luli Bobby, pt sitting up in bed, talking on phone, has no complaints at this time call bell in reach assessment as noted 0300--pt resting in bed has no complaints at this time. o2 stats 95% 3L, call bell in reach. 0530--xray tech. Notified of 0700 stat portable chest order, will obtain first shift 0710-confirmed with first shift xray tech, pt is on list for port. Chest xray 0715--bedside report given to luli tellez who is assuming care of pt

## 2019-02-08 NOTE — PROGRESS NOTES
Nutrition Assessment: 
 
INTERVENTIONS/RECOMMENDATIONS:  
Meals/Snacks: General/healthful diet: Continue consistent carb diet Supplements: Discontinue mighty shakes; patient drinking boost from home 1-2x/day ASSESSMENT:  
Chart reviewed; medically noted for respiratory failure, pneumonia, PE, and DM. Patient reports her appetite is slowly getting better; food isn't so bad. Using menu/room service as desired. She did not like the mighty shake supplements; will discontinue. Boost at bedside. She reports family is bringing them in for her; drinking 1-2 per day as needed. Encouraged intake of meals/supplement. Diet Order: Consistent carb(Mighty shakes BID) % Eaten:   
Patient Vitals for the past 72 hrs: 
 % Diet Eaten 02/07/19 1806 50 % 02/07/19 1400 50 % 02/07/19 0833 75 % Pertinent Medications: [x] Reviewed []Other: Norvasc, Atorvastatin, Lantus, Humalog, Shantel Q, Solu-medrol, KCl Pertinent Labs: [x]Reviewed  []Other:Na 134, -[phone] Food Allergies: [x]None []Other:   Last BM:    [x]Active     []Hyperactive  []Hypoactive       [] Absent  BS Skin:    [x] Intact   [] Incision  [] Breakdown   []Edema   []Other: Anthropometrics: Height: 5' 8\" (172.7 cm) Weight: 81.6 kg (180 lb) IBW (%IBW):   ( ) UBW (%UBW):   (  %) BMI: Body mass index is 27.37 kg/m². This BMI is indicative of: 
[]Underweight   []Normal   [x]Overweight   [] Obesity   [] Extreme Obesity (BMI>40) Last Weight Metrics: 
Weight Loss Metrics 2/4/2019 1/21/2019 11/15/2018 9/13/2018 9/12/2018 9/10/2018 8/27/2018 Today's Wt 180 lb 180 lb 180 lb 198 lb 201 lb 1 oz 200 lb 3.2 oz 201 lb BMI 27.37 kg/m2 27.37 kg/m2 27.37 kg/m2 30.11 kg/m2 29.69 kg/m2 29.56 kg/m2 29.68 kg/m2 Estimated Nutrition Needs (Based on): 5162 Kcals/day(BMR (5094) x 1. 3AF) , 82 g(-90g (1.0-1.1 g/kg bw)) Protein Carbohydrate: At Least 130 g/day  Fluids: 1800 mL/day  Pt expected to meet estimated nutrient needs: [x]Yes []No 
 
 NUTRITION DIAGNOSES:  
Problem:  Unintended weight loss Etiology: related to chemo, decreased PO Signs/Symptoms: as evidenced by 10.4% weight loss x 6 months NUTRITION INTERVENTIONS: 
Meals/Snacks: General/healthful diet GOAL:  
PO intake >50% of meals and at least 1 boost/day next 4-6 days NUTRITION MONITORING AND EVALUATION Food/Nutrient Intake Outcomes: Total energy intake Physical Signs/Symptoms Outcomes: Weight/weight change, Glucose profile, GI profile, Electrolyte and renal profile Previous Goal Met: 
 [] Met              [x] Progressing Towards Goal              [] Not Progressing Towards Goal  
Previous Recommendations: 
 [x] Implemented          [] Not Implemented          [] Not Applicable LEARNING NEEDS (Diet, Food/Nutrient-Drug Interaction):  
 [x] None Identified 
 [] Identified and Education Provided/Documented 
 [] Identified and Pt declined/was not appropriate Cultural, Confucianist, OR Ethnic Dietary Needs:  
 [x] None Identified 
 [] Identified and Addressed 
 
 [x] Interdisciplinary Care Plan Reviewed/Documented  
 [x] Discharge Planning: Consistent carb/low Na diet + Boost 2-3x/day as needed 
 [] Participated in Interdisciplinary Rounds NUTRITION RISK:  
 [] High              [] Moderate           [x]  Low  []  Minimal/Uncompromised [facility] Pager [phone] Weekend Pager [phone]

## 2019-02-08 NOTE — PROGRESS NOTES
Hematology Oncology Progress Note Follow up for: HD Chart notes reviewed since last visit. Case discussed with following:  
 
Patient complains of the following: feels better Additional concerns noted by the staff:  
 
Patient Vitals for the past 24 hrs: 
 BP Temp Pulse Resp SpO2  
02/08/19 0804 146/80 98.4 °F (36.9 °C) 92 18   
02/08/19 0255 140/89 98.2 °F (36.8 °C) 93 18 94 % 02/08/19 0208     99 % 02/07/19 2329 133/72 98.1 °F (36.7 °C) 92 18 94 % 02/07/19 2032 120/77 98.3 °F (36.8 °C) 90 18 93 % 02/07/19 1526 118/70    93 % 02/07/19 1525 92/66 98.1 °F (36.7 °C) 91 18 94 % 02/07/19 1129 124/52 98.1 °F (36.7 °C) 99 20 91 % Review of Systems: 
12 point ROS done and negative except as above Physical Examination: 
gen NAD 
CV reg Lungs clear 
abd benign Labs: 
Recent Results (from the past 24 hour(s)) GLUCOSE, POC Collection Time: 02/07/19 10:41 AM  
Result Value Ref Range Glucose (POC) 209 (H) 65 - 100 mg/dL Performed by Manuel Magaña GLUCOSE, POC Collection Time: 02/07/19  3:56 PM  
Result Value Ref Range Glucose (POC) 148 (H) 65 - 100 mg/dL Performed by Manuel Magaña GLUCOSE, POC Collection Time: 02/07/19  9:50 PM  
Result Value Ref Range Glucose (POC) 164 (H) 65 - 100 mg/dL Performed by Debbie Campbell (PCT) GLUCOSE, POC Collection Time: 02/08/19  7:12 AM  
Result Value Ref Range Glucose (POC) 147 (H) 65 - 100 mg/dL Performed by STAN LOWE (PCT) CON Assessment and Plan: HD 
-treated by my partner Dr Jennifer Rizvi,  Had great response to tx 
-f/u with her Pneumonia 
-On abx per primary and pulmonary 
-bronch 2/6, she says she feels better PE 
-Now on eliquis Please call over the weekend with any questions. Will have Dr. Jennifer Rizvi f/u on Monday.

## 2019-02-09 NOTE — PROGRESS NOTES
No complaint of pain overnight. Can turn with encouragement but needs assistance with boosting in bed due to weakness. Remains on oxygen and oxygen saturation dipped to 88 when asleep at times. Refused a bath due to being cold this morning but allowed for a pad brody, wick change, and lor care.

## 2019-02-09 NOTE — PROGRESS NOTES
General Daily Progress Note Admit Date: 2/3/2019 Subjective:  
 
Patient has complains of coughing and anorexia Current Facility-Administered Medications Medication Dose Route Frequency  sodium chloride (NS) flush 5-40 mL  5-40 mL IntraVENous Q8H  
 sodium chloride (NS) flush 5-40 mL  5-40 mL IntraVENous PRN  
 sodium chloride (NS) flush 5-40 mL  5-40 mL IntraVENous Q8H  
 fluconazole (DIFLUCAN) 400mg/200 mL IVPB (premix)  400 mg IntraVENous Q24H  
 insulin glargine (LANTUS) injection 32 Units  32 Units SubCUTAneous DAILY  [START ON 2/15/2019] apixaban (ELIQUIS) tablet 5 mg  5 mg Oral Q12H And  
 apixaban (ELIQUIS) tablet 10 mg  10 mg Oral Q12H  nystatin (MYCOSTATIN) 100,000 unit/mL oral suspension 500,000 Units  500,000 Units Oral QID  sodium chloride (NS) flush 5-40 mL  5-40 mL IntraVENous Q8H  
 sodium chloride (NS) flush 5-40 mL  5-40 mL IntraVENous PRN  
 methylPREDNISolone (PF) (SOLU-MEDROL) injection 60 mg  60 mg IntraVENous Q8H  
 benzonatate (TESSALON) capsule 200 mg  200 mg Oral TID PRN  
 vancomycin (VANCOCIN) 1500 mg in  ml infusion  1,500 mg IntraVENous Q12H  
 lactobac ac& pc-s.therm-b.anim (JUNI Q/RISAQUAD)  1 Cap Oral DAILY  insulin lispro (HUMALOG) injection   SubCUTAneous TIDAC  cefepime (MAXIPIME) 2 g in 0.9% sodium chloride (MBP/ADV) 100 mL  2 g IntraVENous Q8H  
 sodium chloride (NS) flush 5-40 mL  5-40 mL IntraVENous Q8H  
 sodium chloride (NS) flush 5-40 mL  5-40 mL IntraVENous PRN  
 acetaminophen (TYLENOL) tablet 650 mg  650 mg Oral Q6H PRN  
 ondansetron (ZOFRAN) injection 4 mg  4 mg IntraVENous Q4H PRN  
 amLODIPine (NORVASC) tablet 2.5 mg  2.5 mg Oral DAILY  atorvastatin (LIPITOR) tablet 40 mg  40 mg Oral QHS  fluticasone (FLONASE) 50 mcg/actuation nasal spray 2 Spray  2 Spray Both Nostrils DAILY  metoprolol succinate (TOPROL-XL) XL tablet 50 mg  50 mg Oral DAILY  glucose chewable tablet 16 g  4 Tab Oral PRN  
  dextrose (D50W) injection syrg 12.5-25 g  12.5-25 g IntraVENous PRN  
 glucagon (GLUCAGEN) injection 1 mg  1 mg IntraMUSCular PRN  
 nitroglycerin (NITROBID) 2 % ointment 1 Inch  1 Inch Topical Q6H PRN Review of Systems A comprehensive review of systems was negative. Objective:  
 
Patient Vitals for the past 24 hrs: 
 BP Temp Pulse Resp SpO2 Weight  
02/09/19 0932 142/72  89     
02/09/19 0700 129/63 98.1 °F (36.7 °C) 92 20 95 %   
02/09/19 0425      238 lb 8.6 oz (108.2 kg) 02/09/19 0420 129/63 97.8 °F (36.6 °C) 95 18 95 %   
02/09/19 0044 138/70 97.8 °F (36.6 °C) 73 18 94 %   
02/08/19 2041 112/71 97.7 °F (36.5 °C) 92 19 92 %   
02/08/19 1408 132/77 98.3 °F (36.8 °C) (!) 110 19 92 %  No intake/output data recorded. 02/07 1901 - 02/09 0700 In: 1960 [P.O.:560; I.V.:1400] Out: 800 [Urine:800] Physical Exam:  
Visit Vitals /72 Pulse 89 Temp 98.1 °F (36.7 °C) Resp 20 Ht 5' 8\" (1.727 m) Wt 238 lb 8.6 oz (108.2 kg) SpO2 95% BMI 36.27 kg/m² General appearance: alert, cooperative, no distress, appears stated age Neck: supple, symmetrical, trachea midline, no adenopathy, thyroid: not enlarged, symmetric, no tenderness/mass/nodules, no carotid bruit and no JVD Lungs: rales R base, L base Heart: regular rate and rhythm, S1, S2 normal, no murmur, click, rub or gallop Abdomen: soft, non-tender. Bowel sounds normal. No masses,  no organomegaly Extremities: extremities normal, atraumatic, no cyanosis or edema Data Review Recent Results (from the past 24 hour(s)) GLUCOSE, POC Collection Time: 02/08/19 11:52 AM  
Result Value Ref Range Glucose (POC) 184 (H) 65 - 100 mg/dL Performed by STAN LOWE (PCT) CON   
GLUCOSE, POC Collection Time: 02/08/19  4:49 PM  
Result Value Ref Range Glucose (POC) 202 (H) 65 - 100 mg/dL Performed by Radha Mcneil (PCT) GLUCOSE, POC  Collection Time: 02/09/19  7:58 AM  
 Result Value Ref Range Glucose (POC) 345 (H) 65 - 100 mg/dL Performed by Shay Perry Assessment:  
 
Active Problems: 
  SVT (supraventricular tachycardia) ([address] (8/29/2016) Essential hypertension with goal blood pressure less than 140/90 (8/29/2016) Diabetes mellitus type 2, controlled ([address] (9/8/2016) Dyslipidemia (9/8/2016) Acute respiratory failure ([address] (2/3/2019) Leg swelling (2/3/2019) Hodgkin lymphoma ([address] (2/3/2019) CAP (community acquired pneumonia) (2/3/2019) Plan: 1. Continue treatment of interstitial pneumonitis. Await biopsy report. O2 requirements reasonably stable. Her pulmonary. 2.  Continue diabetic control. 3.  Will mobilize.

## 2019-02-09 NOTE — ROUTINE PROCESS
Bedside and Verbal shift change report given to Ayse You RN (oncoming nurse) by Anamaria Bennett RN (offgoing nurse). Report included the following information SBAR, ED Summary, Intake/Output, MAR and Recent Results.

## 2019-02-09 NOTE — PROGRESS NOTES
Paged Dr. Carol Paniagua with pulmonology regarding several orders for patient that came in at 1931. AFB, viral, bacterial, sputum cultures, etc. 
Dr. Nassar Shall called back at 2006 and stated that these sound like bronchoscopy order sets. He will clarify with Dr. Carol Paniagua and get back to us.

## 2019-02-10 NOTE — PROGRESS NOTES
PCU SHIFT NURSING NOTE Bedside and Verbal shift change report given to Belkis Vee RN (oncoming nurse) by Harrel Blizzard, RN (offgoing nurse). Report included the following information SBAR, ED Summary, Intake/Output, MAR and Recent Results. Shift Summary:  
Received report and assumed care of patient. /73 (BP 1 Location: Left arm, BP Patient Position: At rest)   Pulse 84   Temp 97.5 °F (36.4 °C)   Resp 18   Ht 5' 8\" (1.727 m)   Wt 108.2 kg (238 lb 8.6 oz)   SpO2 99%   BMI 36.27 kg/m² A/Ox4. Patient drowsy but easy to rouse. Resting comfortably in bed with family members present. Lung sounds bilaterally diminished. [email or web address]. 
NSR on the monitor. Patient moves all extremities and turns self well in the bed but has some generalized weakness. Plans are to get patient up and moving today. 80 Jorge vancomycin trough level per order. 1200 Offered for patient to get up to chair. She opted to try later. Admission Date 2/3/2019 Admission Diagnosis Acute respiratory failure ([facility] Consults IP CONSULT TO HEMATOLOGY 
IP CONSULT TO PRIMARY CARE PROVIDER 
IP CONSULT TO PULMONOLOGY Consults [x]PT [x]OT []Speech [x]Case Management  
  
[] Palliative Cardiac Monitoring Order  
[x]Yes []No  
 
IV drips []Yes Drip:                            Dose: 
Drip:                            Dose: 
Drip:                            Dose:  
[x]No  
 
GI Prophylaxis [x]Yes []No  
 
 
 
DVT Prophylaxis SCDs:     
     
 Shawn stockings:     
  
[x] Medication []Contraindicated []None Activity Level Activity Level: Up with Assistance Activity Assistance: Partial (one person) Purposeful Rounding every 1-2 hour? [x]Yes Spence Score  Total Score: 4 Bed Alarm (If score 3 or >) [x]Yes  
[] Refused (See signed refusal form in chart) Dwayne Score  Dwayne Score: 18 Dwayne Score (if score 14 or less) []PMT consult  
[]Wound Care consult []Specialty bed  
[] Nutrition consult Needs prior to discharge:  
Home O2 required:   
[]Yes  
[x]No  
 If yes, how much O2 required? Other:  
 Last Bowel Movement: Last Bowel Movement Date: 01/31/19 Influenza Vaccine Received Flu Vaccine for Current Season (usually Sept-March): No  
 Patient/Guardian Refused (Notify MD): Yes Pneumonia Vaccine Diet Active Orders Diet DIET DIABETIC CONSISTENT CARB Regular LDAs Venous Access Device portacath  09/12/18 Upper chest (subclavicular area), left (Active) Venous Access Device portacath 02/04/19 Upper chest (subclavicular area), left (Active) Central Line Being Utilized Yes 2/10/2019  7:33 AM  
Criteria for Appropriate Use Long term IV/antibiotic administration 2/10/2019  7:33 AM  
Site Assessment Clean, dry, & intact 2/10/2019  7:33 AM  
Date of Last Dressing Change 02/03/19 2/10/2019  7:33 AM  
Dressing Status Clean, dry, & intact 2/10/2019  7:33 AM  
Dressing Type Disk with Chlorhexadine gluconate (CHG); Transparent 2/10/2019  7:33 AM  
Action Taken Blood drawn 2/10/2019  8:15 AM  
Positive Blood Return (Medial Site) Yes 2/10/2019  7:33 AM  
Alcohol Cap Used Yes 2/10/2019  3:22 AM  
  
Peripheral IV 02/03/19 Right Antecubital (Active) Site Assessment Clean, dry, & intact 2/10/2019  7:33 AM  
Phlebitis Assessment 0 2/10/2019  7:33 AM  
Infiltration Assessment 0 2/10/2019  7:33 AM  
Dressing Status Clean, dry, & intact 2/10/2019  7:33 AM  
Dressing Type Tape;Transparent 2/10/2019  7:33 AM  
Hub Color/Line Status Pink;Flushed;Capped 2/10/2019  7:33 AM  
Action Taken Open ports on tubing capped 2/8/2019  7:12 PM  
Alcohol Cap Used Yes 2/10/2019  7:33 AM  
      
External Female Catheter 02/04/19 (Active) Site Assessment Clean, dry, & intact 2/10/2019  7:33 AM  
Repositioned Yes 2/10/2019  7:33 AM  
Perineal Care Yes 2/10/2019  7:33 AM  
Wick Changed No 2/10/2019  7:33 AM  
 Suction Canister/Tubing Changed No 2/10/2019  7:33 AM  
Urine Output (mL) 200 ml 2/9/2019  4:34 AM  
            
Urinary Catheter Intake & Output Date 02/09/19 0700 - 02/10/19 0289 02/10/19 0700 - 02/11/19 0161 Shift 6120-9953 7193-1340 24 Hour Total 0668-0549 6423-2628 24 Hour Total  
INTAKE  
P.O.    480  480  
  P. O.    480  480  
I. V.(mL/kg/hr)  1600(1.2) 1600(0.6) 550  550 Volume (cefepime (MAXIPIME) 2 g in 0.9% sodium chloride (MBP/ADV) 100 mL)  400 400 100  100 Volume (vancomycin (VANCOCIN) 1500 mg in  ml infusion)  1000 1000 Volume (fluconazole (DIFLUCAN) 400mg/200 mL IVPB (premix))  200 200 200  200 Volume (vancomycin (VANCOCIN) 1250 mg in  ml infusion)    250  250 Shift Total(mL/kg)  1600(14.8) 1600(14.8) 1030(9.5)  1030(9.5) OUTPUT Urine(mL/kg/hr) 500(0.4)  500(0.2) 550  550 Urine Voided 500  500 550  550 Shift Total(mL/kg) 500(4.6)  500(4.6) 550(5.1)  550(5.1) NET -500 1600 1100 480  480 Weight (kg) 108. 2 108. 2 108. 2 108. 2 108. 2 108.2 Readmission Risk Assessment Tool Score Medium Risk 25 Total Score 3 Has Seen PCP in Last 6 Months (Yes=3, No=0) 3 Patient Length of Stay (>5 days = 3)  
 5 Pt. Coverage (Medicare=5 , Medicaid, or Self-Pay=4) 7 Charlson Comorbidity Score (Age + Comorbid Conditions) Criteria that do not apply:  
 . Living with Significant Other. Assisted Living. LTAC. SNF. or  
Rehab  
 IP Visits Last 12 Months (1-3=4, 4=9, >4=11) Expected Length of Stay 4d 7h Actual Length of Stay 7

## 2019-02-10 NOTE — PROGRESS NOTES
General Daily Progress Note Admit Date: 2/3/2019 Subjective:  
 
Patient feeling better this am - concerns re:bs elevation Current Facility-Administered Medications Medication Dose Route Frequency  vancomycin (VANCOCIN) 1250 mg in  ml infusion  1,250 mg IntraVENous Q12H  
 insulin glargine (LANTUS) injection 40 Units  40 Units SubCUTAneous DAILY  sodium chloride (NS) flush 5-40 mL  5-40 mL IntraVENous Q8H  
 sodium chloride (NS) flush 5-40 mL  5-40 mL IntraVENous PRN  
 sodium chloride (NS) flush 5-40 mL  5-40 mL IntraVENous Q8H  
 fluconazole (DIFLUCAN) 400mg/200 mL IVPB (premix)  400 mg IntraVENous Q24H  
 [START ON 2/15/2019] apixaban (ELIQUIS) tablet 5 mg  5 mg Oral Q12H And  
 apixaban (ELIQUIS) tablet 10 mg  10 mg Oral Q12H  nystatin (MYCOSTATIN) 100,000 unit/mL oral suspension 500,000 Units  500,000 Units Oral QID  sodium chloride (NS) flush 5-40 mL  5-40 mL IntraVENous Q8H  
 sodium chloride (NS) flush 5-40 mL  5-40 mL IntraVENous PRN  
 methylPREDNISolone (PF) (SOLU-MEDROL) injection 60 mg  60 mg IntraVENous Q8H  
 benzonatate (TESSALON) capsule 200 mg  200 mg Oral TID PRN  
 lactobac ac& pc-s.therm-b.anim (JUNI Q/RISAQUAD)  1 Cap Oral DAILY  insulin lispro (HUMALOG) injection   SubCUTAneous TIDAC  cefepime (MAXIPIME) 2 g in 0.9% sodium chloride (MBP/ADV) 100 mL  2 g IntraVENous Q8H  
 sodium chloride (NS) flush 5-40 mL  5-40 mL IntraVENous Q8H  
 sodium chloride (NS) flush 5-40 mL  5-40 mL IntraVENous PRN  
 acetaminophen (TYLENOL) tablet 650 mg  650 mg Oral Q6H PRN  
 ondansetron (ZOFRAN) injection 4 mg  4 mg IntraVENous Q4H PRN  
 amLODIPine (NORVASC) tablet 2.5 mg  2.5 mg Oral DAILY  atorvastatin (LIPITOR) tablet 40 mg  40 mg Oral QHS  fluticasone (FLONASE) 50 mcg/actuation nasal spray 2 Spray  2 Spray Both Nostrils DAILY  metoprolol succinate (TOPROL-XL) XL tablet 50 mg  50 mg Oral DAILY  glucose chewable tablet 16 g  4 Tab Oral PRN  
  dextrose (D50W) injection syrg 12.5-25 g  12.5-25 g IntraVENous PRN  
 glucagon (GLUCAGEN) injection 1 mg  1 mg IntraMUSCular PRN  
 nitroglycerin (NITROBID) 2 % ointment 1 Inch  1 Inch Topical Q6H PRN Review of Systems A comprehensive review of systems was negative. Objective:  
 
Patient Vitals for the past 24 hrs: 
 BP Temp Pulse Resp SpO2  
02/10/19 1121 134/55 97.2 °F (36.2 °C) 77 18 97 % 02/10/19 0733 141/68 97.5 °F (36.4 °C) 88 19 100 % 02/10/19 0322 154/73 98.3 °F (36.8 °C) 88 18 95 % 02/09/19 2333 129/54 98 °F (36.7 °C) 71 18 96 % 02/09/19 1940 149/76 98 °F (36.7 °C) 80 18 97 % 02/10 0701 - 02/10 1900 In: -  
Out: 550 [Urine:550] 02/08 1901 - 02/10 0700 In: 2100 [I.V.:2100] Out: 900 [Urine:900] Physical Exam:  
Visit Vitals /55 (BP 1 Location: Left arm, BP Patient Position: At rest) Pulse 77 Temp 97.2 °F (36.2 °C) Resp 18 Ht 5' 8\" (1.727 m) Wt 238 lb 8.6 oz (108.2 kg) SpO2 97% BMI 36.27 kg/m² General appearance: alert, cooperative, no distress, appears stated age Neck: supple, symmetrical, trachea midline, no adenopathy, thyroid: not enlarged, symmetric, no tenderness/mass/nodules, no carotid bruit and no JVD Lungs: rales R base, L base Heart: regular rate and rhythm, S1, S2 normal, no murmur, click, rub or gallop Abdomen: soft, non-tender. Bowel sounds normal. No masses,  no organomegaly Extremities: extremities normal, atraumatic, no cyanosis or edema Data Review Recent Results (from the past 24 hour(s)) GLUCOSE, POC Collection Time: 02/09/19  4:22 PM  
Result Value Ref Range Glucose (POC) 284 (H) 65 - 100 mg/dL Performed by Maureen Sorenson GLUCOSE, POC Collection Time: 02/09/19  9:19 PM  
Result Value Ref Range Glucose (POC) 231 (H) 65 - 100 mg/dL Performed by Aida RAM) SAMPLES BEING HELD Collection Time: 02/10/19  3:45 AM  
Result Value Ref Range SAMPLES BEING HELD GREEN   
 COMMENT Add-on orders for these samples will be processed based on acceptable specimen integrity and analyte stability, which may vary by analyte. METABOLIC PANEL, COMPREHENSIVE Collection Time: 02/10/19  3:45 AM  
Result Value Ref Range Sodium 136 136 - 145 mmol/L Potassium 3.9 3.5 - 5.1 mmol/L Chloride 104 97 - 108 mmol/L  
 CO2 26 21 - 32 mmol/L Anion gap 6 5 - 15 mmol/L Glucose 199 (H) 65 - 100 mg/dL BUN 23 (H) 6 - 20 MG/DL Creatinine 0.74 0.55 - 1.02 MG/DL  
 BUN/Creatinine ratio 31 (H) 12 - 20 GFR est AA >60 >60 ml/min/1.73m2 GFR est non-AA >60 >60 ml/min/1.73m2 Calcium 8.5 8.5 - 10.1 MG/DL Bilirubin, total 0.2 0.2 - 1.0 MG/DL  
 ALT (SGPT) 16 12 - 78 U/L  
 AST (SGOT) 17 15 - 37 U/L Alk. phosphatase 98 45 - 117 U/L Protein, total 6.6 6.4 - 8.2 g/dL Albumin 2.4 (L) 3.5 - 5.0 g/dL Globulin 4.2 (H) 2.0 - 4.0 g/dL A-G Ratio 0.6 (L) 1.1 - 2.2 GLUCOSE, POC Collection Time: 02/10/19  8:05 AM  
Result Value Ref Range Glucose (POC) 220 (H) 65 - 100 mg/dL Performed by Blake Dasilva Smoke Collection Time: 02/10/19  8:27 AM  
Result Value Ref Range Vancomycin,trough 23.4 (HH) 5.0 - 10.0 ug/mL Reported dose date: NOT PROVIDED Reported dose time: NOT PROVIDED Reported dose: NOT PROVIDED UNITS  
GLUCOSE, POC Collection Time: 02/10/19 11:17 AM  
Result Value Ref Range Glucose (POC) 252 (H) 65 - 100 mg/dL Performed by Blake Cedillo Assessment:  
 
Active Problems: 
  SVT (supraventricular tachycardia) ([facility] (8/29/2016) Essential hypertension with goal blood pressure less than 140/90 (8/29/2016) Diabetes mellitus type 2, controlled ([facility] (9/8/2016) Dyslipidemia (9/8/2016) Acute respiratory failure ([facility] (2/3/2019) Leg swelling (2/3/2019) Hodgkin lymphoma ([facility] (2/3/2019) CAP (community acquired pneumonia) (2/3/2019) Plan: 1.  Continue treatment of interstitial pneumonitis. Await biopsy report. O2 requirements reasonably stable. 2.  Continue diabetic control. 3.  Will mobilize. 4.  Titrate insulin

## 2019-02-10 NOTE — PROGRESS NOTES
Pharmacy Automatic Renal Dosing Protocol - Antimicrobials Indication for Antimicrobials:  PNA (bilateral) on immunocompromised patient Current Regimen of Each Antimicrobial: 
Vancomycin 1.5 g X1, then 1.25 g q 12 hours (Start Date ; Day # 7 
levofloxacin 750 mg q24h (Start Date ; Day # 7 Cefepime 2 gm q8h,  (Start Date ; Day # 7 Previous Antimicrobial Therapy: 
None Goal Level: VANCOMYCIN TROUGH GOAL RANGE Vancomycin Trough: 15 - 20 mcg/mL Date Dose & Interval Measured (mcg/mL) Extrapolated (mcg/mL)  
 @8:36 1250 mg q 12 H 12.2 14.7 2/10 @ 0827 1500 mg q 12 H 23.4 19.9 Date & time of next level:  2/10 or  if Scr remains stable Significant Cultures:  
2/3: Blood cx: No growth - FINAL 
: Respiratory panel: Coronavirus 229E 
: AFB cx + smear: pending : Respiratory cx: moderate yeast - pending : Fungus cx: pending : Viral cx: FINAL Radiology / Imaging results: (X-ray, CT scan or MRI):  
: CXR: Bilateral airspace disease right greater than left is consistent with pneumonia Paralysis, amputations, malnutrition:   
 
Labs: 
Recent Labs  
  02/10/19 
0345 19 
1050 CREA 0.74 0.82  
BUN 23* 19 WBC  --  20.3* Temp (24hrs), Av.9 °F (36.6 °C), Min:97.5 °F (36.4 °C), Max:98.3 °F (36.8 °C) Creatinine Clearance (mL/min) or Dialysis: >50 Impression/Plan: · Vancomycin dose adjusted based on the trough level from  to 1250 mg q 12 hours with an estimated trough of 15.6 mcg/ml. · Continue with the current dose of LVQ and cefepime · SCr stable · Afebrile · Please consider discontinuing the vancomycin as the patient's cx has NOT grown any MRSA/GP organisms. · Antimicrobial stop date: please specify the duration of treatment. Today is day #7 of triple antibiotics treatment Pharmacy will follow daily and adjust medications as appropriate for renal function and/or serum levels. Thank you, Elke Mccollum, PHARMD 
 
 Recommended duration of therapy 
[email or web address]/[email or web address]. docx Renal Dosing 
[email or web address]/[email or web address]/Renal%[email or web address]. pdf

## 2019-02-11 NOTE — ROUTINE PROCESS
Bedside and Verbal shift change report given to Winter Cotto RN (oncoming nurse) by Ryan Philip RN (offgoing nurse). Report included the following information SBAR, ED Summary, Intake/Output, MAR and Recent Results.

## 2019-02-11 NOTE — PROGRESS NOTES
[facility] [place], and Sleep Medicine Progress Note Name: Leonela Espinoza MRN: [id number] : 1950 Hospital: [place] Date: 2019 IMPRESSION:  
· Acute hypoxic resp failure - decreasing o2 needs but still on NC oxygen. · Bilateral pulm infiltrates-  First noted on PET  and seen on ct on admit - tx with augmentin -, then Levaquin - Preceding fever 101. S/P bronch . DDX: PNA, opportunistic infection and/or bleomycin toxicity from chemo - suspect toxicity if bronch remains negative. · Coronavirus positive RVP · Hodkins lymphoma- tx with 4 cyles of abvd- positive response to chemo · Pulm htn - pap 60 with nl lvef · Anemia-  ? Related to chemo- stable · Hx of asthma from childhood to age 24  
· NEWLY DISCOVERED BILATERAL PTE WITH NEG DUPLEX OF LEG, now on Eliquis. RECOMMENDATIONS:  
· IV ABX - immunosuppressed pt - broad spectrum - Will  stop vancomycin. · Once over this acute episode will get repeat PFTs as an outpt. · Can add nebs to help with airway clearance. · IV fluconazole · On Eliquis for PE. · Wean o2 as able, goal try to keep pox over 90%. · PT and OT to eval.  
· IV steroids, decreased the dose. · F/U cultures, so far negative. · F/U CXR Monday Subjective: Pt was seen this am. Has been have moderate to severe coughing paroxysms. Dry and not sputum production. NO chest pain, no back pain, no issues with sleeping. Still has dyspnea. No increased fevers. Pulse has been stable. Still with hypoxia on 3L NC with pox of 91%. This patient has been seen and evaluated at the request of Dr. Carlos Shine  For hypoxia. Patient is a 70 y.o. female  Who was dx with hodgkin lymphoma  In august when she was getting a massage in [facility] and the masseuse note  At rt neck ln. She is s/p chemo with abvd.   She  Was noted to have bilateral pulm infiltrates on pet/ct On jan 21  And was started on augmenin on jan 25 - seen in  Oncology office on Jan 30 - sats high 80's but increased to 90 % on fluids and was started on levaquin on Jan 30. Noted sig  Increased landaverde and said sats drop to 70's with exertion . She says she  Feels much better today. NO cough, heme , phlegm. NO cp. She has noted some ankle swelling. Feb 5 - report decreased sob, reports decreased ankle swelling . reports rhinitis with clear liquid Feb 6- says she gets a cough and sob with talking. Slept well. Feb 7- says she feels better 2/8: Resting in bed talking on the telephone. No current complaints. On 3LPM.   
 
Past Medical History:  
Diagnosis Date  Asthma   
 as a child, nothing since menopause  Cancer ([facility] [address] 08/21/2018 Non-Hodgkin's  Cataracts, bilateral   
 Diabetes ([facility] [address] type II  
 Environmental allergies  Fibromyalgia Treated with alternate medical therapy  Hypertension   
 no asthma since age 24  // Dr. Kate Real said hodgkins  And pt confirms Past Surgical History:  
Procedure Laterality Date  HX BREAST BIOPSY Right 2006  HX COLONOSCOPY  2015  HX GYN Pap 2015  HX ORTHOPAEDIC    
 fractured R ankle/ no surgery  HX OTHER SURGICAL Right 08/27/2018  
 excision of deep neck cervical LN. Prior to Admission medications Medication Sig Start Date End Date Taking? Authorizing Provider ONETOUCH VERIO strip USE 1 STRIP IN VITRO TO TEST BLOOD SUGAR ONCE DAILY 10/17/18  Yes Ben Lauren MD  
ibuprofen (MOTRIN) 600 mg tablet Take 1 Tab by mouth every six (6) hours as needed for Pain. 8/28/18  Yes Zak Back MD  
Lancets (ONE TOUCH DELICA) misc USE TO TEST BLOOD SUGAR ONCE DAILY. DX.E11.9 10/13/16  Yes Ben Lauren MD  
pioglitazone (ACTOS) 30 mg tablet TAKE 1 TABLET DAILY 1/6/19   Ben Lauren MD  
metoprolol succinate (TOPROL-XL) 50 mg XL tablet TAKE 1 TAB BY MOUTH DAILY.  10/12/18   Ben Lauren MD  
 JANUMET XR 50-1,000 mg TM24 TAKE 1 TABLET BY MOUTH TWICE A DAY WITH MEALS 10/12/18   Regla Lopez MD  
fluticasone [facility] ALLERGY RELIEF) 50 mcg/actuation nasal spray 2 Sprays by Both Nostrils route daily. Provider, Historical  
spironolactone-hydrochlorothiazide (ALDACTAZIDE) 25-25 mg per tablet TAKE ONE TABLET BY MOUTH EVERY DAY 12/3/17   Regla Lopez MD  
atorvastatin (LIPITOR) 40 mg tablet TAKE 1 TAB BY MOUTH DAILY. 12/3/17   Regla Lopez MD  
amLODIPine (NORVASC) 10 mg tablet TAKE 1 TABLET BY MOUTH EVERY DAY 17   Regla Lopez MD  
alcohol swabs (ALCOHOL PREP PADS) padm 1 Pad by Apply Externally route daily. DX.E11.9 10/13/16   Regla Lopez MD  
 
No Known Allergies Social History Tobacco Use  Smoking status: Never Smoker  Smokeless tobacco: Never Used Substance Use Topics  Alcohol use: Yes Comment: socially  
 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   
     prostate cancer  Cancer Sister Breast cancer apparent DCIS  
 Heart Disease Brother Brother  from congestive heart failure secondary to severe mitral disease Current Facility-Administered Medications Medication Dose Route Frequency  insulin glargine (LANTUS) injection 12 Units  12 Units SubCUTAneous QHS  vancomycin (VANCOCIN) 1250 mg in  ml infusion  1,250 mg IntraVENous Q12H  
 insulin glargine (LANTUS) injection 50 Units  50 Units SubCUTAneous DAILY  sodium chloride (NS) flush 5-40 mL  5-40 mL IntraVENous Q8H  
 sodium chloride (NS) flush 5-40 mL  5-40 mL IntraVENous Q8H  
 fluconazole (DIFLUCAN) 400mg/200 mL IVPB (premix)  400 mg IntraVENous Q24H  
 [START ON 2/15/2019] apixaban (ELIQUIS) tablet 5 mg  5 mg Oral Q12H  And  
 apixaban (ELIQUIS) tablet 10 mg  10 mg Oral Q12H  
 sodium chloride (NS) flush 5-40 mL  5-40 mL IntraVENous Q8H  
  methylPREDNISolone (PF) (SOLU-MEDROL) injection 60 mg  60 mg IntraVENous Q8H  
 lactobac ac& pc-s.therm-b.anim (JUNI Q/RISAQUAD)  1 Cap Oral DAILY  insulin lispro (HUMALOG) injection   SubCUTAneous TIDAC  cefepime (MAXIPIME) 2 g in 0.9% sodium chloride (MBP/ADV) 100 mL  2 g IntraVENous Q8H  
 sodium chloride (NS) flush 5-40 mL  5-40 mL IntraVENous Q8H  
 amLODIPine (NORVASC) tablet 2.5 mg  2.5 mg Oral DAILY  atorvastatin (LIPITOR) tablet 40 mg  40 mg Oral QHS  fluticasone (FLONASE) 50 mcg/actuation nasal spray 2 Spray  2 Spray Both Nostrils DAILY  metoprolol succinate (TOPROL-XL) XL tablet 50 mg  50 mg Oral DAILY Review of Systems: A comprehensive review of systems was negative except for that written in the HPI. Objective: 
Vital Signs:   
Visit Vitals /74 (BP 1 Location: Left arm, BP Patient Position: At rest) Pulse 84 Temp 98.3 °F (36.8 °C) Resp 18 Ht 5' 8\" (1.727 m) Wt 108.2 kg (238 lb 8.6 oz) SpO2 91% BMI 36.27 kg/m² O2 Device: Nasal cannula O2 Flow Rate (L/min): 3 l/min Temp (24hrs), Av °F (36.7 °C), Min:97.5 °F (36.4 °C), Max:98.3 °F (36.8 °C) Intake/Output:  
Last shift:      No intake/output data recorded. Last 3 shifts:  1901 -  0700 In: 8794 [P.O.:600; I.V.:2250] Out: 1100 [Urine:1100] Intake/Output Summary (Last 24 hours) at 2019 1206 Last data filed at 2019 0401 Gross per 24 hour Intake 910 ml Output 550 ml Net 360 ml Physical Exam:  
General:  Alert, cooperative, no distress, appears stated age. ON NC oxygen. Sitting up in chair. Head:  Normocephalic, without obvious abnormality, atraumatic. alopecia Eyes:  Conjunctivae/corneas clear. PERRL, EOMs intact. Nose: Nares normal. Septum midline. Mucosa normal. No drainage or sinus tenderness. Throat: Lips, mucosa, and tongue normal. Teeth and gums normal.- Neck: Supple, symmetrical, trachea midline, no adenopathy, thyroid: no enlargment/tenderness/nodules, no carotid bruit and no JVD. Back:   Symmetric, no curvature. ROM normal.  
Lungs:   Bilateral rales . -  Rt greater than left in the bases. Appears to be breathing comfortably. Good air movement. Chest wall:  No tenderness or deformity. Heart:  Regular rate and rhythm, S1, S2 normal, no murmur, click, rub or gallop.- mild tachy Abdomen:   Soft, non-tender. Bowel sounds normal. No masses,  No organomegaly. Extremities: Extremities normal, atraumatic, no cyanosis - edema gone Pulses: 2+ and symmetric all extremities. Skin: Skin color, texture, turgor normal. No rashes or lesions Lymph nodes: Cervical, supraclavicular, and axillary nodes normal.  
Neurologic: Grossly nonfocal, motor is intact. Psych: no over anxiety or depression. Data review:  
 
Recent Results (from the past 24 hour(s)) GLUCOSE, POC Collection Time: 02/10/19  3:52 PM  
Result Value Ref Range Glucose (POC) 294 (H) 65 - 100 mg/dL Performed by Jonel Pozo GLUCOSE, POC Collection Time: 02/10/19  8:34 PM  
Result Value Ref Range Glucose (POC) 343 (H) 65 - 100 mg/dL Performed by Lc Olivares (PCT) METABOLIC PANEL, BASIC Collection Time: 02/11/19  4:10 AM  
Result Value Ref Range Sodium 136 136 - 145 mmol/L Potassium 3.6 3.5 - 5.1 mmol/L Chloride 105 97 - 108 mmol/L  
 CO2 23 21 - 32 mmol/L Anion gap 8 5 - 15 mmol/L Glucose 216 (H) 65 - 100 mg/dL BUN 25 (H) 6 - 20 MG/DL Creatinine 0.65 0.55 - 1.02 MG/DL  
 BUN/Creatinine ratio 38 (H) 12 - 20 GFR est AA >60 >60 ml/min/1.73m2 GFR est non-AA >60 >60 ml/min/1.73m2 Calcium 8.7 8.5 - 10.1 MG/DL  
CBC WITH MANUAL DIFF Collection Time: 02/11/19  4:10 AM  
Result Value Ref Range WBC 21.5 (H) 3.6 - 11.0 K/uL  
 RBC 2.94 (L) 3.80 - 5.20 M/uL HGB 8.6 (L) 11.5 - 16.0 g/dL HCT 26.3 (L) 35.0 - 47.0 % MCV 89.5 80.0 - 99.0 FL  
 MCH 29.3 26.0 - 34.0 PG  
 MCHC 32.7 30.0 - 36.5 g/dL RDW 14.2 11.5 - 14.5 % PLATELET 649 (H) 000 - 400 K/uL MPV 9.6 8.9 - 12.9 FL  
 NRBC 0.0 0  WBC ABSOLUTE NRBC 0.00 0.00 - 0.01 K/uL NEUTROPHILS 94 (H) 32 - 75 % BAND NEUTROPHILS 1 0 - 6 % LYMPHOCYTES 3 (L) 12 - 49 % MONOCYTES 2 (L) 5 - 13 % EOSINOPHILS 0 0 - 7 % BASOPHILS 0 0 - 1 % METAMYELOCYTES 0 0 % MYELOCYTES 0 0 % PROMYELOCYTES 0 0 % BLASTS 0 0 % OTHER CELL 0 0 IMMATURE GRANULOCYTES 0 %  
 ABS. NEUTROPHILS 20.5 (H) 1.8 - 8.0 K/UL  
 ABS. LYMPHOCYTES 0.6 (L) 0.8 - 3.5 K/UL  
 ABS. MONOCYTES 0.4 0.0 - 1.0 K/UL  
 ABS. EOSINOPHILS 0.0 0.0 - 0.4 K/UL  
 ABS. BASOPHILS 0.0 0.0 - 0.1 K/UL  
 ABS. IMM. GRANS. 0.0 K/UL  
 DF MANUAL PLATELET COMMENTS Large Platelets RBC COMMENTS TEARDROP CELLS 
PRESENT 
    
GLUCOSE, POC Collection Time: 02/11/19  7:53 AM  
Result Value Ref Range Glucose (POC) 219 (H) 65 - 100 mg/dL Performed by Baylee Scruggs (traveler) GLUCOSE, POC Collection Time: 02/11/19 11:12 AM  
Result Value Ref Range Glucose (POC) 208 (H) 65 - 100 mg/dL Performed by Marybeth Pagan (PCT) Imaging: 
I have personally reviewed the patients radiographs and have reviewed the reports: 
Ct scan: 2-3-19: FINDINGS: 
CHEST: 
THYROID: No nodule. MEDIASTINUM: Mediastinal lymphadenopathy has improved compared to the prior 
exam. Transverse dimension at the level of the aortic arch was previously 5.9 cm 
and is now 4.2 cm. SERJIO: No mass or lymphadenopathy. THORACIC AORTA: No dissection or aneurysm. MAIN PULMONARY ARTERY: Right upper and left lower lobe pulmonary emboli are 
noted. TRACHEA/BRONCHI: Patent. ESOPHAGUS: No wall thickening or dilatation. HEART: Normal in size. PLEURA: No effusion or pneumothorax. LUNGS: Infiltrates are noted throughout the right lung and left lower lobe. INCIDENTALLY IMAGED UPPER ABDOMEN: 17 mm right adrenal adenoma.  
BONES: Degenerative changes are seen in the thoracic spine. 
  
  
 IMPRESSION: Right upper and left lower lobe pulmonary emboli. Bilateral airspace 
disease. Improved mediastinal lymphadenopathy. 2-11-19: Clinical indication: Bilateral lung infiltrates, acute respiratory distress with 
hypoxia. 
  
Portable AP upright view of the chest is obtained, comparison February 8. Lung 
fields are unchanged. Infusion catheter is unchanged. Shallow inspiration with 
elevation of the right hemidiaphragm, bilateral interstitial infiltrates more 
prominent on the right. 
  
IMPRESSION 
: No change.  
 
  
Prasad García MD

## 2019-02-11 NOTE — ROUTINE PROCESS
Bedside and Verbal shift change report given to Thad Renee RN (oncoming nurse) by Matt Estes RN (offgoing nurse). Report included the following information SBAR, Intake/Output, MAR and Recent Results.

## 2019-02-11 NOTE — PROGRESS NOTES
Problem: Falls - Risk of 
Goal: *Absence of Falls Document Latosha Joyce Fall Risk and appropriate interventions in the flowsheet. Outcome: Progressing Towards Goal 
Fall Risk Interventions: 
Mobility Interventions: Communicate number of staff needed for ambulation/transfer, Patient to call before getting OOB Mentation Interventions: Adequate sleep, hydration, pain control Medication Interventions: Evaluate medications/consider consulting pharmacy, Teach patient to arise slowly, Patient to call before getting OOB Elimination Interventions: Call light in reach, Patient to call for help with toileting needs, Toileting schedule/hourly rounds History of Falls Interventions: Investigate reason for fall, Room close to nurse's station

## 2019-02-11 NOTE — PROGRESS NOTES
Hematology Oncology Progress Note Follow up for: HD Chart notes reviewed since last visit. Case discussed with following:  
 
Patient complains of the following: She is feeling like she is slowly improving. Weak still and on 3L NC. Occasional dry cough. Additional concerns noted by the staff:  
 
Patient Vitals for the past 24 hrs: 
 BP Temp Pulse Resp SpO2  
02/11/19 0731 151/74 98.3 °F (36.8 °C) 84 18 91 % 02/11/19 0401 130/77 98 °F (36.7 °C) 86 16 93 % 02/10/19 2352 142/78 98.2 °F (36.8 °C) 84 16 95 % 02/10/19 1945 127/73 98 °F (36.7 °C) 80 16 96 % 02/10/19 1552 158/73 97.5 °F (36.4 °C) 84 18 99 % Review of Systems: 
12 point ROS done and negative except as above Physical Examination: 
gen NAD 
CV reg Lungs coarse throughout, no wheezes 
abd benign Labs: 
Recent Results (from the past 24 hour(s)) GLUCOSE, POC Collection Time: 02/10/19  3:52 PM  
Result Value Ref Range Glucose (POC) 294 (H) 65 - 100 mg/dL Performed by Adrienne Shrestha GLUCOSE, POC Collection Time: 02/10/19  8:34 PM  
Result Value Ref Range Glucose (POC) 343 (H) 65 - 100 mg/dL Performed by Lc Olivares (PCT) METABOLIC PANEL, BASIC Collection Time: 02/11/19  4:10 AM  
Result Value Ref Range Sodium 136 136 - 145 mmol/L Potassium 3.6 3.5 - 5.1 mmol/L Chloride 105 97 - 108 mmol/L  
 CO2 23 21 - 32 mmol/L Anion gap 8 5 - 15 mmol/L Glucose 216 (H) 65 - 100 mg/dL BUN 25 (H) 6 - 20 MG/DL Creatinine 0.65 0.55 - 1.02 MG/DL  
 BUN/Creatinine ratio 38 (H) 12 - 20 GFR est AA >60 >60 ml/min/1.73m2 GFR est non-AA >60 >60 ml/min/1.73m2 Calcium 8.7 8.5 - 10.1 MG/DL  
CBC WITH MANUAL DIFF Collection Time: 02/11/19  4:10 AM  
Result Value Ref Range WBC 21.5 (H) 3.6 - 11.0 K/uL  
 RBC 2.94 (L) 3.80 - 5.20 M/uL HGB 8.6 (L) 11.5 - 16.0 g/dL HCT 26.3 (L) 35.0 - 47.0 % MCV 89.5 80.0 - 99.0 FL  
 MCH 29.3 26.0 - 34.0 PG  
 MCHC 32.7 30.0 - 36.5 g/dL RDW 14.2 11.5 - 14.5 % PLATELET 438 (H) 712 - 400 K/uL MPV 9.6 8.9 - 12.9 FL  
 NRBC 0.0 0  WBC ABSOLUTE NRBC 0.00 0.00 - 0.01 K/uL NEUTROPHILS 94 (H) 32 - 75 % BAND NEUTROPHILS 1 0 - 6 % LYMPHOCYTES 3 (L) 12 - 49 % MONOCYTES 2 (L) 5 - 13 % EOSINOPHILS 0 0 - 7 % BASOPHILS 0 0 - 1 % METAMYELOCYTES 0 0 % MYELOCYTES 0 0 % PROMYELOCYTES 0 0 % BLASTS 0 0 % OTHER CELL 0 0 IMMATURE GRANULOCYTES 0 %  
 ABS. NEUTROPHILS 20.5 (H) 1.8 - 8.0 K/UL  
 ABS. LYMPHOCYTES 0.6 (L) 0.8 - 3.5 K/UL  
 ABS. MONOCYTES 0.4 0.0 - 1.0 K/UL  
 ABS. EOSINOPHILS 0.0 0.0 - 0.4 K/UL  
 ABS. BASOPHILS 0.0 0.0 - 0.1 K/UL  
 ABS. IMM. GRANS. 0.0 K/UL  
 DF MANUAL PLATELET COMMENTS Large Platelets RBC COMMENTS TEARDROP CELLS 
PRESENT 
    
GLUCOSE, POC Collection Time: 02/11/19  7:53 AM  
Result Value Ref Range Glucose (POC) 219 (H) 65 - 100 mg/dL Performed by Roberto Caceres (traveler) GLUCOSE, POC Collection Time: 02/11/19 11:12 AM  
Result Value Ref Range Glucose (POC) 208 (H) 65 - 100 mg/dL Performed by Alejandra Mckinley (PCT) Assessment and Plan: 
 
*) Hodgkins Lymphoma: 
- s/p 4 cycles of ABVD with post treatment PET without any uptake. - will not plan any further chemo or xrt given lung issues. *) Respiratory Failure: - Worrisome for Bleomycin toxicity? We did do baseline PFT's can repeat per pulmonary in the future. 
-On abx per primary and pulmonary - Appreciate pulmonary assistance/input. 
-bronch 2/6, cultures NGTD 
- Viral panel with coronavirus 
- Candida present on broch - Remains on 2-3 L NC, slowly better clinically PE 
- Now on eliquis - LE dopplers negative

## 2019-02-11 NOTE — PROGRESS NOTES
Problem: Falls - Risk of 
Goal: *Absence of Falls Document Dudley Phillips Fall Risk and appropriate interventions in the flowsheet. Outcome: Progressing Towards Goal 
Fall Risk Interventions: 
Mobility Interventions: Communicate number of staff needed for ambulation/transfer, Patient to call before getting OOB Mentation Interventions: Adequate sleep, hydration, pain control, Door open when patient unattended, Increase mobility, More frequent rounding, Reorient patient, Bed/chair exit alarm Medication Interventions: Patient to call before getting OOB, Teach patient to arise slowly Elimination Interventions: Call light in reach, Patient to call for help with toileting needs History of Falls Interventions: Consult care management for discharge planning, Door open when patient unattended Problem: Pressure Injury - Risk of 
Goal: *Prevention of pressure injury Document Dwayne Scale and appropriate interventions in the flowsheet. Outcome: Progressing Towards Goal 
Pressure Injury Interventions: 
Sensory Interventions: Assess changes in LOC, Check visual cues for pain, Keep linens dry and wrinkle-free, Maintain/enhance activity level Moisture Interventions: Absorbent underpads Activity Interventions: Increase time out of bed, PT/OT evaluation Mobility Interventions: Chair cushion, Pressure redistribution bed/mattress (bed type) Nutrition Interventions: Offer support with meals,snacks and hydration Friction and Shear Interventions: Lift sheet

## 2019-02-11 NOTE — PROGRESS NOTES
Attempted to see pt this pm and after going in to start nursing came in for change of shift and after waiting 25 min for nursing to finish pt stated she was to exhausted to participate in tx today. Aborted tx and will try again tomorrow.

## 2019-02-11 NOTE — PROGRESS NOTES
Spiritual Care Assessment/Progress Note [place] 
 
 
NAME: Tiana Rice      MRN: [id number] AGE: 70 y.o. SEX: female Judaism Affiliation: Bioserie  
Language: [place] 2/11/2019     Total Time (in minutes): 12 Spiritual Assessment begun in [facility] PROGRESSIVE CARE through conversation with: 
  
    [x]Patient        [x] Family    [] Friend(s) Reason for Consult: Initial/Spiritual assessment, patient floor, Initial visit Spiritual beliefs: (Please include comment if needed) [x] Identifies with a viri tradition:     
   [] Supported by a viri community:        
   [] Claims no spiritual orientation:       
   [] Seeking spiritual identity:            
   [] Adheres to an individual form of spirituality:       
   [] Not able to assess:                   
 
    
Identified resources for coping:  
   [x] Prayer                           
   [] Music                  [] Guided Imagery [x] Family/friends                 [] Pet visits [] Devotional reading                         [] Unknown 
   [] Other:                                          
 
 
Interventions offered during this visit: (See comments for more details) Patient Interventions: Initial/Spiritual assessment, patient floor, Initial visit, Prayer (assurance of), Affirmation of emotions/emotional suffering Family/Friend(s): Prayer (assurance of), Affirmation of emotions/emotional suffering Plan of Care: 
 
 [x] Support spiritual and/or cultural needs  
 [] Support AMD and/or advance care planning process    
 [] Support grieving process 
 [] Coordinate Rites and/or Rituals  
 [] Coordination with community clergy [] No spiritual needs identified at this time 
 [] Detailed Plan of Care below (See Comments)  [] Make referral to Music Therapy 
[] Make referral to Pet Therapy    
[] Make referral to Addiction services 
[] Make referral to [facility] [] Make referral to Spiritual Care Partner 
[] No future visits requested       
[x] Follow up visits as needed Comments:  visited patient in Progressive Care. Patients niece was in the room with her supplying comfort. Patient has a strong family to support her. Patient also likes prayer. She has come a long way and is recovering, but still has a few more days to go.  provided comfort and support to the patient and family. Spiritual Care will follow up as needed. Ana Mckeon MDiv Pager: [phone]

## 2019-02-11 NOTE — PROGRESS NOTES
General Daily Progress Note Admit Date: 2/3/2019 Subjective:  
 
Patient has no complaint . Current Facility-Administered Medications Medication Dose Route Frequency  insulin glargine (LANTUS) injection 12 Units  12 Units SubCUTAneous QHS  vancomycin (VANCOCIN) 1250 mg in  ml infusion  1,250 mg IntraVENous Q12H  
 insulin glargine (LANTUS) injection 50 Units  50 Units SubCUTAneous DAILY  sodium chloride (NS) flush 5-40 mL  5-40 mL IntraVENous Q8H  
 sodium chloride (NS) flush 5-40 mL  5-40 mL IntraVENous PRN  
 sodium chloride (NS) flush 5-40 mL  5-40 mL IntraVENous Q8H  
 fluconazole (DIFLUCAN) 400mg/200 mL IVPB (premix)  400 mg IntraVENous Q24H  
 [START ON 2/15/2019] apixaban (ELIQUIS) tablet 5 mg  5 mg Oral Q12H And  
 apixaban (ELIQUIS) tablet 10 mg  10 mg Oral Q12H  
 sodium chloride (NS) flush 5-40 mL  5-40 mL IntraVENous Q8H  
 sodium chloride (NS) flush 5-40 mL  5-40 mL IntraVENous PRN  
 methylPREDNISolone (PF) (SOLU-MEDROL) injection 60 mg  60 mg IntraVENous Q8H  
 benzonatate (TESSALON) capsule 200 mg  200 mg Oral TID PRN  
 lactobac ac& pc-s.therm-b.anim (JUNI Q/RISAQUAD)  1 Cap Oral DAILY  insulin lispro (HUMALOG) injection   SubCUTAneous TIDAC  cefepime (MAXIPIME) 2 g in 0.9% sodium chloride (MBP/ADV) 100 mL  2 g IntraVENous Q8H  
 sodium chloride (NS) flush 5-40 mL  5-40 mL IntraVENous Q8H  
 sodium chloride (NS) flush 5-40 mL  5-40 mL IntraVENous PRN  
 acetaminophen (TYLENOL) tablet 650 mg  650 mg Oral Q6H PRN  
 ondansetron (ZOFRAN) injection 4 mg  4 mg IntraVENous Q4H PRN  
 amLODIPine (NORVASC) tablet 2.5 mg  2.5 mg Oral DAILY  atorvastatin (LIPITOR) tablet 40 mg  40 mg Oral QHS  fluticasone (FLONASE) 50 mcg/actuation nasal spray 2 Spray  2 Spray Both Nostrils DAILY  metoprolol succinate (TOPROL-XL) XL tablet 50 mg  50 mg Oral DAILY  glucose chewable tablet 16 g  4 Tab Oral PRN  
  dextrose (D50W) injection syrg 12.5-25 g  12.5-25 g IntraVENous PRN  
 glucagon (GLUCAGEN) injection 1 mg  1 mg IntraMUSCular PRN  
 nitroglycerin (NITROBID) 2 % ointment 1 Inch  1 Inch Topical Q6H PRN Review of Systems A comprehensive review of systems was negative. Objective:  
 
Patient Vitals for the past 24 hrs: 
 BP Temp Pulse Resp SpO2  
02/11/19 0731 151/74 98.3 °F (36.8 °C) 84 18 91 % 02/11/19 0401 130/77 98 °F (36.7 °C) 86 16 93 % 02/10/19 2352 142/78 98.2 °F (36.8 °C) 84 16 95 % 02/10/19 1945 127/73 98 °F (36.7 °C) 80 16 96 % 02/10/19 1552 158/73 97.5 °F (36.4 °C) 84 18 99 % 02/10/19 1121 134/55 97.2 °F (36.2 °C) 77 18 97 % No intake/output data recorded. 02/09 1901 - 02/11 0700 In: 7255 [P.O.:600; I.V.:2250] Out: 1100 [Urine:1100] Physical Exam:  
Visit Vitals /74 (BP 1 Location: Left arm, BP Patient Position: At rest) Pulse 84 Temp 98.3 °F (36.8 °C) Resp 18 Ht 5' 8\" (1.727 m) Wt 238 lb 8.6 oz (108.2 kg) SpO2 91% BMI 36.27 kg/m² General appearance: alert, cooperative, no distress, appears stated age Neck: supple, symmetrical, trachea midline, no adenopathy, thyroid: not enlarged, symmetric, no tenderness/mass/nodules, no carotid bruit and no JVD Lungs: rales R base, L base, diminished breath sounds R base, L base Heart: regular rate and rhythm, S1, S2 normal, no murmur, click, rub or gallop Abdomen: soft, non-tender. Bowel sounds normal. No masses,  no organomegaly Extremities: extremities normal, atraumatic, no cyanosis or edema Data Review Recent Results (from the past 24 hour(s)) GLUCOSE, POC Collection Time: 02/10/19 11:17 AM  
Result Value Ref Range Glucose (POC) 252 (H) 65 - 100 mg/dL Performed by Blake Cedillo GLUCOSE, POC Collection Time: 02/10/19  3:52 PM  
Result Value Ref Range Glucose (POC) 294 (H) 65 - 100 mg/dL Performed by Blake Cedillo GLUCOSE, POC  
 Collection Time: 02/10/19  8:34 PM  
Result Value Ref Range Glucose (POC) 343 (H) 65 - 100 mg/dL Performed by Lc Olivares (PCT) METABOLIC PANEL, BASIC Collection Time: 02/11/19  4:10 AM  
Result Value Ref Range Sodium 136 136 - 145 mmol/L Potassium 3.6 3.5 - 5.1 mmol/L Chloride 105 97 - 108 mmol/L  
 CO2 23 21 - 32 mmol/L Anion gap 8 5 - 15 mmol/L Glucose 216 (H) 65 - 100 mg/dL BUN 25 (H) 6 - 20 MG/DL Creatinine 0.65 0.55 - 1.02 MG/DL  
 BUN/Creatinine ratio 38 (H) 12 - 20 GFR est AA >60 >60 ml/min/1.73m2 GFR est non-AA >60 >60 ml/min/1.73m2 Calcium 8.7 8.5 - 10.1 MG/DL  
CBC WITH MANUAL DIFF Collection Time: 02/11/19  4:10 AM  
Result Value Ref Range WBC 21.5 (H) 3.6 - 11.0 K/uL  
 RBC 2.94 (L) 3.80 - 5.20 M/uL HGB 8.6 (L) 11.5 - 16.0 g/dL HCT 26.3 (L) 35.0 - 47.0 % MCV 89.5 80.0 - 99.0 FL  
 MCH 29.3 26.0 - 34.0 PG  
 MCHC 32.7 30.0 - 36.5 g/dL  
 RDW 14.2 11.5 - 14.5 % PLATELET 394 (H) 796 - 400 K/uL MPV 9.6 8.9 - 12.9 FL  
 NRBC 0.0 0  WBC ABSOLUTE NRBC 0.00 0.00 - 0.01 K/uL NEUTROPHILS 94 (H) 32 - 75 % BAND NEUTROPHILS 1 0 - 6 % LYMPHOCYTES 3 (L) 12 - 49 % MONOCYTES 2 (L) 5 - 13 % EOSINOPHILS 0 0 - 7 % BASOPHILS 0 0 - 1 % METAMYELOCYTES 0 0 % MYELOCYTES 0 0 % PROMYELOCYTES 0 0 % BLASTS 0 0 % OTHER CELL 0 0 IMMATURE GRANULOCYTES 0 %  
 ABS. NEUTROPHILS 20.5 (H) 1.8 - 8.0 K/UL  
 ABS. LYMPHOCYTES 0.6 (L) 0.8 - 3.5 K/UL  
 ABS. MONOCYTES 0.4 0.0 - 1.0 K/UL  
 ABS. EOSINOPHILS 0.0 0.0 - 0.4 K/UL  
 ABS. BASOPHILS 0.0 0.0 - 0.1 K/UL  
 ABS. IMM. GRANS. 0.0 K/UL  
 DF MANUAL PLATELET COMMENTS Large Platelets RBC COMMENTS TEARDROP CELLS 
PRESENT 
    
GLUCOSE, POC Collection Time: 02/11/19  7:53 AM  
Result Value Ref Range Glucose (POC) 219 (H) 65 - 100 mg/dL Performed by Sabina Alcala (traveler) Assessment:  
 
Active Problems: SV[facility]ra[facility]) [facility] (8/29/2016) Essential hypertension with goal blood pressure less than 140/90 (8/29/2016) Diabetes mellitus type 2, controlled ([facility] (9/8/2016) Dyslipidemia (9/8/2016) Acute respiratory failure ([facility] (2/3/2019) Leg swelling (2/3/2019) Hodgkin lymphoma ([facility] (2/3/2019) CAP (community acquired pneumonia) (2/3/2019) Plan:  
 
1. Interstitial process appears to be improving slowly. Await comments and suggestions pulmonary. Steroids continue. 2.  Continue diabetic control. 3.  Will mobilize.

## 2019-02-11 NOTE — PROGRESS NOTES
PCU SHIFT NURSING NOTE Bedside and Verbal shift change report given to Maria Luz Roberson RN (oncoming nurse) by Florian Vogel RN (offgoing nurse). Report included the following information SBAR, Intake/Output, MAR and Recent Results. Shift Summary:  
Received report and assumed care of patient. VSS. Patient resting comfortably and reports no pain. [email or web address]. SOLIZ when ambulating to the bathroom. Have bumped oxygen up as high as 5L per patient request but not because her saturations have gotten low. Requested that Dr. Kalie Fitzpatrick discontinue the nystatin order because patient is refusing the medicine. Admission Date 2/3/2019 Admission Diagnosis Acute respiratory failure ([facility] Consults IP CONSULT TO HEMATOLOGY 
IP CONSULT TO PRIMARY CARE PROVIDER 
IP CONSULT TO PULMONOLOGY Consults [x]PT [x]OT []Speech [x]Case Management  
  
[] Palliative Cardiac Monitoring Order  
[x]Yes []No  
 
IV drips []Yes Drip:                            Dose: 
Drip:                            Dose: 
Drip:                            Dose:  
[x]No  
 
GI Prophylaxis [x]Yes []No  
 
 
 
DVT Prophylaxis SCDs:     
     
 Shawn stockings:     
  
[x] Medication []Contraindicated []None Activity Level Activity Level: Up with Assistance Activity Assistance: Partial (one person) Purposeful Rounding every 1-2 hour? [x]Yes Spence Score  Total Score: 4 Bed Alarm (If score 3 or >) []Yes  
[] Refused (See signed refusal form in chart) Dwayne Score  Dwayne Score: 19 Dwayne Score (if score 14 or less) []PMT consult  
[]Wound Care consult []Specialty bed  
[] Nutrition consult Needs prior to discharge:  
Home O2 required:   
[]Yes  
[x]No  
 If yes, how much O2 required? Other:  
 Last Bowel Movement: Last Bowel Movement Date: 01/31/19 Influenza Vaccine Received Flu Vaccine for Current Season (usually Sept-March):  No  
 Patient/Guardian Refused (Notify MD): Yes Pneumonia Vaccine Diet Active Orders Diet DIET DIABETIC CONSISTENT CARB Regular LDAs Venous Access Device portacath  09/12/18 Upper chest (subclavicular area), left (Active) Venous Access Device portacath 02/04/19 Upper chest (subclavicular area), left (Active) Central Line Being Utilized Yes 2/11/2019  7:31 AM  
Criteria for Appropriate Use Irritant/vesicant medication 2/11/2019  7:31 AM  
Site Assessment Clean, dry, & intact 2/11/2019  7:31 AM  
Date of Last Dressing Change 02/03/19 2/11/2019  7:31 AM  
Dressing Status Clean, dry, & intact 2/11/2019  7:31 AM  
Dressing Type Disk with Chlorhexadine gluconate (CHG); Transparent 2/11/2019  7:31 AM  
Action Taken Blood drawn 2/10/2019  8:15 AM  
Positive Blood Return (Medial Site) Yes 2/11/2019  7:31 AM  
Alcohol Cap Used Yes 2/11/2019  4:01 AM  
  
Peripheral IV 02/03/19 Right Antecubital (Active) Site Assessment Clean, dry, & intact 2/11/2019  7:31 AM  
Phlebitis Assessment 0 2/11/2019  7:31 AM  
Infiltration Assessment 0 2/11/2019  7:31 AM  
Dressing Status Clean, dry, & intact 2/11/2019  7:31 AM  
Dressing Type Tape;Transparent 2/11/2019  7:31 AM  
Hub Color/Line Status Pink;Flushed;Capped 2/11/2019  7:31 AM  
Action Taken Open ports on tubing capped 2/8/2019  7:12 PM  
Alcohol Cap Used Yes 2/11/2019  7:31 AM  
      
External Female Catheter 02/04/19 (Active) Site Assessment Clean, dry, & intact 2/11/2019 10:00 AM  
Repositioned Yes 2/11/2019 10:00 AM  
Perineal Care Yes 2/11/2019 10:00 AM  
Wick Changed Yes 2/11/2019 10:00 AM  
Suction Canister/Tubing Changed No 2/11/2019  7:31 AM  
Urine Output (mL) 100 ml 2/11/2019 10:00 AM  
            
Urinary Catheter Intake & Output Date 02/10/19 0700 - 02/11/19 5922 02/11/19 0700 - 02/12/19 4464 Shift 0700-1859 1900-0659 24 Hour Total 0213-2331 8517-2609 24 Hour Total  
INTAKE  
P.O. 600  600 120  120 P.O. 600  600 120  120  
I. V.(mL/kg/hr) 650(0.5)  650(0.3) 900  900 Volume (cefepime (MAXIPIME) 2 g in 0.9% sodium chloride (MBP/ADV) 100 mL) 200  200 200  200 Volume (fluconazole (DIFLUCAN) 400mg/200 mL IVPB (premix)) 200  200 200  200 Volume (vancomycin (VANCOCIN) 1250 mg in  ml infusion) 250  250 500  500 Shift Total(mL/kg) 1250(11.6)  1250(11.6) 1020(9.4)  1020(9.4) OUTPUT Urine(mL/kg/hr) 800(0.6) 300(0.2) 1100(0.4) 200  200 Urine Voided 550  550 Urine Occurrence(s)  1 x 1 x 1 x  1 x Urine Output (mL) (External Female Catheter 02/04/19) 250 300 550 200  200 Shift Total(mL/kg) 800(7.4) 300(2.8) 1100(10.2) 200(1.8)  200(1.8)  -300 150 820  820 Weight (kg) 108. 2 108. 2 108. 2 108. 2 108. 2 108.2 Readmission Risk Assessment Tool Score Medium Risk 25 Total Score 3 Has Seen PCP in Last 6 Months (Yes=3, No=0) 3 Patient Length of Stay (>5 days = 3)  
 5 Pt. Coverage (Medicare=5 , Medicaid, or Self-Pay=4) 7 Charlson Comorbidity Score (Age + Comorbid Conditions) Criteria that do not apply:  
 . Living with Significant Other. Assisted Living. LTAC. SNF. or  
Rehab  
 IP Visits Last 12 Months (1-3=4, 4=9, >4=11) Expected Length of Stay 4d 7h Actual Length of Stay 8

## 2019-02-12 NOTE — ROUTINE PROCESS
Bedside and Verbal shift change report given to Liberty Rashid RN (oncoming nurse) by Gucci Huntley RN (offgoing nurse). Report included the following information ED Summary and Recent Results.

## 2019-02-12 NOTE — PROGRESS NOTES
[facility], Critical Care, and Sleep Medicine Progress Note Name: iKm Whitt MRN: [id number] : 1950 Hospital: [place] [address] Date: 2019 IMPRESSION:  
· Acute hypoxic resp failure - decreasing o2 needs but still on NC oxygen. Mostly likely will need home oxygen. · Bilateral pulm infiltrates-  First noted on PET  and seen on ct on admit - tx with augmentin -, then Levaquin - Preceding fever 101. S/P bronch . DDX: PNA, opportunistic infection and/or bleomycin toxicity from chemo - suspect toxicity if bronch remains negative. · Coronavirus positive RVP · Hodkins lymphoma- tx with 4 cyles of abvd- positive response to chemo · Pulm htn - pap 60 with nl lvef · Anemia-  ? Related to chemo- stable · Hx of asthma from childhood to age 24  
· NEWLY DISCOVERED BILATERAL PTE WITH NEG DUPLEX OF LEG, now on Eliquis. RECOMMENDATIONS:  
· IV ABX - immunosuppressed pt - broad spectrum - Will  stop vancomycin, has stop date of Cefepime. · Once over this acute episode will get repeat PFTs as an outpt. · Can add nebs to help with airway clearance. · IV fluconazole · On Eliquis for PE. · Wean o2 as able, goal try to keep pox over 90%. May need home oxygen. · PT and OT to eval.  
· IV steroids, decreased the dose. Will try to switch to po. Steroids. · F/U cultures, so far negative. Subjective: Pt was seen this am. Has been have moderate to severe coughing paroxysms. Dry and not sputum production. NO chest pain, no back pain, no issues with sleeping. Still has dyspnea. No increased fevers. Pulse has been stable. Still with hypoxia on 3L NC with pox of 91%. NO chest pain, no back pain. No leg pain. This patient has been seen and evaluated at the request of Dr. Thomas Iverson  For hypoxia.  Patient is a 70 y.o. female  Who was dx with hodgkin lymphoma  In august when she was getting a massage in [facility] and the masseuse note  At rt neck ln. She is s/p chemo with abvd. She  Was noted to have bilateral pulm infiltrates on pet/ct On jan 21  And was started on augmenin on jan 25 - seen in  Oncology office on Jan 30 - sats high 80's but increased to 90 % on fluids and was started on levaquin on Jan 30. Noted sig  Increased landaverde and said sats drop to 70's with exertion . She says she  Feels much better today. NO cough, heme , phlegm. NO cp. She has noted some ankle swelling. Feb 5 - report decreased sob, reports decreased ankle swelling . reports rhinitis with clear liquid Feb 6- says she gets a cough and sob with talking. Slept well. Feb 7- says she feels better 2/8: Resting in bed talking on the telephone. No current complaints. On 3LPM.   
 
Past Medical History:  
Diagnosis Date  Asthma   
 as a child, nothing since menopause  Cancer ([facility] 08/21/2018 Non-Hodgkin's  Cataracts, bilateral   
 Diabetes ([facility] type II  
 Environmental allergies  Fibromyalgia Treated with alternate medical therapy  Hypertension   
 no asthma since age 24  // Dr. Terence Warner said hodgkins  And pt confirms Past Surgical History:  
Procedure Laterality Date  HX BREAST BIOPSY Right 2006  HX COLONOSCOPY  2015  HX GYN Pap 2015  HX ORTHOPAEDIC    
 fractured R ankle/ no surgery  HX OTHER SURGICAL Right 08/27/2018  
 excision of deep neck cervical LN. Prior to Admission medications Medication Sig Start Date End Date Taking? Authorizing Provider ONETOUCH VERIO strip USE 1 STRIP IN VITRO TO TEST BLOOD SUGAR ONCE DAILY 10/17/18  Yes Becky Flores MD  
ibuprofen (MOTRIN) 600 mg tablet Take 1 Tab by mouth every six (6) hours as needed for Pain. 8/28/18  Yes Percilla Mcardle, MD  
Lancets (ONE TOUCH DELICA) misc USE TO TEST BLOOD SUGAR ONCE DAILY. DX.E11.9 10/13/16  Yes Becky Flores MD  
 pioglitazone (ACTOS) 30 mg tablet TAKE 1 TABLET DAILY 19   Devyn Neumann MD  
metoprolol succinate (TOPROL-XL) 50 mg XL tablet TAKE 1 TAB BY MOUTH DAILY. 10/12/18   Devyn Neumann MD  
JANUMET XR 50-1,000 mg TM24 TAKE 1 TABLET BY MOUTH TWICE A DAY WITH MEALS 10/12/18   Devyn Neumann MD  
fluticasone [facility] ALLERGY RELIEF) 50 mcg/actuation nasal spray 2 Sprays by Both Nostrils route daily. Provider, Historical  
spironolactone-hydrochlorothiazide (ALDACTAZIDE) 25-25 mg per tablet TAKE ONE TABLET BY MOUTH EVERY DAY 12/3/17   Devyn Neumann MD  
atorvastatin (LIPITOR) 40 mg tablet TAKE 1 TAB BY MOUTH DAILY. 12/3/17   Devyn Neumann MD  
amLODIPine (NORVASC) 10 mg tablet TAKE 1 TABLET BY MOUTH EVERY DAY 17   Devyn Neumann MD  
alcohol swabs (ALCOHOL PREP PADS) padm 1 Pad by Apply Externally route daily. DX.E11.9 10/13/16   Devyn Neumann MD  
 
No Known Allergies Social History Tobacco Use  Smoking status: Never Smoker  Smokeless tobacco: Never Used Substance Use Topics  Alcohol use: Yes Comment: socially  
 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   
     prostate cancer  Cancer Sister Breast cancer apparent DCIS  
 Heart Disease Brother Brother  from congestive heart failure secondary to severe mitral disease Current Facility-Administered Medications Medication Dose Route Frequency  [START ON 2019] insulin glargine (LANTUS) injection 60 Units  60 Units SubCUTAneous DAILY  insulin glargine (LANTUS) injection 20 Units  20 Units SubCUTAneous QHS  methylPREDNISolone (PF) (SOLU-MEDROL) injection 40 mg  40 mg IntraVENous Q8H  
 albuterol (PROVENTIL VENTOLIN) nebulizer solution 2.5 mg  2.5 mg Nebulization Q6H RT  
 sodium chloride (NS) flush 5-40 mL  5-40 mL IntraVENous Q8H  
  sodium chloride (NS) flush 5-40 mL  5-40 mL IntraVENous Q8H  
 fluconazole (DIFLUCAN) 400mg/200 mL IVPB (premix)  400 mg IntraVENous Q24H  
 [START ON 2/15/2019] apixaban (ELIQUIS) tablet 5 mg  5 mg Oral Q12H And  
 apixaban (ELIQUIS) tablet 10 mg  10 mg Oral Q12H  
 sodium chloride (NS) flush 5-40 mL  5-40 mL IntraVENous Q8H  
 lactobac ac& pc-s.therm-b.anim (JUNI Q/RISAQUAD)  1 Cap Oral DAILY  insulin lispro (HUMALOG) injection   SubCUTAneous TIDAC  cefepime (MAXIPIME) 2 g in 0.9% sodium chloride (MBP/ADV) 100 mL  2 g IntraVENous Q8H  
 sodium chloride (NS) flush 5-40 mL  5-40 mL IntraVENous Q8H  
 amLODIPine (NORVASC) tablet 2.5 mg  2.5 mg Oral DAILY  atorvastatin (LIPITOR) tablet 40 mg  40 mg Oral QHS  fluticasone (FLONASE) 50 mcg/actuation nasal spray 2 Spray  2 Spray Both Nostrils DAILY  metoprolol succinate (TOPROL-XL) XL tablet 50 mg  50 mg Oral DAILY Review of Systems: A comprehensive review of systems was negative except for that written in the HPI. Objective: 
Vital Signs:   
Visit Vitals /65 (BP 1 Location: Left arm, BP Patient Position: At rest) Pulse 99 Temp 97.7 °F (36.5 °C) Resp 18 Ht 5' 8\" (1.727 m) Wt 108.2 kg (238 lb 8.6 oz) SpO2 93% BMI 36.27 kg/m² O2 Device: Nasal cannula O2 Flow Rate (L/min): 3 l/min Temp (24hrs), Av.7 °F (36.5 °C), Min:97.4 °F (36.3 °C), Max:98.4 °F (36.9 °C) Intake/Output:  
Last shift:      No intake/output data recorded. Last 3 shifts: 02/10 1901 -  0700 In: 1260 [P.O.:360; I.V.:900] Out: 1100 [Urine:1100] Intake/Output Summary (Last 24 hours) at 2019 1219 Last data filed at 2019 9402 Gross per 24 hour Intake 940 ml Output 600 ml Net 340 ml Physical Exam:  
General:  Alert, cooperative, no distress, appears stated age. ON NC oxygen. Sitting up in chair. Head:  Normocephalic, without obvious abnormality, atraumatic. alopecia Eyes:  Conjunctivae/corneas clear. PERRL, EOMs intact. Nose: Nares normal. Septum midline. Mucosa normal. No drainage or sinus tenderness. Throat: Lips, mucosa, and tongue normal. Teeth and gums normal.- Neck: Supple, symmetrical, trachea midline, no adenopathy, thyroid: no enlargment/tenderness/nodules, no carotid bruit and no JVD. Back:   Symmetric, no curvature. ROM normal.  
Lungs:   Bilateral rales . -  Rt greater than left in the bases. Appears to be breathing comfortably. Good air movement. Chest wall:  No tenderness or deformity. Heart:  Regular rate and rhythm, S1, S2 normal, no murmur, click, rub or gallop.- mild tachy Abdomen:   Soft, non-tender. Bowel sounds normal. No masses,  No organomegaly. Extremities: Extremities normal, atraumatic, no cyanosis - edema gone Pulses: 2+ and symmetric all extremities. Skin: Skin color, texture, turgor normal. No rashes or lesions Lymph nodes: Cervical, supraclavicular, and axillary nodes normal.  
Neurologic: Grossly nonfocal, motor is intact. Psych: no over anxiety or depression. Data review:  
 
Recent Results (from the past 24 hour(s)) GLUCOSE, POC Collection Time: 02/11/19  4:59 PM  
Result Value Ref Range Glucose (POC) 178 (H) 65 - 100 mg/dL Performed by Lenin Barahona ([facility]) GLUCOSE, POC Collection Time: 02/11/19  9:26 PM  
Result Value Ref Range Glucose (POC) 253 (H) 65 - 100 mg/dL Performed by Malcolm Olmstead ([facility]) GLUCOSE, POC Collection Time: 02/12/19  7:53 AM  
Result Value Ref Range Glucose (POC) 281 (H) 65 - 100 mg/dL Performed by Leanne Ramey GLUCOSE, POC Collection Time: 02/12/19 12:01 PM  
Result Value Ref Range Glucose (POC) 252 (H) 65 - 100 mg/dL Performed by Zoltan Rider Imaging: 
I have personally reviewed the patients radiographs and have reviewed the reports: 
Ct scan: 2-3-19: FINDINGS: 
CHEST: 
THYROID: No nodule. MEDIASTINUM: Mediastinal lymphadenopathy has improved compared to the prior 
exam. Transverse dimension at the level of the aortic arch was previously 5.9 cm 
and is now 4.2 cm. SERJIO: No mass or lymphadenopathy. THORACIC AORTA: No dissection or aneurysm. MAIN PULMONARY ARTERY: Right upper and left lower lobe pulmonary emboli are 
noted. TRACHEA/BRONCHI: Patent. ESOPHAGUS: No wall thickening or dilatation. HEART: Normal in size. PLEURA: No effusion or pneumothorax. LUNGS: Infiltrates are noted throughout the right lung and left lower lobe. INCIDENTALLY IMAGED UPPER ABDOMEN: 17 mm right adrenal adenoma. BONES: Degenerative changes are seen in the thoracic spine. 
  
  
IMPRESSION: Right upper and left lower lobe pulmonary emboli. Bilateral airspace 
disease. Improved mediastinal lymphadenopathy. 2-11-19: Clinical indication: Bilateral lung infiltrates, acute respiratory distress with 
hypoxia. 
  
Portable AP upright view of the chest is obtained, comparison February 8. Lung 
fields are unchanged. Infusion catheter is unchanged. Shallow inspiration with 
elevation of the right hemidiaphragm, bilateral interstitial infiltrates more 
prominent on the right. 
  
IMPRESSION 
: No change.  
 
  
Jabier Choudhury MD

## 2019-02-12 NOTE — PROGRESS NOTES
ADULT PROTOCOL: JET AEROSOL ASSESSMENT Patient  Herb Salas     70 y.o.   female     2/11/2019  10:51 PM 
 
Breath Sounds Pre Procedure: Right Breath Sounds: Diminished Left Breath Sounds: Diminished Breath Sounds Post Procedure: Right Breath Sounds: Clear, Diminished Left Breath Sounds: Clear, Diminished Breathing pattern: Pre procedure Breathing Pattern: Regular Post procedure Breathing Pattern: Regular Heart Rate: Pre procedure Pulse: 85 
         Post procedure Pulse: 87 Resp Rate: Pre procedure Respirations: 20 
         Post procedure Respirations: 20 Peak Flow: Pre bronchodilator Post bronchodilator FVC/FEV1:  N/A Incentive Spirometry:    
     
 
Cough: Pre procedure Cough: Non-productive Post procedure Cough: Non-productive, Dry Suctioned: NO Sputum: Pre procedure Post procedure Oxygen: O2 Device: Nasal cannula   4L NC Changed: NO SpO2: Pre procedure SpO2: 94 %   with oxygen Post procedure SpO2: 92 %  with oxygen Nebulizer Therapy: Current medications Aerosolized Medications: Albuterol Changed: NO Smoking History:  Smoking status: Never Smoker  Smokeless tobacco: Never Used Problem List:  
Patient Active Problem List  
Diagnosis Code  SVT (supraventricular tachycardia) ([facility]) I47.1  Essential hypertension with goal blood pressure less than 140/90 I10  
 Diabetes mellitus [facility], [facility] E11.9  Dyslipidemia E78.5  Overweight (BMI 25.0-29. 9) E66.3  
 Supraclavicular mass R22.2  Lymphadenopathy R59.1  Acute respiratory failure ([facility]) J96.00  Leg swelling M79.89  
 Hodgkin lymphoma ([facility]) C81.90  
 CAP (community acquired pneumonia) J18.9 Respiratory Therapist: Sami Zamora RT

## 2019-02-12 NOTE — PROGRESS NOTES
Hematology Oncology Progress Note Follow up for: HD Chart notes reviewed since last visit. Case discussed with following:  
 
Patient complains of the following: Still feeling weak and has dry cough. Additional concerns noted by the staff:  
 
Patient Vitals for the past 24 hrs: 
 BP Temp Pulse Resp SpO2  
02/12/19 1131 137/65 97.7 °F (36.5 °C) 99 18 93 % 02/12/19 0849 156/78 98.1 °F (36.7 °C) (!) 108 18 94 % 02/12/19 0810     94 % 02/12/19 0759 (!) 166/98 98.4 °F (36.9 °C) (!) 105 (!) 36 (!) 88 % 02/12/19 0256  97.7 °F (36.5 °C)   90 % 02/12/19 0254 147/68 97.7 °F (36.5 °C) 95 18 (!) 89 % 02/12/19 0221     96 % 02/12/19 0030   82  96 % 02/11/19 2232 152/72 97.4 °F (36.3 °C) 91 16   
02/11/19 2010     92 % 02/11/19 1947     94 % 02/11/19 1930 151/63 97.4 °F (36.3 °C) 85 20 95 % 02/11/19 1530 166/67 97.5 °F (36.4 °C) 98 20 98 % 02/11/19 1508     92 % Review of Systems: 
12 point ROS done and negative except as above Physical Examination: 
gen NAD 
CV reg Lungs coarse throughout, no wheezes 
abd benign Labs: 
Recent Results (from the past 24 hour(s)) GLUCOSE, POC Collection Time: 02/11/19  4:59 PM  
Result Value Ref Range Glucose (POC) 178 (H) 65 - 100 mg/dL Performed by Gerber Alt (PCT) GLUCOSE, POC Collection Time: 02/11/19  9:26 PM  
Result Value Ref Range Glucose (POC) 253 (H) 65 - 100 mg/dL Performed by Rachael Garcia (PCT) GLUCOSE, POC Collection Time: 02/12/19  7:53 AM  
Result Value Ref Range Glucose (POC) 281 (H) 65 - 100 mg/dL Performed by Sosa Metzger Assessment and Plan: 
 
*) Hodgkins Lymphoma: 
- s/p 4 cycles of ABVD with post treatment PET without any uptake, great response. 
- will not plan any further chemo or xrt given lung issues. *) Respiratory Failure, suspected Bleomycin toxicity: 
- We did do baseline PFT's can repeat per pulmonary in the future once stabalized. - On abx per primary and pulmonary, vancomycin stopped. - Appreciate pulmonary assistance/input. - bronch 2/6, cultures NGTD 
- Viral panel with coronavirus 
- Candida present on broch, she is on IV fluconazole - Weaning O2 down for goal sat >90% PE 
- Now on eliquis - LE dopplers negative

## 2019-02-12 NOTE — PROGRESS NOTES
General Daily Progress Note Admit Date: 2/3/2019 Subjective:  
 
Patient continues to complain about coughing but improving and general weakness. Current Facility-Administered Medications Medication Dose Route Frequency  [START ON 2/13/2019] insulin glargine (LANTUS) injection 60 Units  60 Units SubCUTAneous DAILY  insulin glargine (LANTUS) injection 20 Units  20 Units SubCUTAneous QHS  methylPREDNISolone (PF) (SOLU-MEDROL) injection 40 mg  40 mg IntraVENous Q8H  
 albuterol (PROVENTIL VENTOLIN) nebulizer solution 2.5 mg  2.5 mg Nebulization Q6H RT  
 vancomycin (VANCOCIN) 1250 mg in  ml infusion  1,250 mg IntraVENous Q12H  
 sodium chloride (NS) flush 5-40 mL  5-40 mL IntraVENous Q8H  
 sodium chloride (NS) flush 5-40 mL  5-40 mL IntraVENous PRN  
 sodium chloride (NS) flush 5-40 mL  5-40 mL IntraVENous Q8H  
 fluconazole (DIFLUCAN) 400mg/200 mL IVPB (premix)  400 mg IntraVENous Q24H  
 [START ON 2/15/2019] apixaban (ELIQUIS) tablet 5 mg  5 mg Oral Q12H And  
 apixaban (ELIQUIS) tablet 10 mg  10 mg Oral Q12H  
 sodium chloride (NS) flush 5-40 mL  5-40 mL IntraVENous Q8H  
 sodium chloride (NS) flush 5-40 mL  5-40 mL IntraVENous PRN  
 benzonatate (TESSALON) capsule 200 mg  200 mg Oral TID PRN  
 lactobac ac& pc-s.therm-b.anim (JUNI Q/RISAQUAD)  1 Cap Oral DAILY  insulin lispro (HUMALOG) injection   SubCUTAneous TIDAC  cefepime (MAXIPIME) 2 g in 0.9% sodium chloride (MBP/ADV) 100 mL  2 g IntraVENous Q8H  
 sodium chloride (NS) flush 5-40 mL  5-40 mL IntraVENous Q8H  
 sodium chloride (NS) flush 5-40 mL  5-40 mL IntraVENous PRN  
 acetaminophen (TYLENOL) tablet 650 mg  650 mg Oral Q6H PRN  
 ondansetron (ZOFRAN) injection 4 mg  4 mg IntraVENous Q4H PRN  
 amLODIPine (NORVASC) tablet 2.5 mg  2.5 mg Oral DAILY  atorvastatin (LIPITOR) tablet 40 mg  40 mg Oral QHS  fluticasone (FLONASE) 50 mcg/actuation nasal spray 2 Spray  2 Spray Both Nostrils DAILY  metoprolol succinate (TOPROL-XL) XL tablet 50 mg  50 mg Oral DAILY  glucose chewable tablet 16 g  4 Tab Oral PRN  
 dextrose (D50W) injection syrg 12.5-25 g  12.5-25 g IntraVENous PRN  
 glucagon (GLUCAGEN) injection 1 mg  1 mg IntraMUSCular PRN  
 nitroglycerin (NITROBID) 2 % ointment 1 Inch  1 Inch Topical Q6H PRN Review of Systems A comprehensive review of systems was negative. Objective:  
 
Patient Vitals for the past 24 hrs: 
 BP Temp Pulse Resp SpO2  
02/12/19 0849 156/78  (!) 108    
02/12/19 0810     94 % 02/12/19 0759 (!) 166/98 98.4 °F (36.9 °C) (!) 105 (!) 36 (!) 88 % 02/12/19 0256  97.7 °F (36.5 °C)   90 % 02/12/19 0254 147/68 97.7 °F (36.5 °C) 95 18 (!) 89 % 02/12/19 0221     96 % 02/12/19 0030   82  96 % 02/11/19 2232 152/72 97.4 °F (36.3 °C) 91 16   
02/11/19 2010     92 % 02/11/19 1947     94 % 02/11/19 1930 151/63 97.4 °F (36.3 °C) 85 20 95 % 02/11/19 1530 166/67 97.5 °F (36.4 °C) 98 20 98 % 02/11/19 1508     92 % 02/11/19 1112 148/68 98 °F (36.7 °C) 67 16 95 % No intake/output data recorded. 02/10 1901 - 02/12 0700 In: 1260 [P.O.:360; I.V.:900] Out: 1100 [Urine:1100] Physical Exam:  
Visit Vitals /78 Pulse (!) 108 Temp 98.4 °F (36.9 °C) Resp (!) 36 Ht 5' 8\" (1.727 m) Wt 238 lb 8.6 oz (108.2 kg) SpO2 94% BMI 36.27 kg/m² General appearance: alert, cooperative, no distress, appears stated age Neck: supple, symmetrical, trachea midline, no adenopathy, thyroid: not enlarged, symmetric, no tenderness/mass/nodules, no carotid bruit and no JVD Lungs: rales R base, L base Heart: regular rate and rhythm, S1, S2 normal, no murmur, click, rub or gallop Abdomen: soft, non-tender. Bowel sounds normal. No masses,  no organomegaly Extremities: extremities normal, atraumatic, no cyanosis or edema Data Review Recent Results (from the past 24 hour(s)) GLUCOSE, POC  
 Collection Time: 02/11/19 11:12 AM  
Result Value Ref Range Glucose (POC) 208 (H) 65 - 100 mg/dL Performed by Radha Stock (PCT) GLUCOSE, POC Collection Time: 02/11/19  4:59 PM  
Result Value Ref Range Glucose (POC) 178 (H) 65 - 100 mg/dL Performed by Radha Stock (PCT) GLUCOSE, POC Collection Time: 02/11/19  9:26 PM  
Result Value Ref Range Glucose (POC) 253 (H) 65 - 100 mg/dL Performed by Justa Santoro (PCT) GLUCOSE, POC Collection Time: 02/12/19  7:53 AM  
Result Value Ref Range Glucose (POC) 281 (H) 65 - 100 mg/dL Performed by Vinnie Anayeli Assessment:  
 
Active Problems: 
  SVT (supraventricular tachycardia) ([address] (8/29/2016) Essential hypertension with goal blood pressure less than 140/90 (8/29/2016) Diabetes mellitus type 2, controlled ([address] (9/8/2016) Dyslipidemia (9/8/2016) Acute respiratory failure ([address] (2/3/2019) Leg swelling (2/3/2019) Hodgkin lymphoma ([address] (2/3/2019) CAP (community acquired pneumonia) (2/3/2019) Plan: 1. Continue steroids for interstitial process possibly bleomycin toxicity. 2.  Continue attempts at rehabilitation. 3.  Continue diabetic control.

## 2019-02-12 NOTE — PROGRESS NOTES
Problem: Mobility Impaired (Adult and Pediatric) Goal: *Acute Goals and Plan of Care (Insert Text) Physical Therapy Goals Initiated 2/7/2019 1. Patient will move from supine to sit and sit to supine , scoot up and down and roll side to side in bed with modified independence within 7 day(s). 2.  Patient will transfer from bed to chair and chair to bed with supervision/set-up using the least restrictive device within 7 day(s). 3.  Patient will perform sit to stand with supervision/set-up within 7 day(s). 4.  Patient will ambulate with supervision/set-up for 150 feet with the least restrictive device within 7 day(s). physical Therapy TREATMENT Patient: Olivier Lyon (29 y.o. female) Date: 2/12/2019 Diagnosis: Acute respiratory failure ([address] Procedure(s) (LRB): BRONCHOSCOPY (N/A) BRONCHIAL WASHINGS FLEXIBLE (N/A) 6 Days Post-Op Precautions:   falls Chart, physical therapy assessment, plan of care and goals were reviewed. ASSESSMENT: pt did very well with bed mob and ther-ex today, not feeling well enough to amb, very motivated, will do well with HHPT at d/c. Progression toward goals: 
[]    Improving appropriately and progressing toward goals [x]    Improving slowly and progressing toward goals 
[]    Not making progress toward goals and plan of care will be adjusted PLAN: 
Patient continues to benefit from skilled intervention to address the above impairments. Continue treatment per established plan of care. Discharge Recommendations:  Home Health Further Equipment Recommendations for Discharge:  rolling walker OBJECTIVE DATA SUMMARY:  
Critical Behavior: 
Neurologic State: Alert, Appropriate for age Functional Mobility Training: 
Bed Mobility: 
Rolling: Supervision Supine to Sit: Supervision Sit to Supine: Supervision Scooting: Supervision Level of Assistance: Supervision Interventions: Verbal cues Balance: Sitting: Intact; Without support Therapeutic Exercises:  
sitting EXERCISE Sets Reps Active Active Assist  
Passive Comments Ankle pumps 1 10 [x] [] [] bilat Heel raises 1 10 [x] [] [] \" Toe tap 1 10 [x] [] [] \" Knee ext 1 10 [x] [] [] \" Hip flex 1 10 [x] [] [] \" Abd & Add 1 10 [x] [] [] \"  
SLR 1 10 [x] [] [] \" Heel slides 1 10 [x] [] [] \"  
SAQ 1 10 [x] [] [] \"  
   [] [] []   
   [] [] []   
 
Pain: 
Pain Scale 1: Numeric (0 - 10) Pain Intensity 1: 0 Activity Tolerance: poor After treatment:  
[]    Patient left in no apparent distress sitting up in chair 
[x]    Patient left in no apparent distress in bed 
[x]    Call bell left within reach [x]    Nursing notified 
[]    Caregiver present 
[]    Bed alarm activated COMMUNICATION/COLLABORATION:  
The patients plan of care was discussed with: Registered Nurse Geryl Dancer, PTA Time Calculation: 25 mins

## 2019-02-12 NOTE — PROGRESS NOTES
CM introduced self and role of discharge planning. Discussed therapy's recommendation for home health and pt stated she lives alone and didn't have anyone to help her. Pt has family close by but they don't live with her. Pt was interested in going to a rehab center (SNF) at discharge. Pt stated she wanted to discuss it with Dr. Srinivasan Yañez. CM will continue to follow for discharge planning needs. Ubaldo Covington, [address]

## 2019-02-12 NOTE — ADT AUTH CERT NOTES
Pulmonary Disease [facility] - Care Day 2 (2/8/2019) by Vera Ng RN Review Entered Review Status 2/12/2019 12:01 Completed Criteria Review Care Day: 2 Care Date: 2/8/2019 Level of Care: Step Down Guideline Day 3 Level Of Care ( ) * Activity level acceptable ( ) * Isolation not needed, or status acceptable Clinical Status  
(X) * Respiratory status acceptable 2/12/2019 12:01:47 EST by Yessica Sal Patient is on 3L NC  
  
(X) * Right heart function adequate 2/12/2019 12:01:47 EST by Patel Mcdonald  
  no inadequate right heart function noted  
  
(X) * Temperature status acceptable 2/12/2019 12:01:47 EST by Patel Mcdonald  
  temp 98.3  
  
( ) * No infection, or status acceptable 2/12/2019 12:01:47 EST by Patel Mcdonald  
  on maxipime 2gm iv q 8hrs, diflucan 400mg iv q 24hrs, vanc 1500mg iv q 12hrs  
  
(X) * Stable chest findings 2/12/2019 12:01:47 EST by Patel Mcdonald  
  CXR: No significant change. (X) * Pain and nausea absent or adequately managed 2/12/2019 12:01:47 EST by Patel Mcdonald  
  none noted ( ) * General Discharge Criteria met Interventions  
(X) * No chest tube, or status acceptable 2/12/2019 12:01:47 EST by Patel Mcdonald  
  no chest tube noted ( ) * Intake acceptable 2/12/2019 12:01:47 EST by Yessica Sal started diabetic diet on 2/8/2019 after 4:30pm  
  
( ) * No inpatient interventions needed 2/12/2019 12:01:47 EST by Patel Mcdonald  
  norvasc po,, eliquis po, lipitor po, maxipime 2gm iv q 8hrs, flonase spray, insulin sc, JUNI Q/RISAQUAD po, toprol xl po, potassium chloride po, diflucan 400mg iv q 24hrs, solumedrol 60mg iv q8hrs, vanc 1500mg iv q 12hrs 2/12/2019 12:01:47 EST by Patel Mcdonald Subject: Additional Clinical Information PULMONOLOGY  
resting in bed on 3LPM  
Impression Acute hypoxic resp failure - decreasing o2 needs Bilateral pulm infiltrates-   First noted on PET 1/21 and seen on ct on admit - tx with augmentin 1/25-1/30, then Levaquin - Preceding fever 101. S/P bronch 2/6. DDX: PNA, opportunistic infection and toxicity (? bleo) from chemo - suspect toxicity Coronavirus positive RVP Hodkins lymphoma- tx with 4 cyles of abvd- positive response to chemo Pulm htn - pap 60 with nl lvef Anemia-   ? Related to chemo- stable Hx of asthma from childhood to age 24 NEWLY DISCOVERED BILATERAL PTE WITH NEG DUPLEX OF LEG Recommendation IV ABX - immunosuppressed pt - broad spectrum - if no MRSA plan to stop vanco after 5 days IV fluconazole On Eliquis Wean o2 as able IV steroids F/U cultures Will see as needed over the weekend, please call F/U CXR Monday Internal Med  
Plans 1.  Continue treatment of interstitial pneumonitis.   Await biopsy report.   O2 requirements reasonably stable.   Her pulmonary. 2.   Continue diabetic control. 3.   Will mobilize. * Milestone Pulmonary Disease [facility] - Care Day 1 (2/7/2019) by Jairon Howard RN Review Entered Review Status 2/7/2019 10:39 Completed Criteria Review Care Day: 1 Care Date: 2/7/2019 Level of Care: Step Down Guideline Day 2 Level Of Care (X) Floor 2/7/2019 10:39:12 EST by Romayne Nanas stepdown Clinical Status  
(X) * No ICU or intermediate care needs 2/7/2019 10:39:12 EST by Palma Peter  
  gluc 157 118 231  
rales right base left base 
s/p bronch 2/6 (X) * No mechanical ventilation required[I] 2/7/2019 10:39:12 EST by Palma Peter  
  88% 2l nc o2 increased to 4 l nc  
pt weaned fom high flow o2 2/6 Interventions (X) Inpatient interventions continue 2/7/2019 10:39:12 EST by Palma Peter  
  supplemental o2  
pulmonary consulting  
iv maxipime 2 gram q8h  
iv levaquin 750 mg q24 iv solumedrol 60 mg q8h  
iv vanco 1500 mg q12h  
dm diet sputum culture fungus cx viral cx pending PT consult 2/7/2019 10:39:12 EST by Katie Whiteside Subject: Additional Clinical Information  
admit date 2/3   
  
  
pulmonary status appears to be improving o2 req decreasing   
cont diabetic control   
change to DOAC tomorrow   
cont abx for now   
attempt to mobilize   
pt does not use home o2   
plan on dc to home with hhc   
  
  
  
  
  
  
  
* Milestone Additional Notes Admit date 2/3 Pulmonary Disease [facility] - Clinical Indications for Admission to Inpatient Care by Leonard Cantor RN Review Entered Review Status 2/7/2019 10:29 Completed Criteria Review Clinical Indications for Admission to Inpatient Care Most Recent : Katie Whiteside Most Recent Date: 2/7/2019 10:29:13 EST  
  
  (X) Other Indication: admit date 2/3  
  Entered 2/7/2019 10:29:13 EST by Darian Lewis resp failure

## 2019-02-12 NOTE — PROGRESS NOTES
Bedside shift change report given to Kely DAVALOS (oncoming nurse) by James Florence RN (offgoing nurse). Report included the following information SBAR, Kardex, MAR, Recent Results and Cardiac Rhythm SR-ST.

## 2019-02-13 NOTE — PROGRESS NOTES
Nutrition Assessment: 
 
INTERVENTIONS/RECOMMENDATIONS:  
Meals/Snacks: General/healthful diet: Consistent carbohydrate diet Patient prefers boost shakes being brought in by family ASSESSMENT:  
Chart reviewed; medically noted for respiratory failure, pneumonia, PE, DM, and Hodgkin's lymphoma. Elevated BG noted; hx of DM and receiving steroid. Continue consistent carb diet for optimal BG control. Patient has previously declined supplements on [address]. Prefers to drink boost which is being brought in by family. Notes indicate patient waiting to eat lunch until after 2pm as she wanted to rest; declined to work with therapy as she didn't want to be disturbed. Encourage intake of meals + boost supplements. Diet Order: Consistent carb(HS Snack) % Eaten:   
Patient Vitals for the past 72 hrs: 
 % Diet Eaten 02/11/19 0845 20 % 02/10/19 1733 15 % Pertinent Medications: [x] Reviewed []Other: Norvasc, Eliquis, Atorvastatin, Lantus, Humalog, Shantel Q, Prednisone Pertinent Labs: [x]Reviewed  []Other: [phone] Food Allergies: [x]None []Other:   Last BM: 2/13 [x]Active     []Hyperactive  []Hypoactive       [] Absent  BS Skin:    [x] Intact   [] Incision  [] Breakdown   []Edema   []Other: Anthropometrics: Height: 5' 8\" (172.7 cm) Weight: 108.2 kg (238 lb 8.6 oz) IBW (%IBW):   ( ) UBW (%UBW):   (  %) BMI: Body mass index is 36.27 kg/m². This BMI is indicative of: 
[]Underweight   []Normal   []Overweight   [x] Obesity   [] Extreme Obesity (BMI>40) Last Weight Metrics: 
Weight Loss Metrics 2/9/2019 1/21/2019 11/15/2018 9/13/2018 9/12/2018 9/10/2018 8/27/2018 Today's Wt 238 lb 8.6 oz 180 lb 180 lb 198 lb 201 lb 1 oz 200 lb 3.2 oz 201 lb BMI 36.27 kg/m2 27.37 kg/m2 27.37 kg/m2 30.11 kg/m2 29.69 kg/m2 29.56 kg/m2 29.68 kg/m2 Estimated Nutrition Needs (Based on): 5551 Kcals/day(BMR (7463) x 1. 3AF -300kcal) , 86 g(0.8 g/kg bw) Protein Carbohydrate: At Least 130 g/day  Fluids: 1850 mL/day Pt expected to meet estimated nutrient needs: [x]Yes []No 
 
NUTRITION DIAGNOSES:  
Problem:  Altered nutrition-related lab values Etiology: related to DM, steroid Signs/Symptoms: as evidenced by [phone] NUTRITION INTERVENTIONS: 
Meals/Snacks: General/healthful diet   Supplements: Commercial supplement GOAL:  
PO intake >50% of meals and 1-2 boost/day next 5-7 days NUTRITION MONITORING AND EVALUATION Food/Nutrient Intake Outcomes: Total energy intake Physical Signs/Symptoms Outcomes: Weight/weight change, Glucose profile, Electrolyte and renal profile Previous Goal Met: 
 [x] Met              [] Progressing Towards Goal              [] Not Progressing Towards Goal  
Previous Recommendations: 
 [x] Implemented          [] Not Implemented          [] Not Applicable LEARNING NEEDS (Diet, Food/Nutrient-Drug Interaction):  
 [x] None Identified 
 [] Identified and Education Provided/Documented 
 [] Identified and Pt declined/was not appropriate Cultural, Yazidism, OR Ethnic Dietary Needs:  
 [x] None Identified 
 [] Identified and Addressed 
 
 [x] Interdisciplinary Care Plan Reviewed/Documented  
 [x] Discharge Planning: CCD [] Participated in Interdisciplinary Rounds NUTRITION RISK:  
 [] High              [] Moderate           [x]  Low  []  Minimal/Uncompromised Janey [place]onhouse Pager [phone] Weekend Pager [phone]

## 2019-02-13 NOTE — PROGRESS NOTES
Pt did not want to eat lunch until 2 p m or later.   Just wants to rest so insulin held for 1100 am.

## 2019-02-13 NOTE — PROGRESS NOTES
Bedside shift change report given to Erlinda Yan RN (oncoming nurse) by Agustina Hector (offgoing nurse). Report included the following information SBAR, Kardex, MAR, Recent Results and Cardiac Rhythm SR-ST.

## 2019-02-13 NOTE — PROGRESS NOTES
General Daily Progress Note Admit Date: 2/3/2019 Subjective:  
 
Patient continues to complain of coughing and shortness of breath although no worse. Current Facility-Administered Medications Medication Dose Route Frequency  insulin glargine (LANTUS) injection 70 Units  70 Units SubCUTAneous DAILY  insulin glargine (LANTUS) injection 30 Units  30 Units SubCUTAneous QHS  methylPREDNISolone (PF) (SOLU-MEDROL) injection 40 mg  40 mg IntraVENous Q8H  
 albuterol (PROVENTIL VENTOLIN) nebulizer solution 2.5 mg  2.5 mg Nebulization Q6H RT  
 sodium chloride (NS) flush 5-40 mL  5-40 mL IntraVENous Q8H  
 sodium chloride (NS) flush 5-40 mL  5-40 mL IntraVENous PRN  
 sodium chloride (NS) flush 5-40 mL  5-40 mL IntraVENous Q8H  
 [START ON 2/15/2019] apixaban (ELIQUIS) tablet 5 mg  5 mg Oral Q12H And  
 apixaban (ELIQUIS) tablet 10 mg  10 mg Oral Q12H  
 sodium chloride (NS) flush 5-40 mL  5-40 mL IntraVENous Q8H  
 sodium chloride (NS) flush 5-40 mL  5-40 mL IntraVENous PRN  
 benzonatate (TESSALON) capsule 200 mg  200 mg Oral TID PRN  
 lactobac ac& pc-s.therm-b.anim (JUNI Q/RISAQUAD)  1 Cap Oral DAILY  insulin lispro (HUMALOG) injection   SubCUTAneous TIDAC  cefepime (MAXIPIME) 2 g in 0.9% sodium chloride (MBP/ADV) 100 mL  2 g IntraVENous Q8H  
 sodium chloride (NS) flush 5-40 mL  5-40 mL IntraVENous Q8H  
 sodium chloride (NS) flush 5-40 mL  5-40 mL IntraVENous PRN  
 acetaminophen (TYLENOL) tablet 650 mg  650 mg Oral Q6H PRN  
 ondansetron (ZOFRAN) injection 4 mg  4 mg IntraVENous Q4H PRN  
 amLODIPine (NORVASC) tablet 2.5 mg  2.5 mg Oral DAILY  atorvastatin (LIPITOR) tablet 40 mg  40 mg Oral QHS  fluticasone (FLONASE) 50 mcg/actuation nasal spray 2 Spray  2 Spray Both Nostrils DAILY  metoprolol succinate (TOPROL-XL) XL tablet 50 mg  50 mg Oral DAILY  glucose chewable tablet 16 g  4 Tab Oral PRN  
  dextrose (D50W) injection syrg 12.5-25 g  12.5-25 g IntraVENous PRN  
 glucagon (GLUCAGEN) injection 1 mg  1 mg IntraMUSCular PRN  
 nitroglycerin (NITROBID) 2 % ointment 1 Inch  1 Inch Topical Q6H PRN Review of Systems A comprehensive review of systems was negative. Objective:  
 
Patient Vitals for the past 24 hrs: 
 BP Temp Pulse Resp SpO2  
02/13/19 0743     92 % 02/13/19 0737 148/70 97.8 °F (36.6 °C) 83 16 92 % 02/13/19 0510 127/56 97.6 °F (36.4 °C) 73 16 95 % 02/13/19 0148     95 % 02/12/19 2306 145/55 98.4 °F (36.9 °C) 78 18 96 % 02/12/19 2032     96 % 02/12/19 1939 146/54 98.2 °F (36.8 °C) 76 16 96 % 02/12/19 1131 137/65 97.7 °F (36.5 °C) 99 18 93 % 02/12/19 0849 156/78 98.1 °F (36.7 °C) (!) 108 18 94 % 02/13 0701 - 02/13 1900 In: -  
Out: 500 [Urine:500] 02/11 1901 - 02/13 0700 In: 760 [P.O.:360; I.V.:400] Out: 1800 [Urine:1800] Physical Exam:  
Visit Vitals /70 Pulse 83 Temp 97.8 °F (36.6 °C) Resp 16 Ht 5' 8\" (1.727 m) Wt 238 lb 8.6 oz (108.2 kg) SpO2 92% BMI 36.27 kg/m² General appearance: alert, cooperative, no distress, appears stated age Neck: supple, symmetrical, trachea midline, no adenopathy, thyroid: not enlarged, symmetric, no tenderness/mass/nodules, no carotid bruit and no JVD Lungs: rales R base, L base Heart: regular rate and rhythm, S1, S2 normal, no murmur, click, rub or gallop Abdomen: soft, non-tender. Bowel sounds normal. No masses,  no organomegaly Extremities: extremities normal, atraumatic, no cyanosis or edema Data Review Recent Results (from the past 24 hour(s)) GLUCOSE, POC Collection Time: 02/12/19 12:01 PM  
Result Value Ref Range Glucose (POC) 252 (H) 65 - 100 mg/dL Performed by [employer] GLUCOSE, POC Collection Time: 02/12/19  4:28 PM  
Result Value Ref Range Glucose (POC) 281 (H) 65 - 100 mg/dL Performed by [employer] GLUCOSE, POC  
 Collection Time: 02/12/19  8:39 PM  
Result Value Ref Range Glucose (POC) 301 (H) 65 - 100 mg/dL Performed by Lucio Hurst (PCT) METABOLIC PANEL, BASIC Collection Time: 02/13/19  2:13 AM  
Result Value Ref Range Sodium 138 136 - 145 mmol/L Potassium 3.5 3.5 - 5.1 mmol/L Chloride 104 97 - 108 mmol/L  
 CO2 26 21 - 32 mmol/L Anion gap 8 5 - 15 mmol/L Glucose 219 (H) 65 - 100 mg/dL BUN 24 (H) 6 - 20 MG/DL Creatinine 0.71 0.55 - 1.02 MG/DL  
 BUN/Creatinine ratio 34 (H) 12 - 20 GFR est AA >60 >60 ml/min/1.73m2 GFR est non-AA >60 >60 ml/min/1.73m2 Calcium 9.2 8.5 - 10.1 MG/DL  
CBC WITH MANUAL DIFF Collection Time: 02/13/19  2:13 AM  
Result Value Ref Range WBC 19.5 (H) 3.6 - 11.0 K/uL  
 RBC 3.14 (L) 3.80 - 5.20 M/uL HGB 9.2 (L) 11.5 - 16.0 g/dL HCT 27.3 (L) 35.0 - 47.0 % MCV 86.9 80.0 - 99.0 FL  
 MCH 29.3 26.0 - 34.0 PG  
 MCHC 33.7 30.0 - 36.5 g/dL  
 RDW 14.6 (H) 11.5 - 14.5 % PLATELET 396 445 - 322 K/uL MPV 9.6 8.9 - 12.9 FL  
 NRBC 0.0 0  WBC ABSOLUTE NRBC 0.00 0.00 - 0.01 K/uL NEUTROPHILS 91 (H) 32 - 75 % BAND NEUTROPHILS 1 0 - 6 % LYMPHOCYTES 4 (L) 12 - 49 % MONOCYTES 4 (L) 5 - 13 % EOSINOPHILS 0 0 - 7 % BASOPHILS 0 0 - 1 % METAMYELOCYTES 0 0 % MYELOCYTES 0 0 % PROMYELOCYTES 0 0 % BLASTS 0 0 % OTHER CELL 0 0 IMMATURE GRANULOCYTES 0 %  
 ABS. NEUTROPHILS 17.9 (H) 1.8 - 8.0 K/UL  
 ABS. LYMPHOCYTES 0.8 0.8 - 3.5 K/UL  
 ABS. MONOCYTES 0.8 0.0 - 1.0 K/UL  
 ABS. EOSINOPHILS 0.0 0.0 - 0.4 K/UL  
 ABS. BASOPHILS 0.0 0.0 - 0.1 K/UL  
 ABS. IMM. GRANS. 0.0 K/UL  
 DF MANUAL    
 RBC COMMENTS NORMOCYTIC, NORMOCHROMIC    
GLUCOSE, POC Collection Time: 02/13/19  8:29 AM  
Result Value Ref Range Glucose (POC) 239 (H) 65 - 100 mg/dL Performed by Gillian Hernandez Assessment:  
 
Active Problems: 
  SVT (supraventricular tachycardia) ([facility] (8/29/2016) Essential hypertension with goal blood pressure less than 140/90 (8/29/2016) Diabetes mellitus type 2, controlled ([facility] (9/8/2016) Dyslipidemia (9/8/2016) Acute respiratory failure ([facility] (2/3/2019) Leg swelling (2/3/2019) Hodgkin lymphoma ([facility] (2/3/2019) CAP (community acquired pneumonia) (2/3/2019) Plan: 1. Continue systemic steroids for interstitial pneumonitis. 2.  Continue diabetic control. 3.  Continue rehab.

## 2019-02-13 NOTE — PROGRESS NOTES
Bedside shift change report given to Vanessa Mendez RN (oncoming nurse) by Lawson Ramos RN (offgoing nurse). Report included the following information SBAR, Kardex, MAR, Recent Results and Cardiac Rhythm NSR-ST.

## 2019-02-13 NOTE — PROGRESS NOTES
Hematology Oncology Progress Note Follow up for: HD Chart notes reviewed since last visit. Case discussed with following:  
 
Patient complains of the following: Feels SOB with walking, less SOB overall however. Additional concerns noted by the staff:  
 
Patient Vitals for the past 24 hrs: 
 BP Temp Pulse Resp SpO2  
02/13/19 0743     92 % 02/13/19 0737 148/70 97.8 °F (36.6 °C) 83 16 92 % 02/13/19 0510 127/56 97.6 °F (36.4 °C) 73 16 95 % 02/13/19 0148     95 % 02/12/19 2306 145/55 98.4 °F (36.9 °C) 78 18 96 % 02/12/19 2032     96 % 02/12/19 1939 146/54 98.2 °F (36.8 °C) 76 16 96 % 02/12/19 1131 137/65 97.7 °F (36.5 °C) 99 18 93 % Review of Systems: 
12 point ROS done and negative except as above Physical Examination: 
gen NAD 
CV reg Lungs Coarse in the bases but improved, no wheezes 
abd benign Labs: 
Recent Results (from the past 24 hour(s)) GLUCOSE, POC Collection Time: 02/12/19 12:01 PM  
Result Value Ref Range Glucose (POC) 252 (H) 65 - 100 mg/dL Performed by Kiera Perry GLUCOSE, POC Collection Time: 02/12/19  4:28 PM  
Result Value Ref Range Glucose (POC) 281 (H) 65 - 100 mg/dL Performed by Kiera Perry GLUCOSE, POC Collection Time: 02/12/19  8:39 PM  
Result Value Ref Range Glucose (POC) 301 (H) 65 - 100 mg/dL Performed by Latanya De Los Santos (PCT) METABOLIC PANEL, BASIC Collection Time: 02/13/19  2:13 AM  
Result Value Ref Range Sodium 138 136 - 145 mmol/L Potassium 3.5 3.5 - 5.1 mmol/L Chloride 104 97 - 108 mmol/L  
 CO2 26 21 - 32 mmol/L Anion gap 8 5 - 15 mmol/L Glucose 219 (H) 65 - 100 mg/dL BUN 24 (H) 6 - 20 MG/DL Creatinine 0.71 0.55 - 1.02 MG/DL  
 BUN/Creatinine ratio 34 (H) 12 - 20 GFR est AA >60 >60 ml/min/1.73m2 GFR est non-AA >60 >60 ml/min/1.73m2 Calcium 9.2 8.5 - 10.1 MG/DL  
CBC WITH MANUAL DIFF Collection Time: 02/13/19  2:13 AM  
Result Value Ref Range WBC 19.5 (H) 3.6 - 11.0 K/uL  
 RBC 3.14 (L) 3.80 - 5.20 M/uL HGB 9.2 (L) 11.5 - 16.0 g/dL HCT 27.3 (L) 35.0 - 47.0 % MCV 86.9 80.0 - 99.0 FL  
 MCH 29.3 26.0 - 34.0 PG  
 MCHC 33.7 30.0 - 36.5 g/dL  
 RDW 14.6 (H) 11.5 - 14.5 % PLATELET 995 351 - 529 K/uL MPV 9.6 8.9 - 12.9 FL  
 NRBC 0.0 0  WBC ABSOLUTE NRBC 0.00 0.00 - 0.01 K/uL NEUTROPHILS 91 (H) 32 - 75 % BAND NEUTROPHILS 1 0 - 6 % LYMPHOCYTES 4 (L) 12 - 49 % MONOCYTES 4 (L) 5 - 13 % EOSINOPHILS 0 0 - 7 % BASOPHILS 0 0 - 1 % METAMYELOCYTES 0 0 % MYELOCYTES 0 0 % PROMYELOCYTES 0 0 % BLASTS 0 0 % OTHER CELL 0 0 IMMATURE GRANULOCYTES 0 %  
 ABS. NEUTROPHILS 17.9 (H) 1.8 - 8.0 K/UL  
 ABS. LYMPHOCYTES 0.8 0.8 - 3.5 K/UL  
 ABS. MONOCYTES 0.8 0.0 - 1.0 K/UL  
 ABS. EOSINOPHILS 0.0 0.0 - 0.4 K/UL  
 ABS. BASOPHILS 0.0 0.0 - 0.1 K/UL  
 ABS. IMM. GRANS. 0.0 K/UL  
 DF MANUAL    
 RBC COMMENTS NORMOCYTIC, NORMOCHROMIC    
GLUCOSE, POC Collection Time: 02/13/19  8:29 AM  
Result Value Ref Range Glucose (POC) 239 (H) 65 - 100 mg/dL Performed by Geni Montana Assessment and Plan: 
 
*) Hodgkins Lymphoma: 
- s/p 4 cycles of ABVD with post treatment PET without any uptake, great response. 
- will not plan any further chemo or xrt given lung issues. *) Respiratory Failure, suspected Bleomycin toxicity: 
- She feels a bit better, less SOB while talking - On abx per primary - Appreciate pulmonary assistance/input. - bronch 2/6, cultures NGTD 
- Viral panel with coronavirus 
- Candida present on broch, she is on IV fluconazole - Weaning O2 down for goal sat >90% - Pulm planning to switch to oral steroids soon PE 
- Now on eliquis - LE dopplers negative

## 2019-02-13 NOTE — PROGRESS NOTES
Physical Therapy Patient cleared by nursing for PT this am. Attempting to see patient for PT, upon arrival patient on the phone and ignored this writer. Once phone call completed patient adamantly and curtly refusing therapy stating she had asked not to be disturbed until after noon as she had work to do. Attempting to educate patient on the role of PT and importance of OOB mobility. Offered to assist patient to chair to allow her to complete her work. Patient dismissive and stating again that she is busy doing work and requested not be be disturbed until after lunch. Therapy session aborted.  
Holly Robles, PT

## 2019-02-13 NOTE — CDMP QUERY
Dr. Hannah Davalos MD : 
 
Patient admitted with PE/PNA, noted to have 10% wt loss. If possible, please document in progress notes and d/c summary if you are evaluating and/or treating any of the following: 
 
=> Acute Moderate Protein-Calorie Malnutrition 
=> Acute Severe Protein-Calorie Malnutrition 
=> Other Explanation of clinical findings 
=> Clinically Undetermined (no explanation for clinical findings) The medical record reflects the following: 
 
   Risk Factors: 68  BF w/hx: DM, Hodgkin's disease Clinical Indicators: Per dietician \"Meets criteria for acute moderate malnutrition\" with decreased nutritional intake <75% of recommended intake for >1 week and 10% wt loss in 6 months Treatment: Diabetic diet and dietician consult Please clarify and document your clinical opinion in the progress notes and discharge summary including the definitive and/or presumptive diagnosis, (suspected or probable), related to the above clinical findings. Please include clinical findings supporting your diagnosis. Thank You, [email or web address] 
[phone]

## 2019-02-13 NOTE — PROGRESS NOTES
[facility], Critical Care, and Sleep Medicine Progress Note Name: Miles Tolliver MRN: [id number] : 1950 Hospital: [address] Date: 2019 IMPRESSION:  
· Acute hypoxic resp failure - decreasing o2 needs but still on NC oxygen. Mostly likely will need home oxygen. Will do an oxygen need assessment to gauge how much oxygen she may need. · Bilateral pulm infiltrates-  First noted on PET  and seen on ct on admit - tx with augmentin -, then Levaquin - Preceding fever 101. S/P bronch . DDX: PNA, opportunistic infection and/or bleomycin toxicity from chemo - suspect toxicity if bronch remains negative. · Bleomycin Toxicity is the working Dx. · Coronavirus positive RVP · Hodkins lymphoma- tx with 4 cyles of abvd- positive response to chemo · Pulm htn - pap 60 with nl lvef · Anemia-  ? Related to chemo- stable · Hx of asthma from childhood to age 24  
· NEWLY DISCOVERED BILATERAL Pulmonary Emboli WITH NEG DUPLEX OF LEG, now on Eliquis. RECOMMENDATIONS:  
· IV ABX - immunosuppressed pt - broad spectrum - Has complete course of Abx and Fluconazole · Once over this acute episode will get repeat PFTs as an outpt. · Can add nebs to help with airway clearance. · On Eliquis for PE. · Wean o2 as able, goal try to keep pox over 90%. May need home oxygen. Will do an oxygen assessment. · PT and OT to eval.  
· IV steroids, decreased the dose. Will try to switch to po steroids today. Prednisone 30mg po bid. () · Has Insulin adjusted. · F/U cultures, so far negative. Subjective:  
-: Pt has increase clear nasal drainage. NO headache. NO chest pain. Feels that her breathing is improving. Has mainly a dry cough. Feels that her chest is clearing up. NO GERD. Tolerating po intake. NO leg pain or swelling. Slept ok last pm.  
Rest of 10 point ROS is negative. 2-12-19: Pt was seen this am. Has been have moderate to severe coughing paroxysms. Dry and not sputum production. NO chest pain, no back pain, no issues with sleeping. Still has dyspnea. No increased fevers. Pulse has been stable. Still with hypoxia on 3L NC with pox of 91%. NO chest pain, no back pain. No leg pain. This patient has been seen and evaluated at the request of Dr. Robin Lou  For hypoxia. Patient is a 70 y.o. female  Who was dx with hodgkin lymphoma  In august when she was getting a massage in [facility] and the masseuse note  At rt neck ln. She is s/p chemo with abvd. She  Was noted to have bilateral pulm infiltrates on pet/ct On jan 21  And was started on augmenin on jan 25 - seen in  Oncology office on Jan 30 - sats high 80's but increased to 90 % on fluids and was started on levaquin on Jan 30. Noted sig  Increased [place] and said sats drop to 70's with exertion . She says she  Feels much better today. NO cough, heme , phlegm. NO cp. She has noted some ankle swelling. Feb 5 - report decreased sob, reports decreased ankle swelling . reports rhinitis with clear liquid Feb 6- says she gets a cough and sob with talking. Slept well. Feb 7- says she feels better 2/8: Resting in bed talking on the telephone. No current complaints. On 3LPM.   
 
Past Medical History:  
Diagnosis Date  Asthma   
 as a child, nothing since menopause  Cancer ([address] 08/21/2018 Non-Hodgkin's  Cataracts, bilateral   
 Diabetes ([address] type II  
 Environmental allergies  Fibromyalgia Treated with alternate medical therapy  Hypertension   
 no asthma since age 24  // Dr. Renetta Mora said hodgkins  And pt confirms Past Surgical History:  
Procedure Laterality Date  HX BREAST BIOPSY Right 2006  HX COLONOSCOPY  2015  HX GYN Pap 2015  HX ORTHOPAEDIC    
 fractured R ankle/ no surgery  HX OTHER SURGICAL Right 08/27/2018  
 excision of deep neck cervical LN. Prior to Admission medications Medication Sig Start Date End Date Taking? Authorizing Provider ONETOUCH VERIO strip USE 1 STRIP IN VITRO TO TEST BLOOD SUGAR ONCE DAILY 10/17/18  Yes Tres An MD  
ibuprofen (MOTRIN) 600 mg tablet Take 1 Tab by mouth every six (6) hours as needed for Pain. 18  Yes Frankey Dame, MD  
Lancets (ONE TOUCH DELICA) misc USE TO TEST BLOOD SUGAR ONCE DAILY. DX.E11.9 10/13/16  Yes Tres An MD  
pioglitazone (ACTOS) 30 mg tablet TAKE 1 TABLET DAILY 19   Tres An MD  
metoprolol succinate (TOPROL-XL) 50 mg XL tablet TAKE 1 TAB BY MOUTH DAILY. 10/12/18   Tres An MD  
JANUMET XR 50-1,000 mg TM24 TAKE 1 TABLET BY MOUTH TWICE A DAY WITH MEALS 10/12/18   Tres An MD  
fluticasone [facility] ALLERGY RELIEF) 50 mcg/actuation nasal spray 2 Sprays by Both Nostrils route daily. Provider, Historical  
spironolactone-hydrochlorothiazide (ALDACTAZIDE) 25-25 mg per tablet TAKE ONE TABLET BY MOUTH EVERY DAY 12/3/17   Tres An MD  
atorvastatin (LIPITOR) 40 mg tablet TAKE 1 TAB BY MOUTH DAILY. 12/3/17   Tres An MD  
amLODIPine (NORVASC) 10 mg tablet TAKE 1 TABLET BY MOUTH EVERY DAY 17   Tres An MD  
alcohol swabs (ALCOHOL PREP PADS) padm 1 Pad by Apply Externally route daily. DX.E11.9 10/13/16   Tres An MD  
 
No Known Allergies Social History Tobacco Use  Smoking status: Never Smoker  Smokeless tobacco: Never Used Substance Use Topics  Alcohol use: Yes Comment: socially  
 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   
     prostate cancer  Cancer Sister Breast cancer apparent DCIS  
 Heart Disease Brother Brother  from congestive heart failure secondary to severe mitral disease Current Facility-Administered Medications Medication Dose Route Frequency  insulin glargine (LANTUS) injection 70 Units  70 Units SubCUTAneous DAILY  insulin glargine (LANTUS) injection 30 Units  30 Units SubCUTAneous QHS  [START ON 2019] predniSONE (DELTASONE) tablet 30 mg  30 mg Oral DAILY WITH BREAKFAST  albuterol (PROVENTIL VENTOLIN) nebulizer solution 2.5 mg  2.5 mg Nebulization Q6H RT  
 sodium chloride (NS) flush 5-40 mL  5-40 mL IntraVENous Q8H  
 sodium chloride (NS) flush 5-40 mL  5-40 mL IntraVENous Q8H  
 [START ON 2/15/2019] apixaban (ELIQUIS) tablet 5 mg  5 mg Oral Q12H And  
 apixaban (ELIQUIS) tablet 10 mg  10 mg Oral Q12H  
 sodium chloride (NS) flush 5-40 mL  5-40 mL IntraVENous Q8H  
 lactobac ac& pc-s.therm-b.anim (JUNI Q/RISAQUAD)  1 Cap Oral DAILY  insulin lispro (HUMALOG) injection   SubCUTAneous TIDAC  cefepime (MAXIPIME) 2 g in 0.9% sodium chloride (MBP/ADV) 100 mL  2 g IntraVENous Q8H  
 sodium chloride (NS) flush 5-40 mL  5-40 mL IntraVENous Q8H  
 amLODIPine (NORVASC) tablet 2.5 mg  2.5 mg Oral DAILY  atorvastatin (LIPITOR) tablet 40 mg  40 mg Oral QHS  fluticasone (FLONASE) 50 mcg/actuation nasal spray 2 Spray  2 Spray Both Nostrils DAILY  metoprolol succinate (TOPROL-XL) XL tablet 50 mg  50 mg Oral DAILY Review of Systems: A comprehensive review of systems was negative except for that written in the HPI. Objective: 
Vital Signs:   
Visit Vitals /64 Pulse 78 Temp 98.6 °F (37 °C) Resp 16 Ht 5' 8\" (1.727 m) Wt 108.2 kg (238 lb 8.6 oz) SpO2 97% BMI 36.27 kg/m² O2 Device: Nasal cannula O2 Flow Rate (L/min): 6 l/min Temp (24hrs), Av.1 °F (36.7 °C), Min:97.6 °F (36.4 °C), Max:98.6 °F (37 °C) Intake/Output:  
Last shift:       0701 -  190 In: -  
Out: 500 [Urine:500] Last 3 shifts: 1901 -  0700 In: 760 [P.O.:360; I.V.:400] Out: 1800 [Urine:1800] Intake/Output Summary (Last 24 hours) at 2019 1326 Last data filed at 2019 0806 Gross per 24 hour Intake 520 ml Output 1700 ml Net -1180 ml Physical Exam:  
General:  Alert, cooperative, no distress, appears stated age. ON NC oxygen. Sitting up in chair. Head:  Normocephalic, without obvious abnormality, atraumatic. alopecia Eyes:  Conjunctivae/corneas clear. PERRL, EOMs intact. Nose: Nares normal. Septum midline. Mucosa normal. No drainage or sinus tenderness. Throat: Lips, mucosa, and tongue normal. Teeth and gums normal.- Neck: Supple, symmetrical, trachea midline, no adenopathy, thyroid: no enlargment/tenderness/nodules, no carotid bruit and no JVD. Back:   Symmetric, no curvature. ROM normal.  
Lungs:   Bilateral rales . -  Rt greater than left in the bases. Appears to be breathing comfortably. Good air movement. Not coughing with deep inspiratory effort. Chest wall:  No tenderness or deformity. Heart:  Regular rate and rhythm, S1, S2 normal, no murmur, click, rub or gallop.- mild tachy Abdomen:   Soft, non-tender. Bowel sounds normal. No masses,  No organomegaly. Extremities: Extremities normal, atraumatic, no cyanosis - edema gone Pulses: 2+ and symmetric all extremities. Skin: Skin color, texture, turgor normal. No rashes or lesions Lymph nodes: Cervical, supraclavicular, and axillary nodes normal.  
Neurologic: Grossly nonfocal, motor is intact. Psych: no over anxiety or depression. Data review:  
 
Recent Results (from the past 24 hour(s)) GLUCOSE, POC Collection Time: 02/12/19  4:28 PM  
Result Value Ref Range Glucose (POC) 281 (H) 65 - 100 mg/dL Performed by Zeb Copper GLUCOSE, POC Collection Time: 02/12/19  8:39 PM  
Result Value Ref Range Glucose (POC) 301 (H) 65 - 100 mg/dL Performed by Lucia Jackman (PCT) METABOLIC PANEL, BASIC Collection Time: 02/13/19  2:13 AM  
Result Value Ref Range Sodium 138 136 - 145 mmol/L Potassium 3.5 3.5 - 5.1 mmol/L  Chloride 104 97 - 108 mmol/L  
 CO2 26 21 - 32 mmol/L Anion gap 8 5 - 15 mmol/L Glucose 219 (H) 65 - 100 mg/dL BUN 24 (H) 6 - 20 MG/DL Creatinine 0.71 0.55 - 1.02 MG/DL  
 BUN/Creatinine ratio 34 (H) 12 - 20 GFR est AA >60 >60 ml/min/1.73m2 GFR est non-AA >60 >60 ml/min/1.73m2 Calcium 9.2 8.5 - 10.1 MG/DL  
CBC WITH MANUAL DIFF Collection Time: 02/13/19  2:13 AM  
Result Value Ref Range WBC 19.5 (H) 3.6 - 11.0 K/uL  
 RBC 3.14 (L) 3.80 - 5.20 M/uL HGB 9.2 (L) 11.5 - 16.0 g/dL HCT 27.3 (L) 35.0 - 47.0 % MCV 86.9 80.0 - 99.0 FL  
 MCH 29.3 26.0 - 34.0 PG  
 MCHC 33.7 30.0 - 36.5 g/dL  
 RDW 14.6 (H) 11.5 - 14.5 % PLATELET 904 378 - 232 K/uL MPV 9.6 8.9 - 12.9 FL  
 NRBC 0.0 0  WBC ABSOLUTE NRBC 0.00 0.00 - 0.01 K/uL NEUTROPHILS 91 (H) 32 - 75 % BAND NEUTROPHILS 1 0 - 6 % LYMPHOCYTES 4 (L) 12 - 49 % MONOCYTES 4 (L) 5 - 13 % EOSINOPHILS 0 0 - 7 % BASOPHILS 0 0 - 1 % METAMYELOCYTES 0 0 % MYELOCYTES 0 0 % PROMYELOCYTES 0 0 % BLASTS 0 0 % OTHER CELL 0 0 IMMATURE GRANULOCYTES 0 %  
 ABS. NEUTROPHILS 17.9 (H) 1.8 - 8.0 K/UL  
 ABS. LYMPHOCYTES 0.8 0.8 - 3.5 K/UL  
 ABS. MONOCYTES 0.8 0.0 - 1.0 K/UL  
 ABS. EOSINOPHILS 0.0 0.0 - 0.4 K/UL  
 ABS. BASOPHILS 0.0 0.0 - 0.1 K/UL  
 ABS. IMM. GRANS. 0.0 K/UL  
 DF MANUAL    
 RBC COMMENTS NORMOCYTIC, NORMOCHROMIC    
GLUCOSE, POC Collection Time: 02/13/19  8:29 AM  
Result Value Ref Range Glucose (POC) 239 (H) 65 - 100 mg/dL Performed by Eleazar Hobbs GLUCOSE, POC Collection Time: 02/13/19 11:47 AM  
Result Value Ref Range Glucose (POC) 185 (H) 65 - 100 mg/dL Performed by Irena Smith (PCT) Imaging: 
I have personally reviewed the patients radiographs and have reviewed the reports: 
Ct scan: 2-3-19: FINDINGS: 
CHEST: 
THYROID: No nodule.  
MEDIASTINUM: Mediastinal lymphadenopathy has improved compared to the prior 
exam. Transverse dimension at the level of the aortic arch was previously 5.9 cm 
and is now 4.2 cm. SERJIO: No mass or lymphadenopathy. THORACIC AORTA: No dissection or aneurysm. MAIN PULMONARY ARTERY: Right upper and left lower lobe pulmonary emboli are 
noted. TRACHEA/BRONCHI: Patent. ESOPHAGUS: No wall thickening or dilatation. HEART: Normal in size. PLEURA: No effusion or pneumothorax. LUNGS: Infiltrates are noted throughout the right lung and left lower lobe. INCIDENTALLY IMAGED UPPER ABDOMEN: 17 mm right adrenal adenoma. BONES: Degenerative changes are seen in the thoracic spine. 
  
  
IMPRESSION: Right upper and left lower lobe pulmonary emboli. Bilateral airspace 
disease. Improved mediastinal lymphadenopathy. 2-11-19: Clinical indication: Bilateral lung infiltrates, acute respiratory distress with 
hypoxia. 
  
Portable AP upright view of the chest is obtained, comparison February 8. Lung 
fields are unchanged. Infusion catheter is unchanged. Shallow inspiration with 
elevation of the right hemidiaphragm, bilateral interstitial infiltrates more 
prominent on the right. 
  
IMPRESSION 
: No change.  
 
  
Dale Novoa MD

## 2019-02-13 NOTE — PROGRESS NOTES
ADULT PROTOCOL: JET AEROSOL  REASSESSMENT Patient  Osmar Cota     70 y.o.   female     2/13/2019  9:29 AM 
 
Breath Sounds Pre Procedure:  Clear Breath Sounds Post Procedure:  Clear Breathing pattern: Pre procedure Breathing Pattern: Regular Post procedure Breathing Pattern: Regular Heart Rate: Pre procedure Pulse: 80 
         Post procedure Pulse: 97 Resp Rate: Pre procedure Respirations: 18 Post procedure Respirations: 20 
 
 
Cough: Pre procedure Cough: Non-productive Post procedure Cough: Non-productive Oxygen: O2 Device: Nasal cannula   5-6L SpO2: Pre procedure SpO2: 92 % Post procedure SpO2: 95 % Nebulizer Therapy: Current medications Aerosolized Medications: Albuterol Smoking History:  Never Problem List:  
Patient Active Problem List  
Diagnosis Code  SVT (supraventricular tachycardia) ([facility]) I47.1  Essential hypertension with goal blood pressure less than 140/90 I10  
 Diabetes mellitus type 2, controlled ([facility] E11.9  Dyslipidemia E78.5  Overweight (BMI 25.0-29. 9) E66.3  
 Supraclavicular mass R22.2  Lymphadenopathy R59.1  Acute respiratory failure (HCC) J96.00  Leg swelling M79.89  
 Hodgkin lymphoma (HCC) C81.90  
 CAP (community acquired pneumonia) J18.9 Respiratory Therapist: RT Rosanne

## 2019-02-13 NOTE — PROGRESS NOTES
Physical Therapy Attempting again this pm to see patient for PT. Upon entering room patient stating \"Oh you, I did not say I was ready for you. \" Patient again refusing therapy stating she needed to have nursing perform her lower body and lor area bathing. Offered to assist patient to chair where nursing staff could assist with bathing. Patient refusing any OOB mobility until cleaned by nursing staff. Patient requesting to discharge to rehab and attempting to educate patient on importance of participating in therapy during hospital stay and need for progress notes for authorization. Patient continues to refuse. Will follow back tomorrow.  
Tae Dodge, PT

## 2019-02-14 NOTE — PROGRESS NOTES
Hematology Oncology Progress Note Follow up for: HD Chart notes reviewed since last visit. Case discussed with following:  
 
Patient complains of the following: Feels sob but improving, minimal cough Additional concerns noted by the staff:  
 
Patient Vitals for the past 24 hrs: 
 BP Temp Pulse Resp SpO2  
02/14/19 0917 143/73 98 °F (36.7 °C) 95 18 98 % 02/14/19 0840     97 % 02/14/19 0400 137/73 97.9 °F (36.6 °C) 90 18 94 % 02/14/19 0100     94 % 02/13/19 2226 142/62 97.8 °F (36.6 °C) 90 16 91 % 02/13/19 2056     95 % 02/13/19 1904 142/70 97.8 °F (36.6 °C) 93 18 92 % 02/13/19 1717     92 % 02/13/19 1716     (!) 87 % 02/13/19 1657     93 % 02/13/19 1643 145/79 97.6 °F (36.4 °C) 61 16 93 % 02/13/19 1452 107/78 98.3 °F (36.8 °C) (!) 105 16 95 % 02/13/19 1324     97 % 02/13/19 1129   78  98 % 02/13/19 1124 144/64 98.6 °F (37 °C) 78 16 98 % Review of Systems: 
12 point ROS done and negative except as above Physical Examination: 
gen NAD 
CV reg Lungs Coarse in the bases but improved, no wheezes 
abd benign Labs: 
Recent Results (from the past 24 hour(s)) GLUCOSE, POC Collection Time: 02/13/19 11:47 AM  
Result Value Ref Range Glucose (POC) 185 (H) 65 - 100 mg/dL Performed by Vashti Bleacher (PCT) GLUCOSE, POC Collection Time: 02/13/19  4:35 PM  
Result Value Ref Range Glucose (POC) 110 (H) 65 - 100 mg/dL Performed by Vashti Bleacher (PCT) GLUCOSE, POC Collection Time: 02/13/19  9:01 PM  
Result Value Ref Range Glucose (POC) 240 (H) 65 - 100 mg/dL Performed by Vashti Bleacher (PCT) GLUCOSE, POC Collection Time: 02/14/19  7:29 AM  
Result Value Ref Range Glucose (POC) 124 (H) 65 - 100 mg/dL Performed by Marina Guthrie Assessment and Plan: 
 
*) Hodgkins Lymphoma: 
- s/p 4 cycles of ABVD with post treatment PET without any uptake, great response. - will not plan any further chemo or xrt given lung issues. *) Respiratory Failure, suspected Bleomycin toxicity: 
- She feels a bit better, less SOB while talking - Appreciate pulmonary assistance/input. - bronch 2/6, cultures NGTD 
- Viral panel with coronavirus 
- completed IV Abx and fluconazole - Weaning O2 down for goal sat >90% - Now on oral steroids per pulmonary 
- likely will need home o2 PE 
- Now on eliquis - LE dopplers negative Deconditioning - Planning to go to [facility]

## 2019-02-14 NOTE — PROGRESS NOTES
Problem: Mobility Impaired (Adult and Pediatric) Goal: *Acute Goals and Plan of Care (Insert Text) Physical Therapy Goals Initiated 2/7/2019 1. Patient will move from supine to sit and sit to supine , scoot up and down and roll side to side in bed with modified independence within 7 day(s). 2.  Patient will transfer from bed to chair and chair to bed with supervision/set-up using the least restrictive device within 7 day(s). 3.  Patient will perform sit to stand with supervision/set-up within 7 day(s). 4.  Patient will ambulate with supervision/set-up for 150 feet with the least restrictive device within 7 day(s). physical Therapy TREATMENT Patient: Zulma Velasquez (62 y.o. female) Date: 2/14/2019 Diagnosis: Acute respiratory failure ([address] Procedure(s) (LRB): BRONCHOSCOPY (N/A) BRONCHIAL WASHINGS FLEXIBLE (N/A) 8 Days Post-Op Precautions:   falls Chart, physical therapy assessment, plan of care and goals were reviewed. ASSESSMENT: pt did well today, no LOB or SOB, developed a cough during gait trng, did fair with transfers and ther-ex, good motivation this pm, needed 1 standing and 1 sitting rest breaks, vc's for safety and proper RW use. Progression toward goals: 
[]    Improving appropriately and progressing toward goals [x]    Improving slowly and progressing toward goals 
[]    Not making progress toward goals and plan of care will be adjusted PLAN: 
Patient continues to benefit from skilled intervention to address the above impairments. Continue treatment per established plan of care. Discharge Recommendations:  Inpatient Rehab Further Equipment Recommendations for Discharge:  rolling walker OBJECTIVE DATA SUMMARY:  
\ Critical Behavior: 
Neurologic State: Alert Functional Mobility Training: 
Bed Mobility: Pt sitting in chair on arrival. 
Transfers: 
Sit to Stand: Contact guard assistance Stand to Sit: Contact guard assistance Interventions: Tactile cues; Verbal cues Level of Assistance: Contact guard assistance Balance: 
Sitting: Intact; Without support Standing: Intact; With support Standing - Static: Good;Constant support Standing - Dynamic : Good Ambulation/Gait Training: 
Distance (ft): 50 Feet (ft) Assistive Device: Walker, rolling;Gait belt Ambulation - Level of Assistance: Contact guard assistance;Assist x1 Gait Abnormalities: Decreased step clearance Right Side Weight Bearing: Full Left Side Weight Bearing: Full Base of Support: Widened Speed/Carmita: Pace decreased (<100 feet/min); Slow Step Length: Left shortened;Right shortened Therapeutic Exercises:  
sitting EXERCISE Sets Reps Active Active Assist  
Passive Comments Ankle pumps 1 10 [x] [] [] bilat Heel raises 1 10 [x] [] [] \" Toe tap 1 10 [x] [] [] \" Knee ext 1 10 [x] [] [] \" Hip flex 1 10 [x] [] [] \"  
 
Pain: 
Pain Scale 1: Numeric (0 - 10) Pain Intensity 1: 0 Activity Tolerance: fair After treatment:  
[x]    Patient left in no apparent distress sitting up in chair 
[]    Patient left in no apparent distress in bed 
[x]    Call bell left within reach [x]    Nursing notified 
[]    Caregiver present 
[]    Bed alarm activated COMMUNICATION/COLLABORATION:  
The patients plan of care was discussed with: Registered Nurse Klaudia Villanueva PTA Time Calculation: 25 mins

## 2019-02-14 NOTE — PROGRESS NOTES
Attempted to see pt this am and pt is to busy to see therapy this morning. She stated she will be ready for PT after 13:00. Will continue to follow.

## 2019-02-14 NOTE — PROGRESS NOTES
General Daily Progress Note Admit Date: 2/3/2019 Subjective:  
 
Patient has no complaint. Current Facility-Administered Medications Medication Dose Route Frequency  [START ON 2/15/2019] insulin glargine (LANTUS) injection 60 Units  60 Units SubCUTAneous DAILY  insulin glargine (LANTUS) injection 30 Units  30 Units SubCUTAneous QHS  predniSONE (DELTASONE) tablet 30 mg  30 mg Oral BID WITH MEALS  
 albuterol (PROVENTIL VENTOLIN) nebulizer solution 2.5 mg  2.5 mg Nebulization Q6H RT  
 sodium chloride (NS) flush 5-40 mL  5-40 mL IntraVENous PRN  
 sodium chloride (NS) flush 5-40 mL  5-40 mL IntraVENous Q8H  
 [START ON 2/15/2019] apixaban (ELIQUIS) tablet 5 mg  5 mg Oral Q12H And  
 apixaban (ELIQUIS) tablet 10 mg  10 mg Oral Q12H  
 benzonatate (TESSALON) capsule 200 mg  200 mg Oral TID PRN  
 lactobac ac& pc-s.therm-b.anim (JUNI Q/RISAQUAD)  1 Cap Oral DAILY  insulin lispro (HUMALOG) injection   SubCUTAneous TIDAC  acetaminophen (TYLENOL) tablet 650 mg  650 mg Oral Q6H PRN  
 ondansetron (ZOFRAN) injection 4 mg  4 mg IntraVENous Q4H PRN  
 amLODIPine (NORVASC) tablet 2.5 mg  2.5 mg Oral DAILY  atorvastatin (LIPITOR) tablet 40 mg  40 mg Oral QHS  fluticasone (FLONASE) 50 mcg/actuation nasal spray 2 Spray  2 Spray Both Nostrils DAILY  metoprolol succinate (TOPROL-XL) XL tablet 50 mg  50 mg Oral DAILY  glucose chewable tablet 16 g  4 Tab Oral PRN  
 dextrose (D50W) injection syrg 12.5-25 g  12.5-25 g IntraVENous PRN  
 glucagon (GLUCAGEN) injection 1 mg  1 mg IntraMUSCular PRN  
 nitroglycerin (NITROBID) 2 % ointment 1 Inch  1 Inch Topical Q6H PRN Review of Systems A comprehensive review of systems was negative. Objective:  
 
Patient Vitals for the past 24 hrs: 
 BP Temp Pulse Resp SpO2  
02/14/19 0840     97 % 02/14/19 0400 137/73 97.9 °F (36.6 °C) 90 18 94 % 02/14/19 0100     94 % 02/13/19 2226 142/62 97.8 °F (36.6 °C) 90 16 91 % 02/13/19 2056     95 % 02/13/19 1904 142/70 97.8 °F (36.6 °C) 93 18 92 % 02/13/19 1717     92 % 02/13/19 1716     (!) 87 % 02/13/19 1657     93 % 02/13/19 1643 145/79 97.6 °F (36.4 °C) 61 16 93 % 02/13/19 1452 107/78 98.3 °F (36.8 °C) (!) 105 16 95 % 02/13/19 1324     97 % 02/13/19 1129   78  98 % 02/13/19 1124 144/64 98.6 °F (37 °C) 78 16 98 % No intake/output data recorded. 02/12 1901 - 02/14 0700 In: 209 [P.O.:570; I.V.:300] Out: 1150 [TWDLA:4002] Physical Exam:  
Visit Vitals /73 (BP 1 Location: Left arm, BP Patient Position: At rest) Pulse 90 Temp 97.9 °F (36.6 °C) Resp 18 Ht 5' 8\" (1.727 m) Wt 238 lb 8.6 oz (108.2 kg) SpO2 97% BMI 36.27 kg/m² General appearance: alert, cooperative, no distress, appears stated age Neck: supple, symmetrical, trachea midline, no adenopathy, thyroid: not enlarged, symmetric, no tenderness/mass/nodules, no carotid bruit and no JVD Lungs: rales R base, L base Heart: regular rate and rhythm, S1, S2 normal, no murmur, click, rub or gallop Abdomen: soft, non-tender. Bowel sounds normal. No masses,  no organomegaly Extremities: extremities normal, atraumatic, no cyanosis or edema Data Review Recent Results (from the past 24 hour(s)) GLUCOSE, POC Collection Time: 02/13/19 11:47 AM  
Result Value Ref Range Glucose (POC) 185 (H) 65 - 100 mg/dL Performed by Irena Smith (PCT) GLUCOSE, POC Collection Time: 02/13/19  4:35 PM  
Result Value Ref Range Glucose (POC) 110 (H) 65 - 100 mg/dL Performed by Irena Smith (PCT) GLUCOSE, POC Collection Time: 02/13/19  9:01 PM  
Result Value Ref Range Glucose (POC) 240 (H) 65 - 100 mg/dL Performed by Irena Smith (PCT) GLUCOSE, POC Collection Time: 02/14/19  7:29 AM  
Result Value Ref Range Glucose (POC) 124 (H) 65 - 100 mg/dL Performed by Nya Lafleur Assessment:  
 
Active Problems: SVT (supraventricular tachycardia) ([facility] (8/29/2016) Essential hypertension with goal blood pressure less than 140/90 (8/29/2016) Diabetes mellitus type 2, controlled ([facility] (9/8/2016) Dyslipidemia (9/8/2016) Acute respiratory failure ([facility] [address] (2/3/2019) Leg swelling (2/3/2019) Hodgkin lymphoma ([facility] (2/3/2019) CAP (community acquired pneumonia) (2/3/2019) Plan: 1. Continued improvement with interstitial pneumonitis. Steroids decreased per pulmonary. 2.  Continue diabetic control. 3.  Continue rehab.

## 2019-02-14 NOTE — PROGRESS NOTES
[facility] Progress Note Name: Alfa Kennedy MRN: [id number] : 1950 Hospital: [address] Date: 2019 IMPRESSION:  
· Acute hypoxic resp failure - decreasing o2 needs but still on NC oxygen. Mostly likely will need home oxygen. Did need 3L NC oxygen while sleeping. Will have ongoing assessment of oxygen need. · Bilateral pulm infiltrates-  First noted on PET  and seen on ct on admit - tx with augmentin -, then Levaquin - Preceding fever 101. S/P bronch . DDX: PNA, opportunistic infection and/or bleomycin toxicity from chemo - suspect toxicity if bronch remains negative. · Bleomycin Toxicity is the working Dx. · Coronavirus positive RVP · Hodkins lymphoma- tx with 4 cyles of abvd- positive response to chemo · Pulm htn - pap 60 with nl lvef · Anemia-  ? Related to chemo- stable · Hx of asthma from childhood to age 24  
· NEWLY DISCOVERED BILATERAL Pulmonary Emboli WITH NEG DUPLEX OF LEG, now on Eliquis. RECOMMENDATIONS:  
· IV ABX - immunosuppressed pt - broad spectrum - Has complete course of Abx and Fluconazole · Once over this acute episode will get repeat PFTs as an outpt. · Can add nebs to help with airway clearance. · On Eliquis for PE. · Wean o2 as able, goal try to keep pox over 90%. May need home oxygen. Will do an oxygen assessment. · PT and OT to eval.  
· IV steroids, decreased the dose. Will try to switch to po steroids today. Prednisone 30mg po bid. () · Has Insulin adjusted. · F/U cultures, so far negative. Subjective:  
19: Pt has been feeling better. Still with nasal dryness. NO chest pain, no back pain, no leg pain. toleating po intake well.  
 
 
19: Pt has increase clear nasal drainage. NO headache. NO chest pain. Feels that her breathing is improving. Has mainly a dry cough. Feels that her chest is clearing up. NO GERD. Tolerating po intake. NO leg pain or swelling. Slept ok last pm.  
Rest of 10 point ROS is negative. 2-12-19: Pt was seen this am. Has been have moderate to severe coughing paroxysms. Dry and not sputum production. NO chest pain, no back pain, no issues with sleeping. Still has dyspnea. No increased fevers. Pulse has been stable. Still with hypoxia on 3L NC with pox of 91%. NO chest pain, no back pain. No leg pain. This patient has been seen and evaluated at the request of Dr. Vanessa Eisenmenger  For hypoxia. Patient is a 70 y.o. female  Who was dx with hodgkin lymphoma  In august when she was getting a massage in [facility] and the masseuse note  At rt neck ln. She is s/p chemo with abvd. She  Was noted to have bilateral pulm infiltrates on pet/ct On jan 21  And was started on augmenin on jan 25 - seen in  Oncology office on Jan 30 - sats high 80's but increased to 90 % on fluids and was started on levaquin on Jan 30. Noted sig  Increased [place] and said sats drop to 70's with exertion . She says she  Feels much better today. NO cough, heme , phlegm. NO cp. She has noted some ankle swelling. Feb 5 - report decreased sob, reports decreased ankle swelling . reports rhinitis with clear liquid Feb 6- says she gets a cough and sob with talking. Slept well. Feb 7- says she feels better 2/8: Resting in bed talking on the telephone. No current complaints. On 3LPM.   
 
Past Medical History:  
Diagnosis Date  Asthma   
 as a child, nothing since menopause  Cancer ([address] 08/21/2018 Non-Hodgkin's  Cataracts, bilateral   
 Diabetes ([address] type II  
 Environmental allergies  Fibromyalgia Treated with alternate medical therapy  Hypertension   
 no asthma since age 24  // Dr. Gregory Manzo said hodgkins  And pt confirms Past Surgical History:  
Procedure Laterality Date  HX BREAST BIOPSY Right 2006  HX COLONOSCOPY  2015  HX GYN Pap 2015  HX ORTHOPAEDIC    
 fractured R ankle/ no surgery  HX OTHER SURGICAL Right 08/27/2018  
 excision of deep neck cervical LN. Prior to Admission medications Medication Sig Start Date End Date Taking? Authorizing Provider ONETOUCH VERIO strip USE 1 STRIP IN VITRO TO TEST BLOOD SUGAR ONCE DAILY 10/17/18  Yes Katarina Holland MD  
ibuprofen (MOTRIN) 600 mg tablet Take 1 Tab by mouth every six (6) hours as needed for Pain. 8/28/18  Yes Dionte Ibarra MD  
Lancets (ONE TOUCH DELICA) misc USE TO TEST BLOOD SUGAR ONCE DAILY. DX.E11.9 10/13/16  Yes Katarina Holland MD  
pioglitazone (ACTOS) 30 mg tablet TAKE 1 TABLET DAILY 1/6/19   Katarina Holland MD  
metoprolol succinate (TOPROL-XL) 50 mg XL tablet TAKE 1 TAB BY MOUTH DAILY. 10/12/18   Katarina Holland MD  
JANUMET XR 50-1,000 mg TM24 TAKE 1 TABLET BY MOUTH TWICE A DAY WITH MEALS 10/12/18   Katarina Holland MD  
fluticasone [facility]) 50 mcg/actuation nasal spray 2 Sprays by Both Nostrils route daily. Provider, Historical  
spironolactone-hydrochlorothiazide (ALDACTAZIDE) 25-25 mg per tablet TAKE ONE TABLET BY MOUTH EVERY DAY 12/3/17   Katarina Holland MD  
atorvastatin (LIPITOR) 40 mg tablet TAKE 1 TAB BY MOUTH DAILY. 12/3/17   Katarina Holland MD  
amLODIPine (NORVASC) 10 mg tablet TAKE 1 TABLET BY MOUTH EVERY DAY 11/7/17   Katarina Holland MD  
alcohol swabs (ALCOHOL PREP PADS) padm 1 Pad by Apply Externally route daily. DX.E11.9 10/13/16   Katarina Holland MD  
 
No Known Allergies Social History Tobacco Use  Smoking status: Never Smoker  Smokeless tobacco: Never Used Substance Use Topics  Alcohol use: Yes Comment: socially  
 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   
     prostate cancer  Cancer Sister Breast cancer apparent DCIS  
 Heart Disease Brother Brother  from congestive heart failure secondary to severe mitral disease Current Facility-Administered Medications Medication Dose Route Frequency  [START ON 2/15/2019] insulin glargine (LANTUS) injection 60 Units  60 Units SubCUTAneous DAILY  insulin glargine (LANTUS) injection 30 Units  30 Units SubCUTAneous QHS  predniSONE (DELTASONE) tablet 30 mg  30 mg Oral BID WITH MEALS  
 albuterol (PROVENTIL VENTOLIN) nebulizer solution 2.5 mg  2.5 mg Nebulization Q6H RT  
 sodium chloride (NS) flush 5-40 mL  5-40 mL IntraVENous Q8H  
 [START ON 2/15/2019] apixaban (ELIQUIS) tablet 5 mg  5 mg Oral Q12H And  
 apixaban (ELIQUIS) tablet 10 mg  10 mg Oral Q12H  
 lactobac ac& pc-s.therm-b.anim (JUNI Q/RISAQUAD)  1 Cap Oral DAILY  insulin lispro (HUMALOG) injection   SubCUTAneous TIDAC  amLODIPine (NORVASC) tablet 2.5 mg  2.5 mg Oral DAILY  atorvastatin (LIPITOR) tablet 40 mg  40 mg Oral QHS  fluticasone (FLONASE) 50 mcg/actuation nasal spray 2 Spray  2 Spray Both Nostrils DAILY  metoprolol succinate (TOPROL-XL) XL tablet 50 mg  50 mg Oral DAILY Review of Systems: A comprehensive review of systems was negative except for that written in the HPI. Objective: 
Vital Signs:   
Visit Vitals /73 Pulse 95 Temp 97.9 °F (36.6 °C) Resp 18 Ht 5' 8\" (1.727 m) Wt 108.2 kg (238 lb 8.6 oz) SpO2 97% BMI 36.27 kg/m² O2 Device: Nasal cannula O2 Flow Rate (L/min): 1.5 l/min Temp (24hrs), Av °F (36.7 °C), Min:97.6 °F (36.4 °C), Max:98.6 °F (37 °C) Intake/Output:  
Last shift:      No intake/output data recorded. Last 3 shifts:  1901 -  0700 In: 672 [P.O.:570; I.V.:300] Out: 1150 [[facility]] Intake/Output Summary (Last 24 hours) at 2019 0318 Last data filed at 2019 7586 Gross per 24 hour Intake 350 ml Output 450 ml Net -100 ml Physical Exam: General:  Alert, cooperative, no distress, appears stated age. ON NC oxygen. Sitting up in chair. Pox was 91% on RA at rest.   
Head:  Normocephalic, without obvious abnormality, atraumatic. alopecia Eyes:  Conjunctivae/corneas clear. PERRL, EOMs intact. Nose: Nares normal. Septum midline. Mucosa normal. No drainage or sinus tenderness. Throat: Lips, mucosa, and tongue normal. Teeth and gums normal.- Neck: Supple, symmetrical, trachea midline, no adenopathy, thyroid: no enlargment/tenderness/nodules, no carotid bruit and no JVD. Back:   Symmetric, no curvature. ROM normal.  
Lungs:   Appears to be breathing comfortably. Good air movement. Pretty clear. Chest wall:  No tenderness or deformity. Heart:  Regular rate and rhythm, S1, S2 normal, no murmur, click, rub or gallop.- mild tachy Abdomen:   Soft, non-tender. Bowel sounds normal. No masses,  No organomegaly. Extremities: Extremities normal, atraumatic, no cyanosis - edema gone Pulses: 2+ and symmetric all extremities. Skin: Skin color, texture, turgor normal. No rashes or lesions Lymph nodes: Cervical, supraclavicular, and axillary nodes normal.  
Neurologic: Grossly nonfocal, motor is intact. Psych: no over anxiety or depression. Data review:  
 
Recent Results (from the past 24 hour(s)) GLUCOSE, POC Collection Time: 02/13/19 11:47 AM  
Result Value Ref Range Glucose (POC) 185 (H) 65 - 100 mg/dL Performed by David Sandhu (PCT) GLUCOSE, POC Collection Time: 02/13/19  4:35 PM  
Result Value Ref Range Glucose (POC) 110 (H) 65 - 100 mg/dL Performed by David Sandhu (PCT) GLUCOSE, POC Collection Time: 02/13/19  9:01 PM  
Result Value Ref Range Glucose (POC) 240 (H) 65 - 100 mg/dL Performed by David Sandhu (PCT) GLUCOSE, POC Collection Time: 02/14/19  7:29 AM  
Result Value Ref Range Glucose (POC) 124 (H) 65 - 100 mg/dL Performed by Francois Belington Imaging: I have personally reviewed the patients radiographs and have reviewed the reports: 
Ct scan: 2-3-19: FINDINGS: 
CHEST: 
THYROID: No nodule. MEDIASTINUM: Mediastinal lymphadenopathy has improved compared to the prior 
exam. Transverse dimension at the level of the aortic arch was previously 5.9 cm 
and is now 4.2 cm. SERJIO: No mass or lymphadenopathy. THORACIC AORTA: No dissection or aneurysm. MAIN PULMONARY ARTERY: Right upper and left lower lobe pulmonary emboli are 
noted. TRACHEA/BRONCHI: Patent. ESOPHAGUS: No wall thickening or dilatation. HEART: Normal in size. PLEURA: No effusion or pneumothorax. LUNGS: Infiltrates are noted throughout the right lung and left lower lobe. INCIDENTALLY IMAGED UPPER ABDOMEN: 17 mm right adrenal adenoma. BONES: Degenerative changes are seen in the thoracic spine. 
  
  
IMPRESSION: Right upper and left lower lobe pulmonary emboli. Bilateral airspace 
disease. Improved mediastinal lymphadenopathy. 2-11-19: Clinical indication: Bilateral lung infiltrates, acute respiratory distress with 
hypoxia. 
  
Portable AP upright view of the chest is obtained, comparison February 8. Lung 
fields are unchanged. Infusion catheter is unchanged. Shallow inspiration with 
elevation of the right hemidiaphragm, bilateral interstitial infiltrates more 
prominent on the right. 
  
IMPRESSION 
: No change.  
 
  
Talita Don MD

## 2019-02-14 NOTE — PROGRESS NOTES
CM consult noted for inpt rehab. CM met with pt and discussed inpt rehab and she was in agreement. Pt requested to go to [address] [place], [place] [address]natali Acharya [facility]. [facility] form completed and signed. Referral sent via [employer]. CM discussed with pt that she will need to participate with therapy while in the hospital and pt agreed. Spurgeon Najjar, [address]

## 2019-02-14 NOTE — PROGRESS NOTES
After cleaned up after dinner by [employer], pt remains in the bed, in spite of her plans to get out of bed and stay up until 10 PM. . Family now present. Pt states was tired all day because worked on a project all night because during the day (yesterday) her computer went down, She had a report to finish for her job. It took a good portion of the day to finish her project.

## 2019-02-14 NOTE — PROGRESS NOTES
Overnite cont pulse ox set up- initially started on room air but O2 sat's 84%. O2 increased to 3LPM and currently sat's are 90-91%. Will continue to monitor overnite as O2 may need to be increased as nite goes on. RN aware of setup

## 2019-02-15 NOTE — PROGRESS NOTES
Problem: Mobility Impaired (Adult and Pediatric) Goal: *Acute Goals and Plan of Care (Insert Text) Physical Therapy Goals Goals reviewed and remain appropriate 2/15/19 Initiated 2/7/2019 1. Patient will move from supine to sit and sit to supine , scoot up and down and roll side to side in bed with modified independence within 7 day(s). 2.  Patient will transfer from bed to chair and chair to bed with supervision/set-up using the least restrictive device within 7 day(s). 3.  Patient will perform sit to stand with supervision/set-up within 7 day(s). 4.  Patient will ambulate with supervision/set-up for 150 feet with the least restrictive device within 7 day(s). physical Therapy TREATMENT: WEEKLY REASSESSMENT Patient: Marlene Moody (32 y.o. female) Date: 2/15/2019 Diagnosis: Acute respiratory failure (HCC) <principal problem not specified> Procedure(s) (LRB): BRONCHOSCOPY (N/A) BRONCHIAL WASHINGS FLEXIBLE (N/A) 9 Days Post-Op Precautions:   
Chart, physical therapy assessment, plan of care and goals were reviewed. ASSESSMENT: 
Pt received sitting in bedside chair with RN present and agreeable to PT intervention. Pt cleared by nursing for mobility. Pt continues to exhibit limited progress in acute PT due to patient's limited participation in acute PT, decreased respiratory status, and generalized weakness. Goals remain appropriate. Received on 2.5 L/min Hi Flow O2 NC and ultimately required 12 L/min Hi Flow O2 NC during activity. RN present to attempt to wean to standard O2 NC, however patient 87% at rest. With short distance gait on 12 L/min Hi flow O2 NC, SaO2 87%. Transfers and gait performed with CGA, however pt only able to tolerate 12' x 2 in room with RW due to limited endurance and increased SOB. Needed frequent cueing for PLB and education on activity pacing and energy conservation during session.  Pt was left sitting in bedside chair with all needs met, RN present, and VSS following therapy session. Recommend pt discharge to inpatient pulmonary rehab to address endurance and O2 needs prior to returning home. PT will continue to follow to address mobility impairments as noted above. Patient's progression toward goals since last assessment: Slow progress; limited by decreased respiratory status PLAN: 
Goals have been updated based on progression since last assessment. Patient continues to benefit from skilled intervention to address the above impairments. Continue to follow the patient 4 times a week to address goals. Planned Interventions: 
[x]              Bed Mobility Training             []       Neuromuscular Re-Education [x]              Transfer Training                   []       Orthotic/Prosthetic Training 
[x]              Gait Training                         []       Modalities [x]              Therapeutic Exercises           []       Edema Management/Control [x]              Therapeutic Activities            [x]       Patient and Family Training/Education []              Other (comment): 
Discharge Recommendations: Inpatient Pulmonary Rehab Further Equipment Recommendations for Discharge: TBD by rehab SUBJECTIVE:  
Patient stated I think I need to sit.  OBJECTIVE DATA SUMMARY:  
Critical Behavior: 
Neurologic State: Alert Orientation Level: Oriented X4 Cognition: Appropriate decision making, Appropriate for age attention/concentration, Appropriate safety awareness Safety/Judgement: Awareness of environment, Fall prevention, Home safety Strength:  
Strength: Generally decreased, functional 
  
  
  
  
  
  
 
Functional Mobility Training: 
Bed Mobility: 
Rolling: (seated at bedside chair upon arrival) Scooting: Supervision Transfers: 
Sit to Stand: Contact guard assistance Stand to Sit: Contact guard assistance Bed to Chair: Contact guard assistance(needs RW due to weakness) Balance: 
Sitting: Intact Standing: Impaired; With support Standing - Static: Good Standing - Dynamic : FairAmbulation/Gait Training: 
Distance (ft): 12 Feet (ft)(x 2) Assistive Device: Gait belt;Walker, rolling Ambulation - Level of Assistance: Contact guard assistance;Assist x1;Additional time; Adaptive equipment Gait Abnormalities: Decreased step clearance(increased trunk flexion) Base of Support: Widened Speed/Carmita: Pace decreased (<100 feet/min); Fluctuations Step Length: Left shortened;Right shortened Pain: 
Pain Scale 1: Numeric (0 - 10) Pain Intensity 1: 0 Activity Tolerance:  
Poor - SaO2 87% at rest on 6 L/min O2 NC and 87% post-activity on 12 L/min Hi flow NC; increased SOB and limited endurance; no pain complaints Please refer to the flowsheet for vital signs taken during this treatment. After treatment:  
[x]  Patient left in no apparent distress sitting up in chair 
[]  Patient left in no apparent distress in bed 
[x]  Call bell left within reach [x]  Nursing notified 
[x]  Caregiver present 
[]  Bed alarm activated COMMUNICATION/COLLABORATION:  
The patients plan of care was discussed with: Physical Therapist, Occupational Therapist, Registered Nurse and  Hugh Jaquez PT, DPT Time Calculation: 32 mins

## 2019-02-15 NOTE — PROGRESS NOTES
Hematology Oncology Progress Note Follow up for: HD Chart notes reviewed since last visit. Case discussed with following:  
 
Patient complains of the following: Walked in her room with PT yesterday, SOB slowly improving Additional concerns noted by the staff:  
 
Patient Vitals for the past 24 hrs: 
 BP Temp Pulse Resp SpO2  
02/15/19 1145 131/60 98.2 °F (36.8 °C) 77 18 95 % 02/15/19 0804     90 % 02/15/19 0742 136/63 98.1 °F (36.7 °C) 93 18 96 % 02/15/19 0402 136/52 97.5 °F (36.4 °C) 87 16 (!) 87 % 02/14/19 2254 152/70 98.3 °F (36.8 °C) 78 18 91 % 02/14/19 2019 120/57 97.7 °F (36.5 °C) 74 18 96 % 02/14/19 1350     93 % Review of Systems: 
12 point ROS done and negative except as above Physical Examination: 
gen NAD 
CV reg Lungs much improved, minimally coarse 
abd benign Labs: 
Recent Results (from the past 24 hour(s)) GLUCOSE, POC Collection Time: 02/14/19  5:02 PM  
Result Value Ref Range Glucose (POC) 170 (H) 65 - 100 mg/dL Performed by Caroline Weiss GLUCOSE, POC Collection Time: 02/14/19  9:03 PM  
Result Value Ref Range Glucose (POC) 183 (H) 65 - 100 mg/dL Performed by Caroline Weiss METABOLIC PANEL, BASIC Collection Time: 02/15/19  4:20 AM  
Result Value Ref Range Sodium 139 136 - 145 mmol/L Potassium 2.9 (L) 3.5 - 5.1 mmol/L Chloride 104 97 - 108 mmol/L  
 CO2 28 21 - 32 mmol/L Anion gap 7 5 - 15 mmol/L Glucose 38 (LL) 65 - 100 mg/dL BUN 23 (H) 6 - 20 MG/DL Creatinine 0.49 (L) 0.55 - 1.02 MG/DL  
 BUN/Creatinine ratio 47 (H) 12 - 20 GFR est AA >60 >60 ml/min/1.73m2 GFR est non-AA >60 >60 ml/min/1.73m2 Calcium 8.9 8.5 - 10.1 MG/DL  
CBC WITH MANUAL DIFF Collection Time: 02/15/19  4:20 AM  
Result Value Ref Range WBC 21.1 (H) 3.6 - 11.0 K/uL  
 RBC 3.33 (L) 3.80 - 5.20 M/uL HGB 9.6 (L) 11.5 - 16.0 g/dL HCT 29.5 (L) 35.0 - 47.0 %  MCV 88.6 80.0 - 99.0 FL  
 MCH 28.8 26.0 - 34.0 PG  
 MCHC 32.5 30.0 - 36.5 g/dL  
 RDW 15.5 (H) 11.5 - 14.5 % PLATELET 865 432 - 290 K/uL MPV 10.0 8.9 - 12.9 FL  
 NRBC 0.0 0  WBC ABSOLUTE NRBC 0.00 0.00 - 0.01 K/uL NEUTROPHILS 72 32 - 75 % BAND NEUTROPHILS 0 0 - 6 % LYMPHOCYTES 22 12 - 49 % MONOCYTES 6 5 - 13 % EOSINOPHILS 0 0 - 7 % BASOPHILS 0 0 - 1 % METAMYELOCYTES 0 0 % MYELOCYTES 0 0 % PROMYELOCYTES 0 0 % BLASTS 0 0 % OTHER CELL 0 0 IMMATURE GRANULOCYTES 0 %  
 ABS. NEUTROPHILS 15.2 (H) 1.8 - 8.0 K/UL  
 ABS. LYMPHOCYTES 4.6 (H) 0.8 - 3.5 K/UL  
 ABS. MONOCYTES 1.3 (H) 0.0 - 1.0 K/UL  
 ABS. EOSINOPHILS 0.0 0.0 - 0.4 K/UL  
 ABS. BASOPHILS 0.0 0.0 - 0.1 K/UL  
 ABS. IMM. GRANS. 0.0 K/UL  
 DF MANUAL    
 RBC COMMENTS NORMOCYTIC, NORMOCHROMIC    
GLUCOSE, POC Collection Time: 02/15/19  5:29 AM  
Result Value Ref Range Glucose (POC) 46 (LL) 65 - 100 mg/dL Performed by Unkown  GLUCOSE, POC Collection Time: 02/15/19  5:55 AM  
Result Value Ref Range Glucose (POC) 192 (H) 65 - 100 mg/dL Performed by Unkown  GLUCOSE, POC Collection Time: 02/15/19  7:40 AM  
Result Value Ref Range Glucose (POC) 144 (H) 65 - 100 mg/dL Performed by Jonel Dubonr GLUCOSE, POC Collection Time: 02/15/19 11:17 AM  
Result Value Ref Range Glucose (POC) 129 (H) 65 - 100 mg/dL Performed by Jonel Pozo Assessment and Plan: 
 
*) Hodgkins Lymphoma: 
- s/p 4 cycles of ABVD with post treatment PET without any uptake, great response. 
- will not plan any further chemo or xrt given lung issues. *) Respiratory Failure, suspected Bleomycin toxicity: 
- She feels a bit better, less SOB while talking - Appreciate pulmonary assistance/input. - bronch 2/6, cultures NGTD 
- Viral panel with coronavirus 
- completed IV Abx and fluconazole - Weaning O2 down for goal sat >90% - Now on oral steroids per pulmonary 
- likely will need home o2 PE 
- Now on eliquis - LE dopplers negative Deconditioning - Planning to go to [facility] [place] over weekend

## 2019-02-15 NOTE — PROGRESS NOTES
Physical Therapy Note Chart reviewed. Attempted to see patient for PT intervention, however RN reporting that patient requesting to not be disturbed until 10AM as she was woken up nursing at 4 AM. Will defer and continue to follow.  
 
Thank you, 
Norma Welch, PT, DPT

## 2019-02-15 NOTE — PROGRESS NOTES
Problem: Self Care Deficits Care Plan (Adult) Goal: *Acute Goals and Plan of Care (Insert Text) Occupational Therapy Goals: 
Initiated 2/15/2019 1. Patient will perform grooming standing with modified independence within 7 days. 2. Patient will perform toileting with modified independence within 7 days. 3. Patient will perform upper body dressing and lower body dressing with modified independence within 7 days. 4. Patient will perform bathing with modified independence within 7 days. 5. Patient will correctly use PLB technique during functional tasks for improved independence with ADLS within 7 days. 6. Patient will transfer from toilet with modified independence using the least restrictive device and appropriate durable medical equipment within 7 days. Occupational Therapy EVALUATION Patient: Denise Castillo (4 y.o. female) Date: 2/15/2019 Primary Diagnosis: Acute respiratory failure ([address] Procedure(s) (LRB): BRONCHOSCOPY (N/A) BRONCHIAL WASHINGS FLEXIBLE (N/A) 9 Days Post-Op Precautions: monitor O2    
 
ASSESSMENT : 
Based on the objective data described below, the patient presents with need for high flow O2 at 2.5 liters at rest initially (94%) and 12 liters NC high flow with activity (O2 sat was 87%). Nurse present and attempt to wean pt to standard nasal cannula but even on 6 liters NC O2 sat was 87% at rest.  Pt did feel SOB. Pt reports that she is motivated and willing to participate in the rehab process. CGA for sit to stand and pt needed RW for balance and due to LE weakness. CGA for ADLS and mobility and pt is mostly limited by respiratory status. Recommend inpatient pulmonary rehab at discharge. 2.5 liters at rest upon arrival to room on high flow:  94% 6L NC at rest on standard cannula 87-86% 12 liters NC with activity on high flow 87% 6 liters NC high flow at end of session 94% Patient will benefit from skilled intervention to address the above impairments. Patients rehabilitation potential is considered to be Fair Factors which may influence rehabilitation potential include:  
[x]             None noted []             Mental ability/status []             Medical condition []             Home/family situation and support systems []             Safety awareness []             Pain tolerance/management 
[]             Other: PLAN : 
Recommendations and Planned Interventions: 
[x]               Self Care Training                  [x]        Therapeutic Activities [x]               Functional Mobility Training    []        Cognitive Retraining 
[x]               Therapeutic Exercises           []        Endurance Activities [x]               Balance Training                   []        Neuromuscular Re-Education []               Visual/Perceptual Training     [x]   Home Safety Training 
[x]               Patient Education                 [x]        Family Training/Education []               Other (comment): Frequency/Duration: Patient will be followed by occupational therapy 4 times a week to address goals. Discharge Recommendations: Inpatient Pulmonary Rehab Further Equipment Recommendations for Discharge: TBD SUBJECTIVE:  
Patient stated I am ready to do whatever you want me to do. I am motivated to do this.  OBJECTIVE DATA SUMMARY:  
HISTORY:  
Past Medical History:  
Diagnosis Date  Asthma   
 as a child, nothing since menopause  Cancer ([facility] [address] 08/21/2018 Non-Hodgkin's  Cataracts, bilateral   
 Diabetes ([facility] [address] type II  
 Environmental allergies  Fibromyalgia Treated with alternate medical therapy  Hypertension Past Surgical History:  
Procedure Laterality Date  HX BREAST BIOPSY Right 2006  HX COLONOSCOPY  2015  HX GYN Pap 2015  HX ORTHOPAEDIC    
 fractured R ankle/ no surgery  HX OTHER SURGICAL Right 08/27/2018  
 excision of deep neck cervical LN. Prior Level of Function/Environment/Context: travels internationally frequently, performed all ADLS and IADLS without assist, recent chemo which impacted lung function Expanded or extensive additional review of patient history:  
 
Home Situation Home Environment: Apartment # Steps to Enter: (elevator to 3rd floor) One/Two Story Residence: One story Living Alone: Yes Support Systems: Family member(s) Patient Expects to be Discharged to[de-identified] Private residence Current DME Used/Available at Home: Cane, straight, Wheelchair Tub or Shower Type: Tub/Shower combination Hand dominance: RightEXAMINATION OF PERFORMANCE DEFICITS: 
Cognitive/Behavioral Status: 
Neurologic State: Alert Orientation Level: Oriented X4 Cognition: Appropriate decision making; Appropriate for age attention/concentration; Appropriate safety awareness Perception: Appears intact Perseveration: No perseveration noted Safety/Judgement: Awareness of environment; Fall prevention;Home safety Hearing: Auditory Auditory Impairment: None Vision/Perceptual:   
    
    
    
  
    
    
Corrective Lenses: Glasses(bifocals) Range of Motion: 
 
AROM: Within functional limits Strength: 
 
Strength: Generally decreased, functional 
  
  
  
  
Coordination: 
Coordination: Within functional limits Fine Motor Skills-Upper: Left Intact; Right Intact Gross Motor Skills-Upper: Left Intact; Right Intact Tone & Sensation: 
 
Tone: Normal 
Sensation: Intact Balance: 
Sitting: Intact Standing: Impaired Standing - Static: Good Standing - Dynamic : Fair Functional Mobility and Transfers for ADLs:Bed Mobility: 
Rolling: (seated at bedside chair upon arrival) Scooting: Supervision Transfers: 
Sit to Stand: Contact guard assistance Stand to Sit: Contact guard assistance Bed to Chair: Contact guard assistance(needs RW due to weakness) Bathroom Mobility: Contact guard assistance(with rolling walker) Toilet Transfer : Contact guard assistance ADL Assessment: 
Feeding: Independent Oral Facial Hygiene/Grooming: Contact guard assistance Bathing: Contact guard assistance Upper Body Dressing: Contact guard assistance Lower Body Dressing: Contact guard assistance Toileting: Contact guard assistance ADL Intervention and task modifications: 
  
Educated on pt on energy conservation, PLB techniques Cognitive Retraining Safety/Judgement: Awareness of environment; Fall prevention;Home safety Functional Measure: 
Barthel Index: 
 
Bathin Bladder: 10 Bowels: 10 
Groomin Dressin Feeding: 10 Mobility: 10 Stairs: 0 Toilet Use: 5 Transfer (Bed to Chair and Back): 10 Total: 65 The Barthel ADL Index: Guidelines 1. The index should be used as a record of what a patient does, not as a record of what a patient could do. 2. The main aim is to establish degree of independence from any help, physical or verbal, however minor and for whatever reason. 3. The need for supervision renders the patient not independent. 4. A patient's performance should be established using the best available evidence. Asking the patient, friends/relatives and nurses are the usual sources, but direct observation and common sense are also important. However direct testing is not needed. 5. Usually the patient's performance over the preceding 24-48 hours is important, but occasionally longer periods will be relevant. 6. Middle categories imply that the patient supplies over 50 per cent of the effort. 7. Use of aids to be independent is allowed. Isra Dillard., Barthel, DFrannyW. (1377). Functional evaluation: the Barthel Index. [address] [facility] (14)2. FELA Henry, Calvert Stephan., Eric Sellers., Tas[place]em, [address] [place], [place] [address] ().  Measuring the change indisability after inpatient rehabilitation; comparison of the responsiveness of the Barthel Index and Functional [place] Measure. Journal of Neurology, Neurosurgery, and Psychiatry, 66(4), 975-927. ALISHA Perera, JONNATHAN Owens, & José Guerrero M.A. (2004.) Assessment of post-stroke quality of life in cost-effectiveness studies: The usefulness of the Barthel Index and the EuroQoL-5D. [facility], 13, [phone] Occupational Therapy Evaluation Charge Determination History Examination Decision-Making MEDIUM Complexity : Expanded review of history including physical, cognitive and psychosocial  history  LOW Complexity : 1-3 performance deficits relating to physical, cognitive , or psychosocial skils that result in activity limitations and / or participation restrictions  MEDIUM Complexity : Patient may present with comorbidities that affect occupational performnce. Miniml to moderate modification of tasks or assistance (eg, physical or verbal ) with assesment(s) is necessary to enable patient to complete evaluation Based on the above components, the patient evaluation is determined to be of the following complexity level: LOW Pain: 
Pain Scale 1: Numeric (0 - 10) Pain Intensity 1: 0 Activity Tolerance:  
 
Please refer to the flowsheet for vital signs taken during this treatment. After treatment:  
[x] Patient left in no apparent distress sitting up in chair 
[] Patient left in no apparent distress in bed 
[x] Call bell left within reach [x] Nursing notified 
[x] Caregiver present 
[] Bed alarm activated COMMUNICATION/EDUCATION:  
The patients plan of care was discussed with: Physical Therapist, Registered Nurse and patient. [x] Home safety education was provided and the patient/caregiver indicated understanding. [x] Patient have participated as able in goal setting and plan of care. [x] Patient agree to work toward stated goals and plan of care. [] Patient understands intent and goals of therapy, but is neutral about his/her participation. [] Patient is unable to participate in goal setting and plan of care. This patients plan of care is appropriate for delegation to [facility]. Thank you for this referral. 
Paloma Miles OTR/L Time Calculation: 35 mins

## 2019-02-15 NOTE — PROGRESS NOTES
Attempted to see pt for OT services. Pt is requesting to sleep until 10 am as pt was woken up at 4am and did not sleep well. Noted that Dr. Patricia Brown and pt wants to go to Inpatient Rehab. Unable to do eval due to the above and noted that [facility] is waiting for eval for determination of rehab needs. Will defer and follow up later today.

## 2019-02-15 NOTE — ADT AUTH CERT NOTES
Pulmonary Disease GRG - Care Day 12 (2/14/2019) by Florence Sales RN Review Entered Review Status 2/14/2019 15:57 Completed Criteria Review Care Day: 12 Care Date: 2/14/2019 Level of Care: Step Down Guideline Day 3 Level Of Care  
(X) * Activity level acceptable  
(X) * Isolation not needed, or status acceptable ( ) Floor to discharge 2/14/2019 15:57:50 EST by [facility] Clinical Status ( ) * Respiratory status acceptable 2/14/2019 15:57:50 EST by Corina Sandoval  
  Nasal cannula 1.5 l/min  
  
(X) * Right heart function adequate  
(X) * Temperature status acceptable 2/14/2019 15:57:50 EST by Corina Sandoval  
  97.5  °F (36.4 [facility]) ( ) * No infection, or status acceptable 2/14/2019 15:57:50 EST by Corina Sandoval  
  WBC 0N 02/13/2019 19.5; NO ADDITIONAL LABS AVAILABLE FOR 02/14/2019 REVIEW AT THIS TIME. ( ) * Stable chest findings  
(X) * Pain and nausea absent or adequately managed ( ) * General Discharge Criteria met Interventions  
(X) * No chest tube, or status acceptable  
(X) * Intake acceptable ( ) * No inpatient interventions needed 2/14/2019 15:57:50 EST by Corina Sandoval Subject: Additional Clinical Information INTERNAL MED: 1.   Continued improvement with interstitial pneumonitis.   Steroids decreased per pulmonary. 2.   Continue diabetic control. 3.   Continue rehab.  
  
  
  
2/14/2019 15:57:50 EST by Corina Sandoval Subject: Additional Clinical Information 02/14/19 0917 98  °F (36.7  °C) 95 143/73âLeft arm At rest 18 98 % Nasal cannula 1.5 l/min Alert 2/14/2019 15:57:50 EST by Corina Sandoval Subject: Additional Clinical Information MEDS: DELTASONE 30MG PO BID; TOPROL XL TAB 50MG PO QD; HUMALOG SC TID; ELIQUIS PO Q12HRS; NORVASC PO QD; ALBUTEROL NEB Q6HRS  
  
  
  
  
  
* Milestone Pulmonary Disease GRG - Care Day 11 (2/13/2019) by Florence Sales RN Review Entered Review Status 2/14/2019 15:52 Completed Criteria Review Care Day: 11 Care Date: 2/13/2019 Level of Care: Step Down Guideline Day 3 Level Of Care  
(X) * Activity level acceptable  
(X) * Isolation not needed, or status acceptable ( ) Floor to discharge 2/14/2019 15:52:53 EST by Hebert Gloria Clinical Status ( ) * Respiratory status acceptable 2/14/2019 15:52:53 EST by Al Etienne  
  92 % Nasal cannula 3 l/min  
  
(X) * Right heart function adequate  
(X) * Temperature status acceptable 2/14/2019 15:52:53 EST by Al Etienne  
  97.6  °F (36.4 [facility]) ( ) * No infection, or status acceptable 2/14/2019 15:52:53 EST by Al Etienne  
  WBC 19.5  
  
( ) * Stable chest findings  
(X) * Pain and nausea absent or adequately managed ( ) * General Discharge Criteria met Interventions  
(X) * No chest tube, or status acceptable  
(X) * Intake acceptable ( ) * No inpatient interventions needed 2/14/2019 15:52:53 EST by Al Etienne Subject: Additional Clinical Information INTERNAL MED: 1.   Continue systemic steroids for interstitial pneumonitis. 2.   Continue diabetic control. 3.   Continue rehab.  
  
  
2/14/2019 15:52:53 EST by Al Etienne Subject: Additional Clinical Information HEM/ONC:   
Assessment and Plan:  *) Hodgkins Lymphoma:- s/p 4 cycles of ABVD with post treatment PET without any uptake, great response.- will not plan any further chemo or xrt given lung issues.  *) Respiratory Failure, suspected Bleomycin toxicity:- She feels a bit better, less SOB while talking- On abx per primary - Appreciate pulmonary assistance/input. - bronch 2/6, cultures NGTD- Viral panel with coronavirus- Candida present on broch, she is on IV fluconazole- Weaning O2 down for goal sat >90%- Pulm planning to switch to oral steroids soon  PE- Now on eliquis- LE dopplers negative    
  
  
  
2/14/2019 15:52:53 EST by Al Etienne Subject: Additional Clinical Information PULMONARY DISEASE: IV ABX - immunosuppressed pt - broad spectrum - Has complete course of Abx and Fluconazole Once over this acute episode will get repeat PFTs as an outpt. Can add nebs to help with airway clearance. On Eliquis for PE. Wean o2 as able, goal try to keep pox over 90%. May need home oxygen.   Will do an oxygen assessment. PT and OT to eval.   
IV steroids, decreased the dose. Will try to switch to po steroids today. Prednisone 30mg po bid. (2/13) Has Insulin adjusted. F/U cultures, so far negative. 2/14/2019 15:52:53 EST by Riaz Rawls Subject: Additional Clinical Information LABS: WBC 19.5; HGB 9.2; HCT 27.3; BUN 24; GLUCOSE 219; GLUCOSE FAST , 156  
  
  
2/14/2019 15:52:53 EST by Riaz Rawls Subject: Additional Clinical Information MEDS: SOLU-MEDROL 40MG IV Q8HRS; LANTUS SC QD; MAXIPIME 2G IV Q8HRS; DELTASONE 30MG PO BID; TOPROL XL TAB PO QD; HUMALOG INJECTION SC TID; LIPITOR PO QHS; ELIQUIS TAB PO Q12HRS; NORVASC PO QD; ALBUTEROL NEB Q6HRS  
  
  
2/14/2019 15:52:53 EST by Riaz Rawls Subject: Additional Clinical Information Patient Vitals for the past 24 hrs:  [id number]/13/19 0743ââââ92 %02/13/19 5281880/4523.7  °F (36.6  °D)722117 %02/13/19 5276757/9788.6  °F (36.4  °Y)015826 %02/13/19 0148ââââ95 % * Milestone

## 2019-02-15 NOTE — PROGRESS NOTES
[facility] Progress Note Name: Ghanshyam Jiang MRN: [id number] : 1950 Hospital: [facility] Date: 2/15/2019 IMPRESSION:  
· Had Hypoglycemia early this am.  
· Bleomycin Toxicity is the working Dx. · Acute hypoxic resp failure - decreasing o2 needs but still on NC oxygen. Mostly likely will need home oxygen. Did need 3L NC oxygen while sleeping. Will have ongoing assessment of oxygen need. · Bilateral pulm infiltrates-  First noted on PET  and seen on ct on admit - tx with augmentin -, then Levaquin - Preceding fever 101. S/P bronch . DDX: PNA, opportunistic infection and/or bleomycin toxicity from chemo - suspect toxicity if bronch remains negative. · Coronavirus positive RVP · Hodkins lymphoma- tx with 4 cyles of abvd- positive response to chemo · Pulm htn - pap 60 with nl lvef · Anemia-  ? Related to chemo- stable · Hx of asthma from childhood to age 24  
· NEWLY DISCOVERED BILATERAL Pulmonary Emboli WITH NEG DUPLEX OF LEG, now on Eliquis. RECOMMENDATIONS:  
· Monitor for further hypoglycemia. · IV ABX - immunosuppressed pt - broad spectrum - Has completed course of Abx and Fluconazole · Once over this acute episode will get repeat PFTs as an outpt. · Can add nebs to help with airway clearance. · On Eliquis for PE. · Wean o2 as able, goal try to keep pox over 90%. May need home oxygen. Will do an oxygen assessment. · PT and OT to [facility]. Anticipate admission to [facility]. · Will Continue. Prednisone 30mg po bid. () · Has Insulin adjusted. · Needs to call [phone] for appt. When ready for discharge. Subjective:  
 
2-15-19: Pt had hypoglycemia this am. She has mild dyspnea. Has nasal dryness. She did not eat much last pm. NO chest pain. No back pain, no abdominal pain. 19: Pt has been feeling better. Still with nasal dryness.  NO chest pain, no back pain, no leg pain. toleating po intake well.  
 
 
2-13-19: Pt has increase clear nasal drainage. NO headache. NO chest pain. Feels that her breathing is improving. Has mainly a dry cough. Feels that her chest is clearing up. NO GERD. Tolerating po intake. NO leg pain or swelling. Slept ok last pm.  
Rest of 10 point ROS is negative. 2-12-19: Pt was seen this am. Has been have moderate to severe coughing paroxysms. Dry and not sputum production. NO chest pain, no back pain, no issues with sleeping. Still has dyspnea. No increased fevers. Pulse has been stable. Still with hypoxia on 3L NC with pox of 91%. NO chest pain, no back pain. No leg pain. This patient has been seen and evaluated at the request of Dr. Jaquelin Claros  For hypoxia. Patient is a 70 y.o. female  Who was dx with hodgkin lymphoma  In august when she was getting a massage in [facility] and the masseuse note  At rt neck ln. She is s/p chemo with abvd. She  Was noted to have bilateral pulm infiltrates on pet/ct On jan 21  And was started on augmenin on jan 25 - seen in  Oncology office on Jan 30 - sats high 80's but increased to 90 % on fluids and was started on levaquin on Jan 30. Noted sig  Increased [place] and said sats drop to 70's with exertion . She says she  Feels much better today. NO cough, heme , phlegm. NO cp. She has noted some ankle swelling. Feb 5 - report decreased sob, reports decreased ankle swelling . reports rhinitis with clear liquid Feb 6- says she gets a cough and sob with talking. Slept well. Feb 7- says she feels better 2/8: Resting in bed talking on the telephone. No current complaints. On 3LPM.   
 
Past Medical History:  
Diagnosis Date  Asthma   
 as a child, nothing since menopause  Cancer ([address] 08/21/2018 Non-Hodgkin's  Cataracts, bilateral   
 Diabetes ([address] type II  
 Environmental allergies  Fibromyalgia Treated with alternate medical therapy  Hypertension   
 no asthma since age 24  // Dr. Lisette Valle said hodgkins  And pt confirms Past Surgical History:  
Procedure Laterality Date  HX BREAST BIOPSY Right 2006  HX COLONOSCOPY  2015  HX GYN Pap 2015  HX ORTHOPAEDIC    
 fractured R ankle/ no surgery  HX OTHER SURGICAL Right 08/27/2018  
 excision of deep neck cervical LN. Prior to Admission medications Medication Sig Start Date End Date Taking? Authorizing Provider ONETOUCH VERIO strip USE 1 STRIP IN VITRO TO TEST BLOOD SUGAR ONCE DAILY 10/17/18  Yes Donald Ratliff MD  
ibuprofen (MOTRIN) 600 mg tablet Take 1 Tab by mouth every six (6) hours as needed for Pain. 8/28/18  Yes Michele Panda MD  
Lancets (ONE TOUCH DELICA) misc USE TO TEST BLOOD SUGAR ONCE DAILY. DX.E11.9 10/13/16  Yes Donald Ratliff MD  
pioglitazone (ACTOS) 30 mg tablet TAKE 1 TABLET DAILY 1/6/19   Donald Ratliff MD  
metoprolol succinate (TOPROL-XL) 50 mg XL tablet TAKE 1 TAB BY MOUTH DAILY. 10/12/18   Donald Ratliff MD  
JANUMET XR 50-1,000 mg TM24 TAKE 1 TABLET BY MOUTH TWICE A DAY WITH MEALS 10/12/18   Donald Ratliff MD  
fluticasone [facility] ALLERGY RELIEF) 50 mcg/actuation nasal spray 2 Sprays by Both Nostrils route daily. Provider, Historical  
spironolactone-hydrochlorothiazide (ALDACTAZIDE) 25-25 mg per tablet TAKE ONE TABLET BY MOUTH EVERY DAY 12/3/17   Donald Ratliff MD  
atorvastatin (LIPITOR) 40 mg tablet TAKE 1 TAB BY MOUTH DAILY. 12/3/17   Donald Ratliff MD  
amLODIPine (NORVASC) 10 mg tablet TAKE 1 TABLET BY MOUTH EVERY DAY 11/7/17   Donald Ratliff MD  
alcohol swabs (ALCOHOL PREP PADS) padm 1 Pad by Apply Externally route daily. DX.E11.9 10/13/16   Donald Ratliff MD  
 
No Known Allergies Social History Tobacco Use  Smoking status: Never Smoker  Smokeless tobacco: Never Used Substance Use Topics  Alcohol use: Yes Comment: socially  
 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   
     prostate cancer  Cancer Sister Breast cancer apparent DCIS  
 Heart Disease Brother Brother  from congestive heart failure secondary to severe mitral disease Current Facility-Administered Medications Medication Dose Route Frequency  insulin glargine (LANTUS) injection 30 Units  30 Units SubCUTAneous DAILY  albuterol (PROVENTIL VENTOLIN) nebulizer solution 2.5 mg  2.5 mg Nebulization TID RT  
 predniSONE (DELTASONE) tablet 30 mg  30 mg Oral BID WITH MEALS  sodium chloride (NS) flush 5-40 mL  5-40 mL IntraVENous Q8H  
 apixaban (ELIQUIS) tablet 5 mg  5 mg Oral Q12H  
 lactobac ac& pc-s.therm-b.anim (JUNI Q/RISAQUAD)  1 Cap Oral DAILY  insulin lispro (HUMALOG) injection   SubCUTAneous TIDAC  amLODIPine (NORVASC) tablet 2.5 mg  2.5 mg Oral DAILY  atorvastatin (LIPITOR) tablet 40 mg  40 mg Oral QHS  fluticasone (FLONASE) 50 mcg/actuation nasal spray 2 Spray  2 Spray Both Nostrils DAILY  metoprolol succinate (TOPROL-XL) XL tablet 50 mg  50 mg Oral DAILY Review of Systems: A comprehensive review of systems was negative except for that written in the HPI. Objective: 
Vital Signs:   
Visit Vitals /63 Pulse 93 Temp 98.1 °F (36.7 °C) Resp 18 Ht 5' 8\" (1.727 m) Wt 108.2 kg (238 lb 8.6 oz) SpO2 90% BMI 36.27 kg/m² O2 Device: Nasal cannula O2 Flow Rate (L/min): 2.5 l/min Temp (24hrs), Av.8 °F (36.6 °C), Min:97.5 °F (36.4 °C), Max:98.3 °F (36.8 °C) Intake/Output:  
Last shift:      No intake/output data recorded. Last 3 shifts:  1901 - 02/15 0700 In: -  
Out: 450 [Urine:450] Intake/Output Summary (Last 24 hours) at 2/15/2019 1014 Last data filed at 2/15/2019 3425 Gross per 24 hour Intake  Output 450 ml Net -450 ml Physical Exam:  
General:  Alert, cooperative, no distress, appears stated age.  ON NC oxygen. Sitting up in chair. Pox was 91% on RA at rest.   
Head:  Normocephalic, without obvious abnormality, atraumatic. alopecia Eyes:  Conjunctivae/corneas clear. PERRL, EOMs intact. Nose: Nares normal. Septum midline. Mucosa normal. No drainage or sinus tenderness. Throat: Lips, mucosa, and tongue normal. Teeth and gums normal.- Neck: Supple, symmetrical, trachea midline, no adenopathy, thyroid: no enlargment/tenderness/nodules, no carotid bruit and no JVD. Back:   Symmetric, no curvature. ROM normal.  
Lungs:   Appears to be breathing comfortably. Good air movement. Pretty clear. Chest wall:  No tenderness or deformity. Heart:  Regular rate and rhythm, S1, S2 normal, no murmur, click, rub or gallop.- mild tachy Abdomen:   Soft, non-tender. Bowel sounds normal. No masses,  No organomegaly. Extremities: Extremities normal, atraumatic, no cyanosis - edema gone Pulses: 2+ and symmetric all extremities. Skin: Skin color, texture, turgor normal. No rashes or lesions Lymph nodes: Cervical, supraclavicular, and axillary nodes normal.  
Neurologic: Grossly nonfocal, motor is intact. Psych: no over anxiety or depression. Data review:  
 
Recent Results (from the past 24 hour(s)) GLUCOSE, POC Collection Time: 02/14/19 10:51 AM  
Result Value Ref Range Glucose (POC) 156 (H) 65 - 100 mg/dL Performed by [employer] GLUCOSE, POC Collection Time: 02/14/19  5:02 PM  
Result Value Ref Range Glucose (POC) 170 (H) 65 - 100 mg/dL Performed by [employer] GLUCOSE, POC Collection Time: 02/14/19  9:03 PM  
Result Value Ref Range Glucose (POC) 183 (H) 65 - 100 mg/dL Performed by [employer] METABOLIC PANEL, BASIC Collection Time: 02/15/19  4:20 AM  
Result Value Ref Range Sodium 139 136 - 145 mmol/L Potassium 2.9 (L) 3.5 - 5.1 mmol/L Chloride 104 97 - 108 mmol/L  
 CO2 28 21 - 32 mmol/L  Anion gap 7 5 - 15 mmol/L  
 Glucose 38 (LL) 65 - 100 mg/dL BUN 23 (H) 6 - 20 MG/DL Creatinine 0.49 (L) 0.55 - 1.02 MG/DL  
 BUN/Creatinine ratio 47 (H) 12 - 20 GFR est AA >60 >60 ml/min/1.73m2 GFR est non-AA >60 >60 ml/min/1.73m2 Calcium 8.9 8.5 - 10.1 MG/DL  
CBC WITH MANUAL DIFF Collection Time: 02/15/19  4:20 AM  
Result Value Ref Range WBC 21.1 (H) 3.6 - 11.0 K/uL  
 RBC 3.33 (L) 3.80 - 5.20 M/uL HGB 9.6 (L) 11.5 - 16.0 g/dL HCT 29.5 (L) 35.0 - 47.0 % MCV 88.6 80.0 - 99.0 FL  
 MCH 28.8 26.0 - 34.0 PG  
 MCHC 32.5 30.0 - 36.5 g/dL  
 RDW 15.5 (H) 11.5 - 14.5 % PLATELET 020 149 - 258 K/uL MPV 10.0 8.9 - 12.9 FL  
 NRBC 0.0 0  WBC ABSOLUTE NRBC 0.00 0.00 - 0.01 K/uL NEUTROPHILS 72 32 - 75 % BAND NEUTROPHILS 0 0 - 6 % LYMPHOCYTES 22 12 - 49 % MONOCYTES 6 5 - 13 % EOSINOPHILS 0 0 - 7 % BASOPHILS 0 0 - 1 % METAMYELOCYTES 0 0 % MYELOCYTES 0 0 % PROMYELOCYTES 0 0 % BLASTS 0 0 % OTHER CELL 0 0 IMMATURE GRANULOCYTES 0 %  
 ABS. NEUTROPHILS 15.2 (H) 1.8 - 8.0 K/UL  
 ABS. LYMPHOCYTES 4.6 (H) 0.8 - 3.5 K/UL  
 ABS. MONOCYTES 1.3 (H) 0.0 - 1.0 K/UL  
 ABS. EOSINOPHILS 0.0 0.0 - 0.4 K/UL  
 ABS. BASOPHILS 0.0 0.0 - 0.1 K/UL  
 ABS. IMM. GRANS. 0.0 K/UL  
 DF MANUAL    
 RBC COMMENTS NORMOCYTIC, NORMOCHROMIC    
GLUCOSE, POC Collection Time: 02/15/19  5:29 AM  
Result Value Ref Range Glucose (POC) 46 (LL) 65 - 100 mg/dL Performed by [employer]  GLUCOSE, POC Collection Time: 02/15/19  5:55 AM  
Result Value Ref Range Glucose (POC) 192 (H) 65 - 100 mg/dL Performed by Mauricewnash Ramirez GLUCOSE, POC Collection Time: 02/15/19  7:40 AM  
Result Value Ref Range Glucose (POC) 144 (H) 65 - 100 mg/dL Performed by Evaristo Lawson Imaging: 
I have personally reviewed the patients radiographs and have reviewed the reports: 
Ct scan: 2-3-19: FINDINGS: 
CHEST: 
THYROID: No nodule. MEDIASTINUM: Mediastinal lymphadenopathy has improved compared to the prior 
exam. Transverse dimension at the level of the aortic arch was previously 5.9 cm 
and is now 4.2 cm. SERJIO: No mass or lymphadenopathy. THORACIC AORTA: No dissection or aneurysm. MAIN PULMONARY ARTERY: Right upper and left lower lobe pulmonary emboli are 
noted. TRACHEA/BRONCHI: Patent. ESOPHAGUS: No wall thickening or dilatation. HEART: Normal in size. PLEURA: No effusion or pneumothorax. LUNGS: Infiltrates are noted throughout the right lung and left lower lobe. INCIDENTALLY IMAGED UPPER ABDOMEN: 17 mm right adrenal adenoma. BONES: Degenerative changes are seen in the thoracic spine. 
  
  
IMPRESSION: Right upper and left lower lobe pulmonary emboli. Bilateral airspace 
disease. Improved mediastinal lymphadenopathy. 2-11-19: Clinical indication: Bilateral lung infiltrates, acute respiratory distress with 
hypoxia. 
  
Portable AP upright view of the chest is obtained, comparison February 8. Lung 
fields are unchanged. Infusion catheter is unchanged. Shallow inspiration with 
elevation of the right hemidiaphragm, bilateral interstitial infiltrates more 
prominent on the right. 
  
IMPRESSION 
: No change.  
 
  
Concepcion Reddy MD

## 2019-02-15 NOTE — DIABETES MGMT
DTC Progress Note Recommendations/ Comments: Please consider further reduction of lantus. BG this am 38 after receiving 30 units of lantus last night. Current hospital DM medication: lantus 30 units daily and humalog correction Chart reviewed on Susan Mccarty. Patient is a 70 y.o. female with known Type 2 Diabetes on actos 30mg daily, janumet XR 50-1000mg ac b/d at home. A1c:  
Lab Results Component Value Date/Time Hemoglobin A1c 6.9 (H) 08/03/2018 01:45 PM  
 Hemoglobin A1c 6.8 (H) 01/30/2018 01:25 PM  
 
 
Recent Glucose Results:  
Lab Results Component Value Date/Time GLU 38 (LL) 02/15/2019 04:20 AM  
 GLUCPOC 129 (H) 02/15/2019 11:17 AM  
 GLUCPOC 144 (H) 02/15/2019 07:40 AM  
 GLUCPOC 192 (H) 02/15/2019 05:55 AM  
  
 
Lab Results Component Value Date/Time Creatinine 0.49 (L) 02/15/2019 04:20 AM  
 
Estimated Creatinine Clearance: 139.6 mL/min (A) (based on SCr of 0.49 mg/dL (L)). Active Orders Diet DIET DIABETIC CONSISTENT CARB Regular PO intake:  
Patient Vitals for the past 72 hrs: 
 % Diet Eaten 02/13/19 1716 100 % 02/13/19 1324 0 % 02/13/19 0909 100 % Will continue to follow as needed. Thank you Estefany Benitez, SAMUELN, RN, CDE [facility] Time spent: 5 min

## 2019-02-15 NOTE — PROGRESS NOTES
General Daily Progress Note Admit Date: 2/3/2019 Subjective:  
 
Patient has no complaint . Current Facility-Administered Medications Medication Dose Route Frequency  insulin glargine (LANTUS) injection 30 Units  30 Units SubCUTAneous DAILY  albuterol (PROVENTIL VENTOLIN) nebulizer solution 2.5 mg  2.5 mg Nebulization TID RT  
 predniSONE (DELTASONE) tablet 30 mg  30 mg Oral BID WITH MEALS  sodium chloride (NS) flush 5-40 mL  5-40 mL IntraVENous PRN  
 sodium chloride (NS) flush 5-40 mL  5-40 mL IntraVENous Q8H  
 apixaban (ELIQUIS) tablet 5 mg  5 mg Oral Q12H  
 benzonatate (TESSALON) capsule 200 mg  200 mg Oral TID PRN  
 lactobac ac& pc-s.therm-b.anim (JUNI Q/RISAQUAD)  1 Cap Oral DAILY  insulin lispro (HUMALOG) injection   SubCUTAneous TIDAC  acetaminophen (TYLENOL) tablet 650 mg  650 mg Oral Q6H PRN  
 ondansetron (ZOFRAN) injection 4 mg  4 mg IntraVENous Q4H PRN  
 amLODIPine (NORVASC) tablet 2.5 mg  2.5 mg Oral DAILY  atorvastatin (LIPITOR) tablet 40 mg  40 mg Oral QHS  fluticasone (FLONASE) 50 mcg/actuation nasal spray 2 Spray  2 Spray Both Nostrils DAILY  metoprolol succinate (TOPROL-XL) XL tablet 50 mg  50 mg Oral DAILY  glucose chewable tablet 16 g  4 Tab Oral PRN  
 dextrose (D50W) injection syrg 12.5-25 g  12.5-25 g IntraVENous PRN  
 glucagon (GLUCAGEN) injection 1 mg  1 mg IntraMUSCular PRN  
 nitroglycerin (NITROBID) 2 % ointment 1 Inch  1 Inch Topical Q6H PRN Review of Systems A comprehensive review of systems was negative. Objective:  
 
Patient Vitals for the past 24 hrs: 
 BP Temp Pulse Resp SpO2  
02/15/19 0804     90 % 02/15/19 0742 136/63 98.1 °F (36.7 °C) 93 18 96 % 02/15/19 0402 136/52 97.5 °F (36.4 °C) 87 16 (!) 87 % 02/14/19 2254 152/70 98.3 °F (36.8 °C) 78 18 91 % 02/14/19 2019 120/57 97.7 °F (36.5 °C) 74 18 96 % 02/14/19 1350     93 % 02/14/19 1104 154/63 97.5 °F (36.4 °C) 88 18 96 % 02/14/19 0917 143/73 98 °F (36.7 °C) 95 18 98 % No intake/output data recorded. 02/13 1901 - 02/15 0700 In: -  
Out: 450 [Urine:450] Physical Exam:  
Visit Vitals /63 Pulse 93 Temp 98.1 °F (36.7 °C) Resp 18 Ht 5' 8\" (1.727 m) Wt 238 lb 8.6 oz (108.2 kg) SpO2 90% BMI 36.27 kg/m² General appearance: alert, cooperative, no distress, appears stated age Neck: supple, symmetrical, trachea midline, no adenopathy, thyroid: not enlarged, symmetric, no tenderness/mass/nodules, no carotid bruit and no JVD Lungs: rales R base, L base Heart: regular rate and rhythm, S1, S2 normal, no murmur, click, rub or gallop Abdomen: soft, non-tender. Bowel sounds normal. No masses,  no organomegaly Extremities: extremities normal, atraumatic, no cyanosis or edema Data Review Recent Results (from the past 24 hour(s)) GLUCOSE, POC Collection Time: 02/14/19 10:51 AM  
Result Value Ref Range Glucose (POC) 156 (H) 65 - 100 mg/dL Performed by [employer] GLUCOSE, POC Collection Time: 02/14/19  5:02 PM  
Result Value Ref Range Glucose (POC) 170 (H) 65 - 100 mg/dL Performed by Souris Artemio GLUCOSE, POC Collection Time: 02/14/19  9:03 PM  
Result Value Ref Range Glucose (POC) 183 (H) 65 - 100 mg/dL Performed by [facility] METABOLIC PANEL, BASIC Collection Time: 02/15/19  4:20 AM  
Result Value Ref Range Sodium 139 136 - 145 mmol/L Potassium 2.9 (L) 3.5 - 5.1 mmol/L Chloride 104 97 - 108 mmol/L  
 CO2 28 21 - 32 mmol/L Anion gap 7 5 - 15 mmol/L Glucose 38 (LL) 65 - 100 mg/dL BUN 23 (H) 6 - 20 MG/DL Creatinine 0.49 (L) 0.55 - 1.02 MG/DL  
 BUN/Creatinine ratio 47 (H) 12 - 20 GFR est AA >60 >60 ml/min/1.73m2 GFR est non-AA >60 >60 ml/min/1.73m2 Calcium 8.9 8.5 - 10.1 MG/DL  
CBC WITH MANUAL DIFF Collection Time: 02/15/19  4:20 AM  
Result Value Ref Range WBC 21.1 (H) 3.6 - 11.0 K/uL RBC 3.33 (L) 3.80 - 5.20 M/uL HGB 9.6 (L) 11.5 - 16.0 g/dL HCT 29.5 (L) 35.0 - 47.0 % MCV 88.6 80.0 - 99.0 FL  
 MCH 28.8 26.0 - 34.0 PG  
 MCHC 32.5 30.0 - 36.5 g/dL  
 RDW 15.5 (H) 11.5 - 14.5 % PLATELET 631 619 - 464 K/uL MPV 10.0 8.9 - 12.9 FL  
 NRBC 0.0 0  WBC ABSOLUTE NRBC 0.00 0.00 - 0.01 K/uL NEUTROPHILS 72 32 - 75 % BAND NEUTROPHILS 0 0 - 6 % LYMPHOCYTES 22 12 - 49 % MONOCYTES 6 5 - 13 % EOSINOPHILS 0 0 - 7 % BASOPHILS 0 0 - 1 % METAMYELOCYTES 0 0 % MYELOCYTES 0 0 % PROMYELOCYTES 0 0 % BLASTS 0 0 % OTHER CELL 0 0 IMMATURE GRANULOCYTES 0 %  
 ABS. NEUTROPHILS 15.2 (H) 1.8 - 8.0 K/UL  
 ABS. LYMPHOCYTES 4.6 (H) 0.8 - 3.5 K/UL  
 ABS. MONOCYTES 1.3 (H) 0.0 - 1.0 K/UL  
 ABS. EOSINOPHILS 0.0 0.0 - 0.4 K/UL  
 ABS. BASOPHILS 0.0 0.0 - 0.1 K/UL  
 ABS. IMM. GRANS. 0.0 K/UL  
 DF MANUAL    
 RBC COMMENTS NORMOCYTIC, NORMOCHROMIC    
GLUCOSE, POC Collection Time: 02/15/19  5:29 AM  
Result Value Ref Range Glucose (POC) 46 (LL) 65 - 100 mg/dL Performed by [employer]  GLUCOSE, POC Collection Time: 02/15/19  5:55 AM  
Result Value Ref Range Glucose (POC) 192 (H) 65 - 100 mg/dL Performed by Un[employer]  GLUCOSE, POC Collection Time: 02/15/19  7:40 AM  
Result Value Ref Range Glucose (POC) 144 (H) 65 - 100 mg/dL Performed by Shay Perry Assessment:  
 
Active Problems: 
  SVT (supraventricular tachycardia) ([facility] (8/29/2016) Essential hypertension with goal blood pressure less than 140/90 (8/29/2016) Diabetes mellitus type 2, controlled ([address] (9/8/2016) Dyslipidemia (9/8/2016) Acute respiratory failure ([facility] (2/3/2019) Leg swelling (2/3/2019) Hodg[facility] (2/3/2019) CAP (community acquired pneumonia) (2/3/2019) Plan: 1. Continued improvement with patient transitioned to p.o. Steroids. This will be a slow process as far as improvement is concerned. 2.  [facility] 3.  Continue diabetic control.

## 2019-02-15 NOTE — PROGRESS NOTES
CM received update from therapists and they are recommending inpt pulmonary rehab. Pt was in agreement to go to [facility]. Referral sent via allscripts. Chikis Isaac, [phone]

## 2019-02-16 NOTE — PROGRESS NOTES
PCU SHIFT NURSING NOTE Bedside and Verbal shift change report given to Harinder Obrien RN (oncoming nurse) by Rito Guerrero RN (offgoing nurse). Report included the following information SBAR, Kardex, Intake/Output, MAR, Recent Results and Cardiac Rhythm SR. Shift Summary:  
 
 
Admission Date 2/3/2019 Admission Diagnosis Acute respiratory failure ([address] Consults IP CONSULT TO HEMATOLOGY 
IP CONSULT TO PRIMARY CARE PROVIDER 
IP CONSULT TO PULMONOLOGY Consults []PT []OT []Speech  
[]Case Management  
  
[] Palliative Cardiac Monitoring Order []Yes []No  
 
IV drips []Yes Drip:                            Dose: 
Drip:                            Dose: 
Drip:                            Dose:  
[]No  
 
GI Prophylaxis []Yes []No  
 
 
 
DVT Prophylaxis SCDs:     
     
 Shawn stockings:     
  
[] Medication []Contraindicated []None Activity Level Activity Level: Up with Assistance Activity Assistance: Partial (one person) Purposeful Rounding every 1-2 hour? []Yes Spence Score  Total Score: 4 Bed Alarm (If score 3 or >) []Yes  
[] Refused (See signed refusal form in chart) Dwayne Score  Dwayne Score: 18 Dwayne Score (if score 14 or less) []PMT consult  
[]Wound Care consult []Specialty bed  
[] Nutrition consult Needs prior to discharge:  
Home O2 required:   
[]Yes []No  
 If yes, how much O2 required? Other:  
 Last Bowel Movement: Last Bowel Movement Date: 02/11/19 Influenza Vaccine Received Flu Vaccine for Current Season (usually Sept-March): No  
 Patient/Guardian Refused (Notify MD): Yes Pneumonia Vaccine Diet Active Orders Diet DIET DIABETIC CONSISTENT CARB Regular LDAs Venous Access Device portacath  09/12/18 Upper chest (subclavicular area), left (Active) Venous Access Device 02/12/19 Arm, right (Active) Central Line Being Utilized No 2/14/2019  4:46 AM  
 Criteria for Appropriate Use Long term IV/antibiotic administration 2/14/2019  4:46 AM  
Site Assessment Clean, dry, & intact 2/14/2019  4:46 AM  
Dressing Status Clean, dry, & intact 2/14/2019  4:46 AM  
Dressing Type Transparent 2/14/2019  4:46 AM  
Access Needle Size (Site #1) 22 G 2/12/2019  3:18 PM  
Action Taken (Medial Site) Flushed 2/12/2019  3:18 PM  
Access Lateral Site Assessment Warmth;Redness 2/12/2019  3:18 PM  
Access Needle Size (Lateral Site) 22 G 2/12/2019  3:18 PM  
Positive Blood Return (Lateral Site) Yes 2/12/2019  3:18 PM  
Action Taken (Lateral Site) Alcohol 2/12/2019  3:18 PM  
  
Peripheral IV 02/12/19 Anterior;Proximal;Right Forearm (Active) Site Assessment Clean, dry, & intact 2/14/2019  4:46 AM  
Phlebitis Assessment 0 2/14/2019  4:46 AM  
Infiltration Assessment 0 2/14/2019  4:46 AM  
Dressing Status Clean, dry, & intact 2/14/2019  4:46 AM  
Dressing Type Transparent 2/14/2019  4:46 AM  
Hub Color/Line Status Blue 2/14/2019  4:46 AM  
Action Taken Open ports on tubing capped 2/14/2019  4:46 AM  
      
External Female Catheter 02/04/19 (Active) Site Assessment Clean, dry, & intact 2/13/2019  5:16 PM  
Repositioned Yes 2/13/2019  5:16 PM  
Perineal Care Yes 2/13/2019  5:16 PM  
Wick Changed Yes 2/13/2019  5:16 PM  
Suction Canister/Tubing Changed No 2/13/2019  5:16 PM  
Urine Output (mL) 450 ml 2/13/2019  5:16 PM  
            
Urinary Catheter Intake & Output Readmission Risk Assessment Tool Score Medium Risk 25 Total Score 3 Has Seen PCP in Last 6 Months (Yes=3, No=0) 3 Patient Length of Stay (>5 days = 3)  
 5 Pt. Coverage (Medicare=5 , Medicaid, or Self-Pay=4) 7 Charlson Comorbidity Score (Age + Comorbid Conditions) Criteria that do not apply:  
 . Living with Significant Other. Assisted Living. LTAC. SNF. or  
Rehab  
 IP Visits Last 12 Months (1-3=4, 4=9, >4=11) Expected Length of Stay 3d 12h Actual Length of Stay 13

## 2019-02-16 NOTE — PROGRESS NOTES
General Daily Progress Note Admit Date: 2/3/2019 Subjective:  
 
Patient has no complaint . Current Facility-Administered Medications Medication Dose Route Frequency  [START ON 2/17/2019] insulin glargine (LANTUS) injection 40 Units  40 Units SubCUTAneous DAILY  albuterol (PROVENTIL VENTOLIN) nebulizer solution 2.5 mg  2.5 mg Nebulization TID RT  
 predniSONE (DELTASONE) tablet 30 mg  30 mg Oral BID WITH MEALS  sodium chloride (NS) flush 5-40 mL  5-40 mL IntraVENous PRN  
 sodium chloride (NS) flush 5-40 mL  5-40 mL IntraVENous Q8H  
 apixaban (ELIQUIS) tablet 5 mg  5 mg Oral Q12H  
 benzonatate (TESSALON) capsule 200 mg  200 mg Oral TID PRN  
 lactobac ac& pc-s.therm-b.anim (JUNI Q/RISAQUAD)  1 Cap Oral DAILY  insulin lispro (HUMALOG) injection   SubCUTAneous TIDAC  acetaminophen (TYLENOL) tablet 650 mg  650 mg Oral Q6H PRN  
 ondansetron (ZOFRAN) injection 4 mg  4 mg IntraVENous Q4H PRN  
 amLODIPine (NORVASC) tablet 2.5 mg  2.5 mg Oral DAILY  atorvastatin (LIPITOR) tablet 40 mg  40 mg Oral QHS  fluticasone (FLONASE) 50 mcg/actuation nasal spray 2 Spray  2 Spray Both Nostrils DAILY  metoprolol succinate (TOPROL-XL) XL tablet 50 mg  50 mg Oral DAILY  glucose chewable tablet 16 g  4 Tab Oral PRN  
 dextrose (D50W) injection syrg 12.5-25 g  12.5-25 g IntraVENous PRN  
 glucagon (GLUCAGEN) injection 1 mg  1 mg IntraMUSCular PRN  
 nitroglycerin (NITROBID) 2 % ointment 1 Inch  1 Inch Topical Q6H PRN Review of Systems A comprehensive review of systems was negative. Objective:  
 
Patient Vitals for the past 24 hrs: 
 BP Temp Pulse Resp SpO2  
02/16/19 0854 136/68 98.1 °F (36.7 °C) 76 20 95 % 02/16/19 0801     95 % 02/16/19 0418  98.3 °F (36.8 °C) 77  92 % 02/16/19 0416   77  92 % 02/16/19 0415   78  93 % 02/16/19 0414 124/64 98.3 °F (36.8 °C) 77 18 93 % 02/16/19 0413   88  94 % 02/16/19 0410   75  93 % 02/16/19 0409   73  93 % 02/16/19 0407   78  93 % 02/16/19 0406   77  93 % 02/16/19 0404   75  93 % 02/16/19 0402   77  93 % 02/16/19 0359   78  92 % 02/16/19 0358   77  93 % 02/16/19 0356   78  94 % 02/16/19 0354   81  93 % 02/16/19 0353   82  93 % 02/16/19 0351   86  93 % 02/16/19 0349   81  92 % 02/16/19 0347   84  92 % 02/16/19 0346   84  92 % 02/16/19 0345   83  92 % 02/16/19 0343   88  93 % 02/16/19 0341   83  94 % 02/16/19 0339   85  93 % 02/16/19 0338   84  93 % 02/16/19 0336   86  95 % 02/16/19 0334   80  93 % 02/16/19 0332   85  93 % 02/16/19 0330   83  93 % 02/16/19 0328   87  93 % 02/16/19 0325   87  93 % 02/16/19 0324   83  94 % 02/16/19 0322   83  95 % 02/16/19 0321   80  95 % 02/16/19 0319   83  95 % 02/16/19 0316   79  95 % 02/16/19 0315   81  95 % 02/16/19 0313   82  95 % 02/16/19 0311   84  94 % 02/16/19 0310   84  95 % 02/16/19 0307   82  94 % 02/16/19 0306   91  93 % 02/16/19 0304   83  96 % 02/16/19 0302   81  95 % 02/16/19 0300   85  94 % 02/16/19 0258   88  94 % 02/16/19 0257   85  94 % 02/16/19 0254   88    
02/16/19 0253   94  94 % 02/16/19 0251   94    
02/16/19 0250   96    
02/16/19 0247   79  95 % 02/16/19 0246   84  95 % 02/16/19 0244   81  95 % 02/16/19 0243   77  95 % 02/16/19 0241   82  94 % 02/16/19 0238   81  95 % 02/16/19 0237   83  95 % 02/16/19 0235   81  95 % 02/16/19 0233   80  95 % 02/16/19 0231   81  95 % 02/16/19 0229   83  95 % 02/16/19 0227   83  95 % 02/16/19 0226   85  94 % 02/16/19 0223   87  95 % 02/16/19 0222   82  95 % 02/16/19 0220   83  96 % 02/16/19 0218   78  95 % 02/16/19 0217   83  95 % 02/16/19 0215   79  95 % 02/16/19 0212   80  94 % 02/16/19 0211   86  94 % 02/16/19 0209   80  94 % 02/16/19 0208   82  94 % 02/16/19 0205   83  94 % 02/16/19 0204   78  94 % 02/16/19 0202   82  95 % 02/16/19 0200   78  95 % 02/16/19 0158   77  94 % 02/16/19 0156   80  94 % 02/16/19 0154   83  94 % 02/16/19 0153   84  94 % 02/16/19 0150   81  94 % 02/16/19 0149   87  93 % 02/16/19 0147   82  94 % 02/16/19 0146   83  94 % 02/16/19 0144   86  93 % 02/16/19 0142   87  93 % 02/16/19 0140   86  93 % 02/16/19 0137   83  94 % 02/16/19 0136   84  94 % 02/16/19 0134   86  93 % 02/16/19 0132   84  94 % 02/16/19 0130   84  94 % 02/16/19 0129   86  95 % 02/16/19 0126   86  94 % 02/16/19 0125   85  94 % 02/16/19 0123   75  95 % 02/16/19 0121   87  94 % 02/16/19 0119   89  93 % 02/16/19 0117   82  94 % 02/16/19 0116   86  94 % 02/16/19 0114   85  94 % 02/16/19 0112   87  94 % 02/16/19 0111   87  94 % 02/16/19 0110   85  94 % 02/16/19 0108   86  94 % 02/16/19 0106   84  95 % 02/16/19 0104   86  95 % 02/16/19 0102   84  95 % 02/16/19 0059   83  94 % 02/16/19 0058   85  95 % 02/16/19 0056   84  95 % 02/16/19 0054   82  94 % 02/16/19 0053   84  94 % 02/16/19 0050   84  94 % 02/16/19 0049   84  96 % 02/16/19 0047   86    
02/16/19 0045   88  95 % 02/16/19 0044   89  95 % 02/16/19 0041   91  94 % 02/16/19 0040   88  95 % 02/16/19 0038   88  95 % 02/16/19 0036   95    
02/16/19 0034   86  94 % 02/16/19 0033   93  92 % 02/16/19 0030   92  95 % 02/16/19 0029   87  94 % 02/16/19 0027   83  94 % 02/16/19 0025   83  97 % 02/16/19 0024   83  96 % 02/16/19 0022   95  100 % 02/16/19 0020   95  96 % 02/16/19 0017   83  95 % 02/16/19 0016   77  96 % 02/16/19 0014   86  96 % 02/16/19 0013   80  97 % 02/16/19 0011   81  96 % 02/16/19 0008   85  95 % 02/16/19 0007   85  95 % 02/16/19 0005   86  94 % 02/16/19 0004   76  96 % 02/16/19 0002   82  94 % 02/15/19 2359   82  95 % 02/15/19 2358   82  96 % 02/15/19 2356   86  95 % 02/15/19 2355   86  95 % 02/15/19 2352   83  96 % 02/15/19 2351   89  96 % 02/15/19 2349   84  96 % 02/15/19 2347   90  95 % 02/15/19 2345   82  96 % 02/15/19 2343   82  96 % 02/15/19 2342   83  97 % 02/15/19 2339   84  96 % 02/15/19 2338   84  95 % 02/15/19 2336   87  95 % 02/15/19 2334   88  95 % 02/15/19 2332   88  95 % 02/15/19 2330   96  94 % 02/15/19 2328   88  96 % 02/15/19 2327   92  96 % 02/15/19 2324   88  95 % 02/15/19 2323   87  95 % 02/15/19 2321   90  93 % 02/15/19 2319 109/59  84  96 % 02/15/19 2318 109/59 98 °F (36.7 °C) 72 18 96 % 02/15/19 2009     94 % 02/15/19 1951 112/51 96 °F (35.6 °C) (!) 59 18 96 % 02/15/19 1604 111/64 98.3 °F (36.8 °C) 87 18 96 % 02/15/19 1410     94 % 02/15/19 1145 131/60 98.2 °F (36.8 °C) 77 18 95 % No intake/output data recorded. 02/14 1901 - 02/16 0700 In: -  
Out: 450 [Urine:450] Physical Exam:  
Visit Vitals /68 Pulse 76 Temp 98.1 °F (36.7 °C) Resp 20 Ht 5' 8\" (1.727 m) Wt 238 lb 8.6 oz (108.2 kg) SpO2 95% BMI 36.27 kg/m² General appearance: alert, cooperative, no distress, appears stated age Neck: supple, symmetrical, trachea midline, no adenopathy, thyroid: not enlarged, symmetric, no tenderness/mass/nodules, no carotid bruit and no JVD Lungs: rales R base, L base, bronchial breath sounds Heart: regular rate and rhythm, S1, S2 normal, no murmur, click, rub or gallop Abdomen: soft, non-tender. Bowel sounds normal. No masses,  no organomegaly Extremities: extremities normal, atraumatic, no cyanosis or edema Data Review Recent Results (from the past 24 hour(s)) GLUCOSE, POC Collection Time: 02/15/19 11:17 AM  
Result Value Ref Range Glucose (POC) 129 (H) 65 - 100 mg/dL Performed by Nika Canela GLUCOSE, POC Collection Time: 02/15/19  5:08 PM  
Result Value Ref Range Glucose (POC) 167 (H) 65 - 100 mg/dL Performed by Nika Canela GLUCOSE, POC Collection Time: 02/15/19  8:57 PM  
Result Value Ref Range Glucose (POC) 138 (H) 65 - 100 mg/dL Performed by Win Turcios GLUCOSE, POC Collection Time: 02/16/19  7:26 AM  
Result Value Ref Range Glucose (POC) 197 (H) 65 - 100 mg/dL Performed by Nika Canela Assessment:  
 
Active Problems: 
  SVT (supraventricular tachycardia) ([address] (8/29/2016) Essential hypertension with goal blood pressure less than 140/90 (8/29/2016) Diabetes mellitus type 2, controlled ([address] (9/8/2016) Dyslipidemia (9/8/2016) Acute respiratory failure ([address] (2/3/2019) Leg swelling (2/3/2019) Hodgkin lymphoma ([address] (2/3/2019) CAP (community acquired pneumonia) (2/3/2019) Plan: 1. Pulmonary rehab when insurance agrees. 2.  Pulmonary status continues to improve although quite slowly currently. 3.  Continue diabetic control. 4.  Patient is much more upbeat.

## 2019-02-17 NOTE — PROGRESS NOTES
PCU SHIFT NURSING NOTE Bedside and Verbal shift change report given to Eli Yeung RN (oncoming nurse) by [facility] SUSANNAH JACOB (offgoing nurse). Report included the following information SBAR, Kardex, Intake/Output, MAR, Recent Results and Cardiac Rhythm NSR. Shift Summary:  
 
 
Admission Date 2/3/2019 Admission Diagnosis Acute respiratory failure ([address] Consults IP CONSULT TO HEMATOLOGY 
IP CONSULT TO PRIMARY CARE PROVIDER 
IP CONSULT TO PULMONOLOGY Consults []PT []OT []Speech  
[]Case Management  
  
[] Palliative Cardiac Monitoring Order []Yes []No  
 
IV drips []Yes Drip:                            Dose: 
Drip:                            Dose: 
Drip:                            Dose:  
[]No  
 
GI Prophylaxis []Yes []No  
 
 
 
DVT Prophylaxis SCDs:     
     
 Shawn stockings:     
  
[] Medication []Contraindicated []None Activity Level Activity Level: Recliner Activity Assistance: Partial (one person) Purposeful Rounding every 1-2 hour? []Yes Spence Score  Total Score: 4 Bed Alarm (If score 3 or >) []Yes  
[] Refused (See signed refusal form in chart) Dwayne Score  Dwayne Score: 18 Dwayne Score (if score 14 or less) []PMT consult  
[]Wound Care consult []Specialty bed  
[] Nutrition consult Needs prior to discharge:  
Home O2 required:   
[]Yes []No  
 If yes, how much O2 required? Other:  
 Last Bowel Movement: Last Bowel Movement Date: (Patient states that last BM was PTA) Influenza Vaccine Received Flu Vaccine for Current Season (usually Sept-March): No  
 Patient/Guardian Refused (Notify MD): Yes Pneumonia Vaccine Diet Active Orders Diet DIET DIABETIC CONSISTENT CARB Regular LDAs Venous Access Device portacath  09/12/18 Upper chest (subclavicular area), left (Active) Venous Access Device 02/12/19 Arm, right (Active) Central Line Being Utilized No 2/16/2019  7:17 PM  
 Criteria for Appropriate Use Irritant/vesicant medication 2/16/2019  7:17 PM  
Site Assessment Clean, dry, & intact 2/16/2019  8:01 AM  
Dressing Status Clean, dry, & intact 2/16/2019  8:01 AM  
Dressing Type Tape;Transparent 2/16/2019  8:01 AM  
Access Needle Size (Site #1) 22 G 2/12/2019  3:18 PM  
Action Taken (Medial Site) Flushed 2/12/2019  3:18 PM  
Access Lateral Site Assessment Warmth;Redness 2/12/2019  3:18 PM  
Access Needle Size (Lateral Site) 22 G 2/12/2019  3:18 PM  
Positive Blood Return (Lateral Site) Yes 2/12/2019  3:18 PM  
Action Taken (Lateral Site) Alcohol 2/12/2019  3:18 PM  
  
Peripheral IV 02/12/19 Anterior;Proximal;Right Forearm (Active) Site Assessment Clean, dry, & intact 2/16/2019  7:17 PM  
Phlebitis Assessment 0 2/16/2019  7:17 PM  
Infiltration Assessment 0 2/16/2019  7:17 PM  
Dressing Status Clean, dry, & intact 2/16/2019  7:17 PM  
Dressing Type Tape;Transparent 2/16/2019  5:14 PM  
Hub Color/Line Status Blue;Capped 2/16/2019  5:14 PM  
Action Taken Open ports on tubing capped 2/14/2019  4:46 AM  
      
External Female Catheter 02/04/19 (Active) Site Assessment Clean, dry, & intact 2/16/2019  7:17 PM  
Repositioned Yes 2/16/2019  7:17 PM  
Perineal Care Yes 2/16/2019  7:17 PM  
Wick Changed No 2/16/2019  7:17 PM  
Suction Canister/Tubing Changed No 2/16/2019  7:17 PM  
Urine Output (mL) 450 ml 2/13/2019  5:16 PM  
            
Urinary Catheter Intake & Output Readmission Risk Assessment Tool Score Medium Risk 25 Total Score 3 Has Seen PCP in Last 6 Months (Yes=3, No=0) 3 Patient Length of Stay (>5 days = 3)  
 5 Pt. Coverage (Medicare=5 , Medicaid, or Self-Pay=4) 7 Charlson Comorbidity Score (Age + Comorbid Conditions) Criteria that do not apply:  
 . Living with Significant Other. Assisted Living. LTAC. SNF. or  
Rehab  
 IP Visits Last 12 Months (1-3=4, 4=9, >4=11) Expected Length of Stay 3d 12h Actual Length of Stay 14

## 2019-02-17 NOTE — PROGRESS NOTES
Problem: Falls - Risk of 
Goal: *Absence of Falls Document Liliane Simon Fall Risk and appropriate interventions in the flowsheet. Outcome: Progressing Towards Goal 
Fall Risk Interventions: 
Mobility Interventions: Patient to call before getting OOB, Communicate number of staff needed for ambulation/transfer Mentation Interventions: Bed/chair exit alarm, Increase mobility, More frequent rounding, Reorient patient Medication Interventions: Patient to call before getting OOB, Teach patient to arise slowly Elimination Interventions: Call light in reach, Patient to call for help with toileting needs History of Falls Interventions: Investigate reason for fall, Consult care management for discharge planning Problem: Pressure Injury - Risk of 
Goal: *Prevention of pressure injury Document Dwayne Scale and appropriate interventions in the flowsheet. Outcome: Progressing Towards Goal 
Pressure Injury Interventions: 
Sensory Interventions: Avoid rigorous massage over bony prominences, Assess need for specialty bed, Assess changes in LOC Moisture Interventions: Apply protective barrier, creams and emollients, Absorbent underpads Activity Interventions: Increase time out of bed, PT/OT evaluation Mobility Interventions: Assess need for specialty bed, Pressure redistribution bed/mattress (bed type) Nutrition Interventions: Offer support with meals,snacks and hydration Friction and Shear Interventions: Apply protective barrier, creams and emollients, Lift sheet, Feet elevated on foot rest

## 2019-02-17 NOTE — PROGRESS NOTES
Problem: Falls - Risk of 
Goal: *Absence of Falls Document Orvel Buffy Fall Risk and appropriate interventions in the flowsheet. Outcome: Progressing Towards Goal 
Fall Risk Interventions: 
Mobility Interventions: OT consult for ADLs, Patient to call before getting OOB, PT Consult for mobility concerns Mentation Interventions: Family/sitter at bedside, Increase mobility, More frequent rounding, Reorient patient Medication Interventions: Patient to call before getting OOB, Teach patient to arise slowly Elimination Interventions: Call light in reach, Patient to call for help with toileting needs History of Falls Interventions: Bed/chair exit alarm, Door open when patient unattended Problem: Pressure Injury - Risk of 
Goal: *Prevention of pressure injury Document Dwayne Scale and appropriate interventions in the flowsheet. Outcome: Progressing Towards Goal 
Pressure Injury Interventions: 
Sensory Interventions: Avoid rigorous massage over bony prominences, Assess need for specialty bed, Assess changes in LOC Moisture Interventions: Absorbent underpads, Internal/External urinary devices, Maintain skin hydration (lotion/cream), Minimize layers, Moisture barrier Activity Interventions: Increase time out of bed, Pressure redistribution bed/mattress(bed type), PT/OT evaluation Mobility Interventions: HOB 30 degrees or less, Pressure redistribution bed/mattress (bed type), PT/OT evaluation, Turn and reposition approx. every two hours(pillow and wedges) Nutrition Interventions: Document food/fluid/supplement intake Friction and Shear Interventions: HOB 30 degrees or less, Lift sheet

## 2019-02-17 NOTE — PROGRESS NOTES
General Daily Progress Note Admit Date: 2/3/2019 Subjective:  
 
Patient has no complaint . Current Facility-Administered Medications Medication Dose Route Frequency  polyethylene glycol (MIRALAX) packet 17 g  17 g Oral DAILY PRN  
 [START ON 2/18/2019] insulin glargine (LANTUS) injection 50 Units  50 Units SubCUTAneous DAILY  insulin glargine (LANTUS) injection 10 Units  10 Units SubCUTAneous QHS  albuterol (PROVENTIL VENTOLIN) nebulizer solution 2.5 mg  2.5 mg Nebulization TID RT  
 predniSONE (DELTASONE) tablet 30 mg  30 mg Oral BID WITH MEALS  sodium chloride (NS) flush 5-40 mL  5-40 mL IntraVENous PRN  
 sodium chloride (NS) flush 5-40 mL  5-40 mL IntraVENous Q8H  
 apixaban (ELIQUIS) tablet 5 mg  5 mg Oral Q12H  
 benzonatate (TESSALON) capsule 200 mg  200 mg Oral TID PRN  
 lactobac ac& pc-s.therm-b.anim (JUNI Q/RISAQUAD)  1 Cap Oral DAILY  insulin lispro (HUMALOG) injection   SubCUTAneous TIDAC  acetaminophen (TYLENOL) tablet 650 mg  650 mg Oral Q6H PRN  
 ondansetron (ZOFRAN) injection 4 mg  4 mg IntraVENous Q4H PRN  
 amLODIPine (NORVASC) tablet 2.5 mg  2.5 mg Oral DAILY  atorvastatin (LIPITOR) tablet 40 mg  40 mg Oral QHS  fluticasone (FLONASE) 50 mcg/actuation nasal spray 2 Spray  2 Spray Both Nostrils DAILY  metoprolol succinate (TOPROL-XL) XL tablet 50 mg  50 mg Oral DAILY  glucose chewable tablet 16 g  4 Tab Oral PRN  
 dextrose (D50W) injection syrg 12.5-25 g  12.5-25 g IntraVENous PRN  
 glucagon (GLUCAGEN) injection 1 mg  1 mg IntraMUSCular PRN  
 nitroglycerin (NITROBID) 2 % ointment 1 Inch  1 Inch Topical Q6H PRN Review of Systems A comprehensive review of systems was negative. Objective:  
 
Patient Vitals for the past 24 hrs: 
 BP Temp Pulse Resp SpO2  
02/17/19 1044    18   
02/17/19 0821 122/49 97.5 °F (36.4 °C) 91 18 97 % 02/17/19 0724 128/66 97.9 °F (36.6 °C) 91 18 94 % 02/17/19 0619 129/62 98.8 °F (37.1 °C) 92 16 91 % 02/16/19 2008     93 % 02/16/19 1917 109/58 98.3 °F (36.8 °C) 89 18 95 % 02/16/19 1600 107/62 98 °F (36.7 °C) 72 20 95 % 02/16/19 1351     96 % 02/16/19 1117 119/58 97.8 °F (36.6 °C)  20 95 % No intake/output data recorded. No intake/output data recorded. Physical Exam:  
Visit Vitals /49 Pulse 91 Temp 97.5 °F (36.4 °C) Resp 18 Ht 5' 8\" (1.727 m) Wt 238 lb 8.6 oz (108.2 kg) SpO2 97% BMI 36.27 kg/m² General appearance: alert, cooperative, no distress, appears stated age Neck: supple, symmetrical, trachea midline, no adenopathy, thyroid: not enlarged, symmetric, no tenderness/mass/nodules, no carotid bruit and no JVD Lungs: rales R base, L base, egophony R base, L base Heart: regular rate and rhythm, S1, S2 normal, no murmur, click, rub or gallop Abdomen: soft, non-tender. Bowel sounds normal. No masses,  no organomegaly Extremities: extremities normal, atraumatic, no cyanosis or edema Data Review Recent Results (from the past 24 hour(s)) GLUCOSE, POC Collection Time: 02/16/19  4:22 PM  
Result Value Ref Range Glucose (POC) 218 (H) 65 - 100 mg/dL Performed by [employer] GLUCOSE, POC Collection Time: 02/16/19  6:33 PM  
Result Value Ref Range Glucose (POC) 167 (H) 65 - 100 mg/dL Performed by [employer] GLUCOSE, POC Collection Time: 02/17/19  7:46 AM  
Result Value Ref Range Glucose (POC) 290 (H) 65 - 100 mg/dL Performed by [employer] GLUCOSE, POC Collection Time: 02/17/19 10:44 AM  
Result Value Ref Range Glucose (POC) 229 (H) 65 - 100 mg/dL Performed by Zada Peper Assessment:  
 
Active Problems: 
  SVT (supraventricular tachycardia) ([address] (8/29/2016) Essential hypertension with goal blood pressure less than 140/90 (8/29/2016) Diabetes mellitus type 2, controlled ([facility] (9/8/2016) Dyslipidemia (9/8/2016) Acute respiratory failure ([facility] (2/3/2019) Leg swelling (2/3/2019) Hodgkin lymphoma ([facility] (2/3/2019) CAP (community acquired pneumonia) (2/3/2019) Plan: 1. Pulmonary symptoms continue to improve. O2 requirements continue to decrease. She remains symptomatic with dyspnea on exertion however. 2.  Continue diabetic control. Current flare secondary to steroid usage. She will need insulin while she is on steroids only. 3.  Disposition pending regarding pulmonary rehab placement.

## 2019-02-17 NOTE — PROGRESS NOTES
Patient has requested not to be woken for vital signs during the night. Writer has checked on patient several times and she appears to be sleeping comfortably. Will continue to monitor and will check vital signs when patient wakes up.

## 2019-02-18 NOTE — PROGRESS NOTES
Problem: Falls - Risk of 
Goal: *Absence of Falls Document Radha Quick Fall Risk and appropriate interventions in the flowsheet. Outcome: Progressing Towards Goal 
Fall Risk Interventions: 
Mobility Interventions: Communicate number of staff needed for ambulation/transfer, Patient to call before getting OOB Mentation Interventions: Family/sitter at bedside, Increase mobility, More frequent rounding, Reorient patient Medication Interventions: Patient to call before getting OOB, Teach patient to arise slowly Elimination Interventions: Call light in reach, Patient to call for help with toileting needs History of Falls Interventions: Door open when patient unattended, Consult care management for discharge planning Problem: Pressure Injury - Risk of 
Goal: *Prevention of pressure injury Document Dwayne Scale and appropriate interventions in the flowsheet. Outcome: Progressing Towards Goal 
Pressure Injury Interventions: 
Sensory Interventions: Avoid rigorous massage over bony prominences, Assess need for specialty bed, Assess changes in LOC Moisture Interventions: Absorbent underpads, Apply protective barrier, creams and emollients Activity Interventions: Increase time out of bed Mobility Interventions: Pressure redistribution bed/mattress (bed type) Nutrition Interventions: Offer support with meals,snacks and hydration Friction and Shear Interventions: Apply protective barrier, creams and emollients

## 2019-02-18 NOTE — PROGRESS NOTES
CM sent updates via [employer] to [facility]. They are working on an authorization for pt's stay. Antonia Fairchild, [address] [place], [place] [address]

## 2019-02-18 NOTE — PROGRESS NOTES
Problem: Mobility Impaired (Adult and Pediatric) Goal: *Acute Goals and Plan of Care (Insert Text) Physical Therapy Goals Goals reviewed and remain appropriate 2/15/19 Initiated 2/7/2019 1. Patient will move from supine to sit and sit to supine , scoot up and down and roll side to side in bed with modified independence within 7 day(s). 2.  Patient will transfer from bed to chair and chair to bed with supervision/set-up using the least restrictive device within 7 day(s). 3.  Patient will perform sit to stand with supervision/set-up within 7 day(s). 4.  Patient will ambulate with supervision/set-up for 150 feet with the least restrictive device within 7 day(s). physical Therapy TREATMENT Patient: Tiana Rice (20 y.o. female) Date: 2/18/2019 Diagnosis: Acute respiratory failure ([address] Procedure(s) (LRB): BRONCHOSCOPY (N/A) BRONCHIAL WASHINGS FLEXIBLE (N/A) 12 Days Post-Op Precautions:  falls Chart, physical therapy assessment, plan of care and goals were reviewed. ASSESSMENT: pt with slow progress, no LOB, mod->heavy SOB, still has persistent cough when inhaling deeply, takes quite a bit of time to recover once winded, does well with transfers, vc's for safety and proper RW use. Progression toward goals: 
[]    Improving appropriately and progressing toward goals [x]    Improving slowly and progressing toward goals 
[]    Not making progress toward goals and plan of care will be adjusted PLAN: 
Patient continues to benefit from skilled intervention to address the above impairments. Continue treatment per established plan of care. Discharge Recommendations:  Inpatient Pulmonary Rehab Further Equipment Recommendations for Discharge:  rolling walker OBJECTIVE DATA SUMMARY:  
Critical Behavior: 
Neurologic State: Alert Orientation Level: Oriented X4 Cognition: Follows commands Safety/Judgement: Awareness of environment, Fall prevention, Good awareness of safety precautions, Home safety Functional Mobility Training: 
Bed Mobility: Pt sitting in chair on arrival.Scooting: Contact guard assistance Transfers: 
Sit to Stand: Contact guard assistance;Assist x1 Stand to Sit: Contact guard assistance Bed to Chair: Contact guard assistance; Additional time(with RW) Interventions: Tactile cues; Verbal cues Level of Assistance: Contact guard assistance Balance: 
Sitting: Intact; Without support Standing: Intact; With support Standing - Static: Good;Constant support Standing - Dynamic : Fair Ambulation/Gait Training: 
Distance (ft): 50 Feet (ft) Assistive Device: Walker, rolling;Gait belt Ambulation - Level of Assistance: Contact guard assistance;Assist x1;Additional time Gait Abnormalities: Decreased step clearance Right Side Weight Bearing: Full Left Side Weight Bearing: Full Base of Support: Widened Speed/Carmita: Slow Step Length: Left shortened;Right shortened Pain: 
Pain Scale 1: Numeric (0 - 10) Pain Intensity 1: 0 Activity Tolerance: poor Please refer to the flowsheet for vital signs taken during this treatment. After treatment:  
[x]    Patient left in no apparent distress sitting up in chair 
[]    Patient left in no apparent distress in bed 
[x]    Call bell left within reach [x]    Nursing notified 
[]    Caregiver present 
[]    Bed alarm activated COMMUNICATION/COLLABORATION:  
The patients plan of care was discussed with: Registered Nurse Nava Baez PTA Time Calculation: 30 mins

## 2019-02-18 NOTE — PROGRESS NOTES
General Daily Progress Note Admit Date: 2/3/2019 Subjective:  
 
Patient has no complaint . Current Facility-Administered Medications Medication Dose Route Frequency  insulin glargine (LANTUS) injection 20 Units  20 Units SubCUTAneous QHS  polyethylene glycol (MIRALAX) packet 17 g  17 g Oral DAILY PRN  
 insulin glargine (LANTUS) injection 50 Units  50 Units SubCUTAneous DAILY  albuterol (PROVENTIL VENTOLIN) nebulizer solution 2.5 mg  2.5 mg Nebulization TID RT  
 predniSONE (DELTASONE) tablet 30 mg  30 mg Oral BID WITH MEALS  sodium chloride (NS) flush 5-40 mL  5-40 mL IntraVENous PRN  
 sodium chloride (NS) flush 5-40 mL  5-40 mL IntraVENous Q8H  
 apixaban (ELIQUIS) tablet 5 mg  5 mg Oral Q12H  
 benzonatate (TESSALON) capsule 200 mg  200 mg Oral TID PRN  
 lactobac ac& pc-s.therm-b.anim (JUNI Q/RISAQUAD)  1 Cap Oral DAILY  insulin lispro (HUMALOG) injection   SubCUTAneous TIDAC  acetaminophen (TYLENOL) tablet 650 mg  650 mg Oral Q6H PRN  
 ondansetron (ZOFRAN) injection 4 mg  4 mg IntraVENous Q4H PRN  
 amLODIPine (NORVASC) tablet 2.5 mg  2.5 mg Oral DAILY  atorvastatin (LIPITOR) tablet 40 mg  40 mg Oral QHS  fluticasone (FLONASE) 50 mcg/actuation nasal spray 2 Spray  2 Spray Both Nostrils DAILY  metoprolol succinate (TOPROL-XL) XL tablet 50 mg  50 mg Oral DAILY  glucose chewable tablet 16 g  4 Tab Oral PRN  
 dextrose (D50W) injection syrg 12.5-25 g  12.5-25 g IntraVENous PRN  
 glucagon (GLUCAGEN) injection 1 mg  1 mg IntraMUSCular PRN  
 nitroglycerin (NITROBID) 2 % ointment 1 Inch  1 Inch Topical Q6H PRN Review of Systems A comprehensive review of systems was negative except for: Respiratory: positive for nasal congestion Objective:  
 
Patient Vitals for the past 24 hrs: 
 BP Temp Pulse Resp SpO2  
02/18/19 0728 159/81 98.9 °F (37.2 °C) 87 20 97 % 02/18/19 0400 156/72 98.8 °F (37.1 °C) 86 20 98 % 02/17/19 1932 117/56 98.7 °F (37.1 °C) 85 20 99 % 02/17/19 1610 108/50 98.3 °F (36.8 °C) 86 20 93 % 02/17/19 1419     99 % 02/17/19 1124 143/70 98 °F (36.7 °C) 90 20 99 % 02/17/19 [address] No intake/output data recorded. 02/16 1901 - 02/18 0700 In: -  
Out: 400 [Urine:400] Physical Exam:  
Visit Vitals /81 Pulse 87 Temp 98.9 °F (37.2 °C) Resp 20 Ht 5' 8\" (1.727 m) Wt 238 lb 8.6 oz (108.2 kg) SpO2 97% BMI 36.27 kg/m² General appearance: alert, cooperative, no distress, appears stated age Neck: supple, symmetrical, trachea midline, no adenopathy, thyroid: not enlarged, symmetric, no tenderness/mass/nodules, no carotid bruit and no JVD Lungs: rales R base, L base, egophony R base, L base Heart: regular rate and rhythm, S1, S2 normal, no murmur, click, rub or gallop Abdomen: soft, non-tender. Bowel sounds normal. No masses,  no organomegaly Extremities: extremities normal, atraumatic, no cyanosis or edema Data Review Recent Results (from the past 24 hour(s)) GLUCOSE, POC Collection Time: 02/17/19 10:44 AM  
Result Value Ref Range Glucose (POC) 229 (H) 65 - 100 mg/dL Performed by [employer] GLUCOSE, POC Collection Time: 02/17/19  4:03 PM  
Result Value Ref Range Glucose (POC) 139 (H) 65 - 100 mg/dL Performed by [employer] GLUCOSE, POC Collection Time: 02/17/19  8:53 PM  
Result Value Ref Range Glucose (POC) 272 (H) 65 - 100 mg/dL Performed by Debbie Campbell (PCT) CBC WITH MANUAL DIFF Collection Time: 02/18/19  4:10 AM  
Result Value Ref Range WBC 15.3 (H) 3.6 - 11.0 K/uL  
 RBC 3.07 (L) 3.80 - 5.20 M/uL HGB 9.0 (L) 11.5 - 16.0 g/dL HCT 28.3 (L) 35.0 - 47.0 % MCV 92.2 80.0 - 99.0 FL  
 MCH 29.3 26.0 - 34.0 PG  
 MCHC 31.8 30.0 - 36.5 g/dL  
 RDW 16.2 (H) 11.5 - 14.5 % PLATELET 983 333 - 857 K/uL MPV 10.7 8.9 - 12.9 FL  
 NRBC 0.0 0  WBC ABSOLUTE NRBC 0.00 0.00 - 0.01 K/uL NEUTROPHILS 95 (H) 32 - 75 % BAND NEUTROPHILS 0 0 - 6 % LYMPHOCYTES 3 (L) 12 - 49 % MONOCYTES 2 (L) 5 - 13 % EOSINOPHILS 0 0 - 7 % BASOPHILS 0 0 - 1 % METAMYELOCYTES 0 0 % MYELOCYTES 0 0 % PROMYELOCYTES 0 0 % BLASTS 0 0 % OTHER CELL 0 0 IMMATURE GRANULOCYTES 0 %  
 ABS. NEUTROPHILS 14.5 (H) 1.8 - 8.0 K/UL  
 ABS. LYMPHOCYTES 0.5 (L) 0.8 - 3.5 K/UL  
 ABS. MONOCYTES 0.3 0.0 - 1.0 K/UL  
 ABS. EOSINOPHILS 0.0 0.0 - 0.4 K/UL  
 ABS. BASOPHILS 0.0 0.0 - 0.1 K/UL  
 ABS. IMM. GRANS. 0.0 K/UL  
 DF MANUAL    
 RBC COMMENTS ANISOCYTOSIS 1+ GLUCOSE, POC Collection Time: 02/18/19  7:27 AM  
Result Value Ref Range Glucose (POC) 200 (H) 65 - 100 mg/dL Performed by Lenin Barahona (PCT) Assessment:  
 
Active Problems: 
  SVT (supraventricular tachycardia) ([address] (8/29/2016) Essential hypertension with goal blood pressure less than 140/90 (8/29/2016) Diabetes mellitus type 2, controlled ([address] (9/8/2016) Dyslipidemia (9/8/2016) Acute respiratory failure ([address] (2/3/2019) Leg swelling (2/3/2019) Hodgkin lymphoma ([address] (2/3/2019) CAP (community acquired pneumonia) (2/3/2019) Plan: 1. Pulmonary status continues to improve slowly. O2 requirements stable. Continue steroids. 2.  Continue diabetic control. 3.  Awaiting disposition for rehab.

## 2019-02-18 NOTE — PROGRESS NOTES
Hematology Oncology Progress Note Follow up for: HD Chart notes reviewed since last visit. Case discussed with following:  
 
Patient complains of the following: SOB slowly improving Additional concerns noted by the staff:  
 
Patient Vitals for the past 24 hrs: 
 BP Temp Pulse Resp SpO2  
02/18/19 0728 159/81 98.9 °F (37.2 °C) 87 20 97 % 02/18/19 0400 156/72 98.8 °F (37.1 °C) 86 20 98 % 02/17/19 1932 117/56 98.7 °F (37.1 °C) 85 20 99 % 02/17/19 1610 108/50 98.3 °F (36.8 °C) 86 20 93 % 02/17/19 1419     99 % 02/17/19 1124 143/70 98 °F (36.7 °C) 90 20 99 % 02/17/19 [address] Review of Systems: 
12 point ROS done and negative except as above Physical Examination: 
gen NAD 
CV reg Lungs CTAB 
abd benign Labs: 
Recent Results (from the past 24 hour(s)) GLUCOSE, POC Collection Time: 02/17/19 10:44 AM  
Result Value Ref Range Glucose (POC) 229 (H) 65 - 100 mg/dL Performed by Landry Shipman GLUCOSE, POC Collection Time: 02/17/19  4:03 PM  
Result Value Ref Range Glucose (POC) 139 (H) 65 - 100 mg/dL Performed by Landry Shipman GLUCOSE, POC Collection Time: 02/17/19  8:53 PM  
Result Value Ref Range Glucose (POC) 272 (H) 65 - 100 mg/dL Performed by Donna Lundy (PCT) CBC WITH MANUAL DIFF Collection Time: 02/18/19  4:10 AM  
Result Value Ref Range WBC 15.3 (H) 3.6 - 11.0 K/uL  
 RBC 3.07 (L) 3.80 - 5.20 M/uL HGB 9.0 (L) 11.5 - 16.0 g/dL HCT 28.3 (L) 35.0 - 47.0 % MCV 92.2 80.0 - 99.0 FL  
 MCH 29.3 26.0 - 34.0 PG  
 MCHC 31.8 30.0 - 36.5 g/dL  
 RDW 16.2 (H) 11.5 - 14.5 % PLATELET 515 063 - 595 K/uL MPV 10.7 8.9 - 12.9 FL  
 NRBC 0.0 0  WBC ABSOLUTE NRBC 0.00 0.00 - 0.01 K/uL NEUTROPHILS 95 (H) 32 - 75 % BAND NEUTROPHILS 0 0 - 6 % LYMPHOCYTES 3 (L) 12 - 49 % MONOCYTES 2 (L) 5 - 13 % EOSINOPHILS 0 0 - 7 % BASOPHILS 0 0 - 1 % METAMYELOCYTES 0 0 % MYELOCYTES 0 0 % PROMYELOCYTES 0 0 % BLASTS 0 0 % OTHER CELL 0 0 IMMATURE GRANULOCYTES 0 %  
 ABS. NEUTROPHILS 14.5 (H) 1.8 - 8.0 K/UL  
 ABS. LYMPHOCYTES 0.5 (L) 0.8 - 3.5 K/UL  
 ABS. MONOCYTES 0.3 0.0 - 1.0 K/UL  
 ABS. EOSINOPHILS 0.0 0.0 - 0.4 K/UL  
 ABS. BASOPHILS 0.0 0.0 - 0.1 K/UL  
 ABS. IMM. GRANS. 0.0 K/UL  
 DF MANUAL    
 RBC COMMENTS ANISOCYTOSIS 1+ GLUCOSE, POC Collection Time: 02/18/19  7:27 AM  
Result Value Ref Range Glucose (POC) 200 (H) 65 - 100 mg/dL Performed by Bella Arora (PCT) Assessment and Plan: 
 
*) Hodgkins Lymphoma: 
- s/p 4 cycles of ABVD with post treatment PET without any uptake, great response. 
- will not plan any further chemo or xrt given lung issues. -F/u with Dr. Eliezer Perrin 
 
*) Respiratory Failure, suspected Bleomycin toxicity: 
- She feels a bit better, less SOB while talking - Appreciate pulmonary assistance/input. - bronch 2/6, cultures NGTD 
- Viral panel with coronavirus 
- completed IV Abx and fluconazole - Weaning O2 down for goal sat >90% - Now on oral steroids per pulmonary 
- likely will need home o2 PE 
- Now on eliquis - LE dopplers negative Deconditioning - Planning to go to [facility] soon

## 2019-02-18 NOTE — DIABETES MGMT
[facility]- consult received for insulin teaching. Met with patient. States she was told she would likely not need insulin at discharge. Will discontinue consult at this time. Please re consult [facility] if insulin is needed after discharge. Danial Roman RD, CDE [facility] Office:  [phone]

## 2019-02-18 NOTE — PROGRESS NOTES
[facility] [place], Critical Care, and Sleep Medicine Progress Note Name: Tiana Members MRN: [id number] : 1950 Hospital: [facility] Date: 2019 IMPRESSION:  
· Acute hypoxic resp failure · Likely bleomycin lung toxicity · Bilateral pulm infiltrates-  First noted on PET  and seen on ct on admit - tx with augmentin -, then Levaquin - Preceding fever 101. S/P bronch . DDX: PNA, opportunistic infection and/or bleomycin toxicity from chemo · Coronavirus positive RVP · Hodkins lymphoma- tx with 4 cyles of abvd- positive response to chemo · Pulm htn - pap 60 with nl lvef · Anemia-  ? Related to chemo- stable · Hx of asthma from childhood to age 24  
· NEWLY DISCOVERED BILATERAL Pulmonary Emboli WITH NEG DUPLEX OF LEG, now on Eliquis. RECOMMENDATIONS:  
· O2 - wean as tolerated · Antibiotics complete · Once over this acute episode will get repeat PFTs as an outpt. · On Eliquis for PE.  
· PT and OT to [facility]. Anticipate admission to [address] [place] [address]. · Will Continue. Prednisone 30mg po bid for now · Needs to call [phone] for appt when ready for discharge. Subjective:  
 
2-15-19: Pt had hypoglycemia this am. She has mild dyspnea. Has nasal dryness. She did not eat much last pm. NO chest pain. No back pain, no abdominal pain. -: Pt has been feeling better. Still with nasal dryness. NO chest pain, no back pain, no leg pain. toleating po intake well.  
 
 
19: Pt has increase clear nasal drainage. NO headache. NO chest pain. Feels that her breathing is improving. Has mainly a dry cough. Feels that her chest is clearing up. NO GERD. Tolerating po intake. NO leg pain or swelling. Slept ok last pm.  
Rest of 10 point ROS is negative. 19: Pt was seen this am. Has been have moderate to severe coughing paroxysms. Dry and not sputum production. NO chest pain, no back pain, no issues with sleeping. Still has dyspnea. No increased fevers. Pulse has been stable. Still with hypoxia on 3L NC with pox of 91%. NO chest pain, no back pain. No leg pain. This patient has been seen and evaluated at the request of Dr. Tariq Castle  For hypoxia. Patient is a 70 y.o. female  Who was dx with hodgkin lymphoma  In august when she was getting a massage in [employer] and the masseuse note  At rt neck ln. She is s/p chemo with abvd. She  Was noted to have bilateral pulm infiltrates on pet/ct On jan 21  And was started on augmenin on jan 25 - seen in  Oncology office on Jan 30 - sats high 80's but increased to 90 % on fluids and was started on levaquin on Jan 30. Noted sig  Increased landaver[place] and said sats drop to 70's with exertion . She says she  Feels much better today. NO cough, heme , phlegm. NO cp. She has noted some ankle swelling. Feb 5 - report decreased sob, reports decreased ankle swelling . reports rhinitis with clear liquid Feb 6- says she gets a cough and sob with talking. Slept well. Feb 7- says she feels better 2/8: Resting in bed talking on the telephone. No current complaints. On 3LPM.   
 
Past Medical History:  
Diagnosis Date  Asthma   
 as a child, nothing since menopause  Cancer ([address] 08/21/2018 Non-Hodgkin's  Cataracts, bilateral   
 Diabetes ([address] type II  
 Environmental allergies  Fibromyalgia Treated with alternate medical therapy  Hypertension   
 no asthma since age 24  // Dr. Marlon Buckley said hodgkins  And pt confirms Past Surgical History:  
Procedure Laterality Date  HX BREAST BIOPSY Right 2006  HX COLONOSCOPY  2015  HX GYN Pap 2015  HX ORTHOPAEDIC    
 fractured R ankle/ no surgery  HX OTHER SURGICAL Right 08/27/2018  
 excision of deep neck cervical LN. Prior to Admission medications Medication Sig Start Date End Date Taking? Authorizing Provider ONETOUCH VERIO strip USE 1 STRIP IN VITRO TO TEST BLOOD SUGAR ONCE DAILY 10/17/18  Yes Daria Hughes MD  
ibuprofen (MOTRIN) 600 mg tablet Take 1 Tab by mouth every six (6) hours as needed for Pain. 18  Yes Gabo Ling MD  
Lancets (ONE TOUCH DELICA) misc USE TO TEST BLOOD SUGAR ONCE DAILY. DX.E11.9 10/13/16  Yes Daria Hughes MD  
pioglitazone (ACTOS) 30 mg tablet TAKE 1 TABLET DAILY 19   Daria Hughes MD  
metoprolol succinate (TOPROL-XL) 50 mg XL tablet TAKE 1 TAB BY MOUTH DAILY. 10/12/18   Daria Hughes MD  
JANUMET XR 50-1,000 mg TM24 TAKE 1 TABLET BY MOUTH TWICE A DAY WITH MEALS 10/12/18   Daria Hughes MD  
fluticasone [facility] ALLERGY RELIEF) 50 mcg/actuation nasal spray 2 Sprays by Both Nostrils route daily. Provider, Historical  
spironolactone-hydrochlorothiazide (ALDACTAZIDE) 25-25 mg per tablet TAKE ONE TABLET BY MOUTH EVERY DAY 12/3/17   Daria Hughes MD  
atorvastatin (LIPITOR) 40 mg tablet TAKE 1 TAB BY MOUTH DAILY. 12/3/17   Daria Hughes MD  
amLODIPine (NORVASC) 10 mg tablet TAKE 1 TABLET BY MOUTH EVERY DAY 17   Daria Hughes MD  
alcohol swabs (ALCOHOL PREP PADS) padm 1 Pad by Apply Externally route daily. DX.E11.9 10/13/16   Daria Hughes MD  
 
No Known Allergies Social History Tobacco Use  Smoking status: Never Smoker  Smokeless tobacco: Never Used Substance Use Topics  Alcohol use: Yes Comment: socially  
 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   
     prostate cancer  Cancer Sister Breast cancer apparent DCIS  
 Heart Disease Brother Brother  from congestive heart failure secondary to severe mitral disease Current Facility-Administered Medications Medication Dose Route Frequency  insulin glargine (LANTUS) injection 50 Units  50 Units SubCUTAneous DAILY  insulin glargine (LANTUS) injection 10 Units  10 Units SubCUTAneous QHS  albuterol (PROVENTIL VENTOLIN) nebulizer solution 2.5 mg  2.5 mg Nebulization TID RT  
 predniSONE (DELTASONE) tablet 30 mg  30 mg Oral BID WITH MEALS  sodium chloride (NS) flush 5-40 mL  5-40 mL IntraVENous Q8H  
 apixaban (ELIQUIS) tablet 5 mg  5 mg Oral Q12H  
 lactobac ac& pc-s.therm-b.anim (JUNI Q/RISAQUAD)  1 Cap Oral DAILY  insulin lispro (HUMALOG) injection   SubCUTAneous TIDAC  amLODIPine (NORVASC) tablet 2.5 mg  2.5 mg Oral DAILY  atorvastatin (LIPITOR) tablet 40 mg  40 mg Oral QHS  fluticasone (FLONASE) 50 mcg/actuation nasal spray 2 Spray  2 Spray Both Nostrils DAILY  metoprolol succinate (TOPROL-XL) XL tablet 50 mg  50 mg Oral DAILY Review of Systems: A comprehensive review of systems was negative except for that written in the HPI. Objective: 
Vital Signs:   
Visit Vitals /81 Pulse 87 Temp 98.9 °F (37.2 °C) Resp 20 Ht 5' 8\" (1.727 m) Wt 108.2 kg (238 lb 8.6 oz) SpO2 97% BMI 36.27 kg/m² O2 Device: Nasal cannula O2 Flow Rate (L/min): 2 l/min Temp (24hrs), Av.5 °F (36.9 °C), Min:98 °F (36.7 °C), Max:98.9 °F (37.2 °C) Intake/Output:  
Last shift:      No intake/output data recorded. Last 3 shifts:  1901 -  0700 In: -  
Out: 400 [Urine:400] Intake/Output Summary (Last 24 hours) at 2019 0827 Last data filed at 2019 1100 Gross per 24 hour Intake  Output 400 ml Net -400 ml Physical Exam:  
General:  Alert, cooperative, no distress, appears stated age. ON NC oxygen. Sitting up in chair. Pox was 91% on RA at rest.   
Head:  Normocephalic, without obvious abnormality, atraumatic. alopecia Eyes:  Conjunctivae/corneas clear. PERRL, EOMs intact. Nose: Nares normal. Septum midline. Mucosa normal. No drainage or sinus tenderness. Throat: Lips, mucosa, and tongue normal. Teeth and gums normal.- Neck: Supple, symmetrical, trachea midline, no adenopathy, thyroid: no enlargment/tenderness/nodules, no carotid bruit and no JVD. Back:   Symmetric, no curvature. ROM normal.  
Lungs:   Appears to be breathing comfortably. Good air movement. Pretty clear. Chest wall:  No tenderness or deformity. Heart:  Regular rate and rhythm, S1, S2 normal, no murmur, click, rub or gallop.- mild tachy Abdomen:   Soft, non-tender. Bowel sounds normal. No masses,  No organomegaly. Extremities: Extremities normal, atraumatic, no cyanosis - edema gone Pulses: 2+ and symmetric all extremities. Skin: Skin color, texture, turgor normal. No rashes or lesions Lymph nodes: Cervical, supraclavicular, and axillary nodes normal.  
Neurologic: Grossly nonfocal, motor is intact. Psych: no over anxiety or depression. Data review:  
 
Recent Results (from the past 24 hour(s)) GLUCOSE, POC Collection Time: 02/17/19 10:44 AM  
Result Value Ref Range Glucose (POC) 229 (H) 65 - 100 mg/dL Performed by Olita Coker GLUCOSE, POC Collection Time: 02/17/19  4:03 PM  
Result Value Ref Range Glucose (POC) 139 (H) 65 - 100 mg/dL Performed by Olita Coker GLUCOSE, POC Collection Time: 02/17/19  8:53 PM  
Result Value Ref Range Glucose (POC) 272 (H) 65 - 100 mg/dL Performed by Lissa Sanchez (PCT) CBC WITH MANUAL DIFF Collection Time: 02/18/19  4:10 AM  
Result Value Ref Range WBC 15.3 (H) 3.6 - 11.0 K/uL  
 RBC 3.07 (L) 3.80 - 5.20 M/uL HGB 9.0 (L) 11.5 - 16.0 g/dL HCT 28.3 (L) 35.0 - 47.0 % MCV 92.2 80.0 - 99.0 FL  
 MCH 29.3 26.0 - 34.0 PG  
 MCHC 31.8 30.0 - 36.5 g/dL  
 RDW 16.2 (H) 11.5 - 14.5 % PLATELET 610 839 - 822 K/uL MPV 10.7 8.9 - 12.9 FL  
 NRBC 0.0 0  WBC ABSOLUTE NRBC 0.00 0.00 - 0.01 K/uL NEUTROPHILS 95 (H) 32 - 75 % BAND NEUTROPHILS 0 0 - 6 % LYMPHOCYTES 3 (L) 12 - 49 % MONOCYTES 2 (L) 5 - 13 % EOSINOPHILS 0 0 - 7 % BASOPHILS 0 0 - 1 % METAMYELOCYTES 0 0 % MYELOCYTES 0 0 % PROMYELOCYTES 0 0 % BLASTS 0 0 % OTHER CELL 0 0 IMMATURE GRANULOCYTES 0 %  
 ABS. NEUTROPHILS 14.5 (H) 1.8 - 8.0 K/UL  
 ABS. LYMPHOCYTES 0.5 (L) 0.8 - 3.5 K/UL  
 ABS. MONOCYTES 0.3 0.0 - 1.0 K/UL  
 ABS. EOSINOPHILS 0.0 0.0 - 0.4 K/UL  
 ABS. BASOPHILS 0.0 0.0 - 0.1 K/UL  
 ABS. IMM. GRANS. 0.0 K/UL  
 DF MANUAL    
 RBC COMMENTS ANISOCYTOSIS 1+ GLUCOSE, POC Collection Time: 02/18/19  7:27 AM  
Result Value Ref Range Glucose (POC) 200 (H) 65 - 100 mg/dL Performed by Gerber Alt (PCT) Imaging: 
I have personally reviewed the patients radiographs and have reviewed the reports: 
Ct scan: 2-3-19: FINDINGS: 
CHEST: 
THYROID: No nodule. MEDIASTINUM: Mediastinal lymphadenopathy has improved compared to the prior 
exam. Transverse dimension at the level of the aortic arch was previously 5.9 cm 
and is now 4.2 cm. SERJIO: No mass or lymphadenopathy. THORACIC AORTA: No dissection or aneurysm. MAIN PULMONARY ARTERY: Right upper and left lower lobe pulmonary emboli are 
noted. TRACHEA/BRONCHI: Patent. ESOPHAGUS: No wall thickening or dilatation. HEART: Normal in size. PLEURA: No effusion or pneumothorax. LUNGS: Infiltrates are noted throughout the right lung and left lower lobe. INCIDENTALLY IMAGED UPPER ABDOMEN: 17 mm right adrenal adenoma. BONES: Degenerative changes are seen in the thoracic spine. 
  
  
IMPRESSION: Right upper and left lower lobe pulmonary emboli. Bilateral airspace 
disease. Improved mediastinal lymphadenopathy. 2-11-19: Clinical indication: Bilateral lung infiltrates, acute respiratory distress with 
hypoxia. 
  
Portable AP upright view of the chest is obtained, comparison February 8. Lung 
fields are unchanged. Infusion catheter is unchanged.  Shallow inspiration with 
elevation of the right hemidiaphragm, bilateral interstitial infiltrates more 
prominent on the right. 
  
 IMPRESSION 
: No change.  
 
  
Carmen Reyes MD

## 2019-02-18 NOTE — PROGRESS NOTES
PCU SHIFT NURSING NOTE Bedside and Verbal shift change report given to Mine Soliz RN (oncoming nurse) by Amarilys Longoria RN (offgoing nurse). Report included the following information SBAR, Kardex, Intake/Output, MAR, Recent Results and Cardiac Rhythm NSR. Shift Summary:  
 
 
Admission Date 2/3/2019 Admission Diagnosis Acute respiratory failure ([address] Consults IP CONSULT TO HEMATOLOGY 
IP CONSULT TO PRIMARY CARE PROVIDER 
IP CONSULT TO PULMONOLOGY Consults []PT []OT []Speech  
[]Case Management  
  
[] Palliative Cardiac Monitoring Order []Yes []No  
 
IV drips []Yes Drip:                            Dose: 
Drip:                            Dose: 
Drip:                            Dose:  
[]No  
 
GI Prophylaxis []Yes []No  
 
 
 
DVT Prophylaxis SCDs:     
     
 Shawn stockings:     
  
[] Medication []Contraindicated []None Activity Level Activity Level: Up with Assistance, Recliner Activity Assistance: Partial (one person) Purposeful Rounding every 1-2 hour? []Yes Spence Score  Total Score: 4 Bed Alarm (If score 3 or >) []Yes  
[] Refused (See signed refusal form in chart) Dwayne Score  Dwayne Score: 17 Dwayne Score (if score 14 or less) []PMT consult  
[]Wound Care consult []Specialty bed  
[] Nutrition consult Needs prior to discharge:  
Home O2 required:   
[]Yes []No  
 If yes, how much O2 required? Other:  
 Last Bowel Movement: Last Bowel Movement Date: (Patient states that last BM was PTA) Influenza Vaccine Received Flu Vaccine for Current Season (usually Sept-March): No  
 Patient/Guardian Refused (Notify MD): Yes Pneumonia Vaccine Diet Active Orders Diet DIET DIABETIC CONSISTENT CARB Regular LDAs Venous Access Device portacath  09/12/18 Upper chest (subclavicular area), left (Active) Venous Access Device 02/12/19 Arm, right (Active) Central Line Being Utilized No 2/18/2019  4:00 AM  
Criteria for Appropriate Use Irritant/vesicant medication 2/17/2019  7:32 PM  
Site Assessment Clean, dry, & intact 2/14/2019  4:46 AM  
Dressing Status Clean, dry, & intact 2/14/2019  4:46 AM  
Dressing Type Transparent 2/14/2019  4:46 AM  
Access Needle Size (Site #1) 22 G 2/12/2019  3:18 PM  
Action Taken (Medial Site) Flushed 2/12/2019  3:18 PM  
Access Lateral Site Assessment Warmth;Redness 2/12/2019  3:18 PM  
Access Needle Size (Lateral Site) 22 G 2/12/2019  3:18 PM  
Positive Blood Return (Lateral Site) Yes 2/12/2019  3:18 PM  
Action Taken (Lateral Site) Alcohol 2/12/2019  3:18 PM  
  
Peripheral IV 02/12/19 Anterior;Proximal;Right Forearm (Active) Site Assessment Clean, dry, & intact 2/18/2019  4:00 AM  
Phlebitis Assessment 0 2/18/2019  4:00 AM  
Infiltration Assessment 0 2/18/2019  4:00 AM  
Dressing Status Clean, dry, & intact 2/18/2019  4:00 AM  
Dressing Type Tape;Transparent 2/17/2019  3:32 PM  
Hub Color/Line Status Blue;Capped 2/17/2019  3:32 PM  
Action Taken Open ports on tubing capped 2/14/2019  4:46 AM  
      
External Female Catheter 02/04/19 (Active) Site Assessment Clean, dry, & intact 2/18/2019  4:00 AM  
Repositioned Yes 2/18/2019  4:00 AM  
Perineal Care Yes 2/18/2019  4:00 AM  
Wick Changed No 2/18/2019  4:00 AM  
Suction Canister/Tubing Changed No 2/18/2019  4:00 AM  
Urine Output (mL) 400 ml 2/17/2019 11:00 AM  
            
Urinary Catheter Intake & Output Date 02/17/19 0700 - 02/18/19 1722 02/18/19 0700 - 02/19/19 2797 Shift [id number] 1900-0659 24 Hour Total 3472-0870 2510-5465 24 Hour Total  
INTAKE Shift Total(mL/kg) OUTPUT Urine(mL/kg/hr) 400(0.3)  400(0.2) Urine Occurrence(s) 1 x  1 x Urine Output (mL) (External Female Catheter 02/04/19) 400  400 Shift Total(mL/kg) 400(3.7)  400(3.7) NET -400  -400 Weight (kg) 108. 2 108. 2 108. 2 108. 2 108. 2 108.2 Readmission Risk Assessment Tool Score Medium Risk 25 Total Score 3 Has Seen PCP in Last 6 Months (Yes=3, No=0) 3 Patient Length of Stay (>5 days = 3)  
 5 Pt. Coverage (Medicare=5 , Medicaid, or Self-Pay=4) 7 Charlson Comorbidity Score (Age + Comorbid Conditions) Criteria that do not apply:  
 . Living with Significant Other. Assisted Living. LTAC. SNF. or  
Rehab  
 IP Visits Last 12 Months (1-3=4, 4=9, >4=11) Expected Length of Stay 3d 12h Actual Length of Stay 15

## 2019-02-18 NOTE — DIABETES MGMT
DTC- attempted to see patient for consult to teach patient how to give insulin with steroids. Patient currently on telephone and asked to not be bothered at this time. Will attempt to see at a later time. Mar Sanchez RD, CDE [facility] Office:  [phone]

## 2019-02-18 NOTE — PROGRESS NOTES
Problem: Self Care Deficits Care Plan (Adult) Goal: *Acute Goals and Plan of Care (Insert Text) Occupational Therapy Goals: 
Initiated 2/15/2019 1. Patient will perform grooming standing with modified independence within 7 days. 2. Patient will perform toileting with modified independence within 7 days. 3. Patient will perform upper body dressing and lower body dressing with modified independence within 7 days. 4. Patient will perform bathing with modified independence within 7 days. 5. Patient will correctly use PLB technique during functional tasks for improved independence with ADLS within 7 days. 6. Patient will transfer from toilet with modified independence using the least restrictive device and appropriate durable medical equipment within 7 days. Occupational Therapy TREATMENT Patient: Zulma Velasquez (89 y.o. female) Date: 2/18/2019 Diagnosis: Acute respiratory failure (HCC) <principal problem not specified> Procedure(s) (LRB): BRONCHOSCOPY (N/A) BRONCHIAL WASHINGS FLEXIBLE (N/A) 12 Days Post-Op Precautions:   
Chart, occupational therapy assessment, plan of care, and goals were reviewed. ASSESSMENT: 
Pt was seated at bedside chair upon arrival. Pt was on standard NC this session. Pt was on 1 liter NC this session upon arrival 92%. Constant cues needed for or PLB technique and pacing with basic ADL mobility. RW needed for balance and energy conservation this session with CGA for mobility. Limited activity tolerance beyond basic mobility. Continue to recommend Inpatient Pulmonary Rehab. 
 
 
1 liter NC at rest: 92% 2 liters NC at rest (increased prior to activity): 93% 2 liters NC after mobility: 74% 6 liters NC at rest: 89% 6 liters NC after mobility: 78% 6 liters NC at end of session to recover 88-89% Nurse notified of O2 readings during session and that pt was on 6 liters NC to recover. Unable to wean pt back down to 1 liter NC as prior to activity. Progression toward goals: 
[]       Improving appropriately and progressing toward goals [x]       Improving slowly and progressing toward goals 
[]       Not making progress toward goals and plan of care will be adjusted PLAN: 
Patient continues to benefit from skilled intervention to address the above impairments. Continue treatment per established plan of care. Discharge Recommendations:  Inpatient Pulmonary Rehab Further Equipment Recommendations for Discharge:  Home O2 SUBJECTIVE:  
Patient stated Hilary Mo you be coming back. I am motivated to participate.  OBJECTIVE DATA SUMMARY:  
Cognitive/Behavioral Status: 
Neurologic State: Alert Orientation Level: Oriented X4 Cognition: Follows commands Perception: Appears intact Perseveration: No perseveration noted Safety/Judgement: Awareness of environment; Fall prevention;Good awareness of safety precautions; Home safety Functional Mobility and Transfers for ADLs:Bed Mobility: 
Scooting: Contact guard assistance Transfers: 
Sit to Stand: Contact guard assistance Bed to Chair: Contact guard assistance; Additional time(with RW) Balance: 
Sitting: Intact Standing: Impaired Standing - Static: Good Standing - Dynamic : Fair ADL Intervention: 
  
Educated on energy conservation, PLB and pacing, focus on general mobility due to low O2 sats Cognitive Retraining Safety/Judgement: Awareness of environment; Fall prevention;Good awareness of safety precautions; Home safety Pain: 
Pain Scale 1: Numeric (0 - 10) Pain Intensity 1: 0 Activity Tolerance:  
 
Please refer to the flowsheet for vital signs taken during this treatment. After treatment:  
[x] Patient left in no apparent distress sitting up in chair 
[] Patient left in no apparent distress in bed 
[x] Call bell left within reach [x] Nursing notified 
[x] Caregiver present 
[] Bed alarm activated COMMUNICATION/COLLABORATION:  
 The patients plan of care was discussed with: Physical Therapy Assistant and , patient and RN Omar Vincent, OTR/L Time Calculation: 31 mins

## 2019-02-19 NOTE — ADT AUTH CERT NOTES
Pulmonary Disease GRG - Care Day 16 (2/18/2019) by Sherrie Tinajero, RN Review Entered Review Status 2/18/2019 13:10 Completed Criteria Review Care Day: 16 Care Date: 2/18/2019 Level of Care: Step Down Guideline Day 3 Level Of Care ( ) * Activity level acceptable  
(X) * Isolation not needed, or status acceptable Clinical Status  
(X) * Respiratory status acceptable  
(X) * Right heart function adequate  
(X) * Temperature status acceptable  
(X) * No infection, or status acceptable  
(X) * Stable chest findings  
(X) * Pain and nausea absent or adequately managed ( ) * General Discharge Criteria met 2/18/2019 13:10:21 EST by Ruthe Rolling PT Monitoring Glucs O2 assessements Awaiting safe discharge Interventions  
(X) * No chest tube, or status acceptable  
(X) * Intake acceptable ( ) * No inpatient interventions needed 2/18/2019 13:10:21 EST by Kaiser Goldberg continues with po steroids, PT, awaiting safe discharge to rehab 2/18/2019 13:10:21 EST by Brandie Corey Subject: Additional Clinical Information 2/18/19 Review VS 98.9, 87, 20, 97% 2LNC 159/81 Active Problems:   SVT (supraventricular tachycardia) ([facility]) (8/29/2016)     Essential hypertension with goal blood pressure less than 140/90 (8/29/2016)     Diabetes mellitus type 2, controlled ([facility] [address] (9/8/2016)     Dyslipidemia (9/8/2016)     Acute respiratory failure ([facility] [address] (2/3/2019)     Leg swelling (2/3/2019)     Hodgkin lymphoma ([facility] [address] (2/3/2019)     CAP (community acquired pneumonia) (2/3/2019)    
  
   Pulmonary status continues to improve slowly.   O2 requirements stable.   Continue steroids. 2.   Continue diabetic control. 3.   Awaiting disposition for rehab.    
  
Tx Plan Tele, Routine VS, Detason po bid, POC Gluc with ssc, Lantus sc daily am and pm, PT consult, DM, Hematology and Pulonology consult, Oximetry spot check, Acapella q6hrs Wbc 15.3,   
  
  
  
  
  
  
 * Milestone Pulmonary Disease GRG - Care Day 15 (2/17/2019) by Brendon Reece RN Review Entered Review Status 2/18/2019 13:01 Completed Criteria Review Care Day: 15 Care Date: 2/17/2019 Level of Care: Step Down Guideline Day 3 Level Of Care ( ) * Activity level acceptable 2/18/2019 13:01:30 EST by Juan Carlos Coyle  
  Up to recliner today  
  
(X) * Isolation not needed, or status acceptable Clinical Status  
(X) * Respiratory status acceptable 2/18/2019 13:01:30 EST by Justine Marquez to improve  
  
(X) * Right heart function adequate  
(X) * Temperature status acceptable  
(X) * No infection, or status acceptable  
(X) * Stable chest findings  
(X) * Pain and nausea absent or adequately managed ( ) * General Discharge Criteria met Interventions  
(X) * No chest tube, or status acceptable  
(X) * Intake acceptable ( ) * No inpatient interventions needed 2/18/2019 13:01:31 EST by Lorin Cole safe discharge Monitoring Gluc while on steroids 2/18/2019 13:01:30 EST by Juan Carlos Coyle Subject: Additional Clinical Information 2/17/19 Review VS stable Internal Med Note No complaints Lungs: rales R base, L base, egophony R base, L base Pulmonary symptoms continue to improve.  O2 requirements continue to decrease.   She remains symptomatic with dyspnea on exertion however. 2.   Continue diabetic control.   Current flare secondary to steroid usage.   She will need insulin while she is on steroids only. 3.   Disposition pending regarding pulmonary rehab placement. * Milestone Pulmonary Disease GRG - Care Day 14 (2/16/2019) by Brendon Reece RN Review Entered Review Status 2/18/2019 12:47 Completed Criteria Review Care Day: 14 Care Date: 2/16/2019 Level of Care: Step Down Guideline Day 3 Level Of Care ( ) * Activity level acceptable (X) * Isolation not needed, or status acceptable Clinical Status ( ) * Respiratory status acceptable 2/18/2019 12:47:50 EST by Anneliese Cottrell  
  Pulmonary symptoms continue to improve.  O2 requirements continue to decrease.  She remains symptomatic with dyspnea on exertion however  
  
(X) * Right heart function adequate  
(X) * Temperature status acceptable  
(X) * No infection, or status acceptable 2/18/2019 12:47:50 EST by Anneliese Cottrell  
  afebrile ( ) * Stable chest findings 2/18/2019 12:47:50 EST by Anneliese Cottrell  
  Positive for Dyspnea on exhertion  
  
(X) * Pain and nausea absent or adequately managed ( ) * General Discharge Criteria met 2/18/2019 12:47:50 EST by Anneliese Cottrell  
  Continuous O2 
PT for rehab Interventions  
(X) * No chest tube, or status acceptable  
(X) * Intake acceptable ( ) * No inpatient interventions needed 2/18/2019 12:47:50 EST by Anjana Phelps rehab placement for safe discharge 2/18/2019 12:47:50 EST by Anneliese Cottrell Subject: Additional Clinical Information 2/17/19 Review 97.5, 91, 18, 122/49, 97% 2LNC Internal Med Note Lungs: rales R base, L base, egophony R base, L base Active Problems:   SVT (supraventricular tachycardia) ([facility]) (8/29/2016)     Essential hypertension with goal blood pressure less than 140/90 (8/29/2016)     Diabetes mellitus type 2, controlled ([address] (9/8/2016)     Dyslipidemia (9/8/2016)     Acute respiratory failure ([address] (2/3/2019)     Leg swelling (2/3/2019)     Hodgkin lymphoma ([address] (2/3/2019)     CAP (community acquired pneumonia) (2/3/2019)    
  
1.   Pulmonary symptoms continue to improve.   O2 requirements continue to decrease.   She remains symptomatic with dyspnea on exertion however. 2.   Continue diabetic control.   Current flare secondary to steroid usage.   She will need insulin while she is on steroids only. 3.   Disposition pending regarding pulmonary rehab placement. Tx Plan Tele, Routine VS, Detason po bid, POC Gluc with ssc, Lantus sc daily am and pm, PT consult, DM, Hematology and Pulonology consult, Oximetry spot check, Acapella q6hrs * Milestone

## 2019-02-19 NOTE — PROGRESS NOTES
CM called Yvrose Sneed with [facility] to f/u on the authorization and he will check on it and get back to this CM. 2pm - CM spoke with Yvrose Sneed with [facility] and they are still waiting on insurance authorization. He will let this CM know when he gets auth. Ubaldo Covington, [address] [place] [address]

## 2019-02-19 NOTE — PROGRESS NOTES
Problem: Mobility Impaired (Adult and Pediatric) Goal: *Acute Goals and Plan of Care (Insert Text) Physical Therapy Goals Goals reviewed and remain appropriate 2/15/19 Initiated 2/7/2019 1. Patient will move from supine to sit and sit to supine , scoot up and down and roll side to side in bed with modified independence within 7 day(s). 2.  Patient will transfer from bed to chair and chair to bed with supervision/set-up using the least restrictive device within 7 day(s). 3.  Patient will perform sit to stand with supervision/set-up within 7 day(s). 4.  Patient will ambulate with supervision/set-up for 150 feet with the least restrictive device within 7 day(s). physical Therapy TREATMENT Patient: Belgica Talley (1 y.o. female) Date: 2/19/2019 Diagnosis: Acute respiratory failure ([address] Procedure(s) (LRB): BRONCHOSCOPY (N/A) BRONCHIAL WASHINGS FLEXIBLE (N/A) 13 Days Post-Op Precautions:   falls Chart, physical therapy assessment, plan of care and goals were reviewed. ASSESSMENT: pt continues to improve, no LOB, min SOB, needed 2 sitting rest breaks, slow pace, did well with toilet transfers and needs to start using the bathroom, vc's for safety and proper RW use. Progression toward goals: 
[]    Improving appropriately and progressing toward goals [x]    Improving slowly and progressing toward goals 
[]    Not making progress toward goals and plan of care will be adjusted PLAN: 
Patient continues to benefit from skilled intervention to address the above impairments. Continue treatment per established plan of care. Discharge Recommendations:   Pulmonary Rehab Further Equipment Recommendations for Discharge:  rolling walker OBJECTIVE DATA SUMMARY:  
Critical Behavior: 
Neurologic State: Alert, Appropriate for age Orientation Level: Oriented X4 Cognition: Follows commands Safety/Judgement: Awareness of environment, Fall prevention, Good awareness of safety precautions, Home safety Functional Mobility Training: 
Bed Mobility: Pt sitting in chair on arrival. 
Transfers: 
Sit to Stand: Stand-by assistance Stand to Sit: Stand-by assistance Interventions: Verbal cues; Tactile cues Level of Assistance: Stand-by assistance Balance: 
Sitting: Intact; Without support Standing: Intact; With support Standing - Static: Good;Constant support Standing - Dynamic : Good Ambulation/Gait Training: 
Distance (ft): 60 Feet (ft) Assistive Device: Walker, rolling;Gait belt Ambulation - Level of Assistance: Contact guard assistance Gait Abnormalities: Decreased step clearance Right Side Weight Bearing: Full Left Side Weight Bearing: Full Base of Support: Widened Speed/Carmita: Slow Step Length: Left shortened;Right shortened Pain: 
Pain Scale 1: Numeric (0 - 10) Pain Intensity 1: 0 Activity Tolerance: poor After treatment:  
[x]    Patient left in no apparent distress sitting up in chair 
[]    Patient left in no apparent distress in bed 
[x]    Call bell left within reach [x]    Nursing notified 
[]    Caregiver present 
[]    Bed alarm activated COMMUNICATION/COLLABORATION:  
The patients plan of care was discussed with: Registered Nurse Tracie Byrnes PTA Time Calculation: 25 mins

## 2019-02-19 NOTE — PROGRESS NOTES
General Daily Progress Note Admit Date: 2/3/2019 Subjective:  
 
Patient has no complaint other than coughing. Current Facility-Administered Medications Medication Dose Route Frequency  insulin glargine (LANTUS) injection 10 Units  10 Units SubCUTAneous QHS  insulin glargine (LANTUS) injection 40 Units  40 Units SubCUTAneous DAILY  albuterol (PROVENTIL VENTOLIN) nebulizer solution 2.5 mg  2.5 mg Nebulization Q4H PRN  polyethylene glycol (MIRALAX) packet 17 g  17 g Oral DAILY PRN  predniSONE (DELTASONE) tablet 30 mg  30 mg Oral BID WITH MEALS  sodium chloride (NS) flush 5-40 mL  5-40 mL IntraVENous PRN  
 sodium chloride (NS) flush 5-40 mL  5-40 mL IntraVENous Q8H  
 apixaban (ELIQUIS) tablet 5 mg  5 mg Oral Q12H  
 benzonatate (TESSALON) capsule 200 mg  200 mg Oral TID PRN  
 lactobac ac& pc-s.therm-b.anim (JUNI Q/RISAQUAD)  1 Cap Oral DAILY  insulin lispro (HUMALOG) injection   SubCUTAneous TIDAC  acetaminophen (TYLENOL) tablet 650 mg  650 mg Oral Q6H PRN  
 ondansetron (ZOFRAN) injection 4 mg  4 mg IntraVENous Q4H PRN  
 amLODIPine (NORVASC) tablet 2.5 mg  2.5 mg Oral DAILY  atorvastatin (LIPITOR) tablet 40 mg  40 mg Oral QHS  fluticasone (FLONASE) 50 mcg/actuation nasal spray 2 Spray  2 Spray Both Nostrils DAILY  metoprolol succinate (TOPROL-XL) XL tablet 50 mg  50 mg Oral DAILY  glucose chewable tablet 16 g  4 Tab Oral PRN  
 dextrose (D50W) injection syrg 12.5-25 g  12.5-25 g IntraVENous PRN  
 glucagon (GLUCAGEN) injection 1 mg  1 mg IntraMUSCular PRN  
 nitroglycerin (NITROBID) 2 % ointment 1 Inch  1 Inch Topical Q6H PRN Review of Systems A comprehensive review of systems was negative. Objective:  
 
Patient Vitals for the past 24 hrs: 
 BP Temp Pulse Resp SpO2  
02/19/19 0348 144/77 98.1 °F (36.7 °C) 85 18 93 % 02/18/19 2245 136/64 98.1 °F (36.7 °C) 73 18 94 % 02/18/19 1930 128/55 97.7 °F (36.5 °C) 83 18 94 % 02/18/19 1647 129/85 98.5 °F (36.9 °C) 89 20 97 % 02/18/19 1130 163/65 98.9 °F (37.2 °C) 90 22 98 % No intake/output data recorded. 02/17 1901 - 02/19 0700 In: -  
Out: 700 [Urine:700] Physical Exam:  
Visit Vitals /77 (BP 1 Location: Left arm, BP Patient Position: At rest) Pulse 85 Temp 98.1 °F (36.7 °C) Resp 18 Ht 5' 8\" (1.727 m) Wt 238 lb 8.6 oz (108.2 kg) SpO2 93% BMI 36.27 kg/m² General appearance: alert, cooperative, no distress, appears stated age Neck: supple, symmetrical, trachea midline, no adenopathy, thyroid: not enlarged, symmetric, no tenderness/mass/nodules, no carotid bruit and no JVD Lungs: Right basilar rales Heart: regular rate and rhythm, S1, S2 normal, no murmur, click, rub or gallop Abdomen: soft, non-tender. Bowel sounds normal. No masses,  no organomegaly Extremities: extremities normal, atraumatic, no cyanosis or edema Data Review Recent Results (from the past 24 hour(s)) GLUCOSE, POC Collection Time: 02/18/19 11:33 AM  
Result Value Ref Range Glucose (POC) 171 (H) 65 - 100 mg/dL Performed by Marybeth Pagan (PCT) GLUCOSE, POC Collection Time: 02/18/19  4:31 PM  
Result Value Ref Range Glucose (POC) 86 65 - 100 mg/dL Performed by Angelica Bermudez (PCT) GLUCOSE, POC Collection Time: 02/18/19  4:32 PM  
Result Value Ref Range Glucose (POC) 88 65 - 100 mg/dL Performed by Angelica Bermudez (PCT) GLUCOSE, POC Collection Time: 02/18/19  8:36 PM  
Result Value Ref Range Glucose (POC) 171 (H) 65 - 100 mg/dL Performed by Donna Lundy (PCT) GLUCOSE, POC Collection Time: 02/19/19  4:52 AM  
Result Value Ref Range Glucose (POC) 85 65 - 100 mg/dL Performed by Donna Lundy (PCT) GLUCOSE, POC Collection Time: 02/19/19  7:37 AM  
Result Value Ref Range Glucose (POC) 72 65 - 100 mg/dL Performed by Aylin Mojica GLUCOSE, POC  Collection Time: 02/19/19  7:38 AM  
 Result Value Ref Range Glucose (POC) 68 65 - 100 mg/dL Performed by Daria Santa GLUCOSE, POC Collection Time: 02/19/19  8:02 AM  
Result Value Ref Range Glucose (POC) 82 65 - 100 mg/dL Performed by Daria Pratt Assessment:  
 
Active Problems: 
  SVT (supraventricular tachycardia) ([address] (8/29/2016) Essential hypertension with goal blood pressure less than 140/90 (8/29/2016) Diabetes mellitus type 2, controlled ([address] (9/8/2016) Dyslipidemia (9/8/2016) Acute respiratory failure ([address] (2/3/2019) Leg swelling (2/3/2019) Hodgkin lymphoma ([address] (2/3/2019) CAP (community acquired pneumonia) (2/3/2019) Plan: 1. Significant VQ mismatching occurring with pneumonitis. Desaturate significantly with exertion as somewhat expected. Hopefully continued improvement. 2.  Continue diabetic control. 3.  Disposition pending acceptance into pulmonary rehab.

## 2019-02-19 NOTE — PROGRESS NOTES
Hematology Oncology Progress Note Follow up for: HD Chart notes reviewed since last visit. Case discussed with following:  
 
Patient complains of the following: SOB slowly improving Additional concerns noted by the staff:  
 
Patient Vitals for the past 24 hrs: 
 BP Temp Pulse Resp SpO2  
02/19/19 0348 144/77 98.1 °F (36.7 °C) 85 18 93 % 02/18/19 2245 136/64 98.1 °F (36.7 °C) 73 18 94 % 02/18/19 1930 128/55 97.7 °F (36.5 °C) 83 18 94 % 02/18/19 1647 129/85 98.5 °F (36.9 °C) 89 20 97 % 02/18/19 1130 163/65 98.9 °F (37.2 °C) 90 22 98 % Review of Systems: 
12 point ROS done and negative except as above Physical Examination: 
gen NAD 
CV reg Lungs CTAB 
abd benign Labs: 
Recent Results (from the past 24 hour(s)) GLUCOSE, POC Collection Time: 02/18/19 11:33 AM  
Result Value Ref Range Glucose (POC) 171 (H) 65 - 100 mg/dL Performed by Jayy Bower (PCT) GLUCOSE, POC Collection Time: 02/18/19  4:31 PM  
Result Value Ref Range Glucose (POC) 86 65 - 100 mg/dL Performed by Ld Anderson (PCT) GLUCOSE, POC Collection Time: 02/18/19  4:32 PM  
Result Value Ref Range Glucose (POC) 88 65 - 100 mg/dL Performed by Ld Anderson (PCT) GLUCOSE, POC Collection Time: 02/18/19  8:36 PM  
Result Value Ref Range Glucose (POC) 171 (H) 65 - 100 mg/dL Performed by Nick Murry (PCT) GLUCOSE, POC Collection Time: 02/19/19  4:52 AM  
Result Value Ref Range Glucose (POC) 85 65 - 100 mg/dL Performed by Nick Murry (PCT) GLUCOSE, POC Collection Time: 02/19/19  7:37 AM  
Result Value Ref Range Glucose (POC) 72 65 - 100 mg/dL Performed by Abhay Hill GLUCOSE, POC Collection Time: 02/19/19  7:38 AM  
Result Value Ref Range Glucose (POC) 68 65 - 100 mg/dL Performed by Abhay Hill GLUCOSE, POC Collection Time: 02/19/19  8:02 AM  
Result Value Ref Range Glucose (POC) 82 65 - 100 mg/dL Performed by Raf Gutierrez Assessment and Plan: 
 
*) Hodgkins Lymphoma: 
- s/p 4 cycles of ABVD with post treatment PET without any uptake, great response. 
- will not plan any further chemo or xrt given lung issues. -F/u with Dr. Tito Rome 
 
*) Respiratory Failure, suspected Bleomycin toxicity: 
- She feels a bit better, less SOB while talking - Appreciate pulmonary assistance/input. - bronch 2/6, cultures NGTD 
- Viral panel with coronavirus 
- completed IV Abx and fluconazole - Weaning O2 down for goal sat >90% - Now on oral steroids per pulmonary 
- likely will need home o2 PE 
- Now on eliquis - LE dopplers negative Deconditioning - Planning to go to [facility] soon

## 2019-02-19 NOTE — PROGRESS NOTES
[facility]MON[facility]OC[facility] OF [facility], Critical Care, and Sleep Medicine Progress Note Name: Az Vee MRN: [id number] : 1950 Hospital: [facility] Date: 2019 IMPRESSION:  
· Acute hypoxic resp failure · Likely bleomycin lung toxicity · Bilateral pulm infiltrates-  First noted on PET  and seen on ct on admit - tx with augmentin -, then Levaquin - Preceding fever 101. S/P bronch . DDX: PNA, opportunistic infection and/or bleomycin toxicity from chemo · Coronavirus positive RVP · Hodkins lymphoma- tx with 4 cyles of abvd- positive response to chemo · Pulm htn - pap 60 with nl lvef · Anemia-  ? Related to chemo- stable · Hx of asthma from childhood to age 24  
· NEWLY DISCOVERED BILATERAL Pulmonary Emboli WITH NEG DUPLEX OF LEG, now on Eliquis. RECOMMENDATIONS:  
· O2 - wean as tolerated · Antibiotics complete · Once over this acute episode will get repeat PFTs as an outpt. · On Eliquis for PE.  
· PT and OT to [facility]. Anticipate admission to [address] [place] [address]. · Will Continue. Prednisone 30mg po bid for now · Needs to call [phone] for appt when ready for discharge. Subjective: No events. Still breathless with exertion. SpO2 adequate on 2 LPM, but drops significantly with exertion (recovers quickly). Initial history: This patient has been seen and evaluated at the request of Dr. Jimbo Velazco  For hypoxia. Patient is a 70 y.o. female  Who was dx with hodgkin lymphoma  In august when she was getting a massage in [facility] and the masseuse note  At rt neck ln. She is s/p chemo with abvd. She  Was noted to have bilateral pulm infiltrates on pet/ct On   And was started on augmenin on  - seen in  Oncology office on  - sats high 80's but increased to 90 % on fluids and was started on levaquin on .   Noted sig  Increased landaverde and said sats drop to 70's with exertion . She says she  Feels much better today. NO cough, heme , phlegm. NO cp. She has noted some ankle swelling. Past Medical History:  
Diagnosis Date  Asthma   
 as a child, nothing since menopause  Cancer ([facility] 08/21/2018 Non-Hodgkin's  Cataracts, bilateral   
 Diabetes ([facility] type II  
 Environmental allergies  Fibromyalgia Treated with alternate medical therapy  Hypertension   
 no asthma since age 24  // Dr. Gisell Holbrook said hodgkins  And pt confirms Past Surgical History:  
Procedure Laterality Date  HX BREAST BIOPSY Right 2006  HX COLONOSCOPY  2015  HX GYN Pap 2015  HX ORTHOPAEDIC    
 fractured R ankle/ no surgery  HX OTHER SURGICAL Right 08/27/2018  
 excision of deep neck cervical LN. Prior to Admission medications Medication Sig Start Date End Date Taking? Authorizing Provider ONETOUCH VERIO strip USE 1 STRIP IN VITRO TO TEST BLOOD SUGAR ONCE DAILY 10/17/18  Yes Jann Javier MD  
ibuprofen (MOTRIN) 600 mg tablet Take 1 Tab by mouth every six (6) hours as needed for Pain. 8/28/18  Yes Azul Del Castillo MD  
Lancets (ONE TOUCH DELICA) misc USE TO TEST BLOOD SUGAR ONCE DAILY. DX.E11.9 10/13/16  Yes Jann Javier MD  
pioglitazone (ACTOS) 30 mg tablet TAKE 1 TABLET DAILY 1/6/19   Jann Javier MD  
metoprolol succinate (TOPROL-XL) 50 mg XL tablet TAKE 1 TAB BY MOUTH DAILY. 10/12/18   Jann Javier MD  
JANUMET XR 50-1,000 mg TM24 TAKE 1 TABLET BY MOUTH TWICE A DAY WITH MEALS 10/12/18   Jann Javier MD  
fluticasone Tavon Catalan ALLERGY RELIEF) 50 mcg/actuation nasal spray 2 Sprays by Both Nostrils route daily. Provider, Historical  
spironolactone-hydrochlorothiazide (ALDACTAZIDE) 25-25 mg per tablet TAKE ONE TABLET BY MOUTH EVERY DAY 12/3/17   Jann Javier MD  
atorvastatin (LIPITOR) 40 mg tablet TAKE 1 TAB BY MOUTH DAILY.  12/3/17   Jann Javier MD  
 amLODIPine (NORVASC) 10 mg tablet TAKE 1 TABLET BY MOUTH EVERY DAY 17   Devyn Neumann MD  
alcohol swabs (ALCOHOL PREP PADS) padm 1 Pad by Apply Externally route daily. DX.E11.9 10/13/16   Devyn Neumann MD  
 
No Known Allergies Social History Tobacco Use  Smoking status: Never Smoker  Smokeless tobacco: Never Used Substance Use Topics  Alcohol use: Yes Comment: socially  
 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   
     prostate cancer  Cancer Sister Breast cancer apparent DCIS  
 Heart Disease Brother Brother  from congestive heart failure secondary to severe mitral disease Current Facility-Administered Medications Medication Dose Route Frequency  insulin glargine (LANTUS) injection 20 Units  20 Units SubCUTAneous QHS  insulin glargine (LANTUS) injection 40 Units  40 Units SubCUTAneous DAILY  predniSONE (DELTASONE) tablet 30 mg  30 mg Oral BID WITH MEALS  sodium chloride (NS) flush 5-40 mL  5-40 mL IntraVENous Q8H  
 apixaban (ELIQUIS) tablet 5 mg  5 mg Oral Q12H  
 lactobac ac& pc-s.therm-b.anim (JUNI Q/RISAQUAD)  1 Cap Oral DAILY  insulin lispro (HUMALOG) injection   SubCUTAneous TIDAC  amLODIPine (NORVASC) tablet 2.5 mg  2.5 mg Oral DAILY  atorvastatin (LIPITOR) tablet 40 mg  40 mg Oral QHS  fluticasone (FLONASE) 50 mcg/actuation nasal spray 2 Spray  2 Spray Both Nostrils DAILY  metoprolol succinate (TOPROL-XL) XL tablet 50 mg  50 mg Oral DAILY Review of Systems: A comprehensive review of systems was negative except for that written in the HPI. Objective: 
Vital Signs:   
Visit Vitals /77 (BP 1 Location: Left arm, BP Patient Position: At rest) Pulse 85 Temp 98.1 °F (36.7 °C) Resp 18 Ht 5' 8\" (1.727 m) Wt 108.2 kg (238 lb 8.6 oz) SpO2 93% BMI 36.27 kg/m² O2 Device: Nasal cannula O2 Flow Rate (L/min): 2 l/min Temp (24hrs), Av.3 °F (36.8 °C), Min:97.7 °F (36.5 °C), Max:98.9 °F (37.2 °C) Intake/Output:  
Last shift:      No intake/output data recorded. Last 3 shifts:  1901 -  0700 In: -  
Out: 700 [Urine:700] Intake/Output Summary (Last 24 hours) at 2019 0815 Last data filed at 2019 7634 Gross per 24 hour Intake  Output 700 ml Net -700 ml Physical Exam:  
General:  Alert, cooperative, no distress, appears stated age. ON NC oxygen. Sitting up in chair. Pox was 91% on RA at rest.   
Head:  Normocephalic, without obvious abnormality, atraumatic. alopecia Eyes:  Conjunctivae/corneas clear. PERRL, EOMs intact. Nose: Nares normal. Septum midline. Mucosa normal. No drainage or sinus tenderness. Throat: Lips, mucosa, and tongue normal. Teeth and gums normal.- Neck: Supple, symmetrical, trachea midline, no adenopathy, thyroid: no enlargment/tenderness/nodules, no carotid bruit and no JVD. Back:   Symmetric, no curvature. ROM normal.  
Lungs:   Appears to be breathing comfortably. Good air movement. Pretty clear. Chest wall:  No tenderness or deformity. Heart:  Regular rate and rhythm, S1, S2 normal, no murmur, click, rub or gallop.- mild tachy Abdomen:   Soft, non-tender. Bowel sounds normal. No masses,  No organomegaly. Extremities: Extremities normal, atraumatic, no cyanosis - edema gone Pulses: 2+ and symmetric all extremities. Skin: Skin color, texture, turgor normal. No rashes or lesions Lymph nodes: Cervical, supraclavicular, and axillary nodes normal.  
Neurologic: Grossly nonfocal, motor is intact. Psych: no over anxiety or depression. Data review:  
 
Recent Results (from the past 24 hour(s)) GLUCOSE, POC Collection Time: 19 11:33 AM  
Result Value Ref Range Glucose (POC) 171 (H) 65 - 100 mg/dL Performed by Drinda Cockayne (PCT) GLUCOSE, POC  
 Collection Time: 02/18/19  4:31 PM  
Result Value Ref Range Glucose (POC) 86 65 - 100 mg/dL Performed by April Irons (PCT) GLUCOSE, POC Collection Time: 02/18/19  4:32 PM  
Result Value Ref Range Glucose (POC) 88 65 - 100 mg/dL Performed by April Irons (PCT) GLUCOSE, POC Collection Time: 02/18/19  8:36 PM  
Result Value Ref Range Glucose (POC) 171 (H) 65 - 100 mg/dL Performed by Shane Klein (PCT) GLUCOSE, POC Collection Time: 02/19/19  4:52 AM  
Result Value Ref Range Glucose (POC) 85 65 - 100 mg/dL Performed by Shane Klein (PCT) GLUCOSE, POC Collection Time: 02/19/19  7:37 AM  
Result Value Ref Range Glucose (POC) 72 65 - 100 mg/dL Performed by Nimo Lieu GLUCOSE, POC Collection Time: 02/19/19  7:38 AM  
Result Value Ref Range Glucose (POC) 68 65 - 100 mg/dL Performed by Nimo Lieu GLUCOSE, POC Collection Time: 02/19/19  8:02 AM  
Result Value Ref Range Glucose (POC) 82 65 - 100 mg/dL Performed by Nimo Lieu Imaging: 
I have personally reviewed the patients radiographs and have reviewed the reports: 
Ct scan: 2-3-19: FINDINGS: 
CHEST: 
THYROID: No nodule. MEDIASTINUM: Mediastinal lymphadenopathy has improved compared to the prior 
exam. Transverse dimension at the level of the aortic arch was previously 5.9 cm 
and is now 4.2 cm. SERJIO: No mass or lymphadenopathy. THORACIC AORTA: No dissection or aneurysm. MAIN PULMONARY ARTERY: Right upper and left lower lobe pulmonary emboli are 
noted. TRACHEA/BRONCHI: Patent. ESOPHAGUS: No wall thickening or dilatation. HEART: Normal in size. PLEURA: No effusion or pneumothorax. LUNGS: Infiltrates are noted throughout the right lung and left lower lobe. INCIDENTALLY IMAGED UPPER ABDOMEN: 17 mm right adrenal adenoma. BONES: Degenerative changes are seen in the thoracic spine. 
  
  
IMPRESSION: Right upper and left lower lobe pulmonary emboli. Bilateral airspace disease. Improved mediastinal lymphadenopathy. 2-11-19: Clinical indication: Bilateral lung infiltrates, acute respiratory distress with 
hypoxia. 
  
Portable AP upright view of the chest is obtained, comparison February 8. Lung 
fields are unchanged. Infusion catheter is unchanged. Shallow inspiration with 
elevation of the right hemidiaphragm, bilateral interstitial infiltrates more 
prominent on the right. 
  
IMPRESSION 
: No change.  
 
  
Brett Mccann MD

## 2019-02-20 NOTE — PROGRESS NOTES
0537-pt had uneventful night. Pt had to be encouraged to ambulate to the bathroom. She requested a purewick. She is continent, stronger and reminded to call when she reeds to go the the bathroom and that PT expected her to ambulate. Pt verbalized understanding and agreement and although reluctant, she has been compliantl.

## 2019-02-20 NOTE — PROGRESS NOTES
Nutrition Assessment: 
 
INTERVENTIONS/RECOMMENDATIONS:  
Continue current diet ASSESSMENT:  
Chart reviewed; continues on a CCD + HS snack. Has been awaiting insurance authorization for several days which was received today. Plans for discharge tomorrow per notes. Last documented %. Boost 1-2x/day per patient preference. BG better controlled; DTC following. Encourage intake of meals. Diet Order: Consistent carb 
% Eaten:  No data found. Pertinent Medications: [x] Reviewed []Other: Norvasc, Eliquis, Atorvastatin, Lantus, Humalog, Shantel Q, Prednisone Pertinent Labs: [x]Reviewed  []Other: -55--944 Food Allergies: [x]None []Other:   Last BM: 2/11  [x]Active     []Hyperactive  []Hypoactive       [] Absent  BS Skin:    [x] Intact   [] Incision  [] Breakdown   [x]Edema   []Other: Anthropometrics: Height: 5' 8\" (172.7 cm) Weight: 108.2 kg (238 lb 8.6 oz) IBW (%IBW):   ( ) UBW (%UBW):   (  %) BMI: Body mass index is 36.27 kg/m². This BMI is indicative of: 
[]Underweight   []Normal   []Overweight   [x] Obesity   [] Extreme Obesity (BMI>40) Last Weight Metrics: 
Weight Loss Metrics 2/9/2019 1/21/2019 11/15/2018 9/13/2018 9/12/2018 9/10/2018 8/27/2018 Today's Wt 238 lb 8.6 oz 180 lb 180 lb 198 lb 201 lb 1 oz 200 lb 3.2 oz 201 lb BMI 36.27 kg/m2 27.37 kg/m2 27.37 kg/m2 30.11 kg/m2 29.69 kg/m2 29.56 kg/m2 29.68 kg/m2 Estimated Nutrition Needs (Based on): 9101 Kcals/day(BMR (9215) x 1. 3AF -300kcal) , 86 g(0.8 g/kg bw) Protein Carbohydrate: At Least 130 g/day  Fluids: 1850 mL/day Pt expected to meet estimated nutrient needs: [x]Yes []No 
 
NUTRITION DIAGNOSES:  
Problem:  Altered nutrition-related lab values Etiology: related to DM, steroid Signs/Symptoms: as evidenced by [phone] Previous diagnosis resolved; BG better controlled NUTRITION INTERVENTIONS: 
Meals/Snacks: General/healthful diet   Supplements: Commercial supplement GOAL:  
 PO intake >70% of meals next 5-7 days NUTRITION MONITORING AND EVALUATION Food/Nutrient Intake Outcomes: Total energy intake Physical Signs/Symptoms Outcomes: Weight/weight change, Glucose profile Previous Goal Met: 
 [x] Met              [] Progressing Towards Goal              [] Not Progressing Towards Goal  
Previous Recommendations: 
 [x] Implemented          [] Not Implemented          [] Not Applicable LEARNING NEEDS (Diet, Food/Nutrient-Drug Interaction):  
 [x] None Identified 
 [] Identified and Education Provided/Documented 
 [] Identified and Pt declined/was not appropriate Cultural, Yazidi, OR Ethnic Dietary Needs:  
 [x] None Identified 
 [] Identified and Addressed 
 
 [x] Interdisciplinary Care Plan Reviewed/Documented  
 [x] Discharge Planning: CCD + Boost carb control 1-2x/day  
 [] Participated in Interdisciplinary Rounds NUTRITION RISK:  
 [] High              [] Moderate           [x]  Low  []  Minimal/Uncompromised Darlene Elias Pager [phone] Weekend Pager [phone]

## 2019-02-20 NOTE — DIABETES MGMT
DTC Progress Note Recommendations/ Comments: If appropriate, please consider decreasing Lantus to 6 units hs dose only due to POC < 80 mg/dl fasting x2 days. Current hospital DM medication: lantus 40 units am and 10 units hs, Lispro correctional insulin normal sensitivity ac and hs. Chart reviewed on [facility]. Patient is a 70 y.o. female with known Type 2 Diabetes on actos 30mg daily, janumet XR 50-1000mg ac b/d at home. A1c:  
Lab Results Component Value Date/Time Hemoglobin A1c 6.9 (H) 08/03/2018 01:45 PM  
 Hemoglobin A1c 6.8 (H) 01/30/2018 01:25 PM  
 
 
Recent Glucose Results:  
Lab Results Component Value Date/Time GLUCPOC 90 02/20/2019 08:06 AM  
 GLUCPOC 71 02/20/2019 07:36 AM  
 GLUCPOC 130 (H) 02/19/2019 08:57 PM  
  
 
Lab Results Component Value Date/Time Creatinine 0.49 (L) 02/15/2019 04:20 AM  
 
Estimated Creatinine Clearance: 139.6 mL/min (A) (based on SCr of 0.49 mg/dL (L)). Active Orders Diet DIET DIABETIC CONSISTENT CARB Regular PO intake: No data found. Will continue to follow as needed. Thank you Mario Kay RD [facility] [phone] Time spent: 10 minutes

## 2019-02-20 NOTE — PROGRESS NOTES
Bedside shift change report given to [facility]. (oncoming nurse) by Sudarshan Hubbard (offgoing nurse). Report included the following information SBAR.

## 2019-02-20 NOTE — PROGRESS NOTES
PULMONARY ASSOCIATES OF [facility], Critical Care, and Sleep Medicine Progress Note Name: Nathaly Quijano MRN: [id number] : 1950 Hospital: [facility] Date: 2019 IMPRESSION:  
· Acute hypoxic resp failure · Likely bleomycin lung toxicity · Bilateral pulm infiltrates-  First noted on PET  and seen on ct on admit - tx with augmentin -, then Levaquin - Preceding fever 101. S/P bronch . DDX: PNA, opportunistic infection and/or bleomycin toxicity from chemo · Coronavirus positive RVP · Hodkins lymphoma- tx with 4 cyles of abvd- positive response to chemo · Pulm htn - pap 60 with nl lvef · Anemia-  ? Related to chemo- stable · Hx of asthma from childhood to age 24  
· NEWLY DISCOVERED BILATERAL Pulmonary Emboli WITH NEG DUPLEX OF LEG, now on Eliquis. RECOMMENDATIONS:  
· O2 - wean as tolerated · Antibiotics complete · Once over this acute episode will need repeat PFTs as an outpt. · On Eliquis for PE.  
· PT and OT to [facility]. Anticipate admission to [address] [place] [address]. · Will Continue. Prednisone 30mg po bid for now · Needs to call [phone] for appt when ready for discharge. Hopefully to rehab soon? Subjective: No events. No new complaints for me. Initial history: This patient has been seen and evaluated at the request of Dr. Robin Lou  For hypoxia. Patient is a 70 y.o. female  Who was dx with hodgkin lymphoma  In august when she was getting a massage in [facility] and the masseuse note  At rt neck ln. She is s/p chemo with abvd. She  Was noted to have bilateral pulm infiltrates on pet/ct On   And was started on augmenin on  - seen in  Oncology office on  - sats high 80's but increased to 90 % on fluids and was started on levaquin on . Noted sig  Increased land[place] and said sats drop to 70's with exertion . She says she  Feels much better today. NO cough, heme , phlegm. NO cp. She has noted some ankle swelling. Past Medical History:  
Diagnosis Date  Asthma   
 as a child, nothing since menopause  Cancer ([facility] [address] 08/21/2018 Non-Hodgkin's  Cataracts, bilateral   
 Diabetes ([facility] type II  
 Environmental allergies  Fibromyalgia Treated with alternate medical therapy  Hypertension   
 no asthma since age 24  // Dr. Stefania Garcia said hodgkins  And pt confirms Past Surgical History:  
Procedure Laterality Date  HX BREAST BIOPSY Right 2006  HX COLONOSCOPY  2015  HX GYN Pap 2015  HX ORTHOPAEDIC    
 fractured R ankle/ no surgery  HX OTHER SURGICAL Right 08/27/2018  
 excision of deep neck cervical LN. Prior to Admission medications Medication Sig Start Date End Date Taking? Authorizing Provider ONETOUCH VERIO strip USE 1 STRIP IN VITRO TO TEST BLOOD SUGAR ONCE DAILY 10/17/18  Yes Katarina Holland MD  
ibuprofen (MOTRIN) 600 mg tablet Take 1 Tab by mouth every six (6) hours as needed for Pain. 8/28/18  Yes Dionte Ibarra MD  
Lancets (ONE TOUCH DELICA) misc USE TO TEST BLOOD SUGAR ONCE DAILY. DX.E11.9 10/13/16  Yes Katarina Holland MD  
pioglitazone (ACTOS) 30 mg tablet TAKE 1 TABLET DAILY 1/6/19   Katarina Holland MD  
metoprolol succinate (TOPROL-XL) 50 mg XL tablet TAKE 1 TAB BY MOUTH DAILY. 10/12/18   Katarina Holland MD  
JANUMET XR 50-1,000 mg TM24 TAKE 1 TABLET BY MOUTH TWICE A DAY WITH MEALS 10/12/18   Katarina Holland MD  
fluticasone [facility] ALLERGY RELIEF) 50 mcg/actuation nasal spray 2 Sprays by Both Nostrils route daily. Provider, Historical  
spironolactone-hydrochlorothiazide (ALDACTAZIDE) 25-25 mg per tablet TAKE ONE TABLET BY MOUTH EVERY DAY 12/3/17   Katarina Holland MD  
atorvastatin (LIPITOR) 40 mg tablet TAKE 1 TAB BY MOUTH DAILY.  12/3/17   Katarina Holland MD  
amLODIPine (NORVASC) 10 mg tablet TAKE 1 TABLET BY MOUTH EVERY DAY 11/7/17   Katarina Holland MD  
 alcohol swabs (ALCOHOL PREP PADS) padm 1 Pad by Apply Externally route daily. DX.E11.9 10/13/16   Cedrick Wiley MD  
 
No Known Allergies Social History Tobacco Use  Smoking status: Never Smoker  Smokeless tobacco: Never Used Substance Use Topics  Alcohol use: Yes Comment: socially  
 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   
     prostate cancer  Cancer Sister Breast cancer apparent DCIS  
 Heart Disease Brother Brother  from congestive heart failure secondary to severe mitral disease Current Facility-Administered Medications Medication Dose Route Frequency  insulin glargine (LANTUS) injection 10 Units  10 Units SubCUTAneous QHS  insulin glargine (LANTUS) injection 40 Units  40 Units SubCUTAneous DAILY  predniSONE (DELTASONE) tablet 30 mg  30 mg Oral BID WITH MEALS  sodium chloride (NS) flush 5-40 mL  5-40 mL IntraVENous Q8H  
 apixaban (ELIQUIS) tablet 5 mg  5 mg Oral Q12H  
 lactobac ac& pc-s.therm-b.anim (JUNI Q/RISAQUAD)  1 Cap Oral DAILY  insulin lispro (HUMALOG) injection   SubCUTAneous TIDAC  amLODIPine (NORVASC) tablet 2.5 mg  2.5 mg Oral DAILY  atorvastatin (LIPITOR) tablet 40 mg  40 mg Oral QHS  fluticasone (FLONASE) 50 mcg/actuation nasal spray 2 Spray  2 Spray Both Nostrils DAILY  metoprolol succinate (TOPROL-XL) XL tablet 50 mg  50 mg Oral DAILY Review of Systems: A comprehensive review of systems was negative except for that written in the HPI. Objective: 
Vital Signs:   
Visit Vitals /82 Pulse 98 Temp 97.6 °F (36.4 °C) Resp 20 Ht 5' 8\" (1.727 m) Wt 108.2 kg (238 lb 8.6 oz) SpO2 90% BMI 36.27 kg/m² O2 Device: Nasal cannula O2 Flow Rate (L/min): 3 l/min Temp (24hrs), Av.9 °F (36.6 °C), Min:97.6 °F (36.4 °C), Max:98.2 °F (36.8 °C) Intake/Output: Last shift:      No intake/output data recorded. Last 3 shifts: 02/18 1901 - 02/20 0700 In: 500 [P.O.:500] Out: 1700 [Urine:1700] Intake/Output Summary (Last 24 hours) at 2/20/2019 3007 Last data filed at 2/20/2019 0600 Gross per 24 hour Intake 500 ml Output 1000 ml Net -500 ml Physical Exam:  
General:  Alert, cooperative, no distress, appears stated age. ON NC oxygen. Sitting up in chair. Pox was 91% on RA at rest.   
Head:  Normocephalic, without obvious abnormality, atraumatic. alopecia Eyes:  Conjunctivae/corneas clear. PERRL, EOMs intact. Nose: Nares normal. Septum midline. Mucosa normal. No drainage or sinus tenderness. Throat: Lips, mucosa, and tongue normal. Teeth and gums normal.- Neck: Supple, symmetrical, trachea midline, no adenopathy, thyroid: no enlargment/tenderness/nodules, no carotid bruit and no JVD. Back:   Symmetric, no curvature. ROM normal.  
Lungs:   Appears to be breathing comfortably. Good air movement. Pretty clear. Chest wall:  No tenderness or deformity. Heart:  Regular rate and rhythm, S1, S2 normal, no murmur, click, rub or gallop.- mild tachy Abdomen:   Soft, non-tender. Bowel sounds normal. No masses,  No organomegaly. Extremities: Extremities normal, atraumatic, no cyanosis - edema gone Pulses: 2+ and symmetric all extremities. Skin: Skin color, texture, turgor normal. No rashes or lesions Lymph nodes: Cervical, supraclavicular, and axillary nodes normal.  
Neurologic: Grossly nonfocal, motor is intact. Psych: no over anxiety or depression. Data review:  
 
Recent Results (from the past 24 hour(s)) GLUCOSE, POC Collection Time: 02/19/19 11:47 AM  
Result Value Ref Range Glucose (POC) 75 65 - 100 mg/dL Performed by Allegory Law GLUCOSE, POC Collection Time: 02/19/19 12:12 PM  
Result Value Ref Range Glucose (POC) 83 65 - 100 mg/dL Performed by Allegory Law GLUCOSE, POC  
 Collection Time: 02/19/19  4:30 PM  
Result Value Ref Range Glucose (POC) 122 (H) 65 - 100 mg/dL Performed by Fransisco Moody GLUCOSE, POC Collection Time: 02/19/19  8:57 PM  
Result Value Ref Range Glucose (POC) 130 (H) 65 - 100 mg/dL Performed by Michelle Bustillo GLUCOSE, POC Collection Time: 02/20/19  7:36 AM  
Result Value Ref Range Glucose (POC) 71 65 - 100 mg/dL Performed by Kenneth Lopez Imaging: 
I have personally reviewed the patients radiographs and have reviewed the reports: 
Ct scan: 2-3-19: FINDINGS: 
CHEST: 
THYROID: No nodule. MEDIASTINUM: Mediastinal lymphadenopathy has improved compared to the prior 
exam. Transverse dimension at the level of the aortic arch was previously 5.9 cm 
and is now 4.2 cm. SERJIO: No mass or lymphadenopathy. THORACIC AORTA: No dissection or aneurysm. MAIN PULMONARY ARTERY: Right upper and left lower lobe pulmonary emboli are 
noted. TRACHEA/BRONCHI: Patent. ESOPHAGUS: No wall thickening or dilatation. HEART: Normal in size. PLEURA: No effusion or pneumothorax. LUNGS: Infiltrates are noted throughout the right lung and left lower lobe. INCIDENTALLY IMAGED UPPER ABDOMEN: 17 mm right adrenal adenoma. BONES: Degenerative changes are seen in the thoracic spine. 
  
  
IMPRESSION: Right upper and left lower lobe pulmonary emboli. Bilateral airspace 
disease. Improved mediastinal lymphadenopathy. 2-11-19: Clinical indication: Bilateral lung infiltrates, acute respiratory distress with 
hypoxia. 
  
Portable AP upright view of the chest is obtained, comparison February 8. Lung 
fields are unchanged. Infusion catheter is unchanged. Shallow inspiration with 
elevation of the right hemidiaphragm, bilateral interstitial infiltrates more 
prominent on the right. 
  
IMPRESSION 
: No change.  
 
  
Noelle Sky MD

## 2019-02-20 NOTE — PROGRESS NOTES
Attempted to see pt this pm and pt declined 2/2 to not getting any sleep last night, has a cold, feels bad, ect. Will continue to follow.

## 2019-02-20 NOTE — PROGRESS NOTES
Hematology Oncology Progress Note Follow up for: HD Chart notes reviewed since last visit. Case discussed with following:  
 
Patient complains of the following: SOB slowly improving Additional concerns noted by the staff:  
 
Patient Vitals for the past 24 hrs: 
 BP Temp Pulse Resp SpO2  
02/20/19 0804 155/82 97.6 °F (36.4 °C) 98 20   
02/20/19 0348 142/57 97.7 °F (36.5 °C) 87    
02/20/19 0053 148/62 98.2 °F (36.8 °C) 84  90 % 02/19/19 1928     94 % 02/19/19 1918 147/79 98 °F (36.7 °C) 73 18   
02/19/19 1541 146/76 97.6 °F (36.4 °C) 98 20 92 % 02/19/19 1046 158/66 98.2 °F (36.8 °C) 88 18 90 % Review of Systems: 
12 point ROS done and negative except as above Physical Examination: 
gen NAD Labs: 
Recent Results (from the past 24 hour(s)) GLUCOSE, POC Collection Time: 02/19/19 11:47 AM  
Result Value Ref Range Glucose (POC) 75 65 - 100 mg/dL Performed by Yuliya Rome GLUCOSE, POC Collection Time: 02/19/19 12:12 PM  
Result Value Ref Range Glucose (POC) 83 65 - 100 mg/dL Performed by Yuliya Rome GLUCOSE, POC Collection Time: 02/19/19  4:30 PM  
Result Value Ref Range Glucose (POC) 122 (H) 65 - 100 mg/dL Performed by Yuliya Rome GLUCOSE, POC Collection Time: 02/19/19  8:57 PM  
Result Value Ref Range Glucose (POC) 130 (H) 65 - 100 mg/dL Performed by Redge Dakin GLUCOSE, POC Collection Time: 02/20/19  7:36 AM  
Result Value Ref Range Glucose (POC) 71 65 - 100 mg/dL Performed by Jorje Ulloa GLUCOSE, POC Collection Time: 02/20/19  8:06 AM  
Result Value Ref Range Glucose (POC) 90 65 - 100 mg/dL Performed by Jorje Ulloa Assessment and Plan: 
 
*) Hodgkins Lymphoma: 
- s/p 4 cycles of ABVD with post treatment PET without any uptake, great response. 
- will not plan any further chemo or xrt given lung issues.  
-F/u with Dr. Marylen Meager 
 
 *) Respiratory Failure, suspected Bleomycin toxicity: 
- She feels a bit better, less SOB while talking - Appreciate pulmonary assistance/input. - bronch 2/6, cultures NGTD 
- Viral panel with coronavirus 
- completed IV Abx and fluconazole - Weaning O2 down for goal sat >90% - Now on oral steroids per pulmonary 
- likely will need home o2 PE 
- Now on eliquis - LE dopplers negative Deconditioning - Planning to go to [facility] soon

## 2019-02-20 NOTE — PROGRESS NOTES
9:45am - CM called Klever Carty, liaison with [facility] ([phone]) and left message to f/u on auth. CM will set up medical transport for will call. 10am - Received call from Klever Carty with [facility] and they received authorization for pt's stay. CM called Dr. Kalie Fitzpatrick and informed him. He will discharge pt tomorrow. CM called Klever Carty at [facility] AT [address] [place] [address] and informed him. CM placed transport with [facility] on will call for tomorrow. Linda Louise, [address] [place] [address]

## 2019-02-20 NOTE — PROGRESS NOTES
Pt is sitting in chair and reports fatigue from not sleeping last night. Pt is declining activity at this time. Continue to recommend Inpatient pulm at discharge.

## 2019-02-20 NOTE — PROGRESS NOTES
General Daily Progress Note Admit Date: 2/3/2019 Subjective:  
 
Patient planes of nasal congestion. Current Facility-Administered Medications Medication Dose Route Frequency  insulin glargine (LANTUS) injection 10 Units  10 Units SubCUTAneous QHS  insulin glargine (LANTUS) injection 40 Units  40 Units SubCUTAneous DAILY  albuterol (PROVENTIL VENTOLIN) nebulizer solution 2.5 mg  2.5 mg Nebulization Q4H PRN  polyethylene glycol (MIRALAX) packet 17 g  17 g Oral DAILY PRN  predniSONE (DELTASONE) tablet 30 mg  30 mg Oral BID WITH MEALS  sodium chloride (NS) flush 5-40 mL  5-40 mL IntraVENous PRN  
 sodium chloride (NS) flush 5-40 mL  5-40 mL IntraVENous Q8H  
 apixaban (ELIQUIS) tablet 5 mg  5 mg Oral Q12H  
 benzonatate (TESSALON) capsule 200 mg  200 mg Oral TID PRN  
 lactobac ac& pc-s.therm-b.anim (JUNI Q/RISAQUAD)  1 Cap Oral DAILY  insulin lispro (HUMALOG) injection   SubCUTAneous TIDAC  acetaminophen (TYLENOL) tablet 650 mg  650 mg Oral Q6H PRN  
 ondansetron (ZOFRAN) injection 4 mg  4 mg IntraVENous Q4H PRN  
 amLODIPine (NORVASC) tablet 2.5 mg  2.5 mg Oral DAILY  atorvastatin (LIPITOR) tablet 40 mg  40 mg Oral QHS  fluticasone (FLONASE) 50 mcg/actuation nasal spray 2 Spray  2 Spray Both Nostrils DAILY  metoprolol succinate (TOPROL-XL) XL tablet 50 mg  50 mg Oral DAILY  glucose chewable tablet 16 g  4 Tab Oral PRN  
 dextrose (D50W) injection syrg 12.5-25 g  12.5-25 g IntraVENous PRN  
 glucagon (GLUCAGEN) injection 1 mg  1 mg IntraMUSCular PRN  
 nitroglycerin (NITROBID) 2 % ointment 1 Inch  1 Inch Topical Q6H PRN Review of Systems A comprehensive review of systems was negative. Objective:  
 
Patient Vitals for the past 24 hrs: 
 BP Temp Pulse Resp SpO2  
02/20/19 0804 155/82 97.6 °F (36.4 °C) 98 20   
02/20/19 0348 142/57 97.7 °F (36.5 °C) 87    
02/20/19 0053 148/62 98.2 °F (36.8 °C) 84  90 % 02/19/19 1925     94 % 02/19/19 1918 147/79 98 °F (36.7 °C) 73 18   
02/19/19 1541 146/76 97.6 °F (36.4 °C) 98 20 92 % 02/19/19 1046 158/66 98.2 °F (36.8 °C) 88 18 90 % No intake/output data recorded. 02/18 1901 - 02/20 0700 In: 500 [P.O.:500] Out: 1700 [Urine:1700] Physical Exam:  
Visit Vitals /82 Pulse 98 Temp 97.6 °F (36.4 °C) Resp 20 Ht 5' 8\" (1.727 m) Wt 238 lb 8.6 oz (108.2 kg) SpO2 90% BMI 36.27 kg/m² General appearance: alert, cooperative, no distress, appears stated age Neck: supple, symmetrical, trachea midline, no adenopathy, thyroid: not enlarged, symmetric, no tenderness/mass/nodules, no carotid bruit and no JVD Lungs: rales R base, diminished breath sounds R base, egophony R base Heart: regular rate and rhythm, S1, S2 normal, no murmur, click, rub or gallop Abdomen: soft, non-tender. Bowel sounds normal. No masses,  no organomegaly Extremities: extremities normal, atraumatic, no cyanosis or edema Data Review Recent Results (from the past 24 hour(s)) GLUCOSE, POC Collection Time: 02/19/19 11:47 AM  
Result Value Ref Range Glucose (POC) 75 65 - 100 mg/dL Performed by Onita Flank GLUCOSE, POC Collection Time: 02/19/19 12:12 PM  
Result Value Ref Range Glucose (POC) 83 65 - 100 mg/dL Performed by Onita Flank GLUCOSE, POC Collection Time: 02/19/19  4:30 PM  
Result Value Ref Range Glucose (POC) 122 (H) 65 - 100 mg/dL Performed by Onita Flank GLUCOSE, POC Collection Time: 02/19/19  8:57 PM  
Result Value Ref Range Glucose (POC) 130 (H) 65 - 100 mg/dL Performed by Angel Plaza GLUCOSE, POC Collection Time: 02/20/19  7:36 AM  
Result Value Ref Range Glucose (POC) 71 65 - 100 mg/dL Performed by Mir Rubin Assessment:  
 
Active Problems: 
  SVT (supraventricular tachycardia) ([address] (8/29/2016) Essential hypertension with goal blood pressure less than 140/90 (8/29/2016) Diabetes mellitus type 2, controlled ([facility] (9/8/2016) Dyslipidemia (9/8/2016) Acute respiratory failure ([facility] (2/3/2019) Leg swelling (2/3/2019) Hodgkin lymphoma ([facility] (2/3/2019) CAP (community acquired pneumonia) (2/3/2019) Plan: 1. Continue treatment of interstitial pneumonitis predominant involvement of her right lung. 2.  Probable mild upper respiratory infection. 3.  Continue diabetic control.  
4.  Discharge to rehab with approval.

## 2019-02-21 NOTE — PROGRESS NOTES
8:42 AM spoke with Dr Ansley Ledbetter, he states patient does not feel ready to be d/c'd; states if she will loose rehab bed he will d/c, otherwise please downgrade pt. Order placed.

## 2019-02-21 NOTE — PROGRESS NOTES
Problem: Falls - Risk of 
Goal: *Absence of Falls Document Estrella Axel Fall Risk and appropriate interventions in the flowsheet. Outcome: Progressing Towards Goal 
Fall Risk Interventions: 
Mobility Interventions: Bed/chair exit alarm, OT consult for ADLs, Patient to call before getting OOB, PT Consult for mobility concerns, PT Consult for assist device competence, Strengthening exercises (ROM-active/passive), Utilize walker, cane, or other assistive device, Utilize gait belt for transfers/ambulation Mentation Interventions: Adequate sleep, hydration, pain control, Bed/chair exit alarm, Door open when patient unattended, Evaluate medications/consider consulting pharmacy, Eyeglasses and hearing aids, Increase mobility, More frequent rounding, Reorient patient, Toileting rounds Medication Interventions: Bed/chair exit alarm, Evaluate medications/consider consulting pharmacy, Patient to call before getting OOB, Teach patient to arise slowly, Utilize gait belt for transfers/ambulation Elimination Interventions: Bed/chair exit alarm, Call light in reach, Patient to call for help with toileting needs, Toileting schedule/hourly rounds History of Falls Interventions: Bed/chair exit alarm, Consult care management for discharge planning, Door open when patient unattended, Evaluate medications/consider consulting pharmacy, Investigate reason for fall, Room close to nurse's station, Utilize gait belt for transfer/ambulation

## 2019-02-21 NOTE — ADT AUTH CERT NOTES
Pulmonary Disease GRG - Care Day 19 (2/21/2019) by Mariola Winn RN Review Entered Review Status 2/21/2019 10:19 Completed Criteria Review Care Day: 19 Care Date: 2/21/2019 Level of Care: Step Down Guideline Day 3 Level Of Care ( ) * Activity level acceptable ( ) * Isolation not needed, or status acceptable Clinical Status ( ) * Respiratory status acceptable 2/21/2019 10:19:12 EST by Almita Beltran  
  91%-95% 3L NC  
  
( ) * Right heart function adequate  
(X) * Temperature status acceptable 2/21/2019 10:19:12 EST by Almita Beltran  
  97.4  
  
(X) * No infection, or status acceptable 2/21/2019 10:19:12 EST by Almita Beltran  
  None  
  
(X) * Stable chest findings 2/21/2019 10:19:12 EST by Almita Beltran  
  Stable ( ) * Pain and nausea absent or adequately managed ( ) * General Discharge Criteria met 2/21/2019 10:19:12 EST by Almita Beltran  
  Planning to go to [facility] soon Interventions  
(X) * No chest tube, or status acceptable 2/21/2019 10:19:12 EST by Almita Beltran  
  None  
  
(X) * Intake acceptable 2/21/2019 10:19:12 EST by Almita Beltran  
  Diaetic consistant carb diet  
  
( ) * No inpatient interventions needed 2/21/2019 10:19:12 EST by Anna Durham NC 3L, * Milestone Additional Notes Clinical Date Reviewed:   2/21/19 LOS; Status; Review Type:  Tele/Inpt/CS Vital Signs:  150/84, 97.4, 90, 20, 91%-95% 3L NC Abn. Findings LABS/RADIOLOGY:  
None Meds:  
Norvasc 2.5mg PO Daily Eliquis 5mg PO Q12hrs Lipitor 40mg PO QHS Flonase 50mcg IN Daily Lantus 14u SC Daily Humalog SSI SC TID Toprol XL 50mg PO Daily Deltasone 30mg PO BID Potassium Chloride 40mEq PO BID Pulmonary Disease Subjective: No events.  Plans for discharge to rehab noted. Initial history: This patient has been seen and evaluated at the request of Dr. Georgia Acevedo  For hypoxia. Patient is a 70 y.o. female  Who was dx with hodgkin lymphoma  In august when she was getting a massage in [facility] and the [employer] note  At rt neck ln.   
 She is s/p chemo with abvd. Lu Sena noted to have bilateral pulm infiltrates on pet/ct On jan 21  And was started on augmenin on jan 25 - seen in  Oncology office on Jan 30 - sats high 80's but increased to 90 % on fluids and was started on levaquin on Jan 30.  Noted sig  Increased landaverde and said sats drop to 70's with exertion . She says she  Feels much better today.    
NO cough, heme , phlegm.  NO cp.   
 She has noted some ankle swelling. IMPRESSION:  
Acute hypoxic resp failure Likely bleomycin lung toxicity Bilateral pulm infiltrates-  First noted on PET 1/21 and seen on ct on admit - tx with augmentin 1/25-1/30, then Levaquin - Preceding fever 101. S/P bronch 2/6. DDX: PNA, opportunistic infection and/or bleomycin toxicity from chemo Coronavirus positive RVP Hodkins lymphoma- tx with 4 cyles of abvd- positive response to chemo Pulm htn - pap 60 with nl lvef Anemia-  ? Related to chemo- stable Hx of asthma from childhood to age 24 NEWLY DISCOVERED BILATERAL Pulmonary Emboli WITH NEG DUPLEX OF LEG, now on Eliquis.  
   
RECOMMENDATIONS:  
O2 - wean as tolerated Antibiotics complete Once over this acute episode will need repeat PFTs as an outpt. On Eliquis for PE. Will Continue. Prednisone 30mg po bid for now Needs to call [phone] for appt when ready for discharge. Agree with transfer to rehab. Internal Medicine Subjective:  
Patient complains of generalized malaise. Assessment:  
Active Problems:  
  SVT (supraventricular tachycardia) ([address] (8/29/2016)  
  Essential hypertension with goal blood pressure less than 140/90 (8/29/2016)  
  Diabetes mellitus type 2, controlled ([address] (9/8/2016)  
  Dyslipidemia (9/8/2016)  
  Acute respiratory failure ([address] (2/3/2019)  
  Leg swelling (2/3/2019)   Hodgkin lymphoma ([facility] (2/3/2019)  
  CAP (community acquired pneumonia) (2/3/2019) Plan:  
   
1.  Low blood sugars secondary to patient's reduced caloric intake.  Insulin adjusted accordingly. Jagdish Patel this needs to be stabilized before discharge. 2.  Pulmonary status appears to be unchanged.  Steroids continue. 3.  Upper respiratory infection gradually improving. 4.  Significant physical deconditioning. 5.  Discharge pending. Hematology and Oncology Follow up for: HD  
   
Assessment and Plan:  
) Hodgkins Lymphoma:  
- s/p 4 cycles of ABVD with post treatment PET without any uptake, great response.  
- will not plan any further chemo or xrt given lung issues. -F/u with Dr. Jennifer Rizvi  
   
) Respiratory Failure, suspected Bleomycin toxicity:  
- She feels a bit better, less SOB while talking - Now on oral steroids per pulmonary  
   
PE  
- Now on eliquis - LE dopplers negative  
   
Deconditioning - Planning to go to [facility]  
   
  
  
Pulmonary Disease GRG - Care Day 18 (2/20/2019) by Florian Denton RN Review Entered Review Status 2/21/2019 10:09 Completed Criteria Review Care Day: 18 Care Date: 2/20/2019 Level of Care: Step Down Guideline Day 3 Level Of Care ( ) * Activity level acceptable ( ) * Isolation not needed, or status acceptable Clinical Status ( ) * Respiratory status acceptable ( ) * Right heart function adequate  
(X) * Temperature status acceptable 2/21/2019 10:09:01 EST by Cecile Ahn  
  98.1  
  
(X) * No infection, or status acceptable 2/21/2019 10:09:01 EST by Cecile Ahn  
  Acceptable  
  
(X) * Stable chest findings 2/21/2019 10:09:01 EST by Kem Dandy stable ( ) * Pain and nausea absent or adequately managed ( ) * General Discharge Criteria met Interventions  
(X) * No chest tube, or status acceptable 2/21/2019 10:09:01 EST by Cecile Ahn  
  None  
  
(X) * Intake acceptable 2/21/2019 10:09:01 EST by Dhruv Monk  
  Acceptable ( ) * No inpatient interventions needed * Milestone Additional Notes Clinical Date Reviewed:   2/20/19 LOS; Status; Review Type:  Tele/Inpt/CS Vital Signs:  125//70, 98.1, 98, 20, 93% 3LNC Abn. Findings LABS/RADIOLOGY:  
POC , 133, 153 Meds:  
Norvasc 2.5mg PO Daily Eliquis 5mg PO Q12hrs Lipitor 40mg PO QHS Flonase 50mcg IN Daily Humalog SSI SC TID Toprol XL 50mg PO Daily Deltasone 30mg PO BID Lantus 10u SC QHS Lantus 40u SC Daily Hematology and Oncology Patient complains of the following: SOB slowly improving  
 Assessment and Plan:  
   
) Hodgkins Lymphoma:  
- s/p 4 cycles of ABVD with post treatment PET without any uptake, great response.  
- will not plan any further chemo or xrt given lung issues. -F/u with Dr. Vernon Stoll  
   
) Respiratory Failure, suspected Bleomycin toxicity:  
- She feels a bit better, less SOB while talking - Appreciate pulmonary assistance/input. - bronch 2/6, cultures NGTD  
- Viral panel with coronavirus  
- completed IV Abx and fluconazole - Weaning O2 down for goal sat >90% - Now on oral steroids per pulmonary  
- likely will need home o2  
   
PE  
- Now on eliquis - LE dopplers negative  
   
Deconditioning - Planning to go to [facility] soon Pulmonary Disease Subjective:  
   
No events.  No new complaints for me.  
   
Initial history: This patient has been seen and evaluated at the request of Dr. Elham Edmondson  For hypoxia.  Patient is a 70 y.o. female  Who was dx with hodgkin lymphoma  In august when she was getting a massage in [place] and the [employer] note  At [place].   
 She is s/p chemo with abvd. Agnes President  Was noted to have bilateral pulm infiltrates on pet/ct On jan 21  And was started on augmenin on jan 25 - seen in  Oncology office on Jan 30 - sats high 80's but increased to 90 % on fluids and was started on levaquin on Jan 30.  Noted sig  Increased landaverde and said sats drop to 70's with exertion . She says she  Feels much better today.    
NO cough, heme , phlegm.  NO cp.   
 She has noted some ankle swelling. IMPRESSION:  
Acute hypoxic resp failure Likely bleomycin lung toxicity Bilateral pulm infiltrates-  First noted on PET 1/21 and seen on ct on admit - tx with augmentin 1/25-1/30, then Levaquin - Preceding fever 101. S/P bronch 2/6. DDX: PNA, opportunistic infection and/or bleomycin toxicity from chemo Coronavirus positive RVP Hodkins lymphoma- tx with 4 cyles of abvd- positive response to chemo Pulm htn - pap 60 with nl lvef Anemia-  ? Related to chemo- stable Hx of asthma from childhood to age 24 NEWLY DISCOVERED BILATERAL Pulmonary Emboli WITH NEG DUPLEX OF LEG, now on Eliquis.  
   
RECOMMENDATIONS:  
O2 - wean as tolerated Antibiotics complete Once over this acute episode will need repeat PFTs as an outpt. On Eliquis for PE. PT and OT to [facility]. Anticipate admission to [address] [place] [address]. Will Continue. Prednisone 30mg po bid for now Needs to call [phone] for appt when ready for discharge. Hopefully to rehab soon? Internal Medicine Subjective:  
Patient planes of nasal congestion Assessment:  
Active Problems:  
  SVT (supraventricular tachycardia) ([address] (8/29/2016)  
  Essential hypertension with goal blood pressure less than 140/90 (8/29/2016)  
  Diabetes mellitus type 2, controlled ([address] (9/8/2016)  
  Dyslipidemia (9/8/2016)    
  Acute respiratory failure ([address] (2/3/2019)  
  Leg swelling (2/3/2019)  
  Hodgkin lymphoma ([address] (2/3/2019)  
  CAP (community acquired pneumonia) (2/3/2019) Plan:  
   
1.  Continue treatment of interstitial pneumonitis predominant involvement of her right lung. 2.  Probable mild upper respiratory infection. 3.  Continue diabetic control.   
4.  Discharge to rehab with approval.

## 2019-02-21 NOTE — PROGRESS NOTES
JONAH called Debbie Jaramillo, liaison for [facility] and they have a bed for pt today and they should have a bed for pt tomorrow. He cannot guarantee that they will have a bed, but they should have one available tomorrow. 1:30pm -JONAH spoke with [facility] regarding transport to [address] and informed them pt was not discharging today and should discharge tomorrow. [facility] set up for 11am pickup for tomorrow. Zuly Macdonald, [address] [place] [address]

## 2019-02-21 NOTE — PROGRESS NOTES
Primary Nurse Isa Smith RN and mireya lewis RN performed a dual skin assessment on this patient Impairment noted- see wound doc flow sheet Bilateral bottoms of feet, toes and heels are red and blanching and flaky Buttocks has breakdown in center of gluteal cleft. Wound approx . 25\" x .25\". Area cleaned and mepielx applied. wc rn consulted with and they state no consult needed and pt to have sensicare applied to area.   
 
Dwayne score is 18

## 2019-02-21 NOTE — PROGRESS NOTES
Problem: Mobility Impaired (Adult and Pediatric) Goal: *Acute Goals and Plan of Care (Insert Text) Physical Therapy Goals Goals reviewed and remain appropriate 2/15/19 Initiated 2/7/2019 1. Patient will move from supine to sit and sit to supine , scoot up and down and roll side to side in bed with modified independence within 7 day(s). 2.  Patient will transfer from bed to chair and chair to bed with supervision/set-up using the least restrictive device within 7 day(s). 3.  Patient will perform sit to stand with supervision/set-up within 7 day(s). 4.  Patient will ambulate with supervision/set-up for 150 feet with the least restrictive device within 7 day(s). physical Therapy TREATMENT Patient: Denton Bolanos (42 y.o. female) Date: 2/21/2019 Diagnosis: Acute respiratory failure ([address] Procedure(s) (LRB): BRONCHOSCOPY (N/A) BRONCHIAL WASHINGS FLEXIBLE (N/A) 15 Days Post-Op Precautions:  falls Chart, physical therapy assessment, plan of care and goals were reviewed. ASSESSMENT: pt progressing towards goals, no LOB, mod SOB (needed O2 turned up to 5L to recover), does fair with toilet transfers, gets a little impulsive when fatigued (almost fell into chair), vc's for safety and proper RW use. Progression toward goals: 
[x]    Improving appropriately and progressing toward goals 
[]    Improving slowly and progressing toward goals 
[]    Not making progress toward goals and plan of care will be adjusted PLAN: 
Patient continues to benefit from skilled intervention to address the above impairments. Continue treatment per established plan of care. Discharge Recommendations:  inpatient Pulmonary Rehab Further Equipment Recommendations for Discharge:  rolling walker OBJECTIVE DATA SUMMARY:  
Critical Behavior: 
Neurologic State: Alert, Eyes open spontaneously Orientation Level: Oriented X4 Cognition: Appropriate decision making, Appropriate for age attention/concentration, Appropriate safety awareness Safety/Judgement: Awareness of environment, Fall prevention, Good awareness of safety precautions, Home safety Functional Mobility Training: 
Bed Mobility: Pt sitting in chair on arrival. 
Transfers: 
Sit to Stand: Stand-by assistance Stand to Sit: Stand-by assistance Stand Pivot Transfers: Stand-by assistance Interventions: Verbal cues Level of Assistance: Stand-by assistance Balance: 
Sitting: Intact; Without support Standing: Intact; With support Standing - Static: Good;Constant support Standing - Dynamic : Good Ambulation/Gait Training: 
Distance (ft): 60 Feet (ft) Assistive Device: Walker, rolling;Gait belt Ambulation - Level of Assistance: Contact guard assistance Gait Abnormalities: Decreased step clearance Right Side Weight Bearing: Full Left Side Weight Bearing: Full Base of Support: Widened Speed/Carimta: Pace decreased (<100 feet/min) Step Length: Left shortened;Right shortened Pain: 
Pain Scale 1: Numeric (0 - 10) Pain Intensity 1: 0 Activity Tolerance: poor After treatment:  
[x]    Patient left in no apparent distress sitting up in chair 
[]    Patient left in no apparent distress in bed 
[x]    Call bell left within reach [x]    Nursing notified 
[]    Caregiver present 
[]    Bed alarm activated COMMUNICATION/COLLABORATION:  
The patients plan of care was discussed with: Registered Nurse Vernell Fair PTA Time Calculation: 25 mins

## 2019-02-21 NOTE — PROGRESS NOTES
Hematology Oncology Progress Note Follow up for: HD Chart notes reviewed since last visit. Case discussed with following:  
 
Patient complains of the following: breathing stable Additional concerns noted by the staff:  
 
Patient Vitals for the past 24 hrs: 
 BP Temp Pulse Resp SpO2  
02/21/19 0716 150/84 97.4 °F (36.3 °C) 90 20 91 % 02/21/19 0400 126/53 97.7 °F (36.5 °C) 88 18 92 % 02/20/19 2204 139/70 97.5 °F (36.4 °C) 88 20 93 % 02/20/19 1904 124/64 97.5 °F (36.4 °C) 92 20 93 % 02/20/19 1505 (!) 125/99 98.1 °F (36.7 °C) 98 20   
02/20/19 1122 147/64 98.2 °F (36.8 °C) 95 20  Review of Systems: 
12 point ROS done and negative except as above Physical Examination: 
gen NAD 
CV reg Lungs clear from ant 
abd benign Labs: 
Recent Results (from the past 24 hour(s)) GLUCOSE, POC Collection Time: 02/20/19 11:32 AM  
Result Value Ref Range Glucose (POC) 136 (H) 65 - 100 mg/dL Performed by Burnell Cowden GLUCOSE, POC Collection Time: 02/20/19  4:36 PM  
Result Value Ref Range Glucose (POC) 133 (H) 65 - 100 mg/dL Performed by Wilbert Chavez GLUCOSE, POC Collection Time: 02/20/19  8:46 PM  
Result Value Ref Range Glucose (POC) 153 (H) 65 - 100 mg/dL Performed by Abhay Sharp (PCT) GLUCOSE, POC Collection Time: 02/21/19  7:34 AM  
Result Value Ref Range Glucose (POC) 58 (L) 65 - 100 mg/dL Performed by Melba Latus GLUCOSE, POC Collection Time: 02/21/19  7:52 AM  
Result Value Ref Range Glucose (POC) 74 65 - 100 mg/dL Performed by Melba Latus GLUCOSE, POC Collection Time: 02/21/19  7:53 AM  
Result Value Ref Range Glucose (POC) 82 65 - 100 mg/dL Performed by Melba Latus Assessment and Plan: 
 
*) Hodgkins Lymphoma: 
- s/p 4 cycles of ABVD with post treatment PET without any uptake, great response. 
- will not plan any further chemo or xrt given lung issues.  
-F/u with Dr. Estela Shepherd 
 
 *) Respiratory Failure, suspected Bleomycin toxicity: 
- She feels a bit better, less SOB while talking - Now on oral steroids per pulmonary PE 
- Now on eliquis - LE dopplers negative Deconditioning - Planning to go to [facility] soon

## 2019-02-21 NOTE — PROGRESS NOTES
Problem: Falls - Risk of 
Goal: *Absence of Falls Document Isabela Aldridge Fall Risk and appropriate interventions in the flowsheet. Outcome: Progressing Towards Goal 
Fall Risk Interventions: 
Mobility Interventions: Assess mobility with egress test 
 
Mentation Interventions: Room close to nurse's station, Update white board, Toileting rounds Medication Interventions: Teach patient to arise slowly, Patient to call before getting OOB Elimination Interventions: Call light in reach, Patient to call for help with toileting needs History of Falls Interventions: Consult care management for discharge planning Problem: Pressure Injury - Risk of 
Goal: *Prevention of pressure injury Document Dwayne Scale and appropriate interventions in the flowsheet. Outcome: Progressing Towards Goal 
Pressure Injury Interventions: 
Sensory Interventions: Keep linens dry and wrinkle-free, Minimize linen layers Moisture Interventions: Absorbent underpads Activity Interventions: Increase time out of bed Mobility Interventions: HOB 30 degrees or less, PT/OT evaluation Nutrition Interventions: Document food/fluid/supplement intake Friction and Shear Interventions: Minimize layers

## 2019-02-21 NOTE — PROGRESS NOTES
[facility], Critical Care, and Sleep Medicine Progress Note Name: Milvia Pires MRN: [id number] : 1950 Hospital: [address] Date: 2019 IMPRESSION:  
· Acute hypoxic resp failure · Likely bleomycin lung toxicity · Bilateral pulm infiltrates-  First noted on PET  and seen on ct on admit - tx with augmentin -, then Levaquin - Preceding fever 101. S/P bronch . DDX: PNA, opportunistic infection and/or bleomycin toxicity from chemo · Coronavirus positive RVP · Hodkins lymphoma- tx with 4 cyles of abvd- positive response to chemo · Pulm htn - pap 60 with nl lvef · Anemia-  ? Related to chemo- stable · Hx of asthma from childhood to age 24  
· NEWLY DISCOVERED BILATERAL Pulmonary Emboli WITH NEG DUPLEX OF LEG, now on Eliquis. RECOMMENDATIONS:  
· O2 - wean as tolerated · Antibiotics complete · Once over this acute episode will need repeat PFTs as an outpt. · On Eliquis for PE. · Will Continue. Prednisone 30mg po bid for now · Needs to call [phone] for appt when ready for discharge. Agree with transfer to rehab. Subjective: No events. Plans for discharge to rehab noted. Initial history: This patient has been seen and evaluated at the request of Dr. George Tam  For hypoxia. Patient is a 70 y.o. female  Who was dx with hodgkin lymphoma  In august when she was getting a massage in [facility] and the masseuse note  At rt neck ln. She is s/p chemo with abvd. She  Was noted to have bilateral pulm infiltrates on pet/ct On   And was started on augmenin on  - seen in  Oncology office on  - sats high 80's but increased to 90 % on fluids and was started on levaquin on . Noted sig  Increased [place] and said sats drop to 70's with exertion . She says she  Feels much better today. NO cough, heme , phlegm. NO cp. She has noted some ankle swelling. Past Medical History: Diagnosis Date  Asthma   
 as a child, nothing since menopause  Cancer ([facility] [address] 08/21/2018 Non-Hodgkin's  Cataracts, bilateral   
 Diabetes ([facility] [address] type II  
 Environmental allergies  Fibromyalgia Treated with alternate medical therapy  Hypertension   
 no asthma since age 24  // Dr. Princess To said hodgkins  And pt confirms Past Surgical History:  
Procedure Laterality Date  HX BREAST BIOPSY Right 2006  HX COLONOSCOPY  2015  HX GYN Pap 2015  HX ORTHOPAEDIC    
 fractured R ankle/ no surgery  HX OTHER SURGICAL Right 08/27/2018  
 excision of deep neck cervical LN. Prior to Admission medications Medication Sig Start Date End Date Taking? Authorizing Provider ONETOUCH VERIO strip USE 1 STRIP IN VITRO TO TEST BLOOD SUGAR ONCE DAILY 10/17/18  Yes Gavino Harden MD  
ibuprofen (MOTRIN) 600 mg tablet Take 1 Tab by mouth every six (6) hours as needed for Pain. 8/28/18  Yes Esdras Ramos MD  
Lancets (ONE TOUCH DELICA) misc USE TO TEST BLOOD SUGAR ONCE DAILY. DX.E11.9 10/13/16  Yes Gavino Harden MD  
pioglitazone (ACTOS) 30 mg tablet TAKE 1 TABLET DAILY 1/6/19   Gavino Harden MD  
metoprolol succinate (TOPROL-XL) 50 mg XL tablet TAKE 1 TAB BY MOUTH DAILY. 10/12/18   Gavino Haredn MD  
JANUMET XR 50-1,000 mg TM24 TAKE 1 TABLET BY MOUTH TWICE A DAY WITH MEALS 10/12/18   Gavino Harden MD  
fluticasone [facility] ALLERGY RELIEF) 50 mcg/actuation nasal spray 2 Sprays by Both Nostrils route daily. Provider, Historical  
spironolactone-hydrochlorothiazide (ALDACTAZIDE) 25-25 mg per tablet TAKE ONE TABLET BY MOUTH EVERY DAY 12/3/17   Gavino Harden MD  
atorvastatin (LIPITOR) 40 mg tablet TAKE 1 TAB BY MOUTH DAILY. 12/3/17   Gavino Harden MD  
amLODIPine (NORVASC) 10 mg tablet TAKE 1 TABLET BY MOUTH EVERY DAY 11/7/17   Gavino Harden MD  
alcohol swabs (ALCOHOL PREP PADS) padm 1 Pad by Apply Externally route daily.  DX.E11.9 10/13/16   Gavino Harden MD  
 
 No Known Allergies Social History Tobacco Use  Smoking status: Never Smoker  Smokeless tobacco: Never Used Substance Use Topics  Alcohol use: Yes Comment: socially  
 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   
     prostate cancer  Cancer Sister Breast cancer apparent DCIS  
 Heart Disease Brother Brother  from congestive heart failure secondary to severe mitral disease Current Facility-Administered Medications Medication Dose Route Frequency  potassium chloride SR (KLOR-CON 10) tablet 40 mEq  40 mEq Oral BID  insulin glargine (LANTUS) injection 10 Units  10 Units SubCUTAneous QHS  insulin glargine (LANTUS) injection 40 Units  40 Units SubCUTAneous DAILY  predniSONE (DELTASONE) tablet 30 mg  30 mg Oral BID WITH MEALS  sodium chloride (NS) flush 5-40 mL  5-40 mL IntraVENous Q8H  
 apixaban (ELIQUIS) tablet 5 mg  5 mg Oral Q12H  
 lactobac ac& pc-s.therm-b.anim (JUNI Q/RISAQUAD)  1 Cap Oral DAILY  insulin lispro (HUMALOG) injection   SubCUTAneous TIDAC  amLODIPine (NORVASC) tablet 2.5 mg  2.5 mg Oral DAILY  atorvastatin (LIPITOR) tablet 40 mg  40 mg Oral QHS  fluticasone (FLONASE) 50 mcg/actuation nasal spray 2 Spray  2 Spray Both Nostrils DAILY  metoprolol succinate (TOPROL-XL) XL tablet 50 mg  50 mg Oral DAILY Review of Systems: A comprehensive review of systems was negative except for that written in the HPI. Objective: 
Vital Signs:   
Visit Vitals /84 (BP 1 Location: Left arm, BP Patient Position: At rest) Pulse 90 Temp 97.4 °F (36.3 °C) Resp 20 Ht 5' 8\" (1.727 m) Wt 108.2 kg (238 lb 8.6 oz) SpO2 91% BMI 36.27 kg/m² O2 Device: Nasal cannula O2 Flow Rate (L/min): 3 l/min Temp (24hrs), Av.7 °F (36.5 °C), Min:97.4 °F (36.3 °C), Max:98.2 °F (36.8 °C) Intake/Output: Last shift:      No intake/output data recorded. Last 3 shifts: 02/19 1901 - 02/21 0700 In: 500 [P.O.:500] Out: 3943 [[facility]:4537] Intake/Output Summary (Last 24 hours) at 2/21/2019 9290 Last data filed at 2/21/2019 1907 Gross per 24 hour Intake  Output 450 ml Net -450 ml Physical Exam:  
General:  Alert, cooperative, no distress, appears stated age. ON NC oxygen. Sitting up in chair. Pox was 91% on RA at rest.   
Head:  Normocephalic, without obvious abnormality, atraumatic. alopecia Eyes:  Conjunctivae/corneas clear. PERRL, EOMs intact. Nose: Nares normal. Septum midline. Mucosa normal. No drainage or sinus tenderness. Throat: Lips, mucosa, and tongue normal. Teeth and gums normal.- Neck: Supple, symmetrical, trachea midline, no adenopathy, thyroid: no enlargment/tenderness/nodules, no carotid bruit and no JVD. Back:   Symmetric, no curvature. ROM normal.  
Lungs:   Appears to be breathing comfortably. Good air movement. Pretty clear. Chest wall:  No tenderness or deformity. Heart:  Regular rate and rhythm, S1, S2 normal, no murmur, click, rub or gallop.- mild tachy Abdomen:   Soft, non-tender. Bowel sounds normal. No masses,  No organomegaly. Extremities: Extremities normal, atraumatic, no cyanosis - edema gone Pulses: 2+ and symmetric all extremities. Skin: Skin color, texture, turgor normal. No rashes or lesions Lymph nodes: Cervical, supraclavicular, and axillary nodes normal.  
Neurologic: Grossly nonfocal, motor is intact. Psych: no over anxiety or depression. Data review:  
 
Recent Results (from the past 24 hour(s)) GLUCOSE, POC Collection Time: 02/20/19 11:32 AM  
Result Value Ref Range Glucose (POC) 136 (H) 65 - 100 mg/dL Performed by Maci Harvey GLUCOSE, POC Collection Time: 02/20/19  4:36 PM  
Result Value Ref Range Glucose (POC) 133 (H) 65 - 100 mg/dL Performed by Lorena Bolivar GLUCOSE, POC  
 Collection Time: 02/20/19  8:46 PM  
Result Value Ref Range Glucose (POC) 153 (H) 65 - 100 mg/dL Performed by Gilda Paz (PCT) GLUCOSE, POC Collection Time: 02/21/19  7:34 AM  
Result Value Ref Range Glucose (POC) 58 (L) 65 - 100 mg/dL Performed by [employer] GLUCOSE, POC Collection Time: 02/21/19  7:52 AM  
Result Value Ref Range Glucose (POC) 74 65 - 100 mg/dL Performed by [employer] GLUCOSE, POC Collection Time: 02/21/19  7:53 AM  
Result Value Ref Range Glucose (POC) 82 65 - 100 mg/dL Performed by [employer]: 
I have personally reviewed the patients radiographs and have reviewed the reports: 
Ct scan: 2-3-19: FINDINGS: 
CHEST: 
THYROID: No nodule. MEDIASTINUM: Mediastinal lymphadenopathy has improved compared to the prior 
exam. Transverse dimension at the level of the aortic arch was previously 5.9 cm 
and is now 4.2 cm. SERJIO: No mass or lymphadenopathy. THORACIC AORTA: No dissection or aneurysm. MAIN PULMONARY ARTERY: Right upper and left lower lobe pulmonary emboli are 
noted. TRACHEA/BRONCHI: Patent. ESOPHAGUS: No wall thickening or dilatation. HEART: Normal in size. PLEURA: No effusion or pneumothorax. LUNGS: Infiltrates are noted throughout the right lung and left lower lobe. INCIDENTALLY IMAGED UPPER ABDOMEN: 17 mm right adrenal adenoma. BONES: Degenerative changes are seen in the thoracic spine. 
  
  
IMPRESSION: Right upper and left lower lobe pulmonary emboli. Bilateral airspace 
disease. Improved mediastinal lymphadenopathy. 2-11-19: Clinical indication: Bilateral lung infiltrates, acute respiratory distress with 
hypoxia. 
  
Portable AP upright view of the chest is obtained, comparison February 8. Lung 
fields are unchanged. Infusion catheter is unchanged. Shallow inspiration with 
elevation of the right hemidiaphragm, bilateral interstitial infiltrates more 
prominent on the right. 
  
IMPRESSION 
: No change. Carmen Reyes MD

## 2019-02-21 NOTE — PROGRESS NOTES
General Daily Progress Note Admit Date: 2/3/2019 Subjective:  
 
Patient complains of generalized malaise. Current Facility-Administered Medications Medication Dose Route Frequency  insulin glargine (LANTUS) injection 14 Units  14 Units SubCUTAneous DAILY  potassium chloride SR (KLOR-CON 10) tablet 40 mEq  40 mEq Oral BID  albuterol (PROVENTIL VENTOLIN) nebulizer solution 2.5 mg  2.5 mg Nebulization Q4H PRN  polyethylene glycol (MIRALAX) packet 17 g  17 g Oral DAILY PRN  predniSONE (DELTASONE) tablet 30 mg  30 mg Oral BID WITH MEALS  sodium chloride (NS) flush 5-40 mL  5-40 mL IntraVENous PRN  
 sodium chloride (NS) flush 5-40 mL  5-40 mL IntraVENous Q8H  
 apixaban (ELIQUIS) tablet 5 mg  5 mg Oral Q12H  
 benzonatate (TESSALON) capsule 200 mg  200 mg Oral TID PRN  
 lactobac ac& pc-s.therm-b.anim (JUNI Q/RISAQUAD)  1 Cap Oral DAILY  insulin lispro (HUMALOG) injection   SubCUTAneous TIDAC  acetaminophen (TYLENOL) tablet 650 mg  650 mg Oral Q6H PRN  
 ondansetron (ZOFRAN) injection 4 mg  4 mg IntraVENous Q4H PRN  
 amLODIPine (NORVASC) tablet 2.5 mg  2.5 mg Oral DAILY  atorvastatin (LIPITOR) tablet 40 mg  40 mg Oral QHS  fluticasone (FLONASE) 50 mcg/actuation nasal spray 2 Spray  2 Spray Both Nostrils DAILY  metoprolol succinate (TOPROL-XL) XL tablet 50 mg  50 mg Oral DAILY  glucose chewable tablet 16 g  4 Tab Oral PRN  
 dextrose (D50W) injection syrg 12.5-25 g  12.5-25 g IntraVENous PRN  
 glucagon (GLUCAGEN) injection 1 mg  1 mg IntraMUSCular PRN  
 nitroglycerin (NITROBID) 2 % ointment 1 Inch  1 Inch Topical Q6H PRN Review of Systems A comprehensive review of systems was negative. Objective:  
 
Patient Vitals for the past 24 hrs: 
 BP Temp Pulse Resp SpO2  
02/21/19 0716 150/84 97.4 °F (36.3 °C) 90 20 91 % 02/21/19 0400 126/53 97.7 °F (36.5 °C) 88 18 92 % 02/20/19 2204 139/70 97.5 °F (36.4 °C) 88 20 93 % 02/20/19 1904 124/64 97.5 °F (36.4 °C) 92 20 93 % 02/20/19 1505 (!) 125/99 98.1 °F (36.7 °C) 98 20   
02/20/19 1122 147/64 98.2 °F (36.8 °C) 95 20  No intake/output data recorded. 02/19 1901 - 02/21 0700 In: 500 [P.O.:500] Out: 4425 [[facility]:8805] Physical Exam:  
Visit Vitals /84 (BP 1 Location: Left arm, BP Patient Position: At rest) Pulse 90 Temp 97.4 °F (36.3 °C) Resp 20 Ht 5' 8\" (1.727 m) Wt 238 lb 8.6 oz (108.2 kg) SpO2 91% BMI 36.27 kg/m² General appearance: alert, cooperative, no distress, appears stated age Neck: supple, symmetrical, trachea midline, no adenopathy, thyroid: not enlarged, symmetric, no tenderness/mass/nodules, no carotid bruit and no JVD Lungs: rales R base, L base, egophony R base Heart: regular rate and rhythm, S1, S2 normal, no murmur, click, rub or gallop Abdomen: soft, non-tender. Bowel sounds normal. No masses,  no organomegaly Extremities: extremities normal, atraumatic, no cyanosis or edema Data Review Recent Results (from the past 24 hour(s)) GLUCOSE, POC Collection Time: 02/20/19 11:32 AM  
Result Value Ref Range Glucose (POC) 136 (H) 65 - 100 mg/dL Performed by Janette Hawkins GLUCOSE, POC Collection Time: 02/20/19  4:36 PM  
Result Value Ref Range Glucose (POC) 133 (H) 65 - 100 mg/dL Performed by Michael Cruz GLUCOSE, POC Collection Time: 02/20/19  8:46 PM  
Result Value Ref Range Glucose (POC) 153 (H) 65 - 100 mg/dL Performed by Hugo Pride ([facility]) GLUCOSE, POC Collection Time: 02/21/19  7:34 AM  
Result Value Ref Range Glucose (POC) 58 (L) 65 - 100 mg/dL Performed by Raf Gutierrez GLUCOSE, POC Collection Time: 02/21/19  7:52 AM  
Result Value Ref Range Glucose (POC) 74 65 - 100 mg/dL Performed by Raf Gutierrez GLUCOSE, POC Collection Time: 02/21/19  7:53 AM  
Result Value Ref Range Glucose (POC) 82 65 - 100 mg/dL Performed by Raf Gutierrez Assessment:  
 
Active Problems: 
  SVT (supraventricular tachycardia) ([facility] [address] (8/29/2016) Essential hypertension with goal blood pressure less than 140/90 (8/29/2016) Diabetes mellitus type 2, controlled ([facility] [address] (9/8/2016) Dyslipidemia (9/8/2016) Acute respiratory failure ([facility] [address] (2/3/2019) Leg swelling (2/3/2019) [facility] (2/3/2019) CAP (community acquired pneumonia) (2/3/2019) Plan: 1. Low blood sugars secondary to patient's reduced caloric intake. Insulin adjusted accordingly. Ideally this needs to be stabilized before discharge. 2.  Pulmonary status appears to be unchanged. Steroids continue. 3.  Upper respiratory infection gradually improving. 4.  Significant physical deconditioning. 5.  Discharge pending.

## 2019-02-21 NOTE — PROGRESS NOTES
TRANSFER - IN REPORT: 
 
Verbal report received from guillermina (name) on Tiana Members  being received from PCU (unit) for routine progression of care Report consisted of patients Situation, Background, Assessment and  
Recommendations(SBAR). Information from the following report(s) SBAR, Kardex, ED Summary, Procedure Summary, Intake/Output, MAR and Recent Results was reviewed with the receiving nurse. Opportunity for questions and clarification was provided. Assessment completed upon patients arrival to unit and care assumed.

## 2019-02-21 NOTE — PROGRESS NOTES
0945: Attempted to call report, room was no ready at this time, RN will call when room is ready. TRANSFER - OUT REPORT: 
 
Verbal report given to Denise(name) on Denton Bolanos  being transferred to [facility](unit) for routine progression of care Report consisted of patients Situation, Background, Assessment and  
Recommendations(SBAR). Information from the following report(s) SBAR, Intake/Output and MAR was reviewed with the receiving nurse. Lines:  
Venous Access Device portacath  09/12/18 Upper chest (subclavicular area), left (Active) Venous Access Device 02/12/19 Arm, right (Active) Central Line Being Utilized No 2/18/2019  4:00 AM  
Criteria for Appropriate Use Irritant/vesicant medication 2/17/2019  7:32 PM  
Site Assessment Clean, dry, & intact 2/14/2019  4:46 AM  
Dressing Status Clean, dry, & intact 2/14/2019  4:46 AM  
Dressing Type Transparent 2/14/2019  4:46 AM  
Access Needle Size (Site #1) 22 G 2/12/2019  3:18 PM  
Action Taken (Medial Site) Flushed 2/12/2019  3:18 PM  
Access Lateral Site Assessment Warmth;Redness 2/12/2019  3:18 PM  
Access Needle Size (Lateral Site) 22 G 2/12/2019  3:18 PM  
Positive Blood Return (Lateral Site) Yes 2/12/2019  3:18 PM  
Action Taken (Lateral Site) Alcohol 2/12/2019  3:18 PM  
   
Peripheral IV 02/12/19 Anterior;Proximal;Right Forearm (Active) Site Assessment Clean, dry, & intact 2/21/2019  7:47 AM  
Phlebitis Assessment 0 2/21/2019  7:47 AM  
Infiltration Assessment 0 2/21/2019  7:47 AM  
Dressing Status Clean, dry, & intact 2/21/2019  7:47 AM  
Dressing Type Tape;Transparent 2/21/2019  7:47 AM  
Hub Color/Line Status Blue;Capped 2/21/2019  7:47 AM  
Action Taken Open ports on tubing capped 2/21/2019  7:47 AM  
Alcohol Cap Used Yes 2/21/2019  4:14 AM  
  
 
Opportunity for questions and clarification was provided.    
 
Patient transported with: 
O2 
RN

## 2019-02-21 NOTE — PROGRESS NOTES
Bedside and Verbal shift change report given to beckie harden RN (oncoming nurse). Report included the following information SBAR, Kardex, ED Summary, Procedure Summary, Intake/Output, MAR and Recent Results. SHIFT SUMMARY: 
1250: rn notified wc rns about pt's breakdown in gluteal cleft. wc rns state moisture related and to apply sensicare cream to it. Pt has auth for pulm rehab. Possible d/c tomorrow.

## 2019-02-21 NOTE — PROGRESS NOTES
Bedside shift change report given to [employer] (oncoming nurse) by Mak Brown RN (offgoing nurse). Report included the following information SBAR.

## 2019-02-21 NOTE — PROGRESS NOTES
PCU SHIFT NURSING NOTE Bedside shift change report given to [employer] (oncoming nurse) by Frank Dee RN (offgoing nurse). Report included the following information SBAR, Kardex, Intake/Output and Recent Results. Shift Summary:  
0600: Uneventful night Bedside shift change report given to Elliot Barajas (oncoming nurse) by Valentino Balint RN (offgoing nurse). Report included the following information SBAR, Kardex, Intake/Output and Recent Results. Admission Date 2/3/2019 Admission Diagnosis Acute respiratory failure ([facility] [address] Consults IP CONSULT TO HEMATOLOGY 
IP CONSULT TO PRIMARY CARE PROVIDER 
IP CONSULT TO PULMONOLOGY Consults []PT []OT []Speech  
[]Case Management  
  
[] Palliative Cardiac Monitoring Order []Yes []No  
 
IV drips []Yes Drip:                            Dose: 
Drip:                            Dose: 
Drip:                            Dose:  
[]No  
 
GI Prophylaxis []Yes []No  
 
 
 
DVT Prophylaxis SCDs:     
     
 Shawn stockings:     
  
[] Medication []Contraindicated []None Activity Level Activity Level: Up with Assistance Activity Assistance: Partial (one person) Purposeful Rounding every 1-2 hour? []Yes Spence Score  Total Score: 4 Bed Alarm (If score 3 or >) []Yes  
[] Refused (See signed refusal form in chart) Dwayne Score  Dwayne Score: 19 Dwayne Score (if score 14 or less) []PMT consult  
[]Wound Care consult []Specialty bed  
[] Nutrition consult Needs prior to discharge:  
Home O2 required:   
[]Yes []No  
 If yes, how much O2 required? Other:  
 Last Bowel Movement: Last Bowel Movement Date: 02/03/19(per pt ) Influenza Vaccine Received Flu Vaccine for Current Season (usually Sept-March): No  
 Patient/Guardian Refused (Notify MD): Yes Pneumonia Vaccine Diet Active Orders Diet DIET DIABETIC CONSISTENT CARB Regular LDAs Venous Access Device portacath  09/12/18 Upper chest (subclavicular area), left (Active) Venous Access Device 02/12/19 Arm, right (Active) Central Line Being Utilized No 2/18/2019  4:00 AM  
Criteria for Appropriate Use Irritant/vesicant medication 2/17/2019  7:32 PM  
Site Assessment Clean, dry, & intact 2/14/2019  4:46 AM  
Dressing Status Clean, dry, & intact 2/14/2019  4:46 AM  
Dressing Type Transparent 2/14/2019  4:46 AM  
Access Needle Size (Site #1) 22 G 2/12/2019  3:18 PM  
Action Taken (Medial Site) Flushed 2/12/2019  3:18 PM  
Access Lateral Site Assessment Warmth;Redness 2/12/2019  3:18 PM  
Access Needle Size (Lateral Site) 22 G 2/12/2019  3:18 PM  
Positive Blood Return (Lateral Site) Yes 2/12/2019  3:18 PM  
Action Taken (Lateral Site) Alcohol 2/12/2019  3:18 PM  
  
Peripheral IV 02/12/19 Anterior;Proximal;Right Forearm (Active) Site Assessment Clean, dry, & intact 2/20/2019  7:04 PM  
Phlebitis Assessment 0 2/20/2019  7:04 PM  
Infiltration Assessment 0 2/20/2019  7:04 PM  
Dressing Status Clean, dry, & intact 2/20/2019  7:04 PM  
Dressing Type Tape;Transparent 2/20/2019  7:04 PM  
Hub Color/Line Status Blue;Capped;Flushed 2/20/2019  7:04 PM  
Action Taken Open ports on tubing capped 2/20/2019  7:04 PM  
Alcohol Cap Used Yes 2/20/2019  7:04 PM  
                  
Urinary Catheter Intake & Output Date 02/20/19 0700 - 02/21/19 4235 02/21/19 0700 - 02/22/19 8215 Shift [id number] 1900-0659 24 Hour Total 6229-3365 6646-6463 24 Hour Total  
INTAKE Shift Total(mL/kg) OUTPUT Urine(mL/kg/hr)  250 250 Urine Voided  250 250 Shift Total(mL/kg)  250(2.3) 250(2.3) NET  -250 -250 Weight (kg) 108. 2 108. 2 108. 2 108. 2 108. 2 108.2 Readmission Risk Assessment Tool Score Medium Risk 25 Total Score 3 Has Seen PCP in Last 6 Months (Yes=3, No=0) 3 Patient Length of Stay (>5 days = 3) 5 Pt. Coverage (Medicare=5 , Medicaid, or Self-Pay=4) 7 Charlson Comorbidity Score (Age + Comorbid Conditions) Criteria that do not apply:  
 . Living with Significant Other. Assisted Living. LTAC. SNF. or  
Rehab  
 IP Visits Last 12 Months (1-3=4, 4=9, >4=11) Expected Length of Stay 3d 12h Actual Length of Stay 18

## 2019-02-22 NOTE — PROGRESS NOTES
Attempted to see pt this am and per nursing pt is not medically stable for tx today 2/2 to respiratory issues. Will defer tx today and tx when pt is able and when cleared by nursing.

## 2019-02-22 NOTE — PROGRESS NOTES
Bedside and Verbal shift change report given to SUSANNAH butts (oncoming nurse). Report included the following information SBAR, Kardex, ED Summary, Procedure Summary, Intake/Output, MAR and Recent Results. SHIFT SUMMARY: 
564: rt nik to bring prn tx 
 
36: jasmyn yañez rn notified of pt's increased O2 demand and elevated HR. Rec for cxr. 
 
925: rn paged dr Nabila King 
- rn notified dr Nabila King of pt's resp status and increased O2 demand without mental status or vital sign change except HR elevated. 929:cxr notified of stat order 931: rn paged dr York Ion 935: rt to change nebs to xopenex 10: cxr done 1012:  rn notified dr orellana of pt's respiratory decompensation and O2 need/CXR and reconsult. MD to see pt.  
1055: dr Link Lopes requested nasal O2 sensor 1105: RT to come troubleshoot nasal O2 sensor 1115: RT unable to obtain O2 reading from nasal sensor. RN will obtain new sensor and try again. 1121: second nasal sensor applied and no reading possible RT notified. 1130: RT brought O2 reading machine from CCU. O2 reading 95% via nasal sensor on 13L high-flow. Will wean slowly. All of O2 readings since 1120 are via nasal sensor.

## 2019-02-22 NOTE — PROGRESS NOTES
[facility], Critical Care, and Sleep Medicine Progress Note Name: Marlene Moody MRN: [id number] : 1950 Hospital: [address] Date: 2019 IMPRESSION:  
· Acute hypoxic resp failure · Likely bleomycin lung toxicity · Bilateral pulm infiltrates-  First noted on PET  and seen on ct on admit - tx with augmentin -, then Levaquin - Preceding fever 101. S/P bronch . DDX: PNA, opportunistic infection and/or bleomycin toxicity from chemo · Coronavirus positive RVP · Hodkins lymphoma- tx with 4 cyles of abvd- positive response to chemo · Pulm htn - pap 60 with nl lvef · Anemia-  ? Related to chemo- stable · Hx of asthma from childhood to age 24  
· NEWLY DISCOVERED BILATERAL Pulmonary Emboli WITH NEG DUPLEX OF LEG, now on Eliquis. RECOMMENDATIONS:  
· O2 - wean as tolerated · Antibiotics complete · Jet nebs · Oral steroids · Once over this acute episode will need repeat PFTs as an outpt. · On Eliquis for PE.  
· Recommend not to check pulse ox on her fingers due to nail polish · Will f/u monday Subjective:  
 
Still hypoxic No acute distres Still SOLIZ Current Facility-Administered Medications Medication Dose Route Frequency  albuterol-ipratropium (DUO-NEB) 2.5 MG-0.5 MG/3 ML  3 mL Nebulization QID RT  
 insulin glargine (LANTUS) injection 14 Units  14 Units SubCUTAneous DAILY  predniSONE (DELTASONE) tablet 30 mg  30 mg Oral BID WITH MEALS  sodium chloride (NS) flush 5-40 mL  5-40 mL IntraVENous Q8H  
 apixaban (ELIQUIS) tablet 5 mg  5 mg Oral Q12H  
 lactobac ac& pc-s.therm-b.anim (JUNI Q/RISAQUAD)  1 Cap Oral DAILY  insulin lispro (HUMALOG) injection   SubCUTAneous TIDAC  amLODIPine (NORVASC) tablet 2.5 mg  2.5 mg Oral DAILY  atorvastatin (LIPITOR) tablet 40 mg  40 mg Oral QHS  fluticasone (FLONASE) 50 mcg/actuation nasal spray 2 Spray  2 Spray Both Nostrils DAILY  metoprolol succinate (TOPROL-XL) XL tablet 50 mg  50 mg Oral DAILY Review of Systems: A comprehensive review of systems was negative except for that written in the HPI. Objective: 
Vital Signs:   
Visit Vitals /69 (BP 1 Location: Right arm, BP Patient Position: Sitting) Pulse (!) 115 Temp 99.3 °F (37.4 °C) Resp 20 Ht 5' 8\" (1.727 m) Wt 108.2 kg (238 lb 8.6 oz) SpO2 95% BMI 36.27 kg/m² O2 Device: Hi flow nasal cannula O2 Flow Rate (L/min): 10 l/min Temp (24hrs), Av.7 °F (37.1 °C), Min:98.2 °F (36.8 °C), Max:99.3 °F (37.4 °C) Intake/Output:  
Last shift:       07 - 1900 In: 240 [P.O.:240] Out: - Last 3 shifts:  190 -  0700 In: -  
Out: 451 [Urine:451] Intake/Output Summary (Last 24 hours) at 2019 1243 Last data filed at 2019 4452 Gross per 24 hour Intake 240 ml Output 1 ml Net 239 ml Physical Exam:  
General:  Alert, cooperative, no distress, appears stated age. ON NC oxygen. Sitting up in chair. Pox was 91% on RA at rest.   
Head:  Normocephalic, without obvious abnormality, atraumatic. alopecia Eyes:  Conjunctivae/corneas clear. PERRL, EOMs intact. Nose: Nares normal. Septum midline. Mucosa normal. No drainage or sinus tenderness. Throat: Lips, mucosa, and tongue normal. Teeth and gums normal.- Neck: Supple, symmetrical, trachea midline, no adenopathy, thyroid: no enlargment/tenderness/nodules, no carotid bruit and no JVD. Back:   Symmetric, no curvature. ROM normal.  
Lungs:   Good air movement. Pretty clear. Chest wall:  No tenderness or deformity. Heart:  Regular rate and rhythm, S1, S2 normal, no murmur, click, rub or gallop.- mild tachy Abdomen:   Soft, non-tender. Bowel sounds normal. No masses,  No organomegaly. Extremities: Extremities normal, atraumatic, no cyanosis - edema gone Pulses: 2+ and symmetric all extremities. Skin: Skin color, texture, turgor normal. No rashes or lesions Lymph nodes: Cervical, supraclavicular, and axillary nodes normal.  
Neurologic: Grossly nonfocal, motor is intact. Psych: no over anxiety or depression. Data review:  
 
Recent Results (from the past 24 hour(s)) GLUCOSE, POC Collection Time: 02/21/19  4:33 PM  
Result Value Ref Range Glucose (POC) 168 (H) 65 - 100 mg/dL Performed by Justin Mcdonald  (PCT) GLUCOSE, POC Collection Time: 02/21/19  8:45 PM  
Result Value Ref Range Glucose (POC) 209 (H) 65 - 100 mg/dL Performed by Nicolle Donald (CON) PCT   
CBC WITH AUTOMATED DIFF Collection Time: 02/22/19  2:05 AM  
Result Value Ref Range WBC 7.7 3.6 - 11.0 K/uL  
 RBC 3.22 (L) 3.80 - 5.20 M/uL HGB 9.5 (L) 11.5 - 16.0 g/dL HCT 29.1 (L) 35.0 - 47.0 % MCV 90.4 80.0 - 99.0 FL  
 MCH 29.5 26.0 - 34.0 PG  
 MCHC 32.6 30.0 - 36.5 g/dL  
 RDW 17.0 (H) 11.5 - 14.5 % PLATELET 988 505 - 241 K/uL MPV 10.6 8.9 - 12.9 FL  
 NRBC 0.0 0  WBC ABSOLUTE NRBC 0.00 0.00 - 0.01 K/uL NEUTROPHILS 87 (H) 32 - 75 % LYMPHOCYTES 7 (L) 12 - 49 % MONOCYTES 6 5 - 13 % EOSINOPHILS 0 0 - 7 % BASOPHILS 0 0 - 1 % IMMATURE GRANULOCYTES 0 0.0 - 0.5 % ABS. NEUTROPHILS 6.7 1.8 - 8.0 K/UL  
 ABS. LYMPHOCYTES 0.5 (L) 0.8 - 3.5 K/UL  
 ABS. MONOCYTES 0.5 0.0 - 1.0 K/UL  
 ABS. EOSINOPHILS 0.0 0.0 - 0.4 K/UL  
 ABS. BASOPHILS 0.0 0.0 - 0.1 K/UL  
 ABS. IMM. GRANS. 0.0 0.00 - 0.04 K/UL  
 DF AUTOMATED    
GLUCOSE, POC Collection Time: 02/22/19  7:18 AM  
Result Value Ref Range Glucose (POC) 157 (H) 65 - 100 mg/dL Performed by Enid Eduardo (PCT) GLUCOSE, POC Collection Time: 02/22/19 11:22 AM  
Result Value Ref Range Glucose (POC) 185 (H) 65 - 100 mg/dL Performed by Enid Eduardo (PCT) Imaging: 
I have personally reviewed the patients radiographs and have reviewed the reports: CXR today no acute changes Vladislav Levine MD

## 2019-02-22 NOTE — PROGRESS NOTES
Problem: Pressure Injury - Risk of 
Goal: *Prevention of pressure injury Document Dwayne Scale and appropriate interventions in the flowsheet. Outcome: Progressing Towards Goal 
Pressure Injury Interventions: 
Sensory Interventions: Keep linens dry and wrinkle-free, Minimize linen layers Moisture Interventions: Absorbent underpads Activity Interventions: Chair cushion, Increase time out of bed, PT/OT evaluation Mobility Interventions: Chair cushion, Float heels, PT/OT evaluation, Turn and reposition approx. every two hours(pillow and wedges) Nutrition Interventions: Document food/fluid/supplement intake, Discuss nutritional consult with provider, Offer support with meals,snacks and hydration Friction and Shear Interventions: Apply protective barrier, creams and emollients, Feet elevated on foot rest, Foam dressings/transparent film/skin sealants, Lift sheet, Lift team/patient mobility team, Minimize layers, Transferring/repositioning devices

## 2019-02-22 NOTE — PROGRESS NOTES
Hematology Oncology Progress Note Follow up for: HD Chart notes reviewed since last visit. Case discussed with following:  
 
Patient complains of the following: breathing stable Additional concerns noted by the staff:  
 
Patient Vitals for the past 24 hrs: 
 BP Temp Pulse Resp SpO2  
02/22/19 1013   (!) 116  92 % 02/22/19 0922     90 % 02/22/19 0917     (!) 88 % 02/22/19 0913     (!) 85 % 02/22/19 0902     (!) 77 % 02/22/19 0807     91 % 02/22/19 0713     90 % 02/22/19 0712     (!) 87 % 02/22/19 0711     (!) 84 % 02/22/19 0706 135/74 98.9 °F (37.2 °C) 100 18 92 % 02/22/19 0600     91 % 02/22/19 0350 133/72 98.2 °F (36.8 °C) 100 16 92 % 02/21/19 2246 128/71 99.1 °F (37.3 °C) 86 21 91 % 02/1950 147/73 98.7 °F (37.1 °C) 89 23 91 % 02/21/19 1500 139/72 98.2 °F (36.8 °C) 99 22 91 % 02/21/19 1058 142/70 97.7 °F (36.5 °C) 100 25 90 % Review of Systems: 
12 point ROS done and negative except as above Physical Examination: 
gen NAD 
CV reg Lungs clear  
abd benign Labs: 
Recent Results (from the past 24 hour(s)) GLUCOSE, POC Collection Time: 02/21/19 11:03 AM  
Result Value Ref Range Glucose (POC) 195 (H) 65 - 100 mg/dL Performed by [facility] Donald  (PCT) GLUCOSE, POC Collection Time: 02/21/19  4:33 PM  
Result Value Ref Range Glucose (POC) 168 (H) 65 - 100 mg/dL Performed by [facility] Donald  (PCT) GLUCOSE, POC Collection Time: 02/21/19  8:45 PM  
Result Value Ref Range Glucose (POC) 209 (H) 65 - 100 mg/dL Performed by Renato Rodríguez (CON) PCT   
CBC WITH AUTOMATED DIFF Collection Time: 02/22/19  2:05 AM  
Result Value Ref Range WBC 7.7 3.6 - 11.0 K/uL  
 RBC 3.22 (L) 3.80 - 5.20 M/uL HGB 9.5 (L) 11.5 - 16.0 g/dL HCT 29.1 (L) 35.0 - 47.0 % MCV 90.4 80.0 - 99.0 FL  
 MCH 29.5 26.0 - 34.0 PG  
 MCHC 32.6 30.0 - 36.5 g/dL  
 RDW 17.0 (H) 11.5 - 14.5 % PLATELET 230 986 - 186 K/uL MPV 10.6 8.9 - 12.9 FL  
 NRBC 0.0 0  WBC ABSOLUTE NRBC 0.00 0.00 - 0.01 K/uL NEUTROPHILS 87 (H) 32 - 75 % LYMPHOCYTES 7 (L) 12 - 49 % MONOCYTES 6 5 - 13 % EOSINOPHILS 0 0 - 7 % BASOPHILS 0 0 - 1 % IMMATURE GRANULOCYTES 0 0.0 - 0.5 % ABS. NEUTROPHILS 6.7 1.8 - 8.0 K/UL  
 ABS. LYMPHOCYTES 0.5 (L) 0.8 - 3.5 K/UL  
 ABS. MONOCYTES 0.5 0.0 - 1.0 K/UL  
 ABS. EOSINOPHILS 0.0 0.0 - 0.4 K/UL  
 ABS. BASOPHILS 0.0 0.0 - 0.1 K/UL  
 ABS. IMM. GRANS. 0.0 0.00 - 0.04 K/UL  
 DF AUTOMATED    
GLUCOSE, POC Collection Time: 02/22/19  7:18 AM  
Result Value Ref Range Glucose (POC) 157 (H) 65 - 100 mg/dL Performed by Denis Jain (PCT) Assessment and Plan: 
 
*) Hodgkins Lymphoma: 
- s/p 4 cycles of ABVD with post treatment PET without any uptake, great response. 
- will not plan any further chemo or xrt given lung issues. -F/u with Dr. Stockton Nab 
 
*) Respiratory Failure, suspected Bleomycin toxicity: 
- She feels a bit better, less SOB while talking - Now on oral steroids per pulmonary PE 
- Now on eliquis - LE dopplers negative Deconditioning - Planning to go to [facility] soon Will sign off. Please call with any questions.

## 2019-02-22 NOTE — PROGRESS NOTES
Occupational Therapy Attempted to see patient. Per discussion with PTA, patient not medically stable for therapy services secondary to respiratory issues. Will defer and follow up for OT as medically appropriate. Thank you, Jas Younger, OT

## 2019-02-22 NOTE — PROGRESS NOTES
Bedside and Verbal shift change report given to , RN (oncoming nurse). Report included the following information Kardex. SHIFT SUMMARY: 
 
 
 
 
 
[facility] NURSING NOTE Admission Date 2/3/2019 Admission Diagnosis Acute respiratory failure ([address] Consults IP CONSULT TO HEMATOLOGY 
IP CONSULT TO PRIMARY CARE PROVIDER 
IP CONSULT TO PULMONOLOGY Cardiac Monitoring [x] Yes [] No  
  
Purposeful Hourly Rounding [x] Yes   
Abdulaziz Score Total Score: 4 Abdulaziz score 3 or > [x] Bed Alarm [] Avasys [] 1:1 sitter [] Patient refused (Signed refusal form in chart) Dwayne Score Dwayne Score: 18 Dwayne score 14 or < [] PMT consult [] Wound Care consult  
 []  Specialty bed  [] Nutrition consult Influenza Vaccine Received Flu Vaccine for Current Season (usually Sept-March): No  
 Patient/Guardian Refused (Notify MD): Yes Oxygen needs? [] Room air Oxygen @  []1L    []2L    [x]3L   []4L    []5L   []6L via NC Chronic home O2 use? [] Yes [x] No 
Perform O2 challenge test and document in progress note using smartphrase (.Homeoxygen) Last bowel movement Last Bowel Movement Date: 02/21/19 Urinary Catheter [REMOVED] External Female Catheter 02/04/19-Urine Output (mL): 400 ml LDAs Venous Access Device portacath  09/12/18 Upper chest (subclavicular area), left (Active) Venous Access Device 02/12/19 Arm, right (Active) Central Line Being Utilized No 2/18/2019  4:00 AM  
Criteria for Appropriate Use Irritant/vesicant medication 2/17/2019  7:32 PM  
Site Assessment Clean, dry, & intact 2/14/2019  4:46 AM  
Dressing Status Clean, dry, & intact 2/14/2019  4:46 AM  
Dressing Type Transparent 2/14/2019  4:46 AM  
Access Needle Size (Site #1) 22 G 2/12/2019  3:18 PM  
Action Taken (Medial Site) Flushed 2/12/2019  3:18 PM  
Access Lateral Site Assessment Warmth;Redness 2/12/2019  3:18 PM  
Access Needle Size (Lateral Site) 22 G 2/12/2019  3:18 PM  
 Positive Blood Return (Lateral Site) Yes 2/12/2019  3:18 PM  
Action Taken (Lateral Site) Alcohol 2/12/2019  3:18 PM  
  
Peripheral IV 02/12/19 Anterior;Proximal;Right Forearm (Active) Site Assessment Clean, dry, & intact 2/21/2019  8:26 PM  
Phlebitis Assessment 0 2/21/2019  8:26 PM  
Infiltration Assessment 0 2/21/2019  8:26 PM  
Dressing Status Clean, dry, & intact 2/21/2019  8:26 PM  
Dressing Type Tape;Transparent 2/21/2019  8:26 PM  
Hub Color/Line Status Blue;Capped;Flushed 2/21/2019  8:26 PM  
Action Taken Open ports on tubing capped 2/21/2019  7:47 AM  
Alcohol Cap Used Yes 2/21/2019  4:14 AM  
                  
  
Readmission Risk Assessment Tool Score Medium Risk 25 Total Score 3 Has Seen PCP in Last 6 Months (Yes=3, No=0) 3 Patient Length of Stay (>5 days = 3)  
 5 Pt. Coverage (Medicare=5 , Medicaid, or Self-Pay=4) 7 Charlson Comorbidity Score (Age + Comorbid Conditions) Criteria that do not apply:  
 . Living with Significant Other. Assisted Living. LTAC. SNF. or  
Rehab  
 IP Visits Last 12 Months (1-3=4, 4=9, >4=11) Expected Length of Stay 3d 12h Actual Length of Stay 18

## 2019-02-22 NOTE — PROGRESS NOTES
ADULT PROTOCOL: JET AEROSOL ASSESSMENT Patient  Aquiles Epps     70 y.o.   female     2/22/2019  4:16 PM 
 
Breath Sounds Pre Procedure: Right Breath Sounds: Diminished Left Breath Sounds: Diminished Breath Sounds Post Procedure: Right Breath Sounds: Diminished Left Breath Sounds: Diminished Breathing pattern: Pre procedure Breathing Pattern: Regular Post procedure Breathing Pattern: Labored Heart Rate: Pre procedure Pulse: 100 Post procedure Pulse: 122 Resp Rate: Pre procedure Respirations: 20 
         Post procedure Respirations: 20 
 
Oxygen: O2 Device: Nasal cannula High Flow 12L SpO2: Pre procedure SpO2: 95 % Post procedure SpO2: (!) 77 % Nebulizer Therapy: Current medications Aerosolized Medications: DuoNeb Smoking History: never Problem List:  
Patient Active Problem List  
Diagnosis Code  SVT (supraventricular tachycardia) (HCC) I47.1  Essential hypertension with goal blood pressure less than 140/90 I10  
 Diabetes mellitus type 2, controlled ([facility] [address] E11.9  Dyslipidemia E78.5  Overweight (BMI 25.0-29. 9) E66.3  
 Supraclavicular mass R22.2  Lymphadenopathy R59.1  Acute respiratory failure (HCC) J96.00  Leg swelling M79.89  
 Hodgkin lymphoma (HCC) C81.90  
 CAP (community acquired pneumonia) J18.9 Respiratory Therapist: Jocelyne Baumgarten

## 2019-02-23 NOTE — PROGRESS NOTES
Patient Active Problem List  
Diagnosis Code  SVT (supraventricular tachycardia) ([facility]) I47.1  Essential hypertension with goal blood pressure less than 140/90 I10  
 Diabetes mellitus type 2, controlled [facility] E11.9  Dyslipidemia E78.5  Overweight (BMI 25.0-29. 9) E66.3  
 Supraclavicular mass R22.2  Lymphadenopathy R59.1  Acute respiratory failure ([facility]) J96.00  Leg swelling M79.89  
 Hodgkin lymphoma ([facility]) C81.90  
 CAP (community acquired pneumonia) J18.9 No Known Allergies Current Facility-Administered Medications:  
  levalbuterol (XOPENEX) nebulizer soln 1.25 mg/3 mL, 1.25 mg, Nebulization, Q4H PRN, Donta Jenkins MD 
  albuterol-ipratropium (DUO-NEB) 2.5 MG-0.5 MG/3 ML, 3 mL, Nebulization, QID RT, Melanie De La Paz MD, 3 mL at 02/22/19 1936 
  insulin glargine (LANTUS) injection 20 Units, 20 Units, SubCUTAneous, DAILY, Donta Jenkins MD 
  polyethylene glycol (MIRALAX) packet 17 g, 17 g, Oral, DAILY PRN, Donta Jenkins MD 
  predniSONE (DELTASONE) tablet 30 mg, 30 mg, Oral, BID WITH MEALS, Korey Pratt MD, 30 mg at 02/22/19 1754   sodium chloride (NS) flush 5-40 mL, 5-40 mL, IntraVENous, PRN, Aleena FUENTES MD, 10 mL at 02/11/19 1132   sodium chloride (NS) flush 5-40 mL, 5-40 mL, IntraVENous, Q8H, Angelina Mar MD, 10 mL at 02/23/19 0600 
  apixaban (ELIQUIS) tablet 5 mg, 5 mg, Oral, Q12H, 5 mg at 02/22/19 2114 **AND** [COMPLETED] apixaban (ELIQUIS) tablet 10 mg, 10 mg, Oral, Q12H, Donta Jenkins MD, 10 mg at 02/14/19 2143   benzonatate (TESSALON) capsule 200 mg, 200 mg, Oral, TID PRN, Angelina Mar MD 
  lactobac ac& pc-s.therm-b.anim (JUNI Q/RISAQUAD), 1 Cap, Oral, DAILY, Donta Jenkins MD, 1 Cap at 02/22/19 0920 
  insulin lispro (HUMALOG) injection, , SubCUTAneous, TIDAC, Miksa, Angelina Engle MD, 3 Units at 02/22/19 1754   acetaminophen (TYLENOL) tablet 650 mg, 650 mg, Oral, Q6H PRN, Erick Clark MD 
   ondansetron (ZOFRAN) injection 4 mg, 4 mg, IntraVENous, Q4H PRN, Ovidio Jacobs MD 
  amLODIPine (NORVASC) tablet 2.5 mg, 2.5 mg, Oral, DAILY, Ovidio Jacobs MD, 2.5 mg at 02/22/19 0921 
  atorvastatin (LIPITOR) tablet 40 mg, 40 mg, Oral, QHS, Ovidio Jacobs MD, 40 mg at 02/22/19 2114   fluticasone (FLONASE) 50 mcg/actuation nasal spray 2 Spray, 2 Spray, Both Nostrils, DAILY, Ovidio Jacobs MD, 2 [place] at 02/22/19 1007   metoprolol succinate (TOPROL-XL) XL tablet 50 mg, 50 mg, Oral, DAILY, Ovidio Jacobs MD, 50 mg at 02/22/19 0921 
  glucose chewable tablet 16 g, 4 Tab, Oral, PRN, Ovidio Jacobs MD 
  dextrose (D50W) injection syrg 12.5-25 g, 12.5-25 g, IntraVENous, PRN, Ovidio Jacobs MD, 25 g at 02/15/19 0537 
  glucagon (GLUCAGEN) injection 1 mg, 1 mg, IntraMUSCular, PRN, Ovidio Jacobs MD 
  nitroglycerin (NITROBID) 2 % ointment 1 Inch, 1 Inch, Topical, Q6H PRN, Ovidio Jacobs MD 
Recent Results (from the past 24 hour(s)) GLUCOSE, POC Collection Time: 02/22/19  7:18 AM  
Result Value Ref Range Glucose (POC) 157 (H) 65 - 100 mg/dL Performed by Wade Avilez ([facility]) GLUCOSE, POC Collection Time: 02/22/19 11:22 AM  
Result Value Ref Range Glucose (POC) 185 (H) 65 - 100 mg/dL Performed by Wade Avilez (PCT) GLUCOSE, POC Collection Time: 02/22/19  4:38 PM  
Result Value Ref Range Glucose (POC) 235 (H) 65 - 100 mg/dL Performed by Wade Avilez ([facility]) GLUCOSE, POC Collection Time: 02/22/19  8:53 PM  
Result Value Ref Range Glucose (POC) 402 (H) 65 - 100 mg/dL Performed by Sukumar Thomson p[place]it [place] GLUCOSE, POC Collection Time: 02/22/19  9:25 PM  
Result Value Ref Range Glucose (POC) 369 (H) 65 - 100 mg/dL Performed by Elba Luevano   
CBC WITH MANUAL DIFF Collection Time: 02/23/19  2:32 AM  
Result Value Ref Range WBC 7.3 3.6 - 11.0 K/uL  
 RBC 3.07 (L) 3.80 - 5.20 M/uL HGB 8.8 (L) 11.5 - 16.0 g/dL HCT 28.0 (L) 35.0 - 47.0 % MCV 91.2 80.0 - 99.0 FL  
 MCH 28.7 26.0 - 34.0 PG  
 MCHC 31.4 30.0 - 36.5 g/dL  
 RDW 16.9 (H) 11.5 - 14.5 % PLATELET 275 (L) 746 - 400 K/uL MPV 11.0 8.9 - 12.9 FL  
 NRBC 0.0 0  WBC ABSOLUTE NRBC 0.00 0.00 - 0.01 K/uL DIFFERENTIAL PENDING   
 NEUTROPHILS 93 (H) 32 - 75 % BAND NEUTROPHILS 0 0 - 6 % LYMPHOCYTES 4 (L) 12 - 49 % MONOCYTES 3 (L) 5 - 13 % EOSINOPHILS 0 0 - 7 % BASOPHILS 0 0 - 1 % METAMYELOCYTES 0 0 % MYELOCYTES 0 0 % PROMYELOCYTES 0 0 % BLASTS 0 0 % OTHER CELL 0 0 IMMATURE GRANULOCYTES 0 %  
 ABS. NEUTROPHILS 6.8 1.8 - 8.0 K/UL  
 ABS. LYMPHOCYTES 0.3 (L) 0.8 - 3.5 K/UL  
 ABS. MONOCYTES 0.2 0.0 - 1.0 K/UL  
 ABS. EOSINOPHILS 0.0 0.0 - 0.4 K/UL  
 ABS. BASOPHILS 0.0 0.0 - 0.1 K/UL  
 ABS. IMM. GRANS. 0.0 K/UL  
 DF MANUAL    
 RBC COMMENTS ANISOCYTOSIS 
1+ Patient Vitals for the past 24 hrs: 
 Temp Pulse Resp BP SpO2  
02/23/19 0600     95 % 02/23/19 0239 98.3 °F (36.8 °C) (!) 105 20 135/75 94 % 02/23/19 0038     92 % 02/22/19 2255 97.5 °F (36.4 °C) 98 26 137/78 95 % 02/22/19 2200     (!) 88 % 02/22/19 2130 98 °F (36.7 °C) 92 22 122/60 92 % 02/22/19 2044 97.7 °F (36.5 °C) (!) 105 28 126/69 91 % 02/22/19 1936     96 % 02/22/19 1753     96 % 02/22/19 1616     90 % 02/22/19 1528     97 % 02/22/19 1513 98.7 °F (37.1 °C) (!) 104 18 113/54 96 % 02/22/19 1511     95 % 02/22/19 1354     90 % 02/22/19 1353     (!) 89 % 02/22/19 1351     (!) 82 % 02/22/19 1350     (!) 87 % 02/22/19 1346     (!) 86 % 02/22/19 1345     (!) 86 % 02/22/19 1344     93 % 02/22/19 1325     97 % 02/22/19 1243     94 % 02/22/19 1229     95 % 02/22/19 1200     93 % 02/22/19 1130     95 % 02/22/19 1040 99.3 °F (37.4 °C) (!) 115 20 143/69 92 % 02/22/19 1013  (!) 116   92 % 02/22/19 0922     90 % 02/22/19 0917     (!) 88 % 02/22/19 0913     (!) 85 % 02/22/19 0902     (!) 77 % 02/22/19 0807     91 % 02/22/19 0713     90 % 02/22/19 0712     (!) 87 % 02/22/19 0711     (!) 84 % 02/22/19 0706 98.9 °F (37.2 °C) 100 18 135/74 92 % SOB now better Exam:   Lungs clear. Heart RRR Impression: 
Stable. DM with elevated BS Plan: 
AC lispro adjusted

## 2019-02-23 NOTE — PROGRESS NOTES
1920 Pt just moved nasal prong oximetry to left nare ,we will make sure its moved q4 and PRN Pt continues to desat to high 80s ,with coughing ,or activity but is able to come back to baseline I have ordered wound care consult to evaluate a wound in the gluteal clef ,it was covered with foam by Rodri Marquis on day shift

## 2019-02-23 NOTE — PROGRESS NOTES
Problem: Pressure Injury - Risk of 
Goal: *Prevention of pressure injury Document Dwayne Scale and appropriate interventions in the flowsheet. Outcome: Progressing Towards Goal 
Pressure Injury Interventions: 
Sensory Interventions: Keep linens dry and wrinkle-free, Minimize linen layers Moisture Interventions: Absorbent underpads Activity Interventions: Chair cushion, Pressure redistribution bed/mattress(bed type), PT/OT evaluation, Increase time out of bed Mobility Interventions: Chair cushion, Float heels, HOB 30 degrees or less, Pressure redistribution bed/mattress (bed type) Nutrition Interventions: Document food/fluid/supplement intake Friction and Shear Interventions: Apply protective barrier, creams and emollients, Feet elevated on foot rest, HOB 30 degrees or less, Lift sheet

## 2019-02-23 NOTE — PROGRESS NOTES
General Daily Progress Note Admit Date: 2/3/2019 Subjective:  
 
Patient has no complaint . Current Facility-Administered Medications Medication Dose Route Frequency  levalbuterol (XOPENEX) nebulizer soln 1.25 mg/3 mL  1.25 mg Nebulization Q4H PRN  
 albuterol-ipratropium (DUO-NEB) 2.5 MG-0.5 MG/3 ML  3 mL Nebulization QID RT  
 insulin glargine (LANTUS) injection 14 Units  14 Units SubCUTAneous DAILY  polyethylene glycol (MIRALAX) packet 17 g  17 g Oral DAILY PRN  predniSONE (DELTASONE) tablet 30 mg  30 mg Oral BID WITH MEALS  sodium chloride (NS) flush 5-40 mL  5-40 mL IntraVENous PRN  
 sodium chloride (NS) flush 5-40 mL  5-40 mL IntraVENous Q8H  
 apixaban (ELIQUIS) tablet 5 mg  5 mg Oral Q12H  
 benzonatate (TESSALON) capsule 200 mg  200 mg Oral TID PRN  
 lactobac ac& pc-s.therm-b.anim (JUNI Q/RISAQUAD)  1 Cap Oral DAILY  insulin lispro (HUMALOG) injection   SubCUTAneous TIDAC  acetaminophen (TYLENOL) tablet 650 mg  650 mg Oral Q6H PRN  
 ondansetron (ZOFRAN) injection 4 mg  4 mg IntraVENous Q4H PRN  
 amLODIPine (NORVASC) tablet 2.5 mg  2.5 mg Oral DAILY  atorvastatin (LIPITOR) tablet 40 mg  40 mg Oral QHS  fluticasone (FLONASE) 50 mcg/actuation nasal spray 2 Spray  2 Spray Both Nostrils DAILY  metoprolol succinate (TOPROL-XL) XL tablet 50 mg  50 mg Oral DAILY  glucose chewable tablet 16 g  4 Tab Oral PRN  
 dextrose (D50W) injection syrg 12.5-25 g  12.5-25 g IntraVENous PRN  
 glucagon (GLUCAGEN) injection 1 mg  1 mg IntraMUSCular PRN  
 nitroglycerin (NITROBID) 2 % ointment 1 Inch  1 Inch Topical Q6H PRN Review of Systems A comprehensive review of systems was negative. Objective:  
 
Patient Vitals for the past 24 hrs: 
 BP Temp Pulse Resp SpO2  
02/22/19 2044 126/69 97.7 °F (36.5 °C) (!) 105 28 91 % 02/22/19 1936     96 % 02/22/19 1753     96 % 02/22/19 1616     90 % 02/22/19 1528     97 % 02/22/19 1513 113/54 98.7 °F (37.1 °C) (!) 104 18 96 % 02/22/19 1511     95 % 02/22/19 1354     90 % 02/22/19 1353     (!) 89 % 02/22/19 1351     (!) 82 % 02/22/19 1350     (!) 87 % 02/22/19 1346     (!) 86 % 02/22/19 1345     (!) 86 % 02/22/19 1344     93 % 02/22/19 1325     97 % 02/22/19 1243     94 % 02/22/19 1229     95 % 02/22/19 1200     93 % 02/22/19 1130     95 % 02/22/19 1040 143/69 99.3 °F (37.4 °C) (!) 115 20 92 % 02/22/19 1013   (!) 116  92 % 02/22/19 0922     90 % 02/22/19 0917     (!) 88 % 02/22/19 0913     (!) 85 % 02/22/19 0902     (!) 77 % 02/22/19 0807     91 % 02/22/19 0713     90 % 02/22/19 0712     (!) 87 % 02/22/19 0711     (!) 84 % 02/22/19 0706 135/74 98.9 °F (37.2 °C) 100 18 92 % 02/22/19 0600     91 % 02/22/19 0350 133/72 98.2 °F (36.8 °C) 100 16 92 % 02/21/19 2246 128/71 99.1 °F (37.3 °C) 86 21 91 % No intake/output data recorded. 02/21 0701 - 02/22 1900 In: 480 [P.O.:480] Out: 301 [Urine:301] Physical Exam:  
Visit Vitals /69 (BP 1 Location: Right arm, BP Patient Position: At rest) Pulse (!) 105 Temp 97.7 °F (36.5 °C) Resp 28 Ht 5' 8\" (1.727 m) Wt 238 lb 8.6 oz (108.2 kg) SpO2 91% BMI 36.27 kg/m² General appearance: alert, cooperative, no distress, appears stated age Neck: supple, symmetrical, trachea midline, no adenopathy, thyroid: not enlarged, symmetric, no tenderness/mass/nodules, no carotid bruit and no JVD Lungs: rales R base, L base, diminished breath sounds R base, L base, egophony R base Heart: regular rate and rhythm, S1, S2 normal, no murmur, click, rub or gallop Abdomen: soft, non-tender. Bowel sounds normal. No masses,  no organomegaly Extremities: edema trace Data Review Recent Results (from the past 24 hour(s)) CBC WITH AUTOMATED DIFF  Collection Time: 02/22/19  2:05 AM  
 Result Value Ref Range WBC 7.7 3.6 - 11.0 K/uL  
 RBC 3.22 (L) 3.80 - 5.20 M/uL HGB 9.5 (L) 11.5 - 16.0 g/dL HCT 29.1 (L) 35.0 - 47.0 % MCV 90.4 80.0 - 99.0 FL  
 MCH 29.5 26.0 - 34.0 PG  
 MCHC 32.6 30.0 - 36.5 g/dL  
 RDW 17.0 (H) 11.5 - 14.5 % PLATELET 881 123 - 807 K/uL MPV 10.6 8.9 - 12.9 FL  
 NRBC 0.0 0  WBC ABSOLUTE NRBC 0.00 0.00 - 0.01 K/uL NEUTROPHILS 87 (H) 32 - 75 % LYMPHOCYTES 7 (L) 12 - 49 % MONOCYTES 6 5 - 13 % EOSINOPHILS 0 0 - 7 % BASOPHILS 0 0 - 1 % IMMATURE GRANULOCYTES 0 0.0 - 0.5 % ABS. NEUTROPHILS 6.7 1.8 - 8.0 K/UL  
 ABS. LYMPHOCYTES 0.5 (L) 0.8 - 3.5 K/UL  
 ABS. MONOCYTES 0.5 0.0 - 1.0 K/UL  
 ABS. EOSINOPHILS 0.0 0.0 - 0.4 K/UL  
 ABS. BASOPHILS 0.0 0.0 - 0.1 K/UL  
 ABS. IMM. GRANS. 0.0 0.00 - 0.04 K/UL  
 DF AUTOMATED    
GLUCOSE, POC Collection Time: 02/22/19  7:18 AM  
Result Value Ref Range Glucose (POC) 157 (H) 65 - 100 mg/dL Performed by Barnie Palm (PCT) GLUCOSE, POC Collection Time: 02/22/19 11:22 AM  
Result Value Ref Range Glucose (POC) 185 (H) 65 - 100 mg/dL Performed by Barnie Palm (PCT) GLUCOSE, POC Collection Time: 02/22/19  4:38 PM  
Result Value Ref Range Glucose (POC) 235 (H) 65 - 100 mg/dL Performed by Barnie Palm (PCT) Assessment:  
 
Active Problems: 
  [facility] (8/29/2016) Essential hypertension with goal blood pressure less than 140/90 (8/29/2016) Diabetes mellitus type 2, controlled ([facility] (9/8/2016) Dyslipidemia (9/8/2016) Acute respiratory failure ([facility] (2/3/2019) Leg swelling (2/3/2019) Hodgkin lymphoma ([facility] (2/3/2019) CAP (community acquired pneumonia) (2/3/2019) Plan:  
 
1. Worsening respiratory failure with increasing O2 requirements. Does this represent worsening patient's pneumonitis increasing VQ mismatching.   But this also be exacerbated by mucous plugging patient's superimposed upper respiratory infection on her current process? Note made of pulmonary's comments no recommended change in her current treatment regimen. Continue observation for now. Cannot transfer to rehab until stabilization occurs. 2.  Continue diabetic control. Vacillating blood sugars reflect poor caloric intake.

## 2019-02-23 NOTE — PROGRESS NOTES
ADULT PROTOCOL: JET AEROSOL  REASSESSMENT Patient  Yumiko Leavitt     70 y.o.   female     2/23/2019  4:21 PM 
 
Breath Sounds Pre Procedure: Right Breath Sounds: Diminished Left Breath Sounds: Diminished Breath Sounds Post Procedure: Right Breath Sounds: Diminished Left Breath Sounds: Diminished Breathing pattern: Pre procedure Breathing Pattern: Regular Post procedure Breathing Pattern: Regular Heart Rate: Pre procedure Pulse: 92 
         Post procedure Pulse: 96 
 
Resp Rate: Pre procedure Respirations: 20 
         Post procedure Respirations: 20 
 
Oxygen: O2 Device: Nasal cannula  9L HFNC SpO2: Pre procedure SpO2: 95 % Post procedure SpO2: 93 % Nebulizer Therapy: Current medications Aerosolized Medications: DuoNeb Smoking History: never Problem List:  
Patient Active Problem List  
Diagnosis Code  SVT (supraventricular tachycardia) ([facility]) I47.1  Essential hypertension with goal blood pressure less than 140/90 I10  
 Diabetes mellitus type 2, controlled ([facility] E11.9  Dyslipidemia E78.5  Overweight (BMI 25.0-29. 9) E66.3  
 Supraclavicular mass R22.2  Lymphadenopathy R59.1  Acute respiratory failure ([facility]) J96.00  Leg swelling M79.89  
 Hodgkin lymphoma ([facility]) C81.90  
 CAP (community acquired pneumonia) J18.9 Respiratory Therapist: Megan Hancock

## 2019-02-24 NOTE — PROGRESS NOTES
O2 sensor probe rotated to prevent skin breakdown . Sats remain >90 on 9l High flow mostly ,but still desats on activity ,pt says she feels better than Friday night

## 2019-02-24 NOTE — PROGRESS NOTES
Patient Active Problem List  
Diagnosis Code  SVT (supraventricular tachycardia) ([facility]) I47.1  Essential hypertension with goal blood pressure less than 140/90 I10  
 [facility] E11.9  Dyslipidemia E78.5  Overweight (BMI 25.0-29. 9) E66.3  
 Supraclavicular mass R22.2  Lymphadenopathy R59.1  Acute respiratory failure ([facility]) J96.00  Leg swelling M79.89  
 Hodgkin lymphoma ([facility]) C81.90  
 CAP (community acquired pneumonia) J18.9 No Known Allergies Current Facility-Administered Medications:  
  insulin lispro (HUMALOG) injection 5 Units, 5 Units, SubCUTAneous, TIDAC, Alejandra Flores MD, 5 Units at 02/24/19 1234   levalbuterol (XOPENEX) nebulizer soln 1.25 mg/3 mL, 1.25 mg, Nebulization, Q4H PRN, Marimar Baker MD 
  albuterol-ipratropium (DUO-NEB) 2.5 MG-0.5 MG/3 ML, 3 mL, Nebulization, QID RT, Sole Cedillo MD, 3 mL at 02/24/19 1121 
  insulin glargine (LANTUS) injection 20 Units, 20 Units, SubCUTAneous, DAILY, Marimar Baker MD, 20 Units at 02/24/19 3883 
  polyethylene glycol (MIRALAX) packet 17 g, 17 g, Oral, DAILY PRN, Marimar Baker MD 
  predniSONE (DELTASONE) tablet 30 mg, 30 mg, Oral, BID WITH MEALS, Hanna Lindsay MD, 30 mg at 02/24/19 8549   sodium chloride (NS) flush 5-40 mL, 5-40 mL, IntraVENous, PRN, Keesha FUENTES MD, 10 mL at 02/11/19 1132   sodium chloride (NS) flush 5-40 mL, 5-40 mL, IntraVENous, Q8H, Karissa Mar MD, 10 mL at 02/24/19 0600 
  apixaban (ELIQUIS) tablet 5 mg, 5 mg, Oral, Q12H, 5 mg at 02/24/19 0925 **AND** [COMPLETED] apixaban (ELIQUIS) tablet 10 mg, 10 mg, Oral, Q12H, Marimar Baker MD, 10 mg at 02/14/19 2143   benzonatate (TESSALON) capsule 200 mg, 200 mg, Oral, TID PRN, Roque Osborn MD 
  lactobac ac& pc-s.therm-b.anim (JUNI Q/RISAQUAD), 1 Cap, Oral, DAILY, Marimar Baker MD, 1 Cap at 02/24/19 4326   acetaminophen (TYLENOL) tablet 650 mg, 650 mg, Oral, Q6H PRN, Anis, MD Ovidio 
  ondansetron (ZOFRAN) injection 4 mg, 4 mg, IntraVENous, Q4H PRN, Ovidio Jacobs MD 
  amLODIPine (NORVASC) tablet 2.5 mg, 2.5 mg, Oral, DAILY, Ovidio Jacobs MD, 2.5 mg at 02/24/19 0924 
  atorvastatin (LIPITOR) tablet 40 mg, 40 mg, Oral, QHS, Ovidio Jacobs MD, 40 mg at 02/23/19 2110   fluticasone (FLONASE) 50 mcg/actuation nasal spray 2 Spray, 2 Spray, Both Nostrils, DAILY, Ovidio Jacobs MD, 2 [place] at 02/24/19 0927 
  metoprolol succinate (TOPROL-XL) XL tablet 50 mg, 50 mg, Oral, DAILY, Ovidio Jacobs MD, 50 mg at 02/24/19 0925 
  glucose chewable tablet 16 g, 4 Tab, Oral, PRN, Ovidio Jacobs MD 
  dextrose (D50W) injection syrg 12.5-25 g, 12.5-25 g, IntraVENous, PRN, Ovidio Jacobs MD, 25 g at 02/15/19 0537 
  glucagon (GLUCAGEN) injection 1 mg, 1 mg, IntraMUSCular, PRN, Ovidio Jacobs MD 
  nitroglycerin (NITROBID) 2 % ointment 1 Inch, 1 Inch, Topical, Q6H PRN, Ovidio Jacobs MD 
Recent Results (from the past 24 hour(s)) GLUCOSE, POC Collection Time: 02/23/19  4:42 PM  
Result Value Ref Range Glucose (POC) 268 (H) 65 - 100 mg/dL Performed by Justin Mcdonald  (PCT) GLUCOSE, POC Collection Time: 02/23/19  9:11 PM  
Result Value Ref Range Glucose (POC) 286 (H) 65 - 100 mg/dL Performed by Monique Laguna GLUCOSE, POC Collection Time: 02/24/19  7:28 AM  
Result Value Ref Range Glucose (POC) 196 (H) 65 - 100 mg/dL Performed by Carline Antony) GLUCOSE, POC Collection Time: 02/24/19 11:27 AM  
Result Value Ref Range Glucose (POC) 209 (H) 65 - 100 mg/dL Performed by Carline Silversmith) Patient Vitals for the past 24 hrs: 
 Temp Pulse Resp BP SpO2  
02/24/19 1124 99.1 °F (37.3 °C) (!) 106 20 145/80 92 % 02/24/19 1123     95 % 02/24/19 0740     93 % 02/24/19 0726 97.9 °F (36.6 °C) 95 20 148/78 90 % 02/24/19 0225 98 °F (36.7 °C) 95 20 139/80 92 % 02/23/19 2252 98.1 °F (36.7 °C) 85 20 133/75 98 % 02/23/19 2122     95 % 02/23/19 1934 97.7 °F (36.5 °C) 92 20 143/63 95 % 02/23/19 1541     95 % 02/23/19 1505 98.4 °F (36.9 °C) 89 21 119/64 93 % Lungs rhonchi Heart RRR Stable  On current meds.

## 2019-02-25 NOTE — PROGRESS NOTES
General Daily Progress Note Admit Date: 2/3/2019 Subjective:  
 
Patient continues to complain of shortness of breath. Current Facility-Administered Medications Medication Dose Route Frequency  insulin lispro (HUMALOG) injection 5 Units  5 Units SubCUTAneous TIDAC  levalbuterol (XOPENEX) nebulizer soln 1.25 mg/3 mL  1.25 mg Nebulization Q4H PRN  
 albuterol-ipratropium (DUO-NEB) 2.5 MG-0.5 MG/3 ML  3 mL Nebulization QID RT  
 insulin glargine (LANTUS) injection 20 Units  20 Units SubCUTAneous DAILY  polyethylene glycol (MIRALAX) packet 17 g  17 g Oral DAILY PRN  predniSONE (DELTASONE) tablet 30 mg  30 mg Oral BID WITH MEALS  sodium chloride (NS) flush 5-40 mL  5-40 mL IntraVENous PRN  
 sodium chloride (NS) flush 5-40 mL  5-40 mL IntraVENous Q8H  
 apixaban (ELIQUIS) tablet 5 mg  5 mg Oral Q12H  
 benzonatate (TESSALON) capsule 200 mg  200 mg Oral TID PRN  
 lactobac ac& pc-s.therm-b.anim (JUNI Q/RISAQUAD)  1 Cap Oral DAILY  acetaminophen (TYLENOL) tablet 650 mg  650 mg Oral Q6H PRN  
 ondansetron (ZOFRAN) injection 4 mg  4 mg IntraVENous Q4H PRN  
 amLODIPine (NORVASC) tablet 2.5 mg  2.5 mg Oral DAILY  atorvastatin (LIPITOR) tablet 40 mg  40 mg Oral QHS  fluticasone (FLONASE) 50 mcg/actuation nasal spray 2 Spray  2 Spray Both Nostrils DAILY  metoprolol succinate (TOPROL-XL) XL tablet 50 mg  50 mg Oral DAILY  glucose chewable tablet 16 g  4 Tab Oral PRN  
 dextrose (D50W) injection syrg 12.5-25 g  12.5-25 g IntraVENous PRN  
 glucagon (GLUCAGEN) injection 1 mg  1 mg IntraMUSCular PRN  
 nitroglycerin (NITROBID) 2 % ointment 1 Inch  1 Inch Topical Q6H PRN Review of Systems A comprehensive review of systems was negative. Objective:  
 
Patient Vitals for the past 24 hrs: 
 BP Temp Pulse Resp SpO2  
02/25/19 0809     100 % 02/25/19 0732 143/72 97.7 °F (36.5 °C) 75 20 98 % 02/25/19 0312 138/70 97.6 °F (36.4 °C) 95 18 97 % 02/24/19 2253 132/65 98.1 °F (36.7 °C) 82 18 99 % 02/24/19 2131     100 % 02/24/19 1926 108/56 97.8 °F (36.6 °C) 84 18 99 % 02/24/19 1543     96 % 02/24/19 1534 146/58 97.6 °F (36.4 °C) (!) 104 18 94 % 02/24/19 1124 145/80 99.1 °F (37.3 °C) (!) 106 20 92 % 02/24/19 1123     95 % 02/25 0701 - 02/25 1900 In: 120 [P.O.:120] Out: 200 [Urine:200] 02/23 1901 - 02/25 0700 In: 480 [P.O.:480] Out: 1275 [[id number]] Physical Exam:  
Visit Vitals /72 (BP 1 Location: Right arm, BP Patient Position: Sitting; Other (comment)) Comment (BP Patient Position): legs elevated Pulse 75 Temp 97.7 °F (36.5 °C) Resp 20 Ht 5' 8\" (1.727 m) Wt 238 lb 8.6 oz (108.2 kg) SpO2 100% BMI 36.27 kg/m² General appearance: alert, cooperative, no distress, appears stated age Lungs: rales R base, L base, diminished breath sounds R base, L base, egophony R base Heart: regular rate and rhythm, S1, S2 normal, no murmur, click, rub or gallop Abdomen: soft, non-tender. Bowel sounds normal. No masses,  no organomegaly Extremities: extremities normal, atraumatic, no cyanosis or edema Data Review Recent Results (from the past 24 hour(s)) GLUCOSE, POC Collection Time: 02/24/19 11:27 AM  
Result Value Ref Range Glucose (POC) 209 (H) 65 - 100 mg/dL Performed by Namrata Peña) GLUCOSE, POC Collection Time: 02/24/19  4:28 PM  
Result Value Ref Range Glucose (POC) 272 (H) 65 - 100 mg/dL Performed by Jonnathan LESLIE) GLUCOSE, POC Collection Time: 02/24/19  8:33 PM  
Result Value Ref Range Glucose (POC) 294 (H) 65 - 100 mg/dL Performed by Aline Gonzalez (PCT) CBC WITH MANUAL DIFF Collection Time: 02/25/19  3:24 AM  
Result Value Ref Range WBC 8.6 3.6 - 11.0 K/uL  
 RBC 3.06 (L) 3.80 - 5.20 M/uL HGB 9.0 (L) 11.5 - 16.0 g/dL HCT 28.2 (L) 35.0 - 47.0 % MCV 92.2 80.0 - 99.0 FL  
 MCH 29.4 26.0 - 34.0 PG  
 MCHC 31.9 30.0 - 36.5 g/dL RDW 16.7 (H) 11.5 - 14.5 % PLATELET 457 (L) 577 - 400 K/uL MPV 10.5 8.9 - 12.9 FL  
 NRBC 0.0 0  WBC ABSOLUTE NRBC 0.00 0.00 - 0.01 K/uL NEUTROPHILS 80 (H) 32 - 75 % BAND NEUTROPHILS 0 0 - 6 % LYMPHOCYTES 13 12 - 49 % MONOCYTES 5 5 - 13 % EOSINOPHILS 0 0 - 7 % BASOPHILS 0 0 - 1 % METAMYELOCYTES 2 (H) 0 % MYELOCYTES 0 0 % PROMYELOCYTES 0 0 % BLASTS 0 0 % OTHER CELL 0 0 IMMATURE GRANULOCYTES 0 %  
 ABS. NEUTROPHILS 6.9 1.8 - 8.0 K/UL  
 ABS. LYMPHOCYTES 1.1 0.8 - 3.5 K/UL  
 ABS. MONOCYTES 0.4 0.0 - 1.0 K/UL  
 ABS. EOSINOPHILS 0.0 0.0 - 0.4 K/UL  
 ABS. BASOPHILS 0.0 0.0 - 0.1 K/UL  
 ABS. IMM. GRANS. 0.0 K/UL  
 DF MANUAL    
 RBC COMMENTS ANISOCYTOSIS 1+ 
    
 RBC COMMENTS TEARDROP CELLS 1+ GLUCOSE, POC Collection Time: 02/25/19  7:34 AM  
Result Value Ref Range Glucose (POC) 251 (H) 65 - 100 mg/dL Performed by Soni Watts) Assessment:  
 
Active Problems: 
  SVT (supraventricular tachycardia) ([facility] [address] (8/29/2016) Essential hypertension with goal blood pressure less than 140/90 (8/29/2016) Diabetes mellitus type 2, controlled ([address] (9/8/2016) Dyslipidemia (9/8/2016) Acute respiratory failure ([address] (2/3/2019) Leg swelling (2/3/2019) Hodgkin lymphoma ([address] (2/3/2019) CAP (community acquired pneumonia) (2/3/2019) Plan: 1. Significant increase in O2 requirements. Not sure of the etiology but presumably secondary to the pneumonitis. Gradual improvement. 2.  Continue diabetic control.

## 2019-02-25 NOTE — PROGRESS NOTES
[facility] Progress Note Name: Gregoria Davenport MRN: [id number] : 1950 Hospital: [facility] Date: 2019 IMPRESSION:  
· Acute hypoxic resp failure - would hope that oxygnnation improve some with anticoagulation but am afraid the Bleo toxicity will not regress despite steroids and time; of course only time will tel but that is my experiece · Likely bleomycin lung toxicity · Bilateral pulm infiltrates-  First noted on PET  and seen on ct on admit - tx with augmentin -, then Levaquin - Preceding fever 101. S/P bronch . DDX: PNA, opportunistic infection and/or bleomycin toxicity from chemo · Coronavirus positive RVP 
· NEWLY DISCOVERED BILATERAL Pulmonary Emboli WITH NEG DUPLEX OF LEG, now on Eliquis. · Hodkins lymphoma- tx with 4 cyles of abvd- positive response to chemo · Pulm htn - pap 60 with nl lvef · BLE edema · Anemia-  ? Related to chemo- stable · Hx of asthma from childhood to age 24 RECOMMENDATIONS:  
· O2 - wean as tolerated but anticipate this will be chronic and need home O2 
· Topical nasal saline · Antibiotics complete · Jet nebs · Oral steroids- very slow taper would keep on same dose until seen in office for 4 week f/u. · Once over this acute episode will need repeat PFTs as an outpt. · On Eliquis for PE.  
· Recommend not to check pulse ox on her fingers due to nail polish Subjective:  
 
 d/w Dr. Delacruz Comes. still hypoxic after sudden decline end of last week. . No acute distress. Still SOLIZ. More nasal congestion and post nasal drip. MEDS:  
Current Facility-Administered Medications Medication  insulin glargine (LANTUS) injection 10 Units  insulin glargine (LANTUS) injection 26 Units  albuterol-ipratropium (DUO-NEB) 2.5 MG-0.5 MG/3 ML  
 insulin lispro (HUMALOG) injection 5 Units  levalbuterol (XOPENEX) nebulizer soln 1.25 mg/3 mL  polyethylene glycol (MIRALAX) packet 17 g  predniSONE (DELTASONE) tablet 30 mg  
 sodium chloride (NS) flush 5-40 mL  sodium chloride (NS) flush 5-40 mL  apixaban (ELIQUIS) tablet 5 mg  benzonatate (TESSALON) capsule 200 mg  
 lactobac ac& pc-s.therm-b.anim (JUNI Q/RISAQUAD)  acetaminophen (TYLENOL) tablet 650 mg  
 ondansetron (ZOFRAN) injection 4 mg  amLODIPine (NORVASC) tablet 2.5 mg  
 atorvastatin (LIPITOR) tablet 40 mg  
 fluticasone (FLONASE) 50 mcg/actuation nasal spray 2 Spray  metoprolol succinate (TOPROL-XL) XL tablet 50 mg  
 glucose chewable tablet 16 g  
 dextrose (D50W) injection syrg 12.5-25 g  
 glucagon (GLUCAGEN) injection 1 mg  
 nitroglycerin (NITROBID) 2 % ointment 1 Inch Review of Systems: A comprehensive review of systems was negative except for that written in the HPI. Objective: 
Vital Signs:   
Visit Vitals /69 (BP 1 Location: Right arm, BP Patient Position: Sitting; Other (comment)) Comment (BP Patient Position): legs elevaTED Pulse 88 Temp 97.9 °F (36.6 °C) Resp 20 Ht 5' 8\" (1.727 m) Wt 108.2 kg (238 lb 8.6 oz) SpO2 99% BMI 36.27 kg/m² O2 Device: Hi flow nasal cannula O2 Flow Rate (L/min): 6 l/min Temp (24hrs), Av.8 °F (36.6 °C), Min:97.6 °F (36.4 °C), Max:98.1 °F (36.7 °C) Intake/Output:  
Last shift:       07 -  1900 In: 120 [P.O.:120] Out: 1000 [Urine:1000] Last 3 shifts: 1901 -  0700 In: 480 [P.O.:480] Out: 1275 [[id number]] Intake/Output Summary (Last 24 hours) at 2019 1458 Last data filed at 2019 1422 Gross per 24 hour Intake 120 ml Output 1875 ml Net -1755 ml Physical Exam:  
General:  Alert, cooperative, no distress, appears stated age. ON NC oxygen. Sitting up in chair. Pox was 91% on RA at rest.   
Head:  Normocephalic, without obvious abnormality, atraumatic. alopecia Eyes:  Conjunctivae/corneas clear. PERRL, EOMs intact. Nose: Nares normal. Septum midline. Mucosa normal. No drainage or sinus tenderness. Throat: Lips, mucosa, and tongue normal. Teeth and gums normal.- Neck: Supple, symmetrical, trachea midline, no adenopathy, thyroid: no enlargment/tenderness/nodules, no carotid bruit and no JVD. Back:   Symmetric, no curvature. ROM normal.  
Lungs:   Good air movement. Pretty clear. Chest wall:  No tenderness or deformity. Heart:  Regular rate and rhythm, S1, S2 normal, no murmur, click, rub or gallop.- mild tachy Abdomen:   Soft, non-tender. Bowel sounds normal. No masses,  No organomegaly. Extremities: Extremities normal, atraumatic, no cyanosis - edema gone Pulses: 2+ and symmetric all extremities. Skin: Skin color, texture, turgor normal. No rashes or lesions Lymph nodes: Cervical, supraclavicular, and axillary nodes normal.  
Neurologic: Grossly nonfocal, motor is intact. Psych: no over anxiety or depression. Data review:  
 
       
Labs: 
 
Recent Labs  
  02/25/19 
0324 02/23/19 
0232 WBC 8.6 7.3 HGB 9.0* 8.8*  
* 141* No results for input(s): NA, K, CL, CO2, GLU, BUN, CREA, CA, MG, PHOS, LAC, ALB, TBIL, SGOT, ALT, AML, LPSE in the last 72 hours. No results for input(s): PH, PCO2, PO2, HCO3, FIO2 in the last 72 hours. No results for input(s): CPK, CKNDX, TROIQ in the last 72 hours. No lab exists for component: CPKMB No results found for: BNPP, BNP Imaging: 
I have personally reviewed the patients radiographs and have reviewed the reports: CXR today no acute changes Dorys Lemons MD

## 2019-02-25 NOTE — PROGRESS NOTES
Bedside and Verbal shift change report given to Zohreh Fox RN (oncoming nurse). Report included the following information SBAR, Kardex, ED Summary, Procedure Summary, Intake/Output, MAR, Recent Results and Cardiac Rhythm (NSR). SHIFT SUMMARY: 
 
 
 
 
 
[address] NURSING NOTE Admission Date 2/3/2019 Admission Diagnosis Acute respiratory failure ([address] Consults IP CONSULT TO HEMATOLOGY 
IP CONSULT TO PRIMARY CARE PROVIDER 
IP CONSULT TO PULMONOLOGY Cardiac Monitoring [x] Yes [] No  
  
Purposeful Hourly Rounding [x] Yes   
Abdulaziz Score Total Score: 4 Abdulaziz score 3 or > [x] Bed Alarm [] Avasys [] 1:1 sitter [] Patient refused (Signed refusal form in chart) Dwayne Score Dwayne Score: 18 Dwayne score 14 or < [] PMT consult [] Wound Care consult  
 []  Specialty bed  [] Nutrition consult Influenza Vaccine Received Flu Vaccine for Current Season (usually Sept-March): No  
 Patient/Guardian Refused (Notify MD): Yes Oxygen needs? [] Room air Oxygen @  []1L    []2L    []3L   []4L    []5L   [x]6L via NC Chronic home O2 use? [] Yes [x] No 
Perform O2 challenge test and document in progress note using smartphrase (.Homeoxygen) Last bowel movement Last Bowel Movement Date: 02/22/19 Urinary Catheter [REMOVED] External Female Catheter 02/04/19-Urine Output (mL): 400 ml LDAs Venous Access Device portacath  09/12/18 Upper chest (subclavicular area), left (Active) Peripheral IV 02/23/19 Left; Lower Arm (Active) Site Assessment Clean, dry, & intact 2/25/2019  2:41 AM  
Phlebitis Assessment 0 2/25/2019  2:41 AM  
Infiltration Assessment 0 2/25/2019  2:41 AM  
Dressing Status Clean, dry, & intact 2/25/2019  2:41 AM  
Dressing Type Transparent 2/25/2019  2:41 AM  
Hub Color/Line Status Blue;Flushed 2/25/2019  2:41 AM  
                  
  
Readmission Risk Assessment Tool Score Medium Risk 25 Total Score 3 Has Seen PCP in Last 6 Months (Yes=3, No=0) 3 Patient Length of Stay (>5 days = 3)  
 5 Pt. Coverage (Medicare=5 , Medicaid, or Self-Pay=4) 7 Charlson Comorbidity Score (Age + Comorbid Conditions) Criteria that do not apply:  
 . Living with Significant Other. Assisted Living. LTAC. SNF. or  
Rehab  
 IP Visits Last 12 Months (1-3=4, 4=9, >4=11) Expected Length of Stay 3d 12h Actual Length of Stay 22

## 2019-02-25 NOTE — PROGRESS NOTES
0715 bedside shift report received from [facility]. Writer assumed care of patient. 1255 patient gargled with salt H2O x2 per Dr. Clarence Bradley rec 1915 bedside shift report given to [facility]

## 2019-02-25 NOTE — PROGRESS NOTES
Problem: Falls - Risk of 
Goal: *Absence of Falls Document Tito Dust Fall Risk and appropriate interventions in the flowsheet. Outcome: Progressing Towards Goal 
Fall Risk Interventions: 
Mobility Interventions: Bed/chair exit alarm, Communicate number of staff needed for ambulation/transfer, Patient to call before getting OOB, Strengthening exercises (ROM-active/passive), Utilize walker, cane, or other assistive device Mentation Interventions: Bed/chair exit alarm, Adequate sleep, hydration, pain control Medication Interventions: Bed/chair exit alarm, Patient to call before getting OOB, Teach patient to arise slowly Elimination Interventions: Bed/chair exit alarm, Call light in reach, Patient to call for help with toileting needs, Toileting schedule/hourly rounds History of Falls Interventions: Bed/chair exit alarm, Investigate reason for fall, Room close to nurse's station Problem: Pressure Injury - Risk of 
Goal: *Prevention of pressure injury Document Dwayne Scale and appropriate interventions in the flowsheet. Outcome: Progressing Towards Goal 
Pressure Injury Interventions: 
Sensory Interventions: Keep linens dry and wrinkle-free, Minimize linen layers Moisture Interventions: Absorbent underpads Activity Interventions: Assess need for specialty bed, Chair cushion, Increase time out of bed Mobility Interventions: Assess need for specialty bed, Chair cushion, Turn and reposition approx. every two hours(pillow and wedges) Nutrition Interventions: Document food/fluid/supplement intake, Offer support with meals,snacks and hydration Friction and Shear Interventions: Apply protective barrier, creams and emollients, Foam dressings/transparent film/skin sealants, HOB 30 degrees or less, Lift sheet, Minimize layers

## 2019-02-25 NOTE — PROGRESS NOTES
Dm  
Received report from Mariam Pending Bedside and Verbal shift change report given to oncoming nurse. Report included the following information SBAR, Kardex, Intake/Output and MAR.

## 2019-02-25 NOTE — PROGRESS NOTES
Problem: Self Care Deficits Care Plan (Adult) Goal: *Acute Goals and Plan of Care (Insert Text) Occupational Therapy Goals: 
 
Weekly Re-assessment 2/25/2019 All goals remain appropriate Initiated 2/15/2019 1. Patient will perform grooming standing with modified independence within 7 days. 2. Patient will perform toileting with modified independence within 7 days. 3. Patient will perform upper body dressing and lower body dressing with modified independence within 7 days. 4. Patient will perform bathing with modified independence within 7 days. 5. Patient will correctly use PLB technique during functional tasks for improved independence with ADLS within 7 days. 6. Patient will transfer from toilet with modified independence using the least restrictive device and appropriate durable medical equipment within 7 days. Occupational Therapy weekly reassessment Patient: Milvia Pires (84 y.o. female) Date: 2/25/2019 Diagnosis: Acute respiratory failure (HCC) <principal problem not specified> Procedure(s) (LRB): BRONCHOSCOPY (N/A) BRONCHIAL WASHINGS FLEXIBLE (N/A) 19 Days Post-Op Precautions:   
Chart, occupational therapy assessment, plan of care, and goals were reviewed. ASSESSMENT: 
Patient is making progress in self-care. She is primarily limited by decreased activity tolerance, today on 7 liters Hi-flow O2. Educated on energy conservation and pursed lip breathing. Patient's posture also has an impact on her SpO2, and improves (at least slightly) when sitting/standing up straight (she tends to hold her head down with her chin near her chest.  Patient is very motivated and participated in therapy. Recommend inpatient pulmonary rehab upon discharge. Progression toward goals: 
[]       Improving appropriately and progressing toward goals [x]       Improving slowly and progressing toward goals 
[]       Not making progress toward goals and plan of care will be adjusted PLAN: 
 Patient continues to benefit from skilled intervention to address the above impairments. Continue treatment per established plan of care. Discharge Recommendations:  Inpatient Pulmonary Rehab Further Equipment Recommendations for Discharge:  Defer to rehab facility SUBJECTIVE:  
Patient stated I've never really even been sick before. This is all new to me.  OBJECTIVE DATA SUMMARY:  
Cognitive/Behavioral Status: 
Neurologic State: Alert Orientation Level: Oriented X4 Cognition: Appropriate decision making; Follows commands; Appropriate safety awareness Perception: Appears intact Perseveration: No perseveration noted Safety/Judgement: Awareness of environment; Fall prevention;Home safety Functional Mobility and Transfers for ADLs:Bed Mobility: 
  
 
Transfers: 
Sit to Stand: Stand-by assistance Functional Transfers Toilet Transfer : Stand-by assistance(no BSC present; stood to place bedpan in the chair) Cues: Verbal cues provided Balance: 
Sitting: Intact Standing: Impaired Standing - Static: Good Standing - Dynamic : Fair ADL Intervention: 
Discussed energy conservation, including pacing/taking breaks, prioritizing, and planning. She indicates understanding but requires cues throughout to utilize strategies. Education provided for energy conservation and pursed lip breathing. As above, discussed the importance of posture on good air flow. Patient also educated on safety with sit to stand and transfers (like proper hand placement to push up from the chair and reaching back before sitting). Discussed use of the RW for energy conservation. Grooming Washing Face: Supervision/set-up(seated) Washing Hands: Supervision/set-up(seated) Lower Body Dressing Assistance Slip on Shoes Without Back: Supervision/set-up Toileting Bladder Hygiene: Supervision/set-up Clothing Management: Stand-by assistance Cues: Verbal cues provided Cognitive Retraining Safety/Judgement: Awareness of environment; Fall prevention;Home safety Pain: 
Pain Scale 1: Numeric (0 - 10) Pain Intensity 1: 0 Activity Tolerance:  
Fair Please refer to the flowsheet for vital signs taken during this treatment. After treatment:  
[x] Patient left in no apparent distress sitting up in chair 
[] Patient left in no apparent distress in bed 
[x] Call bell left within reach [x] Nursing notified 
[] Caregiver present [x] Bed alarm activated COMMUNICATION/COLLABORATION:  
The patients plan of care was discussed with: Physical Therapist and Registered Nurse EZE Smith/L Time Calculation: 32 mins

## 2019-02-25 NOTE — PROGRESS NOTES
Problem: Mobility Impaired (Adult and Pediatric) Goal: *Acute Goals and Plan of Care (Insert Text) Physical Therapy Goals Goals reviewed 2/25/19 and remain appropriate for the next 7 days Goals reviewed and remain appropriate 2/15/19 Initiated 2/7/2019 1. Patient will move from supine to sit and sit to supine , scoot up and down and roll side to side in bed with modified independence within 7 day(s). 2.  Patient will transfer from bed to chair and chair to bed with supervision/set-up using the least restrictive device within 7 day(s). 3.  Patient will perform sit to stand with supervision/set-up within 7 day(s). 4.  Patient will ambulate with supervision/set-up for 150 feet with the least restrictive device within 7 day(s). physical Therapy TREATMENT: WEEKLY REASSESSMENT Patient: Susan Mccarty (76 y.o. female) Date: 2/25/2019 Diagnosis: Acute respiratory failure (HCC) <principal problem not specified> Procedure(s) (LRB): BRONCHOSCOPY (N/A) BRONCHIAL WASHINGS FLEXIBLE (N/A) 19 Days Post-Op Precautions: fall Chart, physical therapy assessment, plan of care and goals were reviewed. ASSESSMENT: 
Chart reviewed and pt cleared by RN. Pt received sitting in recliner upon arrival. Pt reporting that she is now sleeping in the recliner. Pt demonstrates sit<>stand with SBA and steady stance, pt fatigues pretty quickly and only able to tolerate one standing exercise. RW used for energy conservation with standing and with LE exercises in standing. Pt on 7L of Hi flow O2 with O2 sats decreasing at worst to 87%. Education provided for more effective posture to facilitate improved O2 sats. O2 sats remain above 90% with pt adhering to instructions. Pulmonary rehab remains appropriate at discharge from acute setting. Patient's progression toward goals since last assessment: minimal, continues to be limited by pulmonary status PLAN: 
 Goals have been updated based on progression since last assessment. Patient continues to benefit from skilled intervention to address the above impairments. Continue to follow the patient 4 times a week to address goals. Planned Interventions: 
[x]              Bed Mobility Training             []       Neuromuscular Re-Education [x]              Transfer Training                   []       Orthotic/Prosthetic Training 
[x]              Gait Training                         []       Modalities [x]              Therapeutic Exercises           []       Edema Management/Control [x]              Therapeutic Activities            [x]       Patient and Family Training/Education []              Other (comment): 
Discharge Recommendations: Inpatient Pulmonary Rehab Further Equipment Recommendations for Discharge: rolling walker SUBJECTIVE:  
Patient stated Dr. Jossie Hewitt said he wasn't to worried about the swelling in my ankles and feet.  OBJECTIVE DATA SUMMARY:  
Critical Behavior: 
Neurologic State: Alert, Appropriate for age Orientation Level: Oriented X4 Cognition: Appropriate decision making, Appropriate for age attention/concentration, Appropriate safety awareness Safety/Judgement: Awareness of environment, Fall prevention Strength:  
 decreased but functional 
  
 
 
Functional Mobility Training: 
Bed Mobility: 
 deferred, pt up in recliner Transfers: 
Sit to Stand: Stand-by assistance Stand to Sit: Stand-by assistance Balance: 
Standing - Static: Good Standing - Dynamic : FairAmbulation/Gait Training: 
 deferred, however at best pt able to ambulate 60ft with RW Therapeutic Exercises:  
Standing: (pt holding onto RW)  LE marches (1 set x 10), semi squats (2 sets x 5 reps) Pain: 
Pain Scale 1: Numeric (0 - 10) Pain Intensity 1: 0 Activity Tolerance:  
Fair Please refer to the flowsheet for vital signs taken during this treatment. After treatment: [x]  Patient left in no apparent distress sitting up in chair 
[]  Patient left in no apparent distress in bed 
[x]  Call bell left within reach [x]  Nursing notified 
[]  Caregiver present [x]  Bed alarm activated COMMUNICATION/COLLABORATION:  
The patients plan of care was discussed with: Occupational Therapist and Registered Nurse Tk Dean, PT Time Calculation: 25 mins

## 2019-02-26 NOTE — PROGRESS NOTES
Morning General Daily Progress Note Admit Date: 2/3/2019 Subjective:  
 
Patient has no complaint patient other than occasional coughing. Current Facility-Administered Medications Medication Dose Route Frequency  albuterol-ipratropium (DUO-NEB) 2.5 MG-0.5 MG/3 ML  3 mL Nebulization BID RT  
 insulin glargine (LANTUS) injection 34 Units  34 Units SubCUTAneous DAILY  insulin glargine (LANTUS) injection 16 Units  16 Units SubCUTAneous QHS  sodium chloride (OCEAN) 0.65 % nasal squeeze bottle 2 Spray  2 Spray Both Nostrils Q2H PRN  
 insulin lispro (HUMALOG) injection 5 Units  5 Units SubCUTAneous TIDAC  levalbuterol (XOPENEX) nebulizer soln 1.25 mg/3 mL  1.25 mg Nebulization Q4H PRN  polyethylene glycol (MIRALAX) packet 17 g  17 g Oral DAILY PRN  predniSONE (DELTASONE) tablet 30 mg  30 mg Oral BID WITH MEALS  sodium chloride (NS) flush 5-40 mL  5-40 mL IntraVENous PRN  
 sodium chloride (NS) flush 5-40 mL  5-40 mL IntraVENous Q8H  
 apixaban (ELIQUIS) tablet 5 mg  5 mg Oral Q12H  
 benzonatate (TESSALON) capsule 200 mg  200 mg Oral TID PRN  
 lactobac ac& pc-s.therm-b.anim (JUNI Q/RISAQUAD)  1 Cap Oral DAILY  acetaminophen (TYLENOL) tablet 650 mg  650 mg Oral Q6H PRN  
 ondansetron (ZOFRAN) injection 4 mg  4 mg IntraVENous Q4H PRN  
 amLODIPine (NORVASC) tablet 2.5 mg  2.5 mg Oral DAILY  atorvastatin (LIPITOR) tablet 40 mg  40 mg Oral QHS  fluticasone (FLONASE) 50 mcg/actuation nasal spray 2 Spray  2 Spray Both Nostrils DAILY  metoprolol succinate (TOPROL-XL) XL tablet 50 mg  50 mg Oral DAILY  glucose chewable tablet 16 g  4 Tab Oral PRN  
 dextrose (D50W) injection syrg 12.5-25 g  12.5-25 g IntraVENous PRN  
 glucagon (GLUCAGEN) injection 1 mg  1 mg IntraMUSCular PRN  
 nitroglycerin (NITROBID) 2 % ointment 1 Inch  1 Inch Topical Q6H PRN Review of Systems A comprehensive review of systems was negative. Objective: Patient Vitals for the past 24 hrs: 
 BP Temp Pulse Resp SpO2  
02/26/19 0755     100 % 02/26/19 0715 148/79 98 °F (36.7 °C) 78 12 99 % 02/26/19 0333 141/58 97.6 °F (36.4 °C) 80 20 93 % 02/25/19 2305 135/60 97.3 °F (36.3 °C) 79 20 93 % 02/25/19 2112     100 % 02/25/19 1948 115/56 98.5 °F (36.9 °C) 90 20 93 % 02/25/19 1535 109/85 97.8 °F (36.6 °C) 100 20 97 % 02/25/19 1435     99 % 02/25/19 1120 137/69 97.9 °F (36.6 °C) 88 20 100 % No intake/output data recorded. 02/24 1901 - 02/26 0700 In: 240 [P.O.:240] Out: 2625 [Urine:2625] Physical Exam:  
Visit Vitals /79 (BP 1 Location: Left arm, BP Patient Position: Sitting) Pulse 78 Temp 98 °F (36.7 °C) Resp 12 Ht 5' 8\" (1.727 m) Wt 238 lb 8.6 oz (108.2 kg) SpO2 100% BMI 36.27 kg/m² General appearance: alert, cooperative, no distress, appears stated age Neck: supple, symmetrical, trachea midline, no adenopathy, thyroid: not enlarged, symmetric, no tenderness/mass/nodules, no carotid bruit and no JVD Lungs: rales R base, L base, diminished breath sounds R base, L base Heart: regular rate and rhythm, S1, S2 normal, no murmur, click, rub or gallop Abdomen: soft, non-tender. Bowel sounds normal. No masses,  no organomegaly Extremities: extremities normal, atraumatic, no cyanosis or edema Data Review Recent Results (from the past 24 hour(s)) GLUCOSE, POC Collection Time: 02/25/19 11:23 AM  
Result Value Ref Range Glucose (POC) 207 (H) 65 - 100 mg/dL Performed by Carly Arizmendi) GLUCOSE, POC Collection Time: 02/25/19  4:41 PM  
Result Value Ref Range Glucose (POC) 171 (H) 65 - 100 mg/dL Performed by Carly Ling GLUCOSE, POC Collection Time: 02/25/19  9:13 PM  
Result Value Ref Range Glucose (POC) 248 (H) 65 - 100 mg/dL Performed by Yee Garza (PCT) GLUCOSE, POC Collection Time: 02/26/19  7:12 AM  
Result Value Ref Range Glucose (POC) 194 (H) 65 - 100 mg/dL Performed by Leny Flynn Assessment:  
 
Active Problems: 
  SVT (supraventricular tachycardia) ([facility] [address] (8/29/2016) Essential hypertension with goal blood pressure less than 140/90 (8/29/2016) Diabetes mellitus type 2, controlled ([facility] [address] (9/8/2016) Dyslipidemia (9/8/2016) Acute respiratory failure ([facility] [address] (2/3/2019) Leg swelling (2/3/2019) Hodgkin lymphoma ([facility] [address] (2/3/2019) CAP (community acquired pneumonia) (2/3/2019) Plan: 1. The likelihood of further improvement in the next several days is not good. Improvement will be quite slow. Dreaded complication is a development of pulmonary fibrosis. Continue steroids. Will consider transfer to pulmonary rehab on Friday if all goes well pulmonary agrees. 2.  Continue diabetic control.

## 2019-02-26 NOTE — PROGRESS NOTES
0700: Bedside shift change report given to Jinny Aiken, RN (oncoming nurse) by Tg Monk RN (offgoing nurse). Report included the following information SBAR and Kardex. 1150: Clarified medication order with Dr. Raydell Duane as pre-meal order for humalog is for 5 units, however SSI parameters are still attached in the [facility]. Per Dr. Raydell Duane continue with pre-meal dose of 5 units and discontinue SSI parameters.

## 2019-02-26 NOTE — PROGRESS NOTES
Attempted to see pt this am and pt stated she is wet and needed to be cleaned. Will continue to follow.

## 2019-02-26 NOTE — PROGRESS NOTES
1900:  
Bedside shift change report given to Urvashi Pacheco RN (oncoming nurse). Report included the following information SBAR and Kardex. SHIFT SUMMARY: 
 
 
 
 
 
[facility] NURSING NOTE Admission Date 2/3/2019 Admission Diagnosis Acute respiratory failure ([address] Consults IP CONSULT TO HEMATOLOGY 
IP CONSULT TO PRIMARY CARE PROVIDER 
IP CONSULT TO PULMONOLOGY Cardiac Monitoring [x] Yes [] No  
  
Purposeful Hourly Rounding [x] Yes   
Abdulaziz Score Total Score: 4 Abdulaziz score 3 or > [x] Bed Alarm [] Avasys [] 1:1 sitter [] Patient refused (Signed refusal form in chart) Dwayne Score Dwayne Score: 19 Dwayne score 14 or < [] PMT consult [] Wound Care consult  
 []  Specialty bed  [] Nutrition consult Influenza Vaccine Received Flu Vaccine for Current Season (usually Sept-March): No  
 Patient/Guardian Refused (Notify MD): Yes Oxygen needs? [] Room air Oxygen @  []1L    []2L    []3L   []4L    []5L   [x]6L via NC Chronic home O2 use? [] Yes [x] No 
Perform O2 challenge test and document in progress note using [employer] (.Homeoxygen) Last bowel movement Last Bowel Movement Date: 02/20/19 Urinary Catheter [REMOVED] External Female Catheter 02/04/19-Urine Output (mL): 400 ml LDAs Venous Access Device portacath  09/12/18 Upper chest (subclavicular area), left (Active) Peripheral IV 02/23/19 Left; Lower Arm (Active) Site Assessment Clean, dry, & intact 2/26/2019  2:00 PM  
Phlebitis Assessment 0 2/26/2019  2:00 PM  
Infiltration Assessment 0 2/26/2019  2:00 PM  
Dressing Status Clean, dry, & intact 2/26/2019  2:00 PM  
Dressing Type Transparent 2/26/2019  2:00 PM  
Hub Color/Line Status Blue;Flushed 2/26/2019  2:00 PM  
                  
  
Readmission Risk Assessment Tool Score Medium Risk 25 Total Score 3 Has Seen PCP in Last 6 Months (Yes=3, No=0) 3 Patient Length of Stay (>5 days = 3) 5 Pt. Coverage (Medicare=5 , Medicaid, or Self-Pay=4) 7 Charlson Comorbidity Score (Age + Comorbid Conditions) Criteria that do not apply:  
 . Living with Significant Other. Assisted Living. LTAC. SNF. or  
Rehab  
 IP Visits Last 12 Months (1-3=4, 4=9, >4=11) Expected Length of Stay 3d 12h Actual Length of Stay 23

## 2019-02-26 NOTE — WOUND CARE
Wound care nurse consult from staff nurse stating \"assess sacral wound identified 2/21\". Patient is a 71 y/o AAF admitted for acute resp failure Past Medical History:  
Diagnosis Date  Asthma   
 as a child, nothing since menopause  Cancer ([facility] 08/21/2018 Non-Hodgkin's  Cataracts, bilateral   
 Diabetes ([facility] [address] type II  
 Environmental allergies  Fibromyalgia Treated with alternate medical therapy  Hypertension Patient has a tiny 0.5 x 0.3 x 0.1 partial thickness wound in her gluteal cleft from intertriginous MASD. This is NOT a PI. Recommend: 
 
Gluteal cleft: gently wash with soap and water, pat dry and apply a smear of Sensicare zinc cream to skin.  
 
Cassandra Meza RN, [employer]

## 2019-02-26 NOTE — PROGRESS NOTES
Problem: Mobility Impaired (Adult and Pediatric) Goal: *Acute Goals and Plan of Care (Insert Text) Physical Therapy Goals Goals reviewed 2/25/19 and remain appropriate for the next 7 days Goals reviewed and remain appropriate 2/15/19 Initiated 2/7/2019 1. Patient will move from supine to sit and sit to supine , scoot up and down and roll side to side in bed with modified independence within 7 day(s). 2.  Patient will transfer from bed to chair and chair to bed with supervision/set-up using the least restrictive device within 7 day(s). 3.  Patient will perform sit to stand with supervision/set-up within 7 day(s). 4.  Patient will ambulate with supervision/set-up for 150 feet with the least restrictive device within 7 day(s). physical Therapy TREATMENT Patient: Zulma Velasquez (33 y.o. female) Date: 2/26/2019 Diagnosis: Acute respiratory failure ([address] Procedure(s) (LRB): BRONCHOSCOPY (N/A) BRONCHIAL WASHINGS FLEXIBLE (N/A) 20 Days Post-Op Precautions:  falls Chart, physical therapy assessment, plan of care and goals were reviewed. ASSESSMENT: pt with slow progress, continues to fatigue very quickly, very low activity tolerance, no LOB, heavy SOB, does fair with transfers and ther-ex, vc's for safety and proper RW use. Progression toward goals: 
[]    Improving appropriately and progressing toward goals [x]    Improving slowly and progressing toward goals 
[]    Not making progress toward goals and plan of care will be adjusted PLAN: 
Patient continues to benefit from skilled intervention to address the above impairments. Continue treatment per established plan of care. Discharge Recommendations:  Inpatient Pulmonary Rehab Further Equipment Recommendations for Discharge:  rolling walker OBJECTIVE DATA SUMMARY:  
Critical Behavior: 
Neurologic State: Alert, Appropriate for age Orientation Level: Appropriate for age Cognition: Appropriate decision making, Appropriate for age attention/concentration, Appropriate safety awareness, Follows commands Safety/Judgement: Awareness of environment, Fall prevention, Home safety Functional Mobility Training: 
Bed Mobility: Pt sitting in chair on arrival. 
Transfers: 
Sit to Stand: Contact guard assistance;Assist x1 Stand to Sit: Stand-by assistance Interventions: Tactile cues; Verbal cues Level of Assistance: Stand-by assistance Balance: 
Sitting: Intact; Without support Standing: Intact; With support Standing - Static: Good;Constant support Standing - Dynamic : Good Ambulation/Gait Training: 
Distance (ft): 75 Feet (ft) Assistive Device: Walker, rolling;Gait belt Ambulation - Level of Assistance: Contact guard assistance;Assist x1 Gait Abnormalities: Decreased step clearance Right Side Weight Bearing: Full Left Side Weight Bearing: Full Base of Support: Widened Speed/Carmita: Slow Step Length: Left shortened;Right shortened Therapeutic Exercises:  
sitting EXERCISE Sets Reps Active Active Assist  
Passive Comments Ankle pumps 1 10 [x] [] [] bilat Heel raises 1 10 [x] [] [] \" Toe tap 1 10 [x] [] [] \" Knee ext 1 10 [x] [] [] \" Hip flex 1 10 [x] [] [] \"  
 
Pain: 
Pain Scale 1: Numeric (0 - 10) Pain Intensity 1: 0 Activity Tolerance: poor After treatment:  
[x]    Patient left in no apparent distress sitting up in chair 
[]    Patient left in no apparent distress in bed 
[x]    Call bell left within reach [x]    Nursing notified 
[]    Caregiver present 
[]    Bed alarm activated COMMUNICATION/COLLABORATION:  
The patients plan of care was discussed with: Registered Nurse Savage Leon PTA Time Calculation: 25 mins

## 2019-02-26 NOTE — PROGRESS NOTES
1100 
Cardiopulmonary Care Interdisciplinary Rounds were held today to discuss patient's plan of care and outcomes. The following members were present:  Pharmacy, Nursing and Case Management. Expected Length of Stay:  3d 12h Plan of Care: Continue current treatment plan

## 2019-02-26 NOTE — PROGRESS NOTES
Problem: Self Care Deficits Care Plan (Adult) Goal: *Acute Goals and Plan of Care (Insert Text) Occupational Therapy Goals: 
Weekly Re-assessment 2/25/2019 All goals remain appropriate Initiated 2/15/2019 1. Patient will perform grooming standing with modified independence within 7 days. 2. Patient will perform toileting with modified independence within 7 days. 3. Patient will perform upper body dressing and lower body dressing with modified independence within 7 days. 4. Patient will perform bathing with modified independence within 7 days. 5. Patient will correctly use PLB technique during functional tasks for improved independence with ADLS within 7 days. 6. Patient will transfer from toilet with modified independence using the least restrictive device and appropriate durable medical equipment within 7 days. Occupational Therapy TREATMENT Patient: Nathaly Quijano (79 y.o. female) Date: 2/26/2019 Diagnosis: Acute respiratory failure (HCC) <principal problem not specified> Procedure(s) (LRB): BRONCHOSCOPY (N/A) BRONCHIAL WASHINGS FLEXIBLE (N/A) 20 Days Post-Op Precautions:  Fall Chart, occupational therapy assessment, plan of care, and goals were reviewed. ASSESSMENT: 
Nursing cleared for therapy. Received in chair, agreeable to therapy. Received on 6 L mid flow. Reports she has been using bed pan in chair. Unable to reach mid flow canula to toilet. Obtained BSC and completed amb at RW level with CGA, hygiene with supervision in sitting. Additional time for rest and recovery from [address] Ed on PLB. 6L O2: 
Rest: 94% Activity: 89%,  BPM 
Recovery within 30 secs to 92%, HR 98 BPM 
 
Recommend with nursing patient to complete as able in order to maintain strength, endurance and independence: ADLs with supervision/setup with slow pace, OOB to chair 3x/day and mobilizing to the [facility] for toileting with CGA at RW assist.  
 
Progression toward goals: []       Improving appropriately and progressing toward goals [x]       Improving slowly and progressing toward goals 
[]       Not making progress toward goals and plan of care will be adjusted PLAN: 
Patient continues to benefit from skilled intervention to address the above impairments. Continue treatment per established plan of care. Discharge Recommendations:  IPR- pulmonary rehab Further Equipment Recommendations for Discharge:  TBD rehab SUBJECTIVE:  
Patient stated I have been using the bed pan.  OBJECTIVE DATA SUMMARY:  
Cognitive/Behavioral Status: 
Neurologic State: Alert; Appropriate for age Orientation Level: Appropriate for age Cognition: Appropriate decision making; Appropriate for age attention/concentration; Appropriate safety awareness; Follows commands Functional Mobility and Transfers for ADLs:Bed Mobility: 
  
 
Transfers: 
Sit to Stand: Contact guard assistance;Assist x1 Functional Transfers Bathroom Mobility: Contact guard assistance Toilet Transfer : Contact guard assistance(to [facility] approx 5 ft RW level) Balance: 
Sitting: Intact; Without support Standing: Intact; With support Standing - Static: Good;Constant support Standing - Dynamic : Good ADL Intervention: 
  
 
Patient instructed and indicated understanding the benefits of maintaining activity tolerance, functional mobility, and independence with self care tasks during acute stay  to ensure safe return home and to baseline. Encouraged patient to increase frequency and duration OOB, be out of bed for all meals, perform daily ADLs (as approved by RN/MD regarding bathing etc), and performing functional mobility to/from bathroom. Provided education on energy conservation techniques in regards to ADL/IADL tasks. Discussion with examples of pacing, planning, completion of heavy activity during strongest part of day, seated vs standing, and asking for assistance as needed.  Patient with good participation in discussion and fair understanding. Ed on hand placement, posture/positioning, PLB and pacing tasks with toileting tasks. Good self implemented breaks. Toileting Bladder Hygiene: Supervision/set-up Pain: 
Pain Scale 1: Numeric (0 - 10) Pain Intensity 1: 0 Activity Tolerance:  
See assessment section above After treatment:  
[x] Patient left in no apparent distress sitting up in chair 
[] Patient left in no apparent distress in bed 
[x] Call bell left within reach [x] Nursing notified 
[] Caregiver present 
[] Bed alarm activated COMMUNICATION/COLLABORATION:  
The patients plan of care was discussed with: Physical Therapy Assistant and Registered Nurse Ritu Love OT Time Calculation: 27 mins

## 2019-02-26 NOTE — PROGRESS NOTES
ADULT PROTOCOL: JET AEROSOL  REASSESSMENT Patient  Aquiels Epps     70 y.o.   female     2/26/2019  9:52 AM 
 
Breath Sounds Pre Procedure: Right Breath Sounds: Diminished, Clear Left Breath Sounds: Diminished, Clear Breath Sounds Post Procedure: Right Breath Sounds: Diminished, Clear Left Breath Sounds: Diminished, Clear Breathing pattern: Pre procedure Breathing Pattern: Regular Post procedure Breathing Pattern: Regular Heart Rate: Pre procedure Pulse: 66 Post procedure Pulse: 71 Resp Rate: Pre procedure Respirations: 20 
         Post procedure Respirations: 20 
 
Oxygen: O2 Device: Hi flow nasal cannula  6 SpO2: Pre procedure SpO2: 100 % Post procedure SpO2: 99 % Nebulizer Therapy: Current medications Aerosolized Medications: DuoNeb Smoking History: never Problem List:  
Patient Active Problem List  
Diagnosis Code  SVT (supraventricular tachycardia) ([facility]) I47.1  Essential hypertension with goal blood pressure less than 140/90 I10  
 Diabetes mellitus type 2, controlled [facility] E11.9  Dyslipidemia E78.5  Overweight (BMI 25.0-29. 9) E66.3  
 Supraclavicular mass R22.2  Lymphadenopathy R59.1  Acute respiratory failure (HCC) J96.00  Leg swelling M79.89  
 Hodgkin lymphoma ([facility]) C81.90  
 CAP (community acquired pneumonia) J18.9 Respiratory Therapist: Jocelyne Baumgarten

## 2019-02-26 NOTE — PROGRESS NOTES
[facility], Critical Care, and Sleep Medicine Progress Note Name: Gregoria Davenport MRN: [id number] : 1950 Hospital: [address] Date: 2019 IMPRESSION:  
· Acute hypoxic resp failure - would hope that oxygnnation improve some with anticoagulation but am afraid the Bleo toxicity will not regress despite steroids and time; of course only time will tel but that is my experiece · Likely bleomycin lung toxicity · Bilateral pulm infiltrates-  First noted on PET  and seen on ct on admit - tx with augmentin -, then Levaquin - Preceding fever 101. S/P bronch . DDX: PNA, opportunistic infection and/or bleomycin toxicity from chemo · Coronavirus positive RVP 
· NEWLY DISCOVERED BILATERAL Pulmonary Emboli WITH NEG DUPLEX OF LEG, now on Eliquis. · Hodkins lymphoma- tx with 4 cyles of abvd- positive response to chemo · Pulm htn - pap 60 with nl lvef · BLE edema · Anemia-  ? Related to chemo- stable · Hx of asthma from childhood to age 24 RECOMMENDATIONS:  
· HFNC O2 - wean as tolerated but anticipate this will be chronic and need home O2; she is clearly on too much to provide at home · Topical nasal saline · Antibiotics complete · Jet nebs · Oral steroids- very slow taper would keep on same dose until seen in office for 4 week f/u. · Once over this acute episode will need repeat PFTs as an outpt. · On Eliquis for PE.  
· Recommend not to check pulse ox on her fingers due to nail polish Subjective:  
 
 head cold sx better? On HFNC. Drops sats quickly. No fever. Short sleeper and not bothered by steroids  d/w Dr. Delacruz Comes. still hypoxic after sudden decline end of last week. . No acute distress. Still SOLIZ. More nasal congestion and post nasal drip. MEDS:  
Current Facility-Administered Medications Medication  albuterol-ipratropium (DUO-NEB) 2.5 MG-0.5 MG/3 ML  
  insulin glargine (LANTUS) injection 34 Units  insulin glargine (LANTUS) injection 16 Units  sodium chloride (OCEAN) 0.65 % nasal squeeze bottle 2 Spray  insulin lispro (HUMALOG) injection 5 Units  levalbuterol (XOPENEX) nebulizer soln 1.25 mg/3 mL  polyethylene glycol (MIRALAX) packet 17 g  predniSONE (DELTASONE) tablet 30 mg  
 sodium chloride (NS) flush 5-40 mL  sodium chloride (NS) flush 5-40 mL  apixaban (ELIQUIS) tablet 5 mg  benzonatate (TESSALON) capsule 200 mg  
 lactobac ac& pc-s.therm-b.anim (JUNI Q/RISAQUAD)  acetaminophen (TYLENOL) tablet 650 mg  
 ondansetron (ZOFRAN) injection 4 mg  amLODIPine (NORVASC) tablet 2.5 mg  
 atorvastatin (LIPITOR) tablet 40 mg  
 fluticasone (FLONASE) 50 mcg/actuation nasal spray 2 Spray  metoprolol succinate (TOPROL-XL) XL tablet 50 mg  
 glucose chewable tablet 16 g  
 dextrose (D50W) injection syrg 12.5-25 g  
 glucagon (GLUCAGEN) injection 1 mg  
 nitroglycerin (NITROBID) 2 % ointment 1 Inch Review of Systems: A comprehensive review of systems was negative except for that written in the HPI. Objective: 
Vital Signs:   
Visit Vitals /71 (BP 1 Location: Right arm, BP Patient Position: At rest) Pulse 79 Temp 97.8 °F (36.6 °C) Resp 12 Ht 5' 8\" (1.727 m) Wt 108.2 kg (238 lb 8.6 oz) SpO2 99% BMI 36.27 kg/m² O2 Device: Hi flow nasal cannula O2 Flow Rate (L/min): 6 l/min Temp (24hrs), Av.8 °F (36.6 °C), Min:97.3 °F (36.3 °C), Max:98.5 °F (36.9 °C) Intake/Output:  
Last shift:       07 - 1900 In: 480 [P.O.:480] Out: 500 [Urine:500] Last 3 shifts: 1901 -  0700 In: 240 [P.O.:240] Out: 2625 [Urine:2625] Intake/Output Summary (Last 24 hours) at 2019 1135 Last data filed at 2019 1050 Gross per 24 hour Intake 600 ml Output 2350 ml Net -1750 ml Physical Exam:  
General:  Alert, cooperative, no distress, appears stated age.  ON NC oxygen. Sitting up in chair. Pox was 91% on RA at rest.   
Head:  Normocephalic, without obvious abnormality, atraumatic. alopecia Eyes:  Conjunctivae/corneas clear. PERRL, EOMs intact. Nose: Nares normal. Septum midline. Mucosa normal. No drainage or sinus tenderness. Throat: Lips, mucosa, and tongue normal. Teeth and gums normal.- Neck: Supple, symmetrical, trachea midline, no adenopathy, thyroid: no enlargment/tenderness/nodules, no carotid bruit and no JVD. Back:   Symmetric, no curvature. ROM normal.  
Lungs:   Good air movement. Pretty clear. Chest wall:  No tenderness or deformity. Heart:  Regular rate and rhythm, S1, S2 normal, no murmur, click, rub or gallop.- mild tachy Abdomen:   Soft, non-tender. Bowel sounds normal. No masses,  No organomegaly. Extremities: Extremities normal, atraumatic, no cyanosis - edema gone Pulses: 2+ and symmetric all extremities. Skin: Skin color, texture, turgor normal. No rashes or lesions Lymph nodes: Cervical, supraclavicular, and axillary nodes normal.  
Neurologic: Grossly nonfocal, motor is intact. Psych: no over anxiety or depression. Data review: 
 
       
Labs: 
 
Recent Labs  
  02/25/19 
0324 WBC 8.6 HGB 9.0*  
* No results for input(s): NA, K, CL, CO2, GLU, BUN, CREA, CA, MG, PHOS, LAC, ALB, TBIL, SGOT, ALT, AML, LPSE in the last 72 hours. No results for input(s): PH, PCO2, PO2, HCO3, FIO2 in the last 72 hours. No results for input(s): CPK, CKNDX, TROIQ in the last 72 hours. No lab exists for component: CPKMB No results found for: BNPP, BNP Imaging: 
I have personally reviewed the patients radiographs and have reviewed the reports: CXR today no acute changes Nita Aguirre MD

## 2019-02-26 NOTE — PROGRESS NOTES
Bedside and Verbal shift change report given to Daniel Leslie RN (oncoming nurse). Report included the following information SBAR, Kardex, ED Summary, Procedure Summary, Intake/Output, MAR, Recent Results and Cardiac Rhythm (NSR). SHIFT SUMMARY: 
 
 
 
 
 
[facility] NURSING NOTE Admission Date 2/3/2019 Admission Diagnosis Acute respiratory failure ([address] Consults IP CONSULT TO HEMATOLOGY 
IP CONSULT TO PRIMARY CARE PROVIDER 
IP CONSULT TO PULMONOLOGY Cardiac Monitoring [x] Yes [] No  
  
Purposeful Hourly Rounding [x] Yes   
Abdulaziz Score Total Score: 4 Abdulaziz score 3 or > [x] Bed Alarm [] Avasys [] 1:1 sitter [] Patient refused (Signed refusal form in chart) Dwayne Score Dwayne Score: 19 Dwayne score 14 or < [] PMT consult [] Wound Care consult  
 []  Specialty bed  [] Nutrition consult Influenza Vaccine Received Flu Vaccine for Current Season (usually Sept-March): No  
 Patient/Guardian Refused (Notify MD): Yes Oxygen needs? [] Room air Oxygen @  []1L    []2L    []3L   []4L    []5L   [x]6L via NC Chronic home O2 use? [] Yes [x] No 
Perform O2 challenge test and document in progress note using smartVouche (.Homeoxygen) Last bowel movement Last Bowel Movement Date: 02/20/19 Urinary Catheter [REMOVED] External Female Catheter 02/04/19-Urine Output (mL): 400 ml LDAs Venous Access Device portacath  09/12/18 Upper chest (subclavicular area), left (Active) Peripheral IV 02/23/19 Left; Lower Arm (Active) Site Assessment Clean, dry, & intact 2/26/2019  4:16 AM  
Phlebitis Assessment 0 2/26/2019  4:16 AM  
Infiltration Assessment 0 2/26/2019  4:16 AM  
Dressing Status Clean, dry, & intact 2/26/2019  4:16 AM  
Dressing Type Transparent 2/26/2019  4:16 AM  
Hub Color/Line Status Blue;Flushed 2/26/2019  4:16 AM  
                  
  
Readmission Risk Assessment Tool Score Medium Risk 25 Total Score 3 Has Seen PCP in Last 6 Months (Yes=3, No=0) 3 Patient Length of Stay (>5 days = 3)  
 5 Pt. Coverage (Medicare=5 , Medicaid, or Self-Pay=4) 7 Charlson Comorbidity Score (Age + Comorbid Conditions) Criteria that do not apply:  
 . Living with Significant Other. Assisted Living. LTAC. SNF. or  
Rehab  
 IP Visits Last 12 Months (1-3=4, 4=9, >4=11) Expected Length of Stay 3d 12h Actual Length of Stay 23

## 2019-02-27 NOTE — PROGRESS NOTES
General Daily Progress Note Admit Date: 2/3/2019 Subjective:  
 
Patient complains of exertional dyspnea. Current Facility-Administered Medications Medication Dose Route Frequency  [START ON 2/28/2019] insulin glargine (LANTUS) injection 40 Units  40 Units SubCUTAneous DAILY  albuterol-ipratropium (DUO-NEB) 2.5 MG-0.5 MG/3 ML  3 mL Nebulization BID RT  
 insulin glargine (LANTUS) injection 16 Units  16 Units SubCUTAneous QHS  sodium chloride (OCEAN) 0.65 % nasal squeeze bottle 2 Spray  2 Spray Both Nostrils Q2H PRN  
 insulin lispro (HUMALOG) injection 5 Units  5 Units SubCUTAneous TIDAC  levalbuterol (XOPENEX) nebulizer soln 1.25 mg/3 mL  1.25 mg Nebulization Q4H PRN  polyethylene glycol (MIRALAX) packet 17 g  17 g Oral DAILY PRN  predniSONE (DELTASONE) tablet 30 mg  30 mg Oral BID WITH MEALS  sodium chloride (NS) flush 5-40 mL  5-40 mL IntraVENous PRN  
 sodium chloride (NS) flush 5-40 mL  5-40 mL IntraVENous Q8H  
 apixaban (ELIQUIS) tablet 5 mg  5 mg Oral Q12H  
 benzonatate (TESSALON) capsule 200 mg  200 mg Oral TID PRN  
 lactobac ac& pc-s.therm-b.anim (JUNI Q/RISAQUAD)  1 Cap Oral DAILY  acetaminophen (TYLENOL) tablet 650 mg  650 mg Oral Q6H PRN  
 ondansetron (ZOFRAN) injection 4 mg  4 mg IntraVENous Q4H PRN  
 amLODIPine (NORVASC) tablet 2.5 mg  2.5 mg Oral DAILY  atorvastatin (LIPITOR) tablet 40 mg  40 mg Oral QHS  fluticasone (FLONASE) 50 mcg/actuation nasal spray 2 Spray  2 Spray Both Nostrils DAILY  metoprolol succinate (TOPROL-XL) XL tablet 50 mg  50 mg Oral DAILY  glucose chewable tablet 16 g  4 Tab Oral PRN  
 dextrose (D50W) injection syrg 12.5-25 g  12.5-25 g IntraVENous PRN  
 glucagon (GLUCAGEN) injection 1 mg  1 mg IntraMUSCular PRN  
 nitroglycerin (NITROBID) 2 % ointment 1 Inch  1 Inch Topical Q6H PRN Review of Systems A comprehensive review of systems was negative. Objective:  
 
Patient Vitals for the past 24 hrs: BP Temp Pulse Resp SpO2  
02/27/19 0845     92 % 02/27/19 0715 149/86 97.7 °F (36.5 °C) 91 20 90 % 02/27/19 0321 146/82 97.8 °F (36.6 °C) 78 20 93 % 02/26/19 2238 159/60 97.5 °F (36.4 °C) 97 18 93 % 02/26/19 2058     98 % 02/26/19 1938 134/59 97.4 °F (36.3 °C) 74 16 95 % 02/26/19 1450 130/59 98.5 °F (36.9 °C) 85 15 90 % 02/26/19 1029 134/71 97.8 °F (36.6 °C) 79 12 99 % No intake/output data recorded. 02/25 1901 - 02/27 0700 In: 480 [P.O.:480] Out: 2150 [Urine:2150] Physical Exam:  
Visit Vitals /86 (BP 1 Location: Left arm, BP Patient Position: Sitting) Pulse 91 Temp 97.7 °F (36.5 °C) Resp 20 Ht 5' 8\" (1.727 m) Wt 238 lb 8.6 oz (108.2 kg) SpO2 92% BMI 36.27 kg/m² General appearance: alert, cooperative, no distress, appears stated age Neck: supple, symmetrical, trachea midline, no adenopathy, thyroid: not enlarged, symmetric, no tenderness/mass/nodules, no carotid bruit and no JVD Lungs: rales R base, L base, diminished breath sounds R base, L base Heart: regular rate and rhythm, S1, S2 normal, no murmur, click, rub or gallop Abdomen: soft, non-tender. Bowel sounds normal. No masses,  no organomegaly Extremities: edema trace Data Review Recent Results (from the past 24 hour(s)) GLUCOSE, POC Collection Time: 02/26/19 11:13 AM  
Result Value Ref Range Glucose (POC) 190 (H) 65 - 100 mg/dL Performed by Doron Mars (PCT) GLUCOSE, POC Collection Time: 02/26/19  4:30 PM  
Result Value Ref Range Glucose (POC) 248 (H) 65 - 100 mg/dL Performed by Doron Mars (PCT) GLUCOSE, POC Collection Time: 02/26/19  8:55 PM  
Result Value Ref Range Glucose (POC) 124 (H) 65 - 100 mg/dL Performed by Jose Addison (CON) METABOLIC PANEL, BASIC Collection Time: 02/27/19  2:48 AM  
Result Value Ref Range Sodium 133 (L) 136 - 145 mmol/L Potassium 4.4 3.5 - 5.1 mmol/L  Chloride 97 97 - 108 mmol/L  
 CO2 29 21 - 32 mmol/L  
 Anion gap 7 5 - 15 mmol/L Glucose 242 (H) 65 - 100 mg/dL BUN 28 (H) 6 - 20 MG/DL Creatinine 0.91 0.55 - 1.02 MG/DL  
 BUN/Creatinine ratio 31 (H) 12 - 20 GFR est AA >60 >60 ml/min/1.73m2 GFR est non-AA >60 >60 ml/min/1.73m2 Calcium 8.7 8.5 - 10.1 MG/DL  
GLUCOSE, POC Collection Time: 02/27/19  7:21 AM  
Result Value Ref Range Glucose (POC) 219 (H) 65 - 100 mg/dL Performed by Lucio Hurst  (PCT) Assessment:  
 
Active Problems: 
  SVT (supraventricular tachycardia) ([facility] (8/29/2016) Essential hypertension with goal blood pressure less than 140/90 (8/29/2016) Diabetes mellitus type 2, controlled ([facility] (9/8/2016) Dyslipidemia (9/8/2016) Acute respiratory failure ([facility] (2/3/2019) Leg swelling (2/3/2019) Hodgkin lymphoma ([facility] (2/3/2019) CAP (community acquired pneumonia) (2/3/2019) Plan:  
 
1. O2 requirements continued to fluctuate as expected. We will add Mucomyst. 
2.  Possible discharge to rehab on Friday or Saturday. 3.  Continue diabetic control but tight control cannot occur until there is more consistency with eating.

## 2019-02-27 NOTE — PROGRESS NOTES
Nutrition Assessment: 
 
INTERVENTIONS/RECOMMENDATIONS:  
Meals/Snacks: General/healthful diet:  Continue CCD for BG management. May continue Boost as pref, suggest between meals. ASSESSMENT:  
2/27:  Chart reviewed; med noted for CCD for BG management. PO intake adequate. BG -219. Chart reviewed; continues on a CCD + HS snack. Has been awaiting insurance authorization for several days which was received today. Plans for discharge tomorrow per notes. Last documented %. Boost 1-2x/day per patient preference. BG better controlled; DTC following. Encourage intake of meals. Diet Order: Consistent carb 
% Eaten:   
Patient Vitals for the past 72 hrs: 
 % Diet Eaten 02/27/19 0910 90 % Pertinent Medications: [x] Reviewed []Other: 
Pertinent Labs: [x]Reviewed  []Other: 
Food Allergies: [x]None []Other:   Last BM:    [x]Active     []Hyperactive  []Hypoactive       [] Absent  BS Skin:    [x] Intact   [] Incision  [] Breakdown   []Edema   []Other: Anthropometrics: Height: 5' 8\" (172.7 cm) Weight: 108.2 kg (238 lb 8.6 oz) IBW (%IBW):   ( ) UBW (%UBW):   (  %) BMI: Body mass index is 36.27 kg/m². This BMI is indicative of: 
[]Underweight   []Normal   []Overweight   [x] Obesity   [] Extreme Obesity (BMI>40) Last Weight Metrics: 
Weight Loss Metrics 2/9/2019 1/21/2019 11/15/2018 9/13/2018 9/12/2018 9/10/2018 8/27/2018 Today's Wt 238 lb 8.6 oz 180 lb 180 lb 198 lb 201 lb 1 oz 200 lb 3.2 oz 201 lb BMI 36.27 kg/m2 27.37 kg/m2 27.37 kg/m2 30.11 kg/m2 29.69 kg/m2 29.56 kg/m2 29.68 kg/m2 Estimated Nutrition Needs (Based on): 8515 Kcals/day(BMR (1257) x 1. 3AF -300kcal) , 86 g(0.8 g/kg bw) Protein Carbohydrate: At Least 130 g/day  Fluids: 1800 mL/day NUTRITION DIAGNOSES:  
Problem:  Altered nutrition-related lab values Etiology: related to DM, steroid Signs/Symptoms: as evidenced by -[phone] Previous Nutrition Dx:  [] Resolved  [] Unresolved           [x] Progressing NUTRITION INTERVENTIONS: 
Meals/Snacks: General/healthful diet   Supplements: Commercial supplement GOAL:  
PO intake >70% of meals next 5-7 days NUTRITION MONITORING AND EVALUATION Food/Nutrient Intake Outcomes: Total energy intake Physical Signs/Symptoms Outcomes: Weight/weight change, Glucose profile Previous Goal Met: 
 [x] Met              [] Progressing Towards Goal              [] Not Progressing Towards Goal  
Previous Recommendations: 
 [x] Implemented          [] Not Implemented          [] Not Applicable LEARNING NEEDS (Diet, Food/Nutrient-Drug Interaction):  
 [x] None Identified 
 [] Identified and Education Provided/Documented 
 [] Identified and Pt declined/was not appropriate Cultural, Buddhist, OR Ethnic Dietary Needs:  
 [x] None Identified 
 [] Identified and Addressed 
 
 [x] Interdisciplinary Care Plan Reviewed/Documented  
 [x] Discharge Planning:  Continue CCD for BG managment 
 [] Participated in Interdisciplinary Rounds NUTRITION RISK:  
 [x] Patient At Nutritional Risk 
 [] Patient Not At Nutritional Risk Bernard Yeung RD Pager [phone] Weekend Pager [phone]

## 2019-02-27 NOTE — ADT AUTH CERT NOTES
Pulmonary Disease [facility] - Care Day 21 (2/25/2019) by Jennifer Perez Review Status Completed Criteria Review Care Day: 21 Care Date: 2/25/2019 Level of Care: Telemetry Guideline Day 3 Level Of Care  
(X) * Activity level acceptable 2/26/2019 15:54:19 EST by Bostwick Manuel  
  up with assistance  
  
(X) * Isolation not needed, or status acceptable 2/26/2019 15:54:19 EST by Bostwick Manuel  
  none noted Clinical Status ( ) * Respiratory status acceptable  
(X) * Right heart function adequate 2/26/2019 15:54:19 EST by Bostwick Manuel  
  Regular rate and rhythm, S1, S2 normal, no murmur, click, rub or gallop.- mild tachy  
  
(X) * Temperature status acceptable 2/26/2019 15:54:19 EST by Bostwick Manuel  
  97.3  
  
(X) * No infection, or status acceptable  
(X) * Stable chest findings 2/26/2019 15:54:19 EST by Bostwick Manuel  
  Chest wall: No tenderness or deformity. ( ) * Pain and nausea absent or adequately managed ( ) * General Discharge Criteria met Interventions  
(X) * No chest tube, or status acceptable 2/26/2019 15:54:19 EST by Bostwick Manuel  
  none noted  
  
(X) * Intake acceptable 2/26/2019 15:54:19 EST by Bostwick Manuel  
  diet diabetic  
  
( ) * No inpatient interventions needed 2/26/2019 15:54:19 EST by Bostwick Manuel Subject: Additional Clinical Information Medications-norvasc 2.5mg PO x1, eliquis 5mg PO x2, humalog 15 units, metoprolol 50mg Pox1, prednisone 30mg POx2, duoneb x3, lantus x36 units 2/26/2019 15:54:19 EST by Bostwick Manuel Subject: Additional Clinical Information Patient has no complaint patient other than occasional coughing. 1.   The likelihood of further improvement in the next several days is not good.   Improvement will be quite slow.   Dreaded complication is a development of pulmonary fibrosis.   Continue steroids.   Will consider transfer to pulmonary rehab on Friday if all goes well pulmonary agrees. 2.   Continue diabetic control. 2/26/2019 15:54:19 EST by Dae Carrillo Subject: Additional Clinical Information Vitals-97.3, 20, 93% 6L hi flow nasal cannula, 135/60,   
  
  
  
  
  
* Milestone Additional Notes 2/25/19 Pulmonary Note IMPRESSION:  
· Acute hypoxic resp failure - would hope that oxygnnation improve some with anticoagulation but am afraid the Bleo toxicity will not regress despite steroids and time; of course only time will tel but that is my experiece · Likely bleomycin lung toxicity · Bilateral pulm infiltrates-  First noted on PET 1/21 and seen on ct on admit - tx with augmentin 1/25-1/30, then Levaquin - Preceding fever 101. S/P bronch 2/6. DDX: PNA, opportunistic infection and/or bleomycin toxicity from chemo · Coronavirus positive RVP  
· NEWLY DISCOVERED BILATERAL Pulmonary Emboli WITH NEG DUPLEX OF LEG, now on Eliquis. · Hodkins lymphoma- tx with 4 cyles of abvd- positive response to chemo · Pulm htn - pap 60 with nl lvef · BLE edema · Anemia-  ? Related to chemo- stable · Hx of asthma from childhood to age 24   
   
RECOMMENDATIONS:  
· O2 - wean as tolerated but anticipate this will be chronic and need home O2  
· Topical nasal saline · Antibiotics complete · Jet nebs · Oral steroids- very slow taper would keep on same dose until seen in office for 4 week f/u. · Once over this acute episode will need repeat PFTs as an outpt. · On Eliquis for PE.   
· Recommend not to check pulse ox on her fingers due to nail polish

## 2019-02-27 NOTE — ROUTINE PROCESS
Bedside report provided by Rashad Wiley RN. SBAR report, Kardex, MAR, Cardiac Rhythm (SR), and pt condition reviewed. Pt OOB to chair.

## 2019-02-27 NOTE — PROGRESS NOTES
Attempted to see pt this pm for the second time today (she requested I come back after lunch) and she was on a conference call and pretty much ignored me. Will continue to follow.

## 2019-02-27 NOTE — PROGRESS NOTES
Problem: Falls - Risk of 
Goal: *Absence of Falls Document [place] Rank Fall Risk and appropriate interventions in the flowsheet. Outcome: Progressing Towards Goal 
Fall Risk Interventions: 
Mobility Interventions: Communicate number of staff needed for ambulation/transfer, Patient to call before getting OOB Mentation Interventions: More frequent rounding, Toileting rounds Medication Interventions: Patient to call before getting OOB, Teach patient to arise slowly Elimination Interventions: Bed/chair exit alarm History of Falls Interventions: Investigate reason for fall, Room close to nurse's station Problem: Pressure Injury - Risk of 
Goal: *Prevention of pressure injury Document Dwayne Scale and appropriate interventions in the flowsheet. Outcome: Progressing Towards Goal 
Pressure Injury Interventions: 
Sensory Interventions: Keep linens dry and wrinkle-free, Minimize linen layers Moisture Interventions: Absorbent underpads Activity Interventions: Increase time out of bed Mobility Interventions: HOB 30 degrees or less Nutrition Interventions: Document food/fluid/supplement intake, Discuss nutritional consult with provider, Offer support with meals,snacks and hydration Friction and Shear Interventions: Apply protective barrier, creams and emollients, Lift sheet, Minimize layers Problem: Pulmonary Embolism Care Plan (Adult) Goal: *Absence of bleeding Outcome: Not Progressing Towards Goal 
Pt with noted blood clots with nasal hygiene; not new, although slightly more this morning than in the past. Pt encouraged to continue notifying RN staff with blood clots removal. Pt on Eliquis and high flow nasal canula--risk of bleeding. Problem: Breathing Pattern - Ineffective Goal: *Absence of hypoxia Outcome: Progressing Towards Goal 
Pt continues to be 93% or greater on 6 L/min high flow nasal canula.

## 2019-02-27 NOTE — PROGRESS NOTES
[facility], Critical Care, and Sleep Medicine Progress Note Name: Az Vee MRN: [id number] : 1950 Hospital: [address] Date: 2019 IMPRESSION:  
· Acute hypoxic resp failure - would hope that oxygnnation improve some with anticoagulation but am afraid the Bleo toxicity will not regress despite steroids and time; of course only time will tel but that is my experiece · Likely bleomycin lung toxicity · Bilateral pulm infiltrates-  First noted on PET  and seen on ct on admit - tx with augmentin -, then Levaquin - Preceding fever 101. S/P bronch . DDX: PNA, opportunistic infection and/or bleomycin toxicity from chemo · Coronavirus positive RVP 
· NEWLY DISCOVERED BILATERAL Pulmonary Emboli WITH NEG DUPLEX OF LEG, now on Eliquis. · Hodkins lymphoma- tx with 4 cyles of abvd- positive response to chemo · Pulm htn - pap 60 with nl lvef · BLE edema · Anemia-  ? Related to chemo- stable · Hx of asthma from childhood to age 24 RECOMMENDATIONS:  
· O2 - wean as tolerated but anticipate this will be chronic and need home O2; she is clearly on too much to provide at home and she will need rehab as well · Topical nasal saline · Antibiotics complete · Jet nebs · Oral steroids- very slow taper would keep on same dose until seen in office for 4 week f/u. · Once over this acute episode will need repeat PFTs as an outpt. · On Eliquis for PE.  
· Recommend not to check pulse ox on her fingers due to nail polish Subjective:  
 
 down to 6 LPM but drops sats with any activity. Thermostat not working in room. Very cold and now bundled up under piles of blankets  head cold sx better? On HFNC. Drops sats quickly. No fever. Short sleeper and not bothered by steroids  d/w Dr. Stacy Poag. still hypoxic after sudden decline end of last week. . No acute distress. Still SOLIZ. More nasal congestion and post nasal drip. MEDS:  
Current Facility-Administered Medications Medication  [START ON 2019] insulin glargine (LANTUS) injection 40 Units  albuterol-ipratropium (DUO-NEB) 2.5 MG-0.5 MG/3 ML  
 insulin glargine (LANTUS) injection 16 Units  sodium chloride (OCEAN) 0.65 % nasal squeeze bottle 2 Spray  insulin lispro (HUMALOG) injection 5 Units  levalbuterol (XOPENEX) nebulizer soln 1.25 mg/3 mL  polyethylene glycol (MIRALAX) packet 17 g  predniSONE (DELTASONE) tablet 30 mg  
 sodium chloride (NS) flush 5-40 mL  sodium chloride (NS) flush 5-40 mL  apixaban (ELIQUIS) tablet 5 mg  benzonatate (TESSALON) capsule 200 mg  
 lactobac ac& pc-s.therm-b.anim (JUNI Q/RISAQUAD)  acetaminophen (TYLENOL) tablet 650 mg  
 ondansetron (ZOFRAN) injection 4 mg  amLODIPine (NORVASC) tablet 2.5 mg  
 atorvastatin (LIPITOR) tablet 40 mg  
 fluticasone (FLONASE) 50 mcg/actuation nasal spray 2 Spray  metoprolol succinate (TOPROL-XL) XL tablet 50 mg  
 glucose chewable tablet 16 g  
 dextrose (D50W) injection syrg 12.5-25 g  
 glucagon (GLUCAGEN) injection 1 mg  
 nitroglycerin (NITROBID) 2 % ointment 1 Inch Review of Systems: A comprehensive review of systems was negative except for that written in the HPI. Objective: 
Vital Signs:   
Visit Vitals BP (!) 155/96 (BP 1 Location: Right arm, BP Patient Position: Sitting) Pulse 88 Temp 98.5 °F (36.9 °C) Resp 18 Ht 5' 8\" (1.727 m) Wt 108.2 kg (238 lb 8.6 oz) SpO2 90% BMI 36.27 kg/m² O2 Device: Nasal cannula O2 Flow Rate (L/min): 6 l/min Temp (24hrs), Av.9 °F (36.6 °C), Min:97.4 °F (36.3 °C), Max:98.5 °F (36.9 °C) Intake/Output:  
Last shift:      701 -  190 In: 240 [P.O.:240] Out: 525 [Urine:525] Last 3 shifts: 1901 -  0700 In: 480 [P.O.:480] Out: 2150 [Urine:2150] Intake/Output Summary (Last 24 hours) at 2/27/2019 1259 Last data filed at 2/27/2019 1233 Gross per 24 hour Intake 240 ml Output 1125 ml Net -885 ml Physical Exam:  
General:  Alert, cooperative, no distress, appears stated age. ON NC oxygen. Sitting up in chair. Pox was 91% on RA at rest.   
Head:  Normocephalic, without obvious abnormality, atraumatic. alopecia Eyes:  Conjunctivae/corneas clear. PERRL, EOMs intact. Nose: Nares normal. Septum midline. Mucosa normal. No drainage or sinus tenderness. Throat: Lips, mucosa, and tongue normal. Teeth and gums normal.- Neck: Supple, symmetrical, trachea midline, no adenopathy, thyroid: no enlargment/tenderness/nodules, no carotid bruit and no JVD. Back:   Symmetric, no curvature. ROM normal.  
Lungs:   Good air movement. Pretty clear. Chest wall:  No tenderness or deformity. Heart:  Regular rate and rhythm, S1, S2 normal, no murmur, click, rub or gallop.- mild tachy Abdomen:   Soft, non-tender. Bowel sounds normal. No masses,  No organomegaly. Extremities: Extremities normal, atraumatic, no cyanosis - edema gone Pulses: 2+ and symmetric all extremities. Skin: Skin color, texture, turgor normal. No rashes or lesions Lymph nodes: Cervical, supraclavicular, and axillary nodes normal.  
Neurologic: Grossly nonfocal, motor is intact. Psych: no over anxiety or depression. Data review: 
 
       
Labs: 
 
Recent Labs  
  02/25/19 
0324 WBC 8.6 HGB 9.0*  
* Recent Labs  
  02/27/19 
0248 * K 4.4 CL 97  
CO2 29 * BUN 28* CREA 0.91  
CA 8.7 No results for input(s): PH, PCO2, PO2, HCO3, FIO2 in the last 72 hours. No results for input(s): CPK, CKNDX, TROIQ in the last 72 hours. No lab exists for component: CPKMB No results found for: BNPP, BNP Imaging: 
I have personally reviewed the patients radiographs and have reviewed the reports: CXR today no acute changes Sakshi Solis MD

## 2019-02-27 NOTE — PROGRESS NOTES
Supportive follow up visit on [facility] unit for length of stay patient. No visitors present. Patient was seated in recliner at bedside. Provided pastoral presence and empathic listening as Ms. Dick Lopez shared about her current medical challenges. Her support system includes family and friends and is strong. She shared that spirituality and prayer have always helped her cope, although she is not a [employer] member. Offered assurance of prayer and reminded her of  availability. RAFFI Bray, [facility],  [facility]  Paging Service  [phone])

## 2019-02-28 NOTE — PROGRESS NOTES
Problem: Falls - Risk of 
Goal: *Absence of Falls Document Lori Mercedes Fall Risk and appropriate interventions in the flowsheet. Outcome: Progressing Towards Goal 
Fall Risk Interventions: 
Mobility Interventions: Communicate number of staff needed for ambulation/transfer, Patient to call before getting OOB Mentation Interventions: Adequate sleep, hydration, pain control Medication Interventions: Assess postural VS orthostatic hypotension, Bed/chair exit alarm, Evaluate medications/consider consulting pharmacy Elimination Interventions: Bed/chair exit alarm History of Falls Interventions: Bed/chair exit alarm Problem: Pressure Injury - Risk of 
Goal: *Prevention of pressure injury Document Dwayne Scale and appropriate interventions in the flowsheet. Outcome: Progressing Towards Goal 
Pressure Injury Interventions: 
Sensory Interventions: Keep linens dry and wrinkle-free, Minimize linen layers Moisture Interventions: Absorbent underpads Activity Interventions: Increase time out of bed, PT/OT evaluation Mobility Interventions: PT/OT evaluation Nutrition Interventions: Document food/fluid/supplement intake, Discuss nutritional consult with provider, Offer support with meals,snacks and hydration Friction and Shear Interventions: Apply protective barrier, creams and emollients, Minimize layers Problem: Breathing Pattern - Ineffective Goal: *Absence of hypoxia Outcome: Not Progressing Towards Goal 
Pt still finding herself requiring 5 to 8 L/min supplemental O2 via high flow nasal canula. O2 demands occasionally associated with sleeping (possilbly apneic, though not visualized) or associated with exertion. Pt remains on continuous pulse. Mucus plugging of bronchi

## 2019-02-28 NOTE — ROUTINE PROCESS
Dr. Bear Ray on call MD called---pt presents a second moderate sized nasal derived mucous production; on Eliquis; CBC not drawn this am (no order seen in iTracs). Charge RN reviewed chart--pt has active order for CBC M/W/F but somehow order not released for today. 1) Awaiting MD callback--answering service placing page. 2) Orders released for future CBC.

## 2019-02-28 NOTE — PROGRESS NOTES
[facility], Critical Care, and Sleep Medicine Progress Note Name: Osmar Cota MRN: [id number] : 1950 Hospital: [place] [address] Date: 2019 IMPRESSION:  
· Acute hypoxic resp failure - would hope that oxygnnation improve some with anticoagulation but am afraid the Bleo toxicity will not regress despite steroids and time; of course only time will tel but that is my experiece · Likely bleomycin lung toxicity · Bilateral pulm infiltrates-  First noted on PET  and seen on ct on admit - tx with augmentin -, then Levaquin - Preceding fever 101. S/P bronch . DDX: PNA, opportunistic infection and/or bleomycin toxicity from chemo · Coronavirus positive RVP 
· NEWLY DISCOVERED BILATERAL Pulmonary Emboli WITH NEG DUPLEX OF LEG, now on Eliquis. · Hodkins lymphoma- tx with 4 cyles of abvd- positive response to chemo · Pulm htn - pap 60 with nl lvef · BLE edema · Anemia-  ? Related to chemo- stable · Hx of asthma from childhood to age 24 RECOMMENDATIONS:  
· O2 - wean as tolerated but anticipate this will be chronic and need home O2; she is clearly on too much to provide at home and she will need rehab as well · Will ask Dr. Lavonne Hermosillo to consider antidepressant until she recovers · Topical nasal saline · Antibiotics complete · Jet nebs · Oral steroids- very slow taper would keep on same dose until seen in office for 4 week f/u. · Once over this acute episode will need repeat PFTs as an outpt. · On Eliquis for PE.  
· Recommend not to check pulse ox on her fingers due to nail polish Subjective:  
 
 tired. Little rest. No energy. I think she is clinically depressed. On 6 LPM. No PT yesterday but willing to try today.  down to 6 LPM but drops sats with any activity. Thermostat not working in room. Very cold and now bundled up under piles of blankets  head cold sx better? On HFNC. Drops sats quickly. No fever. Short sleeper and not bothered by steroids  d/w Dr. Karthikeyan Handley. still hypoxic after sudden decline end of last week. . No acute distress. Still SOLIZ. More nasal congestion and post nasal drip. MEDS:  
Current Facility-Administered Medications Medication  albuterol-ipratropium (DUO-NEB) 2.5 MG-0.5 MG/3 ML  
 insulin glargine (LANTUS) injection 40 Units  sodium chloride (OCEAN) 0.65 % nasal squeeze bottle 2 Spray  insulin glargine (LANTUS) injection 16 Units  sodium chloride (OCEAN) 0.65 % nasal squeeze bottle 2 Spray  insulin lispro (HUMALOG) injection 5 Units  levalbuterol (XOPENEX) nebulizer soln 1.25 mg/3 mL  polyethylene glycol (MIRALAX) packet 17 g  predniSONE (DELTASONE) tablet 30 mg  
 sodium chloride (NS) flush 5-40 mL  sodium chloride (NS) flush 5-40 mL  apixaban (ELIQUIS) tablet 5 mg  benzonatate (TESSALON) capsule 200 mg  
 lactobac ac& pc-s.therm-b.anim (JUNI Q/RISAQUAD)  acetaminophen (TYLENOL) tablet 650 mg  
 ondansetron (ZOFRAN) injection 4 mg  amLODIPine (NORVASC) tablet 2.5 mg  
 atorvastatin (LIPITOR) tablet 40 mg  
 fluticasone (FLONASE) 50 mcg/actuation nasal spray 2 Spray  metoprolol succinate (TOPROL-XL) XL tablet 50 mg  
 glucose chewable tablet 16 g  
 dextrose (D50W) injection syrg 12.5-25 g  
 glucagon (GLUCAGEN) injection 1 mg  
 nitroglycerin (NITROBID) 2 % ointment 1 Inch Review of Systems: A comprehensive review of systems was negative except for that written in the HPI. Objective: 
Vital Signs:   
Visit Vitals /62 (BP 1 Location: Right arm, BP Patient Position: Sitting) Pulse 93 Temp 97.6 °F (36.4 °C) Resp 22 Ht 5' 8\" (1.727 m) Wt 108.2 kg (238 lb 8.6 oz) SpO2 99% BMI 36.27 kg/m² O2 Device: Hi flow nasal cannula O2 Flow Rate (L/min): 6 l/min Temp (24hrs), Av.8 °F (36.6 °C), Min:97.4 °F (36.3 °C), Max:98.5 °F (36.9 °C) Intake/Output:  
Last shift:      No intake/output data recorded. Last 3 shifts: 02/26 1901 - 02/28 0700 In: 240 [P.O.:240] Out: 1575 [[id number]:[id number]] Intake/Output Summary (Last 24 hours) at 2/28/2019 2132 Last data filed at 2/28/2019 8107 Gross per 24 hour Intake  Output 975 ml Net -975 ml Physical Exam:  
General:  Alert, cooperative, no distress, appears stated age. ON NC oxygen. Sitting up in chair. Pox was 91% on RA at rest.   
Head:  Normocephalic, without obvious abnormality, atraumatic. alopecia Eyes:  Conjunctivae/corneas clear. PERRL, EOMs intact. Nose: Nares normal. Septum midline. Mucosa normal. No drainage or sinus tenderness. Throat: Lips, mucosa, and tongue normal. Teeth and gums normal.- Neck: Supple, symmetrical, trachea midline, no adenopathy, thyroid: no enlargment/tenderness/nodules, no carotid bruit and no JVD. Back:   Symmetric, no curvature. ROM normal.  
Lungs:   Good air movement. Pretty clear. Chest wall:  No tenderness or deformity. Heart:  Regular rate and rhythm, S1, S2 normal, no murmur, click, rub or gallop.- mild tachy Abdomen:   Soft, non-tender. Bowel sounds normal. No masses,  No organomegaly. Extremities: Extremities normal, atraumatic, no cyanosis - edema gone Pulses: 2+ and symmetric all extremities. Skin: Skin color, texture, turgor normal. No rashes or lesions Lymph nodes: Cervical, supraclavicular, and axillary nodes normal.  
Neurologic: Grossly nonfocal, motor is intact. Psych: no over anxiety or depression. Data review: 
 
       
Labs: 
 
Recent Labs  
  02/27/19 
2101 WBC 10.9 HGB 10.1*  
* Recent Labs  
  02/27/19 
0248 * K 4.4 CL 97  
CO2 29 * BUN 28* CREA 0.91  
CA 8.7 No results for input(s): PH, PCO2, PO2, HCO3, FIO2 in the last 72 hours. No results for input(s): CPK, CKNDX, TROIQ in the last 72 hours. No lab exists for component: CPKMB No results found for: BNPP, BNP Imaging: 
I have personally reviewed the patients radiographs and have reviewed the reports: CXR today no acute changes Tong Day MD

## 2019-02-28 NOTE — PROGRESS NOTES
Occupational Therapy Attempted to see patient for OT treatment. She requested to wait for therapy until after she eats her lunch. Noticed tray wasn't even there yet, but patient indicates \"It should be here any minute. \"  Will defer for now but continue to follow.

## 2019-02-28 NOTE — PROGRESS NOTES
Bedside and Verbal shift change report given to Arley Pinedo RN (oncoming nurse). Report included the following information SBAR, Kardex, Intake/Output and Cardiac Rhythm nsr. SHIFT SUMMARY: 
 
 
CONCERNS TO ADDRESS WITH MD: 
 
 
 
[facility] NURSING NOTE Admission Date 2/3/2019 Admission Diagnosis Acute respiratory failure ([address] Consults IP CONSULT TO HEMATOLOGY 
IP CONSULT TO PRIMARY CARE PROVIDER 
IP CONSULT TO PULMONOLOGY Cardiac Monitoring [x] Yes [] No  
  
Purposeful Hourly Rounding [x] Yes   
Abdulaziz Score Total Score: 3 Abdulaziz score 3 or > [x] Bed Alarm [] Avasys [] 1:1 sitter [] Patient refused (Signed refusal form in chart) Dwayne Score Dwayne Score: 20 Dwayne score 14 or < [] PMT consult [] Wound Care consult  
 []  Specialty bed  [] Nutrition consult Influenza Vaccine Received Flu Vaccine for Current Season (usually Sept-March): No  
 Patient/Guardian Refused (Notify MD): Yes Oxygen needs? [] Room air Oxygen @  []1L    []2L    []3L   []4L    [x]5L   []6L via NC Chronic home O2 use? [] Yes [] No 
Perform O2 challenge test and document in progress note using smartphrase (.Homeoxygen) Last bowel movement Last Bowel Movement Date: 02/20/19 Urinary Catheter [REMOVED] External Female Catheter 02/04/19-Urine Output (mL): 400 ml LDAs Venous Access Device portacath  09/12/18 Upper chest (subclavicular area), left (Active) Peripheral IV 02/23/19 Left; Lower Arm (Active) Site Assessment Clean, dry, & intact 2/28/2019  4:28 PM  
Phlebitis Assessment 0 2/28/2019  4:28 PM  
Infiltration Assessment 0 2/28/2019  4:28 PM  
Dressing Status Clean, dry, & intact 2/28/2019  4:28 PM  
Dressing Type Transparent 2/28/2019  4:28 PM  
Hub Color/Line Status Blue;Flushed 2/28/2019  4:28 PM  
                  
  
Readmission Risk Assessment Tool Score Medium Risk 25 Total Score 3 Has Seen PCP in Last 6 Months (Yes=3, No=0) 3 Patient Length of Stay (>5 days = 3)  
 5 Pt. Coverage (Medicare=5 , Medicaid, or Self-Pay=4) 7 Charlson Comorbidity Score (Age + Comorbid Conditions) Criteria that do not apply:  
 . Living with Significant Other. Assisted Living. LTAC. SNF. or  
Rehab  
 IP Visits Last 12 Months (1-3=4, 4=9, >4=11) Expected Length of Stay 3d 12h Actual Length of Stay 25

## 2019-02-28 NOTE — PROGRESS NOTES
Problem: Mobility Impaired (Adult and Pediatric) Goal: *Acute Goals and Plan of Care (Insert Text) Physical Therapy Goals Goals reviewed 2/25/19 and remain appropriate for the next 7 days Goals reviewed and remain appropriate 2/15/19 Initiated 2/7/2019 1. Patient will move from supine to sit and sit to supine , scoot up and down and roll side to side in bed with modified independence within 7 day(s). 2.  Patient will transfer from bed to chair and chair to bed with supervision/set-up using the least restrictive device within 7 day(s). 3.  Patient will perform sit to stand with supervision/set-up within 7 day(s). 4.  Patient will ambulate with supervision/set-up for 150 feet with the least restrictive device within 7 day(s). physical Therapy TREATMENT Patient: Tiana Rice (19 y.o. female) Date: 2/28/2019 Diagnosis: Acute respiratory failure ([facility] Procedure(s) (LRB): BRONCHOSCOPY (N/A) BRONCHIAL WASHINGS FLEXIBLE (N/A) 22 Days Post-Op Precautions:   falls Chart, physical therapy assessment, plan of care and goals were reviewed. ASSESSMENT: pt with very slow progress, continues to c/o weakness and fatigue, starts to cough the moment we enter the room (non productive), is never feeling well and always cold, low activity tolerance, did fair with toilet transfers, vc's for safety and proper RW use. Progression toward goals: 
[]    Improving appropriately and progressing toward goals 
[]    Improving slowly and progressing toward goals [x]    Not making progress toward goals PLAN: 
Patient continues to benefit from skilled intervention to address the above impairments. Continue treatment per established plan of care. Discharge Recommendations:  Inpatient Rehab Further Equipment Recommendations for Discharge:  rolling walker OBJECTIVE DATA SUMMARY:  
Critical Behavior: 
Neurologic State: Alert Orientation Level: Oriented to person, Oriented to place, Oriented to time Cognition: Appropriate decision making Safety/Judgement: Awareness of environment, Fall prevention, Home safety Functional Mobility Training: 
Bed Mobility: Pt sitting in chair on arrival. 
Transfers: 
Sit to Stand: Contact guard assistance Stand to Sit: Stand-by assistance Interventions: Tactile cues; Verbal cues Level of Assistance: Contact guard assistance Balance: 
Sitting: Intact; Without support Standing: Intact; With support Standing - Static: Good;Constant support Standing - Dynamic : GoodAmbulation/Gait Training: 
Distance (ft): 25 Feet (ft) Assistive Device: Walker, rolling;Gait belt Ambulation - Level of Assistance: Contact guard assistance Gait Abnormalities: Decreased step clearance Right Side Weight Bearing: Full Left Side Weight Bearing: Full Base of Support: Widened Speed/Carmita: Slow Step Length: Left shortened;Right shortened Pain: 
Pain Scale 1: Numeric (0 - 10) Pain Intensity 1: 0 Activity Tolerance: poor After treatment:  
[x]    Patient left in no apparent distress sitting up in chair 
[]    Patient left in no apparent distress in bed 
[x]    Call bell left within reach [x]    Nursing notified 
[]    Caregiver present 
[]    Bed alarm activated COMMUNICATION/COLLABORATION:  
The patients plan of care was discussed with: Registered Nurse Vick Francis PTA Time Calculation: 25 mins

## 2019-02-28 NOTE — PROGRESS NOTES
General Daily Progress Note Admit Date: 2/3/2019 Subjective:  
 
Patient complains of being fatigued and short of breath. Current Facility-Administered Medications Medication Dose Route Frequency  albuterol-ipratropium (DUO-NEB) 2.5 MG-0.5 MG/3 ML  3 mL Nebulization Q4H PRN  
 insulin glargine (LANTUS) injection 40 Units  40 Units SubCUTAneous DAILY  sodium chloride (OCEAN) 0.65 % nasal squeeze bottle 2 Spray  2 Spray Both Nostrils Q2H PRN  
 insulin glargine (LANTUS) injection 16 Units  16 Units SubCUTAneous QHS  sodium chloride (OCEAN) 0.65 % nasal squeeze bottle 2 Spray  2 Spray Both Nostrils Q2H PRN  
 insulin lispro (HUMALOG) injection 5 Units  5 Units SubCUTAneous TIDAC  levalbuterol (XOPENEX) nebulizer soln 1.25 mg/3 mL  1.25 mg Nebulization Q4H PRN  polyethylene glycol (MIRALAX) packet 17 g  17 g Oral DAILY PRN  predniSONE (DELTASONE) tablet 30 mg  30 mg Oral BID WITH MEALS  sodium chloride (NS) flush 5-40 mL  5-40 mL IntraVENous PRN  
 sodium chloride (NS) flush 5-40 mL  5-40 mL IntraVENous Q8H  
 apixaban (ELIQUIS) tablet 5 mg  5 mg Oral Q12H  
 benzonatate (TESSALON) capsule 200 mg  200 mg Oral TID PRN  
 lactobac ac& pc-s.therm-b.anim (JUNI Q/RISAQUAD)  1 Cap Oral DAILY  acetaminophen (TYLENOL) tablet 650 mg  650 mg Oral Q6H PRN  
 ondansetron (ZOFRAN) injection 4 mg  4 mg IntraVENous Q4H PRN  
 amLODIPine (NORVASC) tablet 2.5 mg  2.5 mg Oral DAILY  atorvastatin (LIPITOR) tablet 40 mg  40 mg Oral QHS  fluticasone (FLONASE) 50 mcg/actuation nasal spray 2 Spray  2 Spray Both Nostrils DAILY  metoprolol succinate (TOPROL-XL) XL tablet 50 mg  50 mg Oral DAILY  glucose chewable tablet 16 g  4 Tab Oral PRN  
 dextrose (D50W) injection syrg 12.5-25 g  12.5-25 g IntraVENous PRN  
 glucagon (GLUCAGEN) injection 1 mg  1 mg IntraMUSCular PRN  
 nitroglycerin (NITROBID) 2 % ointment 1 Inch  1 Inch Topical Q6H PRN Review of Systems A comprehensive review of systems was negative. Objective:  
 
Patient Vitals for the past 24 hrs: 
 BP Temp Pulse Resp SpO2  
02/28/19 0813 129/62 97.6 °F (36.4 °C) 93 22 99 % 02/28/19 0318 127/65 97.9 °F (36.6 °C) 93 22 95 % 02/27/19 2231 120/63 97.5 °F (36.4 °C) 88 20 97 % 02/27/19 1924 135/74 97.6 °F (36.4 °C) 89 18 98 % 02/27/19 1433 135/79 97.4 °F (36.3 °C) 97 20 93 % 02/27/19 1047 (!) 155/96 98.5 °F (36.9 °C) 88 18 90 % 02/27/19 0845     92 % No intake/output data recorded. 02/26 1901 - 02/28 0700 In: 240 [P.O.:240] Out: 1575 [[id number]:[id number]] Physical Exam:  
Visit Vitals /62 (BP 1 Location: Right arm, BP Patient Position: Sitting) Pulse 93 Temp 97.6 °F (36.4 °C) Resp 22 Ht 5' 8\" (1.727 m) Wt 238 lb 8.6 oz (108.2 kg) SpO2 99% BMI 36.27 kg/m² General appearance: alert, cooperative, no distress, appears stated age Neck: supple, symmetrical, trachea midline, no adenopathy, thyroid: not enlarged, symmetric, no tenderness/mass/nodules, no carotid bruit and no JVD Lungs: rales R base, L base, diminished breath sounds R base, L base Heart: regular rate and rhythm, S1, S2 normal, no murmur, click, rub or gallop Abdomen: soft, non-tender. Bowel sounds normal. No masses,  no organomegaly Extremities: edema trace Data Review Recent Results (from the past 24 hour(s)) GLUCOSE, POC Collection Time: 02/27/19 10:53 AM  
Result Value Ref Range Glucose (POC) 210 (H) 65 - 100 mg/dL Performed by Lb Mom  (PCT) GLUCOSE, POC Collection Time: 02/27/19  4:56 PM  
Result Value Ref Range Glucose (POC) 164 (H) 65 - 100 mg/dL Performed by Lb Mom  (PCT) GLUCOSE, POC Collection Time: 02/27/19  8:46 PM  
Result Value Ref Range Glucose (POC) 242 (H) 65 - 100 mg/dL Performed by Porfirio Vernon CBC WITH AUTOMATED DIFF Collection Time: 02/27/19  9:01 PM  
Result Value Ref Range WBC 10.9 3.6 - 11.0 K/uL RBC 3.52 (L) 3.80 - 5.20 M/uL  
 HGB 10.1 (L) 11.5 - 16.0 g/dL HCT 32.0 (L) 35.0 - 47.0 % MCV 90.9 80.0 - 99.0 FL  
 MCH 28.7 26.0 - 34.0 PG  
 MCHC 31.6 30.0 - 36.5 g/dL  
 RDW 16.8 (H) 11.5 - 14.5 % PLATELET 036 (L) 439 - 400 K/uL MPV 11.6 8.9 - 12.9 FL  
 NRBC 0.0 0  WBC ABSOLUTE NRBC 0.00 0.00 - 0.01 K/uL NEUTROPHILS 89 (H) 32 - 75 % LYMPHOCYTES 7 (L) 12 - 49 % MONOCYTES 3 (L) 5 - 13 % EOSINOPHILS 0 0 - 7 % BASOPHILS 0 0 - 1 % IMMATURE GRANULOCYTES 1 (H) 0.0 - 0.5 % ABS. NEUTROPHILS 9.7 (H) 1.8 - 8.0 K/UL  
 ABS. LYMPHOCYTES 0.8 0.8 - 3.5 K/UL  
 ABS. MONOCYTES 0.3 0.0 - 1.0 K/UL  
 ABS. EOSINOPHILS 0.0 0.0 - 0.4 K/UL  
 ABS. BASOPHILS 0.0 0.0 - 0.1 K/UL  
 ABS. IMM. GRANS. 0.1 (H) 0.00 - 0.04 K/UL  
 DF AUTOMATED    
 RBC COMMENTS ANISOCYTOSIS 1+ GLUCOSE, POC Collection Time: 02/28/19  7:37 AM  
Result Value Ref Range Glucose (POC) 203 (H) 65 - 100 mg/dL Performed by Brice West Assessment:  
 
Active Problems: 
  [facility] (8/29/2016) Essential hypertension with goal blood pressure less than 140/90 (8/29/2016) Diabetes mellitus type 2, controlled ([facility] (9/8/2016) Dyslipidemia (9/8/2016) Acute respiratory failure ([facility] (2/3/2019) Leg swelling (2/3/2019) Hodgkin lymphoma ([facility] (2/3/2019) CAP (community acquired pneumonia) (2/3/2019) Plan:  
 
1. O2 requirements remain quite high accompanied by desaturation with walking. 2.  Diabetic control remains fair but have elected minimize further increases in insulin until there is more consistency as far as eating is concerned. Elevated fasting blood sugars reflect eating at 3 AM in the morning.

## 2019-02-28 NOTE — ROUTINE PROCESS
Bedside report provided by Karlie Scott RN. SBAR report, Kardex, MAR, Cardiac Rhythm (SR), and pt general condition reviewed. Pt currently on 5 L/min O2 via high flow nasal canula.

## 2019-02-28 NOTE — PROGRESS NOTES
Problem: Falls - Risk of 
Goal: *Absence of Falls Document Lisbet Mckeon Fall Risk and appropriate interventions in the flowsheet. Outcome: Progressing Towards Goal 
Fall Risk Interventions: 
Mobility Interventions: Bed/chair exit alarm, Utilize walker, cane, or other assistive device Mentation Interventions: Bed/chair exit alarm Medication Interventions: Assess postural VS orthostatic hypotension, Bed/chair exit alarm, Evaluate medications/consider consulting pharmacy, Patient to call before getting OOB Elimination Interventions: Call light in reach, Bed/chair exit alarm History of Falls Interventions: Bed/chair exit alarm, Consult care management for discharge planning, Door open when patient unattended, Evaluate medications/consider consulting pharmacy

## 2019-03-01 NOTE — PROGRESS NOTES
0700: SBAR Change of Shift report from Taiwo Leiva, SUSANNAH (offcoming nurse) to SRINI Vicente RN (on-coming nurse) 0830: Paged Dr. Raydell Duane to clarify insulin prescription, Dr. Raydell Duane returned page, and orders entered into [facility] 1010: Pt called out about a expellled blood clot from nose. Pt states this only happens infrequently. Clot small, dark red with mucus. Pt not actively bleeding from nose. 1158: Called Dr. Carmelo Crawford to him in regards to insulin lispro and possible meal time scheduled dose. Dr. Raydell Duane stated he does not want insulin lispro scheduled. No additional orders given. 1547: SBAR report given to SRINI Pollard RN from SRINI Vicente RN. Updated on pt status.

## 2019-03-01 NOTE — PROGRESS NOTES
Problem: Self Care Deficits Care Plan (Adult) Goal: *Acute Goals and Plan of Care (Insert Text) Occupational Therapy Goals: 
Weekly Re-assessment 2/25/2019 All goals remain appropriate Initiated 2/15/2019 1. Patient will perform grooming standing with modified independence within 7 days. 2. Patient will perform toileting with modified independence within 7 days. 3. Patient will perform upper body dressing and lower body dressing with modified independence within 7 days. 4. Patient will perform bathing with modified independence within 7 days. 5. Patient will correctly use PLB technique during functional tasks for improved independence with ADLS within 7 days. 6. Patient will transfer from toilet with modified independence using the least restrictive device and appropriate durable medical equipment within 7 days. Outcome: Progressing Towards Goal 
Occupational Therapy TREATMENT Patient: Olivier Lyon (89 y.o. female) Date: 3/1/2019 Diagnosis: Acute respiratory failure (HCC) <principal problem not specified> Procedure(s) (LRB): BRONCHOSCOPY (N/A) BRONCHIAL WASHINGS FLEXIBLE (N/A) 23 Days Post-Op Precautions:   
Chart, occupational therapy assessment, plan of care, and goals were reviewed. ASSESSMENT: 
Cleared for OT by nursing and patient was agreeable to participate in therapy. On 6L high-flow NC throughout session. O2 sat at rest 98% and 90% at lowest with LB seated ADLs. Patient was able to don both socks and slippers w/o backs with Modified Indep. for increased time. Required RB after donning both socks to catch her breath. Ambulated from chair to door x3 using RW with CGA. Patient was able to self-monitor need for standing RBs at RW. O2 was at 78% (lowest) during mobility with RW. Requires verbal cues for activity pacing/PLB techniques, yet is improving with energy conservation techniques (stopping when she needs to catch her breath). Progression toward goals: [x]       Improving appropriately and progressing toward goals 
[]       Improving slowly and progressing toward goals 
[]       Not making progress toward goals and plan of care will be adjusted PLAN: 
Patient continues to benefit from skilled intervention to address the above impairments. Continue treatment per established plan of care. Discharge Recommendations:  Inpatient Pulmonary Rehab Further Equipment Recommendations for Discharge:  Defer to Rehab SUBJECTIVE:  
Patient stated I feel better today. Yesterday I couldn't do anything. OBJECTIVE DATA SUMMARY:  
Cognitive/Behavioral Status: 
Neurologic State: Alert Orientation Level: Oriented X4 Cognition: Appropriate decision making; Appropriate for age attention/concentration; Follows commands; Appropriate safety awareness Functional Mobility and Transfers for ADLs: 
Transfers: 
Sit to Stand: Contact guard assistance Balance: 
Sitting: Intact; Without support Standing: Intact; With support Standing - Static: Good;Constant support Standing - Dynamic : Good ADL Intervention: Lower Body Dressing Assistance Socks: Modified independent(increased time) Slip on Shoes Without Back: Modified independent(increased time) Activity Tolerance:  
Improving. See assessment above. Please refer to the flowsheet for vital signs taken during this treatment. After treatment:  
[x] Patient left in no apparent distress sitting up in chair 
[] Patient left in no apparent distress in bed 
[x] Call bell left within reach [x] Nursing notified 
[x] Caregiver present 
[] Bed alarm activated COMMUNICATION/COLLABORATION:  
The patients plan of care was discussed with: Physical Therapist and Patient [facility], OTR/L Time Calculation: 27 mins

## 2019-03-01 NOTE — DIABETES MGMT
DTC Progress Note Recommendations/ Comments: chart reviewed for hyperglycemia. Noted pt receiving prednisone 30 mg BID. If appropriate, please consider: 1. Resuming tradjenta 5 mg with breakfast (formulary replacement for Januvia) 2. Lantus will need to be tapered as steroids are tapered. Current hospital DM medication: lantus 40 units am and 16 units hs, Lispro correctional insulin normal sensitivity ac and hs. Chart reviewed on Susan Mccarty. Patient is a 70 y.o. female with known Type 2 Diabetes on actos 30mg daily, janumet XR 50-1000mg ac b/d at home. A1c:  
Lab Results Component Value Date/Time Hemoglobin A1c 6.9 (H) 08/03/2018 01:45 PM  
 Hemoglobin A1c 6.8 (H) 01/30/2018 01:25 PM  
 
 
Recent Glucose Results:  
Lab Results Component Value Date/Time  (H) 03/01/2019 04:40 AM  
 GLUCPOC 184 (H) 03/01/2019 11:13 AM  
 GLUCPOC 239 (H) 03/01/2019 07:57 AM  
 GLUCPOC 220 (H) 02/28/2019 09:15 PM  
  
 
Lab Results Component Value Date/Time Creatinine 0.79 03/01/2019 04:40 AM  
 
Estimated Creatinine Clearance: 86.6 mL/min (based on SCr of 0.79 mg/dL). Active Orders Diet DIET DIABETIC CONSISTENT CARB Regular PO intake:  
Patient Vitals for the past 72 hrs: 
 % Diet Eaten 03/01/19 1001 80 % 02/27/19 0910 90 % Will continue to follow as needed. Thank you Nitish Matias [facility] [phone] Time spent: 4  minutes

## 2019-03-01 NOTE — PROGRESS NOTES
[facility], Critical Care, and Sleep Medicine Progress Note Name: Brittani Garcia MRN: [id number] : 1950 Hospital: [address] Date: 3/1/2019 IMPRESSION:  
· Acute hypoxic resp failure - would hope that oxygnnation improve some with anticoagulation but am afraid the Bleo toxicity will not regress despite steroids and time; of course only time will tel but that is my experiece · Likely bleomycin lung toxicity · Bilateral pulm infiltrates-  First noted on PET  and seen on ct on admit - tx with augmentin -, then Levaquin - Preceding fever 101. S/P bronch . DDX: PNA, opportunistic infection and/or bleomycin toxicity from chemo · Coronavirus positive RVP 
· NEWLY DISCOVERED BILATERAL Pulmonary Emboli WITH NEG DUPLEX OF LEG, now on Eliquis. · Hodkins lymphoma- tx with 4 cyles of abvd- positive response to chemo · Pulm htn - pap 60 with nl lvef · BLE edema · Anemia-  ? Related to chemo- stable · Hx of asthma from childhood to age 24 RECOMMENDATIONS:  
· O2 - wean as tolerated but anticipate this will be chronic and need home O2; she is clearly on too much to provide at home and she will need rehab as well · Will ask Dr. Jossie Hewitt to consider antidepressant until she recovers · Topical nasal saline · Antibiotics complete · Jet nebs · Oral steroids- very slow taper would keep on same dose until seen in office for 4 week f/u. · Once over this acute episode will need repeat PFTs as an outpt. · On Eliquis for PE.  
· Recommend not to check pulse ox on her fingers due to nail polish · Will see again Monday or sooner prn Subjective:  
 
 
31discxussed her depression with family at bedside. Pt refuses antidepressants and wants to rely on her meds, prayer.  Told her to keep that door open and discussed potential benefits and that it could be temporary trial 
 
 2/28 tired. Little rest. No energy. I think she is clinically depressed. On 6 LPM. No PT yesterday but willing to try today. 2/27 down to 6 LPM but drops sats with any activity. Thermostat not working in room. Very cold and now bundled up under piles of blankets 2/26 head cold sx better? On HFNC. Drops sats quickly. No fever. Short sleeper and not bothered by steroids 2/25 d/w Dr. Maria Guadalupe Jones. still hypoxic after sudden decline end of last week. . No acute distress. Still SOLIZ. More nasal congestion and post nasal drip. MEDS:  
Current Facility-Administered Medications Medication  acetylcysteine (MUCOMYST) 200 mg/mL (20 %) solution 400 mg  
 albuterol-ipratropium (DUO-NEB) 2.5 MG-0.5 MG/3 ML  
 insulin glargine (LANTUS) injection 40 Units  sodium chloride (OCEAN) 0.65 % nasal squeeze bottle 2 Spray  insulin glargine (LANTUS) injection 16 Units  levalbuterol (XOPENEX) nebulizer soln 1.25 mg/3 mL  polyethylene glycol (MIRALAX) packet 17 g  predniSONE (DELTASONE) tablet 30 mg  
 sodium chloride (NS) flush 5-40 mL  sodium chloride (NS) flush 5-40 mL  apixaban (ELIQUIS) tablet 5 mg  benzonatate (TESSALON) capsule 200 mg  
 lactobac ac& pc-s.therm-b.anim (JUNI Q/RISAQUAD)  acetaminophen (TYLENOL) tablet 650 mg  
 ondansetron (ZOFRAN) injection 4 mg  amLODIPine (NORVASC) tablet 2.5 mg  
 atorvastatin (LIPITOR) tablet 40 mg  
 fluticasone (FLONASE) 50 mcg/actuation nasal spray 2 Spray  metoprolol succinate (TOPROL-XL) XL tablet 50 mg  
 glucose chewable tablet 16 g  
 dextrose (D50W) injection syrg 12.5-25 g  
 glucagon (GLUCAGEN) injection 1 mg  
 nitroglycerin (NITROBID) 2 % ointment 1 Inch Review of Systems: A comprehensive review of systems was negative except for that written in the HPI. Objective: 
Vital Signs:   
Visit Vitals /67 (BP 1 Location: Right arm, BP Patient Position: Sitting) Pulse 79 Temp 97.3 °F (36.3 °C) Resp 22 Ht 5' 8\" (1.727 m) Wt 108.2 kg (238 lb 8.6 oz) SpO2 98% BMI 36.27 kg/m² O2 Device: Nasal cannula O2 Flow Rate (L/min): 5 l/min Temp (24hrs), Av.6 °F (36.4 °C), Min:97.3 °F (36.3 °C), Max:97.9 °F (36.6 °C) Intake/Output:  
Last shift:      701 - 1900 In: 480 [P.O.:480] Out: 400 [Urine:400] Last 3 shifts: 1901 -  07 In: -  
Out: [place] Intake/Output Summary (Last 24 hours) at 3/1/2019 1139 Last data filed at 3/1/2019 1058 Gross per 24 hour Intake 480 ml Output 600 ml Net -120 ml Physical Exam:  
General:  Alert, cooperative, no distress, appears stated age. ON NC oxygen. Sitting up in chair. Pox was 91% on RA at rest.   
Head:  Normocephalic, without obvious abnormality, atraumatic. alopecia Eyes:  Conjunctivae/corneas clear. PERRL, EOMs intact. Nose: Nares normal. Septum midline. Mucosa normal. No drainage or sinus tenderness. Throat: Lips, mucosa, and tongue normal. Teeth and gums normal.- Neck: Supple, symmetrical, trachea midline, no adenopathy, thyroid: no enlargment/tenderness/nodules, no carotid bruit and no JVD. Back:   Symmetric, no curvature. ROM normal.  
Lungs:   Good air movement. Pretty clear. Chest wall:  No tenderness or deformity. Heart:  Regular rate and rhythm, S1, S2 normal, no murmur, click, rub or gallop.- mild tachy Abdomen:   Soft, non-tender. Bowel sounds normal. No masses,  No organomegaly. Extremities: Extremities normal, atraumatic, no cyanosis - edema gone Pulses: 2+ and symmetric all extremities. Skin: Skin color, texture, turgor normal. No rashes or lesions Lymph nodes: Cervical, supraclavicular, and axillary nodes normal.  
Neurologic: Grossly nonfocal, motor is intact. Psych: no over anxiety or depression. Data review: 
 
       
Labs: 
 
Recent Labs 19 
0440 19 
2101 WBC 9.2 10.9 HGB 9.6* 10.1*  
* 126* Recent Labs 19 
0440 19 0248  
* 133* K 4.2 4.4  
 97 CO2 26 29 * 242* BUN 24* 28* CREA 0.79 0.91  
CA 8.8 8.7 No results for input(s): PH, PCO2, PO2, HCO3, FIO2 in the last 72 hours. No results for input(s): CPK, CKNDX, TROIQ in the last 72 hours. No lab exists for component: CPKMB No results found for: BNPP, BNP Imaging: 
I have personally reviewed the patients radiographs and have reviewed the reports: CXR today no acute changes Bong Mckeon MD

## 2019-03-01 NOTE — PROGRESS NOTES
Problem: Mobility Impaired (Adult and Pediatric) Goal: *Acute Goals and Plan of Care (Insert Text) Physical Therapy Goals Goals reviewed 2/25/19 and remain appropriate for the next 7 days Goals reviewed and remain appropriate 2/15/19 Initiated 2/7/2019 1. Patient will move from supine to sit and sit to supine , scoot up and down and roll side to side in bed with modified independence within 7 day(s). 2.  Patient will transfer from bed to chair and chair to bed with supervision/set-up using the least restrictive device within 7 day(s). 3.  Patient will perform sit to stand with supervision/set-up within 7 day(s). 4.  Patient will ambulate with supervision/set-up for 150 feet with the least restrictive device within 7 day(s). physical Therapy TREATMENT Patient: Lasha Birmingham (11 y.o. female) Date: 3/1/2019 Diagnosis: Acute respiratory failure ([address] Procedure(s) (LRB): BRONCHOSCOPY (N/A) BRONCHIAL WASHINGS FLEXIBLE (N/A) 23 Days Post-Op Precautions:  falls Chart, physical therapy assessment, plan of care and goals were reviewed. ASSESSMENT:pt with slow gait, no LOB, mod ->heavy SOB, O2 sats in the low 80's on 5L hi flow O2, remains very weak and fatigues quickly, continues with non productive cough, vc's for safety and proper RW use. Progression toward goals: 
[]    Improving appropriately and progressing toward goals 
[]    Improving slowly and progressing toward goals [x]    Not making progress toward goals PLAN: 
Patient continues to benefit from skilled intervention to address the above impairments. Continue treatment per established plan of care. Discharge Recommendations:  Inpatient Rehab Further Equipment Recommendations for Discharge:  rolling walker OBJECTIVE DATA SUMMARY:  
Critical Behavior: 
Neurologic State: Alert Orientation Level: Oriented X4 Cognition: Appropriate decision making, Appropriate for age attention/concentration, Follows commands, Appropriate safety awareness Safety/Judgement: Awareness of environment, Fall prevention, Home safety Functional Mobility Training: 
Bed Mobility: Pt sitting in chair on arrival. 
Transfers: 
Sit to Stand: Contact guard assistance Stand to Sit: Contact guard assistance Interventions: Tactile cues; Verbal cues Level of Assistance: Contact guard assistance Balance: 
Sitting: Intact; Without support Standing: Intact; With support Standing - Static: Good;Constant support Standing - Dynamic : Good Ambulation/Gait Training: 
Distance (ft): 75 Feet (ft) Assistive Device: Walker, rolling;Gait belt Ambulation - Level of Assistance: Contact guard assistance Gait Abnormalities: Decreased step clearance Right Side Weight Bearing: Full Left Side Weight Bearing: Full Base of Support: Widened Speed/Carmita: Slow Step Length: Left shortened;Right shortened Pain: 
Pain Scale 1: Numeric (0 - 10) Pain Intensity 1: 0 Activity Tolerance: poor After treatment:  
[x]    Patient left in no apparent distress sitting up in chair 
[]    Patient left in no apparent distress in bed 
[x]    Call bell left within reach [x]    Nursing notified 
[]    Caregiver present 
[]    Bed alarm activated COMMUNICATION/COLLABORATION:  
The patients plan of care was discussed with: Registered Nurse Nava Baez PTA Time Calculation: 30 mins

## 2019-03-01 NOTE — PROGRESS NOTES
General Daily Progress Note Admit Date: 2/3/2019 Subjective:  
 
Patient not feeling well and significant shortness of breath. Graciela Galarza Current Facility-Administered Medications Medication Dose Route Frequency  acetylcysteine (MUCOMYST) 200 mg/mL (20 %) solution 400 mg  400 mg Nebulization QID RT  
 albuterol-ipratropium (DUO-NEB) 2.5 MG-0.5 MG/3 ML  3 mL Nebulization Q4H PRN  
 insulin glargine (LANTUS) injection 40 Units  40 Units SubCUTAneous DAILY  sodium chloride (OCEAN) 0.65 % nasal squeeze bottle 2 Spray  2 Spray Both Nostrils Q2H PRN  
 insulin glargine (LANTUS) injection 16 Units  16 Units SubCUTAneous QHS  levalbuterol (XOPENEX) nebulizer soln 1.25 mg/3 mL  1.25 mg Nebulization Q4H PRN  polyethylene glycol (MIRALAX) packet 17 g  17 g Oral DAILY PRN  predniSONE (DELTASONE) tablet 30 mg  30 mg Oral BID WITH MEALS  sodium chloride (NS) flush 5-40 mL  5-40 mL IntraVENous PRN  
 sodium chloride (NS) flush 5-40 mL  5-40 mL IntraVENous Q8H  
 apixaban (ELIQUIS) tablet 5 mg  5 mg Oral Q12H  
 benzonatate (TESSALON) capsule 200 mg  200 mg Oral TID PRN  
 lactobac ac& pc-s.therm-b.anim (JUNI Q/RISAQUAD)  1 Cap Oral DAILY  acetaminophen (TYLENOL) tablet 650 mg  650 mg Oral Q6H PRN  
 ondansetron (ZOFRAN) injection 4 mg  4 mg IntraVENous Q4H PRN  
 amLODIPine (NORVASC) tablet 2.5 mg  2.5 mg Oral DAILY  atorvastatin (LIPITOR) tablet 40 mg  40 mg Oral QHS  fluticasone (FLONASE) 50 mcg/actuation nasal spray 2 Spray  2 Spray Both Nostrils DAILY  metoprolol succinate (TOPROL-XL) XL tablet 50 mg  50 mg Oral DAILY  glucose chewable tablet 16 g  4 Tab Oral PRN  
 dextrose (D50W) injection syrg 12.5-25 g  12.5-25 g IntraVENous PRN  
 glucagon (GLUCAGEN) injection 1 mg  1 mg IntraMUSCular PRN  
 nitroglycerin (NITROBID) 2 % ointment 1 Inch  1 Inch Topical Q6H PRN Review of Systems A comprehensive review of systems was negative. Objective: Patient Vitals for the past 24 hrs: 
 BP Temp Pulse Resp SpO2  
03/01/19 0801 139/75 97.5 °F (36.4 °C) 90 22 100 % 03/01/19 0414 140/70 97.9 °F (36.6 °C) 94 20 95 % 02/28/19 2250 133/67 97.8 °F (36.6 °C) 82 20 98 % 02/28/19 2000 153/73 97.3 °F (36.3 °C) 87 24 100 % 02/28/19 1509 125/63 97.6 °F (36.4 °C) 98 20 98 % 02/28/19 1126 156/86 98 °F (36.7 °C) 100 22 99 % No intake/output data recorded. 02/27 1901 - 03/01 0700 In: -  
Out: [place] Physical Exam:  
Visit Vitals /75 (BP 1 Location: Left arm, BP Patient Position: Sitting; At rest) Pulse 90 Temp 97.5 °F (36.4 °C) Resp 22 Ht 5' 8\" (1.727 m) Wt 238 lb 8.6 oz (108.2 kg) SpO2 100% BMI 36.27 kg/m² General appearance: alert, cooperative, no distress, appears stated age Neck: supple, symmetrical, trachea midline, no adenopathy, thyroid: not enlarged, symmetric, no tenderness/mass/nodules, no carotid bruit and no JVD Lungs: rales R base, L base, diminished breath sounds R base, L base Heart: regular rate and rhythm, S1, S2 normal, no murmur, click, rub or gallop Abdomen: soft, non-tender. Bowel sounds normal. No masses,  no organomegaly Extremities: extremities normal, atraumatic, no cyanosis or edema Data Review Recent Results (from the past 24 hour(s)) GLUCOSE, POC Collection Time: 02/28/19 11:19 AM  
Result Value Ref Range Glucose (POC) 98 65 - 100 mg/dL Performed by Marshall Suarez GLUCOSE, POC Collection Time: 02/28/19  4:28 PM  
Result Value Ref Range Glucose (POC) 283 (H) 65 - 100 mg/dL Performed by Linda Jay GLUCOSE, POC Collection Time: 02/28/19  9:15 PM  
Result Value Ref Range Glucose (POC) 220 (H) 65 - 100 mg/dL Performed by [facility] METABOLIC PANEL, BASIC Collection Time: 03/01/19  4:40 AM  
Result Value Ref Range Sodium 134 (L) 136 - 145 mmol/L Potassium 4.2 3.5 - 5.1 mmol/L  Chloride 100 97 - 108 mmol/L  
 CO2 26 21 - 32 mmol/L Anion gap 8 5 - 15 mmol/L Glucose 296 (H) 65 - 100 mg/dL BUN 24 (H) 6 - 20 MG/DL Creatinine 0.79 0.55 - 1.02 MG/DL  
 BUN/Creatinine ratio 30 (H) 12 - 20 GFR est AA >60 >60 ml/min/1.73m2 GFR est non-AA >60 >60 ml/min/1.73m2 Calcium 8.8 8.5 - 10.1 MG/DL  
CBC WITH MANUAL DIFF Collection Time: 03/01/19  4:40 AM  
Result Value Ref Range WBC 9.2 3.6 - 11.0 K/uL  
 RBC 3.25 (L) 3.80 - 5.20 M/uL HGB 9.6 (L) 11.5 - 16.0 g/dL HCT 29.8 (L) 35.0 - 47.0 % MCV 91.7 80.0 - 99.0 FL  
 MCH 29.5 26.0 - 34.0 PG  
 MCHC 32.2 30.0 - 36.5 g/dL  
 RDW 16.7 (H) 11.5 - 14.5 % PLATELET 369 (L) 497 - 400 K/uL MPV 11.6 8.9 - 12.9 FL  
 NRBC 0.0 0  WBC ABSOLUTE NRBC 0.00 0.00 - 0.01 K/uL DIFFERENTIAL PENDING   
 NEUTROPHILS 96 (H) 32 - 75 % BAND NEUTROPHILS 0 0 - 6 % LYMPHOCYTES 4 (L) 12 - 49 % MONOCYTES 0 (L) 5 - 13 % EOSINOPHILS 0 0 - 7 % BASOPHILS 0 0 - 1 % METAMYELOCYTES 0 0 % MYELOCYTES 0 0 % PROMYELOCYTES 0 0 % BLASTS 0 0 % OTHER CELL 0 0 IMMATURE GRANULOCYTES 0 %  
 ABS. NEUTROPHILS 8.8 (H) 1.8 - 8.0 K/UL  
 ABS. LYMPHOCYTES 0.4 (L) 0.8 - 3.5 K/UL  
 ABS. MONOCYTES 0.0 0.0 - 1.0 K/UL  
 ABS. EOSINOPHILS 0.0 0.0 - 0.4 K/UL  
 ABS. BASOPHILS 0.0 0.0 - 0.1 K/UL  
 ABS. IMM. GRANS. 0.0 K/UL  
 DF MANUAL    
 RBC COMMENTS NORMOCYTIC, NORMOCHROMIC    
GLUCOSE, POC Collection Time: 03/01/19  7:57 AM  
Result Value Ref Range Glucose (POC) 239 (H) 65 - 100 mg/dL Performed by Unkown  Assessment:  
 
Active Problems: 
  SVT (supraventricular tachycardia) ([address] (8/29/2016) Essential hypertension with goal blood pressure less than 140/90 (8/29/2016) Diabetes mellitus type 2, controlled ([address] (9/8/2016) Dyslipidemia (9/8/2016) Acute respiratory failure ([address] (2/3/2019) Leg swelling (2/3/2019) Hodgkin lymphoma ([address] (2/3/2019) CAP (community acquired pneumonia) (2/3/2019) Plan: 1. Hopefully can discharge on Monday. Patient is quite depressed because of her malaise reduction in exertional tolerance complicated by significant dyspnea. 2.  Continue diabetic control. Limited changes because of sporadic eating.

## 2019-03-01 NOTE — PROGRESS NOTES
Problem: Falls - Risk of 
Goal: *Absence of Falls Document Hanna Zamudio Fall Risk and appropriate interventions in the flowsheet. Outcome: Progressing Towards Goal 
Fall Risk Interventions: 
Mobility Interventions: Patient to call before getting OOB, Utilize walker, cane, or other assistive device Mentation Interventions: Adequate sleep, hydration, pain control, Evaluate medications/consider consulting pharmacy, Eyeglasses and hearing aids, Family/sitter at bedside, Increase mobility, More frequent rounding, Room close to nurse's station, Toileting rounds Medication Interventions: Bed/chair exit alarm, Evaluate medications/consider consulting pharmacy, Patient to call before getting OOB Elimination Interventions: Call light in reach, Patient to call for help with toileting needs History of Falls Interventions: Room close to nurse's station Problem: Pressure Injury - Risk of 
Goal: *Prevention of pressure injury Document Dwayne Scale and appropriate interventions in the flowsheet. Outcome: Progressing Towards Goal 
Pressure Injury Interventions: 
Sensory Interventions: Assess changes in LOC, Avoid rigorous massage over bony prominences, Check visual cues for pain, Float heels, Keep linens dry and wrinkle-free, Maintain/enhance activity level, Turn and reposition approx. every two hours (pillows and wedges if needed) Moisture Interventions: Absorbent underpads, Minimize layers Activity Interventions: Increase time out of bed, PT/OT evaluation Mobility Interventions: Float heels, PT/OT evaluation Nutrition Interventions: Document food/fluid/supplement intake, Discuss nutritional consult with provider, Offer support with meals,snacks and hydration Friction and Shear Interventions: Apply protective barrier, creams and emollients, Minimize layers Problem: Pulmonary Embolism Care Plan (Adult) Goal: *Improvement of existing pulmonary embolism Outcome: Progressing Towards Goal 
 Pt currently on 6 L/min O2 . On Eliquis for PE. Pt with less bloody nasal mucous--using saline nasal spray provided. H&H improved. Pt notes overall general weakness and expresses fears of dying in the hospital; pt's overall status seems improved. Problem: Breathing Pattern - Ineffective Goal: *Absence of hypoxia Outcome: Not Progressing Towards Goal 
Pt still readily dyspneic on exertion with SaO2 rapidly dropping to 74% with standing to place bedpan. Haven't been able to wean pt off O2 less than 5 L/min, and currently is receiving supplemental oxygen at 6 l/im.

## 2019-03-02 NOTE — PROGRESS NOTES
Patient visiting with family and in good spirits. She does not appear sob. She has coarse crackles both lungs. She was at 5.5 lpm nasal 02 at 3:30 pm. She complained of sob and of irritation in upper airways from the new breathing tx today. sats usually 93-94 but do dip down to 88%. even with them returning to normal she requested the increase in 02 x2 times and it is now 7 lpm humidified 02. No edema LE. Has not gotten up from chair this afternoon. I told her we were trying to keep 02 at 5.5 or lower but she insisted that for now she wanted it increased. Danette Paz

## 2019-03-02 NOTE — PROGRESS NOTES
Bedside shift change report given to Brice Nuñez (oncoming nurse) by Milly Bosworth (offgoing nurse). Report included the following information SBAR.

## 2019-03-02 NOTE — PROGRESS NOTES
Bedside shift change report given to Amisha Valles RN (oncoming nurse). Report included the following information SBAR. SHIFT SUMMARY: 
Patient had no complaints of pain. Weaned O2 from 7L to 6L hi flow nasal cannula. New 20G peripheral IV in L hand CONCERNS TO ADDRESS WITH MD: 
 
 
 
[facility] NURSING NOTE Admission Date 2/3/2019 Admission Diagnosis Acute respiratory failure ([address] Consults IP CONSULT TO HEMATOLOGY 
IP CONSULT TO PRIMARY CARE PROVIDER 
IP CONSULT TO PULMONOLOGY Cardiac Monitoring [x] Yes [] No  
  
Purposeful Hourly Rounding [x] Yes   
Abdulaziz Score Total Score: 4 Abdulaziz score 3 or > [x] Bed Alarm [] Avasys [] 1:1 sitter [] Patient refused (Signed refusal form in chart) Dwayne Score Dwayne Score: 17 Dwayne score 14 or < [] PMT consult [x] Wound Care consult  
 []  Specialty bed  [] Nutrition consult Influenza Vaccine Received Flu Vaccine for Current Season (usually Sept-March): No  
 Patient/Guardian Refused (Notify MD): Yes Oxygen needs? [] Room air Oxygen @  []1L    []2L    []3L   []4L    []5L   [x]6L via NC Chronic home O2 use? [] Yes [x] No 
Perform O2 challenge test and document in progress note using smartOct[employer] (.Homeoxygen) Last bowel movement Last Bowel Movement Date: (Pt states it's been \"about a week\") Urinary Catheter [REMOVED] External Female Catheter 02/04/19-Urine Output (mL): 400 ml LDAs Venous Access Device portacath  09/12/18 Upper chest (subclavicular area), left (Active) Peripheral IV 03/02/19 Left Hand (Active) Site Assessment Clean, dry, & intact 3/2/2019  6:12 PM  
Phlebitis Assessment 0 3/2/2019  6:12 PM  
Infiltration Assessment 0 3/2/2019  6:12 PM  
Dressing Status Clean, dry, & intact 3/2/2019  6:12 PM  
Dressing Type Transparent;Tape 3/2/2019  6:12 PM  
Hub Color/Line Status Blue;Flushed 3/2/2019  6:12 PM  
                  
  
Readmission Risk Assessment Tool Score Medium Risk 25 Total Score 3 Has Seen PCP in Last 6 Months (Yes=3, No=0) 3 Patient Length of Stay (>5 days = 3)  
 5 Pt. Coverage (Medicare=5 , Medicaid, or Self-Pay=4) 7 Charlson Comorbidity Score (Age + Comorbid Conditions) Criteria that do not apply:  
 . Living with Significant Other. Assisted Living. LTAC. SNF. or  
Rehab  
 IP Visits Last 12 Months (1-3=4, 4=9, >4=11) Expected Length of Stay 3d 12h Actual Length of Stay 27

## 2019-03-02 NOTE — PROGRESS NOTES
Problem: Falls - Risk of 
Goal: *Absence of Falls Document Hamp Deepak Fall Risk and appropriate interventions in the flowsheet. Outcome: Progressing Towards Goal 
Fall Risk Interventions: 
Mobility Interventions: Assess mobility with egress test, Bed/chair exit alarm, OT consult for ADLs, Patient to call before getting OOB Mentation Interventions: Adequate sleep, hydration, pain control, Bed/chair exit alarm Medication Interventions: Assess postural VS orthostatic hypotension, Bed/chair exit alarm, Evaluate medications/consider consulting pharmacy, Patient to call before getting OOB, Teach patient to arise slowly Elimination Interventions: Bed/chair exit alarm, Call light in reach, Patient to call for help with toileting needs, Toilet paper/wipes in reach, Toileting schedule/hourly rounds History of Falls Interventions: Bed/chair exit alarm, Consult care management for discharge planning, Door open when patient unattended, Evaluate medications/consider consulting pharmacy, Investigate reason for fall, Room close to nurse's station

## 2019-03-02 NOTE — PROGRESS NOTES
General Daily Progress Note Admit Date: 2/3/2019 Subjective:  
 
Patient not feeling well and significant shortness of breath. Artist Fany Current Facility-Administered Medications Medication Dose Route Frequency  acetylcysteine (MUCOMYST) 200 mg/mL (20 %) solution 400 mg  400 mg Nebulization Q4HWA RT  
 albuterol-ipratropium (DUO-NEB) 2.5 MG-0.5 MG/3 ML  3 mL Nebulization Q4H PRN  
 insulin glargine (LANTUS) injection 40 Units  40 Units SubCUTAneous DAILY  sodium chloride (OCEAN) 0.65 % nasal squeeze bottle 2 Spray  2 Spray Both Nostrils Q2H PRN  
 insulin glargine (LANTUS) injection 16 Units  16 Units SubCUTAneous QHS  levalbuterol (XOPENEX) nebulizer soln 1.25 mg/3 mL  1.25 mg Nebulization Q4H PRN  polyethylene glycol (MIRALAX) packet 17 g  17 g Oral DAILY PRN  predniSONE (DELTASONE) tablet 30 mg  30 mg Oral BID WITH MEALS  sodium chloride (NS) flush 5-40 mL  5-40 mL IntraVENous PRN  
 sodium chloride (NS) flush 5-40 mL  5-40 mL IntraVENous Q8H  
 apixaban (ELIQUIS) tablet 5 mg  5 mg Oral Q12H  
 benzonatate (TESSALON) capsule 200 mg  200 mg Oral TID PRN  
 lactobac ac& pc-s.therm-b.anim (JUNI Q/RISAQUAD)  1 Cap Oral DAILY  acetaminophen (TYLENOL) tablet 650 mg  650 mg Oral Q6H PRN  
 ondansetron (ZOFRAN) injection 4 mg  4 mg IntraVENous Q4H PRN  
 amLODIPine (NORVASC) tablet 2.5 mg  2.5 mg Oral DAILY  atorvastatin (LIPITOR) tablet 40 mg  40 mg Oral QHS  fluticasone (FLONASE) 50 mcg/actuation nasal spray 2 Spray  2 Spray Both Nostrils DAILY  metoprolol succinate (TOPROL-XL) XL tablet 50 mg  50 mg Oral DAILY  glucose chewable tablet 16 g  4 Tab Oral PRN  
 dextrose (D50W) injection syrg 12.5-25 g  12.5-25 g IntraVENous PRN  
 glucagon (GLUCAGEN) injection 1 mg  1 mg IntraMUSCular PRN  
 nitroglycerin (NITROBID) 2 % ointment 1 Inch  1 Inch Topical Q6H PRN Review of Systems A comprehensive review of systems was negative. Objective: Patient Vitals for the past 24 hrs: 
 BP Temp Pulse Resp SpO2  
03/02/19 1029 149/73 97.7 °F (36.5 °C) 66 20 100 % 03/02/19 0725 141/82 98.2 °F (36.8 °C) 78 24 98 % 03/02/19 0300 133/74 97.7 °F (36.5 °C) (!) 108 22 92 % 03/01/19 2305 127/58 98.2 °F (36.8 °C) (!) 108 20 100 % 03/01/19 1924 133/75 97.6 °F (36.4 °C) (!) 102 20 98 % 03/01/19 1851     98 % 03/01/19 1805   83    
03/01/19 1552     99 % 03/01/19 1459 114/65 97.4 °F (36.3 °C) 93 20 97 % 03/01/19 1410     98 % 03/01/19 1109 134/67 97.3 °F (36.3 °C) 79 22 98 % 03/02 0701 - 03/02 1900 In: 240 [P.O.:240] Out: 200 [Urine:200] 02/28 1901 - 03/02 0700 In: 480 [P.O.:480] Out: 900 [Urine:900] Physical Exam:  
Visit Vitals /73 (BP 1 Location: Right arm, BP Patient Position: At rest;Sitting) Pulse 66 Temp 97.7 °F (36.5 °C) Resp 20 Ht 5' 8\" (1.727 m) Wt 238 lb 8.6 oz (108.2 kg) SpO2 100% BMI 36.27 kg/m² General appearance: alert, cooperative, no distress, appears stated age Neck: supple, symmetrical, trachea midline, no adenopathy, thyroid: not enlarged, symmetric, no tenderness/mass/nodules, no carotid bruit and no JVD Lungs: rales R base, L base, diminished breath sounds R base, L base Heart: regular rate and rhythm, S1, S2 normal, no murmur, click, rub or gallop Abdomen: soft, non-tender. Bowel sounds normal. No masses,  no organomegaly Extremities: extremities normal, atraumatic, no cyanosis or edema Data Review Recent Results (from the past 24 hour(s)) GLUCOSE, POC Collection Time: 03/01/19 11:13 AM  
Result Value Ref Range Glucose (POC) 184 (H) 65 - 100 mg/dL Performed by Unkown  GLUCOSE, POC Collection Time: 03/01/19  4:43 PM  
Result Value Ref Range Glucose (POC) 160 (H) 65 - 100 mg/dL Performed by Lucina Rangel  (PCT) GLUCOSE, POC Collection Time: 03/01/19  8:39 PM  
Result Value Ref Range Glucose (POC) 148 (H) 65 - 100 mg/dL Performed by Herbert Samuels CBC W/O DIFF Collection Time: 03/02/19  3:06 AM  
Result Value Ref Range WBC 10.9 3.6 - 11.0 K/uL  
 RBC 3.24 (L) 3.80 - 5.20 M/uL HGB 9.7 (L) 11.5 - 16.0 g/dL HCT 29.9 (L) 35.0 - 47.0 % MCV 92.3 80.0 - 99.0 FL  
 MCH 29.9 26.0 - 34.0 PG  
 MCHC 32.4 30.0 - 36.5 g/dL  
 RDW 16.9 (H) 11.5 - 14.5 % PLATELET 851 (L) 700 - 400 K/uL MPV 11.0 8.9 - 12.9 FL  
 NRBC 0.0 0  WBC ABSOLUTE NRBC 0.00 0.00 - 0.01 K/uL GLUCOSE, POC Collection Time: 03/02/19  7:09 AM  
Result Value Ref Range Glucose (POC) 234 (H) 65 - 100 mg/dL Performed by Herbert Samuels Assessment:  
 
Active Problems: 
  SVT (supraventricular tachycardia) ([facility] [address] (8/29/2016) Essential hypertension with goal blood pressure less than 140/90 (8/29/2016) Diabetes mellitus type 2, controlled ([address] (9/8/2016) Dyslipidemia (9/8/2016) Acute respiratory failure ([address] (2/3/2019) Leg swelling (2/3/2019) Hodgkin lymphoma ([address] (2/3/2019) CAP (community acquired pneumonia) (2/3/2019) Plan: 1. Hopefully can discharge on Monday. Patient is quite depressed because of her malaise reduction in exertional tolerance complicated by significant dyspnea. 2.  Continue diabetic control. Limited changes because of sporadic eating. 3. Discussed with family

## 2019-03-02 NOTE — PROGRESS NOTES
Problem: Falls - Risk of 
Goal: *Absence of Falls Document Reino Horn Fall Risk and appropriate interventions in the flowsheet. Outcome: Progressing Towards Goal 
Fall Risk Interventions: 
Mobility Interventions: Bed/chair exit alarm Mentation Interventions: Adequate sleep, hydration, pain control Medication Interventions: Bed/chair exit alarm Elimination Interventions: Bed/chair exit alarm, Call light in reach History of Falls Interventions: Bed/chair exit alarm Problem: Pressure Injury - Risk of 
Goal: *Prevention of pressure injury Document Dwayne Scale and appropriate interventions in the flowsheet. Pressure Injury Interventions: 
Sensory Interventions: Assess changes in LOC Moisture Interventions: Absorbent underpads Activity Interventions: Increase time out of bed Mobility Interventions: Turn and reposition approx. every two hours(pillow and wedges), HOB 30 degrees or less Nutrition Interventions: Document food/fluid/supplement intake Friction and Shear Interventions: HOB 30 degrees or less

## 2019-03-03 NOTE — PROGRESS NOTES
0700: Bedside shift change report given to [facility] (oncoming nurse) by Lisa De RN (offgoing nurse). Report included the following information SBAR and Kardex.

## 2019-03-03 NOTE — PROGRESS NOTES
Problem: Pressure Injury - Risk of 
Goal: *Prevention of pressure injury Document Dwayne Scale and appropriate interventions in the flowsheet. Outcome: Progressing Towards Goal 
Pressure Injury Interventions: 
Sensory Interventions: Assess changes in LOC, Check visual cues for pain, Chair cushion Moisture Interventions: Absorbent underpads, Moisture barrier Activity Interventions: Increase time out of bed Mobility Interventions: Chair cushion, HOB 30 degrees or less, Pressure redistribution bed/mattress (bed type) Nutrition Interventions: Document food/fluid/supplement intake, Discuss nutritional consult with provider Friction and Shear Interventions: Transferring/repositioning devices, Minimize layers Problem: Pulmonary Embolism Care Plan (Adult) Goal: *Improvement of existing pulmonary embolism Outcome: Not Progressing Towards Goal 
Variance: Off Pathway (add note)

## 2019-03-03 NOTE — PROGRESS NOTES
Patient states Mucomyst breathing treatments make her throat burn and she gets sick. Patient refuses to take Mucomyst any more.

## 2019-03-03 NOTE — PROGRESS NOTES
RAPID RESPONSE TEAM 
 
Rounded on patient due to MEWS 4 related to tachycardia. Discussed with primary RN Darrell. No acute concerns. No patient complaints. No RRT interventions indicated at this time. RN encouraged to call with any questions or concerns. Damir Villalta Rapid Response RN Niya Morales

## 2019-03-03 NOTE — PROGRESS NOTES
General Daily Progress Note Admit Date: 2/3/2019 Subjective:  
 
Patient unable to sleep comfortably in due to SOB - current o2 requirements at 8l Current Facility-Administered Medications Medication Dose Route Frequency  insulin glargine (LANTUS) injection 48 Units  48 Units SubCUTAneous DAILY  acetylcysteine (MUCOMYST) 200 mg/mL (20 %) solution 400 mg  400 mg Nebulization Q4HWA RT  
 albuterol-ipratropium (DUO-NEB) 2.5 MG-0.5 MG/3 ML  3 mL Nebulization Q4H PRN  
 sodium chloride (OCEAN) 0.65 % nasal squeeze bottle 2 Spray  2 Spray Both Nostrils Q2H PRN  
 insulin glargine (LANTUS) injection 16 Units  16 Units SubCUTAneous QHS  levalbuterol (XOPENEX) nebulizer soln 1.25 mg/3 mL  1.25 mg Nebulization Q4H PRN  polyethylene glycol (MIRALAX) packet 17 g  17 g Oral DAILY PRN  predniSONE (DELTASONE) tablet 30 mg  30 mg Oral BID WITH MEALS  sodium chloride (NS) flush 5-40 mL  5-40 mL IntraVENous PRN  
 sodium chloride (NS) flush 5-40 mL  5-40 mL IntraVENous Q8H  
 apixaban (ELIQUIS) tablet 5 mg  5 mg Oral Q12H  
 benzonatate (TESSALON) capsule 200 mg  200 mg Oral TID PRN  
 lactobac ac& pc-s.therm-b.anim (JUNI Q/RISAQUAD)  1 Cap Oral DAILY  acetaminophen (TYLENOL) tablet 650 mg  650 mg Oral Q6H PRN  
 ondansetron (ZOFRAN) injection 4 mg  4 mg IntraVENous Q4H PRN  
 amLODIPine (NORVASC) tablet 2.5 mg  2.5 mg Oral DAILY  atorvastatin (LIPITOR) tablet 40 mg  40 mg Oral QHS  fluticasone (FLONASE) 50 mcg/actuation nasal spray 2 Spray  2 Spray Both Nostrils DAILY  metoprolol succinate (TOPROL-XL) XL tablet 50 mg  50 mg Oral DAILY  glucose chewable tablet 16 g  4 Tab Oral PRN  
 dextrose (D50W) injection syrg 12.5-25 g  12.5-25 g IntraVENous PRN  
 glucagon (GLUCAGEN) injection 1 mg  1 mg IntraMUSCular PRN  
 nitroglycerin (NITROBID) 2 % ointment 1 Inch  1 Inch Topical Q6H PRN Review of Systems A comprehensive review of systems was negative. Objective: Patient Vitals for the past 24 hrs: 
 BP Temp Pulse Resp SpO2  
03/03/19 0232 139/80 97.7 °F (36.5 °C) (!) 118 22 94 % 03/02/19 2328   (!) 116  92 % 03/02/19 2327   (!) 118  91 % 03/02/19 2326     (!) 86 % 03/02/19 2324   (!) 132  (!) 82 % 03/02/19 2322     (!) 78 % 03/02/19 2247 123/70 97.6 °F (36.4 °C) 92 20 99 % 03/02/19 1955     98 % 03/02/19 1932 126/71 97.5 °F (36.4 °C) 88 20 96 % 03/02/19 1526 132/80 97.3 °F (36.3 °C) 89 21 98 % 03/02/19 1029 149/73 97.7 °F (36.5 °C) 66 20 100 % 03/02/19 0725 141/82 98.2 °F (36.8 °C) 78 24 98 % 03/02 1901 - 03/03 0700 In: 700 [P.O.:700] Out: 200 [Urine:200] 03/01 0701 - 03/02 1900 In: 720 [P.O.:720] Out: 1125 [[id number]] Physical Exam:  
Visit Vitals /80 (BP 1 Location: Right arm, BP Patient Position: Sitting) Pulse (!) 118 Temp 97.7 °F (36.5 °C) Resp 22 Ht 5' 8\" (1.727 m) Wt 238 lb 8.6 oz (108.2 kg) SpO2 94% BMI 36.27 kg/m² General appearance: alert, cooperative, no distress, appears stated age Neck: supple, symmetrical, trachea midline, no adenopathy, thyroid: not enlarged, symmetric, no tenderness/mass/nodules, no carotid bruit and no JVD Lungs: rales R base, L base, diminished breath sounds R base, L base Heart: regular rate and rhythm, S1, S2 normal, no murmur, click, rub or gallop Abdomen: soft, non-tender. Bowel sounds normal. No masses,  no organomegaly Extremities: extremities normal, atraumatic, no cyanosis or edema Data Review Recent Results (from the past 24 hour(s)) GLUCOSE, POC Collection Time: 03/02/19  7:09 AM  
Result Value Ref Range Glucose (POC) 234 (H) 65 - 100 mg/dL Performed by Kristy España GLUCOSE, POC Collection Time: 03/02/19 11:08 AM  
Result Value Ref Range Glucose (POC) 155 (H) 65 - 100 mg/dL Performed by Alex Mi (CON) GLUCOSE, POC Collection Time: 03/02/19  4:44 PM  
Result Value Ref Range Glucose (POC) 165 (H) 65 - 100 mg/dL Performed by Kilo Alvarez, POC Collection Time: 03/02/19 10:55 PM  
Result Value Ref Range Glucose (POC) 234 (H) 65 - 100 mg/dL Performed by Dominik Goss METABOLIC PANEL, BASIC Collection Time: 03/03/19  2:40 AM  
Result Value Ref Range Sodium 137 136 - 145 mmol/L Potassium 4.2 3.5 - 5.1 mmol/L Chloride 101 97 - 108 mmol/L  
 CO2 29 21 - 32 mmol/L Anion gap 7 5 - 15 mmol/L Glucose 189 (H) 65 - 100 mg/dL BUN 20 6 - 20 MG/DL Creatinine 0.79 0.55 - 1.02 MG/DL  
 BUN/Creatinine ratio 25 (H) 12 - 20 GFR est AA >60 >60 ml/min/1.73m2 GFR est non-AA >60 >60 ml/min/1.73m2 Calcium 9.0 8.5 - 10.1 MG/DL Assessment:  
 
Active Problems: 
  SVT (supraventricular tachycardia) ([address] (8/29/2016) Essential hypertension with goal blood pressure less than 140/90 (8/29/2016) Diabetes mellitus type 2, controlled ([address] (9/8/2016) Dyslipidemia (9/8/2016) Acute respiratory failure ([address] (2/3/2019) Leg swelling (2/3/2019) Hodgkin lymphoma ([address] (2/3/2019) CAP (community acquired pneumonia) (2/3/2019) Plan: 1. Patient is quite depressed because of her reduction in exertional tolerance complicated by significant dyspnea. 2.  Continue diabetic control. Limited changes because of sporadic eating. 3. Discussed with family yesterday

## 2019-03-04 NOTE — PROGRESS NOTES
General Daily Progress Note Admit Date: 2/3/2019 Subjective:  
 
Patient complains of not feeling well and extreme dyspnea on exertion. Desaturate at rest episodically. Romero Monzon Current Facility-Administered Medications Medication Dose Route Frequency  insulin glargine (LANTUS) injection 44 Units  44 Units SubCUTAneous DAILY  albuterol-ipratropium (DUO-NEB) 2.5 MG-0.5 MG/3 ML  3 mL Nebulization Q4H PRN  
 sodium chloride (OCEAN) 0.65 % nasal squeeze bottle 2 Spray  2 Spray Both Nostrils Q2H PRN  
 insulin glargine (LANTUS) injection 16 Units  16 Units SubCUTAneous QHS  levalbuterol (XOPENEX) nebulizer soln 1.25 mg/3 mL  1.25 mg Nebulization Q4H PRN  polyethylene glycol (MIRALAX) packet 17 g  17 g Oral DAILY PRN  predniSONE (DELTASONE) tablet 30 mg  30 mg Oral BID WITH MEALS  sodium chloride (NS) flush 5-40 mL  5-40 mL IntraVENous PRN  
 sodium chloride (NS) flush 5-40 mL  5-40 mL IntraVENous Q8H  
 apixaban (ELIQUIS) tablet 5 mg  5 mg Oral Q12H  
 benzonatate (TESSALON) capsule 200 mg  200 mg Oral TID PRN  
 lactobac ac& pc-s.therm-b.anim (JUNI Q/RISAQUAD)  1 Cap Oral DAILY  acetaminophen (TYLENOL) tablet 650 mg  650 mg Oral Q6H PRN  
 ondansetron (ZOFRAN) injection 4 mg  4 mg IntraVENous Q4H PRN  
 amLODIPine (NORVASC) tablet 2.5 mg  2.5 mg Oral DAILY  atorvastatin (LIPITOR) tablet 40 mg  40 mg Oral QHS  fluticasone (FLONASE) 50 mcg/actuation nasal spray 2 Spray  2 Spray Both Nostrils DAILY  metoprolol succinate (TOPROL-XL) XL tablet 50 mg  50 mg Oral DAILY  glucose chewable tablet 16 g  4 Tab Oral PRN  
 dextrose (D50W) injection syrg 12.5-25 g  12.5-25 g IntraVENous PRN  
 glucagon (GLUCAGEN) injection 1 mg  1 mg IntraMUSCular PRN  
 nitroglycerin (NITROBID) 2 % ointment 1 Inch  1 Inch Topical Q6H PRN Review of Systems A comprehensive review of systems was negative. Objective:  
 
Patient Vitals for the past 24 hrs: 
 BP Temp Pulse Resp SpO2 03/04/19 0707 150/75 97.7 °F (36.5 °C) 84 20 99 % 03/04/19 0301 143/85 97.5 °F (36.4 °C) 93 20 100 % 03/03/19 2304     100 % 03/03/19 2247 117/64 97.6 °F (36.4 °C) 75 20 100 % 03/03/19 1935 109/66 97.3 °F (36.3 °C) 86 20 97 % 03/03/19 1453 140/80 97.7 °F (36.5 °C) 96 20 96 % 03/03/19 1108 122/66 97.9 °F (36.6 °C) 93 20 98 % 03/04 0701 - 03/04 1900 In: 240 [P.O.:240] Out: 200 [Urine:200] 03/02 1901 - 03/04 0700 In: 940 [P.O.:940] Out: 1550 [[id number]] Physical Exam:  
Visit Vitals /75 (BP 1 Location: Right arm, BP Patient Position: Sitting; Other (comment)) Comment (BP Patient Position): legs elevated Pulse 84 Temp 97.7 °F (36.5 °C) Resp 20 Ht 5' 8\" (1.727 m) Wt 238 lb 8.6 oz (108.2 kg) SpO2 99% BMI 36.27 kg/m² General appearance: alert, cooperative, no distress, appears stated age Neck: supple, symmetrical, trachea midline, no adenopathy, thyroid: not enlarged, symmetric, no tenderness/mass/nodules, no carotid bruit and no JVD Lungs: rales R base, L base, diminished breath sounds R base, L base Heart: regular rate and rhythm, S1, S2 normal, no murmur, click, rub or gallop Extremities: extremities normal, atraumatic, no cyanosis or edema Data Review Recent Results (from the past 24 hour(s)) GLUCOSE, POC Collection Time: 03/03/19 11:28 AM  
Result Value Ref Range Glucose (POC) 182 (H) 65 - 100 mg/dL Performed by Lenard Lucas) GLUCOSE, POC Collection Time: 03/03/19  4:32 PM  
Result Value Ref Range Glucose (POC) 167 (H) 65 - 100 mg/dL Performed by Lenard Chavez GLUCOSE, POC Collection Time: 03/03/19  9:07 PM  
Result Value Ref Range Glucose (POC) 234 (H) 65 - 100 mg/dL Performed by Ulysses Hernandes CBC WITH MANUAL DIFF Collection Time: 03/04/19  2:48 AM  
Result Value Ref Range WBC 7.8 3.6 - 11.0 K/uL  
 RBC 3.23 (L) 3.80 - 5.20 M/uL HGB 9.5 (L) 11.5 - 16.0 g/dL HCT 28.8 (L) 35.0 - 47.0 % MCV 89.2 80.0 - 99.0 FL  
 MCH 29.4 26.0 - 34.0 PG  
 MCHC 33.0 30.0 - 36.5 g/dL  
 RDW 16.7 (H) 11.5 - 14.5 % PLATELET 635 (L) 021 - 400 K/uL MPV 10.3 8.9 - 12.9 FL  
 NRBC 0.0 0  WBC ABSOLUTE NRBC 0.00 0.00 - 0.01 K/uL NEUTROPHILS 88 (H) 32 - 75 % BAND NEUTROPHILS 0 0 - 6 % LYMPHOCYTES 7 (L) 12 - 49 % MONOCYTES 5 5 - 13 % EOSINOPHILS 0 0 - 7 % BASOPHILS 0 0 - 1 % METAMYELOCYTES 0 0 % MYELOCYTES 0 0 % PROMYELOCYTES 0 0 % BLASTS 0 0 % OTHER CELL 0 0 IMMATURE GRANULOCYTES 0 %  
 ABS. NEUTROPHILS 6.9 1.8 - 8.0 K/UL  
 ABS. LYMPHOCYTES 0.5 (L) 0.8 - 3.5 K/UL  
 ABS. MONOCYTES 0.4 0.0 - 1.0 K/UL  
 ABS. EOSINOPHILS 0.0 0.0 - 0.4 K/UL  
 ABS. BASOPHILS 0.0 0.0 - 0.1 K/UL  
 ABS. IMM. GRANS. 0.0 K/UL  
 DF MANUAL    
 RBC COMMENTS NORMOCYTIC, NORMOCHROMIC    
GLUCOSE, POC Collection Time: 03/04/19  7:12 AM  
Result Value Ref Range Glucose (POC) 114 (H) 65 - 100 mg/dL Performed by Anahi Cullen) Assessment:  
 
Active Problems: 
  SVT (supraventricular tachycardia) ([facility] [address] (8/29/2016) Essential hypertension with goal blood pressure less than 140/90 (8/29/2016) Diabetes mellitus type 2, controlled ([facility] [address] (9/8/2016) Dyslipidemia (9/8/2016) Acute respiratory failure ([facility] [address] (2/3/2019) Leg swelling (2/3/2019) Hodgkin lymphoma ([facility] [address] (2/3/2019) CAP (community acquired pneumonia) (2/3/2019) Plan: 1. Remains quite precarious as relates to oxygenation. She continues to require high flow O2. 
2.  She remains quite frustrated and depressed as she has been for the last 2 weeks. 3.  Continue diabetic control.

## 2019-03-04 NOTE — PROGRESS NOTES
Problem: Self Care Deficits Care Plan (Adult) Goal: *Acute Goals and Plan of Care (Insert Text) Occupational Therapy Goals: 
Weekly Re-assessment 2/25/2019 and 3/4/2019 All goals remain appropriate Initiated 2/15/2019 1. Patient will perform grooming standing with modified independence within 7 days. 2. Patient will perform toileting with modified independence within 7 days. 3. Patient will perform upper body dressing and lower body dressing with modified independence within 7 days. 4. Patient will perform bathing with modified independence within 7 days. 5. Patient will correctly use PLB technique during functional tasks for improved independence with ADLS within 7 days. 6. Patient will transfer from toilet with modified independence using the least restrictive device and appropriate durable medical equipment within 7 days. Occupational Therapy TREATMENT: WEEKLY REASSESSMENT Patient: Olivier Lyon (77 y.o. female) Date: 3/4/2019 Diagnosis: Acute respiratory failure (HCC) <principal problem not specified> Procedure(s) (LRB): BRONCHOSCOPY (N/A) BRONCHIAL WASHINGS FLEXIBLE (N/A) 26 Days Post-Op Precautions:   
Chart, occupational therapy assessment, plan of care, and goals were reviewed. ASSESSMENT: 
Patient continues to be motivated to improve function. She continues to be significantly limited by activity tolerance. After activity, patient's SpO2 dropped as low as 62% on 5 liters Hi-Flow O2 via nasal canula. Increased to 7 liters after about 2 minutes when she had only recovered to 71-73%. Patient cued for pursed lip breathing, and with the 7 liters, finally increased to 90%. Adjusted back to 6 liters and discussed with nursing. She was ranging between 88 and 90% at that point. She will continue to benefit from skilled OT treatment to maximize independence and safety in ADL. Continue to recommend inpatient pulmonary rehab. Progression toward goals: []            Improving appropriately and progressing toward goals [x]            Improving slowly and progressing toward goals []            Not making progress toward goals and plan of care will be adjusted PLAN: 
Goals have been updated based on progression since last assessment. Patient continues to benefit from skilled intervention to address the above impairments. Continue to follow patient 4 times a week to address goals. Planned Interventions: 
[x]                    Self Care Training                  [x]             Therapeutic Activities [x]                    Functional Mobility Training    [x]             Cognitive Retraining 
[x]                    Therapeutic Exercises           [x]             Endurance Activities [x]                    Balance Training                   []             Neuromuscular Re-Education []                    Visual/Perceptual Training     [x]        Home Safety Training 
[x]                    Patient Education                 [x]             Family Training/Education []                    Other (comment): 
Discharge Recommendations: Inpatient Pulmonary Rehab Further Equipment Recommendations for Discharge: Defer to rehab SUBJECTIVE:  
Patient stated It's so frustrating.  (difficulty breathing) OBJECTIVE DATA SUMMARY:  
Cognitive/Behavioral Status: 
Neurologic State: Alert Orientation Level: Oriented X4 Cognition: Follows commands Perception: Appears intact Perseveration: No perseveration noted Safety/Judgement: Awareness of environment; Insight into deficits; Fall prevention;Home safety Functional Mobility and Transfers for ADLs:Bed Mobility: 
Scooting: Stand-by assistance Transfers: 
Sit to Stand: Stand-by assistance Functional Transfers Toilet Transfer : Stand-by assistance Cues: Verbal cues provided Bed to Chair: Stand-by assistance Balance: 
Sitting: Intact; Without support Standing: Intact; With support Standing - Static: Good Standing - Dynamic : Good(with use of RW) ADL Intervention: 
  
 
Grooming Washing Face: Supervision/set-up(seated) Washing Hands: Supervision/set-up(seated) Lower Body Dressing Assistance Slip on Shoes Without Back: Modified independent Cognitive Retraining Safety/Judgement: Awareness of environment; Insight into deficits; Fall prevention;Home safety Pain: 
Pain Scale 1: Numeric (0 - 10) Pain Intensity 1: 0 Activity Tolerance:  
Poor After treatment:  
[x] Patient left in no apparent distress sitting up in chair 
[] Patient left in no apparent distress in bed 
[x] Call bell left within reach [x] Nursing notified 
[x] Caregiver present 
[] Bed alarm activated COMMUNICATION/COLLABORATION:  
The patients plan of care was discussed with: Physical Therapist and Registered Nurse EZE Martin/L Time Calculation: 40 mins

## 2019-03-04 NOTE — PROGRESS NOTES
[facility], Critical Care, and Sleep Medicine Progress Note Name: Lord Closs MRN: [id number] : 1950 Hospital: [place] Date: 3/4/2019 IMPRESSION:  
· Acute hypoxic resp failure - would hope that oxygnnation improve some with anticoagulation but am afraid the Bleo toxicity will not regress despite steroids and time; of course only time will tell · Likely bleomycin lung toxicity · Bilateral pulm infiltrates-  First noted on PET  and seen on ct on admit - tx with augmentin -, then Levaquin - Preceding fever 101. S/P bronch . DDX: PNA, opportunistic infection and/or bleomycin toxicity from chemo · Coronavirus positive RVP 
· NEWLY DISCOVERED BILATERAL Pulmonary Emboli WITH NEG DUPLEX OF LEG, now on Eliquis. · Hodkins lymphoma- tx with 4 cyles of abvd- positive response to chemo · Pulm htn - pap 60 with nl lvef · BLE edema · Anemia-  ? Related to chemo- stable · Hx of asthma from childhood to age 24 RECOMMENDATIONS:  
· O2 - wean as tolerated but anticipate this will be chronic and need home O2; she is clearly on too much to provide at home and she will need rehab as well · Will ask Dr. Lerma Early to consider antidepressant until she recovers · Topical nasal saline · Antibiotics complete · Jet nebs · Oral steroids- very slow taper would keep on same dose until seen in office for 4 week f/u. · Once over this acute episode will need repeat PFTs as an outpt. · On Eliquis for PE.  
· Recommend not to check pulse ox on her fingers due to nail polish · Would like to check echo for pap/htn · Subjective:  
 
 tired. Little rest. No energy. I think she is clinically depressed. On 6 LPM. No PT yesterday but willing to try today.  down to 6 LPM but drops sats with any activity. Thermostat not working in room. Very cold and now bundled up under piles of blankets 2/26 head cold sx better? On HFNC. Drops sats quickly. No fever. Short sleeper and not bothered by steroids 2/25 d/w Dr. Maria Guadalupe Jones. still hypoxic after sudden decline end of last week. . No acute distress. Still SOLIZ. More nasal congestion and post nasal drip. 3/4- sorry to  See that she is still here and  Has not progressed as hoped. She says she has some days she is very sob just getting to the br and other days are a little better. Says she is weak  And she looks depressed to me. MEDS:  
Current Facility-Administered Medications Medication  FLUoxetine (PROzac) capsule 20 mg  
 insulin glargine (LANTUS) injection 44 Units  albuterol-ipratropium (DUO-NEB) 2.5 MG-0.5 MG/3 ML  sodium chloride (OCEAN) 0.65 % nasal squeeze bottle 2 Spray  insulin glargine (LANTUS) injection 16 Units  levalbuterol (XOPENEX) nebulizer soln 1.25 mg/3 mL  polyethylene glycol (MIRALAX) packet 17 g  predniSONE (DELTASONE) tablet 30 mg  
 sodium chloride (NS) flush 5-40 mL  sodium chloride (NS) flush 5-40 mL  apixaban (ELIQUIS) tablet 5 mg  benzonatate (TESSALON) capsule 200 mg  
 lactobac ac& pc-s.therm-b.anim (JUNI Q/RISAQUAD)  acetaminophen (TYLENOL) tablet 650 mg  
 ondansetron (ZOFRAN) injection 4 mg  amLODIPine (NORVASC) tablet 2.5 mg  
 atorvastatin (LIPITOR) tablet 40 mg  
 fluticasone (FLONASE) 50 mcg/actuation nasal spray 2 Spray  metoprolol succinate (TOPROL-XL) XL tablet 50 mg  
 glucose chewable tablet 16 g  
 dextrose (D50W) injection syrg 12.5-25 g  
 glucagon (GLUCAGEN) injection 1 mg  
 nitroglycerin (NITROBID) 2 % ointment 1 Inch Review of Systems: A comprehensive review of systems was negative except for that written in the HPI. Objective: 
Vital Signs:   
Visit Vitals /66 (BP 1 Location: Right arm, BP Patient Position: Sitting; Other (comment)) Comment (BP Patient Position): legs elevated Pulse 68 Temp 98.1 °F (36.7 °C) Resp 20 Ht 5' 8\" (1.727 m) Wt 108.2 kg (238 lb 8.6 oz) SpO2 100% BMI 36.27 kg/m² O2 Device: Hi flow nasal cannula O2 Flow Rate (L/min): 6 l/min Temp (24hrs), Av.7 °F (36.5 °C), Min:97.3 °F (36.3 °C), Max:98.1 °F (36.7 °C) Intake/Output:  
Last shift:       07 -  190 In: 240 [P.O.:240] Out: 400 [Urine:400] Last 3 shifts:  190 -  0700 In: 940 [P.O.:940] Out: 1550 [VJLTR:2624] Intake/Output Summary (Last 24 hours) at 3/4/2019 1107 Last data filed at 3/4/2019 0850 Gross per 24 hour Intake 240 ml Output 1050 ml Net -810 ml Physical Exam:  
General:  Alert, cooperative, no distress, appears stated age. ON NC oxygen. Sitting up in chair. Sat was 1-- % on 6 liters but noted to drop some with talking Head:  Normocephalic, without obvious abnormality, atraumatic. alopecia Eyes:  Conjunctivae/corneas clear. PERRL, EOMs intact. Nose: Nares normal. Septum midline. Mucosa normal. No drainage or sinus tenderness. Throat: Lips, mucosa, and tongue normal. Teeth and gums normal.- Neck: Supple, symmetrical, trachea midline, no adenopathy, thyroid: no enlargment/tenderness/nodules, no carotid bruit and no JVD. Back:   Symmetric, no curvature. ROM normal.  
Lungs:   Good air movement. Pretty clear. Chest wall:  No tenderness or deformity. Heart:  Regular rate and rhythm, S1, S2 normal, no murmur, click, rub or gallop.- mild tachy Abdomen:   Soft, non-tender. Bowel sounds normal. No masses,  No organomegaly. Extremities: Extremities normal, atraumatic, no cyanosis - edema gone Pulses: 2+ and symmetric all extremities. Skin: Skin color, texture, turgor normal. No rashes or lesions Lymph nodes: Cervical, supraclavicular, and axillary nodes normal.  
Neurologic: Grossly nonfocal, motor is intact. Psych: no over anxiety or depression. Data review: 
 
       
Labs: 
 
Recent Labs 19 
0248 19 
1212 WBC 7.8 10.9 HGB 9.5* 9.7*  
 * 136* Recent Labs 03/03/19 
0240   
K 4.2  CO2 29 * BUN 20  
CREA 0.79 CA 9.0 No results for input(s): PH, PCO2, PO2, HCO3, FIO2 in the last 72 hours. No results for input(s): CPK, CKNDX, TROIQ in the last 72 hours. No lab exists for component: CPKMB No results found for: BNPP, BNP Imaging: 
I have personally reviewed the patients radiographs and have reviewed the reports: CXR today pd - just ordered Juan Pichardo MD

## 2019-03-04 NOTE — PROGRESS NOTES
CM spoke with Radha Koo at [facility]. Per Radha Koo, the admission process will need to be restarted as the authorization with pt's insurance has  since it was submitted/accepted over a week ago. Eduardo Gordillo, MSW Care Manager [phone]

## 2019-03-04 NOTE — PROGRESS NOTES
Problem: Mobility Impaired (Adult and Pediatric) Goal: *Acute Goals and Plan of Care (Insert Text) Physical Therapy Goals Goals reviewed 3/4/19 and remain appropriate for the next 7 days Goals reviewed 2/25/19 and remain appropriate for the next 7 days Goals reviewed and remain appropriate 2/15/19 Initiated 2/7/2019 1. Patient will move from supine to sit and sit to supine , scoot up and down and roll side to side in bed with modified independence within 7 day(s). 2.  Patient will transfer from bed to chair and chair to bed with supervision/set-up using the least restrictive device within 7 day(s). 3.  Patient will perform sit to stand with supervision/set-up within 7 day(s). 4.  Patient will ambulate with supervision/set-up for 150 feet with the least restrictive device within 7 day(s). physical Therapy TREATMENT: WEEKLY REASSESSMENT Patient: Belgica Talley (26 y.o. female) Date: 3/4/2019 Diagnosis: Acute respiratory failure (HCC) <principal problem not specified> Procedure(s) (LRB): BRONCHOSCOPY (N/A) BRONCHIAL WASHINGS FLEXIBLE (N/A) 26 Days Post-Op Precautions:   
Chart, physical therapy assessment, plan of care and goals were reviewed. ASSESSMENT: 
Chart reviewed and pt cleared by RN. Pt received sitting in recliner upon arrival. Pt reporting that she has been up twice this morning early. She reports walking twice to the door and just sitting from walking to the bathroom. Pt O2 sats 88-90% at rest with use of ear clip  for monitoring. Forehead monitor readings significantly higher at rest (97%). Use ear clip during tx session. Pt demonstrates sit<>stand with SBA and steady stance at RW. Pt requests to sit after approx 45 sec. Pt on 5.5L of Hi flow O2 with O2 sats decreasing at worst to 62% with activity(after ambulation). O2 increased to 7L of Hi flow due to pt with very slow recovery time.   Pt also reporting some anxiety, due to difficulty breathing. Pt eventually recovered and O2 decreased to 5.5L of Hi flow, with O2 sats 88-90% at rest.  Pulmonary rehab remains appropriate at discharge from acute setting. Patient's progression toward goals since last assessment: minimal, continues to be limited by pulmonary status, but only SBA to close S with mobility. No physical assistance required. PLAN: 
Goals have been updated based on progression since last assessment. Patient continues to benefit from skilled intervention to address the above impairments. Continue to follow the patient 4 times a week to address goals. Planned Interventions: 
[]              Bed Mobility Training             []       Neuromuscular Re-Education [x]              Transfer Training                   []       Orthotic/Prosthetic Training 
[x]              Gait Training                         []       Modalities [x]              Therapeutic Exercises           []       Edema Management/Control [x]              Therapeutic Activities            [x]       Patient and Family Training/Education []              Other (comment): 
Discharge Recommendations: Inpatient pulmonary Rehab Further Equipment Recommendations for Discharge: tbd SUBJECTIVE:  
Patient stated I have been up a lot today, I actually just sat down\". OBJECTIVE DATA SUMMARY:  
Critical Behavior: 
Neurologic State: Alert Orientation Level: Oriented X4 Cognition: Follows commands Safety/Judgement: Awareness of environment, Fall prevention, Home safety Strength:  
Strength: Generally decreased, functional 
  
 
Functional Mobility Training: 
Bed Mobility: 
  Deferred, pt sitting in recliner Transfers: 
Sit to Stand: Stand-by assistance Stand to Sit: Stand-by assistance(verbal cue to control stand>sit, not flop) Balance: 
Sitting: Intact; Without support Standing: Intact; With support Standing - Static: Good Standing - Dynamic : Good(with use of RW)Ambulation/Gait Training: 
Distance (ft): 36 Feet (ft) tolerance this visit(reports walking 2 other times earlier today) Assistive Device: Gait belt;Walker, rolling Ambulation - Level of Assistance: Contact guard assistance;Stand-by assistance Gait Abnormalities: Decreased step clearance Right Side Weight Bearing: Full Left Side Weight Bearing: Full Base of Support: Widened Speed/Carmita: Slow Step Length: Left shortened;Right shortened Pain: 
Pain Scale 1: Numeric (0 - 10) Pain Intensity 1: 0 Activity Tolerance:  
Poor to Fair Please refer to the flowsheet for vital signs taken during this treatment. After treatment:  
[x]  Patient left in no apparent distress sitting up in chair 
[]  Patient left in no apparent distress in bed 
[x]  Call bell left within reach [x]  Nursing notified 
[]  Caregiver present 
[]  Bed alarm activated COMMUNICATION/COLLABORATION:  
The patients plan of care was discussed with: Occupational Therapist and Registered Nurse Elsi Argueta, PT Time Calculation: 39 mins

## 2019-03-04 NOTE — PROGRESS NOTES
Bedside and Verbal shift change report given to Ольга Barajas (oncoming nurse) by [facility], RN (offgoing nurse). Report included the following information SBAR, Kardex, Recent Results and Cardiac Rhythm NSR.  
 
1359-Patient switched to ear piece for pulse ox. Patient O2 is at 94%. Patient just worked with PT and had a difficult time recovering. Bedside and Verbal shift change report given to SUSANNAH Sol (oncoming nurse) by Fannie Guerrero RN (offgoing nurse). Report included the following information SBAR, Kardex, MAR, Med Rec Status and Cardiac Rhythm NSR/ST.

## 2019-03-04 NOTE — PROGRESS NOTES
Patient sitting up in chair. No complaints. She is not exerting and 02 sats are wnl. Family in room to visit. Patient given miralax for constipation

## 2019-03-04 NOTE — PROGRESS NOTES
Bedside shift change report given to SUSANNAH chris (oncoming nurse). Report included the following information SBAR. SHIFT SUMMARY: 
 
 
CONCERNS TO ADDRESS WITH MD: 
Constipation addressed. Ongoing anemia, ongoing shortness of breath and decreased 02 sats even on 02 
 
 
[facility] NURSING NOTE Admission Date 2/3/2019 Admission Diagnosis Acute respiratory failure ([facility] [address] Consults IP CONSULT TO HEMATOLOGY 
IP CONSULT TO PRIMARY CARE PROVIDER 
IP CONSULT TO PULMONOLOGY Cardiac Monitoring [] Yes [] No  
  
Purposeful Hourly Rounding [] Yes   
Abdulaziz Score Total Score: 3 Abdulaziz score 3 or > [] Bed Alarm [] Avasys [] 1:1 sitter [] Patient refused (Signed refusal form in chart) Dwayne Score Dwayne Score: 16 Dwayne score 14 or < [] PMT consult [] Wound Care consult  
 []  Specialty bed  [] Nutrition consult Influenza Vaccine Received Flu Vaccine for Current Season (usually Sept-March): No  
 Patient/Guardian Refused (Notify MD): Yes Oxygen needs? [] Room air Oxygen @  []1L    []2L    []3L   []4L    []5L   []6L via NC Chronic home O2 use? [] Yes [] No 
Perform O2 challenge test and document in progress note using smartphrase (.Homeoxygen) Last bowel movement Last Bowel Movement Date: (1 week per pt) Urinary Catheter [REMOVED] External Female Catheter 02/04/19-Urine Output (mL): 400 ml LDAs Venous Access Device portacath  09/12/18 Upper chest (subclavicular area), left (Active) Peripheral IV 03/03/19 Right Hand (Active) Site Assessment Clean, dry, & intact 3/4/2019  7:23 AM  
Phlebitis Assessment 0 3/4/2019  7:23 AM  
Infiltration Assessment 0 3/4/2019  7:23 AM  
Dressing Status Clean, dry, & intact 3/4/2019  7:23 AM  
Dressing Type Tape;Transparent 3/4/2019  7:23 AM  
Hub Color/Line Status Blue 3/4/2019  4:11 AM  
Alcohol Cap Used Yes 3/3/2019  8:30 PM  
                  
  
Readmission Risk Assessment Tool Score Medium Risk [id number] [id number] Total Score 3 Has Seen PCP in Last 6 Months (Yes=3, No=0) 3 Patient Length of Stay (>5 days = 3)  
 5 Pt. Coverage (Medicare=5 , Medicaid, or Self-Pay=4) 7 Charlson Comorbidity Score (Age + Comorbid Conditions) Criteria that do not apply:  
 . Living with Significant Other. Assisted Living. LTAC. SNF. or  
Rehab  
 IP Visits Last 12 Months (1-3=4, 4=9, >4=11) Expected Length of Stay 3d 12h Actual Length of Stay 29

## 2019-03-05 NOTE — PROGRESS NOTES
Problem: Self Care Deficits Care Plan (Adult)  Goal: *Acute Goals and Plan of Care (Insert Text)  Occupational Therapy Goals:  Weekly Re-assessment 2/25/2019 and 3/4/2019  All goals remain appropriate    Initiated 2/15/2019  1. Patient will perform grooming standing with modified independence within 7 days. 2. Patient will perform toileting with modified independence within 7 days. 3. Patient will perform upper body dressing and lower body dressing with modified independence within 7 days. 4. Patient will perform bathing with modified independence within 7 days. 5. Patient will correctly use PLB technique during functional tasks for improved independence with ADLS within 7 days. 6. Patient will transfer from toilet with modified independence using the least restrictive device and appropriate durable medical equipment within 7 days. Occupational Therapy TREATMENT  Patient: Ghanshyam Jiang (17 y.o. female)  Date: 3/5/2019  Diagnosis: Acute respiratory failure (HCC) <principal problem not specified>  Procedure(s) (LRB):  BRONCHOSCOPY (N/A)  BRONCHIAL WASHINGS FLEXIBLE (N/A) 27 Days Post-Op  Precautions:    Chart, occupational therapy assessment, plan of care, and goals were reviewed. ASSESSMENT:  Patient participated well in therapy session and continues to be motivated, just limited by activity tolerance. Patient was on 7 liters O2, with SpO2 ranging from 95-96% at rest.  Even during seated grooming tasks, she only dropped as low as 93%. During functional mobility, she dropped to 74%. Cues provided for pursed lip breathing and to try to take slow, deep breaths. SpO2 increased to 82-83%, but only recovered to 90% once oxygen was increased to 8 liters. Continue to emphasize the importance of energy conservation, specifically pacing and taking rest breaks. She indicates understanding, but still requires cues during activity.   Progression toward goals:  []       Improving appropriately and progressing toward goals  [x]       Improving slowly and progressing toward goals  []       Not making progress toward goals and plan of care will be adjusted     PLAN:  Patient continues to benefit from skilled intervention to address the above impairments. Continue treatment per established plan of care. Discharge Recommendations:  Inpatient Pulmonary Rehab  Further Equipment Recommendations for Discharge:  Defer to facility     SUBJECTIVE:   Patient stated [facility]?  (Patient was pleasantly surprised her SpO2 remained above 90% during seated activity.)    OBJECTIVE DATA SUMMARY:   Cognitive/Behavioral Status:  Neurologic State: Alert; Appropriate for age  Orientation Level: Appropriate for age  Cognition: Appropriate decision making  Perception: Appears intact  Perseveration: No perseveration noted  Safety/Judgement: Awareness of environment; Insight into deficits; Fall prevention;Home safety    Functional Mobility and Transfers for ADLs:  Bed Mobility:       Transfers:     Functional Transfers  Toilet Transfer : Stand-by assistance  Cues: Verbal cues provided       Balance:  Sitting: Intact  Standing: Impaired  Standing - Static: Good  Standing - Dynamic : Fair(When she gets fatigued, otherwise good with RW)    ADL Intervention:  Reviewed the importance of pacing and taking rest breaks. Emphasized how she should do short but frequent activity. Encouraged UE exercises throughout the day with patient and niece. Instructed her to only attempt 5 repetitions of an exercises, and then take a rest break. Patient will complete shoulder flexion, abduction, and elbow flexion/extension later with her niece.     Grooming  Washing Face: Supervision/set-up(seated)  Washing Hands: Supervision/set-up(seated)  Brushing Teeth: Supervision/set-up(seated)      Toileting  Bladder Hygiene: Supervision/set-up  Clothing Management: Supervision/set-up  Cues: Verbal cues provided    Cognitive Retraining  Safety/Judgement: Awareness of environment; Insight into deficits; Fall prevention;Home safety    Pain:  Pain Scale 1: Numeric (0 - 10)  Pain Intensity 1: 0              Activity Tolerance:   Poor  After treatment:   [x] Patient left in no apparent distress sitting up in chair  [] Patient left in no apparent distress in bed  [x] Call bell left within reach  [x] Nursing notified  [x] Caregiver present  [] Bed alarm activated    COMMUNICATION/COLLABORATION:   The patients plan of care was discussed with: Physical Therapist and Registered Nurse    EZE Paige/L  Time Calculation: 33 mins

## 2019-03-05 NOTE — PROGRESS NOTES
Problem: Falls - Risk of  Goal: *Absence of Falls  Document Abdulaziz Fall Risk and appropriate interventions in the flowsheet.   Outcome: Progressing Towards Goal  Fall Risk Interventions:  Mobility Interventions: Bed/chair exit alarm, Communicate number of staff needed for ambulation/transfer, Patient to call before getting OOB    Mentation Interventions: Adequate sleep, hydration, pain control, Bed/chair exit alarm    Medication Interventions: Bed/chair exit alarm, Patient to call before getting OOB    Elimination Interventions: Bed/chair exit alarm, Call light in reach, Patient to call for help with toileting needs    History of Falls Interventions: Bed/chair exit alarm, Consult care management for discharge planning, Door open when patient unattended, Evaluate medications/consider consulting pharmacy, Investigate reason for fall, Room close to nurse's station

## 2019-03-05 NOTE — PROGRESS NOTES
General Daily Progress Note    Admit Date: 2/3/2019    Subjective:     Patient complains of exertional dyspnea. Current Facility-Administered Medications   Medication Dose Route Frequency    FLUoxetine (PROzac) capsule 20 mg  20 mg Oral DAILY    insulin glargine (LANTUS) injection 44 Units  44 Units SubCUTAneous DAILY    albuterol-ipratropium (DUO-NEB) 2.5 MG-0.5 MG/3 ML  3 mL Nebulization Q4H PRN    sodium chloride (OCEAN) 0.65 % nasal squeeze bottle 2 Spray  2 Spray Both Nostrils Q2H PRN    insulin glargine (LANTUS) injection 16 Units  16 Units SubCUTAneous QHS    levalbuterol (XOPENEX) nebulizer soln 1.25 mg/3 mL  1.25 mg Nebulization Q4H PRN    polyethylene glycol (MIRALAX) packet 17 g  17 g Oral DAILY PRN    predniSONE (DELTASONE) tablet 30 mg  30 mg Oral BID WITH MEALS    sodium chloride (NS) flush 5-40 mL  5-40 mL IntraVENous PRN    sodium chloride (NS) flush 5-40 mL  5-40 mL IntraVENous Q8H    apixaban (ELIQUIS) tablet 5 mg  5 mg Oral Q12H    benzonatate (TESSALON) capsule 200 mg  200 mg Oral TID PRN    lactobac ac& pc-s.therm-b.anim (JUNI Q/RISAQUAD)  1 Cap Oral DAILY    acetaminophen (TYLENOL) tablet 650 mg  650 mg Oral Q6H PRN    ondansetron (ZOFRAN) injection 4 mg  4 mg IntraVENous Q4H PRN    amLODIPine (NORVASC) tablet 2.5 mg  2.5 mg Oral DAILY    atorvastatin (LIPITOR) tablet 40 mg  40 mg Oral QHS    fluticasone (FLONASE) 50 mcg/actuation nasal spray 2 Spray  2 Spray Both Nostrils DAILY    metoprolol succinate (TOPROL-XL) XL tablet 50 mg  50 mg Oral DAILY    glucose chewable tablet 16 g  4 Tab Oral PRN    dextrose (D50W) injection syrg 12.5-25 g  12.5-25 g IntraVENous PRN    glucagon (GLUCAGEN) injection 1 mg  1 mg IntraMUSCular PRN    nitroglycerin (NITROBID) 2 % ointment 1 Inch  1 Inch Topical Q6H PRN        Review of Systems  A comprehensive review of systems was negative.     Objective:     Patient Vitals for the past 24 hrs:   BP Temp Pulse Resp SpO2   03/05/19 0722 135/67 97.6 °F (36.4 °C) 70 16 98 %   03/05/19 0343 126/64 97.5 °F (36.4 °C) 86 18 96 %   03/04/19 2322 119/67 98.1 °F (36.7 °C) 84 20 97 %   03/04/19 1936 111/61 97.8 °F (36.6 °C) 81 18 94 %   03/04/19 1510 122/64 97.8 °F (36.6 °C) 86 20 94 %   03/04/19 1052 133/66 98.1 °F (36.7 °C) 68 20 100 %     No intake/output data recorded. 03/03 1901 - 03/05 0700  In: 855 [P.O.:855]  Out: 1050 [Urine:1050]    Physical Exam:   Visit Vitals  /67 (BP 1 Location: Right arm, BP Patient Position: Sitting)   Pulse 70   Temp 97.6 °F (36.4 °C)   Resp 16   Ht 5' 8\" (1.727 m)   Wt 238 lb 8.6 oz (108.2 kg)   SpO2 98%   BMI 36.27 kg/m²     General appearance: alert, cooperative, no distress, appears stated age  Neck: supple, symmetrical, trachea midline, no adenopathy, thyroid: not enlarged, symmetric, no tenderness/mass/nodules, no carotid bruit and no JVD  Lungs: rales R base, L base, diminished breath sounds R base, L base  Heart: regular rate and rhythm, S1, S2 normal, no murmur, click, rub or gallop  Abdomen: soft, non-tender.  Bowel sounds normal. No masses,  no organomegaly  Extremities: edema trace        Data Review   Recent Results (from the past 24 hour(s))   GLUCOSE, POC    Collection Time: 03/04/19 11:29 AM   Result Value Ref Range    Glucose (POC) 82 65 - 100 mg/dL    Performed by Bryanna CALLE(CON)    GLUCOSE, POC    Collection Time: 03/04/19  4:37 PM   Result Value Ref Range    Glucose (POC) 186 (H) 65 - 100 mg/dL    Performed by Bryanna CALLE(CON)    GLUCOSE, POC    Collection Time: 03/04/19  8:24 PM   Result Value Ref Range    Glucose (POC) 180 (H) 65 - 100 mg/dL    Performed by Mary Jane Larios ([facility])    METABOLIC PANEL, BASIC    Collection Time: 03/05/19  2:51 AM   Result Value Ref Range    Sodium 136 136 - 145 mmol/L    Potassium 4.3 3.5 - 5.1 mmol/L    Chloride 101 97 - 108 mmol/L    CO2 27 21 - 32 mmol/L    Anion gap 8 5 - 15 mmol/L    Glucose 231 (H) 65 - 100 mg/dL    BUN 23 (H) 6 - 20 MG/DL    Creatinine 0.75 0.55 - 1.02 MG/DL    BUN/Creatinine ratio 31 (H) 12 - 20      GFR est AA >60 >60 ml/min/1.73m2    GFR est non-AA >60 >60 ml/min/1.73m2    Calcium 9.0 8.5 - 10.1 MG/DL   GLUCOSE, POC    Collection Time: 03/05/19  7:12 AM   Result Value Ref Range    Glucose (POC) 166 (H) 65 - 100 mg/dL    Performed by Melva Patton (PCT)            Assessment:     Active Problems:    SVT (supraventricular tachycardia) ([facility] (8/29/2016)      Essential hypertension with goal blood pressure less than 140/90 (8/29/2016)      Diabetes mellitus type 2, controlled ([facility] (9/8/2016)      Dyslipidemia (9/8/2016)      Acute respiratory failure ([address] (2/3/2019)      Leg swelling (2/3/2019)      Hodgkin lymphoma ([facility] (2/3/2019)      CAP (community acquired pneumonia) (2/3/2019)        Plan:     1. Pulmonary status unchanged. Should we repeat the CTA to ensure no further emboli? Should steroids to be increased? 2. Continue diabetic control. 3.  Efforts continue at ambulation.

## 2019-03-05 NOTE — PROGRESS NOTES
Bedside shift change report given to Arsen Pichardo RN (oncoming nurse) by Tank Arrieta (offgoing nurse). Report included the following information SBAR. Bedside shift change report given to SSUANNAH COHEN (oncoming nurse). Report included the following information SBAR. SHIFT SUMMARY:      CONCERNS TO ADDRESS WITH MD:        [facility] NURSING NOTE   Admission Date 2/3/2019   Admission Diagnosis Acute respiratory failure ([address]   Consults IP CONSULT TO HEMATOLOGY  IP CONSULT TO PRIMARY CARE PROVIDER  IP CONSULT TO PULMONOLOGY      Cardiac Monitoring [x] Yes [] No      Purposeful Hourly Rounding [x] Yes    Abdulaziz Score Total Score: 3   Abdulaziz score 3 or > [x] Bed Alarm [] Avasys [] 1:1 sitter [] Patient refused (Signed refusal form in chart)   Dwayne Score Dwayne Score: 16   Dwayne score 14 or < [] PMT consult [] Wound Care consult    []  Specialty bed  [] Nutrition consult      Influenza Vaccine Received Flu Vaccine for Current Season (usually Sept-March): No    Patient/Guardian Refused (Notify MD): Yes      Oxygen needs? [] Room air Oxygen @  []1L    []2L    []3L   []4L    []5L   []6L via  NC on 7L    Chronic home O2 use?  [] Yes [x] No  Perform O2 challenge test and document in progress note using smartphrase (.Homeoxygen)      Last bowel movement Last Bowel Movement Date: (1 week per pt)      Urinary Catheter       [REMOVED] External Female Catheter 02/04/19-Urine Output (mL): 400 ml     LDAs             Venous Access Device portacath  09/12/18 Upper chest (subclavicular area), left (Active)      Peripheral IV 03/03/19 Right Hand (Active)   Site Assessment Clean, dry, & intact 3/5/2019  3:39 PM   Phlebitis Assessment 0 3/5/2019  3:39 PM   Infiltration Assessment 0 3/5/2019  3:39 PM   Dressing Status Clean, dry, & intact 3/5/2019  3:39 PM   Dressing Type Transparent 3/5/2019  3:39 PM   Hub Color/Line Status Flushed 3/5/2019  3:39 PM   Alcohol Cap Used Yes 3/3/2019  8:30 PM                         Readmission Risk Assessment Tool Score Medium Risk            18       Total Score        3 Has Seen PCP in Last 6 Months (Yes=3, No=0)    3 Patient Length of Stay (>5 days = 3)    5 Pt. Coverage (Medicare=5 , Medicaid, or Self-Pay=4)    7 Charlson Comorbidity Score (Age + Comorbid Conditions)        Criteria that do not apply:    . Living with Significant Other. Assisted Living. LTAC. SNF.  or   Rehab    IP Visits Last 12 Months (1-3=4, 4=9, >4=11)       Expected Length of Stay 3d 12h   Actual Length of Stay 30

## 2019-03-05 NOTE — PROGRESS NOTES
Bedside shift change report given to Patricia Schroeder RN (oncoming nurse). Report included the following information SBAR, Kardex, Intake/Output, MAR and Recent Results. SHIFT SUMMARY: 
 
 
CONCERNS TO ADDRESS WITH MD: 
 
 
 
[facility] NURSING NOTE Admission Date 2/3/2019 Admission Diagnosis Acute respiratory failure ([address] Consults IP CONSULT TO HEMATOLOGY 
IP CONSULT TO PRIMARY CARE PROVIDER 
IP CONSULT TO PULMONOLOGY Cardiac Monitoring [x] Yes [] No  
  
Purposeful Hourly Rounding [x] Yes   
Abdulaziz Score Total Score: 3 Abdulaziz score 3 or > [x] Bed Alarm [] Avasys [] 1:1 sitter [] Patient refused (Signed refusal form in chart) Dwayne Score Dwayne Score: 17 Dwayne score 14 or < [] PMT consult [] Wound Care consult  
 []  Specialty bed  [] Nutrition consult Influenza Vaccine Received Flu Vaccine for Current Season (usually Sept-March): No  
 Patient/Guardian Refused (Notify MD): Yes Oxygen needs? [] Room air Oxygen @  []1L    []2L    []3L   []4L    []5L   [x]6L via NC Chronic home O2 use? [x] Yes [] No 
Perform O2 challenge test and document in progress note using smartphrase (.Homeoxygen) Last bowel movement Last Bowel Movement Date: (1 week per pt) Urinary Catheter [REMOVED] External Female Catheter 02/04/19-Urine Output (mL): 400 ml LDAs Venous Access Device portacath  09/12/18 Upper chest (subclavicular area), left (Active) Peripheral IV 03/03/19 Right Hand (Active) Site Assessment Clean, dry, & intact 3/4/2019  7:30 PM  
Phlebitis Assessment 0 3/4/2019  7:30 PM  
Infiltration Assessment 0 3/4/2019  7:30 PM  
Dressing Status Clean, dry, & intact 3/4/2019  7:30 PM  
Dressing Type Transparent 3/4/2019  7:30 PM  
Hub Color/Line Status Capped 3/4/2019  7:30 PM  
Alcohol Cap Used Yes 3/3/2019  8:30 PM  
                  
  
Readmission Risk Assessment Tool Score Medium Risk 25 Total Score 3 Has Seen PCP in Last 6 Months (Yes=3, No=0) 3 Patient Length of Stay (>5 days = 3)  
 5 Pt. Coverage (Medicare=5 , Medicaid, or Self-Pay=4) 7 Charlson Comorbidity Score (Age + Comorbid Conditions) Criteria that do not apply:  
 . Living with Significant Other. Assisted Living. LTAC. SNF. or  
Rehab  
 IP Visits Last 12 Months (1-3=4, 4=9, >4=11) Expected Length of Stay 3d 12h Actual Length of Stay 29

## 2019-03-05 NOTE — PROGRESS NOTES
Bedside shift change report given to Kleber Swan RN (oncoming nurse) by Teena Alcocer RN (offgoing nurse). Report included the following information SBAR.

## 2019-03-05 NOTE — PROGRESS NOTES
Problem: Falls - Risk of 
Goal: *Absence of Falls Document Ana Sewell Fall Risk and appropriate interventions in the flowsheet. Outcome: Progressing Towards Goal 
Fall Risk Interventions: 
Mobility Interventions: Bed/chair exit alarm, Communicate number of staff needed for ambulation/transfer, Mechanical lift, OT consult for ADLs, Patient to call before getting OOB, PT Consult for mobility concerns Mentation Interventions: Adequate sleep, hydration, pain control, Bed/chair exit alarm, Door open when patient unattended, Evaluate medications/consider consulting pharmacy, Eyeglasses and hearing aids, Familiar objects from home, Family/sitter at bedside, Gait belt with transfers/ambulation Medication Interventions: Bed/chair exit alarm, Evaluate medications/consider consulting pharmacy, Patient to call before getting OOB, Teach patient to arise slowly Elimination Interventions: Bed/chair exit alarm, Call light in reach, Elevated toilet seat, Patient to call for help with toileting needs, Toilet paper/wipes in reach, Toileting schedule/hourly rounds History of Falls Interventions: Evaluate medications/consider consulting pharmacy Problem: Pressure Injury - Risk of 
Goal: *Prevention of pressure injury Document Dwayne Scale and appropriate interventions in the flowsheet. Outcome: Progressing Towards Goal 
Pressure Injury Interventions: 
Sensory Interventions: Assess need for specialty bed, Avoid rigorous massage over bony prominences, Chair cushion, Check visual cues for pain, Discuss PT/OT consult with provider, Float heels, Keep linens dry and wrinkle-free Moisture Interventions: Absorbent underpads, Apply protective barrier, creams and emollients, Assess need for specialty bed, Check for incontinence Q2 hours and as needed, Contain wound drainage, Internal/External fecal devices, Internal/External urinary devices, Limit adult briefs Activity Interventions: Assess need for specialty bed, Increase time out of bed, Pressure redistribution bed/mattress(bed type), PT/OT evaluation Mobility Interventions: Assess need for specialty bed, Chair cushion, Float heels, HOB 30 degrees or less, Pressure redistribution bed/mattress (bed type), PT/OT evaluation Nutrition Interventions: Document food/fluid/supplement intake, Discuss nutritional consult with provider, Offer support with meals,snacks and hydration Friction and Shear Interventions: Apply protective barrier, creams and emollients, Feet elevated on foot rest, Foam dressings/transparent film/skin sealants, HOB 30 degrees or less, Lift sheet, Lift team/patient mobility team, Minimize layers, Sit at 90-degree angle

## 2019-03-06 NOTE — PROGRESS NOTES
Problem: Mobility Impaired (Adult and Pediatric)  Goal: *Acute Goals and Plan of Care (Insert Text)  Physical Therapy Goals  Goals reviewed 3/4/19 and remain appropriate for the next 7 days  Goals reviewed 2/25/19 and remain appropriate for the next 7 days  Goals reviewed and remain appropriate 2/15/19  Initiated 2/7/2019  1. Patient will move from supine to sit and sit to supine , scoot up and down and roll side to side in bed with modified independence within 7 day(s). 2.  Patient will transfer from bed to chair and chair to bed with supervision/set-up using the least restrictive device within 7 day(s). 3.  Patient will perform sit to stand with supervision/set-up within 7 day(s). 4.  Patient will ambulate with supervision/set-up for 150 feet with the least restrictive device within 7 day(s). physical Therapy TREATMENT  Patient: Alfa Kennedy (73 y.o. female)  Date: 3/6/2019  Diagnosis: Acute respiratory failure (HCC)    Procedure(s) (LRB):  BRONCHOSCOPY (N/A)  BRONCHIAL WASHINGS FLEXIBLE (N/A) 28 Days Post-Op     Precautions:   falls  Chart, physical therapy assessment, plan of care and goals were reviewed. ASSESSMENT: no change in pt's respiratory status, very low activity tolerance, no LOB, heavy SOB, needs O2 turned up to 10LPM after gait trng and she begins to cough, very weak, vc's for safety and proper RW use. Progression toward goals:  []    Improving appropriately and progressing toward goals  []    Improving slowly and progressing toward goals  [x]    Not making progress toward goals      PLAN:  Patient continues to benefit from skilled intervention to address the above impairments. Continue treatment per established plan of care.   Discharge Recommendations:  inpatient Pulmonary Rehab  Further Equipment Recommendations for Discharge:  rolling walker     OBJECTIVE DATA SUMMARY:     Critical Behavior:  Neurologic State: Alert  Orientation Level: Oriented X4  Cognition: Appropriate decision making, Appropriate for age attention/concentration, Appropriate safety awareness, Follows commands  Safety/Judgement: Awareness of environment, Insight into deficits, Fall prevention, Home safety     Functional Mobility Training:  Bed Mobility: Pt sitting in chair on arrival.    Transfers:  Sit to Stand: Stand-by assistance  Stand to Sit: Stand-by assistance  Interventions: Verbal cues  Level of Assistance: Stand-by assistance     Balance:  Sitting: Intact; Without support  Standing: Intact; With support  Standing - Static: Good;Constant support  Standing - Dynamic : Good     Ambulation/Gait Training:  Distance (ft): 50 Feet (ft)  Assistive Device: Walker, rolling;Gait belt  Ambulation - Level of Assistance: Contact guard assistance  Gait Abnormalities: Decreased step clearance  Right Side Weight Bearing: Full  Left Side Weight Bearing: Full  Base of Support: Widened  Speed/Carmita: Slow  Step Length: Left shortened;Right shortened    Pain:  Pain Scale 1: Numeric (0 - 10)  Pain Intensity 1: 0    Activity Tolerance: poor    After treatment:   [x]    Patient left in no apparent distress sitting up in chair  []    Patient left in no apparent distress in bed  [x]    Call bell left within reach  [x]    Nursing notified  []    Caregiver present  []    Bed alarm activated    COMMUNICATION/COLLABORATION:   The patients plan of care was discussed with: Registered Nurse    Deshawn Sánchez PTA   Time Calculation: 20 mins

## 2019-03-06 NOTE — PROGRESS NOTES
[facility]  Pulmonary, Critical Care, and Sleep Medicine    Progress Note     Name: Delmi Bills MRN: [id number]   : 1950 Hospital: [facility]   Date: 3/5/2019        IMPRESSION:   · Acute hypoxic resp failure - would hope that oxygenation improve some with anticoagulation but am afraid the Bleo toxicity will not regress despite steroids and time; of course only time will tell  · Likely bleomycin lung toxicity  · Bilateral pulm infiltrates-  First noted on PET  and seen on ct on admit - tx with augmentin -, then Levaquin - Preceding fever 101. S/P bronch . DDX: PNA, opportunistic infection and/or bleomycin toxicity from chemo   · Coronavirus positive RVP  · NEWLY DISCOVERED BILATERAL Pulmonary Emboli WITH NEG DUPLEX OF LEG, now on Eliquis. · Hodkins lymphoma- tx with 4 cyles of abvd- positive response to chemo  · Pulm htn - pap 60 with nl lvef   · BLE edema  · Anemia-  ? Related to chemo- stable   · Hx of asthma from childhood to age 24       RECOMMENDATIONS:   · O2 - wean as tolerated but anticipate this will be chronic and need home O2; she is clearly on too much to provide at home and she will need rehab as well  ·  case discussed with Dr. Ansley Ledbetter and agree repeat ct to eval for pte and the ildz may be helpful. Pt is ok with  This. We might repeat echo also if not done since original eval     · Topical nasal saline  · Antibiotics complete  · Jet nebs   · Oral steroids- very slow taper would keep on same dose until seen in office for 4 week f/u. · Once over this acute episode will need repeat PFTs as an outpt. · On Eliquis for PE.   · Recommend not to check pulse ox on her fingers due to nail polish  · Would like to check echo for pap/htn  ·          Subjective:      tired. Little rest. No energy. I think she is clinically depressed. On 6 LPM. No PT yesterday but willing to try today.       down to 6 LPM but drops sats with any activity. Thermostat not working in room. Very cold and now bundled up under piles of blankets    2/26 head cold sx better? On HFNC. Drops sats quickly. No fever. Short sleeper and not bothered by steroids    2/25 d/w Dr. Tawanna Shine. still hypoxic after sudden decline end of last week. . No acute distress. Still SOLIZ. More nasal congestion and post nasal drip. 3/4- sorry to  See that she is still here and  Has not progressed as hoped. She says she has some days she is very sob just getting to the br and other days are a little better. Says she is weak  And she looks depressed to me.   3/5 = she expressed frustration  As expected over her condition . She mentioned her brother in law who is an oncologist recommend a  Second opion about her condition and she is considering [facility] .    MEDS:   Current Facility-Administered Medications   Medication    FLUoxetine (PROzac) capsule 20 mg    insulin glargine (LANTUS) injection 44 Units    albuterol-ipratropium (DUO-NEB) 2.5 MG-0.5 MG/3 ML    sodium chloride (OCEAN) 0.65 % nasal squeeze bottle 2 Spray    insulin glargine (LANTUS) injection 16 Units    levalbuterol (XOPENEX) nebulizer soln 1.25 mg/3 mL    polyethylene glycol (MIRALAX) packet 17 g    predniSONE (DELTASONE) tablet 30 mg    sodium chloride (NS) flush 5-40 mL    sodium chloride (NS) flush 5-40 mL    apixaban (ELIQUIS) tablet 5 mg    benzonatate (TESSALON) capsule 200 mg    lactobac ac& pc-s.therm-b.anim (JUNI Q/RISAQUAD)    acetaminophen (TYLENOL) tablet 650 mg    ondansetron (ZOFRAN) injection 4 mg    amLODIPine (NORVASC) tablet 2.5 mg    atorvastatin (LIPITOR) tablet 40 mg    fluticasone (FLONASE) 50 mcg/actuation nasal spray 2 Spray    metoprolol succinate (TOPROL-XL) XL tablet 50 mg    glucose chewable tablet 16 g    dextrose (D50W) injection syrg 12.5-25 g    glucagon (GLUCAGEN) injection 1 mg    nitroglycerin (NITROBID) 2 % ointment 1 Inch        Review of Systems:  A comprehensive review of systems was negative except for that written in the HPI. Objective:   Vital Signs:    Visit Vitals  /69 (BP 1 Location: Right arm, BP Patient Position: Sitting)   Pulse 81   Temp 98.8 °F (37.1 °C)   Resp 16   Ht 5' 8\" (1.727 m)   Wt 108.2 kg (238 lb 8.6 oz)   SpO2 100%   BMI 36.27 kg/m²       O2 Device: Nasal cannula   O2 Flow Rate (L/min): 7 l/min   Temp (24hrs), Av °F (36.7 °C), Min:97.5 °F (36.4 °C), Max:98.8 °F (37.1 °C)       Intake/Output:   Last shift:      No intake/output data recorded. Last 3 shifts: 701 -  1900  In: 1145 [P.O.:1145]  Out: 1150 [Urine:1150]    Intake/Output Summary (Last 24 hours) at 3/5/2019 193  Last data filed at 3/5/2019 1712  Gross per 24 hour   Intake 830 ml   Output 750 ml   Net 80 ml      Physical Exam:   General:  Alert, cooperative, no distress, appears stated age. ON NC oxygen. Sitting up in chair. Sat was 1-- % on 6 liters but noted to drop some with talking     Head:  Normocephalic, without obvious abnormality, atraumatic. alopecia   Eyes:  Conjunctivae/corneas clear. PERRL, EOMs intact. Nose: Nares normal. Septum midline. Mucosa normal. No drainage or sinus tenderness. Throat: Lips, mucosa, and tongue normal. Teeth and gums normal.-    Neck: Supple, symmetrical, trachea midline, no adenopathy, thyroid: no enlargment/tenderness/nodules, no carotid bruit and no JVD. Back:   Symmetric, no curvature. ROM normal.   Lungs:   Good air movement. Some rale in bases    Chest wall:  No tenderness or deformity. Heart:  Regular rate and rhythm, S1, S2 normal, no murmur, click, rub or gallop.- mild tachy    Abdomen:   Soft, non-tender. Bowel sounds normal. No masses,  No organomegaly. Extremities: Extremities normal, atraumatic, no cyanosis - edema gone    Pulses: 2+ and symmetric all extremities.    Skin: Skin color, texture, turgor normal. No rashes or lesions   Lymph nodes: Cervical, supraclavicular, and axillary nodes normal. Neurologic: Grossly nonfocal, motor is intact. Psych: no over anxiety or depression. Data review:             Labs:    Recent Labs     03/04/19  0248   WBC 7.8   HGB 9.5*   *     Recent Labs     03/05/19  0251 03/03/19  0240    137   K 4.3 4.2    101   CO2 27 29   * 189*   BUN 23* 20   CREA 0.75 0.79   CA 9.0 9.0     No results for input(s): PH, PCO2, PO2, HCO3, FIO2 in the last 72 hours. No results for input(s): CPK, CKNDX, TROIQ in the last 72 hours.     No lab exists for component: CPKMB  No results found for: BNPP, BNP       Imaging:  I have personally reviewed the patients radiographs and have reviewed the reports:  CXR today pd - just ordered        Vilma Bae MD

## 2019-03-06 NOTE — PROGRESS NOTES
Nutrition Assessment:    INTERVENTIONS/RECOMMENDATIONS:   · Continue current diet  · Consider bowel regimen if last documented BM of 2/20 is accurate    ASSESSMENT:   Chart reviewed. Pt was off floor during visit attempt. Pt consumed 100% of breakfast this morning. Flowsheet documents pt consistently consumes % of meals. BG better controlled. Noted for last documented BM was 2/20, miralax was given on 3/4 but not since. Diet Order: Consistent carb  % Eaten:    Patient Vitals for the past 72 hrs:   % Diet Eaten   03/05/19 1919 100 %   03/05/19 1537 50 %   03/05/19 0912 100 %   03/04/19 1258 60 %   03/04/19 0825 100 %   03/03/19 0925 75 %     Pertinent Medications: [x]Reviewed: lantus, loactobac,  miralax (3/4), prednisone,   Pertinent Labs: [x]Reviewed: BG   Food Allergies: [x]NKFA  []Other   Last BM:  2/20? Edema: trace     []RUE   []LUE   [x]RLE   [x]LLE      Pressure Ulcer:  n/a    [] Stage I   [] Stage II   [] Stage III   [] Stage IV      Anthropometrics: Height: 5' 8\" (172.7 cm) Weight: 108.2 kg (238 lb 8.6 oz)    IBW (%IBW):   ( ) UBW (%UBW):   (  %)    BMI: Body mass index is 36.27 kg/m². This BMI is indicative of:  []Underweight   []Normal   []Overweight   [x] Obesity   [] Extreme Obesity (BMI>40)  Last Weight Metrics:  Weight Loss Metrics 2/9/2019 1/21/2019 11/15/2018 9/13/2018 9/12/2018 9/10/2018 8/27/2018   Today's Wt 238 lb 8.6 oz 180 lb 180 lb 198 lb 201 lb 1 oz 200 lb 3.2 oz 201 lb   BMI 36.27 kg/m2 27.37 kg/m2 27.37 kg/m2 30.11 kg/m2 29.69 kg/m2 29.56 kg/m2 29.68 kg/m2       Estimated Nutrition Needs (Based on): 1850 Kcals/day(BMR (1654) x 1. 3AF -300kcal) , 86 g(0.8 g/kg bw) Protein  Carbohydrate:  At Least 130 g/day  Fluids: 1850 mL/day or per primary team    NUTRITION DIAGNOSES:   Problem:  Altered nutrition-related lab values      Etiology: related to DM, steroid     Signs/Symptoms: as evidenced by -[phone]    Previous Nutrition Dx:  [x] Resolved  [] Unresolved [] Progressing    NUTRITION INTERVENTIONS:  Meals/Snacks: General/healthful diet   Supplements: Commercial supplement              GOAL:   PO intake >70% of meals next 5-7 days    NUTRITION MONITORING AND EVALUATION      Food/Nutrient Intake Outcomes:  Total energy intake  Physical Signs/Symptoms Outcomes: Weight/weight change, Glucose profile    Previous Goal Met:   [x] Met              [] Progressing Towards Goal              [] Not Progressing Towards Goal   Previous Recommendations:   [] Implemented          [] Not Implemented          [] Not Applicable    LEARNING NEEDS (Diet, Food/Nutrient-Drug Interaction):    [x] None Identified   [] Identified and Education Provided/Documented   [] Identified and Pt declined/was not appropriate     Cultural, Presybeterian, OR Ethnic Dietary Needs:    [x] None Identified   [] Identified and Addressed     [x] Interdisciplinary Care Plan Reviewed/Documented    [x] Discharge Planning: consistent carb, low Na diet   [] Participated in Interdisciplinary Rounds    NUTRITION RISK:    [] High              [] Moderate           [x]  Low  []  Minimal/Uncompromised      Clarisse Schwab, RDN  Pager [phone]  Weekend Pager [phone]

## 2019-03-06 NOTE — ROUTINE PROCESS
0700: SBAR report from Kajal Martínez RN to SRINI Vicente RN    0800: Transport at bedside to take pt to CT    0930: Pt back in room    1900: Bedside shift change report given to [facility], [address] [place] [address] (oncoming nurse) by SRINI Vicente (offgoing nurse). Report included the following information SBAR and Kardex.

## 2019-03-06 NOTE — PROGRESS NOTES
[facility]  Pulmonary, Critical Care, and Sleep Medicine    Progress Note     Name: Herb Salas MRN: [id number]   : 1950 Hospital: [facility]   Date: 3/6/2019        IMPRESSION:   · Acute hypoxic resp failure - would hope that oxygenation improve some with anticoagulation but am afraid the Bleo toxicity will not regress despite steroids and time; of course only time will tell  · Likely bleomycin lung toxicity  · Bilateral pulm infiltrates-  First noted on PET  and seen on ct on admit - tx with augmentin -, then Levaquin - Preceding fever 101. S/P bronch . DDX: PNA, opportunistic infection and/or bleomycin toxicity from chemo   · Coronavirus positive RVP  · NEWLY DISCOVERED BILATERAL Pulmonary Emboli WITH NEG DUPLEX OF LEG, now on Eliquis. · Hodkins lymphoma- tx with 4 cyles of abvd- positive response to chemo  · Pulm htn - pap 60 with nl lvef   · BLE edema  · Anemia-  ? Related to chemo- stable   · Hx of asthma from childhood to age 24       RECOMMENDATIONS:   · O2 - wean as tolerated but anticipate this will be chronic and need home O2; she is clearly on too much to provide at home and she will need rehab as well  · Ct has  been done  And there is extensive residual scarring -     · Echo done but results are pd   · Antibiotics complete  · Jet nebs   · Oral steroids- very slow taper would keep on same dose until seen in office for 4 week f/u. · Once over this acute episode will need repeat PFTs as an outpt. · On Eliquis for PE.   · Recommend not to check pulse ox on her fingers due to nail polish  · Would like to check echo for pap/htn as as above pd   · Pt would like me to speak with her brother in  Law who is an oncologist about her condition . Subjective:      tired. Little rest. No energy. I think she is clinically depressed. On 6 LPM. No PT yesterday but willing to try today.       down to 6 LPM but drops sats with any activity. Thermostat not working in room. Very cold and now bundled up under piles of blankets    2/26 head cold sx better? On HFNC. Drops sats quickly. No fever. Short sleeper and not bothered by steroids    2/25 d/w Dr. Yariel Deleon. still hypoxic after sudden decline end of last week. . No acute distress. Still SOLIZ. More nasal congestion and post nasal drip. 3/4- sorry to  See that she is still here and  Has not progressed as hoped. She says she has some days she is very sob just getting to the br and other days are a little better. Says she is weak  And she looks depressed to me.   3/5 = she expressed frustration  As expected over her condition .  She mentioned her brother in law who is an oncologist recommend a  Second opion about her condition and she is considering transfer to [facility] .   3/6 - overall no change   MEDS:   Current Facility-Administered Medications   Medication    iopamidol (ISOVUE-370) 76 % injection 100 mL    sodium chloride (NS) flush 10 mL    amitriptyline (ELAVIL) tablet 25 mg    insulin glargine (LANTUS) injection 44 Units    albuterol-ipratropium (DUO-NEB) 2.5 MG-0.5 MG/3 ML    sodium chloride (OCEAN) 0.65 % nasal squeeze bottle 2 Spray    insulin glargine (LANTUS) injection 16 Units    levalbuterol (XOPENEX) nebulizer soln 1.25 mg/3 mL    polyethylene glycol (MIRALAX) packet 17 g    predniSONE (DELTASONE) tablet 30 mg    sodium chloride (NS) flush 5-40 mL    sodium chloride (NS) flush 5-40 mL    apixaban (ELIQUIS) tablet 5 mg    benzonatate (TESSALON) capsule 200 mg    lactobac ac& pc-s.therm-b.anim (JUNI Q/RISAQUAD)    acetaminophen (TYLENOL) tablet 650 mg    ondansetron (ZOFRAN) injection 4 mg    amLODIPine (NORVASC) tablet 2.5 mg    atorvastatin (LIPITOR) tablet 40 mg    fluticasone (FLONASE) 50 mcg/actuation nasal spray 2 Spray    metoprolol succinate (TOPROL-XL) XL tablet 50 mg    glucose chewable tablet 16 g    dextrose (D50W) injection syrg 12.5-25 g    glucagon (GLUCAGEN) injection 1 mg    nitroglycerin (NITROBID) 2 % ointment 1 Inch        Review of Systems:  A comprehensive review of systems was negative except for that written in the HPI. Objective:   Vital Signs:    Visit Vitals  /59 (BP 1 Location: Right arm, BP Patient Position: At rest)   Pulse 62   Temp 97.6 °F (36.4 °C)   Resp 18   Ht 5' 8\" (1.727 m)   Wt 108.2 kg (238 lb 8.6 oz)   SpO2 97%   BMI 36.27 kg/m²       O2 Device: Hi flow nasal cannula   O2 Flow Rate (L/min): 6 l/min   Temp (24hrs), Av.2 °F (36.8 °C), Min:97.6 °F (36.4 °C), Max:98.8 °F (37.1 °C)       Intake/Output:   Last shift:      No intake/output data recorded. Last 3 shifts:  1901 -  0700  In: 1190 [P.O.:1190]  Out: 750 [Urine:750]    Intake/Output Summary (Last 24 hours) at 3/6/2019 1016  Last data filed at 3/6/2019 0323  Gross per 24 hour   Intake 410 ml   Output 350 ml   Net 60 ml      Physical Exam:   General:  Alert, cooperative, no distress, appears stated age. ON NC oxygen. Sitting up in chair. Sat was 1-- % on 6 liters but noted to drop some with talking     Head:  Normocephalic, without obvious abnormality, atraumatic. alopecia   Eyes:  Conjunctivae/corneas clear. PERRL, EOMs intact. Nose: Nares normal. Septum midline. Mucosa normal. No drainage or sinus tenderness. Throat: Lips, mucosa, and tongue normal. Teeth and gums normal.-    Neck: Supple, symmetrical, trachea midline, no adenopathy, thyroid:   Back:   Symmetric, no curvature. ROM normal.   Lungs:   Good air movement. Some rale in bases    Chest wall:  No tenderness or deformity. Heart:  Regular rate and rhythm, S1, S2 normal, no murmur, click, rub or gallop.- mild tachy    Abdomen:   Soft, non-tender. Bowel sounds normal. No masses,  No organomegaly. Extremities: Extremities normal, atraumatic, no cyanosis - edema gone    Pulses: 2+ and symmetric all extremities.    Skin: Skin color, texture, turgor normal. No rashes or lesions   Lymph nodes: Cervical, supraclavicular, and axillary nodes normal.   Neurologic: Grossly nonfocal, motor is intact. Psych: no over anxiety or depression. Data review:             Labs:    Recent Labs     03/06/19  0434 03/04/19  0248   WBC 9.4 7.8   HGB 10.0* 9.5*    144*     Recent Labs     03/05/19  0251      K 4.3      CO2 27   *   BUN 23*   CREA 0.75   CA 9.0     No results for input(s): PH, PCO2, PO2, HCO3, FIO2 in the last 72 hours. No results for input(s): CPK, CKNDX, TROIQ in the last 72 hours.     No lab exists for component: CPKMB  No results found for: BNPP, BNP       Imaging:  I have personally reviewed the patients radiographs and have reviewed the reports:ct seen and  The echo is pd         Abner Vasquez MD

## 2019-03-06 NOTE — PROGRESS NOTES
Problem: Falls - Risk of  Goal: *Absence of Falls  Document Abdulaziz Fall Risk and appropriate interventions in the flowsheet. Outcome: Progressing Towards Goal  Fall Risk Interventions:  Mobility Interventions: Bed/chair exit alarm, Communicate number of staff needed for ambulation/transfer, Mechanical lift, OT consult for ADLs, Patient to call before getting OOB, PT Consult for mobility concerns    Mentation Interventions: Adequate sleep, hydration, pain control, Bed/chair exit alarm, Door open when patient unattended, Evaluate medications/consider consulting pharmacy, Eyeglasses and hearing aids, Familiar objects from home, Family/sitter at bedside, Gait belt with transfers/ambulation    Medication Interventions: Bed/chair exit alarm, Evaluate medications/consider consulting pharmacy, Patient to call before getting OOB, Teach patient to arise slowly    Elimination Interventions: Bed/chair exit alarm, Call light in reach, Elevated toilet seat, Patient to call for help with toileting needs, Toilet paper/wipes in reach, Toileting schedule/hourly rounds    History of Falls Interventions: Bed/chair exit alarm, Consult care management for discharge planning, Door open when patient unattended, Evaluate medications/consider consulting pharmacy, Investigate reason for fall, Room close to nurse's station        Problem: Pressure Injury - Risk of  Goal: *Prevention of pressure injury  Document Dwayne Scale and appropriate interventions in the flowsheet.   Outcome: Progressing Towards Goal  Pressure Injury Interventions:  Sensory Interventions: Assess need for specialty bed, Avoid rigorous massage over bony prominences, Chair cushion, Check visual cues for pain, Discuss PT/OT consult with provider, Float heels, Keep linens dry and wrinkle-free    Moisture Interventions: Absorbent underpads, Apply protective barrier, creams and emollients, Assess need for specialty bed, Check for incontinence Q2 hours and as needed, Contain wound drainage, Internal/External fecal devices, Internal/External urinary devices, Limit adult briefs    Activity Interventions: Increase time out of bed, Pressure redistribution bed/mattress(bed type), PT/OT evaluation    Mobility Interventions: Assess need for specialty bed, Float heels, HOB 30 degrees or less, Pressure redistribution bed/mattress (bed type), PT/OT evaluation    Nutrition Interventions: Document food/fluid/supplement intake, Discuss nutritional consult with provider, Offer support with meals,snacks and hydration    Friction and Shear Interventions: Apply protective barrier, creams and emollients, Feet elevated on foot rest, Foam dressings/transparent film/skin sealants, HOB 30 degrees or less, Lift sheet, Lift team/patient mobility team, Minimize layers, Sit at 90-degree angle

## 2019-03-06 NOTE — PROGRESS NOTES
Bedside shift change report given to [facility], RN (oncoming nurse). Report included the following information SBAR, Kardex, Intake/Output, MAR and Recent Results. SHIFT SUMMARY:      CONCERNS TO ADDRESS WITH MD:      [facility] NURSING NOTE   Admission Date 2/3/2019   Admission Diagnosis Acute respiratory failure ([address]   Consults IP CONSULT TO HEMATOLOGY  IP CONSULT TO PRIMARY CARE PROVIDER  IP CONSULT TO PULMONOLOGY      Cardiac Monitoring [x] Yes [] No      Purposeful Hourly Rounding [x] Yes    Abdulaziz Score Total Score: 3   Abdulaziz score 3 or > [x] Bed Alarm [] Avasys [] 1:1 sitter [] Patient refused (Signed refusal form in chart)   Dwayne Score Dwayne Score: 16   Dwayne score 14 or < [] PMT consult [] Wound Care consult    []  Specialty bed  [] Nutrition consult      Influenza Vaccine Received Flu Vaccine for Current Season (usually Sept-March): No    Patient/Guardian Refused (Notify MD): Yes      Oxygen needs? [] Room air Oxygen @  []1L    []2L    []3L   []4L    [x]5L   []6L via  NC   Chronic home O2 use? [] Yes [x] No  Perform O2 challenge test and document in progress note using smartphrase (.Homeoxygen)      Last bowel movement Last Bowel Movement Date: (1 week per pt)      Urinary Catheter       [REMOVED] External Female Catheter 02/04/19-Urine Output (mL): 400 ml     LDAs             Venous Access Device portacath  09/12/18 Upper chest (subclavicular area), left (Active)      Peripheral IV 03/03/19 Right Hand (Active)   Site Assessment Clean, dry, & intact; Clean 3/5/2019  7:36 PM   Phlebitis Assessment 0 3/5/2019  7:36 PM   Infiltration Assessment 0 3/5/2019  7:36 PM   Dressing Status Clean, dry, & intact 3/5/2019  7:36 PM   Dressing Type Transparent 3/5/2019  7:36 PM   Hub Color/Line Status Capped 3/5/2019  7:36 PM   Alcohol Cap Used Yes 3/3/2019  8:30 PM                         Readmission Risk Assessment Tool Score Medium Risk            18       Total Score        3 Has Seen PCP in Last 6 Months (Yes=3, No=0) 3 Patient Length of Stay (>5 days = 3)    5 Pt. Coverage (Medicare=5 , Medicaid, or Self-Pay=4)    7 Charlson Comorbidity Score (Age + Comorbid Conditions)        Criteria that do not apply:    . Living with Significant Other. Assisted Living. LTAC. SNF.  or   Rehab    IP Visits Last 12 Months (1-3=4, 4=9, >4=11)       Expected Length of Stay 3d 12h   Actual Length of Stay 30

## 2019-03-06 NOTE — PROGRESS NOTES
General Daily Progress Note    Admit Date: 2/3/2019    Subjective:     Patient has no complaint .     Current Facility-Administered Medications   Medication Dose Route Frequency    iopamidol (ISOVUE-370) 76 % injection 100 mL  100 mL IntraVENous RAD ONCE    sodium chloride (NS) flush 10 mL  10 mL IntraVENous RAD ONCE    amitriptyline (ELAVIL) tablet 25 mg  25 mg Oral QHS    insulin glargine (LANTUS) injection 44 Units  44 Units SubCUTAneous DAILY    albuterol-ipratropium (DUO-NEB) 2.5 MG-0.5 MG/3 ML  3 mL Nebulization Q4H PRN    sodium chloride (OCEAN) 0.65 % nasal squeeze bottle 2 Spray  2 Spray Both Nostrils Q2H PRN    insulin glargine (LANTUS) injection 16 Units  16 Units SubCUTAneous QHS    levalbuterol (XOPENEX) nebulizer soln 1.25 mg/3 mL  1.25 mg Nebulization Q4H PRN    polyethylene glycol (MIRALAX) packet 17 g  17 g Oral DAILY PRN    predniSONE (DELTASONE) tablet 30 mg  30 mg Oral BID WITH MEALS    sodium chloride (NS) flush 5-40 mL  5-40 mL IntraVENous PRN    sodium chloride (NS) flush 5-40 mL  5-40 mL IntraVENous Q8H    apixaban (ELIQUIS) tablet 5 mg  5 mg Oral Q12H    benzonatate (TESSALON) capsule 200 mg  200 mg Oral TID PRN    lactobac ac& pc-s.therm-b.anim (JUNI Q/RISAQUAD)  1 Cap Oral DAILY    acetaminophen (TYLENOL) tablet 650 mg  650 mg Oral Q6H PRN    ondansetron (ZOFRAN) injection 4 mg  4 mg IntraVENous Q4H PRN    amLODIPine (NORVASC) tablet 2.5 mg  2.5 mg Oral DAILY    atorvastatin (LIPITOR) tablet 40 mg  40 mg Oral QHS    fluticasone (FLONASE) 50 mcg/actuation nasal spray 2 Spray  2 Spray Both Nostrils DAILY    metoprolol succinate (TOPROL-XL) XL tablet 50 mg  50 mg Oral DAILY    glucose chewable tablet 16 g  4 Tab Oral PRN    dextrose (D50W) injection syrg 12.5-25 g  12.5-25 g IntraVENous PRN    glucagon (GLUCAGEN) injection 1 mg  1 mg IntraMUSCular PRN    nitroglycerin (NITROBID) 2 % ointment 1 Inch  1 Inch Topical Q6H PRN        Review of Systems  A comprehensive review of systems was negative. Objective:     Patient Vitals for the past 24 hrs:   BP Temp Pulse Resp SpO2   03/06/19 0726 130/59 97.6 °F (36.4 °C) 62 18 97 %   03/06/19 0428 132/70 97.9 °F (36.6 °C) 83 18 92 %   03/05/19 2336 136/71 98.6 °F (37 °C) 69 18 95 %   03/05/19 1945 126/67 98.6 °F (37 °C) 69 18 96 %   03/05/19 1527 132/69 98.8 °F (37.1 °C) 81 16 100 %   03/05/19 1102 131/67 97.8 °F (36.6 °C) 68 17 92 %     No intake/output data recorded. 03/04 1901 - 03/06 0700  In: 1190 [P.O.:1190]  Out: 750 [Urine:750]    Physical Exam:   Visit Vitals  /59 (BP 1 Location: Right arm, BP Patient Position: At rest)   Pulse 62   Temp 97.6 °F (36.4 °C)   Resp 18   Ht 5' 8\" (1.727 m)   Wt 238 lb 8.6 oz (108.2 kg)   SpO2 97%   BMI 36.27 kg/m²     General appearance: alert, cooperative, no distress, appears stated age  Neck: supple, symmetrical, trachea midline, no adenopathy, thyroid: not enlarged, symmetric, no tenderness/mass/nodules, no carotid bruit and no JVD  Lungs: rales R base, L base, diminished breath sounds R base, L base  Heart: regular rate and rhythm, S1, S2 normal, no murmur, click, rub or gallop  Abdomen: soft, non-tender.  Bowel sounds normal. No masses,  no organomegaly  Extremities: extremities normal, atraumatic, no cyanosis or edema        Data Review   Recent Results (from the past 24 hour(s))   GLUCOSE, POC    Collection Time: 03/05/19 11:04 AM   Result Value Ref Range    Glucose (POC) 125 (H) 65 - 100 mg/dL    Performed by Orpha Serge  (PCT)    GLUCOSE, POC    Collection Time: 03/05/19  4:33 PM   Result Value Ref Range    Glucose (POC) 146 (H) 65 - 100 mg/dL    Performed by Orpha Serge  (PCT)    GLUCOSE, POC    Collection Time: 03/05/19  9:19 PM   Result Value Ref Range    Glucose (POC) 197 (H) 65 - 100 mg/dL    Performed by HAO STROUD    CBC WITH MANUAL DIFF    Collection Time: 03/06/19  4:34 AM   Result Value Ref Range    WBC 9.4 3.6 - 11.0 K/uL    RBC 3.39 (L) 3.80 - 5.20 M/uL    HGB 10.0 (L) 11.5 - 16.0 g/dL    HCT 31.2 (L) 35.0 - 47.0 %    MCV 92.0 80.0 - 99.0 FL    MCH 29.5 26.0 - 34.0 PG    MCHC 32.1 30.0 - 36.5 g/dL    RDW 17.1 (H) 11.5 - 14.5 %    PLATELET 219 490 - 512 K/uL    MPV 10.5 8.9 - 12.9 FL    NRBC 0.0 0  WBC    ABSOLUTE NRBC 0.00 0.00 - 0.01 K/uL    DIFFERENTIAL PENDING     NEUTROPHILS 89 (H) 32 - 75 %    BAND NEUTROPHILS 0 0 - 6 %    LYMPHOCYTES 7 (L) 12 - 49 %    MONOCYTES 4 (L) 5 - 13 %    EOSINOPHILS 0 0 - 7 %    BASOPHILS 0 0 - 1 %    METAMYELOCYTES 0 0 %    MYELOCYTES 0 0 %    PROMYELOCYTES 0 0 %    BLASTS 0 0 %    OTHER CELL 0 0      IMMATURE GRANULOCYTES 0 %    ABS. NEUTROPHILS 8.3 (H) 1.8 - 8.0 K/UL    ABS. LYMPHOCYTES 0.7 (L) 0.8 - 3.5 K/UL    ABS. MONOCYTES 0.4 0.0 - 1.0 K/UL    ABS. EOSINOPHILS 0.0 0.0 - 0.4 K/UL    ABS. BASOPHILS 0.0 0.0 - 0.1 K/UL    ABS. IMM. GRANS. 0.0 K/UL    DF MANUAL      RBC COMMENTS NORMOCYTIC, NORMOCHROMIC      WBC COMMENTS REACTIVE LYMPHS     GLUCOSE, POC    Collection Time: 03/06/19  7:30 AM   Result Value Ref Range    Glucose (POC) 132 (H) 65 - 100 mg/dL    Performed by Angelica Montez            Assessment:     Active Problems:    SVT (supraventricular tachycardia) ([facility] (8/29/2016)      Essential hypertension with goal blood pressure less than 140/90 (8/29/2016)      Diabetes mellitus type 2, controlled ([facility] (9/8/2016)      Dyslipidemia (9/8/2016)      Acute respiratory failure ([facility] (2/3/2019)      Leg swelling (2/3/2019)      Hodgkin lymphoma ([facility] (2/3/2019)      CAP (community acquired pneumonia) (2/3/2019)        Plan:     1. Pulmonary status appears to be getting no worse. Per recommendations from pulmonary. 2.  Continue diabetic control.

## 2019-03-06 NOTE — BH NOTES
Pt is  Not in room and is likely in ct . I will fu . Will try to come back later today ( leaving early today ) .

## 2019-03-07 NOTE — PROGRESS NOTES
1042: pt placed on bedpan. O2 sats dropped to 86%. After resting sats back up to 92% on 5 L. Will Continue to monitor.

## 2019-03-07 NOTE — DIABETES MGMT
DTC Progress Note    Recommendations/ Comments: chart reviewed for hyperglycemia. Noted pt receiving prednisone 30 mg BID. If appropriate, please consider:  1.adding lispro correction - high sensitivity       Current hospital DM medication: lantus 44 units am and 16 units hs, Lispro correctional insulin normal sensitivity ac and hs. Chart reviewed on [facility]. Patient is a 70 y.o. female with known Type 2 Diabetes on actos 30mg daily, janumet XR 50-1000mg ac b/d at home. A1c:   Lab Results   Component Value Date/Time    Hemoglobin A1c 6.9 (H) 08/03/2018 01:45 PM    Hemoglobin A1c 6.8 (H) 01/30/2018 01:25 PM       Recent Glucose Results:   Lab Results   Component Value Date/Time     (H) 03/07/2019 03:11 AM    GLUCPOC 139 (H) 03/07/2019 11:21 AM    GLUCPOC 180 (H) 03/07/2019 07:21 AM    GLUCPOC 244 (H) 03/06/2019 08:32 PM        Lab Results   Component Value Date/Time    Creatinine 0.70 03/07/2019 03:11 AM     Estimated Creatinine Clearance: 97.7 mL/min (based on SCr of 0.7 mg/dL). Active Orders   Diet    DIET DIABETIC CONSISTENT CARB Regular        PO intake:   Patient Vitals for the past 72 hrs:   % Diet Eaten   03/07/19 0909 75 %   03/06/19 1200 50 %   03/06/19 0837 75 %   03/05/19 1919 100 %   03/05/19 1537 50 %   03/05/19 0912 100 %       Will continue to follow as needed.     Thank you  Elieser Mccloud, [address] [place] [address]   [phone]    Time spent: 2  minutes

## 2019-03-07 NOTE — PROGRESS NOTES
Problem: Falls - Risk of  Goal: *Absence of Falls  Document Abdulaziz Fall Risk and appropriate interventions in the flowsheet. Outcome: Progressing Towards Goal  Fall Risk Interventions:  Mobility Interventions: OT consult for ADLs, Patient to call before getting OOB, PT Consult for mobility concerns, PT Consult for assist device competence    Mentation Interventions: Door open when patient unattended, Eyeglasses and hearing aids, Familiar objects from home, Family/sitter at bedside, Increase mobility, More frequent rounding, Room close to nurse's station    Medication Interventions: Patient to call before getting OOB, Teach patient to arise slowly    Elimination Interventions: Call light in reach, Elevated toilet seat, Toilet paper/wipes in reach, Toileting schedule/hourly rounds    History of Falls Interventions: Room close to nurse's station        Problem: Pressure Injury - Risk of  Goal: *Prevention of pressure injury  Document Dwayne Scale and appropriate interventions in the flowsheet.   Outcome: Progressing Towards Goal  Pressure Injury Interventions:  Sensory Interventions: Chair cushion, Keep linens dry and wrinkle-free, Maintain/enhance activity level, Minimize linen layers, Sit a 90-degree angle/use footstool if needed    Moisture Interventions: Absorbent underpads, Apply protective barrier, creams and emollients, Offer toileting Q_hr, Minimize layers    Activity Interventions: Chair cushion, Increase time out of bed, PT/OT evaluation    Mobility Interventions: Chair cushion, Float heels, PT/OT evaluation, Trapeze to reposition    Nutrition Interventions: Document food/fluid/supplement intake    Friction and Shear Interventions: Lift team/patient mobility team, Lift sheet, Sit at 90-degree angle, Minimize layers, Transfer aides:transfer board/Dalia lift/ceiling lift

## 2019-03-07 NOTE — PROGRESS NOTES
[facility]  Pulmonary, Critical Care, and Sleep Medicine    Progress Note     Name: Milvia Pires MRN: [id number]   : 1950 Hospital: [facility]   Date: 3/7/2019        IMPRESSION:   · Acute hypoxic resp failure - would hope that oxygenation improve some with anticoagulation but am afraid the Bleo toxicity will not regress despite steroids and time; of course only time will tell. 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 He rec cellcept 500 mg bid could be tried. As it is an immunosuppresive I have called her oncologist Dr. Miranda Russo and will get a call back tomorrow. We need to know  If she is ok with us trying this first in view of her newly successfully treated cancer - lymphoma. . At pts request I spoke with Dr. Piero King her cousin who is an oncologist..  I updated him on her condition and potential plans. He is requesting a consult for lung transplantation. I did tell him that would not be an option in view of her cancer . . I  Did happen to have [facility] tranplant on the other line and  He confimed that other for a mild skin cancer , etc they required at least 2 yrs cancer free and preferably 5 yrs. All was discussed with the pt. · Bilateral pulm infiltrates-  First noted on PET  and seen on ct on admit - tx with augmentin -, then Levaquin - Preceding fever 101. S/P bronch . DDX: PNA, opportunistic infection and/or bleomycin toxicity from chemo   · Coronavirus positive RVP  · NEWLY DISCOVERED BILATERAL Pulmonary Emboli WITH NEG DUPLEX OF LEG,  cta on 3/6 does not  Show any residual clot. - remains on eliquis.   · Hodkins lymphoma- tx with 4 cyles of abvd- positive response to chemo  · Pulm htn - pap 60 with nl lvef - repeat echo showed resolution of pulm htn   · BLE edema  · Anemia-  ? Related to chemo- stable   · Hx of asthma from childhood to age 24       RECOMMENDATIONS:   · O2 - wean as tolerated but anticipate this will be chronic and need home O2; she is clearly on too much to provide at home and she will need rehab as well  · Ct has  been done  And there is extensive residual scarring -     · Antibiotics completed  · Jet nebs   · Oral steroids- very slow taper would keep on same dose until seen in office for 4 week f/u. · Once over this acute episode will need repeat PFTs as an outpt. - if able to do   · On Eliquis for PE.   · Recommend not to check pulse ox on her fingers due to nail polish- has ear probe    · Pt would like me to speak with her brother in  Law who is an oncologist about her condition . Subjective:     2/28 tired. Little rest. No energy. I think she is clinically depressed. On 6 LPM. No PT yesterday but willing to try today. 2/27 down to 6 LPM but drops sats with any activity. Thermostat not working in room. Very cold and now bundled up under piles of blankets    2/26 head cold sx better? On HFNC. Drops sats quickly. No fever. Short sleeper and not bothered by steroids    2/25 d/w Dr. Mattson Prom. still hypoxic after sudden decline end of last week. . No acute distress. Still SOLIZ. More nasal congestion and post nasal drip. 3/4- sorry to  See that she is still here and  Has not progressed as hoped. She says she has some days she is very sob just getting to the br and other days are a little better. Says she is weak  And she looks depressed to me.   3/5 = she expressed frustration  As expected over her condition . She mentioned her brother in law who is an oncologist recommend a  Second opion about her condition and she is considering transfer to [facility] .   3/6 - overall no change   3/7 - about the same .  I  Discussed test result with pt   MEDS:   Current Facility-Administered Medications   Medication    amitriptyline (ELAVIL) tablet 25 mg    insulin glargine (LANTUS) injection 44 Units    albuterol-ipratropium (DUO-NEB) 2.5 MG-0.5 MG/3 ML    sodium chloride (OCEAN) 0.65 % nasal squeeze bottle 2 Spray    insulin glargine (LANTUS) injection 16 Units    levalbuterol (XOPENEX) nebulizer soln 1.25 mg/3 mL    polyethylene glycol (MIRALAX) packet 17 g    predniSONE (DELTASONE) tablet 30 mg    sodium chloride (NS) flush 5-40 mL    sodium chloride (NS) flush 5-40 mL    apixaban (ELIQUIS) tablet 5 mg    benzonatate (TESSALON) capsule 200 mg    lactobac ac& pc-s.therm-b.anim (JUNI Q/RISAQUAD)    acetaminophen (TYLENOL) tablet 650 mg    ondansetron (ZOFRAN) injection 4 mg    amLODIPine (NORVASC) tablet 2.5 mg    atorvastatin (LIPITOR) tablet 40 mg    fluticasone (FLONASE) 50 mcg/actuation nasal spray 2 Spray    metoprolol succinate (TOPROL-XL) XL tablet 50 mg    glucose chewable tablet 16 g    dextrose (D50W) injection syrg 12.5-25 g    glucagon (GLUCAGEN) injection 1 mg    nitroglycerin (NITROBID) 2 % ointment 1 Inch        Review of Systems:  A comprehensive review of systems was negative except for that written in the HPI.     Objective:   Vital Signs:    Visit Vitals  /73 (BP 1 Location: Right arm, BP Patient Position: At rest)   Pulse 78   Temp 98 °F (36.7 °C)   Resp 18   Ht 5' 8\" (1.727 m)   Wt 108.2 kg (238 lb 8.6 oz)   SpO2 98%   BMI 36.27 kg/m²       O2 Device: Nasal cannula   O2 Flow Rate (L/min): 5 l/min   Temp (24hrs), Av.9 °F (36.6 °C), Min:97.4 °F (36.3 °C), Max:98.5 °F (36.9 °C)       Intake/Output:   Last shift:      701 - 0  In: 120 [P.O.:120]  Out: -   Last 3 shifts: 1901 -  0700  In: 940 [P.O.:940]  Out: 500 [Urine:500]    Intake/Output Summary (Last 24 hours) at 3/7/2019 1119  Last data filed at 3/7/2019 0909  Gross per 24 hour   Intake 460 ml   Output 375 ml   Net 85 ml      Physical Exam:   General:  Alert, cooperative, no distress, appears stated age. ON NC oxygen. Sitting up in chair. Sat was 1-- % on 6 liters but noted to drop some with talking     Head:  Normocephalic, without obvious abnormality, atraumatic. alopecia   Eyes:  Conjunctivae/corneas clear. PERRL, EOMs intact. Nose: Nares normal. Septum midline. Mucosa normal. No drainage or sinus tenderness. Throat: Lips, mucosa, and tongue normal. Teeth and gums normal.-    Neck: Supple, symmetrical, trachea midline, no adenopathy, thyroid:   Back:   Symmetric, no curvature. ROM normal.   Lungs:   Good air movement. Some rale in bases    Chest wall:  No tenderness or deformity. Heart:  Regular rate and rhythm, S1, S2 normal, no murmur, click, rub or gallop.- mild tachy    Abdomen:   Soft, non-tender. Bowel sounds normal. No masses,  No organomegaly. Extremities: Extremities normal, atraumatic, no cyanosis - edema gone    Pulses: 2+ and symmetric all extremities. Skin: Skin color, texture, turgor normal. No rashes or lesions   Lymph nodes: Cervical, supraclavicular, and axillary nodes normal.   Neurologic: Grossly nonfocal, motor is intact. Psych: no over anxiety or depression. Data review:             Labs:    Recent Labs     03/06/19  0434   WBC 9.4   HGB 10.0*        Recent Labs     03/07/19  0311 03/05/19  0251   * 136   K 4.2 4.3    101   CO2 27 27   * 231*   BUN 21* 23*   CREA 0.70 0.75   CA 8.9 9.0     No results for input(s): PH, PCO2, PO2, HCO3, FIO2 in the last 72 hours. No results for input(s): CPK, CKNDX, TROIQ in the last 72 hours.     No lab exists for component: CPKMB  No results found for: BNPP, BNP       Imaging:  I have personally reviewed the patients radiographs and have reviewed the reports:ct seen and          Magalys Rodriguez MD

## 2019-03-07 NOTE — PROGRESS NOTES
Bedside and Verbal shift change report given to SUSANNAH Weber (oncoming nurse). Report included the following information SBAR, Kardex, ED Summary, Procedure Summary, Intake/Output, MAR, Recent Results and Cardiac Rhythm (NSR). SHIFT SUMMARY:      CONCERNS TO ADDRESS WITH MD:        [address] NURSING NOTE   Admission Date 2/3/2019   Admission Diagnosis Acute respiratory failure ([address]   Consults IP CONSULT TO HEMATOLOGY  IP CONSULT TO PRIMARY CARE PROVIDER  IP CONSULT TO PULMONOLOGY      Cardiac Monitoring [x] Yes [] No      Purposeful Hourly Rounding [x] Yes    Abdulaziz Score Total Score: 2   Abdulaziz score 3 or > [] Bed Alarm [] Avasys [] 1:1 sitter [] Patient refused (Signed refusal form in chart)   Dwayne Score Dwayne Score: 18   Dwayne score 14 or < [] PMT consult [] Wound Care consult    []  Specialty bed  [] Nutrition consult      Influenza Vaccine Received Flu Vaccine for Current Season (usually Sept-March): No    Patient/Guardian Refused (Notify MD): Yes      Oxygen needs? [] Room air Oxygen @  []1L    []2L    []3L   []4L    []5L   [x]6L via  NC   Chronic home O2 use?  [] Yes [x] No  Perform O2 challenge test and document in progress note using smartphrase (.Homeoxygen)      Last bowel movement Last Bowel Movement Date: 03/06/19      Urinary Catheter       [REMOVED] External Female Catheter 02/04/19-Urine Output (mL): 400 ml     LDAs             Venous Access Device portacath  09/12/18 Upper chest (subclavicular area), left (Active)      Peripheral IV 03/03/19 Right Hand (Active)   Site Assessment Clean, dry, & intact 3/7/2019  3:52 AM   Phlebitis Assessment 0 3/7/2019  3:52 AM   Infiltration Assessment 0 3/7/2019  3:52 AM   Dressing Status Clean, dry, & intact 3/7/2019  3:52 AM   Dressing Type Transparent 3/7/2019  3:52 AM   Hub Color/Line Status Blue;Flushed 3/7/2019  3:52 AM   Alcohol Cap Used Yes 3/3/2019  8:30 PM                         Readmission Risk Assessment Tool Score Medium Risk            18 Total Score        3 Has Seen PCP in Last 6 Months (Yes=3, No=0)    3 Patient Length of Stay (>5 days = 3)    5 Pt. Coverage (Medicare=5 , Medicaid, or Self-Pay=4)    7 Charlson Comorbidity Score (Age + Comorbid Conditions)        Criteria that do not apply:    . Living with Significant Other. Assisted Living. LTAC. SNF.  or   Rehab    IP Visits Last 12 Months (1-3=4, 4=9, >4=11)       Expected Length of Stay 3d 12h   Actual Length of Stay 32

## 2019-03-07 NOTE — PROGRESS NOTES
General Daily Progress Note    Admit Date: 2/3/2019    Subjective:     Patient continues to complain of shortness of breath. Current Facility-Administered Medications   Medication Dose Route Frequency    amitriptyline (ELAVIL) tablet 25 mg  25 mg Oral QHS    insulin glargine (LANTUS) injection 44 Units  44 Units SubCUTAneous DAILY    albuterol-ipratropium (DUO-NEB) 2.5 MG-0.5 MG/3 ML  3 mL Nebulization Q4H PRN    sodium chloride (OCEAN) 0.65 % nasal squeeze bottle 2 Spray  2 Spray Both Nostrils Q2H PRN    insulin glargine (LANTUS) injection 16 Units  16 Units SubCUTAneous QHS    levalbuterol (XOPENEX) nebulizer soln 1.25 mg/3 mL  1.25 mg Nebulization Q4H PRN    polyethylene glycol (MIRALAX) packet 17 g  17 g Oral DAILY PRN    predniSONE (DELTASONE) tablet 30 mg  30 mg Oral BID WITH MEALS    sodium chloride (NS) flush 5-40 mL  5-40 mL IntraVENous PRN    sodium chloride (NS) flush 5-40 mL  5-40 mL IntraVENous Q8H    apixaban (ELIQUIS) tablet 5 mg  5 mg Oral Q12H    benzonatate (TESSALON) capsule 200 mg  200 mg Oral TID PRN    lactobac ac& pc-s.therm-b.anim (JUNI Q/RISAQUAD)  1 Cap Oral DAILY    acetaminophen (TYLENOL) tablet 650 mg  650 mg Oral Q6H PRN    ondansetron (ZOFRAN) injection 4 mg  4 mg IntraVENous Q4H PRN    amLODIPine (NORVASC) tablet 2.5 mg  2.5 mg Oral DAILY    atorvastatin (LIPITOR) tablet 40 mg  40 mg Oral QHS    fluticasone (FLONASE) 50 mcg/actuation nasal spray 2 Spray  2 Spray Both Nostrils DAILY    metoprolol succinate (TOPROL-XL) XL tablet 50 mg  50 mg Oral DAILY    glucose chewable tablet 16 g  4 Tab Oral PRN    dextrose (D50W) injection syrg 12.5-25 g  12.5-25 g IntraVENous PRN    glucagon (GLUCAGEN) injection 1 mg  1 mg IntraMUSCular PRN    nitroglycerin (NITROBID) 2 % ointment 1 Inch  1 Inch Topical Q6H PRN        Review of Systems  A comprehensive review of systems was negative.     Objective:     Patient Vitals for the past 24 hrs:   BP Temp Pulse Resp SpO2 03/07/19 0717 135/73 98 °F (36.7 °C) 78 18 98 %   03/07/19 0257 133/68 98 °F (36.7 °C) 87 20 91 %   03/06/19 2258 149/74 98.5 °F (36.9 °C) 84 20 96 %   03/06/19 1933 118/67 97.4 °F (36.3 °C) 84 20 92 %   03/06/19 1522 122/70 97.8 °F (36.6 °C) 85 16 92 %   03/06/19 1118 143/72 97.5 °F (36.4 °C) 63 17 97 %     No intake/output data recorded. 03/05 1901 - 03/07 0700  In: 56 [P.O.:940]  Out: 500 [Urine:500]    Physical Exam:   Visit Vitals  /73 (BP 1 Location: Right arm, BP Patient Position: At rest)   Pulse 78   Temp 98 °F (36.7 °C)   Resp 18   Ht 5' 8\" (1.727 m)   Wt 238 lb 8.6 oz (108.2 kg)   SpO2 98%   BMI 36.27 kg/m²     General appearance: alert, cooperative, no distress, appears stated age  Neck: supple, symmetrical, trachea midline, no adenopathy, thyroid: not enlarged, symmetric, no tenderness/mass/nodules, no carotid bruit and no JVD  Lungs: rales R base, L base, diminished breath sounds R base, L base  Heart: regular rate and rhythm, S1, S2 normal, no murmur, click, rub or gallop  Abdomen: soft, non-tender.  Bowel sounds normal. No masses,  no organomegaly  Extremities: edema trace        Data Review   Recent Results (from the past 24 hour(s))   ECHO ADULT FOLLOW-UP OR LIMITED    Collection Time: 03/06/19  9:45 AM   Result Value Ref Range    RVSP 17.4 mmHg    LVIDs (M-mode) 3.08 cm    LVIDd (M-mode) 4.74 cm    LVPWd (M-mode) 1.03 (A) cm    IVSd (M-mode) 1.11 (A) cm    Est. RA Pressure 10.0 mmHg    Triscuspid Valve Regurgitation Peak Gradient 7.4 mmHg    TR Max Velocity 135.74 cm/s    PASP 17.4 mmHg    AV Cusp 2.07 cm   GLUCOSE, POC    Collection Time: 03/06/19 11:21 AM   Result Value Ref Range    Glucose (POC) 106 (H) 65 - 100 mg/dL    Performed by [employer], POC    Collection Time: 03/06/19  4:37 PM   Result Value Ref Range    Glucose (POC) 227 (H) 65 - 100 mg/dL    Performed by [id number], POC    Collection Time: 03/06/19  8:32 PM   Result Value Ref Range Glucose (POC) 244 (H) 65 - 100 mg/dL    Performed by Cassie Sterling ([facility])    METABOLIC PANEL, BASIC    Collection Time: 03/07/19  3:11 AM   Result Value Ref Range    Sodium 135 (L) 136 - 145 mmol/L    Potassium 4.2 3.5 - 5.1 mmol/L    Chloride 101 97 - 108 mmol/L    CO2 27 21 - 32 mmol/L    Anion gap 7 5 - 15 mmol/L    Glucose 169 (H) 65 - 100 mg/dL    BUN 21 (H) 6 - 20 MG/DL    Creatinine 0.70 0.55 - 1.02 MG/DL    BUN/Creatinine ratio 30 (H) 12 - 20      GFR est AA >60 >60 ml/min/1.73m2    GFR est non-AA >60 >60 ml/min/1.73m2    Calcium 8.9 8.5 - 10.1 MG/DL   GLUCOSE, POC    Collection Time: 03/07/19  7:21 AM   Result Value Ref Range    Glucose (POC) 180 (H) 65 - 100 mg/dL    Performed by Kvng Etienne  (PCT)            Assessment:     Active Problems:    SVT (supraventricular tachycardia) ([facility] (8/29/2016)      Essential hypertension with goal blood pressure less than 140/90 (8/29/2016)      Diabetes mellitus type 2, controlled ([address] (9/8/2016)      Dyslipidemia (9/8/2016)      Acute respiratory failure ([address] (2/3/2019)      Leg swelling (2/3/2019)      Hodgkin lymphoma ([facility] [address] (2/3/2019)      CAP (community acquired pneumonia) (2/3/2019)        Plan:     1. O2 requirements remain high but not getting any worse. Continues with significant exertional dyspnea. Continue pulmonary toilet and systemic steroids. Note made of PTA and echocardiogram results. 2.  Continue diabetic control. Optimal control is difficult because of sporadic eating. 3.  We will attempt discharge to pulmonary rehab on Monday.

## 2019-03-07 NOTE — PROGRESS NOTES
0700: Bedside report received from Leisa Gardiner [facility].    6381:   Bedside shift change report given to Moi Leal RN (oncoming nurse). Report included the following information SBAR, Kardex, Intake/Output, MAR, Recent Results and Cardiac Rhythm NSR.     SHIFT SUMMARY:  Uneventful shift. Unable to wean O2 due to sats. CONCERNS TO ADDRESS WITH MD:  [place]      [address] NURSING NOTE   Admission Date 2/3/2019   Admission Diagnosis Acute respiratory failure ([address]   Consults IP CONSULT TO HEMATOLOGY  IP CONSULT TO PRIMARY CARE PROVIDER  IP CONSULT TO PULMONOLOGY      Cardiac Monitoring [x] Yes [] No      Purposeful Hourly Rounding [x] Yes    Abdulaziz Score Total Score: 2   Abdulaziz score 3 or > [] Bed Alarm [] Avasys [] 1:1 sitter [] Patient refused (Signed refusal form in chart)   Dwayne Score Dwayne Score: 18   Dwayne score 14 or < [] PMT consult [] Wound Care consult    []  Specialty bed  [] Nutrition consult      Influenza Vaccine Received Flu Vaccine for Current Season (usually Sept-March): No    Patient/Guardian Refused (Notify MD): Yes      Oxygen needs? [] Room air Oxygen @  []1L    []2L    []3L   []4L    [x]5L   []6L via  NC   Chronic home O2 use?  [] Yes [x] No  Perform O2 challenge test and document in progress note using smartphrase (.Homeoxygen)      Last bowel movement Last Bowel Movement Date: 03/06/19      Urinary Catheter       [REMOVED] External Female Catheter 02/04/19-Urine Output (mL): 400 ml     LDAs             Venous Access Device portacath  09/12/18 Upper chest (subclavicular area), left (Active)      Peripheral IV 03/03/19 Right Hand (Active)   Site Assessment Clean, dry, & intact 3/7/2019  3:10 PM   Phlebitis Assessment 0 3/7/2019  3:10 PM   Infiltration Assessment 0 3/7/2019  3:10 PM   Dressing Status Clean, dry, & intact 3/7/2019  3:10 PM   Dressing Type Tape;Transparent 3/7/2019  3:10 PM   Hub Color/Line Status Blue;Flushed 3/7/2019  3:10 PM   Alcohol Cap Used Yes 3/3/2019  8:30 PM Readmission Risk Assessment Tool Score Medium Risk            18       Total Score        3 Has Seen PCP in Last 6 Months (Yes=3, No=0)    3 Patient Length of Stay (>5 days = 3)    5 Pt. Coverage (Medicare=5 , Medicaid, or Self-Pay=4)    7 Charlson Comorbidity Score (Age + Comorbid Conditions)        Criteria that do not apply:    . Living with Significant Other. Assisted Living. LTAC. SNF.  or   Rehab    IP Visits Last 12 Months (1-3=4, 4=9, >4=11)       Expected Length of Stay 3d 12h   Actual Length of Stay 32

## 2019-03-07 NOTE — ADT AUTH CERT NOTES
Utilization Reviews        Pulmonary Disease GRG - Care Day 33 (3/7/2019) by Segundo Anna RN        Review Status Review Entered   Completed 3/7/2019 14:57      Criteria Review      Care Day: 33 Care Date: 3/7/2019 Level of Care: Telemetry   Guideline Day 3    Level Of Care   ( ) * Activity level acceptable   (X) * Isolation not needed, or status acceptable      Clinical Status   ( ) * Respiratory status acceptable   3/7/2019 14:57:09 EST by Al Etienne     5L 02NC HIFLOW      (X) * Right heart function adequate   (X) * Temperature status acceptable   (X) * No infection, or status acceptable   ( ) * Stable chest findings   (X) * Pain and nausea absent or adequately managed   ( ) * General Discharge Criteria met      Interventions   (X) * No chest tube, or status acceptable   (X) * Intake acceptable   3/7/2019 14:57:09 EST by Al Etienne     DIET DIABETIC CONSISTENT CARB      ( ) * No inpatient interventions needed      3/7/2019 14:57:09 EST by Al Etienne   Subject: Additional Clinical Information   INTERNAL MED:    1.   O2 requirements remain high but not getting any worse.   Continues with significant exertional dyspnea.   Continue pulmonary toilet and systemic steroids.   Note made of PTA and echocardiogram results. 2.   Continue diabetic control.   Optimal control is difficult because of sporadic eating. 3.   We will attempt discharge to pulmonary rehab on Monday.              3/7/2019 14:57:09 EST by Al Etienne   Subject: Additional Clinical Information   Patient Vitals for the past 24 hrs:  BXFjcaLzpdfHwatUtC684/07/19 4345361/8411  °F (36.7  °P)257653 %03/07/19 6705631/3254  °F (36.7  °S)275890 %         3/7/2019 14:57:09 EST by Al Etienne   Subject: Additional Clinical Information   PULMONARY:    O2 - wean as tolerated but anticipate this will be chronic and need home O2; she is clearly on too much to provide at home and she will need rehab as well   Ct has   been done   And there is extensive residual scarring -        Antibiotics completed   Jet nebs    Oral steroids- very slow taper would keep on same dose until seen in office for 4 week f/u. Once over this acute episode will need repeat PFTs as an outpt. - if able to do    On Eliquis for PE. Recommend not to check pulse ox on her fingers due to nail polish- has ear probe     Pt would like me to speak with her brother in   Law who is an oncologist about her condition .                 3/7/2019 14:57:09 EST by Iker Rutherford   Subject: Additional Clinical Information   LABS: ; BUN 21; GLUCOSE 169; GLUCOSE FAST ; 139         3/7/2019 14:57:09 EST by Iker Rutherford   Subject: Additional Clinical Information   MEDS: DELTASONE PO BID; TOPROL XL PO QD; LACTOBAC AS&PC PO QD; LANTUS SC QD; LANTUS SC QHS; ELIQUIS 5MG PO Q12; NORVASC 2.5MG PO QD                  * Milestone         Pulmonary Disease GRG - Care Day 32 (3/6/2019) by Jemima Laird RN        Review Status Review Entered   Completed 3/7/2019 14:51      Criteria Review      Care Day: 32 Care Date: 3/6/2019 Level of Care: Telemetry   Guideline Day 3    Level Of Care   ( ) * Activity level acceptable   3/7/2019 14:51:37 EST by Iker Rutherford     ACTIVITY AS SPECIFIED      (X) * Isolation not needed, or status acceptable      Clinical Status   ( ) * Respiratory status acceptable   3/7/2019 14:51:37 EST by Iker Rutherford     92 % Hi flow nasal cannula 6 l/min      (X) * Right heart function adequate   (X) * Temperature status acceptable   (X) * No infection, or status acceptable   ( ) * Stable chest findings   (X) * Pain and nausea absent or adequately managed   ( ) * General Discharge Criteria met      Interventions   (X) * No chest tube, or status acceptable   (X) * Intake acceptable   3/7/2019 14:51:37 EST by Iker Rutherford     DIET DIABETIC CONSISTENT CARB      ( ) * No inpatient interventions needed      3/7/2019 14:51:37 EST by Iker Rutherford   Subject: Additional Clinical Information PULMONARY:    O2 - wean as tolerated but anticipate this will be chronic and need home O2; she is clearly on too much to provide at home and she will need rehab as well   Ct has   been done   And there is extensive residual scarring -        Echo done but results are pd    Antibiotics complete   Jet nebs    Oral steroids- very slow taper would keep on same dose until seen in office for 4 week f/u. Once over this acute episode will need repeat PFTs as an outpt. On Eliquis for PE. Recommend not to check pulse ox on her fingers due to nail polish   Would like to check echo for pap/htn as as above pd    Pt would like me to speak with her brother in   Law who is an oncologist about her condition . 3/7/2019 14:51:37 EST by Spring Ovalles   Subject: Additional Clinical Information   03/06/19 0428 97.9  °F (36.6  °C) 83 132/70 91 Right arm At rest;Sitting 18 92 % Hi flow nasal cannula 6 l/min         3/7/2019 14:51:37 EST by Spring Ovalles   Subject: Additional Clinical Information   LABS: WBC 9.4; HGB 10.0; HCT 31.2;          3/7/2019 14:51:37 EST by Spring Ovalles   Subject: Additional Clinical Information   Glucose (POB)753         3/7/2019 14:51:37 EST by Spring Ovalles   Subject: Additional Clinical Information   ECHO: Estimated left ventricular ejection fraction is 56 - 60%. Visually measured ejection fraction. Pulmonary arterial systolic pressure is 34.9 mmHg. There is no evidence of pulmonary hypertension. 3/7/2019 14:51:37 EST by Spring Ovalles   Subject: Additional Clinical Information   INTERNAL MED:    1.   Pulmonary status appears to be getting no worse.   Per recommendations from pulmonary. 2.   Continue diabetic control.                      * Milestone         Pulmonary Disease GRG - Care Day 31 (3/5/2019) by Deette Goltz, RN        Review Status Review Entered   Completed 3/7/2019 14:27      Criteria Review      Care Day: 31 Care Date: 3/5/2019 Level of Care: Telemetry Guideline Day 3    Level Of Care   ( ) * Activity level acceptable   3/7/2019 14:27:35 EST by Spring Bonillahelene     ACTIVITY AS SPECIFIED      (X) * Isolation not needed, or status acceptable   ( ) Floor to discharge   3/7/2019 14:27:35 EST by Spring Bonillahelene     Telemetry         Clinical Status   ( ) * Respiratory status acceptable   3/7/2019 14:27:35 EST by Spring Bonillahelene     8L 02NC      (X) * Right heart function adequate   (X) * Temperature status acceptable   (X) * No infection, or status acceptable   ( ) * Stable chest findings   (X) * Pain and nausea absent or adequately managed   ( ) * General Discharge Criteria met      Interventions   (X) * No chest tube, or status acceptable   (X) * Intake acceptable   3/7/2019 14:27:35 EST by Spring Bonillahelene     DIET DIABETIC CONSISTENT CARB      ( ) * No inpatient interventions needed      3/7/2019 14:27:35 EST by Spring Ovalles   Subject: Additional Clinical Information   MEDS: PROZAC 20MG PO QD; DELTASONE TAB PO BID; TOPROL XL PO QD; LACTOBAC PO QD; LANTUS SC QD; LANTUS SC QHS; LIPITOR PO QHS; ELIQUIS PO Q12HRS; NORVASC PO QD         3/7/2019 14:27:35 EST by Spring Ovalles   Subject: Additional Clinical Information   INTERNAL MED:    Gyula.Aase.   Pulmonary status unchanged.   Should we repeat the CTA to ensure no further emboli?   Should steroids to be increased? 2.   Continue diabetic control. 3.   Efforts continue at ambulation. 3/7/2019 14:27:35 EST by Spring Ovalles   Subject: Additional Clinical Information   Patient Vitals for the past 24 hrs:  LDWdpiSjgnvAkrtEeZ335/05/19 5705111/6797.6  °F (36.4  °V)763993 %03/05/19 3855014/5502.4  °F (36.4  °V)901700 %         3/7/2019 14:27:35 EST by Spring Bonillahelene   Subject: Additional Clinical Information   Glucose (POC)197         3/7/2019 14:27:35 EST by Spring Bonillahelene   Subject: Additional Clinical Information   CTA CHEST W OR W WO CONT:IMPRESSION: 1. No visualized pulmonary embolus. 2.  Progression of consolidation in the right lower leg, now involving nearly theentire right lower lobe with air bronchograms. Newly developed areas of opacityin the periphery of the left upper lobe. The remainder of the lungs is similarto comparison with discontinuous areas of mosaic type opacity which isnonspecific suggesting small airways versus small vessel disease. 3. Persistent confluent soft tissue attenuation in the mediastinum which issimilar to comparison. 4. A port in the left chest with the catheter which terminates in the azygosvein. 5. Incidental findings as above.                   * Milestone

## 2019-03-08 NOTE — PROGRESS NOTES
[facility]  Pulmonary, Critical Care, and Sleep Medicine    Progress Note     Name: Zulma Velasquez MRN: [id number]   : 1950 Hospital: [facility]   Date: 3/8/2019        IMPRESSION:   · Acute hypoxic resp failure - would hope that oxygenation improve some with anticoagulation but am afraid the Bleo toxicity will not regress despite steroids and time; of course only time will tell. 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 He rec cellcept 500 mg bid could be tried. As it is an immunosuppresive I have spoken with Dr. Sheila Rogers her oncologist and she is ok with cellecpt. We needed to know  If she is ok with us trying this first in view of her newly successfully treated cancer - lymphoma. . At pts request I spoke with Dr. Valeriano Coates her cousin who is an oncologist  On 3/7. Don Montano I updated him on her condition and potential plans. He is requesting a consult for lung transplantation. I did tell him that would not be an option in view of her cancer . . I  Did happen to have [facility] on the other line and  [facility]  confimed that other for a mild skin cancer , etc they required at least 2 yrs cancer free and preferably 5 yrs. All was discussed with the pt. · Bilateral pulm infiltrates-  First noted on PET  and seen on ct on admit - tx with augmentin -, then Levaquin - Preceding fever 101. S/P bronch . DDX: PNA, opportunistic infection and/or bleomycin toxicity from chemo - fob did not reveal infectious source , no pcp and no cancer.    · Coronavirus positive RVP  · NEWLY DISCOVERED BILATERAL Pulmonary Emboli WITH NEG DUPLEX OF LEG,  cta on 3/6 does not  Show any residual clot. - remains on eliquis. · Hodkins lymphoma- tx with 4 cyles of abvd- positive response to chemo  · Pulm htn - pap 60 with nl lvef - repeat echo  On 3/6 showed resolution of pulm htn   · BLE edema  · Anemia-  ? Related to chemo- stable   · Hx of asthma from childhood to age 24       RECOMMENDATIONS:   · O2 - wean as tolerated but anticipate this will be chronic and need home O2; she is clearly on too much to provide at home and she will need rehab as well  · Ct has  been done  And there is extensive residual scarring -     · Antibiotics completed  · Jet nebs   · Oral steroids- very slow taper would keep on same dose until seen in office for 4 week f/u.- she has been on pred 30 mg  Bid  Since 2/13  - will start cellcept an maybe steroids can be weaned soon. · Once over this acute episode will need repeat PFTs as an outpt. - if able to do   · On Eliquis for PE.   · Recommend not to check pulse ox on her fingers due to nail polish- has ear probe- she has an ear probe now   · Started bactrim proplylaxis  Today. · I have discussed and reconfirmed plans with pt. Risks and benefits  On these meds changes and plans discussed. · Follow labs on cellept - see order     · Pt would like me to speak with her brother in  Law who is an oncologist about her condition . -  Done  · Par will see prn weekend. Please call if needed            Subjective:     2/28 tired. Little rest. No energy. I think she is clinically depressed. On 6 LPM. No PT yesterday but willing to try today. 2/27 down to 6 LPM but drops sats with any activity. Thermostat not working in room. Very cold and now bundled up under piles of blankets    2/26 head cold sx better? On HFNC. Drops sats quickly. No fever. Short sleeper and not bothered by steroids    2/25 d/w Dr. Patricia Brown. still hypoxic after sudden decline end of last week. . No acute distress. Still SOLIZ. More nasal congestion and post nasal drip.    3/4- sorry to  See that she is still here and  Has not progressed as hoped. She says she has some days she is very sob just getting to the br and other days are a little better. Says she is weak  And she looks depressed to me.   3/5 = she expressed frustration  As expected over her condition . She mentioned her brother in law who is an oncologist recommend a  Second opion about her condition and she is considering transfer [facility] .   3/6 - overall no change   3/7 - about the same . I  Discussed test result with pt   3/8- she looks tired and is in chair. She apprears more subdued than  She has all week. Reports landaverde and desat with exertion    MEDS:   Current Facility-Administered Medications   Medication    trimethoprim-sulfamethoxazole (BACTRIM DS, SEPTRA DS) 160-800 mg per tablet 1 Tab    amitriptyline (ELAVIL) tablet 25 mg    insulin glargine (LANTUS) injection 44 Units    albuterol-ipratropium (DUO-NEB) 2.5 MG-0.5 MG/3 ML    sodium chloride (OCEAN) 0.65 % nasal squeeze bottle 2 Spray    insulin glargine (LANTUS) injection 16 Units    levalbuterol (XOPENEX) nebulizer soln 1.25 mg/3 mL    polyethylene glycol (MIRALAX) packet 17 g    predniSONE (DELTASONE) tablet 30 mg    sodium chloride (NS) flush 5-40 mL    sodium chloride (NS) flush 5-40 mL    apixaban (ELIQUIS) tablet 5 mg    benzonatate (TESSALON) capsule 200 mg    lactobac ac& pc-s.therm-b.anim (JUNI Q/RISAQUAD)    acetaminophen (TYLENOL) tablet 650 mg    ondansetron (ZOFRAN) injection 4 mg    amLODIPine (NORVASC) tablet 2.5 mg    atorvastatin (LIPITOR) tablet 40 mg    fluticasone (FLONASE) 50 mcg/actuation nasal spray 2 Spray    metoprolol succinate (TOPROL-XL) XL tablet 50 mg    glucose chewable tablet 16 g    dextrose (D50W) injection syrg 12.5-25 g    glucagon (GLUCAGEN) injection 1 mg    nitroglycerin (NITROBID) 2 % ointment 1 Inch        Review of Systems:  A comprehensive review of systems was negative except for that written in the HPI.     Objective:   Vital Signs: Visit Vitals  /62 (BP 1 Location: Right arm, BP Patient Position: At rest)   Pulse 62   Temp 98.2 °F (36.8 °C)   Resp 18   Ht 5' 8\" (1.727 m)   Wt 108.2 kg (238 lb 8.6 oz)   SpO2 90%   BMI 36.27 kg/m²       O2 Device: Hi flow nasal cannula   O2 Flow Rate (L/min): 7 l/min(after ambulating)   Temp (24hrs), Av.1 °F (36.7 °C), Min:97.8 °F (36.6 °C), Max:98.5 °F (36.9 °C)       Intake/Output:   Last shift:       07 -  1900  In: 240 [P.O.:240]  Out: 200 [Urine:200]  Last 3 shifts:  190 -  0700  In: 780 [P.O.:780]  Out: 1200 [Urine:1200]    Intake/Output Summary (Last 24 hours) at 3/8/2019 1015  Last data filed at 3/8/2019 0800  Gross per 24 hour   Intake 800 ml   Output 1025 ml   Net -225 ml      Physical Exam:   General:  Alert, cooperative, no distress, appears stated age. ON NC oxygen. Sitting up in chair. Sat 95 % on 6  6 liters    Head:  Normocephalic, without obvious abnormality, atraumatic. alopecia   Eyes:  Conjunctivae/corneas clear. PERRL, EOMs intact. Nose: Nares normal. Septum midline. Mucosa normal. No drainage or sinus tenderness. Throat: Lips, mucosa, and tongue normal. Teeth and gums normal.- no thrush    Neck: Supple, symmetrical, trachea midline, no adenopathy, thyroid:   Back:   Symmetric, no curvature. ROM normal.-  Did not re -examined today in the chair    Lungs:   Good air movement. rale in bases    Chest wall:  No tenderness or deformity. Heart:  Regular rate and rhythm, S1, S2 normal, no murmur, click, rub or gallop.- mild tachy    Abdomen:   Soft, non-tender. Bowel sounds normal. No masses,  No organomegaly. Extremities: Extremities normal, atraumatic, no cyanosis - edema gone    Pulses: 2+ and symmetric all extremities. Skin: Skin color, texture, turgor normal. No rashes or lesions   Lymph nodes: Cervical, supraclavicular, and axillary nodes normal.   Neurologic: Grossly nonfocal, motor is intact. Psych: no over anxiety or depression. Data review:             Labs:    Recent Labs     03/08/19  0510 03/06/19  0434   WBC 10.2 9.4   HGB 9.7* 10.0*    209     Recent Labs     03/07/19  0311   *   K 4.2      CO2 27   *   BUN 21*   CREA 0.70   CA 8.9     No results for input(s): PH, PCO2, PO2, HCO3, FIO2 in the last 72 hours. No results for input(s): CPK, CKNDX, TROIQ in the last 72 hours.     No lab exists for component: CPKMB  No results found for: BNPP, BNP       Imaging:  I have personally reviewed the patients radiographs and have reviewed the reports:ct seen        Temo Gilbert MD

## 2019-03-08 NOTE — PROGRESS NOTES
Problem: Mobility Impaired (Adult and Pediatric)  Goal: *Acute Goals and Plan of Care (Insert Text)  Physical Therapy Goals  Goals reviewed 3/4/19 and remain appropriate for the next 7 days  Goals reviewed 2/25/19 and remain appropriate for the next 7 days  Goals reviewed and remain appropriate 2/15/19  Initiated 2/7/2019  1. Patient will move from supine to sit and sit to supine , scoot up and down and roll side to side in bed with modified independence within 7 day(s). 2.  Patient will transfer from bed to chair and chair to bed with supervision/set-up using the least restrictive device within 7 day(s). 3.  Patient will perform sit to stand with supervision/set-up within 7 day(s). 4.  Patient will ambulate with supervision/set-up for 150 feet with the least restrictive device within 7 day(s). physical Therapy TREATMENT  Patient: Brittani Garcia (28 y.o. female)  Date: 3/8/2019  Diagnosis: Acute respiratory failure (HCC)     Procedure(s) (LRB):  BRONCHOSCOPY (N/A)  BRONCHIAL WASHINGS FLEXIBLE (N/A) 30 Days Post-Op     Precautions:  falls  Chart, physical therapy assessment, plan of care and goals were reviewed. ASSESSMENT: no change in pt's mobility status, very limited mobility 2/2 to her respiratory status, no LOB or SOB, vc's for safety and proper RW use. Progression toward goals:  []    Improving appropriately and progressing toward goals  []    Improving slowly and progressing toward goals  [x]    Not making progress toward goals      PLAN:  Patient continues to benefit from skilled intervention to address the above impairments. Continue treatment per established plan of care.   Discharge Recommendations:  inpatient Pulmonary Rehab  Further Equipment Recommendations for Discharge:  bedside commode and rolling walker     OBJECTIVE DATA SUMMARY:     Critical Behavior:  Neurologic State: Appropriate for age  Orientation Level: Oriented X4  Cognition: Appropriate safety awareness  Safety/Judgement: Awareness of environment, Insight into deficits, Fall prevention, Home safety     Functional Mobility Training:  Bed Mobility: Pt sitting in chair on arrival.    Transfers:  Sit to Stand: Stand-by assistance  Stand to Sit: Stand-by assistance  Interventions: Verbal cues  Level of Assistance: Stand-by assistance     Balance:  Sitting: Intact; Without support  Standing: Intact; With support  Standing - Static: Good;Constant support  Standing - Dynamic : Good    Ambulation/Gait Training:  Distance (ft): 50 Feet (ft)  Assistive Device: Walker, rolling;Gait belt  Ambulation - Level of Assistance: Contact guard assistance  Gait Abnormalities: Decreased step clearance  Right Side Weight Bearing: Full  Left Side Weight Bearing: Full  Base of Support: Widened  Speed/Carmita: Slow  Step Length: Left shortened;Right shortened    Pain:  Pain Scale 1: Numeric (0 - 10)  Pain Intensity 1: 0    Activity Tolerance: poor    After treatment:   [x]    Patient left in no apparent distress sitting up in chair  []    Patient left in no apparent distress in bed  [x]    Call bell left within reach  [x]    Nursing notified  []    Caregiver present  []    Bed alarm activated    COMMUNICATION/COLLABORATION:   The patients plan of care was discussed with: Registered Nurse    Sean Campos PTA   Time Calculation: 20 mins

## 2019-03-08 NOTE — PROGRESS NOTES
Problem: Falls - Risk of  Goal: *Absence of Falls  Document Abdulaziz Fall Risk and appropriate interventions in the flowsheet.   Outcome: Progressing Towards Goal  Fall Risk Interventions:  Mobility Interventions: Patient to call before getting OOB, PT Consult for mobility concerns, Utilize walker, cane, or other assistive device    Mentation Interventions: Adequate sleep, hydration, pain control, Evaluate medications/consider consulting pharmacy, Increase mobility    Medication Interventions: Patient to call before getting OOB, Teach patient to arise slowly    Elimination Interventions: Call light in reach    History of Falls Interventions: Bed/chair exit alarm

## 2019-03-08 NOTE — PROGRESS NOTES
Problem: Self Care Deficits Care Plan (Adult)  Goal: *Acute Goals and Plan of Care (Insert Text)  Occupational Therapy Goals:  Weekly Re-assessment 2/25/2019 and 3/4/2019  All goals remain appropriate    Initiated 2/15/2019  1. Patient will perform grooming standing with modified independence within 7 days. 2. Patient will perform toileting with modified independence within 7 days. 3. Patient will perform upper body dressing and lower body dressing with modified independence within 7 days. 4. Patient will perform bathing with modified independence within 7 days. 5. Patient will correctly use PLB technique during functional tasks for improved independence with ADLS within 7 days. 6. Patient will transfer from toilet with modified independence using the least restrictive device and appropriate durable medical equipment within 7 days. Occupational Therapy TREATMENT  Patient: Lasha Birmingham (26 y.o. female)  Date: 3/8/2019  Diagnosis: Acute respiratory failure (HCC) <principal problem not specified>  Procedure(s) (LRB):  BRONCHOSCOPY (N/A)  BRONCHIAL WASHINGS FLEXIBLE (N/A) 30 Days Post-Op  Precautions:   fall, O2, chemo  Chart, occupational therapy assessment, plan of care, and goals were reviewed. ASSESSMENT:  Patient received in chair willing to work with OT; trained B UE AROM HEP to facilitate skills with ADLs as she reports she fatigues so quickly with ADLs and functional mobility; training included health benefits of consistent participation in 30 cumulative minutes AROM HEP; agreeable that when HEP was measured in cumulative manner, she believed she could be consistent in meeting this recommended activity/goal to maximize immune system, lower stress level, increase energy etc. Patient able to demonstrate B UE AROM WFL with increased time; tolerated up to 48 seconds AROM B UE at a time; PLB with O2 in place.  Toileting CGA and tolerating ambulation up to 50 feet with increased time and RW, activity pacing and PLB in place. Patient will benefit from inpatient pulmonary rehab prior to discharge to home. Progression toward goals:  [x]       Improving appropriately and progressing toward goals  []       Improving slowly and progressing toward goals  []       Not making progress toward goals and plan of care will be adjusted     PLAN:  Patient continues to benefit from skilled intervention to address the above impairments. Continue treatment per established plan of care. Discharge Recommendations:  Inpatient Pulmonary Rehab  Further Equipment Recommendations for Discharge:  TBA     SUBJECTIVE:   Patient stated I think I can do exercises like this.     OBJECTIVE DATA SUMMARY:   Cognitive/Behavioral Status:  Neurologic State: Alert  Orientation Level: Oriented X4  Cognition: Appropriate decision making; Appropriate for age attention/concentration; Appropriate safety awareness; Follows commands  Perception: Appears intact  Perseveration: No perseveration noted  Safety/Judgement: Awareness of environment; Fall prevention;Home safety(fall prevention training provided tomasz bathroom safety)    Functional Mobility and Transfers for ADLs:  Bed Mobility:does not get into bed as it is \"uncomfortable. \"       Transfers:  Sit to Stand: Stand-by assistance          Balance:  Sitting: Intact; Without support  Standing: Intact; With support  Standing - Static: Good;Constant support  Standing - Dynamic : Good    ADL Intervention:                                     Cognitive Retraining  Attention to Task: Single task  Maintains Attention For (Time): Greater than 10 minutes  Following Commands: Follows two step commands/directions  Safety/Judgement: Awareness of environment; Fall prevention;Home safety(fall prevention training provided tomasz bathroom safety)          Therapeutic Exercises:   See above  Pain:  Pain Scale 1: Numeric (0 - 10)  Pain Intensity 1: 0              Activity Tolerance:   Improving; remains a primary limiting factor; will need pulmonary rehab  Please refer to the flowsheet for vital signs taken during this treatment.   After treatment:   [x] Patient left in no apparent distress sitting up in chair  [] Patient left in no apparent distress in bed  [x] Call bell left within reach  [] Nursing notified  [x] Caregiver present  [] Bed alarm activated    COMMUNICATION/COLLABORATION:   The patients plan of care was discussed with: Registered Nurse    Danny Bills OTR/L  Time Calculation: 26 mins

## 2019-03-08 NOTE — PROGRESS NOTES
General Daily Progress Note    Admit Date: 2/3/2019    Subjective:     Patient continues to complain of exertional dyspnea.     Current Facility-Administered Medications   Medication Dose Route Frequency    trimethoprim-sulfamethoxazole (BACTRIM DS, SEPTRA DS) 160-800 mg per tablet 1 Tab  1 Tab Oral Q MON, WED & FRI    amitriptyline (ELAVIL) tablet 25 mg  25 mg Oral QHS    insulin glargine (LANTUS) injection 44 Units  44 Units SubCUTAneous DAILY    albuterol-ipratropium (DUO-NEB) 2.5 MG-0.5 MG/3 ML  3 mL Nebulization Q4H PRN    sodium chloride (OCEAN) 0.65 % nasal squeeze bottle 2 Spray  2 Spray Both Nostrils Q2H PRN    insulin glargine (LANTUS) injection 16 Units  16 Units SubCUTAneous QHS    levalbuterol (XOPENEX) nebulizer soln 1.25 mg/3 mL  1.25 mg Nebulization Q4H PRN    polyethylene glycol (MIRALAX) packet 17 g  17 g Oral DAILY PRN    predniSONE (DELTASONE) tablet 30 mg  30 mg Oral BID WITH MEALS    sodium chloride (NS) flush 5-40 mL  5-40 mL IntraVENous PRN    sodium chloride (NS) flush 5-40 mL  5-40 mL IntraVENous Q8H    apixaban (ELIQUIS) tablet 5 mg  5 mg Oral Q12H    benzonatate (TESSALON) capsule 200 mg  200 mg Oral TID PRN    lactobac ac& pc-s.therm-b.anim (JUNI Q/RISAQUAD)  1 Cap Oral DAILY    acetaminophen (TYLENOL) tablet 650 mg  650 mg Oral Q6H PRN    ondansetron (ZOFRAN) injection 4 mg  4 mg IntraVENous Q4H PRN    amLODIPine (NORVASC) tablet 2.5 mg  2.5 mg Oral DAILY    atorvastatin (LIPITOR) tablet 40 mg  40 mg Oral QHS    fluticasone (FLONASE) 50 mcg/actuation nasal spray 2 Spray  2 Spray Both Nostrils DAILY    metoprolol succinate (TOPROL-XL) XL tablet 50 mg  50 mg Oral DAILY    glucose chewable tablet 16 g  4 Tab Oral PRN    dextrose (D50W) injection syrg 12.5-25 g  12.5-25 g IntraVENous PRN    glucagon (GLUCAGEN) injection 1 mg  1 mg IntraMUSCular PRN    nitroglycerin (NITROBID) 2 % ointment 1 Inch  1 Inch Topical Q6H PRN        Review of Systems  A comprehensive review of systems was negative. Objective:     Patient Vitals for the past 24 hrs:   BP Temp Pulse Resp SpO2   03/08/19 0852     90 %   03/08/19 0731 130/62 98.2 °F (36.8 °C) 62 18 99 %   03/08/19 0340 116/66 97.8 °F (36.6 °C) 78 18 97 %   03/07/19 2338 123/65 98.5 °F (36.9 °C) 80 18 100 %   03/07/19 2035 115/70 97.8 °F (36.6 °C) 90 20 91 %   03/07/19 1510 108/60 98.3 °F (36.8 °C) 82 18 90 %   03/07/19 1119 153/74 97.9 °F (36.6 °C) 81 20 95 %     03/08 0701 - 03/08 1900  In: 240 [P.O.:240]  Out: 200 [Urine:200]  03/06 1901 - 03/08 0700  In: 80 [P.O.:780]  Out: 1200 [Urine:1200]    Physical Exam:   Visit Vitals  /62 (BP 1 Location: Right arm, BP Patient Position: At rest)   Pulse 62   Temp 98.2 °F (36.8 °C)   Resp 18   Ht 5' 8\" (1.727 m)   Wt 238 lb 8.6 oz (108.2 kg)   SpO2 90%   BMI 36.27 kg/m²     General appearance: alert, cooperative, no distress, appears stated age  Neck: supple, symmetrical, trachea midline, no adenopathy, thyroid: not enlarged, symmetric, no tenderness/mass/nodules, no carotid bruit and no JVD  Lungs: rales R base, L base, diminished breath sounds R base, L base  Heart: regular rate and rhythm, S1, S2 normal, no murmur, click, rub or gallop  Abdomen: soft, non-tender.  Bowel sounds normal. No masses,  no organomegaly  Extremities: extremities normal, atraumatic, no cyanosis or edema        Data Review   Recent Results (from the past 24 hour(s))   GLUCOSE, POC    Collection Time: 03/07/19 11:21 AM   Result Value Ref Range    Glucose (POC) 139 (H) 65 - 100 mg/dL    Performed by Kvng Etienne  (PCT)    GLUCOSE, POC    Collection Time: 03/07/19  4:53 PM   Result Value Ref Range    Glucose (POC) 227 (H) 65 - 100 mg/dL    Performed by Kvng Etienne  (PCT)    GLUCOSE, POC    Collection Time: 03/07/19  9:31 PM   Result Value Ref Range    Glucose (POC) 205 (H) 65 - 100 mg/dL    Performed by Rodney Ferris [facility]    CBC WITH MANUAL DIFF    Collection Time: 03/08/19  5:10 AM   Result Value Ref Range    WBC 10.2 3.6 - 11.0 K/uL    RBC 3.25 (L) 3.80 - 5.20 M/uL    HGB 9.7 (L) 11.5 - 16.0 g/dL    HCT 29.3 (L) 35.0 - 47.0 %    MCV 90.2 80.0 - 99.0 FL    MCH 29.8 26.0 - 34.0 PG    MCHC 33.1 30.0 - 36.5 g/dL    RDW 17.0 (H) 11.5 - 14.5 %    PLATELET 391 535 - 892 K/uL    MPV 10.5 8.9 - 12.9 FL    NRBC 0.0 0  WBC    ABSOLUTE NRBC 0.00 0.00 - 0.01 K/uL    NEUTROPHILS 84 (H) 32 - 75 %    BAND NEUTROPHILS 0 0 - 6 %    LYMPHOCYTES 12 12 - 49 %    MONOCYTES 4 (L) 5 - 13 %    EOSINOPHILS 0 0 - 7 %    BASOPHILS 0 0 - 1 %    METAMYELOCYTES 0 0 %    MYELOCYTES 0 0 %    PROMYELOCYTES 0 0 %    BLASTS 0 0 %    OTHER CELL 0 0      IMMATURE GRANULOCYTES 0 %    ABS. NEUTROPHILS 8.6 (H) 1.8 - 8.0 K/UL    ABS. LYMPHOCYTES 1.2 0.8 - 3.5 K/UL    ABS. MONOCYTES 0.4 0.0 - 1.0 K/UL    ABS. EOSINOPHILS 0.0 0.0 - 0.4 K/UL    ABS. BASOPHILS 0.0 0.0 - 0.1 K/UL    ABS. IMM. GRANS. 0.0 K/UL    DF MANUAL      RBC COMMENTS NORMOCYTIC, NORMOCHROMIC     GLUCOSE, POC    Collection Time: 03/08/19  7:34 AM   Result Value Ref Range    Glucose (POC) 128 (H) 65 - 100 mg/dL    Performed by Mario Salmon            Assessment:     Active Problems:    SVT (supraventricular tachycardia) ([facility] [address] (8/29/2016)      Essential hypertension with goal blood pressure less than 140/90 (8/29/2016)      Diabetes mellitus type 2, controlled ([facility] [address] (9/8/2016)      Dyslipidemia (9/8/2016)      Acute respiratory failure ([facility] [address] (2/3/2019)      Leg swelling (2/3/2019)      Hodgkin lymphoma ([facility] [address] (2/3/2019)      CAP (community acquired pneumonia) (2/3/2019)        Plan:     1. Note made of comments by pulmonary with potential intervention with an immunosuppressive agent. 2.  Continue diabetic control. 3.  Exertional tolerance remains poor.

## 2019-03-08 NOTE — PROGRESS NOTES
Bedside shift change report given to Smiley Gu RN (oncoming nurse) by Daryl Ramirez RN (offgoing nurse). Report included the following information SBAR. Bedside shift change report given to Timothy Escudero RN (oncoming nurse). Report included the following information SBAR. SHIFT SUMMARY:  Pt continuing to desat with minimal movement. Has been okay on 5L when still and has needed up 7L while ambulating. CONCERNS TO ADDRESS WITH MD:  Would Pt benefit from palliative consult? [id number] NURSING NOTE   Admission Date 2/3/2019   Admission Diagnosis Acute respiratory failure ([facility] [address]   Consults IP CONSULT TO HEMATOLOGY  IP CONSULT TO PRIMARY CARE PROVIDER  IP CONSULT TO PULMONOLOGY      Cardiac Monitoring [x] Yes [] No      Purposeful Hourly Rounding [x] Yes    Abdulaziz Score Total Score: 3   Abdulaziz score 3 or > [] Bed Alarm [] Avasys [] 1:1 sitter [] Patient refused (Signed refusal form in chart)   Dwayne Score Dwayne Score: 18   Dwayne score 14 or < [] PMT consult [] Wound Care consult    []  Specialty bed  [] Nutrition consult      Influenza Vaccine Received Flu Vaccine for Current Season (usually Sept-March): No    Patient/Guardian Refused (Notify MD): Yes      Oxygen needs? [] Room air Oxygen @  []1L    []2L    []3L   []4L    [x]5L   []6L via  NC   Chronic home O2 use?  [] Yes [x] No  Perform O2 challenge test and document in progress note using smartphrase (.Homeoxygen)      Last bowel movement Last Bowel Movement Date: 03/06/19      Urinary Catheter       [REMOVED] External Female Catheter 02/04/19-Urine Output (mL): 400 ml     LDAs             Venous Access Device portacath  09/12/18 Upper chest (subclavicular area), left (Active)      Peripheral IV 03/03/19 Right Hand (Active)   Site Assessment Clean, dry, & intact 3/8/2019  4:00 PM   Phlebitis Assessment 0 3/8/2019  4:00 PM   Infiltration Assessment 0 3/8/2019  4:00 PM   Dressing Status Clean, dry, & intact 3/8/2019  4:00 PM   Dressing Type Tape 3/8/2019  4:00 PM   Hub Color/Line Status Blue 3/8/2019  4:00 PM   Alcohol Cap Used Yes 3/3/2019  8:30 PM                         Readmission Risk Assessment Tool Score Medium Risk            18       Total Score        3 Has Seen PCP in Last 6 Months (Yes=3, No=0)    3 Patient Length of Stay (>5 days = 3)    5 Pt. Coverage (Medicare=5 , Medicaid, or Self-Pay=4)    7 Charlson Comorbidity Score (Age + Comorbid Conditions)        Criteria that do not apply:    . Living with Significant Other. Assisted Living. LTAC. SNF.  or   Rehab    IP Visits Last 12 Months (1-3=4, 4=9, >4=11)       Expected Length of Stay 3d 12h   Actual Length of Stay 33

## 2019-03-08 NOTE — PROGRESS NOTES
New referral sent to [facility] via [employer] including H&P, Hospitalist notes, Pulmonary notes, and PT/OT notes. [facility] cannot start authorization with insurance until Monday.  Could take 24 hours for approval.    RAND Whittaker  Care Manager  [phone]

## 2019-03-09 NOTE — PROGRESS NOTES
Problem: Falls - Risk of  Goal: *Absence of Falls  Document Abdulaziz Fall Risk and appropriate interventions in the flowsheet.   Outcome: Progressing Towards Goal  Fall Risk Interventions:  Mobility Interventions: Assess mobility with egress test, Bed/chair exit alarm, Communicate number of staff needed for ambulation/transfer, OT consult for ADLs, Patient to call before getting OOB, PT Consult for mobility concerns, Utilize walker, cane, or other assistive device, Utilize gait belt for transfers/ambulation    Mentation Interventions: Adequate sleep, hydration, pain control, Bed/chair exit alarm, Door open when patient unattended    Medication Interventions: Assess postural VS orthostatic hypotension, Bed/chair exit alarm, Evaluate medications/consider consulting pharmacy, Patient to call before getting OOB, Teach patient to arise slowly, Utilize gait belt for transfers/ambulation    Elimination Interventions: Bed/chair exit alarm, Call light in reach, Patient to call for help with toileting needs, Toilet paper/wipes in reach, Toileting schedule/hourly rounds    History of Falls Interventions: Bed/chair exit alarm, Consult care management for discharge planning, Door open when patient unattended, Evaluate medications/consider consulting pharmacy, Investigate reason for fall, Room close to nurse's station

## 2019-03-09 NOTE — PROGRESS NOTES
General Daily Progress Note    Admit Date: 2/3/2019    Subjective:     Patient c/o dyspnea.     Current Facility-Administered Medications   Medication Dose Route Frequency    [START ON 3/10/2019] insulin glargine (LANTUS) injection 48 Units  48 Units SubCUTAneous DAILY    trimethoprim-sulfamethoxazole (BACTRIM DS, SEPTRA DS) 160-800 mg per tablet 1 Tab  1 Tab Oral Q MON, WED & FRI    mycophenolate mofetil (CELLCEPT) capsule 500 mg  500 mg Oral ACB&D    amitriptyline (ELAVIL) tablet 25 mg  25 mg Oral QHS    albuterol-ipratropium (DUO-NEB) 2.5 MG-0.5 MG/3 ML  3 mL Nebulization Q4H PRN    sodium chloride (OCEAN) 0.65 % nasal squeeze bottle 2 Spray  2 Spray Both Nostrils Q2H PRN    insulin glargine (LANTUS) injection 16 Units  16 Units SubCUTAneous QHS    levalbuterol (XOPENEX) nebulizer soln 1.25 mg/3 mL  1.25 mg Nebulization Q4H PRN    polyethylene glycol (MIRALAX) packet 17 g  17 g Oral DAILY PRN    predniSONE (DELTASONE) tablet 30 mg  30 mg Oral BID WITH MEALS    sodium chloride (NS) flush 5-40 mL  5-40 mL IntraVENous PRN    sodium chloride (NS) flush 5-40 mL  5-40 mL IntraVENous Q8H    apixaban (ELIQUIS) tablet 5 mg  5 mg Oral Q12H    benzonatate (TESSALON) capsule 200 mg  200 mg Oral TID PRN    lactobac ac& pc-s.therm-b.anim (JUNI Q/RISAQUAD)  1 Cap Oral DAILY    acetaminophen (TYLENOL) tablet 650 mg  650 mg Oral Q6H PRN    ondansetron (ZOFRAN) injection 4 mg  4 mg IntraVENous Q4H PRN    amLODIPine (NORVASC) tablet 2.5 mg  2.5 mg Oral DAILY    atorvastatin (LIPITOR) tablet 40 mg  40 mg Oral QHS    fluticasone (FLONASE) 50 mcg/actuation nasal spray 2 Spray  2 Spray Both Nostrils DAILY    metoprolol succinate (TOPROL-XL) XL tablet 50 mg  50 mg Oral DAILY    glucose chewable tablet 16 g  4 Tab Oral PRN    dextrose (D50W) injection syrg 12.5-25 g  12.5-25 g IntraVENous PRN    glucagon (GLUCAGEN) injection 1 mg  1 mg IntraMUSCular PRN    nitroglycerin (NITROBID) 2 % ointment 1 Inch  1 Inch Topical Q6H PRN        Review of Systems  A comprehensive review of systems was negative. Objective:     Patient Vitals for the past 24 hrs:   BP Temp Pulse Resp SpO2   03/09/19 0729     94 %   03/09/19 0728 123/67 97.6 °F (36.4 °C) 79 22 97 %   03/09/19 0312 127/73 97.9 °F (36.6 °C) 84 20 100 %   03/08/19 2320 123/74 97.8 °F (36.6 °C) 95 22 92 %   03/08/19 1939 115/65 97.8 °F (36.6 °C) 96 20 93 %   03/08/19 1515 115/65 98 °F (36.7 °C) 85 20 95 %     03/09 0701 - 03/09 1900  In: 120 [P.O.:120]  Out: 300 [Urine:300]  03/07 1901 - 03/09 0700  In: 920 [P.O.:920]  Out: 8141 [Urine:1355]    Physical Exam:   Visit Vitals  /67 (BP 1 Location: Right arm, BP Patient Position: At rest)   Pulse 79   Temp 97.6 °F (36.4 °C)   Resp 22   Ht 5' 8\" (1.727 m)   Wt 238 lb 8.6 oz (108.2 kg)   SpO2 94%   BMI 36.27 kg/m²     General appearance: alert, cooperative, no distress, appears stated age  Neck: supple, symmetrical, trachea midline, no adenopathy, thyroid: not enlarged, symmetric, no tenderness/mass/nodules, no carotid bruit and no JVD  Lungs: rales R base, L base, diminished breath sounds R base, L base  Heart: regular rate and rhythm, S1, S2 normal, no murmur, click, rub or gallop  Abdomen: soft, non-tender.  Bowel sounds normal. No masses,  no organomegaly  Extremities: extremities normal, atraumatic, no cyanosis or edema        Data Review   Recent Results (from the past 24 hour(s))   GLUCOSE, POC    Collection Time: 03/08/19 11:34 AM   Result Value Ref Range    Glucose (POC) 179 (H) 65 - 100 mg/dL    Performed by [address], POC    Collection Time: 03/08/19  4:33 PM   Result Value Ref Range    Glucose (POC) 217 (H) 65 - 100 mg/dL    Performed by [address], POC    Collection Time: 03/08/19  8:45 PM   Result Value Ref Range    Glucose (POC) 400 (H) 65 - 100 mg/dL    Performed by Chelita Storm    GLUCOSE, POC    Collection Time: 03/08/19  8:47 PM   Result Value Ref Range Glucose (POC) 381 (H) 65 - 100 mg/dL    Performed by Consuelo Prater    METABOLIC PANEL, BASIC    Collection Time: 03/09/19  4:52 AM   Result Value Ref Range    Sodium 134 (L) 136 - 145 mmol/L    Potassium 4.2 3.5 - 5.1 mmol/L    Chloride 101 97 - 108 mmol/L    CO2 28 21 - 32 mmol/L    Anion gap 5 5 - 15 mmol/L    Glucose 209 (H) 65 - 100 mg/dL    BUN 23 (H) 6 - 20 MG/DL    Creatinine 0.80 0.55 - 1.02 MG/DL    BUN/Creatinine ratio 29 (H) 12 - 20      GFR est AA >60 >60 ml/min/1.73m2    GFR est non-AA >60 >60 ml/min/1.73m2    Calcium 9.0 8.5 - 10.1 MG/DL   GLUCOSE, POC    Collection Time: 03/09/19  7:11 AM   Result Value Ref Range    Glucose (POC) 212 (H) 65 - 100 mg/dL    Performed by Eder Landry (CON) PCT            Assessment:     Active Problems:    SVT (supraventricular tachycardia) ([facility] [address] (8/29/2016)      Essential hypertension with goal blood pressure less than 140/90 (8/29/2016)      Diabetes mellitus type 2, controlled ([address] (9/8/2016)      Dyslipidemia (9/8/2016)      Acute respiratory failure ([address] (2/3/2019)      Leg swelling (2/3/2019)      Hodgkin lymphoma ([facility] [address] (2/3/2019)      CAP (community acquired pneumonia) (2/3/2019)        Plan:     1. Patient now on immunosuppressive agent and prednisone. No adverse effects. 2.  Pulmonary status unchanged. 3.  Continue diabetic control. 4.  Possible discharge on Monday.

## 2019-03-09 NOTE — PROGRESS NOTES
Problem: Pressure Injury - Risk of  Goal: *Prevention of pressure injury  Document Dwayne Scale and appropriate interventions in the flowsheet.   Outcome: Progressing Towards Goal  Pressure Injury Interventions:  Sensory Interventions: Assess changes in LOC, Keep linens dry and wrinkle-free, Float heels, Monitor skin under medical devices    Moisture Interventions: Assess need for specialty bed, Maintain skin hydration (lotion/cream)    Activity Interventions: Increase time out of bed, Pressure redistribution bed/mattress(bed type)    Mobility Interventions: Assess need for specialty bed, Float heels, Pressure redistribution bed/mattress (bed type)    Nutrition Interventions: Document food/fluid/supplement intake    Friction and Shear Interventions: Apply protective barrier, creams and emollients, Foam dressings/transparent film/skin sealants

## 2019-03-09 NOTE — PROGRESS NOTES
Bedside shift change report given to Roxana Jackson RN (oncoming nurse). Report included the following information SBAR. SHIFT SUMMARY:      CONCERNS TO ADDRESS WITH MD:        [facility] NURSING NOTE   Admission Date 2/3/2019   Admission Diagnosis Acute respiratory failure ([address]   Consults IP CONSULT TO HEMATOLOGY  IP CONSULT TO PRIMARY CARE PROVIDER  IP CONSULT TO PULMONOLOGY      Cardiac Monitoring [x] Yes [] No      Purposeful Hourly Rounding [x] Yes    Abdulaziz Score Total Score: 3   Abdulaziz score 3 or > [x] Bed Alarm [] Avasys [] 1:1 sitter [] Patient refused (Signed refusal form in chart)   Dwayne Score Dwayne Score: 17   Dwayne score 14 or < [] PMT consult [x] Wound Care consult    []  Specialty bed  [x] Nutrition consult      Influenza Vaccine Received Flu Vaccine for Current Season (usually Sept-March): No    Patient/Guardian Refused (Notify MD): Yes      Oxygen needs? [] Room air Oxygen @  []1L    []2L    []3L   []4L    []5L   [x]6L via  NC   Chronic home O2 use?  [] Yes [x] No  Perform O2 challenge test and document in progress note using smartphMyRealTripe (.Homeoxygen)      Last bowel movement Last Bowel Movement Date: 03/08/19      Urinary Catheter       [REMOVED] External Female Catheter 02/04/19-Urine Output (mL): 400 ml     LDAs             Venous Access Device portacath  09/12/18 Upper chest (subclavicular area), left (Active)      Peripheral IV 03/03/19 Right Hand (Active)   Site Assessment Clean, dry, & intact 3/9/2019  2:00 PM   Phlebitis Assessment 0 3/9/2019  2:00 PM   Infiltration Assessment 0 3/9/2019  2:00 PM   Dressing Status Clean, dry, & intact 3/9/2019  2:00 PM   Dressing Type Transparent;Tape 3/9/2019  2:00 PM   Hub Color/Line Status Blue;Flushed 3/9/2019  2:00 PM   Alcohol Cap Used Yes 3/3/2019  8:30 PM                         Readmission Risk Assessment Tool Score Medium Risk            18       Total Score        3 Has Seen PCP in Last 6 Months (Yes=3, No=0)    3 Patient Length of Stay (>5 days = 3) 5 Pt. Coverage (Medicare=5 , Medicaid, or Self-Pay=4)    7 Charlson Comorbidity Score (Age + Comorbid Conditions)        Criteria that do not apply:    . Living with Significant Other. Assisted Living. LTAC. SNF.  or   Rehab    IP Visits Last 12 Months (1-3=4, 4=9, >4=11)       Expected Length of Stay 3d 12h   Actual Length of Stay 34

## 2019-03-09 NOTE — PROGRESS NOTES
Bedside and Verbal shift change report given received from [facility]. Report included the following information SBAR, Kardex and Recent Results. Bedside and Verbal shift change report given to Daniel Graves RN (oncoming nurse). Report included the following information SBAR, Kardex and Recent Results. SHIFT SUMMARY:      CONCERNS TO ADDRESS WITH MD:      [address] NURSING NOTE   Admission Date 2/3/2019   Admission Diagnosis Acute respiratory failure ([address]   Consults IP CONSULT TO HEMATOLOGY  IP CONSULT TO PRIMARY CARE PROVIDER  IP CONSULT TO PULMONOLOGY      Cardiac Monitoring [x] Yes [] No      Purposeful Hourly Rounding [x] Yes    Abdulaziz Score Total Score: 3   Abdulaziz score 3 or > [] Bed Alarm [] Avasys [] 1:1 sitter [] Patient refused (Signed refusal form in chart)   Dwayne Score Dwayne Score: 18   Dwayne score 14 or < [] PMT consult [] Wound Care consult    []  Specialty bed  [] Nutrition consult      Influenza Vaccine Received Flu Vaccine for Current Season (usually Sept-March): No    Patient/Guardian Refused (Notify MD): Yes      Oxygen needs? [] Room air Oxygen @  []1L    []2L    []3L   []4L    []5L   []6L via  NC HIFLOW @ 6   Chronic home O2 use?  [x] Yes [] No  Perform O2 challenge test and document in progress note using smartphrase (.Homeoxygen)      Last bowel movement Last Bowel Movement Date: 03/08/19      Urinary Catheter       [REMOVED] External Female Catheter 02/04/19-Urine Output (mL): 400 ml     LDAs             Venous Access Device portacath  09/12/18 Upper chest (subclavicular area), left (Active)      Peripheral IV 03/03/19 Right Hand (Active)   Site Assessment Clean, dry, & intact 3/8/2019  7:54 PM   Phlebitis Assessment 0 3/8/2019  7:54 PM   Infiltration Assessment 0 3/8/2019  7:54 PM   Dressing Status Clean, dry, & intact 3/8/2019  7:54 PM   Dressing Type Transparent 3/8/2019  7:54 PM   Hub Color/Line Status Blue 3/8/2019  7:54 PM   Alcohol Cap Used Yes 3/3/2019  8:30 PM Readmission Risk Assessment Tool Score Medium Risk            18       Total Score        3 Has Seen PCP in Last 6 Months (Yes=3, No=0)    3 Patient Length of Stay (>5 days = 3)    5 Pt. Coverage (Medicare=5 , Medicaid, or Self-Pay=4)    7 Charlson Comorbidity Score (Age + Comorbid Conditions)        Criteria that do not apply:    . Living with Significant Other. Assisted Living. LTAC. SNF.  or   Rehab    IP Visits Last 12 Months (1-3=4, 4=9, >4=11)       Expected Length of Stay 3d 12h   Actual Length of Stay 34

## 2019-03-09 NOTE — PROGRESS NOTES
Bedside shift change report given to [facility] (oncoming nurse) by Chantel Fuentes (offgoing nurse). Report included the following information SBAR.     7689 - Pt states she feels SOB, O2 sats > 90%. Increased high flow nasal cannula from 6L to 7L. Will continue to monitor. 1428 - Pt's O2 sats 99%, placed back down from 7L to 6L high flow nasal cannula. Pt has no complaints of distress.

## 2019-03-10 NOTE — ROUTINE PROCESS
Bedside report provided by Alley Villafuerte RN. SBAR report, Kardex, MAR, Cardiac Rhythm (NSR), and pt general condition reviewed.

## 2019-03-10 NOTE — PROGRESS NOTES
General Daily Progress Note    Admit Date: 2/3/2019    Subjective:     Patient continues to complain of exertional dyspnea although no worse.     Current Facility-Administered Medications   Medication Dose Route Frequency    insulin glargine (LANTUS) injection 48 Units  48 Units SubCUTAneous DAILY    trimethoprim-sulfamethoxazole (BACTRIM DS, SEPTRA DS) 160-800 mg per tablet 1 Tab  1 Tab Oral Q MON, WED & FRI    mycophenolate mofetil (CELLCEPT) capsule 500 mg  500 mg Oral ACB&D    amitriptyline (ELAVIL) tablet 25 mg  25 mg Oral QHS    albuterol-ipratropium (DUO-NEB) 2.5 MG-0.5 MG/3 ML  3 mL Nebulization Q4H PRN    sodium chloride (OCEAN) 0.65 % nasal squeeze bottle 2 Spray  2 Spray Both Nostrils Q2H PRN    insulin glargine (LANTUS) injection 16 Units  16 Units SubCUTAneous QHS    levalbuterol (XOPENEX) nebulizer soln 1.25 mg/3 mL  1.25 mg Nebulization Q4H PRN    polyethylene glycol (MIRALAX) packet 17 g  17 g Oral DAILY PRN    predniSONE (DELTASONE) tablet 30 mg  30 mg Oral BID WITH MEALS    sodium chloride (NS) flush 5-40 mL  5-40 mL IntraVENous PRN    sodium chloride (NS) flush 5-40 mL  5-40 mL IntraVENous Q8H    apixaban (ELIQUIS) tablet 5 mg  5 mg Oral Q12H    benzonatate (TESSALON) capsule 200 mg  200 mg Oral TID PRN    lactobac ac& pc-s.therm-b.anim (JUNI Q/RISAQUAD)  1 Cap Oral DAILY    acetaminophen (TYLENOL) tablet 650 mg  650 mg Oral Q6H PRN    ondansetron (ZOFRAN) injection 4 mg  4 mg IntraVENous Q4H PRN    amLODIPine (NORVASC) tablet 2.5 mg  2.5 mg Oral DAILY    atorvastatin (LIPITOR) tablet 40 mg  40 mg Oral QHS    fluticasone (FLONASE) 50 mcg/actuation nasal spray 2 Spray  2 Spray Both Nostrils DAILY    metoprolol succinate (TOPROL-XL) XL tablet 50 mg  50 mg Oral DAILY    glucose chewable tablet 16 g  4 Tab Oral PRN    dextrose (D50W) injection syrg 12.5-25 g  12.5-25 g IntraVENous PRN    glucagon (GLUCAGEN) injection 1 mg  1 mg IntraMUSCular PRN    nitroglycerin (NITROBID) 2 % ointment 1 Inch  1 Inch Topical Q6H PRN        Review of Systems  A comprehensive review of systems was negative. Objective:     Patient Vitals for the past 24 hrs:   BP Temp Pulse Resp SpO2   03/10/19 1039 112/60 97.7 °F (36.5 °C) 81 22 100 %   03/10/19 0733 125/77 97.8 °F (36.6 °C) 82 22 95 %   03/10/19 0339 109/60 98.3 °F (36.8 °C) 80 21 100 %   03/09/19 2303 122/66 97.6 °F (36.4 °C) 81 22 98 %   03/09/19 1925 124/70 98 °F (36.7 °C) 81 21 95 %   03/09/19 1500 119/64 98.3 °F (36.8 °C) 89 22 92 %   03/09/19 1114 126/61 97.7 °F (36.5 °C) 77 23 98 %     03/10 0701 - 03/10 1900  In: 240 [P.O.:240]  Out: -   03/08 1901 - 03/10 0700  In: 240 [P.O.:240]  Out: 900 [Urine:900]    Physical Exam:   Visit Vitals  /60 (BP 1 Location: Right arm, BP Patient Position: Sitting)   Pulse 81   Temp 97.7 °F (36.5 °C)   Resp 22   Ht 5' 8\" (1.727 m)   Wt 238 lb 8.6 oz (108.2 kg)   SpO2 100%   BMI 36.27 kg/m²     General appearance: alert, cooperative, no distress, appears stated age  Neck: supple, symmetrical, trachea midline, no adenopathy, thyroid: not enlarged, symmetric, no tenderness/mass/nodules, no carotid bruit and no JVD  Lungs: rales R base, L base, diminished breath sounds R base, L base  Heart: regular rate and rhythm, S1, S2 normal, no murmur, click, rub or gallop  Abdomen: soft, non-tender.  Bowel sounds normal. No masses,  no organomegaly  Extremities: extremities normal, atraumatic, no cyanosis or edema        Data Review   Recent Results (from the past 24 hour(s))   GLUCOSE, POC    Collection Time: 03/09/19 11:16 AM   Result Value Ref Range    Glucose (POC) 169 (H) 65 - 100 mg/dL    Performed by John Maria  (PCT)    GLUCOSE, POC    Collection Time: 03/09/19  4:20 PM   Result Value Ref Range    Glucose (POC) 258 (H) 65 - 100 mg/dL    Performed by John Maria  (PCT)    GLUCOSE, POC    Collection Time: 03/09/19  8:29 PM   Result Value Ref Range    Glucose (POC) 291 (H) 65 - 100 mg/dL    Performed by Nikolas Duron Frantz (CON) PCT    GLUCOSE, POC    Collection Time: 03/10/19  7:27 AM   Result Value Ref Range    Glucose (POC) 235 (H) 65 - 100 mg/dL    Performed by Nicolle Donald (CON) PCT            Assessment:     Active Problems:    SVT (supraventricular tachycardia) ([address] (8/29/2016)      Essential hypertension with goal blood pressure less than 140/90 (8/29/2016)      Diabetes mellitus type 2, controlled ([address] (9/8/2016)      Dyslipidemia (9/8/2016)      Acute respiratory failure ([address] (2/3/2019)      Leg swelling (2/3/2019)      Hodgkin lymphoma ([facility] (2/3/2019)      CAP (community acquired pneumonia) (2/3/2019)        Plan:     1. Pulmonary status about the same although O2 requirements have decreased. Continue CellCept and hopefully reduction in inflammation will occur. 2.  Possible discharge tomorrow to rehab. 3.  Continue diabetic control.

## 2019-03-10 NOTE — PROGRESS NOTES
Problem: Falls - Risk of  Goal: *Absence of Falls  Document Abdulaziz Fall Risk and appropriate interventions in the flowsheet.   Outcome: Progressing Towards Goal  Fall Risk Interventions:  Mobility Interventions: Bed/chair exit alarm    Mentation Interventions: Bed/chair exit alarm    Medication Interventions: Bed/chair exit alarm    Elimination Interventions: Bed/chair exit alarm, Call light in reach    History of Falls Interventions: Bed/chair exit alarm

## 2019-03-10 NOTE — PROGRESS NOTES
Problem: Falls - Risk of  Goal: *Absence of Falls  Document Abdulaziz Fall Risk and appropriate interventions in the flowsheet. Outcome: Progressing Towards Goal  Fall Risk Interventions:  Mobility Interventions: Bed/chair exit alarm    Mentation Interventions: Bed/chair exit alarm    Medication Interventions: Bed/chair exit alarm, Patient to call before getting OOB    Elimination Interventions: Bed/chair exit alarm, Call light in reach    History of Falls Interventions: Bed/chair exit alarm        Problem: Pressure Injury - Risk of  Goal: *Prevention of pressure injury  Document Dwayne Scale and appropriate interventions in the flowsheet. Outcome: Progressing Towards Goal  Pressure Injury Interventions:  Sensory Interventions: Assess changes in LOC    Moisture Interventions: Absorbent underpads    Activity Interventions: Chair cushion, Increase time out of bed    Mobility Interventions: Assess need for specialty bed    Nutrition Interventions: Document food/fluid/supplement intake    Friction and Shear Interventions: Apply protective barrier, creams and emollients               Problem: Breathing Pattern - Ineffective  Goal: *Absence of hypoxia  Outcome: Progressing Towards Goal  Pt's lungs without wheezing per auscultation, but still very diminished. Pt should improved activity tolerance aeb decreased SOLIZ (rather notably so) and stable SaO2 while use 6 L/min O2 via high flow nasal canula.

## 2019-03-10 NOTE — PROGRESS NOTES
1900 received report from Lyssa Le [facility] patient care    Bedside shift change report given to Huyen Peña RN (oncoming nurse) by Fritz Castillo RN (offgoing nurse). Report included the following information SBAR, Kardex, MAR and Cardiac Rhythm NSR.

## 2019-03-10 NOTE — PROGRESS NOTES
0700: Bedside shift change report given to SRINI Vicente RN (oncoming nurse) by Michae Frankel, RN (offgoing nurse). Report included the following information SBAR and Kardex. 1900: Bedside shift change report given to Alli RN (oncoming nurse) by SRINI Vicente RN (offgoing nurse). Report included the following information SBAR and Kardex.

## 2019-03-11 NOTE — PROGRESS NOTES
0700  Bedside shift change report given to Toshia Ervin RN (oncoming nurse) by Stephen Mondragon RN (offgoing nurse). Report included the following information SBAR, Kardex, ED Summary, Procedure Summary, Intake/Output, MAR, Accordion, Recent Results, Med Rec Status and Cardiac Rhythm NSR.    0721  Pt had 1 urine occurrence. Hat was in toilet for measurement but urine missed. repositioned for next time. 80  Talking with pt. She has not had much to drink today and has not been to the bathroom since this morning. Fresh water was provided to pt.

## 2019-03-11 NOTE — PROGRESS NOTES
1900 received report from [place] assumed patient care      Bedside shift change report given to SUSANNAH Mcdermott (oncoming nurse) by Sherin Tobar RN (offgoing nurse). Report included the following information SBAR, Kardex and Cardiac Rhythm NSR.

## 2019-03-11 NOTE — PROGRESS NOTES
[address] INTERDISCIPLINARY ROUNDS    Cardiopulmonary Care Interdisciplinary Rounds were held today to discuss patient's plan of care and outcomes. The following members were present:  Pharmacy, Nursing and Case Management.   Expected Length of Stay:  3d 12h    PLAN OF CARE:   Continue current treatment plan

## 2019-03-11 NOTE — PROGRESS NOTES
Problem: Mobility Impaired (Adult and Pediatric)  Goal: *Acute Goals and Plan of Care (Insert Text)  Physical Therapy Goals  Goals reviewed and revised 3/11/19  1. Patient will move from supine to sit and sit to supine , scoot up and down and roll side to side in bed with modified independence within 7 day(s). 2.  Patient will transfer from bed to chair and chair to bed with modified independence using the least restrictive device within 7 day(s). 3.  Patient will perform sit to stand with modified independence within 7 day(s). 4.  Patient will ambulate with supervision/set-up for 70 feet with the least restrictive device within 7 day(s). Goals reviewed 3/4/19 and remain appropriate for the next 7 days  Goals reviewed 2/25/19 and remain appropriate for the next 7 days  Goals reviewed and remain appropriate 2/15/19  Initiated 2/7/2019  1. Patient will move from supine to sit and sit to supine , scoot up and down and roll side to side in bed with modified independence within 7 day(s). 2.  Patient will transfer from bed to chair and chair to bed with supervision/set-up using the least restrictive device within 7 day(s). 3.  Patient will perform sit to stand with supervision/set-up within 7 day(s). 4.  Patient will ambulate with supervision/set-up for 150 feet with the least restrictive device within 7 day(s). physical Therapy TREATMENT: WEEKLY REASSESSMENT  Patient: Gregoria Davenport (55 y.o. female)  Date: 3/11/2019  Diagnosis: Acute respiratory failure (HCC) <principal problem not specified>  Procedure(s) (LRB):  BRONCHOSCOPY (N/A)  BRONCHIAL WASHINGS FLEXIBLE (N/A) 33 Days Post-Op  Precautions:    Chart, physical therapy assessment, plan of care and goals were reviewed. ASSESSMENT:  Pt received reclined in recliner chair and agreeable to PT intervention. Pt cleared by nursing for mobility.  Pt continues to demonstrate limited functional mobility secondary to poor respiratory status, limited endurance, generalized weakness, impaired gait mechanics, impaired standing balance, and poor activity tolerance. Pt has made minimal progress towards therapy goals and goals have been adjusted. Pt on 7 L/min Hi Flow O2 with SpO2 94%, HR 91 bpm at rest. Pt required increased time to prepare self to attempt to stand and needed one brief standing trial prior to initiating gait training due to mild dizziness. She was only able to tolerate 25' of gait training with CGA and RW support, along with two brief standing rest breaks throughout due to fatigue and SOB, this date. Pt with poor activity tolerance overall as HR increased to 150s bpm and SpO2 decreased to 72% with minimal activity on 7 L/min Hi Flow O2 NC during session. She required seated break, ~ 5 minutes, and an increase in her O2 to 11 L/min Hi Flow O2 NC to recover to >90%, despite good effort for PLB and relaxation techniques. She did exhibit increased RR towards latter half of mobility and unable to improve despite cueing. Pt was able to be weaned back to 7 L/min Hi Flow O2 NC prior to end of session. RN present for latter half of session. Pt was left sitting in bedside chair with all needs met, RN aware, chair alarm on, and VSS on 7 L/min Hi Flow O2 NC following therapy session. Continue to recommend pt discharge to  pulmonary rehab to improve functional mobility, endurance, independence, and respiratory status. PT will continue to follow to address mobility impairments as noted above. Patient's progression toward goals since last assessment: Slow progress - goals adjusted     PLAN:  Goals have been updated based on progression since last assessment. Patient continues to benefit from skilled intervention to address the above impairments. Continue to follow the patient 3 times a week to address goals.   Planned Interventions:  [x]              Bed Mobility Training             []       Neuromuscular Re-Education  [x]              Transfer Training []       Orthotic/Prosthetic Training  [x]              Gait Training                         []       Modalities  [x]              Therapeutic Exercises           []       Edema Management/Control  [x]              Therapeutic Activities            [x]       Patient and Family Training/Education  []              Other (comment):  Discharge Recommendations: Inpatient Pulmonary Rehab  Further Equipment Recommendations for Discharge: TBD by rehab     SUBJECTIVE:   Patient stated Glenna Urias, I think I need to sit down.     OBJECTIVE DATA SUMMARY:   Critical Behavior:  Neurologic State: Alert  Orientation Level: Oriented X4  Cognition: Appropriate decision making, Appropriate for age attention/concentration, Appropriate safety awareness, Follows commands  Safety/Judgement: Awareness of environment, Fall prevention, Home safety(fall prevention training provided tomasz bathroom safety)  Functional Mobility Training:  Bed Mobility:           Scooting: Supervision        Transfers:  Sit to Stand: Stand-by assistance; Additional time  Stand to Sit: Stand-by assistance(mildly uncontrolled descent into sitting)                             Balance:  Sitting: Intact; Without support  Standing: Intact; With support  Standing - Static: Good;Constant support  Standing - Dynamic : Good(with RW support)  Ambulation/Gait Training:  Distance (ft): 25 Feet (ft)(two brief standing rest breaks)  Assistive Device: Gait belt;Walker, rolling  Ambulation - Level of Assistance: Contact guard assistance;Assist x1;Additional time; Adaptive equipment        Gait Abnormalities: Decreased step clearance;Shuffling gait(mildly increased trunk flexion)        Base of Support: Widened     Speed/Carmita: Slow;Shuffled  Step Length: Left shortened;Right shortened  Pain:  Pain Scale 1: Numeric (0 - 10)  Pain Intensity 1: 0              Activity Tolerance:   Poor -  bpm at the highest during session with SaO2 72% on 7 L/min Hi Flow O2 with activity; no pain complaints  Please refer to the flowsheet for vital signs taken during this treatment.   After treatment:   [x]  Patient left in no apparent distress sitting up in chair  []  Patient left in no apparent distress in bed  [x]  Call bell left within reach  [x]  Nursing notified  []  Caregiver present  [x]  Bed alarm activated    COMMUNICATION/COLLABORATION:   The patients plan of care was discussed with: Physical Therapist, Occupational Therapist and Registered Nurse    Maicol Franks, PT, DPT   Time Calculation: 25 mins

## 2019-03-11 NOTE — PROGRESS NOTES
Problem: Falls - Risk of  Goal: *Absence of Falls  Document Abdulaziz Fall Risk and appropriate interventions in the flowsheet.   Outcome: Progressing Towards Goal  Fall Risk Interventions:  Mobility Interventions: Bed/chair exit alarm, Communicate number of staff needed for ambulation/transfer, Mechanical lift, OT consult for ADLs, Patient to call before getting OOB, PT Consult for mobility concerns, PT Consult for assist device competence, Strengthening exercises (ROM-active/passive), Utilize walker, cane, or other assistive device, Utilize gait belt for transfers/ambulation    Mentation Interventions: Bed/chair exit alarm, Door open when patient unattended, Increase mobility, More frequent rounding, Update white board    Medication Interventions: Bed/chair exit alarm, Evaluate medications/consider consulting pharmacy, Patient to call before getting OOB, Teach patient to arise slowly, Utilize gait belt for transfers/ambulation    Elimination Interventions: Bed/chair exit alarm, Call light in reach, Patient to call for help with toileting needs, Toilet paper/wipes in reach, Toileting schedule/hourly rounds    History of Falls Interventions: Bed/chair exit alarm, Room close to nurse's station

## 2019-03-11 NOTE — PROGRESS NOTES
[facility]  Pulmonary, Critical Care, and Sleep Medicine    Progress Note     Name: Lasha Birmingham MRN: [id number]   : 1950 Hospital: [facility]   Date: 3/11/2019        IMPRESSION:   · Acute hypoxic resp failure - would hope that oxygenation improve some with anticoagulation but am afraid the Bleo toxicity will not regress despite steroids and time; of course only time will tell. 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 He rec cellcept 500 mg bid could be tried. As it is an immunosuppresive I have spoken with Dr. Marylen Meager her oncologist and she is ok with cellecpt. We needed to know  If she is ok with us trying this first in view of her newly successfully treated cancer - lymphoma. . At pts request I spoke with Dr. Anna Geronimo her cousin who is an oncologist  On 3/7. Syd Colon I updated him on her condition and potential plans. He is requesting a consult for lung transplantation. I did tell him that would not be an option in view of her cancer . . I  Did happen to have [facility] on the other line and  [facility]  confimed that other for a mild skin cancer , etc they required at least 2 yrs cancer free and preferably 5 yrs. All was discussed with the pt. · Bilateral pulm infiltrates-  First noted on PET  and seen on ct on admit - tx with augmentin -, then Levaquin - Preceding fever 101. S/P bronch . DDX: PNA, opportunistic infection and/or bleomycin toxicity from chemo - fob did not reveal infectious source , no pcp and no cancer.    · Coronavirus positive RVP  · NEWLY DISCOVERED BILATERAL Pulmonary Emboli WITH NEG DUPLEX OF LEG,  cta on 3/6 does not  Show any residual clot. - remains on eliquis. · Hodkins lymphoma- tx with 4 cyles of abvd- positive response to chemo  · Pulm htn - pap 60 with nl lvef - repeat echo  On 3/6 showed resolution of pulm htn   · BLE edema  · Anemia-  ? Related to chemo- stable   · Hx of asthma from childhood to age 24       RECOMMENDATIONS:   · O2 - wean as tolerated but anticipate this will be chronic and need home O2; she is clearly on too much to provide at home and she will need rehab as well  · Ct has  been done  And there is extensive residual scarring -     · Antibiotics completed  · Jet nebs   · Oral steroids- very slow taper would keep on same dose until seen in office for 4 week f/u.- she has been on pred 30 mg  Bid  Since 2/13  - will start cellcept an maybe steroids can be weaned soon. · Once over this acute episode will need repeat PFTs as an outpt. - if able to do   · On Eliquis for PE.   · Recommend not to check pulse ox on her fingers due to nail polish- has ear probe- she has an ear probe now   · Started bactrim proplylaxis at end of last week. · I have discussed and reconfirmed plans with pt. Risks and benefits  On these meds changes and plans discussed. · Follow labs on cellept - see order     · Pt would like me to speak with her brother in  Law who is an oncologist about her condition . -  Done  · Par will see prn weekend. Please call if needed            Subjective:     2/28 tired. Little rest. No energy. I think she is clinically depressed. On 6 LPM. No PT yesterday but willing to try today. 2/27 down to 6 LPM but drops sats with any activity. Thermostat not working in room. Very cold and now bundled up under piles of blankets    2/26 head cold sx better? On HFNC. Drops sats quickly. No fever. Short sleeper and not bothered by steroids    2/25 d/w Dr. Kalie Fitzpatrick. still hypoxic after sudden decline end of last week. . No acute distress. Still SOLIZ. More nasal congestion and post nasal drip.    3/4- sorry to  See that she is still here and  Has not progressed as hoped. She says she has some days she is very sob just getting to the br and other days are a little better. Says she is weak  And she looks depressed to me.   3/5 = she expressed frustration  As expected over her condition . She mentioned her brother in law who is an oncologist recommend a  Second opion about her condition and she is considering [facility] .   3/6 - overall no change   3/7 - about the same . I  Discussed test result with pt   3/8- she looks tired and is in chair. She apprears more subdued than  She has all week. Reports landaverde and desat with exertion     3-11-19: No chest pain, no back pain, no abdominal pain. Has been feeling more tired.       MEDS:   Current Facility-Administered Medications   Medication    insulin glargine (LANTUS) injection 20 Units    insulin glargine (LANTUS) injection 52 Units    trimethoprim-sulfamethoxazole (BACTRIM DS, SEPTRA DS) 160-800 mg per tablet 1 Tab    mycophenolate mofetil (CELLCEPT) capsule 500 mg    amitriptyline (ELAVIL) tablet 25 mg    albuterol-ipratropium (DUO-NEB) 2.5 MG-0.5 MG/3 ML    sodium chloride (OCEAN) 0.65 % nasal squeeze bottle 2 Spray    levalbuterol (XOPENEX) nebulizer soln 1.25 mg/3 mL    polyethylene glycol (MIRALAX) packet 17 g    predniSONE (DELTASONE) tablet 30 mg    sodium chloride (NS) flush 5-40 mL    sodium chloride (NS) flush 5-40 mL    apixaban (ELIQUIS) tablet 5 mg    benzonatate (TESSALON) capsule 200 mg    lactobac ac& pc-s.therm-b.anim (JUNI Q/RISAQUAD)    acetaminophen (TYLENOL) tablet 650 mg    ondansetron (ZOFRAN) injection 4 mg    amLODIPine (NORVASC) tablet 2.5 mg    atorvastatin (LIPITOR) tablet 40 mg    fluticasone (FLONASE) 50 mcg/actuation nasal spray 2 Spray    metoprolol succinate (TOPROL-XL) XL tablet 50 mg    glucose chewable tablet 16 g    dextrose (D50W) injection syrg 12.5-25 g    glucagon (GLUCAGEN) injection 1 mg    nitroglycerin (NITROBID) 2 % ointment 1 Inch        Review of Systems:  A comprehensive review of systems was negative except for that written in the HPI. Objective:   Vital Signs:    Visit Vitals  /74 (BP 1 Location: Right arm, BP Patient Position: At rest)   Pulse 91   Temp 97.8 °F (36.6 °C)   Resp 18   Ht 5' 8\" (1.727 m)   Wt 108.2 kg (238 lb 8.6 oz)   SpO2 96%   BMI 36.27 kg/m²       O2 Device: Hi flow nasal cannula   O2 Flow Rate (L/min): 6 l/min   Temp (24hrs), Av °F (36.7 °C), Min:97.7 °F (36.5 °C), Max:98.3 °F (36.8 °C)       Intake/Output:   Last shift:       0701 - 1900  In: 240 [P.O.:240]  Out: -   Last 3 shifts:  1901 -  0700  In: 830 [P.O.:830]  Out: 1900 [Urine:1350]    Intake/Output Summary (Last 24 hours) at 3/11/2019 0957  Last data filed at 3/11/2019 0810  Gross per 24 hour   Intake 580 ml   Output 1100 ml   Net -520 ml      Physical Exam:   General:  Alert, cooperative, no distress, appears stated age. ON NC oxygen. Sitting up in chair. Sat 95 % on 6  6 liters  Conversant. Appears tired. Head:  Normocephalic, without obvious abnormality, atraumatic. alopecia   Eyes:  Conjunctivae/corneas clear. PERRL, EOMs intact. Nose: Nares normal. Septum midline. Mucosa normal. No drainage or sinus tenderness. Throat: Lips, mucosa, and tongue normal. Teeth and gums normal.- no thrush    Neck: Supple, symmetrical, trachea midline, no adenopathy, thyroid:   Back:   Symmetric, no curvature. ROM normal.-  Did not re -examined today in the chair    Lungs:   Good air movement. rale in bases bilaterally . Chest wall:  No tenderness or deformity. Heart:  Regular rate and rhythm, S1, S2 normal, no murmur, click, rub or gallop.- mild tachy    Abdomen:   Soft, non-tender. Bowel sounds normal. No masses,  No organomegaly. Extremities: Extremities normal, atraumatic, no cyanosis - edema gone    Pulses: 2+ and symmetric all extremities.    Skin: Skin color, texture, turgor normal. No rashes or lesions Lymph nodes: Cervical, supraclavicular, and axillary nodes normal.   Neurologic: Grossly nonfocal, motor is intact. Psych: no over anxiety or depression. Data review:             Labs:    Recent Labs     03/11/19 0236   WBC 11.3*   HGB 10.7*        Recent Labs     03/11/19 0236 03/09/19  0452   * 134*   K 4.1 4.2    101   CO2 27 28   * 209*   BUN 23* 23*   CREA 0.81 0.80   CA 8.7 9.0   ALB 2.8*  --    SGOT 16  --    ALT 46  --      No results for input(s): PH, PCO2, PO2, HCO3, FIO2 in the last 72 hours. No results for input(s): CPK, CKNDX, TROIQ in the last 72 hours.     No lab exists for component: CPKMB  No results found for: BNPP, BNP       Imaging:  I have personally reviewed the patients radiographs and have reviewed the reports:ct seen        Tang Jean-Baptiste MD

## 2019-03-11 NOTE — PROGRESS NOTES
Problem: Self Care Deficits Care Plan (Adult)  Goal: *Acute Goals and Plan of Care (Insert Text)  Occupational Therapy Goals:  Weekly Re-assessment 3/11/19- goals modified  1. Patient will perform one grooming task in standing with modified independence within 7 days. 2. Patient will perform toileting with modified independence within 7 days. 3. Patient will perform upper body dressing and lower body dressing with modified independence within 7 days. 4. Patient will perform bathing with supervision within 7 days. 5. Patient will transfer from toilet with supervision using the least restrictive device and appropriate durable medical equipment within 7 days. Weekly Re-assessment 2/25/2019 and 3/4/2019  All goals remain appropriate  Initiated 2/15/2019  1. Patient will perform grooming standing with modified independence within 7 days. 3/11 DOWNGRADED  2. Patient will perform toileting with modified independence within 7 days. 3/11 CONT  3. Patient will perform upper body dressing and lower body dressing with modified independence within 7 days. 3/11 CONT  4. Patient will perform bathing with modified independence within 7 days. 3/11 DOWNGRADE  5. Patient will correctly use PLB technique during functional tasks for improved independence with ADLS within 7 days. 3/11- met  6. Patient will transfer from toilet with modified independence using the least restrictive device and appropriate durable medical equipment within 7 days. DOWNGRADE 3/11           Occupational Therapy TREATMENT: WEEKLY REASSESSMENT  Patient: Olivier Lyon (80 y.o. female)  Date: 3/11/2019  Diagnosis: Acute respiratory failure (HCC) <principal problem not specified>  Procedure(s) (LRB):  BRONCHOSCOPY (N/A)  BRONCHIAL WASHINGS FLEXIBLE (N/A) 33 Days Post-Op  Precautions:  Fall  Chart, occupational therapy assessment, plan of care, and goals were reviewed. ASSESSMENT:  Nursing cleared for therapy.   Received in chair, agreeable to therapy. Patient is limited by poor pulmonary reserve and act tolerance which limited her balance and mobility all necessary in ADL tasks. Received on 7 L hi flow O2. SpO2 >90%, HR 91 at rest.  Additional time needed to prepare for mobility with erect sitting and additional sit <> stand. Patient denies need for toileting at this time. [place] [facility] [place]ting and grooming tasks. Brief amb in room with CGA-SBA at RW level elevated HR to 150s and SpO2 decrease to 72%. Increased supplemental O2 to 11L hi flow with nurse present. Needing over 5 mins to recover >90% with supplemental O2 slowly returned to 7L. Demo excellent PLB and reports use of mediation to assist.  Left up in chair. Recommend IP pulm rehab once medically stable. Goals have been updated to specific standing grooming goal as patient with limited functional mobility, and downgrade toileting goals to supervision. Patient with good participation in OT services however is limited with pulmonary function which requires slow pace, additional rest breaks, and limits her standing tolerance. Recommend with nursing patient to complete as able in order to maintain strength, endurance and independence: ADLs with supervision/setup, OOB to chair 3x/day and mobilizing to the bathroom for toileting with RW CGA-supervision with monitoring SpO2 assist. Thank you for your assistance. Progression toward goals:  []            Improving appropriately and progressing toward goals  [x]            Improving slowly and progressing toward goals  []            Not making progress toward goals and plan of care will be adjusted     PLAN:  Goals have been updated based on progression since last assessment. Patient continues to benefit from skilled intervention to address the above impairments. Continue to follow patient 3 times a week to address goals.   Planned Interventions:  [x]                    Self Care Training                  [x] Therapeutic Activities  [x]                    Functional Mobility Training    []             Cognitive Retraining  [x]                    Therapeutic Exercises           [x]             Endurance Activities  [x]                    Balance Training                   []             Neuromuscular Re-Education  []                    Visual/Perceptual Training     [x]        Home Safety Training  [x]                    Patient Education                 [x]             Family Training/Education  []                    Other (comment):  Discharge Recommendations: Inpatient Rehab  Further Equipment Recommendations for Discharge: TBD IPR     SUBJECTIVE:   Patient stated I can do the best I can.     OBJECTIVE DATA SUMMARY:   Cognitive/Behavioral Status:  Neurologic State: Alert  Orientation Level: Oriented X4  Cognition: Appropriate decision making; Appropriate for age attention/concentration; Appropriate safety awareness; Follows commands             Functional Mobility and Transfers for ADLs:  Bed Mobility:  Scooting: Supervision    Transfers:  Sit to Stand: Stand-by assistance; Additional time           Balance:  Sitting: Intact; Without support  Standing: Intact; With support  Standing - Static: Good;Constant support  Standing - Dynamic : Good(with RW support)    ADL Intervention:     Patient instructed and indicated understanding the benefits of maintaining activity tolerance, functional mobility, and independence with self care tasks during acute stay  to ensure safe return home and to baseline. Encouraged patient to increase frequency and duration OOB, be out of bed for all meals, perform daily ADLs (as approved by RN/MD regarding bathing etc), and performing functional mobility to/from bathroom. Provided education on energy conservation techniques in regards to ADL/IADL tasks.  Discussion with examples of pacing, planning, completion of heavy activity during strongest part of day, seated vs standing, and asking for assistance as needed. Patient with good participation in discussion and fair understanding. Additional time prior to activity. Amb [place] [facility] [place] with CGA in room necessary in toileting and bathroom tasks. Noted increased RR, HR and decrease Spo2 with brief activity. Nursing present, increasing supplemental O2 for recovery.         Pain:  Pain Scale 1: Numeric (0 - 10)  Pain Intensity 1: 0        Activity Tolerance:   See assessment section for HR and SpO2    After treatment:   [x] Patient left in no apparent distress sitting up in chair  [] Patient left in no apparent distress in bed  [x] Call bell left within reach  [x] Nursing notified  [] Caregiver present  [x] Bed alarm activated    COMMUNICATION/COLLABORATION:   The patients plan of care was discussed with: Physical Therapist and Registered Nurse    Sherie You OT  Time Calculation: 26 mins

## 2019-03-11 NOTE — PROGRESS NOTES
General Daily Progress Note    Admit Date: 2/3/2019    Subjective:     Patient has no complaint .     Current Facility-Administered Medications   Medication Dose Route Frequency    insulin glargine (LANTUS) injection 52 Units  52 Units SubCUTAneous DAILY    trimethoprim-sulfamethoxazole (BACTRIM DS, SEPTRA DS) 160-800 mg per tablet 1 Tab  1 Tab Oral Q MON, WED & FRI    mycophenolate mofetil (CELLCEPT) capsule 500 mg  500 mg Oral ACB&D    amitriptyline (ELAVIL) tablet 25 mg  25 mg Oral QHS    albuterol-ipratropium (DUO-NEB) 2.5 MG-0.5 MG/3 ML  3 mL Nebulization Q4H PRN    sodium chloride (OCEAN) 0.65 % nasal squeeze bottle 2 Spray  2 Spray Both Nostrils Q2H PRN    insulin glargine (LANTUS) injection 16 Units  16 Units SubCUTAneous QHS    levalbuterol (XOPENEX) nebulizer soln 1.25 mg/3 mL  1.25 mg Nebulization Q4H PRN    polyethylene glycol (MIRALAX) packet 17 g  17 g Oral DAILY PRN    predniSONE (DELTASONE) tablet 30 mg  30 mg Oral BID WITH MEALS    sodium chloride (NS) flush 5-40 mL  5-40 mL IntraVENous PRN    sodium chloride (NS) flush 5-40 mL  5-40 mL IntraVENous Q8H    apixaban (ELIQUIS) tablet 5 mg  5 mg Oral Q12H    benzonatate (TESSALON) capsule 200 mg  200 mg Oral TID PRN    lactobac ac& pc-s.therm-b.anim (JUNI Q/RISAQUAD)  1 Cap Oral DAILY    acetaminophen (TYLENOL) tablet 650 mg  650 mg Oral Q6H PRN    ondansetron (ZOFRAN) injection 4 mg  4 mg IntraVENous Q4H PRN    amLODIPine (NORVASC) tablet 2.5 mg  2.5 mg Oral DAILY    atorvastatin (LIPITOR) tablet 40 mg  40 mg Oral QHS    fluticasone (FLONASE) 50 mcg/actuation nasal spray 2 Spray  2 Spray Both Nostrils DAILY    metoprolol succinate (TOPROL-XL) XL tablet 50 mg  50 mg Oral DAILY    glucose chewable tablet 16 g  4 Tab Oral PRN    dextrose (D50W) injection syrg 12.5-25 g  12.5-25 g IntraVENous PRN    glucagon (GLUCAGEN) injection 1 mg  1 mg IntraMUSCular PRN    nitroglycerin (NITROBID) 2 % ointment 1 Inch  1 Inch Topical Q6H PRN Review of Systems  A comprehensive review of systems was negative.     Objective:     Patient Vitals for the past 24 hrs:   BP Temp Pulse Resp SpO2   03/11/19 0721 124/74 97.8 °F (36.6 °C) 91 18 96 %   03/11/19 0247 130/71 97.9 °F (36.6 °C) 90 20 98 %   03/10/19 2309 113/63 98.3 °F (36.8 °C) (!) 102 18 97 %   03/10/19 1940 124/69 97.8 °F (36.6 °C) 91 20 97 %   03/10/19 1537 109/64 98.3 °F (36.8 °C) 84 20 95 %   03/10/19 1039 112/60 97.7 °F (36.5 °C) 81 22 100 %     03/11 0701 - 03/11 1900  In: 240 [P.O.:240]  Out: -   03/09 1901 - 03/11 0700  In: 830 [P.O.:830]  Out: 1900 [Urine:1350]    Physical Exam:   Visit Vitals  /74 (BP 1 Location: Right arm, BP Patient Position: At rest)   Pulse 91   Temp 97.8 °F (36.6 °C)   Resp 18   Ht 5' 8\" (1.727 m)   Wt 238 lb 8.6 oz (108.2 kg)   SpO2 96%   BMI 36.27 kg/m²     General appearance: alert, cooperative, no distress, appears stated age  Neck: supple, symmetrical, trachea midline, no adenopathy, thyroid: not enlarged, symmetric, no tenderness/mass/nodules, no carotid bruit and no JVD  Lungs: rales R base, L base, diminished breath sounds R base, L base  Heart: regular rate and rhythm, S1, S2 normal, no murmur, click, rub or gallop  Extremities: extremities normal, atraumatic, no cyanosis or edema        Data Review   Recent Results (from the past 24 hour(s))   GLUCOSE, POC    Collection Time: 03/10/19 11:22 AM   Result Value Ref Range    Glucose (POC) 222 (H) 65 - 100 mg/dL    Performed by BRAULIO LUONG)    GLUCOSE, POC    Collection Time: 03/10/19  4:29 PM   Result Value Ref Range    Glucose (POC) 198 (H) 65 - 100 mg/dL    Performed by Hugo Pride  (PCT)    GLUCOSE, POC    Collection Time: 03/10/19  8:41 PM   Result Value Ref Range    Glucose (POC) 264 (H) 65 - 100 mg/dL    Performed by Sheila Dobson (PCT)    CBC WITH MANUAL DIFF    Collection Time: 03/11/19  2:36 AM   Result Value Ref Range    WBC 11.3 (H) 3.6 - 11.0 K/uL    RBC 3.57 (L) 3.80 - 5.20 M/uL HGB 10.7 (L) 11.5 - 16.0 g/dL    HCT 32.5 (L) 35.0 - 47.0 %    MCV 91.0 80.0 - 99.0 FL    MCH 30.0 26.0 - 34.0 PG    MCHC 32.9 30.0 - 36.5 g/dL    RDW 16.7 (H) 11.5 - 14.5 %    PLATELET 907 773 - 904 K/uL    MPV 10.3 8.9 - 12.9 FL    NRBC 0.0 0  WBC    ABSOLUTE NRBC 0.00 0.00 - 0.01 K/uL    NEUTROPHILS 88 (H) 32 - 75 %    BAND NEUTROPHILS 0 0 - 6 %    LYMPHOCYTES 10 (L) 12 - 49 %    MONOCYTES 2 (L) 5 - 13 %    EOSINOPHILS 0 0 - 7 %    BASOPHILS 0 0 - 1 %    METAMYELOCYTES 0 0 %    MYELOCYTES 0 0 %    PROMYELOCYTES 0 0 %    BLASTS 0 0 %    OTHER CELL 0 0      IMMATURE GRANULOCYTES 0 %    ABS. NEUTROPHILS 10.0 (H) 1.8 - 8.0 K/UL    ABS. LYMPHOCYTES 1.1 0.8 - 3.5 K/UL    ABS. MONOCYTES 0.2 0.0 - 1.0 K/UL    ABS. EOSINOPHILS 0.0 0.0 - 0.4 K/UL    ABS. BASOPHILS 0.0 0.0 - 0.1 K/UL    ABS. IMM. GRANS. 0.0 K/UL    DF MANUAL      RBC COMMENTS NORMOCYTIC, NORMOCHROMIC     METABOLIC PANEL, COMPREHENSIVE    Collection Time: 03/11/19  2:36 AM   Result Value Ref Range    Sodium 135 (L) 136 - 145 mmol/L    Potassium 4.1 3.5 - 5.1 mmol/L    Chloride 101 97 - 108 mmol/L    CO2 27 21 - 32 mmol/L    Anion gap 7 5 - 15 mmol/L    Glucose 228 (H) 65 - 100 mg/dL    BUN 23 (H) 6 - 20 MG/DL    Creatinine 0.81 0.55 - 1.02 MG/DL    BUN/Creatinine ratio 28 (H) 12 - 20      GFR est AA >60 >60 ml/min/1.73m2    GFR est non-AA >60 >60 ml/min/1.73m2    Calcium 8.7 8.5 - 10.1 MG/DL    Bilirubin, total 0.2 0.2 - 1.0 MG/DL    ALT (SGPT) 46 12 - 78 U/L    AST (SGOT) 16 15 - 37 U/L    Alk.  phosphatase 84 45 - 117 U/L    Protein, total 6.7 6.4 - 8.2 g/dL    Albumin 2.8 (L) 3.5 - 5.0 g/dL    Globulin 3.9 2.0 - 4.0 g/dL    A-G Ratio 0.7 (L) 1.1 - 2.2     GLUCOSE, POC    Collection Time: 03/11/19  6:54 AM   Result Value Ref Range    Glucose (POC) 185 (H) 65 - 100 mg/dL    Performed by Leslie Melvin (PCT)            Assessment:     Active Problems:    SVT (supraventricular tachycardia) ([facility] (8/29/2016)      Essential hypertension with goal blood pressure less than 140/90 (8/29/2016)      Diabetes mellitus type 2, controlled ([facility] (9/8/2016)      Dyslipidemia (9/8/2016)      [facility] ([facility] (2/3/2019)      Leg swelling (2/3/2019)      Hodgkin lymphoma ([facility] (2/3/2019)      CAP (community acquired pneumonia) (2/3/2019)        Plan:     1. Pulmonary status unchanged. CellCept and steroids continue. 2.  Disposition pending insurance company OK. 3.  Continue diabetic control.

## 2019-03-12 NOTE — PROGRESS NOTES
Bedside shift change report given to SUSANNAH Guillen (oncoming nurse). Report included the following information SBAR, Kardex, MAR and Recent Results. SHIFT SUMMARY:        CONCERNS TO ADDRESS WITH MD:        [address] NURSING NOTE   Admission Date 2/3/2019   Admission Diagnosis Acute respiratory failure ([address]   Consults IP CONSULT TO HEMATOLOGY  IP CONSULT TO PRIMARY CARE PROVIDER  IP CONSULT TO PULMONOLOGY      Cardiac Monitoring [x] Yes [] No      Purposeful Hourly Rounding [x] Yes    Abdulaziz Score Total Score: 3   Abdulaziz score 3 or > [x] Bed Alarm [] Avasys [] 1:1 sitter [] Patient refused (Signed refusal form in chart)   Dwayne Score Dwayne Score: 18   Dwayne score 14 or < [] PMT consult [] Wound Care consult    []  Specialty bed  [] Nutrition consult      Influenza Vaccine Received Flu Vaccine for Current Season (usually Sept-March): No    Patient/Guardian Refused (Notify MD): Yes      Oxygen needs? [] Room air Oxygen @  []1L    []2L    []3L   []4L    [x]5L   []6L via  NC   Chronic home O2 use?  [] Yes [] No  Perform O2 challenge test and document in progress note using smartphrase (.Homeoxygen)      Last bowel movement Last Bowel Movement Date: 03/08/19      Urinary Catheter       [REMOVED] External Female Catheter 02/04/19-Urine Output (mL): 400 ml     LDAs             Venous Access Device portacath  09/12/18 Upper chest (subclavicular area), left (Active)      Peripheral IV 03/03/19 Right Hand (Active)   Site Assessment Clean, dry, & intact 3/12/2019  2:25 PM   Phlebitis Assessment 0 3/12/2019  2:25 PM   Infiltration Assessment 0 3/12/2019  2:25 PM   Dressing Status Clean, dry, & intact 3/12/2019  2:25 PM   Dressing Type Transparent 3/12/2019  2:25 PM   Hub Color/Line Status Flushed 3/12/2019  2:25 PM   Alcohol Cap Used Yes 3/3/2019  8:30 PM                         Readmission Risk Assessment Tool Score Medium Risk            18       Total Score        3 Has Seen PCP in Last 6 Months (Yes=3, No=0)    3 Patient Length of Stay (>5 days = 3)    5 Pt. Coverage (Medicare=5 , Medicaid, or Self-Pay=4)    7 Charlson Comorbidity Score (Age + Comorbid Conditions)        Criteria that do not apply:    . Living with Significant Other. Assisted Living. LTAC. SNF.  or   Rehab    IP Visits Last 12 Months (1-3=4, 4=9, >4=11)       Expected Length of Stay 3d 12h   Actual Length of Stay 37

## 2019-03-12 NOTE — PROGRESS NOTES
Problem: Falls - Risk of  Goal: *Absence of Falls  Document Abdulaziz Fall Risk and appropriate interventions in the flowsheet.   Outcome: Progressing Towards Goal  Fall Risk Interventions:  Mobility Interventions: Bed/chair exit alarm, Patient to call before getting OOB, PT Consult for mobility concerns, PT Consult for assist device competence    Mentation Interventions: Adequate sleep, hydration, pain control, Bed/chair exit alarm, Room close to nurse's station    Medication Interventions: Patient to call before getting OOB    Elimination Interventions: Bed/chair exit alarm, Call light in reach, Patient to call for help with toileting needs    History of Falls Interventions: Bed/chair exit alarm, Room close to nurse's station

## 2019-03-12 NOTE — ADT AUTH CERT NOTES
Utilization Reviews        Pulmonary Disease [facility] - Care Day 36 (3/10/2019) by Chan Dior RN        Review Status Review Entered   Completed 3/11/2019 17:27      Criteria Review      Care Day: 36 Care Date: 3/10/2019 Level of Care: Telemetry   Guideline Day 3    Level Of Care   ( ) * Activity level acceptable   3/11/2019 17:27:19 EDT by Demetrice Bolanos     ACTIVITY AS SPECIFIED      (X) * Isolation not needed, or status acceptable   ( ) Floor to discharge   3/11/2019 17:27:19 EDT by Demetrice Bolanso     Telemetry         Clinical Status   ( ) * Respiratory status acceptable   3/11/2019 17:27:19 EDT by Demetrice Bolanos     Nasal cannula 6 l/min      ( ) * Right heart function adequate   (X) * Temperature status acceptable   (X) * No infection, or status acceptable   3/11/2019 17:27:19 EDT by Demetrice Bolanos     NO ADDITIONAL LABS AVAILABLE FOR 03/10/2019 REVIEW AT THIS TIME. ( ) * Stable chest findings   (X) * Pain and nausea absent or adequately managed   ( ) * General Discharge Criteria met      Interventions   (X) * No chest tube, or status acceptable   (X) * Intake acceptable   3/11/2019 17:27:19 EDT by Demetrice Bolanos     DIET DIABETIC CONSISTENT CARB      ( ) * No inpatient interventions needed      3/11/2019 17:27:19 EDT by Demetrice Bolanos   Subject: Additional Clinical Information   MEDS: HOME MEDS; CELLCEPT PO BID         3/11/2019 17:27:19 EDT by Demetrice Bolanos   Subject: Additional Clinical Information   Patient Vitals for the past 24 hrs:  UGBilrUukxuTimmPwR566/10/19 4377678/6097.7  °F (36.5  °Y)0124693 %03/10/19 0076033/9780.3  °F (36.6  °V)723066 %03/10/19 6797842/0164.3  °F (36.8  °C)5359143 %         3/11/2019 17:27:19 EDT by Demetrice Bolanos   Subject: Additional Clinical Information   INTERNAL MED:    1.   Pulmonary status about the same although O2 requirements have decreased.   Continue CellCept and hopefully reduction in inflammation will occur. 2.   Possible discharge tomorrow to rehab. 3.   Continue diabetic control. * Milestone         Pulmonary Disease [facility] - Care Day 35 (3/9/2019) by Nikki Borges RN        Review Status Review Entered   Completed 3/11/2019 17:21      Criteria Review      Care Day: 35 Care Date: 3/9/2019 Level of Care: Telemetry   Guideline Day 3    Level Of Care   ( ) * Activity level acceptable   3/11/2019 17:21:29 EDT by Julia Norman     ACTIVITY AS SPECIFIED      (X) * Isolation not needed, or status acceptable   ( ) Floor to discharge   3/11/2019 17:21:29 EDT by [facility]     Telemetry         Clinical Status   ( ) * Respiratory status acceptable   3/11/2019 17:21:29 EDT by Julia Norman     6l 02nc      ( ) * Right heart function adequate   (X) * Temperature status acceptable   3/11/2019 17:21:29 EDT by Julia Norman     afebrile      (X) * No infection, or status acceptable   ( ) * Stable chest findings   3/11/2019 17:21:29 EDT by Dayna Menendez Patient c/o dyspnea      (X) * Pain and nausea absent or adequately managed   3/11/2019 17:21:29 EDT by Julia Emerson     Patient c/o dyspnea      ( ) * General Discharge Criteria met      Interventions   (X) * No chest tube, or status acceptable   (X) * Intake acceptable   3/11/2019 17:21:29 EDT by [facility]     DIET DIABETIC CONSISTENT CARB      ( ) * No inpatient interventions needed      3/11/2019 17:21:29 EDT by Julia Emerson   Subject: Additional Clinical Information   Patient Vitals for the past 24 hrs:  VSMhqrIlwpxGnivCqV767/09/19 0729ââââ94 %03/09/19 0456174/4074.4  °F (36.4  °Y)098681 %03/09/19 0336986/5547.9  °F (36.6  °N)4034568 %         3/11/2019 17:21:29 EDT by [facility]   Subject: Additional Clinical Information   INTERNAL MED:    1.   Patient now on immunosuppressive agent and prednisone.   No adverse effects. 2.   Pulmonary status unchanged. 3.   Continue diabetic control. 4.   Possible discharge on Monday.             3/11/2019 17:21:29 EDT by Julia Emerson   Subject: Additional Clinical Information   MEDS: CELLCEPT PO BID; HOME MEDS                  * Milestone         Pulmonary Disease [facility] - Care Day 34 (3/8/2019) by Petar Burrell        Review Status Review Entered   Completed 3/8/2019 15:23      Criteria Review      Care Day: 34 Care Date: 3/8/2019 Level of Care: Telemetry   Guideline Day 3    Level Of Care   ( ) * Activity level acceptable   (X) * Isolation not needed, or status acceptable   3/8/2019 15:23:27 EST by [facility]     none noted         Clinical Status   ( ) * Respiratory status acceptable   (X) * Right heart function adequate   3/8/2019 15:23:27 EST by [email or web address]     Heart: regular rate and rhythm, S1, S2 normal, no murmur, click, rub or gallop      (X) * Temperature status acceptable   3/8/2019 15:23:27 EST by Nuria [facility]     98.2      (X) * No infection, or status acceptable   3/8/2019 15:23:27 EST by [email or web address]     no abnormalities noted      ( ) * Stable chest findings   (X) * Pain and nausea absent or adequately managed   3/8/2019 15:23:27 EST by [email or web address]     none noted      ( ) * General Discharge Criteria met      Interventions   (X) * No chest tube, or status acceptable   3/8/2019 15:23:27 EST by [email or web address]     none noted      (X) * Intake acceptable   3/8/2019 15:23:27 EST by [email or web address]     diet diabetic diet      ( ) * No inpatient interventions needed      3/8/2019 15:23:27 EST by [email or web address]   Subject: Additional Clinical Information   Abnormal labs-hgb 9.7, hct 29.3,          3/8/2019 15:23:27 EST by [email or web address]   Subject: Additional Clinical Information   3/8/19   Vitals-130/62, 62, 98.2, 18, 90% 5L high flow nasal cannula   Patient continues to complain of exertional dyspnea   General appearance: alert, cooperative, no distress, appears stated ageNeck: supple, symmetrical, trachea midline, no adenopathy, thyroid: not enlarged, symmetric, no tenderness/mass/nodules, no carotid bruit and no JVDLungs: rales R base, L base, diminished breath sounds R base, L baseHeart: regular rate and rhythm, S1, S2 normal, no murmur, click, rub or gallopAbdomen: soft, non-tender. Bowel sounds normal. No masses,   no organomegalyExtremities: extremities normal, atraumatic, no cyanosis or edema1.  Note made of comments by pulmonary with potential intervention with an immunosuppressive agent. 2.   Continue diabetic control. 3.   Exertional tolerance remains poor            3/8/2019 15:23:27 EST by Wilbert Souza   Subject: Additional Clinical Information   Plan-PT/OT, vital signs per unit protocol, pulse ox spot check, POC glucose 4x day, wound care dressing change daily,                   * Milestone      Additional Notes   3/8/19 Pulmonary Note   IMPRESSION:   · Acute hypoxic resp failure - would hope that oxygenation improve some with anticoagulation but am afraid the Bleo toxicity will not regress despite steroids and time; of course only time will tell. 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 He rec cellcept 500 mg bid could be tried. As it is an immunosuppresive I have spoken with Dr. Vernon Stoll her oncologist and she is ok with cellecpt.  We needed to know  If she is ok with us trying this first in view of her newly successfully treated cancer - lymphoma. . At pts request I spoke with Dr. Kayleen Sacks her cousin who is an oncologist  On 3/7. Nav Gauthier updated him on her condition and potential plans. Saadia Dsouza is requesting a consult for lung transplantation. I did tell him that would not be an option in view of her cancer . Leonides Ojeda  I  Did happen to have [facility] tranplant on the other line and  [facility]  confimed that other for a mild skin cancer , etc they required at least 2 yrs cancer free and preferably 5 yrs. All was discussed with the pt. · Bilateral pulm infiltrates-  First noted on PET 1/21 and seen on ct on admit - tx with augmentin 1/25-1/30, then Levaquin - Preceding fever 101. S/P bronch 2/6. DDX: PNA, opportunistic infection and/or bleomycin toxicity from chemo - fob did not reveal infectious source , no pcp and no cancer. · Coronavirus positive RVP   · NEWLY DISCOVERED BILATERAL Pulmonary Emboli WITH NEG DUPLEX OF LEG,  cta on 3/6 does not  Show any residual clot. - remains on eliquis. · Hodkins lymphoma- tx with 4 cyles of abvd- positive response to chemo   · Pulm htn - pap 60 with nl lvef - repeat echo  On 3/6 showed resolution of pulm htn    · BLE edema   · Anemia-  ? Related to chemo- stable    · Hx of asthma from childhood to age 24        RECOMMENDATIONS:   · O2 - wean as tolerated but anticipate this will be chronic and need home O2; she is clearly on too much to provide at home and she will need rehab as well   · Ct has  been done  And there is extensive residual scarring -      · Antibiotics completed   · Jet nebs    · Oral steroids- very slow taper would keep on same dose until seen in office for 4 week f/u.- she has been on pred 30 mg  Bid  Since 2/13  - will start cellcept an maybe steroids can be weaned soon. · Once over this acute episode will need repeat PFTs as an outpt. - if able to do    · On Eliquis for PE.    · Recommend not to check pulse ox on her fingers due to nail polish- has ear probe- she has an ear probe now    · Started bactrim proplylaxis  Today.     · I have discussed and reconfirmed plans with pt. Risks and benefits  On these meds changes and plans discussed. · Follow labs on cellept - see order        · Pt would like me to speak with her brother in  Law who is an oncologist about her condition . -  Done   · Par will see prn weekend.  Please call if needed         3/8- she looks tired and is in chair.  She apprears more subdued than  She has all week. Reports landaverde and desat with exertion

## 2019-03-12 NOTE — PHYSICIAN ADVISORY
Peer to peer review setup for Dr. Maria Victoria Denny Sa   2:00 PM 3/12/2019       Miguel Jean-Baptiste MD MPH FACP     Physician Aranza    [address] [facility]  [phone]      [phone]

## 2019-03-12 NOTE — PROGRESS NOTES
CM spoke with Jaquelin Acevedo with [facility] who informed CM that pt has been accepted for admission but is still waiting to be authorized by insurance. Jaquelin Acevedo will call CM when they have auth. Pt will need to have [facility]AL[facility] transport when ready to d/c. Maya Mejia [facility]  Care Manager  [phone]    UPDATE 2:30 PM  Authorization approved for rehab. Plan for transfer to [facility] tomorrow by [facility]. [facility] setup for 10:00AM tomorrow. Accepting provider will be Dr. Clarissa Dykes. Number for RN to call report is [phone].

## 2019-03-12 NOTE — PROGRESS NOTES
[facility]  Pulmonary, Critical Care, and Sleep Medicine    Progress Note     Name: Tiana Rice MRN: [id number]   : 1950 Hospital: [facility]   Date: 3/12/2019        IMPRESSION:   · Acute hypoxic resp failure - would hope that oxygenation improve some with anticoagulation but am afraid the Bleo toxicity will not regress despite steroids and time; of course only time will tell. 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 He rec cellcept 500 mg bid could be tried. As it is an immunosuppresive I have spoken with Dr. Kwong Bring her oncologist and she is ok with cellecpt. We needed to know  If she is ok with us trying this first in view of her newly successfully treated cancer - lymphoma. . At pts request I spoke with Dr. Brit Retana her cousin who is an oncologist  On 3/7. Yaneth Brunner I updated him on her condition and potential plans. He is requesting a consult for lung transplantation. I did tell him that would not be an option in view of her cancer . . I  Did happen to have [facility] tranplant on the other line and  [facility]  confimed that other for a mild skin cancer , etc they required at least 2 yrs cancer free and preferably 5 yrs. All was discussed with the pt. · Bilateral pulm infiltrates-  First noted on PET  and seen on ct on admit - tx with augmentin -, then Levaquin - Preceding fever 101. S/P bronch . DDX: PNA, opportunistic infection and/or bleomycin toxicity from chemo - fob did not reveal infectious source , no pcp and no cancer.    · Coronavirus positive RVP  · NEWLY DISCOVERED BILATERAL Pulmonary Emboli WITH NEG DUPLEX OF LEG,  cta on 3/6 does not  Show any residual clot. - remains on eliquis. · Hodkins lymphoma- tx with 4 cyles of abvd- positive response to chemo  · Pulm htn - pap 60 with nl lvef - repeat echo  On 3/6 showed resolution of pulm htn   · BLE edema  · Anemia-  ? Related to chemo- stable   · Hx of asthma from childhood to age 24       RECOMMENDATIONS:   · Has been participating with PT /OT for very limited reserve, deconditioning. · O2 - wean as tolerated but anticipate this will be chronic and need home O2; she is clearly on too much to provide at home and she will need rehab as well  · Ct has  been done  And there is extensive residual scarring -     · Antibiotics completed  · Jet nebs   · Oral steroids- very slow taper would keep on same dose until seen in office for 4 week f/u.- she has been on pred 30 mg  Bid  Since 2/13  - will start cellcept an maybe steroids can be weaned soon. · Once over this acute episode will need repeat PFTs as an outpt. - if able to do   · On Eliquis for PE.   · Recommend not to check pulse ox on her fingers due to nail polish- has ear probe- she has an ear probe now   · Started bactrim proplylaxis at end of last week. · I have discussed and reconfirmed plans with pt. Risks and benefits  On these meds changes and plans discussed. · Follow labs on cellept - see order          Subjective:     2/28 tired. Little rest. No energy. I think she is clinically depressed. On 6 LPM. No PT yesterday but willing to try today. 2/27 down to 6 LPM but drops sats with any activity. Thermostat not working in room. Very cold and now bundled up under piles of blankets    2/26 head cold sx better? On HFNC. Drops sats quickly. No fever. Short sleeper and not bothered by steroids    2/25 d/w Dr. Ansley Ledbetter. still hypoxic after sudden decline end of last week. . No acute distress. Still SOLIZ. More nasal congestion and post nasal drip. 3/4- sorry to  See that she is still here and  Has not progressed as hoped.   She says she has some days she is very sob just getting to the br and other days are a little better. Says she is weak  And she looks depressed to me.   3/5 = she expressed frustration  As expected over her condition . She mentioned her brother in law who is an oncologist recommend a  Second opion about her condition and she is considering [facility] to [facility] .   3/6 - overall no change   3/7 - about the same . I  Discussed test result with pt   3/8- she looks tired and is in chair. She apprears more subdued than  She has all week. Reports landaverde and desat with exertion     3-11-19: No chest pain, no back pain, no abdominal pain. Has been feeling more tired    3-12-19: Pt still with very limited pulmonary reserve. She is able to tolerate po intake. Does not have much of an appetite. NO chest pain, no back pain. She has been sleeping in the chair. Worked with PT this am and required high flow oxygen.           MEDS:   Current Facility-Administered Medications   Medication    insulin glargine (LANTUS) injection 20 Units    insulin glargine (LANTUS) injection 52 Units    trimethoprim-sulfamethoxazole (BACTRIM DS, SEPTRA DS) 160-800 mg per tablet 1 Tab    mycophenolate mofetil (CELLCEPT) capsule 500 mg    amitriptyline (ELAVIL) tablet 25 mg    albuterol-ipratropium (DUO-NEB) 2.5 MG-0.5 MG/3 ML    sodium chloride (OCEAN) 0.65 % nasal squeeze bottle 2 Spray    levalbuterol (XOPENEX) nebulizer soln 1.25 mg/3 mL    polyethylene glycol (MIRALAX) packet 17 g    predniSONE (DELTASONE) tablet 30 mg    sodium chloride (NS) flush 5-40 mL    sodium chloride (NS) flush 5-40 mL    apixaban (ELIQUIS) tablet 5 mg    benzonatate (TESSALON) capsule 200 mg    lactobac ac& pc-s.therm-b.anim (JUNI Q/RISAQUAD)    acetaminophen (TYLENOL) tablet 650 mg    ondansetron (ZOFRAN) injection 4 mg    amLODIPine (NORVASC) tablet 2.5 mg    atorvastatin (LIPITOR) tablet 40 mg    fluticasone (FLONASE) 50 mcg/actuation nasal spray 2 Spray    metoprolol succinate (TOPROL-XL) XL tablet 50 mg    glucose chewable tablet 16 g    dextrose (D50W) injection syrg 12.5-25 g    glucagon (GLUCAGEN) injection 1 mg    nitroglycerin (NITROBID) 2 % ointment 1 Inch        Review of Systems:  A comprehensive review of systems was negative except for that written in the HPI. Objective:   Vital Signs:    Visit Vitals  /74 (BP 1 Location: Right arm, BP Patient Position: Sitting; Other (comment)) Comment (BP Patient Position): legs elevated   Pulse 76   Temp 97.6 °F (36.4 °C)   Resp 20   Ht 5' 8\" (1.727 m)   Wt 108.2 kg (238 lb 8.6 oz)   SpO2 98%   BMI 36.27 kg/m²       O2 Device: Hi flow nasal cannula   O2 Flow Rate (L/min): 7 l/min   Temp (24hrs), Av.8 °F (36.6 °C), Min:97.6 °F (36.4 °C), Max:98.2 °F (36.8 °C)       Intake/Output:   Last shift:       07 - 1900  In: -   Out: 200 [Urine:200]  Last 3 shifts: 03/10 190 -  0700  In: 560 [P.O.:560]  Out: 850 [Urine:850]    Intake/Output Summary (Last 24 hours) at 3/12/2019 1045  Last data filed at 3/12/2019 0852  Gross per 24 hour   Intake 100 ml   Output 850 ml   Net -750 ml      Physical Exam:   General:  Alert, cooperative, no distress, appears stated age. ON NC oxygen. Sitting up in chair. Sat 95 % on 6  6 liters  Conversant. Appears tired and somewhat flat affect. Head:  Normocephalic, without obvious abnormality, atraumatic. alopecia   Eyes:  Conjunctivae/corneas clear. PERRL, EOMs intact. Nose: Nares normal. Septum midline. Mucosa normal. No drainage or sinus tenderness. Throat: Lips, mucosa, and tongue normal. Teeth and gums normal.- no thrush    Neck: Supple, symmetrical, trachea midline, no adenopathy, thyroid:   Back:   Symmetric, no curvature. ROM normal.-  Did not re -examined today in the chair    Lungs:   Good air movement. rale in bases bilaterally . Seems to be breathing comfortably. Chest wall:  No tenderness or deformity.    Heart:  Regular rate and rhythm, S1, S2 normal, no murmur, click, rub or gallop.- mild tachy    Abdomen:   Soft, non-tender. Bowel sounds normal. No masses,  No organomegaly. Extremities: Extremities normal, atraumatic, no cyanosis - edema gone    Pulses: 2+ and symmetric all extremities. Skin: Skin color, texture, turgor normal. No rashes or lesions   Lymph nodes: Cervical, supraclavicular, and axillary nodes normal.   Neurologic: Grossly nonfocal, motor is intact. Psych: no over anxiety or depression. Data review:             Labs:    Recent Labs     03/11/19  0236   WBC 11.3*   HGB 10.7*        Recent Labs     03/11/19  0236   *   K 4.1      CO2 27   *   BUN 23*   CREA 0.81   CA 8.7   ALB 2.8*   SGOT 16   ALT 46     No results for input(s): PH, PCO2, PO2, HCO3, FIO2 in the last 72 hours. No results for input(s): CPK, CKNDX, TROIQ in the last 72 hours.     No lab exists for component: CPKMB  No results found for: BNPP, BNP       Imaging:  I have personally reviewed the patients radiographs and have reviewed the reports:ct seen        Norma Bowden MD

## 2019-03-13 NOTE — PROGRESS NOTES
General Daily Progress Note    Admit Date: 2/3/2019    Subjective:     Patient complains of exertional dyspnea.     Current Facility-Administered Medications   Medication Dose Route Frequency    insulin glargine (LANTUS) injection 20 Units  20 Units SubCUTAneous QHS    insulin glargine (LANTUS) injection 52 Units  52 Units SubCUTAneous DAILY    trimethoprim-sulfamethoxazole (BACTRIM DS, SEPTRA DS) 160-800 mg per tablet 1 Tab  1 Tab Oral Q MON, WED & FRI    mycophenolate mofetil (CELLCEPT) capsule 500 mg  500 mg Oral ACB&D    amitriptyline (ELAVIL) tablet 25 mg  25 mg Oral QHS    albuterol-ipratropium (DUO-NEB) 2.5 MG-0.5 MG/3 ML  3 mL Nebulization Q4H PRN    sodium chloride (OCEAN) 0.65 % nasal squeeze bottle 2 Spray  2 Spray Both Nostrils Q2H PRN    levalbuterol (XOPENEX) nebulizer soln 1.25 mg/3 mL  1.25 mg Nebulization Q4H PRN    polyethylene glycol (MIRALAX) packet 17 g  17 g Oral DAILY PRN    predniSONE (DELTASONE) tablet 30 mg  30 mg Oral BID WITH MEALS    sodium chloride (NS) flush 5-40 mL  5-40 mL IntraVENous PRN    sodium chloride (NS) flush 5-40 mL  5-40 mL IntraVENous Q8H    apixaban (ELIQUIS) tablet 5 mg  5 mg Oral Q12H    benzonatate (TESSALON) capsule 200 mg  200 mg Oral TID PRN    lactobac ac& pc-s.therm-b.anim (JUNI Q/RISAQUAD)  1 Cap Oral DAILY    acetaminophen (TYLENOL) tablet 650 mg  650 mg Oral Q6H PRN    ondansetron (ZOFRAN) injection 4 mg  4 mg IntraVENous Q4H PRN    amLODIPine (NORVASC) tablet 2.5 mg  2.5 mg Oral DAILY    atorvastatin (LIPITOR) tablet 40 mg  40 mg Oral QHS    fluticasone (FLONASE) 50 mcg/actuation nasal spray 2 Spray  2 Spray Both Nostrils DAILY    metoprolol succinate (TOPROL-XL) XL tablet 50 mg  50 mg Oral DAILY    glucose chewable tablet 16 g  4 Tab Oral PRN    dextrose (D50W) injection syrg 12.5-25 g  12.5-25 g IntraVENous PRN    glucagon (GLUCAGEN) injection 1 mg  1 mg IntraMUSCular PRN    nitroglycerin (NITROBID) 2 % ointment 1 Inch  1 Inch Topical Q6H PRN        Review of Systems  A comprehensive review of systems was negative. Objective:     Patient Vitals for the past 24 hrs:   BP Temp Pulse Resp SpO2   03/12/19 1507 110/60 97.5 °F (36.4 °C) 91 20 95 %   03/12/19 1149 123/80 97.8 °F (36.6 °C) 87 18 98 %   03/12/19 0742 141/74 97.6 °F (36.4 °C) 76 20 98 %   03/12/19 0312 118/75 97.8 °F (36.6 °C) 78 18 100 %   03/12/19 0007 113/87 97.6 °F (36.4 °C) 89 18 92 %     No intake/output data recorded. 03/11 0701 - 03/12 1900  In: 560 [P.O.:560]  Out: 1150 [Urine:1150]    Physical Exam:   Visit Vitals  /60 (BP 1 Location: Right arm, BP Patient Position: Sitting; Other (comment)) Comment (BP Patient Position): legs elevated   Pulse 91   Temp 97.5 °F (36.4 °C)   Resp 20   Ht 5' 8\" (1.727 m)   Wt 238 lb 8.6 oz (108.2 kg)   SpO2 95%   BMI 36.27 kg/m²     General appearance: alert, cooperative, no distress, appears stated age  Neck: supple, symmetrical, trachea midline, no adenopathy, thyroid: not enlarged, symmetric, no tenderness/mass/nodules, no carotid bruit and no JVD  Lungs: rales R base, L base, diminished breath sounds R base, L base  Heart: regular rate and rhythm, S1, S2 normal, no murmur, click, rub or gallop  Abdomen: soft, non-tender.  Bowel sounds normal. No masses,  no organomegaly  Extremities: extremities normal, atraumatic, no cyanosis or edema        Data Review   Recent Results (from the past 24 hour(s))   GLUCOSE, POC    Collection Time: 03/12/19  8:18 AM   Result Value Ref Range    Glucose (POC) 113 (H) 65 - 100 mg/dL    Performed by IRMA MUHAMMAD(RN)    GLUCOSE, POC    Collection Time: 03/12/19 11:53 AM   Result Value Ref Range    Glucose (POC) 104 (H) 65 - 100 mg/dL    Performed by Tristen Mathias  (PCT)    GLUCOSE, POC    Collection Time: 03/12/19  4:52 PM   Result Value Ref Range    Glucose (POC) 251 (H) 65 - 100 mg/dL    Performed by Sharlette Schilder ALEXA(CON)    GLUCOSE, POC    Collection Time: 03/12/19  8:50 PM   Result Value Ref Range Glucose (POC) 311 (H) 65 - 100 mg/dL    Performed by HAO STROUD            Assessment:     Active Problems:    SVT (supraventricular tachycardia) ([address] (8/29/2016)      Essential hypertension with goal blood pressure less than 140/90 (8/29/2016)      Diabetes mellitus type 2, controlled ([address] (9/8/2016)      Dyslipidemia (9/8/2016)      Acute respiratory failure ([address] (2/3/2019)      Leg swelling (2/3/2019)      Hodgkin lymphoma ([facility] (2/3/2019)      CAP (community acquired pneumonia) (2/3/2019)        Plan:     1. Plan discharge tomorrow if patient is accepted in pulmonary rehab from insurance company. 2.  O2 requirements remain high but she feels more comfortable with maintenance of sats. 3.  Continue diabetic control.

## 2019-03-13 NOTE — PROGRESS NOTES
[facility]  Pulmonary, Critical Care, and Sleep Medicine    Progress Note     Name: Lasha Birmingham MRN: [id number]   : 1950 Hospital: [facility]   Date: 3/13/2019        IMPRESSION:   · Acute hypoxic resp failure - would hope that oxygenation improve some with anticoagulation but am afraid the Bleo toxicity will not regress despite steroids and time; of course only time will tell. She desats on o2 with minimal exertion which might  Limit rehab options - does need rehab - - pt says she desats on 5 liters  With exertion and o2 has to be bumped up for this. Agree pt is ready for rehab. 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 He rec cellcept 500 mg bid could be tried. As it is an immunosuppresive I have spoken with Dr. Marylen Meager her oncologist and she is ok with cellecpt. We needed to know  If she is ok with us trying this first in view of her newly successfully treated cancer - lymphoma. . At pts request I spoke with Dr. Anna Geronimo her cousin who is an oncologist  On 3/7. Syd Colon I updated him on her condition and potential plans. He is requesting a consult for lung transplantation. I did tell him that would not be an option in view of her cancer . .  · Bilateral pulm infiltrates-  First noted on PET  and seen on ct on admit - tx with augmentin -, then Levaquin - Preceding fever 101. S/P bronch . DDX: PNA, opportunistic infection and/or bleomycin toxicity from chemo - fob did not reveal infectious source , no pcp and no cancer. · Coronavirus positive RVP  · NEWLY DISCOVERED BILATERAL Pulmonary Emboli WITH NEG DUPLEX OF LEG,  cta on 3/6 does not  Show any residual clot. - remains on eliquis.   · Hodkins lymphoma- tx with 4 cyles of abvd- positive response to chemo  · Pulm htn - pap 60 with nl lvef - repeat echo  On 3/6 showed resolution of pulm htn   · BLE edema  · Anemia-  ? Related to chemo- stable   · Hx of asthma from childhood to age 24       RECOMMENDATIONS:   · Has been participating with PT /OT for very limited reserve, deconditioning. AGree with discharge to Rehab.   · O2 - wean as tolerated but anticipate this will be chronic and need home O2; she is clearly on too much to provide at home and she will need rehab as well  · Ct has  been done  And there is extensive residual scarring -     · Antibiotics completed  · Jet nebs   · Oral steroids- very slow taper would keep on same dose until seen in office for 4 week f/u.- she has been on pred 30 mg  Bid  Since 2/13  - will start cellcept an maybe steroids can be weaned soon. · Once over this acute episode will need repeat PFTs as an outpt. - if able to do   · On Eliquis for PE.   · Recommend not to check pulse ox on her fingers due to nail polish- has ear probe- she has an ear probe now   · Started bactrim proplylaxis at end of last week. · I have discussed and reconfirmed plans with pt. Risks and benefits  On these meds changes and plans discussed. · Follow labs on cellept - see order   · We can seen pt upon discharge from Rehab. · Please call if nay issues. [phone]. Subjective:     2/28 tired. Little rest. No energy. I think she is clinically depressed. On 6 LPM. No PT yesterday but willing to try today. 2/27 down to 6 LPM but drops sats with any activity. Thermostat not working in room. Very cold and now bundled up under piles of blankets    2/26 head cold sx better? On HFNC. Drops sats quickly. No fever. Short sleeper and not bothered by steroids    2/25 d/w Dr. Chrissy Zacarias. still hypoxic after sudden decline end of last week. . No acute distress. Still SOLIZ. More nasal congestion and post nasal drip. 3/4- sorry to  See that she is still here and  Has not progressed as hoped.   She says she has some days she is very sob just getting to the br and other days are a little better. Says she is weak  And she looks depressed to me.   3/5 = she expressed frustration  As expected over her condition . She mentioned her brother in law who is an oncologist recommend a  Second opion about her condition and she is considering [facility] .   3/6 - overall no change   3/7 - about the same . I  Discussed test result with pt   3/8- she looks tired and is in chair. She apprears more subdued than  She has all week. Reports landaverde and desat with exertion     3-11-19: No chest pain, no back pain, no abdominal pain. Has been feeling more tired    3-12-19: Pt still with very limited pulmonary reserve. She is able to tolerate po intake. Does not have much of an appetite. NO chest pain, no back pain. She has been sleeping in the chair. Worked with PT this am and required high flow oxygen. 3-13-19: Pt is ready for discharge to rehab. Feels about the same. Still with moderate dyspnea with any exertion. NO pain. Has a dry cough.           MEDS:   Current Facility-Administered Medications   Medication    insulin glargine (LANTUS) injection 20 Units    insulin glargine (LANTUS) injection 52 Units    trimethoprim-sulfamethoxazole (BACTRIM DS, SEPTRA DS) 160-800 mg per tablet 1 Tab    mycophenolate mofetil (CELLCEPT) capsule 500 mg    amitriptyline (ELAVIL) tablet 25 mg    albuterol-ipratropium (DUO-NEB) 2.5 MG-0.5 MG/3 ML    sodium chloride (OCEAN) 0.65 % nasal squeeze bottle 2 Spray    levalbuterol (XOPENEX) nebulizer soln 1.25 mg/3 mL    polyethylene glycol (MIRALAX) packet 17 g    predniSONE (DELTASONE) tablet 30 mg    sodium chloride (NS) flush 5-40 mL    sodium chloride (NS) flush 5-40 mL    apixaban (ELIQUIS) tablet 5 mg    benzonatate (TESSALON) capsule 200 mg    lactobac ac& pc-s.therm-b.anim (JUNI Q/RISAQUAD)    acetaminophen (TYLENOL) tablet 650 mg    ondansetron (ZOFRAN) injection 4 mg    amLODIPine (NORVASC) tablet 2.5 mg    atorvastatin (LIPITOR) tablet 40 mg    fluticasone (FLONASE) 50 mcg/actuation nasal spray 2 Spray    metoprolol succinate (TOPROL-XL) XL tablet 50 mg    glucose chewable tablet 16 g    dextrose (D50W) injection syrg 12.5-25 g    glucagon (GLUCAGEN) injection 1 mg    nitroglycerin (NITROBID) 2 % ointment 1 Inch        Review of Systems:  A comprehensive review of systems was negative except for that written in the HPI. Objective:   Vital Signs:    Visit Vitals  /77 (BP 1 Location: Right arm, BP Patient Position: Sitting)   Pulse 93   Temp 98.6 °F (37 °C)   Resp 18   Ht 5' 8\" (1.727 m)   Wt 108.2 kg (238 lb 8.6 oz)   SpO2 97%   BMI 36.27 kg/m²       O2 Device: Nasal cannula   O2 Flow Rate (L/min): 6 l/min   Temp (24hrs), Av.8 °F (36.6 °C), Min:97.4 °F (36.3 °C), Max:98.6 °F (37 °C)       Intake/Output:   Last shift:       07 - 1900  In: 240 [P.O.:240]  Out: 100 [Urine:100]  Last 3 shifts:  190 -  0700  In: 340 [P.O.:340]  Out: 1275 [Urine:1275]    Intake/Output Summary (Last 24 hours) at 3/13/2019 1222  Last data filed at 3/13/2019 0859  Gross per 24 hour   Intake 460 ml   Output 925 ml   Net -465 ml      Physical Exam:   General:  Alert, cooperative, no distress, appears stated age. ON NC oxygen. Sitting up in chair. Sat 95 % on 6  6 liters  Conversant. Appears tired and somewhat flat affect. Head:  Normocephalic, without obvious abnormality, atraumatic. alopecia   Eyes:  Conjunctivae/corneas clear. PERRL, EOMs intact. Nose: Nares normal. Septum midline. Mucosa normal. No drainage or sinus tenderness. Throat: Lips, mucosa, and tongue normal. Teeth and gums normal.- no thrush    Neck: Supple, symmetrical, trachea midline, no adenopathy, thyroid:   Back:   Symmetric, no curvature. ROM normal.-  Did not re -examined today in the chair    Lungs:   Good air movement. rales in bases bilaterally . Seems to be breathing comfortably. Chest wall:  No tenderness or deformity.    Heart:  Regular rate and rhythm, S1, S2 normal, no murmur, click, rub or gallop.- mild tachy    Abdomen:   Soft, non-tender. Bowel sounds normal. No masses,  No organomegaly. Extremities: Extremities normal, atraumatic, no cyanosis - edema gone    Pulses: 2+ and symmetric all extremities. Skin: Skin color, texture, turgor normal. No rashes or lesions   Lymph nodes: Cervical, supraclavicular, and axillary nodes normal.   Neurologic: Grossly nonfocal, motor is intact. Psych: no over anxiety or depression. Data review:             Labs:    Recent Labs     03/13/19 0328 03/11/19 0236   WBC 10.7 11.3*   HGB 10.8* 10.7*    258     Recent Labs     03/13/19 0328 03/11/19 0236   * 135*   K 4.6 4.1    101   CO2 29 27   * 228*   BUN 20 23*   CREA 0.84 0.81   CA 9.0 8.7   ALB  --  2.8*   SGOT  --  16   ALT  --  46     No results for input(s): PH, PCO2, PO2, HCO3, FIO2 in the last 72 hours. No results for input(s): CPK, CKNDX, TROIQ in the last 72 hours.     No lab exists for component: CPKMB  No results found for: BNPP, BNP       Imaging:  I have personally reviewed the patients radiographs and have reviewed the reports:ct seen        Matt Siddiqui MD

## 2019-03-13 NOTE — PROGRESS NOTES
Medicare pt has received, reviewed, and signed 2nd IM letter informing them of their right to appeal the discharge. Signed copied has been placed on pt bedside chart.         Tristen Wellington  [phone]

## 2019-03-13 NOTE — DISCHARGE INSTRUCTIONS
General Discharge Instructions    Patient ID:  Chayito Moreno  [id number]  70 y.o.  1950    Patient Instructions          The following personal items were collected during your admission and were returned to you. Take Home Medications             What to do at Home    Recommended diet: Diabetic Diet    Recommended activity: Activity as tolerated    Follow-up with Dana Fothergill, MD  in 2 weeks. Information obtained by :  I understand that if any problems occur once I am at home I am to contact my physician. I understand and acknowledge receipt of the instructions indicated above. Physician's or R.N.'s Signature                                                                  Date/Time                                                                                                                                              Patient or Representative Signature                                                          Date/Time    [employer] Activation    Thank you for requesting access to [employer]. Please follow the instructions below to securely access and download your online medical record. [employer] allows you to send messages to your doctor, view your test results, renew your prescriptions, schedule appointments, and more. How Do I Sign Up? 1. In your internet browser, go to [email or web address]  2. Click on the First Time User? Click Here link in the Sign In box. You will be redirect to the New Member Sign Up page. 3. Enter your [employer] Access Code exactly as it appears below. You will not need to use this code after youve completed the sign-up process. If you do not sign up before the expiration date, you must request a new code.     [employer] Access Code: [email or web address]  Expires: 3/1/2019  4:00 PM (This is the date your [employer] access code will )    4. Enter the last four digits of your Social Security Number (xxxx) and Date of Birth (mm/dd/yyyy) as indicated and click Submit. You will be taken to the next sign-up page. 5. Create a [employer] ID. This will be your [employer] login ID and cannot be changed, so think of one that is secure and easy to remember. 6. Create a [employer] password. You can change your password at any time. 7. Enter your Password Reset Question and Answer. This can be used at a later time if you forget your password. 8. Enter your e-mail address. You will receive e-mail notification when new information is available in [address] 9. Click Sign Up. You can now view and download portions of your medical record. 10. Click the Download Summary menu link to download a portable copy of your medical information. Additional Information    If you have questions, please visit the Frequently Asked Questions section of the [employer] website at [email or web address] Remember, [employer] is NOT to be used for urgent needs. For medical emergencies, dial 911.

## 2019-03-13 NOTE — PROGRESS NOTES
Transitional Care Plan: Pt will discharge to [facility] today, transported by [facility] as an EMTALA transport at 12:00pm. Transport will be BLS with pt on 6L of O2 via NC.    PCS, Facesheet, and H&P placed on chart for transport. Transport arranged via [facility] stretcher for generalized weakness and debility from dx of lymphoma. Referral was sent via [employer]. Nursing will need to call report to [facility] at [phone]. Pleast send AVS, MAR, and any written prescriptions to facility in SNF envelope. Care Management Interventions  PCP Verified by CM: Yes  Mode of Transport at Discharge: [facility])  Hospital Transport Time of Discharge: 1200  Transition of Care Consult (CM Consult):  Other([facility])  MyChart Signup: No  Discharge Durable Medical Equipment: No  Physical Therapy Consult: Yes  Occupational Therapy Consult: Yes  Speech Therapy Consult: No  Current Support Network: Relative's Home, Other  Confirm Follow Up Transport: Family  Plan discussed with Pt/Family/Caregiver: Yes  Freedom of Choice Offered: Yes  Discharge Location  Discharge Placement: Rehab hospital/unit acute([facility])    RAND Hill  Care Manager  [phone]

## 2019-03-13 NOTE — PROGRESS NOTES
Transport time changed to 11:00 AM to accommodate more time for RN to call report and MD to complete d/c summary. MAR faxed to Derian Jones MSW  Care Manager  [phone]

## 2019-03-14 NOTE — DISCHARGE SUMMARY
[address] SUMMARY    Name:  Iker Ku  MR#:  [id number]  :  1950  ACCOUNT #:  [de-identified]  ADMIT DATE:  2019  DISCHARGE DATE:  2019    HISTORY OF PRESENT ILLNESS:  The patient is a 58-year-old lady who was doing well up until approximately 2 weeks prior to admission when a note was made of increasing shortness of breath accompanied by dry hacky cough. She most recently had a PET scan done which revealed airspace disease development in the right lung with slight increased metabolic activity as well as a few small foci of airspace disease in the left lung. A CTA of the chest revealed right upper and left lower lobe pulmonary emboli with bilateral airspace disease. She presented with increasing shortness of breath and coughing. In view of the pulmonary emboli as well as the newly developing interstitial process, it was elected to admit her for further evaluation and care. Complicating this is the fact that the patient was most recently treated for Hodgkin's lymphoma with chemotherapy consisting of ABVD. She had complete resolution of the lymphoproliferative process radiographically. Past medical history, social history, review of systems, family history, physical examination is as in admitting H and P.    LABORATORY DATA:  Lab values, hemoglobin is 10.4, white count 6.4, MCV was 89.4, platelet count was 696,552 with the following differential, 60 segs, 1 band, 20 lymphocytes, 15 monocytes, 2 eosinophils and 2 basophils. Urinalysis negative. Abnormalities on the comprehensive profile demonstrate a potassium of 2.9 initially with blood sugar 168, globulin 4.9. Pro-BNP was 265 with negative cardiac enzymes. From a microbiological standpoint, the patient's viral culture revealed a coronavirus. AFB smears were negative.   Respiratory culture reveal yeast.    Cytology studies revealed benign respiratory epithelium including mature squamous cells and bronchial epithelium. There were abundant fungal organisms noted, but no abnormal cells or organisms suggesting Pneumocystis carinii. RADIOGRAPHS:  CTA of the chest revealed right upper and left lower lobe pulmonary emboli with bilateral airspace disease and improved mediastinal lymphadenopathy. Repeat CTA on 03/05 revealed progression of consolidation in the right lower lobe and now involving nearly the entire right lower lobe with an air bronchogram.  There was newly developed areas of opacity in the periphery of the left upper lobe. The remainder of the lungs were similar in comparison with this continuous areas of mosaic-type opacity which is nonspecific. There was persistent, confluent soft tissue attenuation in the mediastinum, unchanged. Chest x-ray on 03/04 revealed improved respiratory effort and the bilateral infiltrates have mildly improved. CONSULTATIONS:  Two consultations were obtained, one with Oncology, Dr. Eliezer Perrin and the second was with Pulmonary, specifically Dr. Sabas Rivera and Ti Smyth as well as Dr. Daniel Hawley. HOSPITAL COURSE:  The patient was admitted and was treated aggressively for pulmonary emboli. She was subsequently transitioned to [facility] and had an uneventful course as relates to any thromboembolic disease. A repeat CTA of the chest did not demonstrate any recurrence of the pulmonary emboli. Attention was however focused on the interstitial process that was evolving leading to a significant degree of hypoxemia. It was felt by all that the patient most likely had bleomycin pulmonary toxicity. She was initially treated with high dose steroids and subsequently maintained prednisone 20 mg t.i.d. for two-thirds of the hospital stay with high dose in the first-third of the hospital stay. Unfortunately, there was no meaningful improvement with progressive increasing O2 requirements. She underwent a fiberoptic bronchoscopy with washings.   Nothing major was noted at that time. Her pulmonary status required high-flow O2 with 10 L a minute. Dr. Tariq Burciaga consulted a pulmonary physician, specialized in interstitial disease who suggested the initiation of CellCept. After discussion with the oncologist, it was agreed that she would be safe to have this started. This was indeed started and she remained on it for the last week with no adverse effects. Finally, she had exhausted everything that could be done and it was suggested that pulmonary rehab will be in order. She was therefore transferred to pulmonary rehab for continued evaluation and followup as an outpatient with Pulmonary. FINAL DIAGNOSES:  1. Acute interstitial pneumonitis, presumably secondary to bleomycin toxicity. 2.  History of pulmonary emboli. 3.  Hodgkin's disease, treated and apparently stable. 4.  Diabetes mellitus type 2.  5.  Dyslipidemia. 6.  Physical deconditioning. 7.  Depression. OPERATIONS AND PROCEDURES UNDERGONE THIS ADMISSION:  1. Central line placement. 2.  Fiberoptic bronchoscopy. DISPOSITION:  1. The patient will be discharged to the pulmonary rehab on a ADA diet with bedtime snack. 2.  She remains a full code. 3.  She has O2 requirements now at 7-10 L high-flow. DISCHARGE MEDICATIONS:  Include the following;  1. Amlodipine 10 mg daily. 2.  Atorvastatin 40 mg daily. 3.  Flonase, fluticasone 2 sprays in each nostril daily. 4.  Metoprolol succinate 50 mg daily. 5.  Eliquis 5 mg b.i.d.  6.  Lantus 20 units at bedtime and 52 units q.a.m.  7.  CellCept 500 mg b.i.d.  8.  Prednisone 30 mg b.i.d.  9.  Bactrim DS 1 tablet 3 times a week, Monday, Wednesday and Friday. DISCHARGE INSTRUCTIONS:  The patient will follow up with her medical oncologist as well as Pulmonary, post-discharge from Pulmonary Rehab. ADDENDUM (Job [id number]); 3/13/19    I failed to mention the patient did indeed have diabetes. Her insulin was adjusted accordingly with prandial insulin only. Significant fluctuation occurred primarily because of her erratic eating habits. I do not push for tight control because of high probability of hypoglycemia, given her erratic eating habits. This can be further titrated once she starts eating more consistently. Angel Shah MD      [id number]  D:  03/13/2019 9:15  T:  03/14/2019 16:15  JOB #:  [id number]  CC:   Corbin Potash, MD Charlena Curling, MD

## 2019-04-02 NOTE — PROGRESS NOTES
Transition of Care Coordination/Hospital to Post Acute Facility: 
  
Date/Time:  2019 10:45 AM 
 
Patient was admitted to [facility] on 2/3/2019 for treatment of Acute Respiratory Failure. Patient was discharged 3/13/2019 to [facility] for continuation of care. Patient then discharged from [facility] on .3/30/2019 to [facility]. Inpatient RRAT score: 18 Top Challenges reviewed   
none Method of communication with care team :none Nurse Navigator(NN) spoke with Nurse Manager Destini to provide introduction to self and explanation of the Nurse Navigator Role. Verified name and  as patient identifiers. Discussed and reviewed  Medication Reconciliation. Advance Care Plan. ACP:  
Does the patient have a current ACP (including DDNR):  no 
Does the post acute facility have a copy of the patients ACP:  no 
 
Medication(s):  
New Medications at Discharge: Xopenex Nebs sol; [email or web address]; Tylenol PO; Miralax PO;  Metformin PO Changed Medications at Discharge: Prednisone 20mg; Lantus to 8u. Amlodipine to 2.5mg Discontinued Medications at Discharge: Flonaise Nasal spray. PCP/Specialist follow up: No future appointments. -PCP appointment was cancelled on yesterday due to new Rehab transfer. Opportunity to ask questions was provided. Contact information was provided for future reference or further questions. Will continue to monitor. Goals Addressed This Visit's Progress  Facilitate transitions of care (ie. palliative care, assisted living, nursing home, changes in living arrangements). On track 2019:  Patient c/o being unkindly treated at [facility] regarding d/c; that she was told on 3/29 that AM 3/30 would be her last day, that she was being transferred to [facility].   States she had 1 day to get Sister to come from out of [facility] to assist her in the transfer; states such was extremely inconveniencing to still remaining Healthcare Decision Maker sister. Agreed that the therapy she received was excellent but she did not understand the process. Patient now comfortable at [facility]. NN w/ f/u post d/c.  EW  
  
  
goal completion:  pending

## 2019-04-10 NOTE — ED TRIAGE NOTES
Pt to ED from [facility] for c/o HTN and rapid heartrate. EMS reports initial pulse of 150 and BP of 200/150. Put on unknown abx 2 days ago for respiratory infection.

## 2019-04-10 NOTE — ED PROVIDER NOTES
70 y.o. female with past medical history significant for Hodgkin's lymphoma, HTN, fibromyalgia, asthma, DM, and cataracts who presents from EMS with chief complaint of tachycardia. Pt reports tachycardia (150 BPM) while she was at physical therapy today. Pt also c/o hypertension ( \"200/100's\"). Pt notes her \"blood pressure was fine this morning\". Pt was placed on an antibiotic 2 days ago for a respiratory infection. Pt reports she wears oxygen at baseline. Pt states she was diagnosed with Hodgkin's lymphoma in 2018. Pt notes she is taking BP medication. Pt denies numbness/tingling, SOB, chest pain, or dizziness. There are no other acute medical concerns at this time. Note written by David Johnson, as dictated by Bella Finney MD 5:22 PM  
 
 
The history is provided by the patient. No  was used. Past Medical History:  
Diagnosis Date  Asthma   
 as a child, nothing since menopause  Cancer ([facility] [address] 2018 Non-Hodgkin's  Cataracts, bilateral   
 Diabetes ([facility] [address] type II  
 Environmental allergies  Fibromyalgia Treated with alternate medical therapy  Hypertension Past Surgical History:  
Procedure Laterality Date  HX BREAST BIOPSY Right 2006  HX COLONOSCOPY  2015  HX GYN Pap   HX ORTHOPAEDIC    
 fractured R ankle/ no surgery  HX OTHER SURGICAL Right 2018  
 excision of deep neck cervical LN. Family History:  
Problem Relation Age of Onset  Heart Disease Mother  Cancer Father   
     prostate cancer  Cancer Sister Breast cancer apparent DCIS  
 Heart Disease Brother Brother  from congestive heart failure secondary to severe mitral disease Social History Socioeconomic History  Marital status: SINGLE Spouse name: Not on file  Number of children: 0  
 Years of education: Not on file  Highest education level: Not on file Occupational History  Occupation: Employed by a nonprofit organization Comment: [employer] Social Needs  Financial resource strain: Not on file  Food insecurity:  
  Worry: Not on file Inability: Not on file  Transportation needs:  
  Medical: Not on file Non-medical: Not on file Tobacco Use  Smoking status: Never Smoker  Smokeless tobacco: Never Used Substance and Sexual Activity  Alcohol use: Yes Comment: socially  Drug use: No  
 Sexual activity: Yes Lifestyle  Physical activity:  
  Days per week: Not on file Minutes per session: Not on file  Stress: Not on file Relationships  Social connections:  
  Talks on phone: Not on file Gets together: Not on file Attends Jain service: Not on file Active member of club or organization: Not on file Attends meetings of clubs or organizations: Not on file Relationship status: Not on file  Intimate partner violence:  
  Fear of current or ex partner: Not on file Emotionally abused: Not on file Physically abused: Not on file Forced sexual activity: Not on file Other Topics Concern  Not on file Social History Narrative Undergraduate [employer]  Graduate school [employer]--- PhD  
 Postgraduate work [employer] Honorary doctorate [employer] Formally employed by [employer] ALLERGIES: Patient has no known allergies. Review of Systems Respiratory: Negative for shortness of breath. Cardiovascular: Positive for palpitations. Negative for chest pain. +Tachycardia. +Hypertension. Neurological: Negative for dizziness and numbness. All other systems reviewed and are negative. Vitals:  
 04/10/19 1739 BP: 168/88 Pulse: (!) 123 Resp: 23 Temp: 98.2 °F (36.8 °C) SpO2: 96% Height: 5' 8.5\" (1.74 m) Physical Exam  
Constitutional: She is oriented to person, place, and time.  She appears well-developed and well-nourished. No distress. Anxious, NAD, AxOx4, speaking in complete sentences on NC (baseline) HENT:  
Head: Normocephalic and atraumatic. Mouth/Throat: Oropharynx is clear and moist.  
Eyes: Pupils are equal, round, and reactive to light. Conjunctivae and EOM are normal. Right eye exhibits no discharge. Left eye exhibits no discharge. No scleral icterus. Neck: Normal range of motion. Neck supple. No JVD present. No tracheal deviation present. Cardiovascular: Regular rhythm, normal heart sounds and intact distal pulses. Exam reveals no gallop and no friction rub. No murmur heard. Tachycardic to 140's Hypertensive also to 180's/ 110 Pulmonary/Chest: Effort normal and breath sounds normal. No respiratory distress. She has no wheezes. She has no rales. She exhibits no tenderness. Abdominal: Soft. Bowel sounds are normal. There is no tenderness. There is no rebound and no guarding. nttp Genitourinary: No vaginal discharge found. Genitourinary Comments: Pt denies urinary/ vaginal complaints Musculoskeletal: Normal range of motion. She exhibits no edema or tenderness. Neurological: She is alert and oriented to person, place, and time. She displays normal reflexes. No cranial nerve deficit or sensory deficit. She exhibits normal muscle tone. Coordination normal.  
Skin: Skin is warm and dry. Capillary refill takes less than 2 seconds. No rash noted. No erythema. No pallor. Psychiatric: She has a normal mood and affect. Her behavior is normal. Thought content normal.  
Nursing note and vitals reviewed. MDM Procedures Chief Complaint Patient presents with  Rapid Heart Rate  Hypertension 6:54 PM 
The patients presenting problems have been discussed, and they are in agreement with the care plan formulated and outlined with them. I have encouraged them to ask questions as they arise throughout their visit.  
 
MEDICATIONS GIVEN: 
 Medications  
0.9% sodium chloride infusion 1,000 mL (1,000 mL IntraVENous New Bag 4/10/19 2755)  
sodium chloride 0.9 % bolus infusion 100 mL (has no administration in time range) iopamidol (ISOVUE-370) 76 % injection 100 mL (has no administration in time range)  
sodium chloride (NS) flush 10 mL (has no administration in time range) LABS REVIEWED: 
Labs Reviewed CBC WITH AUTOMATED DIFF - Abnormal; Notable for the following components:  
    Result Value RBC 3.67 (*) HGB 11.1 (*) HCT 32.9 (*)   
 RDW 18.4 (*) NRBC 0.2 (*) ABSOLUTE NRBC 0.02 (*) IMMATURE GRANULOCYTES 2 (*)   
 ABS. IMM. GRANS. 0.2 (*) All other components within normal limits TROPONIN I  
METABOLIC PANEL, COMPREHENSIVE  
TSH 3RD GENERATION  
T4, FREE  
SAMPLES BEING HELD  
NT-PRO BNP  
URINALYSIS W/ RFLX MICROSCOPIC  
 
 
RADIOLOGY RESULTS: 
The following have been ordered and reviewed: 
_____________________________________________________________________ 
_____________________________________________________________________ EKG interpretation:  
Rhythm: sinus tachycardia rhythm; and regular . Rate (approx.): 120; Axis: normal; P wave: normal; QRS interval: normal ; ST/T wave: normal; Negative acute significant segmental elevations/ unchanged compared to the prior study dated 02/03/2019 PROCEDURES: 
 
 
 
CONSULTATIONS:  
 
 
PROGRESS NOTES: 
 
DIAGNOSIS: 
 
No diagnosis found. PLAN: 
1- HTN/ Tachycardia/ Chest Pain / neg ed evaluation - will have pt follow-up with Cardiology;  
 
 
ED COURSE: The patients hospital course has been uncomplicated. 6:55 PM 
BP normalized now; still tachy/ discussed CT-PE/ Pt agrees; PROGRESS NOTE: 
8:36 PM 
Discussed results with Pt. Will repeat troponin at 10 pm.  
 
 
10:47 PM 
Ina Tovar's  results have been reviewed with her. She has been counseled regarding her diagnosis.   She verbally conveys understanding and agreement of the signs, symptoms, diagnosis, treatment and prognosis and additionally agrees to Call/ Arrange follow up as recommended with CARDIOLOGY/ Dr. Oleg Urrutia MD in 24 - 48 hours. She also agrees with the care-plan and conveys that all of her questions have been answered. I have also put together some discharge instructions for her that include: 1) educational information regarding their diagnosis, 2) how to care for their diagnosis at home, as well a 3) list of reasons why they would want to return to the ED prior to their follow-up appointment, should their condition change or for concerns.

## 2019-04-10 NOTE — ED NOTES
Pt assisted onto bedpan to attempt to obtain urine specimen. Call bell within reach; pt instructed to call nurses station when she has urinated or needs any other assistance.

## 2019-04-11 NOTE — ED NOTES
Discharge instructions discussed with pt and family by provider earlier in the evening. Discharge paperwork given to [facility] EMT to take back to [facility]. Pt's family printed a copy for their records. Pt left department on [facility] stretcher, pt condition stable.

## 2019-04-11 NOTE — DISCHARGE INSTRUCTIONS
Patient Education        Acute High Blood Pressure: Care Instructions  Your Care Instructions    Acute high blood pressure is very high blood pressure. It's a serious problem. Very high blood pressure can damage your brain, heart, eyes, and kidneys. You may have been given medicines to lower your blood pressure. You may have gotten them as pills or through a needle in one of your veins. This is called an IV. And maybe you were given other medicines too. These can be needed when high blood pressure causes other problems. To keep your blood pressure at a lower level, you may need to make healthy lifestyle changes. And you will probably need to take medicines. Be sure to follow up with your doctor about your blood pressure and what you can do about it. Follow-up care is a key part of your treatment and safety. Be sure to make and go to all appointments, and call your doctor if you are having problems. It's also a good idea to know your test results and keep a list of the medicines you take. How can you care for yourself at home? · See your doctor as often as he or she recommends. This is to make sure your blood pressure is under control. You will probably go at least 2 times a year. But it may be more often at first.  · Take your blood pressure medicine exactly as prescribed. You may take one or more types. They include diuretics, beta-blockers, ACE inhibitors, calcium channel blockers, and angiotensin II receptor blockers. Call your doctor if you think you are having a problem with your medicine. · If you take blood pressure medicine, talk to your doctor before you take decongestants or anti-inflammatory medicine, such as ibuprofen. These can raise blood pressure. · Learn how to check your blood pressure at home. Check it often. · Ask your doctor if it's okay to drink alcohol. · Talk to your doctor about lifestyle changes that can help blood pressure. These include being active and not smoking.   When should you call for help? Call 911 anytime you think you may need emergency care. This may mean having symptoms that suggest that your blood pressure is causing a serious heart or blood vessel problem. Your blood pressure may be over 180/120.   For example, call 911 if:    · You have symptoms of a heart attack. These may include:  ? Chest pain or pressure, or a strange feeling in the chest.  ? Sweating. ? Shortness of breath. ? Nausea or vomiting. ? Pain, pressure, or a strange feeling in the back, neck, jaw, or upper belly or in one or both shoulders or arms. ? Lightheadedness or sudden weakness. ? A fast or irregular heartbeat.     · You have symptoms of a stroke. These may include:  ? Sudden numbness, tingling, weakness, or loss of movement in your face, arm, or leg, especially on only one side of your body. ? Sudden vision changes. ? Sudden trouble speaking. ? Sudden confusion or trouble understanding simple statements. ? Sudden problems with walking or balance. ? A sudden, severe headache that is different from past headaches.     · You have severe back or belly pain.    Do not wait until your blood pressure comes down on its own. Get help right away.   Call your doctor now or seek immediate care if:    · Your blood pressure is much higher than normal (such as 180/120 or higher), but you don't have symptoms.     · You think high blood pressure is causing symptoms, such as:  ? Severe headache.  ? Blurry vision.    Watch closely for changes in your health, and be sure to contact your doctor if:    · Your blood pressure measures higher than your doctor recommends at least 2 times. That means the top number is higher or the bottom number is higher, or both.     · You think you may be having side effects from your blood pressure medicine. Where can you learn more? Go to http://kamilla-tata.info/.   Enter G202 in the search box to learn more about \"Acute High Blood Pressure: Care Instructions. \"  Current as of: July 22, 2018  Content Version: 11.9  © 8551-5739 [employer]. Care instructions adapted under license by [employer]) (which disclaims liability or warranty for this information). If you have questions about a medical condition or this instruction, always ask your healthcare professional. [facility] any warranty or liability for your use of this information. Patient Education        Supraventricular Tachycardia: Care Instructions  Your Care Instructions    Having supraventricular tachycardia (SVT) means that from time to time your heart beats abnormally fast. This fast rhythm is caused by changes in the electrical system of your heart. You may feel a fluttering in your chest (palpitations) and have a fast pulse. When your heart is beating fast, you may feel anxious and lightheaded, be short of breath, and feel discomfort in the chest.  Your doctor may prescribe medicines to help slow down your heartbeat. Your doctor may also suggest you try vagal maneuvers when having an episode of SVT. These are things, like bearing down, that might help slow your heart rate. Bearing down means that you try to breathe out with your stomach muscles but you don't let air out of your nose or mouth. Your doctor can show you how to do vagal maneuvers. He or she may suggest you lie down on your back to do them. In some cases, either cardioversion treatment or a procedure called catheter ablation is done to correct SVT. Your doctor may ask you to wear a small electronic device for 1 or 2 days to monitor your heart. It is called a Holter monitor. Follow-up care is a key part of your treatment and safety. Be sure to make and go to all appointments, and call your doctor if you are having problems. It's also a good idea to know your test results and keep a list of the medicines you take. How can you care for yourself at home? · Be safe with medicines. Take your medicines exactly as prescribed. Call your doctor if you think you are having a problem with your medicine. You will get more details on the specific medicines your doctor prescribes. · If your doctor showed you how to do vagal maneuvers, try them when you have an episode. These maneuvers include bearing down or putting an ice-cold, wet towel on your face. · Monitor your condition by keeping a diary of your SVT episodes. Bring this to your doctor appointments. ? Write down how fast or slow your heart was beating. To count your heart rate:  § Gently place 2 fingers of your hand on the inside of your other wrist, below your thumb. § Count the beats for 30 seconds. § Then, double the result to get the number of beats per minute. ? Write down if your heart rhythm was regular or irregular. ? Write down the symptoms you had.  ? Write down the time of day your symptoms occurred. ? Write down how long your symptoms lasted. ? Write down what you were doing when your symptoms started. ? Write down what may have helped your symptoms go away. · If they trigger episodes, limit or avoid alcohol or drinks with caffeine. · Do not use over-the-counter decongestants, herbal remedies, diet pills, or \"pep\" pills, which often contain stimulants. · Do not use illegal drugs, such as cocaine, ecstasy, or methamphetamine, which can speed up your heart's rhythm. · Do not smoke. Smoking can make this condition worse. If you need help quitting, talk to your doctor about stop-smoking programs and medicines. These can increase your chances of quitting for good. · Be alert for new or worsening symptoms, such as shortness of breath, pounding of your heart, or unusual tiredness. If new symptoms develop or your symptoms become worse, call your doctor. When should you call for help? Call 911 anytime you think you may need emergency care.  For example, call if:    · You passed out (lost consciousness).     · You are short of breath.    Call your doctor now or seek immediate medical care if:    · You have a fast heartbeat.     · You are dizzy or lightheaded, or feel like you may faint.    Watch closely for changes in your health, and be sure to contact your doctor if:    · You do not get better as expected. Where can you learn more? Go to http://kamilla-tata.info/. Enter G244 in the search box to learn more about \"Supraventricular Tachycardia: Care Instructions. \"  Current as of: July 22, 2018  Content Version: 11.9  © 2279-4789 [employer]. Care instructions adapted under license by [employer] (which disclaims liability or warranty for this information). If you have questions about a medical condition or this instruction, always ask your healthcare professional. [address] any warranty or liability for your use of this information.

## 2019-07-03 NOTE — TELEPHONE ENCOUNTER
[facility] Palliative Medicine Office  Nursing Note  [phone]  Fax [phone]     Name:  Lorra Duverney  YOB: 1950     Received incoming call from Cindy Urbina at [facility]. She states Lorra Duverney is being discharged from [facility] in about 2 weeks and she is looking for follow up for pt. Cindy Urbina reports that pt has interstitial lung disease as a result of treatment for her Hodgkins Disease - pt is on 4L oxygen, she is confined to a wheelchair. Pt's symptoms are stable, in fact Cindy Urbina says pt's pulmonary status is slowly improving. This nurse explained Palliative Home vs Home Based Primary Care services. It pt is not going to be able to get out to see her PCP, [facility] would be the best option for her. Cindy Urbina will discuss with pt and says she will call back.     SAMUEL MillerN, RN  Clinical Referral Navigator

## 2019-07-15 PROBLEM — J18.9 PNEUMONIA: Status: ACTIVE | Noted: 2019-01-01

## 2019-07-15 NOTE — PROGRESS NOTES
Date of previous inpatient admission/ ED visit? 4/10-4/11 [facility] ED: Hypertension 2/3-3/13 [facility] IP: Acute Respiratory Failure What brought the patient to the ED? Chart Reviewed. Patient presents from [place] via EMS with complaints of SO? B that started last night around 10 pm and has since gotten worse this morning. Patient is normally on 4 L nc and [place] has increased her O2 to 5 L NC. Patient reports she has had PNA 3 times since February Has patient seen a provider since their last inpatient admission/ED visit (if yes, when)? Yes, 6/25/19 seen by [facility] CM Interventions: 
From previous inpatient admission/ED visit: Transition to [address] [place] [address] and Rehab for services from ED visit from 4/10/10-4/11/19. From current inpatient admission/ED visit: 
 
Senior Services consult received and appreciated for Care Management. 70year old female. AOx4. Resides at [facility]. Receives assistance with all ADL's and IADL's. Patient primarily bed bound and wheelchair bound. Utilizes oxygen. Medications are administered by RN staff. Patient reports no significant financial stressors or concerns. Currently open to [facility] for services. Received notifcation that patient revoked hospice today to come to the ER and had revoked in past to seek aggressive treatment   Transport to be arranged. Strong family support. Patient's emergency contacts consist of the following: Colin Starkey [phone] and Orvan Habermann [phone]. Family at bedside. Transition of care plan TBD/subject to change pending recommendations. CM will continue to follow and assist with transition of care needs as they arise. Care Management Interventions PCP Verified by CM: Yes 
Palliative Care Criteria Met (RRAT>21 & CHF Dx)?: Yes 
Palliative Consult Recommended?: Yes Mode of Transport at Discharge: S 
 Transition of Care Consult (CM Consult): Other(ED Senior Services Consult CM) Discharge Durable Medical Equipment: No 
Physical Therapy Consult: No 
Occupational Therapy Consult: No 
Speech Therapy Consult: No 
Current Support Network: Assisted Living([facility]) Confirm Follow Up Transport: Other (see comment)(Stretcher Transport) Plan discussed with Pt/Family/Caregiver: Yes Discharge Location Discharge Placement: Home with hospice(TBD/subject to change pending recommendations) RAND Benavidez/NISA Care Management 11:27 AM

## 2019-07-15 NOTE — ROUTINE PROCESS
TRANSFER - OUT REPORT: 
 
Verbal report given to Merry DAVALOS(name) on yce Been  being transferred to 416(unit) for routine progression of care Report consisted of patients Situation, Background, Assessment and  
Recommendations(SBAR). Information from the following report(s) SBAR, Kardex, ED Summary, [facility] and Recent Results was reviewed with the receiving nurse. Lines:  
Venous Access Device portacath  09/12/18 Upper chest (subclavicular area), left (Active) Peripheral IV 07/15/19 Left Antecubital (Active) Site Assessment Clean, dry, & intact 7/15/2019 11:16 AM  
Phlebitis Assessment 0 7/15/2019 11:16 AM  
Infiltration Assessment 0 7/15/2019 11:16 AM  
Dressing Status Clean, dry, & intact 7/15/2019 11:16 AM  
   
Peripheral IV 07/15/19 Left Wrist (Active) Site Assessment Clean, dry, & intact 7/15/2019 11:22 AM  
Phlebitis Assessment 0 7/15/2019 11:22 AM  
Infiltration Assessment 0 7/15/2019 11:22 AM  
Dressing Status Clean, dry, & intact 7/15/2019 11:22 AM  
  
 
Opportunity for questions and clarification was provided. Patient transported with: 
 Monitor O2 @ 5 liters Registered Nurse Patient to go up after CTA

## 2019-07-15 NOTE — H&P
[address] [facility] HISTORY AND PHYSICAL Name:  Trey Rodrigues 
MR#:  [id number] :  1950 ACCOUNT #:  [de-identified] ADMIT DATE:  07/15/2019 TIME:  02:00 p.m. ADMITTING ATTENDING:  Joshua Couch MD 
 
PRIMARY CARE PHYSICIAN:  Gordo Bennett MD 
 
CHIEF COMPLAINT:  Cough and shortness of breath since last 1 week. HISTORY OF PRESENT ILLNESS:  This is a 56-year-old female with past medical history of hypertension, fibromyalgia, pulmonary fibrosis, Hodgkin's lymphoma. She comes over here because since last 1 week she has been having dry cough. About 1 week ago, she had seen her physician who put her on Z-Jared; yesterday was the last dose that she had. Since last night, she has been having increasing shortness of breath as well as increasing cough. Again, the cough is dry. She never had any phlegm. She does not have any fever. She has been at her baseline 4-4-1/2 L of oxygen. No chest pain, headache, dizziness, nausea, or vomiting. No changes in bowel movements. REVIEW OF SYSTEMS:  Negative except for above. PAST MEDICAL HISTORY: 
1. Asthma. 2.  Hodgkin's lymphoma. 3.  Diabetes mellitus type 2. 
4.  Fibromyalgia. 5.  Hypertension. PAST SURGERIES: 
1. Breast biopsy. 2.  Excision of deep cervical lymph node. FAMILY HISTORY:  Significant for coronary artery disease, prostate cancer, and breast cancer. SOCIAL HISTORY:  Nonsmoker, nonalcoholic, non-IV-drug abuser. Currently lives at [facility] ALLERGIES:  NO KNOWN DRUG ALLERGIES. MEDICATIONS:  The patient is on following medications: 
1. Tylenol 650 q.6 hours p.r.n. 
2.  Norvasc 5 mg p.o. daily. 3.  Eliquis 5 mg p.o. b.i.d. 4.  Lipitor 40 mg p.o. daily. 5.  Metformin 500 mg p.o. b.i.d. 6.  Bactrim DS 1 tablet p.o. every Monday, Wednesday, and Friday.  
 
PHYSICAL EXAMINATION: 
VITAL SIGNS:  At the time when the patient was seen, the patient's vitals were as follows:  Blood pressure 124/80, pulse 120, respiratory rate 23, afebrile, saturating 94% on 4 L of nasal cannula. GENERAL:  Not in acute distress, comfortably sitting on bed. HEENT:  Head:  Normocephalic and atraumatic. Eyes:  PERRLA, EOMI. Ears:  Bilaterally hearing normal.  No growth. Nose:  No polyps or bleeding. Mouth:  Dry mucous membrane. Decent oral hygiene. NECK:  Supple. No JVD. No thyromegaly. RESPIRATORY:  Bilateral coarse breath sounds. CARDIOVASCULAR:  S1, S2, tachycardic. No murmurs, rubs, or gallops. GASTROINTESTINAL:  Bowel sounds present. Soft, nontender, nondistended. NEUROLOGIC:  Cranial nerves II-XII intact. Motor 5/5 bilaterally upper extremities, 0/5 bilateral lower extremities. PSYCHIATRIC:  Mood and affect appropriate. Alert, oriented x3. LABORATORY AND RADIOLOGICAL RESULTS:  WBC 12.2, hemoglobin 8.5, hematocrit 28, and platelet count of 288. Sodium 134, potassium 4.0, chloride 102, bicarbonate 27, BUN 7, creatinine 0.6. ProBNP of 534. Troponin x1 is negative. X-ray of the chest shows increased right greater than left airspace disease. EKG shows sinus tachycardia. No ST-T wave changes suggestive of ischemia. ASSESSMENT AND PLAN:  This is a 60-year-old female with past medical history of pulmonary fibrosis, Hodgkin's lymphoma. She comes over here because of shortness of breath and cough and is found to be tachycardic. 1.  Shortness of breath, cough secondary to pneumonia. Currently, I will treat the patient as healthcare-associated pneumonia with vancomycin, Zosyn, and Levaquin. We will follow the culture. Based on the culture, we should be able to de-escalate her antibiotics. We will also put the patient on incentive spirometry. 2.  Tachycardia. EKG shows this is sinus tachycardia, probably secondary to above, but the patient has a history of pulmonary embolism for which she is on Eliquis.   We will try to rule out the possibility of Eliquis failure and subsequent new pulmonary embolism. We will get a CT of the chest to rule that out. We will continue Eliquis for now. 3.  History of Hodgkin's lymphoma. The patient claims that she had 7 cycles of chemotherapy and when she was having her eighth cycle of chemotherapy early this year, she had significant reactions to the chemotherapy and that is why she has pulmonary fibrosis. 4.  The patient is DNR and I confirmed with the patient. 5.  Chronic hypoxic respiratory failure. The patient is on 4 L of oxygen at baseline. I spent about 50 minutes in taking care of the patient. Tai Guzman MD 
 
 
[id number] 
D:  07/15/2019 14:15 
T:  07/15/2019 15:29 
JOB #:  [id number]

## 2019-07-15 NOTE — SENIOR SERVICES NOTE
Spoke with Kimmie Casillas from [facility]. Of note, pt is listed as Jason Morales in their system. Pt revoked hospice today to come to ER and had revoked in past to seek aggressive treatment at [facility]. Kimmie Casillas states that patient would still qualify for hospice but is now interested in more aggressive treatments of her disease process. Pt being admitted to hospital for PNA/sepsis. Would recommend palliative medicine consult while in house to clarify patient goals and next level of care upon discharge.

## 2019-07-15 NOTE — ED TRIAGE NOTES
Patient presents from [facility] via EMS with complaints of SO? B that started last night around 10 pm and has since gotten worse this morning. Patient is normally on 4 L nc and [place] has increased her O2 to 5 L NC. Patient reports she has had PNA 3 times since February

## 2019-07-15 NOTE — PROGRESS NOTES
Admission Medication Reconciliation: 
 
Information obtained from:  Patient/Medication list/RxQuery Comments/Recommendations: Spoke with patient with family member in the room with patient permission. Discussed use of PTA medications including prescription/OTC, vitamins/supplements, inhaled, topical, injectable, otic and ophthalmic medications. Updated PTA meds/reviewed patient's allergies. Of note, patient reports still taking Bactrim DS on Mon, Wed, Fri with last dose taken on 7/12. She states she no longer takes any injectable insulins- now only taking metformin for diabetes management. Patient reports last dose of apixaban was this morning. Medication changes (since last review): Added - Bactrim DS 
- Acetaminophen rectal suppository Adjusted - Amlodipine from 2.5 mg to 5 mg daily Removed - Fluticasone nasal spray - Insulin glargine - Metoprolol succinate XL 50 mg - Mycophenolate 250 mg 
- Polyethylene glycol (Miralax) - Prednisone Allergies:  Patient has no known allergies. Significant PMH/Disease States:  
Past Medical History:  
Diagnosis Date Asthma   
 as a child, nothing since menopause Cancer ([facility] [address] 08/21/2018 Non-Hodgkin's Cataracts, bilateral   
 Diabetes ([facility] [address] type II Environmental allergies Fibromyalgia Treated with alternate medical therapy Hypertension Chief Complaint for this Admission: Chief Complaint Patient presents with Shortness of Breath Prior to Admission Medications:  
Prior to Admission Medications Prescriptions Last Dose Informant Patient Reported? Taking? Oxygen   Yes No  
Sig: 3 Devices by Nasal route as needed. Indications: SOB PRN  
acetaminophen (TYLENOL ARTHRITIS PAIN) 650 mg TbER   Yes Yes Sig: Take 650 mg by mouth as needed (Q 6hrs PRN). acetaminophen (TYLENOL) 650 mg suppository   Yes Yes Sig: Insert 650 mg into rectum every four (4) hours as needed for Pain. amLODIPine (NORVASC) 5 mg tablet 7/15/2019 at AM  Yes Yes Sig: Take 5 mg by mouth daily. apixaban (ELIQUIS) 5 mg tablet 7/15/2019 at AM  No Yes Sig: Take 1 Tab by mouth every twelve (12) hours. atorvastatin (LIPITOR) 40 mg tablet 2019 at PM  No Yes Sig: TAKE 1 TAB BY MOUTH DAILY. levalbuterol (XOPENEX) 1.25 mg/0.5 mL nebu   Yes Yes Si.25 mg by Nebulization route every four (4) hours as needed. Indications: Per [facility]  
metFORMIN (GLUCOPHAGE) 500 mg tablet   Yes Yes Sig: Take 500 mg by mouth two (2) times daily (with meals). Indications: Per [facility]  
trimethoprim-sulfamethoxazole (BACTRIM DS) 160-800 mg per tablet 2019 at PM  Yes Yes Sig: Take 1 Tab by mouth every Monday, Wednesday, Friday. Facility-Administered Medications: None Thanks, Melissa Mckeon, PharmD

## 2019-07-15 NOTE — ED PROVIDER NOTES
70 y.o. female with past medical history significant for htn, fibromyalgia, muscle atrophy, pulmonary fibrosis, asthma, b/l cataracts, dm, non-hodgkin's, who presents from ems with chief complaint of sob. Pt has \"last night\" onset of worsening sob described as \"labored breathing\" that's continued into this morning. She is normally on 4.5 L of O2 n/c at baseline, but had it increased to 5 L by staff. She also endorses a dry non-productive cough and fever (peak 100) at this time. EMS arrived and measured initial O2 sat of 92% and HR of 140 bpm. Denies pleuritic cp and abd pain. Of note, she has had PNA \"x3 times since Ken" and was treated last week with a z-pack for sinus infection symptomatic of congestion. DNR form in place. EMS was called to [place];  bpm, 124/78, and 9 There are no other acute medical concerns at this time. PCP: Valiant Gowers, MD 
 
Note written by David Newsome, as dictated by Gunner Bustamante MD 10:23 AM  
 
The history is provided by the patient, the EMS personnel, medical records and a relative. No  was used. Past Medical History:  
Diagnosis Date  Asthma   
 as a child, nothing since menopause  Cancer ([facility] [address] 08/21/2018 Non-Hodgkin's  Cataracts, bilateral   
 Diabetes ([facility] [address] type II  
 Environmental allergies  Fibromyalgia Treated with alternate medical therapy  Hypertension Past Surgical History:  
Procedure Laterality Date  HX BREAST BIOPSY Right 2006  HX COLONOSCOPY  2015  HX GYN Pap 2015  HX ORTHOPAEDIC    
 fractured R ankle/ no surgery  HX OTHER SURGICAL Right 08/27/2018  
 excision of deep neck cervical LN. Family History:  
Problem Relation Age of Onset  Heart Disease Mother  Cancer Father   
     prostate cancer  Cancer Sister Breast cancer apparent DCIS  
 Heart Disease Brother Brother  from congestive heart failure secondary to severe mitral disease Social History Socioeconomic History  Marital status: SINGLE Spouse name: Not on file  Number of children: 0  
 Years of education: Not on file  Highest education level: Not on file Occupational History  Occupation: Employed by a nonprofit organization Comment: [employer] Social Needs  Financial resource strain: Not on file  Food insecurity:  
  Worry: Not on file Inability: Not on file  Transportation needs:  
  Medical: Not on file Non-medical: Not on file Tobacco Use  Smoking status: Never Smoker  Smokeless tobacco: Never Used Substance and Sexual Activity  Alcohol use: Yes Comment: socially  Drug use: No  
 Sexual activity: Yes Lifestyle  Physical activity:  
  Days per week: Not on file Minutes per session: Not on file  Stress: Not on file Relationships  Social connections:  
  Talks on phone: Not on file Gets together: Not on file Attends Yazidi service: Not on file Active member of club or organization: Not on file Attends meetings of clubs or organizations: Not on file Relationship status: Not on file  Intimate partner violence:  
  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  
 Postgraduate work [employer] Honorary doctorate [employer] Formally employed by [employer] ALLERGIES: Patient has no known allergies. Review of Systems Constitutional: Positive for fever. Negative for chills. Eyes: Negative for photophobia. Respiratory: Positive for cough and shortness of breath. Cardiovascular: Negative for chest pain. Gastrointestinal: Negative for abdominal pain. Genitourinary: Negative for dysuria. Musculoskeletal: Negative for back pain. Neurological: Negative for headaches. Psychiatric/Behavioral: Negative for confusion. All other systems reviewed and are negative. Vitals:  
 07/15/19 1330 07/15/19 1400 07/15/19 1430 07/15/19 1500 BP: 124/80 137/84 (!) 136/114 117/70 Pulse: (!) 120 (!) 119 (!) 117 (!) 121 Resp: 23 23 24 29 Temp:      
SpO2: 95% 93% 92% 93% Physical Exam  
Constitutional: She appears well-nourished. She appears ill (chronic). No distress. HENT:  
Head: Normocephalic. Eyes: Pupils are equal, round, and reactive to light. Cardiovascular: Regular rhythm and intact distal pulses. Tachycardia present. Pulmonary/Chest: Effort normal. Tachypnea noted.  
r-basilar crackles Abdominal: Soft. She exhibits no distension. There is no tenderness. Musculoskeletal: She exhibits edema (trace b/l pedal edema). Neurological: She is alert. Skin: Skin is warm. There is pallor. Psychiatric: Her behavior is normal.  
  
Note written by David Craig, as dictated by Sanjeev Peralta MD 10:23 AM 
 
MDM Number of Diagnoses or Management Options Bilateral pulmonary infiltrates on CXR:  
Hypoxemia:  
Pneumonia of both lungs due to infectious organism, unspecified part of lung:  
Diagnosis management comments: Charity Martinez is a 70 y.o. female presenting with worsening shortness of breath in the setting of chronic severe ild. Low grade fevers are new, suspect superimposed pneumonia. Differential includes progression of ild, pneumonia, pulmonary edema. Of note patient was on hospice care but she has revoked this. Amount and/or Complexity of Data Reviewed Clinical lab tests: ordered and reviewed Tests in the radiology section of CPT®: ordered Tests in the medicine section of CPT®: reviewed and ordered Risk of Complications, Morbidity, and/or Mortality Presenting problems: high Diagnostic procedures: high Management options: high Critical Care Total time providing critical care: (33) Procedures ED EKG interpretation: 
Rhythm: sinus tachycardia; Rate (approx.): 143; Axis: left axis deviation; no ST/T wave changes Note written by David Harmon, as dictated by Elías Coronado MD 11:06 AM 
 
PROGRESS NOTE: 
11:11 AM 
B/l PNA on CXR. Will start IV abx and admit. Hospitalist Navya for Admission 11:15 AM 
 
ED Room Number: ER09/09 Patient Name and age:  Emanuel Morse 70 y.o.  female Working Diagnosis:  
1. Bilateral pulmonary infiltrates on CXR 2. Pneumonia of both lungs due to infectious organism, unspecified part of lung 3. Hypoxemia Readmission: no 
Isolation Requirements:  no 
Recommended Level of Care:  step down Code Status:  dnr

## 2019-07-16 PROBLEM — J98.4 BLEOMYCIN LUNG TOXICITY: Status: ACTIVE | Noted: 2019-01-01

## 2019-07-16 PROBLEM — T45.1X5A BLEOMYCIN LUNG TOXICITY: Status: ACTIVE | Noted: 2019-01-01

## 2019-07-16 PROBLEM — J96.11 CHRONIC HYPOXEMIC RESPIRATORY FAILURE (HCC): Status: ACTIVE | Noted: 2019-01-01

## 2019-07-16 NOTE — PROGRESS NOTES
Problem: Pneumonia: Day 1 Goal: Activity/Safety Outcome: Progressing Towards Goal 
Goal: Consults, if ordered Outcome: Progressing Towards Goal 
Goal: Diagnostic Test/Procedures Description Completed pertinent labs and diagnostics Outcome: Progressing Towards Goal 
Goal: Nutrition/Diet Outcome: Progressing Towards Goal 
Goal: Medications Outcome: Progressing Towards Goal 
Goal: Respiratory Outcome: Progressing Towards Goal 
Goal: Treatments/Interventions/Procedures Outcome: Progressing Towards Goal 
Goal: Psychosocial 
Outcome: Progressing Towards Goal 
Goal: *Oxygen saturation within defined limits Outcome: Progressing Towards Goal 
Goal: *Influenza vaccine administered (October-March) Outcome: Progressing Towards Goal 
Goal: *Pneumoccocal vaccine administered Outcome: Progressing Towards Goal 
Goal: *Hemodynamically stable Outcome: Progressing Towards Goal 
Goal: *Demonstrates progressive activity Outcome: Progressing Towards Goal 
Goal: *Tolerating diet Outcome: Progressing Towards Goal 
  
Problem: Falls - Risk of 
Goal: *Absence of Falls Description Document [place] Fall Risk and appropriate interventions in the flowsheet. Outcome: Progressing Towards Goal 
  
Problem: Patient Education: Go to Patient Education Activity Goal: Patient/Family Education Outcome: Progressing Towards Goal

## 2019-07-16 NOTE — ROUTINE PROCESS
Bedside and Verbal shift change report given to Thais Polanco RN (oncoming nurse) by Aure Epps RN (offgoing nurse). Report included the following information SBAR, Kardex, Intake/Output, Recent Results and Cardiac Rhythm ST. HR, BP, RR elevated, SPO2 decreased most of night. Per patient she has been anxious since blood draw for AM labs. VS better at times when patient more rested. She was on 6L NC at midnight and progressed to 50% Ventimask by RT overnight. NP Luigi notified about elevated BP and RR. Monitored overnight. Patient received one dose of Diltiazem yesterday having refused her earlier dose. Dose given this morning. Pt. Had large BM this morning. Noted BP taken after activity of getting patient cleaned up and comfortable in bed. Patient's sister at the bedside all night.

## 2019-07-16 NOTE — PROGRESS NOTES
Dr Dariusz Carcamo called for JESUS Ni 9/20/2018 NUTRITION COMPLETE ASSESSMENT 
 
RECOMMENDATIONS:  
1. Advance diet as tolerated 2. Please document po intake meals/supplements in flow sheet 3. Weekly weights Interventions/Plan:  
Food/Nutrient Delivery:           RD to add supplements Assessment:  
Reason for Assessment:  
[x]BPA/MST Referral  
 
Diet:   
Supplements: None Nutritionally Significant Medications: [x] Reviewed & Includes: Humalog, FloraQ, Zofran prn Meal Intake:  
Patient Vitals for the past 100 hrs: 
 % Diet Eaten  
07/15/19 1956 100 % Pre-Hospitalization: [facility] De [facility]zman Current Hospitalization:  
Fluid Restriction:  none Appetite: Fair PO Ability: Independent Average po intake:50-75% Average supplements intake:  n/a Subjective: \"It's amber hard to talk because I get tired. \" 
 
Objective: 
Pt seen for MST. Pt admitted with pneumonia. PMHx: hypertension, fibromyalgia, pulmonary fibrosis, Hodgkin's lymphoma. Reviewed chart, spoke with pt and pt family member. Pt states wt loss since non-Hodgkins diagnosis in January-noted ~25lb wt loss over past year. Pt indicated eating more difficult due to breathing issues; offered Boost, pt accepted (drinks 1-2/day at home). Boost BID provides 500 kcal, 28g protein meeting 30% caloric, 35% protein needs if 100% consumed. RD to follow po intake, wt status. Estimated Nutrition Needs:  
Kcals/day: 0659 Kcals/day(1304-0827 kcal/day (MSJ x 1.2-1.3)) Protein: 80 g(80-83g/day (1-1.1g/kg)) Fluid:  1700mL/day (1mL/kcal) Based On: [facility] Weight Used: Actual wt Pt expected to meet estimated nutrient needs:  []   Yes     []  No [x] Unable to predict at this time Nutrition Diagnosis:  
1. Unintended weight loss related to fair to poor po intake PTA as evidenced by pt with 25 lb wt loss over past year Goals: Pt will consume at least 50% meals/supplements over next 5-7 days Monitoring & Evaluation: - Total energy intake, Liquid meal replacement - Weight/weight change -   
 
Previous Nutrition Goals Met:   N/A Previous Recommendations:    N/A Education & Discharge Needs: 
 [x] None Identified 
 [] Identified and addressed  
 [] Participated in care plan, discharge planning, and/or interdisciplinary rounds Cultural, Protestant and ethnic food preferences identified:   
 
Skin Integrity: []Intact  [x]Other- wound sacrum Edema: []None [x]Other- LUE 1+, LLE, RLE 2+ Last BM: 7/16/2019 Food Allergies: [x]None []Other Diet Restrictions:  None noted Anthropometrics:   
Weight Loss Metrics 7/16/2019 4/10/2019 2/9/2019 1/21/2019 11/15/2018 9/13/2018 9/12/2018 Today's Wt 175 lb 3.2 oz - 238 lb 8.6 oz 180 lb 180 lb 198 lb 201 lb 1 oz BMI 26.25 kg/m2 35.74 kg/m2 36.27 kg/m2 27.37 kg/m2 27.37 kg/m2 30.11 kg/m2 29.69 kg/m2 Weight Source: Bed Height: 5' 8.5\" (174 cm), Body mass index is 26.25 kg/m². ,   
 ,  
 
Labs:   
Lab Results Component Value Date/Time Sodium 135 (L) 07/15/2019 11:11 AM  
 Potassium 4.0 07/15/2019 11:11 AM  
 Chloride 102 07/15/2019 11:11 AM  
 CO2 27 07/15/2019 11:11 AM  
 Glucose 118 (H) 07/15/2019 11:11 AM  
 BUN 7 07/15/2019 11:11 AM  
 Creatinine 0.60 07/15/2019 11:11 AM  
 Calcium 9.4 07/15/2019 11:11 AM  
 Magnesium 1.7 07/15/2019 11:11 AM  
 Albumin 3.0 (L) 07/15/2019 11:11 AM  
 
Lab Results Component Value Date/Time  Hemoglobin A1c 6.9 (H) 08/03/2018 01:45 PM  
 
 
Viola Hester RD

## 2019-07-16 NOTE — PROGRESS NOTES
07/16/19 3267 Vitals Temp 98 °F (36.7 °C) Pulse (Heart Rate) (!) 141 Heart Rate Source Monitor Resp Rate (!) 36  
O2 Sat (%) 99 % Level of Consciousness Alert  
BP (!) 155/93 MAP (Calculated) 114 MEWS Score 6  
assumed care of pt Vital signs obtained Dr Vladimir Callaway paged [phone] Spoke with Dr Vladimir Callaway made aware pt is on venti mask and  He is aware vs trend 0930 RT at bedside ABGS drawn per order, pt placed on high flow O2 per results

## 2019-07-16 NOTE — CONSULTS
PALLIATIVE MEDICINE Consult noted and appreciated. We will be available to see tomorrow 7/17/19. Thank you. Randy Guajardo.  [address] MSN, FNP-BC, [facility]

## 2019-07-16 NOTE — PROGRESS NOTES
Pharmacist Note - Vancomycin Dosing Consult provided for this 70 y.o. female for indication of HAP. Antibiotic regimen(s): Vanc + Levaquin + Zosyn Recent Labs  
  07/15/19 
1111 WBC 12.2*  
CREA 0.60 BUN 7 Frequency of BMP: daily x 3 Height: 174 cm Weight: 79.4 kg Est CrCl: 26.22 ml/min; UO: -- ml/kg/hr Temp (24hrs), Av.8 °F (37.1 °C), Min:98.3 °F (36.8 °C), Max:99.2 °F (37.3 °C) Cultures: 
7/15 blood & urine - pending Goal trough = 15 - 20 mcg/mL Therapy will be initiated with a loading dose of 2000 mg IV x 1 to be followed by a maintenance dose of 1250 mg IV every 8 hours. Pharmacy to follow patient daily and order levels / make dose adjustments as appropriate.

## 2019-07-16 NOTE — PROGRESS NOTES
Bedside shift change report given to Kishor3 [place] Pike (oncoming nurse) by Ignacio Swanson (offgoing nurse). Report included the following information SBAR, Kardex, ED Summary, Intake/Output, MAR, Accordion, Recent Results, Med Rec Status, Cardiac Rhythm Sinus Tachycardia, Alarm Parameters  and Quality Measures.

## 2019-07-16 NOTE — PROGRESS NOTES
Problem: Pneumonia: Day 1 Goal: Medications Outcome: Progressing Towards Goal 
  
Patient taking IV antibiotics: levaquin, zosyn, and vancomycin. Problem: Patient Education: Go to Patient Education Activity Goal: Patient/Family Education Outcome: Progressing Towards Goal 
  
Pt educated on fall prevention. Pt demonstrates appropriate understanding. Pt is oriented and calls out appropriately for assistance ambulating. Purposeful hourly rounds initiated by staff. Problem: Falls - Risk of 
Goal: *Absence of Falls Description Document Zeus Maggie Fall Risk and appropriate interventions in the flowsheet. Outcome: Progressing Towards Goal 
 Pt remains free of falls during admission. Call bell and frequently used items within reach. Bedside table within reach. Patient provided non skid socks and instructed to call out for nurse when in need of assistance. Bedside and Verbal shift change report given to Coby Santos (oncoming nurse) by Kim (offgoing nurse). Report included the following information SBAR, Kardex, ED Summary and Quality Measures.

## 2019-07-16 NOTE — PROGRESS NOTES
Spiritual Care Assessment/Progress Note [address] 
 
 
NAME: Mushtaq Rowan      MRN: [id number] AGE: 70 y.o. SEX: female Ep[employer]opalian Affiliation: Shun Blackmon  
Language: [place] 7/16/2019     Total Time (in minutes): 15 Spiritual Assessment begun in [facility] 4 IMCU 2 through conversation with: 
  
    [x]Patient        [x] Family    [] Friend(s) Reason for Consult: Palliative Care, Initial/Spiritual Assessment Spiritual beliefs: (Please include comment if needed) [x] Identifies with a viri tradition: Quaker    
   [] Supported by a viri community:        
   [] Claims no spiritual orientation:       
   [] Seeking spiritual identity:            
   [] Adheres to an individual form of spirituality:       
   [] Not able to assess:                   
 
    
Identified resources for coping:  
   [x] Prayer                           
   [] Music                  [] Guided Imagery [x] Family/friends                 [] Pet visits [] Devotional reading                         [] Unknown 
   [] Other Interventions offered during this visit: (See comments for more details) Patient Interventions: Affirmation of emotions/emotional suffering, Initial/Spiritual assessment, patient floor, Prayer (actual), Prayer (assurance of) Family/Friend(s): Prayer (assurance of), Prayer (actual), Affirmation of emotions/emotional suffering Plan of Care: 
 
 [] Support spiritual and/or cultural needs  
 [] Support AMD and/or advance care planning process    
 [] Support grieving process 
 [] Coordinate Rites and/or Rituals  
 [] Coordination with community clergy [] No spiritual needs identified at this time 
 [] Detailed Plan of Care below (See Comments)  [] Make referral to Music Therapy 
[] Make referral to Pet Therapy    
[] Make referral to Addiction services 
[] Make referral to [facility] 
[] Make referral to Spiritual Care Partner [] No future visits requested       
[x] Follow up visits as needed Comments: Visited with Ms. Glenn Rogers and her sister who was present at bedside. Pt shared that prayer is meaningful to her. Offered prayer and assurance of prayers. Affirmed the presence and support of her sister who lives out of state. Chaplains are available for continued support as needed. Masha Santos, Palliative

## 2019-07-16 NOTE — CONSULTS
Pulmonary, Critical Care, and Sleep Medicine~Consult Note Name: Belkis Taylor MRN: [id number] : 1950 Hospital: [address] Date: 2019 11:15 AM Admission: 7/15/2019 Impression Plan 1. Acute on chronic (on 4lpm normally) hypoxic resp failure secondary to below 2. Abnormal CT scan: HCAP, possibility of bleomycin lung toxicity, but less likely. Other concerns are pulmonary edema. She has risk factors 3. Hx of lymphoma. Not on therapy since the beginning of the year. 4. Hx of PE and was on eliquis 5. Hx of asthma 1. Strep/legionella pending 2. Add mycoplasma 3. ECHO 4. Diuresis 5. On vanc/levaquin/zosyn 6. O2 titration above 90%, on midflow, but might need high flow 7. Low threshold to start steroids again 8. She might need a bronch, but at this time she is to unstable to move forward. 9. Request [facility] records. 10. High risk for rapid decline 11. Discussed with family and RN at bedside Daily Progression: 
 
Consult Note requested by Dr Jada Elizabeth. Patient presented for admission secondary to a one week course of cough, subjective fevers, and malasie. Outpatient PCP starte zpak and she did not have bernadette improvement. No other symptoms reported on this recent course. 7/15/19 CT scan did showed worsening right great than left infiltrates with interlobular septal thickening; no PE. BNP was 534, not very elevated. This past 2019 she was dx with hodgkin's lymphoma. Was on blecyomcin and developed a pneumonitis associated with it. Was started on cellcept 500mg bid and prednisone. Followed in the office at [facility] in April, however transferred her care to [facility] and saw Dr Poncho Hays (sp?). Her last visit with them was this past  and at that visit she was weaned off of the prednisone and cellcept. We do not have those records at this time. I have reviewed the labs and previous days notes. Pertinent items are noted in HPI. Past Medical History:  
Diagnosis Date  Asthma   
 as a child, nothing since menopause  Cancer ([facility] [address] 08/21/2018 Non-Hodgkin's  Cataracts, bilateral   
 Diabetes ([facility] [address] type II  
 Environmental allergies  Fibromyalgia Treated with alternate medical therapy  Hypertension Past Surgical History:  
Procedure Laterality Date  HX BREAST BIOPSY Right 2006  HX COLONOSCOPY  2015  HX GYN Pap 2015  HX ORTHOPAEDIC    
 fractured R ankle/ no surgery  HX OTHER SURGICAL Right 08/27/2018  
 excision of deep neck cervical LN. Prior to Admission medications Medication Sig Start Date End Date Taking? Authorizing Provider  
acetaminophen (TYLENOL) 650 mg suppository Insert 650 mg into rectum every four (4) hours as needed for Pain. Yes Provider, Historical  
amLODIPine (NORVASC) 5 mg tablet Take 5 mg by mouth daily. Yes Provider, Historical  
trimethoprim-sulfamethoxazole (BACTRIM DS) 160-800 mg per tablet Take 1 Tab by mouth every Monday, Wednesday, Friday. Yes Provider, Historical  
metFORMIN (GLUCOPHAGE) 500 mg tablet Take 500 mg by mouth two (2) times daily (with meals). Indications: Per [address] 3/30/19  Yes Provider, Historical  
levalbuterol (XOPENEX) 1.25 mg/0.5 mL nebu 1.25 mg by Nebulization route every four (4) hours as needed. Indications: Per [address] 3/30/19  Yes Provider, Historical  
acetaminophen (TYLENOL ARTHRITIS PAIN) 650 mg TbER Take 650 mg by mouth as needed (Q 6hrs PRN). 3/30/19  Yes Provider, Historical  
apixaban (ELIQUIS) 5 mg tablet Take 1 Tab by mouth every twelve (12) hours. 3/13/19  Yes Cathy Goodman MD  
atorvastatin (LIPITOR) 40 mg tablet TAKE 1 TAB BY MOUTH DAILY. 12/3/17  Yes Cathy Goodman MD  
Oxygen 3 Devices by Nasal route as needed. Indications: SOB PRN    Provider, Historical  
 
No Known Allergies Social History Tobacco Use  Smoking status: Never Smoker  Smokeless tobacco: Never Used Substance Use Topics  Alcohol use: Yes Comment: socially Family History Problem Relation Age of Onset  Heart Disease Mother  Cancer Father   
     prostate cancer  Cancer Sister Breast cancer apparent DCIS  
 Heart Disease Brother Brother  from congestive heart failure secondary to severe mitral disease OBJECTIVE: 
 
 Vital Signs: 
    
Visit Vitals /64 Pulse (!) 129 Temp 98 °F (36.7 °C) Resp 18 Ht 5' 8.5\" (1.74 m) Wt 79.5 kg (175 lb 3.2 oz) SpO2 (!) 86% BMI 26.25 kg/m² Temp (24hrs), Av.6 °F (37 °C), Min:98 °F (36.7 °C), Max:99.2 °F (37.3 °C) Intake/Output:  
  Last shift: 701 - 1900 In: -  
Out: 1000 [Urine:1000] Last 3 shifts:  190 -  0700 In: 56 [P.O.:240; I.V.:250] Out: 2000 [[id number]] Intake/Output Summary (Last 24 hours) at 2019 1115 Last data filed at 2019 1032 Gross per 24 hour Intake 490 ml Output 3000 ml Net -2510 ml Physical Exam:                                       
Exam Findings Other General: No resp distress noted, appears stated age HEENT:  No ulcers, JVD not elevated, no cervical LAD Chest: No pectus deformity, normal chest rise b/l HEART:  RRR, no murmurs/rubs/gallops Lungs:  Notable crackles, diminished BS at bases ABD: Soft/NT, non rigid mildly distended EXT: No cyanosis/clubbing/edema, normal peripheral pulses Skin: No rashes or ulcers, no mottling Neuro: A/O x 3 Medications: 
Current Facility-Administered Medications Medication Dose Route Frequency  dilTIAZem (CARDIZEM) IR tablet 60 mg  60 mg Oral QID  sodium chloride (NS) flush 5-10 mL  5-10 mL IntraVENous PRN  
 sodium chloride (NS) flush 5-40 mL  5-40 mL IntraVENous Q8H  
 sodium chloride (NS) flush 5-40 mL  5-40 mL IntraVENous PRN  
 acetaminophen (TYLENOL) tablet 650 mg  650 mg Oral Q4H PRN  
  ondansetron (ZOFRAN) injection 4 mg  4 mg IntraVENous Q4H PRN  piperacillin-tazobactam (ZOSYN) 3.375 g in 0.9% sodium chloride (MBP/ADV) 100 mL  3.375 g IntraVENous Q8H  
 levoFLOXacin (LEVAQUIN) 750 mg in D5W IVPB  750 mg IntraVENous Q24H  
 insulin lispro (HUMALOG) injection   SubCUTAneous AC&HS  
 glucose chewable tablet 16 g  4 Tab Oral PRN  
 dextrose (D50W) injection syrg 12.5-25 g  12.5-25 g IntraVENous PRN  
 glucagon (GLUCAGEN) injection 1 mg  1 mg IntraMUSCular PRN  
 lactobac ac& pc-s.therm-b.anim (JUNI Q/RISAQUAD)  1 Cap Oral DAILY  atorvastatin (LIPITOR) tablet 40 mg  40 mg Oral QHS  Vancomycin - pharmacy to dose   Other Rx Dosing/Monitoring  vancomycin (VANCOCIN) 1250 mg in  ml infusion  1,250 mg IntraVENous Q8H Labs: ABG Recent Labs  
  07/16/19 
0918 PHI 7.437 PCO2I 33.1*  
PO2I 55* HCO3I 22.3 SO2I 90* FIO2I 0.50 CBC Recent Labs  
  07/16/19 
0124 07/15/19 
1111 WBC 12.1* 12.2* HGB 8.2* 8.5* HCT 26.7* 28.0*  
 388 MCV 97.1 98.9 MCH 29.8 30.0 Metabolic Panel Recent Labs  
  07/15/19 
1111 * K 4.0  
 CO2 27 * BUN 7  
CREA 0.60  
CA 9.4 MG 1.7 ALB 3.0*  
SGOT 23 ALT 15 Pertinent Labs [facility] 
7/16/2019

## 2019-07-16 NOTE — PROGRESS NOTES
Spoke to Dr Bhat [employer]. He will see the patient. Will sign off. Please call if questions.

## 2019-07-16 NOTE — PROGRESS NOTES
Hospital Progress Note NAME:  Almita Ortega :   1950 MRN:  [id number] Date/Time:  2019 8:25 AM 
 
Plan: 1. Iv ab 2. Pul opinion 3. Increase dilt Risk of Deterioration: Low  []           Moderate  []           High  [x] Assessment:  
Principal Problem: 
  Pneumonia (7/15/2019) Active Problems: 
  Chronic hypoxemic respiratory failure ([facility] (2019) Bleomycin lung toxicity (2019) Diabetes mellitus type 2, controlled ([facility] (2016) [facility] (2/3/2019) Admitting notes:68 y.o. female with past medical history significant for htn, fibromyalgia, muscle atrophy, pulmonary fibrosis, asthma, b/l cataracts, dm, non-hodgkin's, who presents from ems with chief complaint of sob. Pt has \"last night\" onset of worsening sob described as \"labored breathing\" that's continued into this morning. She is normally on 4.5 L of O2 n/c at baseline, but had it increased to 5 L by staff. She also endorses a dry non-productive cough and fever (peak 100) at this time. EMS arrived and measured initial O2 sat of 92% and HR of 140 bpm. Denies pleuritic cp and abd pain. Of note, she has had PNA \"x3 times since Ken" and was treated last week with a z-pack for sinus infection symptomatic of congestion. DNR form in place. EMS was called to [place];  bpm, 124/78, and 9 Subjective/interium history Sob continues - non productive cough 11 Point Review of Systems:  
Negative except no cp 
 
[]            Unable to obtain ROS due to:      
[]            mental status change []            sedated []            intubated Social History Tobacco Use  Smoking status: Never Smoker  Smokeless tobacco: Never Used Substance Use Topics  Alcohol use: Yes Comment: socially Medications reviewed: 
Current Facility-Administered Medications Medication Dose Route Frequency  sodium chloride (NS) flush 5-10 mL  5-10 mL IntraVENous PRN  
 sodium chloride (NS) flush 5-40 mL  5-40 mL IntraVENous Q8H  
 sodium chloride (NS) flush 5-40 mL  5-40 mL IntraVENous PRN  
 acetaminophen (TYLENOL) tablet 650 mg  650 mg Oral Q4H PRN  
 ondansetron (ZOFRAN) injection 4 mg  4 mg IntraVENous Q4H PRN  piperacillin-tazobactam (ZOSYN) 3.375 g in 0.9% sodium chloride (MBP/ADV) 100 mL  3.375 g IntraVENous Q8H  
 levoFLOXacin (LEVAQUIN) 750 mg in D5W IVPB  750 mg IntraVENous Q24H  
 insulin lispro (HUMALOG) injection   SubCUTAneous AC&HS  
 glucose chewable tablet 16 g  4 Tab Oral PRN  
 dextrose (D50W) injection syrg 12.5-25 g  12.5-25 g IntraVENous PRN  
 glucagon (GLUCAGEN) injection 1 mg  1 mg IntraMUSCular PRN  
 lactobac ac& pc-s.therm-b.anim (JUNI Q/RISAQUAD)  1 Cap Oral DAILY  atorvastatin (LIPITOR) tablet 40 mg  40 mg Oral QHS  dilTIAZem (CARDIZEM) IR tablet 30 mg  30 mg Oral TIDAC  Vancomycin - pharmacy to dose   Other Rx Dosing/Monitoring  vancomycin (VANCOCIN) 1250 mg in  ml infusion  1,250 mg IntraVENous Q8H Objective:  
Vitals: 
Visit Vitals BP (!) 175/108 (BP 1 Location: Right arm, BP Patient Position: At rest) Comment: notfied beckie RN  
Pulse (!) 146 Temp 98.4 °F (36.9 °C) Resp (!) 33 Wt 175 lb 3.2 oz (79.5 kg) SpO2 90% BMI 26.25 kg/m² Temp (24hrs), Av.8 °F (37.1 °C), Min:98.3 °F (36.8 °C), Max:99.2 °F (37.3 °C) O2 Flow Rate (L/min): 6 l/min O2 Device: Nasal cannula Last 24hr Input/Output: 
 
Intake/Output Summary (Last 24 hours) at 2019 0825 Last data filed at 7/15/2019 2000 Gross per 24 hour Intake 490 ml Output 300 ml Net 190 ml PHYSICAL EXAM: 
General:    Alert, cooperative, no distress, appears stated age. Head:   Normocephalic, without obvious abnormality, atraumatic. Eyes:   Conjunctivae/corneas clear. PERRLA Nose:  Nares normal. No drainage or sinus tenderness. Throat:    Lips, mucosa, and tongue normal.  No Thrush Neck:  Supple, symmetrical,  no adenopathy, thyroid: non tender 
  no carotid bruit and no JVD. Back:    Symmetric,  No CVA tenderness. Lungs:   Decreased bs with rales. Chest wall:  No tenderness or deformity. No Accessory muscle use. Heart:   Regular rate and rhythm,  no murmur, rub or gallop. Abdomen:   Soft, non-tender. Not distended. Bowel sounds normal. No masses. Lab Data Reviewed: 
 
Recent Labs  
  07/16/19 
0124 07/15/19 
1111 WBC 12.1* 12.2* HGB 8.2* 8.5* HCT 26.7* 28.0*  
 388 Recent Labs  
  07/15/19 
1111 * K 4.0  
 CO2 27 * BUN 7  
CREA 0.60  
CA 9.4 MG 1.7 ALB 3.0* TBILI 0.3 SGOT 23 ALT 15 Lab Results Component Value Date/Time Glucose (POC) 155 (H) 07/16/2019 06:47 AM  
 Glucose (POC) 154 (H) 07/15/2019 09:17 PM  
 Glucose (POC) 105 (H) 07/15/2019 05:12 PM  
 Glucose (POC) 144 (H) 03/13/2019 11:08 AM  
 Glucose (POC) 113 (H) 03/13/2019 08:18 AM  
 
 
___________________________________________________ 
___________________________________________________ Attending Physician: Geremias Munoz MD

## 2019-07-16 NOTE — WOUND CARE
WOCN Note:  
 
New consult placed by RN for: healed pressure ulcer on sacrum / POA / slightly open. Chart shows: 
Admitted for pneumonia; history of : CAP / SVT / HTN / DM / dyslipidemia / obesity / clavicular mass / lymphadenopathy / ARF / leg swelling / Hodgkins lymphoma. WBC = 12.1 On = 7-11-19 Admitted from [place]. Assessment:  
Patient is A&O x 3,  communicative, incontinent and not mobile 2 assists in repositioning unable to turn independently for assessment. Patient attempts a partial turn. Patient wearing briefs for incontinence and using pure wick. Bed: [facility] Bed Diet: cardiac regular Patient reports no pain with current assessment. Bilateral heels and sacral skin intact and without erythema. Heels offloaded on pillows. POA: left buttock chaffing: NOT pressure:patient incontinent and skin stays moist in brief. Area covered is: 6.0cm x 4.0cm x <0.1cm / 100% pink dry skin. Applied Aloe Vesta Cream 
 
 
Recommendations:   
Left and right buttock with turning: apply aloe vesta cream. 3x daily and PRN incontinence. Minimize layers of linen/pads under patient to optimize support surface. Turn/reposition approximately every 2 hours and offload heels. Manage incontinence / promote continence; Aloe Vesta to buttocks and sacrum daily and as needed with incontinence care. Specialty bed: [facility] Discussed above plan with patient and RN / Sivan Fernandez. Transition of Care: Plan to follow weekly and as needed while admitted to hospital.  
 
Emir ÁLVAREZ RN Wound Care Department Office: [phone] Pager: [phone]

## 2019-07-17 NOTE — PROGRESS NOTES
Patient is stable, oxygen saturations above 92% on midflow. Her heart remains tachycardic. Cardizem held because systolic was less than 670. Will continue to monitor. Problem: Falls - Risk of  Goal: *Absence of Falls  Description  Document Nicci Fitzpatrick Fall Risk and appropriate interventions in the flowsheet. Outcome: Progressing Towards Goal  Pt remains free of falls during admission. Call bell and frequently used items within reach. Bedside table within reach. Patient provided non skid socks and instructed to call out for nurse when in need of assistance. Problem: Patient Education: Go to Patient Education Activity  Goal: Patient/Family Education  Outcome: Progressing Towards Goal     Last 3 Recorded Weights in this Encounter    07/15/19 1646 07/16/19 0330 07/17/19 0305   Weight: 79.4 kg (175 lb) 79.5 kg (175 lb 3.2 oz) 77.4 kg (170 lb 10.2 oz)     Bedside and Verbal shift change report given to chivo (oncoming nurse) by [facility] (offgoing nurse). Report included the following information SBAR, Kardex and Quality Measures.

## 2019-07-17 NOTE — CDMP QUERY
#2    Pt admitted withPneumonia/Pt noted to have recent antibiotic    If possible, please document in the progress notes and d/c summary if you are evaluating and / or treating any of the following:     Gram negative pneumonia    Gram positive pneumoniaa   Bacterial pneumonia    Other (please specify)   Unknown      The medical record reflects the following:   Risk Factors: recent antibiotics   Clinical Indicators:   Fever  CXR  Increased right greater than left airspace disease  H/P-Shortness of breath, cough secondary to pneumonia. Currently, I will treat the patient as healthcare-associated pneumonia with vancomycin, Zosyn, and Levaquin     Treatment: vancomycin IV, levaquin IV, zosyn IV    Note: CAP, HAP, and HCAP indicate where the pneumonia was acquired, not a specific type.       Thank you,         Arnaud Mckinley [facility]

## 2019-07-17 NOTE — PROGRESS NOTES
CM reviewed case with treatment team during [facility] Interdisciplinary Rounds. Patient continues with oxygen mid flow 15 l/m. Plan is for Palliative consult today to clarify goals of care wich possibly could include resumption of home hospice services. CM to continue to follow for discharge planning needs.

## 2019-07-17 NOTE — PROGRESS NOTES
1945 Bedside and Verbal shift change report given to Reji Hall, RN (oncoming nurse) by [facility], RN (offgoing nurse). Report included the following information SBAR, Kardex, Intake/Output, MAR and Recent Results. Problem: Pneumonia: Day 2  Goal: Off Pathway (Use only if patient is Off Pathway)  Outcome: Progressing Towards Goal  Note:   Pt received IV abx. Pt still has tachypnea, O2 sats between 89-96%. Pt desats with movement but recovers after a minute or two. Pt remains bedrest due to respiratory status. Problem: Falls - Risk of  Goal: *Absence of Falls  Description  [facility] and appropriate interventions in the flowsheet.   Outcome: Progressing Towards Goal  Note:   Fall Risk Interventions:            Medication Interventions: Evaluate medications/consider consulting pharmacy, Patient to call before getting OOB    Elimination Interventions: Call light in reach, Patient to call for help with toileting needs, Toileting schedule/hourly rounds

## 2019-07-17 NOTE — PROGRESS NOTES
Hospital Progress Note    NAME:  Rony Bonilla   :   1950   MRN:  [id number]     Date/Time:  2019 8:25 AM    Plan:   1. Iv ab  2. High flow o2  3. St eval  4. Obtain records uva  Risk of Deterioration: Low  []           Moderate  []           High  [x]                 Assessment:   Principal Problem:    Pneumonia (7/15/2019)    Active Problems:    Acute on Chronic hypoxemic respiratory failure ([facility] [address] (2019)      Bleomycin lung toxicity (2019)      Diabetes mellitus type 2, controlled ([facility] [address] (2016)      Hodgkin lymphoma ([facility] (2/3/2019)        [de-identified] notes:68 y.o. female with past medical history significant for htn, fibromyalgia, muscle atrophy, pulmonary fibrosis, asthma, b/l cataracts, dm, non-hodgkin's, who presents from ems with chief complaint of sob. Pt has \"last night\" onset of worsening sob described as \"labored breathing\" that's continued into this morning. She is normally on 4.5 L of O2 n/c at baseline, but had it increased to 5 L by staff. She also endorses a dry non-productive cough and fever (peak 100) at this time. EMS arrived and measured initial O2 sat of 92% and HR of 140 bpm. Denies pleuritic cp and abd pain. Of note, she has had PNA \"x3 times since Ken" and was treated last week with a z-pack for sinus infection symptomatic of congestion. DNR form in place.  EMS was called to [place];  bpm, 124/78, and 9            Subjective/interium history     Sob continues - non productive cough less - anorexia  11 Point Review of Systems:   Negative except no cp    []            Unable to obtain ROS due to:       []            mental status change []            sedated []            intubated     Social History     Tobacco Use    Smoking status: Never Smoker    Smokeless tobacco: Never Used   Substance Use Topics    Alcohol use: Yes     Comment: socially     Medications reviewed:  Current Facility-Administered Medications   Medication Dose Route Frequency    dilTIAZem (CARDIZEM) IR tablet 60 mg  60 mg Oral QID    vancomycin (VANCOCIN) 1250 mg in  ml infusion  1,250 mg IntraVENous Q12H    ALPRAZolam (XANAX) tablet 0.25 mg  0.25 mg Oral Q4H PRN    sodium chloride (NS) flush 5-10 mL  5-10 mL IntraVENous PRN    sodium chloride (NS) flush 5-40 mL  5-40 mL IntraVENous Q8H    sodium chloride (NS) flush 5-40 mL  5-40 mL IntraVENous PRN    acetaminophen (TYLENOL) tablet 650 mg  650 mg Oral Q4H PRN    ondansetron (ZOFRAN) injection 4 mg  4 mg IntraVENous Q4H PRN    piperacillin-tazobactam (ZOSYN) 3.375 g in 0.9% sodium chloride (MBP/ADV) 100 mL  3.375 g IntraVENous Q8H    levoFLOXacin (LEVAQUIN) 750 mg in D5W IVPB  750 mg IntraVENous Q24H    insulin lispro (HUMALOG) injection   SubCUTAneous AC&HS    glucose chewable tablet 16 g  4 Tab Oral PRN    dextrose (D50W) injection syrg 12.5-25 g  12.5-25 g IntraVENous PRN    glucagon (GLUCAGEN) injection 1 mg  1 mg IntraMUSCular PRN    lactobac ac& pc-s.therm-b.anim (JUNI Q/RISAQUAD)  1 Cap Oral DAILY    atorvastatin (LIPITOR) tablet 40 mg  40 mg Oral QHS    Vancomycin - pharmacy to dose   Other Rx Dosing/Monitoring        Objective:   Vitals:  Visit Vitals  /76 (BP 1 Location: Left arm, BP Patient Position: At rest)   Pulse (!) 121   Temp 99.2 °F (37.3 °C)   Resp 27   Ht 5' 8.5\" (1.74 m)   Wt 170 lb 10.2 oz (77.4 kg)   SpO2 93%   BMI 25.56 kg/m²     Temp (24hrs), Av.8 °F (37.1 °C), Min:98 °F (36.7 °C), Max:99.4 °F (37.4 °C)    O2 Flow Rate (L/min): 15 l/min O2 Device: Hi flow nasal cannula(mid)    Last 24hr Input/Output:    Intake/Output Summary (Last 24 hours) at 2019 0736  Last data filed at 2019 2326  Gross per 24 hour   Intake 600 ml   Output 2550 ml   Net -1950 ml        PHYSICAL EXAM:  General:    Alert, cooperative, no distress, appears stated age. Head:   Normocephalic, without obvious abnormality, atraumatic. Eyes:   Conjunctivae/corneas clear.   PERRLA  Nose:  Nares normal. No drainage or sinus tenderness. Throat:    Lips, mucosa, and tongue normal.  No Thrush  Neck:  Supple, symmetrical,  no adenopathy, thyroid: non tender    no carotid bruit and no JVD. Back:    Symmetric,  No CVA tenderness. Lungs:   Decreased bs with rales. Chest wall:  No tenderness or deformity. No Accessory muscle use. Heart:   Regular rate and rhythm,  no murmur, rub or gallop. Abdomen:   Soft, non-tender. Not distended. Bowel sounds normal. No masses.        Lab Data Reviewed:    Recent Labs     07/16/19  0124 07/15/19  1111   WBC 12.1* 12.2*   HGB 8.2* 8.5*   HCT 26.7* 28.0*    388     Recent Labs     07/17/19  0100 07/15/19  1111    135*   K 3.3* 4.0    102   CO2 26 27   * 118*   BUN 6 7   CREA 0.47* 0.60   CA 9.0 9.4   MG  --  1.7   ALB  --  3.0*   TBILI  --  0.3   SGOT  --  23   ALT  --  15     Lab Results   Component Value Date/Time    Glucose (POC) 144 (H) 07/17/2019 06:43 AM    Glucose (POC) 146 (H) 07/16/2019 09:34 PM    Glucose (POC) 160 (H) 07/16/2019 04:30 PM    Glucose (POC) 178 (H) 07/16/2019 11:50 AM    Glucose (POC) 155 (H) 07/16/2019 06:47 AM       ___________________________________________________  ___________________________________________________    Attending Physician: Akbar Geiger MD

## 2019-07-17 NOTE — PROGRESS NOTES
Bedside shift change report given to [facility] (oncoming nurse) by Nitish Tucker  (offgoing nurse). Report included the following information SBAR, Kardex and Intake/Output.

## 2019-07-17 NOTE — CONSULTS
Palliative Medicine Consult  [place]akan: [phone] ([phone])    Patient Name: José Miguel Small  YOB: 1950    Date of Initial Consult: July 17, 2019  Reason for Consult: Care Decisions  Requesting Provider: Arnaud Jackson  Primary Care Physician: Shayy Clark MD     SUMMARY:   José Miguel Small is a 70 y.o. with a past history of Lymphoma (chemo) followed at [facility], HTN, fibromyalgia, muscle atrophy, pulmonary fibrosis, asthma, cataracts, DM, chronic respiratory failure, recurrent pna, sinus infection,  who was admitted on 7/15/2019 from [place] with a diagnosis of PNA, acute on chronic hypoxemic respiratory failure, bleomycin lung toxicity. Pt had 7 rounds of chemo and then developed significant side effects. DNR with Hospice services at the facility [facility]) revoked benefit for hospitalization. Current medical issues leading to Palliative Medicine involvement include: support with care decisions. 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 PALLIATIVE DIAGNOSES:   1. Shortness of breath  2. Hypoxemia  3. Leg swelling   4. Wheel chair bound     PLAN:   1. Pt seen with sisterMando at the bedside. Services introduced. 2. Reviewed patient's acute condition and discussed progression of illness to date  3. Pt and family have a clear understanding of the patient's condition. 4. We discussed pt wishes at this time. Pt shared her desire for aggressive rehab to regain function loss  5. Pt has been in inpatient and skilled level rehabs before transitioning to hospice. Pt says she was not clear of the philosophy of hospice upon enrollment and did not anticipate the discontinuation of physical therapy  6.  Explained that she has the right to elect therapy and encouraged pt to evaluate her goals to ensure they are realistic  7. Because of the degree of debility (w/c bound with marcella lift transfer) I explained that additional therapy will not be enough to help her walk again  8. Explained that she would not be able to tolerate aggressive therapy with her lung disease   9. Recommended reconsidering hospice. We could certainly follow outpatient but would not be as available as hospice  10. Pt to reflect on the conversation and discuss with additional family  6. ACP: no AMD on file. Pt willing to complete prior to dc  12. Will continue to follow   13. Continue current level of care  14. Initial consult note routed to primary continuity provider and/or primary health care team members  15. Communicated plan of care with: Palliative IDTAndie 192 Team     GOALS OF CARE / TREATMENT PREFERENCES:     GOALS OF CARE:  Patient/Health Care Proxy Stated Goals: Rehabilitation    TREATMENT PREFERENCES:   Code Status: DNR    Advance Care Planning:  [x] The [facility] Interdisciplinary Team has updated the ACP Navigator with Health Care Decision Maker and Patient Capacity    Siblings Maisha Resendez and Disha Aguilera Planning 7/15/2019   Patient's Healthcare Decision Maker is: -   Primary Decision Maker Name -   Primary Decision Maker Phone Number -   Primary Decision Maker Relationship to Patient -   Confirm Advance Directive None   Patient Would Like to Complete Advance Directive Yes   Does the patient have other document types Do Not Resuscitate       Medical Interventions: Limited additional interventions     Other Instructions:   Artificially Administered Nutrition: No feeding tube     Other:    As far as possible, the palliative care team has discussed with patient / health care proxy about goals of care / treatment preferences for patient.      HISTORY:     History obtained from: chart, pt, team, family    CHIEF COMPLAINT: pt admitted with aforementioned issues    HPI/SUBJECTIVE:    The patient is:   [x] Verbal and participatory  [] Non-participatory due to:   Denies pain, dyspnea managed with mask     Clinical Pain Assessment (nonverbal scale for severity on nonverbal patients):   Clinical Pain Assessment  Severity: 0          Duration: for how long has pt been experiencing pain (e.g., 2 days, 1 month, years)  Frequency: how often pain is an issue (e.g., several times per day, once every few days, constant)     FUNCTIONAL ASSESSMENT:     Palliative Performance Scale (PPS):  PPS: 30       PSYCHOSOCIAL/SPIRITUAL SCREENING:     Palliative IDT has assessed this patient for cultural preferences / practices and a referral made as appropriate to needs (Cultural Services, Patient Advocacy, Ethics, etc.)    Any spiritual / Anabaptist concerns:  [] Yes /  [x] No    Caregiver Burnout:  [] Yes /  [x] No /  [] No Caregiver Present      Anticipatory grief assessment:   [x] Normal  / [] Maladaptive       ESAS Anxiety: Anxiety: 0    ESAS Depression:          REVIEW OF SYSTEMS:     Positive and pertinent negative findings in ROS are noted above in HPI. The following systems were [x] reviewed / [] unable to be reviewed as noted in HPI  Other findings are noted below. Systems: constitutional, ears/nose/mouth/throat, respiratory, gastrointestinal, genitourinary, musculoskeletal, integumentary, neurologic, psychiatric, endocrine. Positive findings noted below. Modified ESAS Completed by: provider   Fatigue: 3 Drowsiness: 0     Pain: 0   Anxiety: 0 Nausea: 0     Dyspnea: 5                    PHYSICAL EXAM:     From RN flowsheet:  Wt Readings from Last 3 Encounters:   07/17/19 170 lb 10.2 oz (77.4 kg)   02/09/19 238 lb 8.6 oz (108.2 kg)   01/21/19 180 lb (81.6 kg)     Blood pressure 148/86, pulse (!) 127, temperature 99 °F (37.2 °C), resp. rate 30, height 5' 8.5\" (1.74 m), weight 170 lb 10.2 oz (77.4 kg), SpO2 98 %.     Pain Scale 1: Numeric (0 - 10)  Pain Intensity 1: 0                 Last bowel movement, if known:     Constitutional: alert, nad, conversant  Eyes: pupils equal, anicteric  ENMT: no nasal discharge, moist mucous membranes  Cardiovascular:distal pulses intact, leg swelling  Respiratory: breathing not labored, symmetric  Gastrointestinal: soft non-tender, +bowel sounds  Musculoskeletal: no deformity, no tenderness to palpation, atrophy   Skin: warm, dry  Neurologic: following commands, moving all extremities, debility   Psychiatric: full affect, no hallucinations  Other:       HISTORY:     Principal Problem:    Pneumonia (7/15/2019)    Active Problems:    Chronic hypoxemic respiratory failure ([address] (2019)      Bleomycin lung toxicity (2019)      Diabetes mellitus type 2, controlled ([address] (2016)      Hodgkin lymphoma ([address] (2/3/2019)      Past Medical History:   Diagnosis Date    Asthma     as a child, nothing since menopause    Cancer ([address] 2018    Non-Hodgkin's    Cataracts, bilateral     Diabetes ([address]     type II    Environmental allergies     Fibromyalgia     Treated with alternate medical therapy    Hypertension       Past Surgical History:   Procedure Laterality Date    HX BREAST BIOPSY Right 2006    HX COLONOSCOPY  2015    HX GYN      Pap     HX ORTHOPAEDIC      fractured R ankle/ no surgery    HX OTHER SURGICAL Right 2018    excision of deep neck cervical LN. Family History   Problem Relation Age of Onset    Heart Disease Mother     Cancer Father         prostate cancer    Cancer Sister         Breast cancer apparent DCIS    Heart Disease Brother         Brother  from congestive heart failure secondary to severe mitral disease      History reviewed, no pertinent family history.   Social History     Tobacco Use    Smoking status: Never Smoker    Smokeless tobacco: Never Used   Substance Use Topics    Alcohol use: Yes     Comment: socially     No Known Allergies   Current Facility-Administered Medications   Medication Dose Route Frequency    furosemide (LASIX) injection 20 mg  20 mg IntraVENous ONCE    potassium chloride SR (KLOR-CON 10) tablet 20 mEq  20 mEq Oral ONCE    apixaban (ELIQUIS) tablet 5 mg  5 mg Oral BID    dilTIAZem (CARDIZEM) IR tablet 60 mg  60 mg Oral QID    vancomycin (VANCOCIN) 1250 mg in  ml infusion  1,250 mg IntraVENous Q12H    sodium chloride (NS) flush 5-10 mL  5-10 mL IntraVENous PRN    sodium chloride (NS) flush 5-40 mL  5-40 mL IntraVENous Q8H    sodium chloride (NS) flush 5-40 mL  5-40 mL IntraVENous PRN    acetaminophen (TYLENOL) tablet 650 mg  650 mg Oral Q4H PRN    ondansetron (ZOFRAN) injection 4 mg  4 mg IntraVENous Q4H PRN    piperacillin-tazobactam (ZOSYN) 3.375 g in 0.9% sodium chloride (MBP/ADV) 100 mL  3.375 g IntraVENous Q8H    levoFLOXacin (LEVAQUIN) 750 mg in D5W IVPB  750 mg IntraVENous Q24H    insulin lispro (HUMALOG) injection   SubCUTAneous AC&HS    glucose chewable tablet 16 g  4 Tab Oral PRN    dextrose (D50W) injection syrg 12.5-25 g  12.5-25 g IntraVENous PRN    glucagon (GLUCAGEN) injection 1 mg  1 mg IntraMUSCular PRN    lactobac ac& pc-s.therm-b.anim (JUNI Q/RISAQUAD)  1 Cap Oral DAILY    atorvastatin (LIPITOR) tablet 40 mg  40 mg Oral QHS    Vancomycin - pharmacy to dose   Other Rx Dosing/Monitoring          LAB AND IMAGING FINDINGS:     Lab Results   Component Value Date/Time    WBC 12.1 (H) 07/16/2019 01:24 AM    HGB 8.2 (L) 07/16/2019 01:24 AM    PLATELET 935 16/18/8571 01:24 AM     Lab Results   Component Value Date/Time    Sodium 138 07/17/2019 01:00 AM    Potassium 3.3 (L) 07/17/2019 01:00 AM    Chloride 102 07/17/2019 01:00 AM    CO2 26 07/17/2019 01:00 AM    BUN 6 07/17/2019 01:00 AM    Creatinine 0.47 (L) 07/17/2019 01:00 AM    Calcium 9.0 07/17/2019 01:00 AM    Magnesium 1.7 07/15/2019 11:11 AM      Lab Results   Component Value Date/Time    AST (SGOT) 23 07/15/2019 11:11 AM    Alk.  phosphatase 116 07/15/2019 11:11 AM    Protein, total 6.5 07/15/2019 11:11 AM    Albumin 3.0 (L) 07/15/2019 11:11 AM    Globulin 3.5 07/15/2019 11:11 AM     Lab Results   Component Value Date/Time    INR 1.3 (H) 02/05/2019 03:07 AM    Prothrombin time 13.1 (H) 02/05/2019 03:07 AM    aPTT 30.2 08/27/2018 08:15 AM      Lab Results   Component Value Date/Time    Iron 24 (L) 07/16/2019 01:24 AM    TIBC 200 (L) 07/16/2019 01:24 AM    Iron % saturation 12 (L) 07/16/2019 01:24 AM      No results found for: PH, PCO2, PO2  No components found for: GLPOC   No results found for: CPK, CKMB             Total time: 70 minutes  Counseling / coordination time, spent as noted above: 60 minutes  > 50% counseling / coordination?: y    Prolonged service was provided for  []30 min   []75 min in face to face time in the presence of the patient, spent as noted above. Time Start:   Time End:   Note: this can only be billed with 84963 (initial) or 31072 (follow up). If multiple start / stop times, list each separately.

## 2019-07-17 NOTE — PROGRESS NOTES
Problem: Dysphagia (Adult)  Goal: *Acute Goals and Plan of Care (Insert Text)  Description  Speech therapy goals  Initiated 7/17/2019   1. Patient will tolerate full liquid diet without s/s of aspiration within 7 days   2. Patient will tolerate solid trials with SLP as respiratory status allows within 7 days    Outcome: Progressing Towards Goal     SPEECH LANGUAGE PATHOLOGY BEDSIDE SWALLOW EVALUATION  Patient: Marylee Eon (10 y.o. female)  Date: 7/17/2019  Primary Diagnosis: Pneumonia [J18.9]        Precautions: aspiration        ASSESSMENT :  Based on the objective data described below, the patient presents with no oral or pharyngeal dysphagia, however, increased risks for aspiration are certainly present given respiratory status. RR ranges from mid 20's to high 30's at rest which decreases ability to coordinate the apneic phase of the swallow. Education provided to patient and sister at length regarding the relationship between swallowing and elevated respiratory rate and compensatory strategies to increase safety with intake. Introduced energy conservation strategies and importance of slow rate and small volumes and frequent rest breaks. They both verbalized understanding and patient independently demonstrated back strategies. Solids were not assessed due to concerns related to respiratory status. Patient will benefit from skilled intervention to address the above impairments. Patients rehabilitation potential is considered to be Fair  Factors which may influence rehabilitation potential include:   ? None noted  ? Mental ability/status  ? Medical condition  ? Home/family situation and support systems  ? Safety awareness  ? Pain tolerance/management  ? Other:      PLAN :  Recommendations and Planned Interventions:  --recommend full liquid diet - bites and sips for comfort with strict use of aspiration precautions.   Small amounts at a time - frequent rest breaks. Hold if elevated RR and increased work of breathing are noted or if any decline in respiratory status. --will follow for diet tolerance and consideration of upgrade as respiratory status allows   Frequency/Duration: Patient will be followed by speech-language pathology 3 times a week to address goals. Discharge Recommendations: To Be Determined     SUBJECTIVE:   Patient stated it's pretty dry in there. Referring to her mouth during oral motor exam.  Sister present, stating \"it's about time, we've been waiting for you all day\" on arrival despite it only being 10:15am.     OBJECTIVE:     Past Medical History:   Diagnosis Date    Asthma     as a child, nothing since menopause    Cancer ([facility] 08/21/2018    Non-Hodgkin's    Cataracts, bilateral     Diabetes ([facility]     type II    Environmental allergies     Fibromyalgia     Treated with alternate medical therapy    Hypertension      Past Surgical History:   Procedure Laterality Date    HX BREAST BIOPSY Right 2006    HX COLONOSCOPY  2015    HX GYN      Pap 2015    HX ORTHOPAEDIC      fractured R ankle/ no surgery    HX OTHER SURGICAL Right 08/27/2018    excision of deep neck cervical LN.      Prior Level of Function/Home Situation:   Home Situation  Home Environment: [address] Name: [facility] ([address])  One/Two Story Residence: One story  Living Alone: No  Support Systems: Assisted living, Friends \ neighbors, Skilled nursing facility  Patient Expects to be Discharged to[de-identified] Assisted living  Current DME Used/Available at Home: Wheelchair  Diet prior to admission: regular   Current Diet:  NPO    Cognitive and Communication Status:  Neurologic State: Alert  Orientation Level: Oriented X4  Cognition: Follows commands  Perception: Appears intact  Perseveration: No perseveration noted  Safety/Judgement: Not assessed  Oral Assessment:  Oral Assessment  Labial: No impairment  Dentition: Natural  Oral Hygiene: dry mucosa   Lingual: No impairment  Velum: Unable to visualize  Mandible: No impairment  P.O. Trials:  Patient Position: upright in bed   Vocal quality prior to P.O.: No impairment  Consistency Presented: Thin liquid; Ice chips;Puree  How Presented: SLP-fed/presented;Spoon;Straw     Bolus Acceptance: No impairment  Bolus Formation/Control: No impairment     Propulsion: No impairment  Oral Residue: None  Initiation of Swallow: No impairment  Laryngeal Elevation: Functional  Aspiration Signs/Symptoms: None  Pharyngeal Phase Characteristics: No impairment, issues, or problems         Comments: respiratory status increases risks for aspiration     Oral Phase Severity: No impairment  Pharyngeal Phase Severity : No impairment    Treatment:   Extensive education provided regarding respiratory status and relationship between swallowing and respiratory rate and increased risks for aspiration as a result. Introduced compensatory strategies for safe intake with specific focus on energy conservation, frequent rest breaks, single sips, slow rate, upright position etc.  Patient demonstrated back appropriate use of strategies. NOMS:   The NOMS functional outcome measure was used to quantify this patient's level of swallowing impairment. Based on the NOMS, the patient was determined to be at level 3 for swallow function       NOMS Swallowing Levels:  Level 1 (CN): NPO  Level 2 (CM): NPO but takes consistency in therapy  Level 3 (CL): Takes less than 50% of nutrition p.o. and continues with nonoral feedings; and/or safe with mod cues; and/or max diet restriction  Level 4 (CK): Safe swallow but needs mod cues; and/or mod diet restriction; and/or still requires some nonoral feeding/supplements  Level 5 (CJ): Safe swallow with min diet restriction; and/or needs min cues  Level 6 (CI): Independent with p.o.; rare cues; usually self cues; may need to avoid some foods or needs extra time  Level 7 [facility]): Independent for all p.o.  ANGELIQUE. (2003). National Outcomes Measurement System (NOMS): Adult Speech-Language Pathology User's Guide. Pain:  Pain Scale 1: Numeric (0 - 10)  Pain Intensity 1: 0     After treatment:   ?            Patient left in no apparent distress sitting up in chair  ? Patient left in no apparent distress in bed  ? Call bell left within reach  ? Nursing notified  ? Caregiver present  ? Bed alarm activated    COMMUNICATION/EDUCATION:   The patients plan of care including recommendations, planned interventions, and recommended diet changes were discussed with: Registered Nurse and PA . ? Patient/family have participated as able in goal setting and plan of care. ?            Patient/family agree to work toward stated goals and plan of care. ?            Patient understands intent and goals of therapy, but is neutral about his/her participation. ? Patient is unable to participate in goal setting and plan of care. Thank you for this referral.  Yoel Nieto M.CD.  CCC-SLP   Time Calculation: 20 mins

## 2019-07-17 NOTE — PROGRESS NOTES
Pulmonary, Critical Care, and Sleep Medicine~Progress Note    Name: Mir Jones MRN: [id number]   : 1950 Hospital: [address]   Date: 2019 11:15 AM Admission: 7/15/2019     Impression Plan   1. Acute on chronic (on 4lpm normally) hypoxic resp failure secondary to below  2. Abnormal CT scan: HCAP, possibility of bleomycin lung toxicity, but less likely. Other concerns are pulmonary edema. She has risk factors  3. Hx of lymphoma. Not on therapy since the beginning of the year. 4. Hx of PE and was on eliquis   5. Hx of asthma 1. Strep/legionella pending  2. mycoplasma pending   3. ECHO pending  4. Diuresis; will give an extra dose this afternoon again  5. Chest film today   6. On vanc/levaquin/zosyn   7. O2 titration above 90%, on midflow, but might need high flow (order is in if needed)   8. Will start steroids if no improvement today  9. She might need a bronch, but at this time she is to unstable to move forward. 10. Request [facility] records. 11. High risk for rapid decline   12. Discussed with family and RN at bedside      Daily Progression:      Feels possibly a little better. proBNP elevated today       Consult Note requested by Dr Reji Wilson. Patient presented for admission secondary to a one week course of cough, subjective fevers, and malasie. Outpatient PCP starte zpak and she did not have bernadette improvement. No other symptoms reported on this recent course. 7/15/19 CT scan did showed worsening right great than left infiltrates with interlobular septal thickening; no PE. BNP was 534, not very elevated. This past 2019 she was dx with hodgkin's lymphoma. Was on blecyomcin and developed a pneumonitis associated with it. Was started on cellcept 500mg bid and prednisone. Followed in the office at [facility] in April, however transferred her care to [facility] and saw Dr Almeta Mcburney (sp?).  Her last visit with them was this past  and at that visit she was weaned off of the prednisone and cellcept. We do not have those records at this time. I have reviewed the labs and previous days notes. Pertinent items are noted in HPI. Past Medical History:   Diagnosis Date    Asthma     as a child, nothing since menopause    Cancer ([facility] [address] 08/21/2018    Non-Hodgkin's    Cataracts, bilateral     Diabetes ([facility] [address]     type II    Environmental allergies     Fibromyalgia     Treated with alternate medical therapy    Hypertension       Past Surgical History:   Procedure Laterality Date    HX BREAST BIOPSY Right 2006    HX COLONOSCOPY  2015    HX GYN      Pap 2015    HX ORTHOPAEDIC      fractured R ankle/ no surgery    HX OTHER SURGICAL Right 08/27/2018    excision of deep neck cervical LN. Prior to Admission medications    Medication Sig Start Date End Date Taking? Authorizing Provider   acetaminophen (TYLENOL) 650 mg suppository Insert 650 mg into rectum every four (4) hours as needed for Pain. Yes Provider, Historical   amLODIPine (NORVASC) 5 mg tablet Take 5 mg by mouth daily. Yes Provider, Historical   trimethoprim-sulfamethoxazole (BACTRIM DS) 160-800 mg per tablet Take 1 Tab by mouth every Monday, Wednesday, Friday. Yes Provider, Historical   metFORMIN (GLUCOPHAGE) 500 mg tablet Take 500 mg by mouth two (2) times daily (with meals). Indications: Per [address] 3/30/19  Yes Provider, Historical   levalbuterol (XOPENEX) 1.25 mg/0.5 mL nebu 1.25 mg by Nebulization route every four (4) hours as needed. Indications: Per [address] 3/30/19  Yes Provider, Historical   acetaminophen (TYLENOL ARTHRITIS PAIN) 650 mg TbER Take 650 mg by mouth as needed (Q 6hrs PRN). 3/30/19  Yes Provider, Historical   apixaban (ELIQUIS) 5 mg tablet Take 1 Tab by mouth every twelve (12) hours. 3/13/19  Yes Billie Inman MD   atorvastatin (LIPITOR) 40 mg tablet TAKE 1 TAB BY MOUTH DAILY.  12/3/17  Yes Billie Inman MD   Oxygen 3 Devices by Nasal route as needed. Indications: SOB PRN    Provider, Historical     No Known Allergies   Social History     Tobacco Use    Smoking status: Never Smoker    Smokeless tobacco: Never Used   Substance Use Topics    Alcohol use: Yes     Comment: socially      Family History   Problem Relation Age of Onset    Heart Disease Mother     Cancer Father         prostate cancer    Cancer Sister         Breast cancer apparent DCIS    Heart Disease Brother         Brother  from congestive heart failure secondary to severe mitral disease       OBJECTIVE:     Vital Signs:       Visit Vitals  /80   Pulse (!) 130   Temp 99.5 °F (37.5 °C)   Resp 24   Ht 5' 8.5\" (1.74 m)   Wt 77.4 kg (170 lb 10.2 oz)   SpO2 93%   BMI 25.56 kg/m²      Temp (24hrs), Av.1 °F (37.3 °C), Min:98.6 °F (37 °C), Max:99.5 °F (37.5 °C)     Intake/Output:     Last shift:  07 -  1900  In: -   Out: 50 [Urine:50]    Last 3 shifts: 07/15 190 -  0700  In: 1440 [P.O.:240;  I.V.:1200]  Out: 4250 [Urine:4250]          Intake/Output Summary (Last 24 hours) at 2019 1022  Last data filed at 2019 0750  Gross per 24 hour   Intake 950 ml   Output 2600 ml   Net -1650 ml       Physical Exam:                                        Exam Findings Other   General: No resp distress noted, appears stated age    [de-identified]:  No ulcers, JVD not elevated, no cervical LAD    Chest: No pectus deformity, normal chest rise b/l    HEART:  RRR, no murmurs/rubs/gallops    Lungs:  Notable crackles, diminished BS at bases    ABD: Soft/NT, non rigid mildly distended    EXT: No cyanosis/clubbing/edema, normal peripheral pulses    Skin: No rashes or ulcers, no mottling    Neuro: A/O x 3        Medications:  Current Facility-Administered Medications   Medication Dose Route Frequency    furosemide (LASIX) injection 20 mg  20 mg IntraVENous ONCE    potassium chloride SR (KLOR-CON 10) tablet 20 mEq  20 mEq Oral ONCE    apixaban (ELIQUIS) tablet 5 mg  5 mg Oral BID    dilTIAZem (CARDIZEM) IR tablet 60 mg  60 mg Oral QID    vancomycin (VANCOCIN) 1250 mg in  ml infusion  1,250 mg IntraVENous Q12H    sodium chloride (NS) flush 5-10 mL  5-10 mL IntraVENous PRN    sodium chloride (NS) flush 5-40 mL  5-40 mL IntraVENous Q8H    sodium chloride (NS) flush 5-40 mL  5-40 mL IntraVENous PRN    acetaminophen (TYLENOL) tablet 650 mg  650 mg Oral Q4H PRN    ondansetron (ZOFRAN) injection 4 mg  4 mg IntraVENous Q4H PRN    piperacillin-tazobactam (ZOSYN) 3.375 g in 0.9% sodium chloride (MBP/ADV) 100 mL  3.375 g IntraVENous Q8H    levoFLOXacin (LEVAQUIN) 750 mg in D5W IVPB  750 mg IntraVENous Q24H    insulin lispro (HUMALOG) injection   SubCUTAneous AC&HS    glucose chewable tablet 16 g  4 Tab Oral PRN    dextrose (D50W) injection syrg 12.5-25 g  12.5-25 g IntraVENous PRN    glucagon (GLUCAGEN) injection 1 mg  1 mg IntraMUSCular PRN    lactobac ac& pc-s.therm-b.anim (JUNI Q/RISAQUAD)  1 Cap Oral DAILY    atorvastatin (LIPITOR) tablet 40 mg  40 mg Oral QHS    Vancomycin - pharmacy to dose   Other Rx Dosing/Monitoring       Labs:  ABG Recent Labs     07/16/19  0918   PHI 7.437   PCO2I 33.1*   PO2I 55*   HCO3I 22.3   SO2I 90*   FIO2I 0.50        CBC Recent Labs     07/16/19  0124 07/15/19  1111   WBC 12.1* 12.2*   HGB 8.2* 8.5*   HCT 26.7* 28.0*    388   MCV 97.1 98.9   MCH 29.8 09.9        Metabolic  Panel Recent Labs     07/17/19  0100 07/15/19  1111    135*   K 3.3* 4.0    102   CO2 26 27   * 118*   BUN 6 7   CREA 0.47* 0.60   CA 9.0 9.4   MG  --  1.7   ALB  --  3.0*   SGOT  --  23   ALT  --  15        Pertinent Labs                SAM Preston  7/17/2019

## 2019-07-17 NOTE — CDMP QUERY
#1    Pt admitted with Pneumonia/Pt noted to have chronic respiratory with worsening SOB requiring ventimask  .  If possible, please document in the progress notes and d/c summary if you are evaluating and / or treating any of the following:    Acute on Chronic Hypoxic Respiratory Failure        Acute on Chronic Hypercapnic  Respiratory Failure  Other, please specify  Clinically unable to determine    The medical record reflects the following:    Risk Factors: pneumonia    Clinical Indicators:   resp rate 33-36  SOB, cough    Treatment: ventimask, hi flow oxygen    Thank you,         [place], [facility]

## 2019-07-18 NOTE — CONSULTS
Palliative Medicine Consult  [place]akan: [phone] ([phone])    Patient Name: Hiral Vaughan  YOB: 1950    Date of Initial Consult: July 17, 2019  Reason for Consult: Care Decisions  Requesting Provider: Huong Garcia  Primary Care Physician: Millicent Tineo MD     SUMMARY:   Hiral Vaughan is a 70 y.o. with a past history of Lymphoma (chemo) followed at [facility], HTN, fibromyalgia, muscle atrophy, pulmonary fibrosis, asthma, cataracts, DM, chronic respiratory failure, recurrent pna, sinus infection,  who was admitted on 7/15/2019 from [place] with a diagnosis of PNA, acute on chronic hypoxemic respiratory failure, bleomycin lung toxicity. Pt had 7 rounds of chemo and then developed significant side effects. [facility] for hospitalization. Current medical issues leading to Palliative Medicine involvement include: support with care decisions. Social: single, no children, well supported by brother and sister, moved to [place] from [place] in 45 to work as the Director of International Studies at [employer] before transitioning to a position  for [employer], [employer], has not been home in 6 months as she has been in and out of facilities for rehab and care. Interval History:  7/18/19: hypoxia and tachycardia with removal of mask for brief period. Pul to start cellcept with steroids to see if she responds. S/s due to disease progression not pneumonia   Pt declined intubation for respiratory distress if needed. Plans for hi flow today      PALLIATIVE DIAGNOSES:   1. Shortness of breath  2. Hypoxemia  3. Leg swelling   4. Wheel chair bound     PLAN:   1. Pt seen with sisterBibBetsy at the bedside. 2. Pt appears fatigued  3. She has a clear understanding of plans for her care after speaking with pulmonary  4. Discussed wishes regarding intubation for symptoms. Pt declined at this time. She is DNRI  5.  We will work on the AMD tomorrow instead of today  6. All questions answered. Will follow up  7. Continue current level of care  8. Initial consult note routed to primary continuity provider and/or primary health care team members  9. Communicated plan of care with: Palliative [facility] Team     GOALS OF CARE / TREATMENT PREFERENCES:     GOALS OF CARE:  Patient/Health Care Proxy Stated Goals: Rehabilitation    TREATMENT PREFERENCES:   Code Status: DNR    Advance Care Planning:  [x] [facility] Interdisciplinary Team has updated the ACP Navigator with Health Care Decision Maker and Patient Capacity    Siblings Andres Lawton and Disha Aguilera Planning 7/15/2019   Patient's Healthcare Decision Maker is: -   Primary Decision Maker Name -   Primary Decision Maker Phone Number -   Primary Decision Maker Relationship to Patient -   Confirm Advance Directive None   Patient Would Like to Complete Advance Directive Yes   Does the patient have other document types Do Not Resuscitate       Medical Interventions: Limited additional interventions     Other Instructions:   Artificially Administered Nutrition: No feeding tube     Other:    As far as possible, the palliative care team has discussed with patient / health care proxy about goals of care / treatment preferences for patient.      HISTORY:     History obtained from: chart, pt, team, family    CHIEF COMPLAINT: pt admitted with aforementioned issues    HPI/SUBJECTIVE:    The patient is:   [x] Verbal and participatory  [] Non-participatory due to:   Denies pain, dyspnea managed with mask     Clinical Pain Assessment (nonverbal scale for severity on nonverbal patients):   Clinical Pain Assessment  Severity: 0          Duration: for how long has pt been experiencing pain (e.g., 2 days, 1 month, years)  Frequency: how often pain is an issue (e.g., several times per day, once every few days, constant)     FUNCTIONAL ASSESSMENT:     Palliative Performance Scale (PPS):  PPS: 30       PSYCHOSOCIAL/SPIRITUAL SCREENING:     Palliative IDT has assessed this patient for cultural preferences / practices and a referral made as appropriate to needs (Cultural Services, Patient Advocacy, Ethics, etc.)    Any spiritual / Amish concerns:  [] Yes /  [x] No    Caregiver Burnout:  [] Yes /  [x] No /  [] No Caregiver Present      Anticipatory grief assessment:   [x] Normal  / [] Maladaptive       ESAS Anxiety: Anxiety: 0    ESAS Depression:          REVIEW OF SYSTEMS:     Positive and pertinent negative findings in ROS are noted above in HPI. The following systems were [x] reviewed / [] unable to be reviewed as noted in HPI  Other findings are noted below. Systems: constitutional, ears/nose/mouth/throat, respiratory, gastrointestinal, genitourinary, musculoskeletal, integumentary, neurologic, psychiatric, endocrine. Positive findings noted below. Modified ESAS Completed by: provider   Fatigue: 5 Drowsiness: 0     Pain: 0   Anxiety: 0 Nausea: 0     Dyspnea: 5           Stool Occurrence(s): 1        PHYSICAL EXAM:     From RN flowsheet:  Wt Readings from Last 3 Encounters:   07/18/19 168 lb (76.2 kg)   02/09/19 238 lb 8.6 oz (108.2 kg)   01/21/19 180 lb (81.6 kg)     Blood pressure 163/84, pulse (!) 137, temperature 97.3 °F (36.3 °C), resp. rate (!) 42, height 5' 8\" (1.727 m), weight 168 lb (76.2 kg), SpO2 97 %.     Pain Scale 1: Numeric (0 - 10)  Pain Intensity 1: 0                 Last bowel movement, if known:     Constitutional: alert, nad, conversant, fatigued  Eyes: pupils equal, anicteric  ENMT: no nasal discharge, moist mucous membranes  Cardiovascular:distal pulses intact, leg swelling  Respiratory: breathing not labored, symmetric  Gastrointestinal: soft non-tender, +bowel sounds  Musculoskeletal: no deformity, no tenderness to palpation, atrophy   Skin: warm, dry  Neurologic: following commands, moving all extremities, debility   Psychiatric: full affect, no hallucinations  Other:       HISTORY:     Principal Problem:    Pneumonia (7/15/2019)    Active Problems:    Chronic hypoxemic respiratory failure ([address] (2019)      Bleomycin lung toxicity (2019)      Diabetes mellitus type 2, controlled ([address] (2016)      Hodgkin lymphoma ([facility] [address] (2/3/2019)      Past Medical History:   Diagnosis Date    Asthma     as a child, nothing since menopause    Cancer ([facility] [address] 2018    Non-Hodgkin's    Cataracts, bilateral     Diabetes ([address]     type II    Environmental allergies     Fibromyalgia     Treated with alternate medical therapy    Hypertension       Past Surgical History:   Procedure Laterality Date    HX BREAST BIOPSY Right 2006    HX COLONOSCOPY  2015    HX GYN      Pap     HX ORTHOPAEDIC      fractured R ankle/ no surgery    HX OTHER SURGICAL Right 2018    excision of deep neck cervical LN. Family History   Problem Relation Age of Onset    Heart Disease Mother     Cancer Father         prostate cancer    Cancer Sister         Breast cancer apparent DCIS    Heart Disease Brother         Brother  from congestive heart failure secondary to severe mitral disease      History reviewed, no pertinent family history.   Social History     Tobacco Use    Smoking status: Never Smoker    Smokeless tobacco: Never Used   Substance Use Topics    Alcohol use: Yes     Comment: socially     No Known Allergies   Current Facility-Administered Medications   Medication Dose Route Frequency    ALPRAZolam (XANAX) tablet 0.25 mg  0.25 mg Oral BID PRN    apixaban (ELIQUIS) tablet 5 mg  5 mg Oral BID    dilTIAZem (CARDIZEM) IR tablet 60 mg  60 mg Oral QID    sodium chloride (NS) flush 5-10 mL  5-10 mL IntraVENous PRN    sodium chloride (NS) flush 5-40 mL  5-40 mL IntraVENous Q8H    sodium chloride (NS) flush 5-40 mL  5-40 mL IntraVENous PRN    acetaminophen (TYLENOL) tablet 650 mg  650 mg Oral Q4H PRN    ondansetron (ZOFRAN) injection 4 mg  4 mg IntraVENous Q4H PRN    piperacillin-tazobactam (ZOSYN) 3.375 g in 0.9% sodium chloride (MBP/ADV) 100 mL  3.375 g IntraVENous Q8H    levoFLOXacin (LEVAQUIN) 750 mg in D5W IVPB  750 mg IntraVENous Q24H    insulin lispro (HUMALOG) injection   SubCUTAneous AC&HS    glucose chewable tablet 16 g  4 Tab Oral PRN    dextrose (D50W) injection syrg 12.5-25 g  12.5-25 g IntraVENous PRN    glucagon (GLUCAGEN) injection 1 mg  1 mg IntraMUSCular PRN    lactobac ac& pc-s.therm-b.anim (JUNI Q/RISAQUAD)  1 Cap Oral DAILY    atorvastatin (LIPITOR) tablet 40 mg  40 mg Oral QHS    Vancomycin - pharmacy to dose   Other Rx Dosing/Monitoring          LAB AND IMAGING FINDINGS:     Lab Results   Component Value Date/Time    WBC 15.5 (H) 07/18/2019 12:23 AM    HGB 8.6 (L) 07/18/2019 12:23 AM    PLATELET 795 (H) 58/68/1619 12:23 AM     Lab Results   Component Value Date/Time    Sodium 137 07/18/2019 12:23 AM    Potassium 3.8 07/18/2019 12:23 AM    Chloride 102 07/18/2019 12:23 AM    CO2 27 07/18/2019 12:23 AM    BUN 12 07/18/2019 12:23 AM    Creatinine 1.25 (H) 07/18/2019 12:23 AM    Calcium 9.3 07/18/2019 12:23 AM    Magnesium 1.7 07/15/2019 11:11 AM      Lab Results   Component Value Date/Time    AST (SGOT) 23 07/15/2019 11:11 AM    Alk.  phosphatase 116 07/15/2019 11:11 AM    Protein, total 6.5 07/15/2019 11:11 AM    Albumin 3.0 (L) 07/15/2019 11:11 AM    Globulin 3.5 07/15/2019 11:11 AM     Lab Results   Component Value Date/Time    INR 1.3 (H) 02/05/2019 03:07 AM    Prothrombin time 13.1 (H) 02/05/2019 03:07 AM    aPTT 30.2 08/27/2018 08:15 AM      Lab Results   Component Value Date/Time    Iron 24 (L) 07/16/2019 01:24 AM    TIBC 200 (L) 07/16/2019 01:24 AM    Iron % saturation 12 (L) 07/16/2019 01:24 AM      No results found for: PH, PCO2, PO2  No components found for: GLPOC   No results found for: CPK, CKMB             Total time: 35 minutes  Counseling / coordination time, spent as noted above: 30 minutes  > 50% counseling / coordination?: y    Prolonged service was provided for  []30 min   []75 min in face to face time in the presence of the patient, spent as noted above. Time Start:   Time End:   Note: this can only be billed with 95640 (initial) or 49785 (follow up). If multiple start / stop times, list each separately.

## 2019-07-18 NOTE — PROGRESS NOTES
Pulmonary, Critical Care, and Sleep Medicine~Progress Note    Name: Elvin Cary MRN: [id number]   : 1950 Hospital: [facility]   Date: 2019 11:15 AM Admission: 7/15/2019     Impression Plan   1. Acute on chronic (on 4lpm normally) hypoxic resp failure secondary to below  2. Abnormal CT scan: HCAP possibly, query more bleomycin lung toxicity, now with lack of response. Other concerns were pulmonary edema, but she is quite dried out now  3. Hx of lymphoma. Not on therapy since the beginning of the year. 4. Hx of PE and was on eliquis   5. Hx of asthma  6. Reviewed [facility] records. She was seen last there on 19. Early  cellcept was stopped and at that appointment she was scheduled to taper off of steroids, which she did. Apparently she was on hospice care, but I am unclear as to the details; per patient it was rescinded   1. Strep/legionella negative  2. mycoplasma pending   3. ECHO pending  4. Hold on diuretics   5. Chest film tomorrow   6. On vanc/levaquin/zosyn; could narrow in the next 24 hours    7. O2 titration above 90%, on midflow, but might need high flow (order is in if needed)   8. Send off for autoimmune labs  9. Will start on IV steroids and cellcept again. Patient and sister agree  8. To unstable for bronchoscopy   11. High risk for rapid decline   12. Discussed with family, palliative and RN at bedside      Daily Progression:       Not much different. We are starting her on high flow now       Feels possibly a little better. proBNP elevated today       Consult Note requested by Dr Mati Harper. Patient presented for admission secondary to a one week course of cough, subjective fevers, and malasie. Outpatient PCP starte zpak and she did not have bernadette improvement. No other symptoms reported on this recent course. 7/15/19 CT scan did showed worsening right great than left infiltrates with interlobular septal thickening; no PE.  BNP was 534, not very elevated. This past Jan 2019 she was dx with hodgkin's lymphoma. Was on blecyomcin and developed a pneumonitis associated with it. Was started on cellcept 500mg bid and prednisone. Followed in the office at [facility] in April, however transferred her care to [facility] and saw Dr Lisette Neal (sp?). Her last visit with them was this past June and at that visit she was weaned off of the prednisone and cellcept. We do not have those records at this time. I have reviewed the labs and previous days notes. Pertinent items are noted in HPI. Past Medical History:   Diagnosis Date    Asthma     as a child, nothing since menopause    Cancer ([facility] 08/21/2018    Non-Hodgkin's    Cataracts, bilateral     Diabetes ([facility]     type II    Environmental allergies     Fibromyalgia     Treated with alternate medical therapy    Hypertension       Past Surgical History:   Procedure Laterality Date    HX BREAST BIOPSY Right 2006    HX COLONOSCOPY  2015    HX GYN      Pap 2015    HX ORTHOPAEDIC      fractured R ankle/ no surgery    HX OTHER SURGICAL Right 08/27/2018    excision of deep neck cervical LN. Prior to Admission medications    Medication Sig Start Date End Date Taking? Authorizing Provider   acetaminophen (TYLENOL) 650 mg suppository Insert 650 mg into rectum every four (4) hours as needed for Pain. Yes Provider, Historical   amLODIPine (NORVASC) 5 mg tablet Take 5 mg by mouth daily. Yes Provider, Historical   trimethoprim-sulfamethoxazole (BACTRIM DS) 160-800 mg per tablet Take 1 Tab by mouth every Monday, Wednesday, Friday. Yes Provider, Historical   metFORMIN (GLUCOPHAGE) 500 mg tablet Take 500 mg by mouth two (2) times daily (with meals). Indications: Per [address] 3/30/19  Yes Provider, Historical   levalbuterol (XOPENEX) 1.25 mg/0.5 mL nebu 1.25 mg by Nebulization route every four (4) hours as needed.  Indications: Per [address] 3/30/19  Yes Provider, Historical acetaminophen (TYLENOL ARTHRITIS PAIN) 650 mg TbER Take 650 mg by mouth as needed (Q 6hrs PRN). 3/30/19  Yes Provider, Historical   apixaban (ELIQUIS) 5 mg tablet Take 1 Tab by mouth every twelve (12) hours. 3/13/19  Yes Shayy Clark MD   atorvastatin (LIPITOR) 40 mg tablet TAKE 1 TAB BY MOUTH DAILY. 12/3/17  Yes Shayy Clark MD   Oxygen 3 Devices by Nasal route as needed. Indications: SOB PRN    Provider, Historical     No Known Allergies   Social History     Tobacco Use    Smoking status: Never Smoker    Smokeless tobacco: Never Used   Substance Use Topics    Alcohol use: Yes     Comment: socially      Family History   Problem Relation Age of Onset    Heart Disease Mother     Cancer Father         prostate cancer    Cancer Sister         Breast cancer apparent DCIS    Heart Disease Brother         Brother  from congestive heart failure secondary to severe mitral disease       OBJECTIVE:     Vital Signs:       Visit Vitals  /84   Pulse (!) 137   Temp 97.3 °F (36.3 °C)   Resp (!) 42   Ht 5' 8\" (1.727 m)   Wt 76.2 kg (168 lb)   SpO2 97%   BMI 25.54 kg/m²      Temp (24hrs), Av.9 °F (36.6 °C), Min:97.3 °F (36.3 °C), Max:98.1 °F (36.7 °C)     Intake/Output:     Last shift: No intake/output data recorded. Last 3 shifts:  1901 -  0700  In: 2350 [P.O.:450;  I.V.:1900]  Out: 2750 [Urine:2750]          Intake/Output Summary (Last 24 hours) at 2019 1059  Last data filed at 2019 0523  Gross per 24 hour   Intake 1200 ml   Output 2150 ml   Net -950 ml       Physical Exam:                                        Exam Findings Other   General: No resp distress noted, appears stated age    HEENT:  No ulcers, JVD not elevated, no cervical LAD    Chest: No pectus deformity, normal chest rise b/l    HEART:  RRR, no murmurs/rubs/gallops    Lungs:  Notable crackles, diminished BS at bases    ABD: Soft/NT, non rigid mildly distended    EXT: No cyanosis/clubbing/edema, normal peripheral pulses    Skin: No rashes or ulcers, no mottling    Neuro: A/O x 3        Medications:  Current Facility-Administered Medications   Medication Dose Route Frequency    ALPRAZolam (XANAX) tablet 0.25 mg  0.25 mg Oral BID PRN    apixaban (ELIQUIS) tablet 5 mg  5 mg Oral BID    dilTIAZem (CARDIZEM) IR tablet 60 mg  60 mg Oral QID    sodium chloride (NS) flush 5-10 mL  5-10 mL IntraVENous PRN    sodium chloride (NS) flush 5-40 mL  5-40 mL IntraVENous Q8H    sodium chloride (NS) flush 5-40 mL  5-40 mL IntraVENous PRN    acetaminophen (TYLENOL) tablet 650 mg  650 mg Oral Q4H PRN    ondansetron (ZOFRAN) injection 4 mg  4 mg IntraVENous Q4H PRN    piperacillin-tazobactam (ZOSYN) 3.375 g in 0.9% sodium chloride (MBP/ADV) 100 mL  3.375 g IntraVENous Q8H    levoFLOXacin (LEVAQUIN) 750 mg in D5W IVPB  750 mg IntraVENous Q24H    insulin lispro (HUMALOG) injection   SubCUTAneous AC&HS    glucose chewable tablet 16 g  4 Tab Oral PRN    dextrose (D50W) injection syrg 12.5-25 g  12.5-25 g IntraVENous PRN    glucagon (GLUCAGEN) injection 1 mg  1 mg IntraMUSCular PRN    lactobac ac& pc-s.therm-b.anim (JUNI Q/RISAQUAD)  1 Cap Oral DAILY    atorvastatin (LIPITOR) tablet 40 mg  40 mg Oral QHS    Vancomycin - pharmacy to dose   Other Rx Dosing/Monitoring       Labs:  ABG Recent Labs     07/16/19  0918   PHI 7.437   PCO2I 33.1*   PO2I 55*   HCO3I 22.3   SO2I 90*   FIO2I 0.50        CBC Recent Labs     07/18/19  0023 07/16/19  0124 07/15/19  1111   WBC 15.5* 12.1* 12.2*   HGB 8.6* 8.2* 8.5*   HCT 27.8* 26.7* 28.0*   * 368 388   MCV 95.9 97.1 98.9   MCH 29.7 29.8 44.6        Metabolic  Panel Recent Labs     07/18/19  0023 07/17/19  0100 07/15/19  1111    138 135*   K 3.8 3.3* 4.0    102 102   CO2 27 26 27   * 133* 118*   BUN 12 6 7   CREA 1.25* 0.47* 0.60   CA 9.3 9.0 9.4   MG  --   --  1.7   ALB  --   --  3.0*   SGOT  --   --  23   ALT  --   --  15        Pertinent Labs                Dheeraj ANGELA Jamarcus [place]  7/18/2019

## 2019-07-18 NOTE — PROGRESS NOTES
Hospital Progress Note    NAME:  Belkis Taylor   :   1950   MRN:  [id number]     Date/Time:  2019 8:25 AM    Plan:   1. Iv ab  2. High flow o2  3. Obtain records uva - reviewed and placed on paper chart  Risk of Deterioration: Low  []           Moderate  []           High  [x]                 Assessment:   Principal Problem:    Pneumonia (7/15/2019)    Active Problems:    Acute on Chronic hypoxemic respiratory failure ([facility] [address] [address] (2019)      Bleomycin lung toxicity (2019)      Diabetes mellitus type 2, controlled ([facility] [address] [address] (2016)      Hodgkin lymphoma ([facility] (2/3/2019)       UTI - POSSIBLE ENTEROCOCCUS SPECIES         Admitting notes:68 y.o. female with past medical history significant for htn, fibromyalgia, muscle atrophy, pulmonary fibrosis, asthma, b/l cataracts, dm, non-hodgkin's, who presents from ems with chief complaint of sob. Pt has \"last night\" onset of worsening sob described as \"labored breathing\" that's continued into this morning. She is normally on 4.5 L of O2 n/c at baseline, but had it increased to 5 L by staff. She also endorses a dry non-productive cough and fever (peak 100) at this time. EMS arrived and measured initial O2 sat of 92% and HR of 140 bpm. Denies pleuritic cp and abd pain. Of note, she has had PNA \"x3 times since Ken" and was treated last week with a z-pack for sinus infection symptomatic of congestion. DNR form in place. EMS was called to [place];  bpm, 124/78, and 9            Subjective/interium history     Sob continues with minimal activity - non productive cough less - discussed palliative care - does not want Hospice  11 Point Review of Systems:   Negative except no cp    []            Unable to obtain ROS due to:       []            mental status change []            sedated []            intubated     Social History     Tobacco Use    Smoking status: Never Smoker    Smokeless tobacco: Never Used   Substance Use Topics    Alcohol use:  Yes Comment: socially     Medications reviewed:  Current Facility-Administered Medications   Medication Dose Route Frequency    apixaban (ELIQUIS) tablet 5 mg  5 mg Oral BID    dilTIAZem (CARDIZEM) IR tablet 60 mg  60 mg Oral QID    sodium chloride (NS) flush 5-10 mL  5-10 mL IntraVENous PRN    sodium chloride (NS) flush 5-40 mL  5-40 mL IntraVENous Q8H    sodium chloride (NS) flush 5-40 mL  5-40 mL IntraVENous PRN    acetaminophen (TYLENOL) tablet 650 mg  650 mg Oral Q4H PRN    ondansetron (ZOFRAN) injection 4 mg  4 mg IntraVENous Q4H PRN    piperacillin-tazobactam (ZOSYN) 3.375 g in 0.9% sodium chloride (MBP/ADV) 100 mL  3.375 g IntraVENous Q8H    levoFLOXacin (LEVAQUIN) 750 mg in D5W IVPB  750 mg IntraVENous Q24H    insulin lispro (HUMALOG) injection   SubCUTAneous AC&HS    glucose chewable tablet 16 g  4 Tab Oral PRN    dextrose (D50W) injection syrg 12.5-25 g  12.5-25 g IntraVENous PRN    glucagon (GLUCAGEN) injection 1 mg  1 mg IntraMUSCular PRN    lactobac ac& pc-s.therm-b.anim (JUNI Q/RISAQUAD)  1 Cap Oral DAILY    atorvastatin (LIPITOR) tablet 40 mg  40 mg Oral QHS    Vancomycin - pharmacy to dose   Other Rx Dosing/Monitoring        Objective:   Vitals:  Visit Vitals  /86 (BP 1 Location: Right arm, BP Patient Position: At rest)   Pulse (!) 124   Temp 97.3 °F (36.3 °C)   Resp (!) 42   Ht 5' 8.5\" (1.74 m)   Wt 168 lb 8 oz (76.4 kg)   SpO2 95%   BMI 25.24 kg/m²     Temp (24hrs), Av.1 °F (36.7 °C), Min:97.3 °F (36.3 °C), Max:99 °F (37.2 °C)    O2 Flow Rate (L/min): 15 l/min O2 Device: Hi flow nasal cannula    Last 24hr Input/Output:    Intake/Output Summary (Last 24 hours) at 2019 0832  Last data filed at 2019 0523  Gross per 24 hour   Intake 1400 ml   Output 2150 ml   Net -750 ml        PHYSICAL EXAM:  General:    Alert, cooperative, no distress, appears stated age. Head:   Normocephalic, without obvious abnormality, atraumatic. Eyes:   Conjunctivae/corneas clear. PERRLA  Nose:  Nares normal. No drainage or sinus tenderness. Throat:    Lips, mucosa, and tongue normal.  No Thrush  Neck:  Supple, symmetrical,  no adenopathy, thyroid: non tender    no carotid bruit and no JVD. Back:    Symmetric,  No CVA tenderness. Lungs:   Decreased bs with rales. Chest wall:  No tenderness or deformity. No Accessory muscle use. Heart:   Regular rate and rhythm,  no murmur, rub or gallop. Abdomen:   Soft, non-tender. Not distended. Bowel sounds normal. No masses.        Lab Data Reviewed:    Recent Labs     07/18/19  0023 07/16/19  0124 07/15/19  1111   WBC 15.5* 12.1* 12.2*   HGB 8.6* 8.2* 8.5*   HCT 27.8* 26.7* 28.0*   * 368 388     Recent Labs     07/18/19  0023 07/17/19  0100 07/15/19  1111    138 135*   K 3.8 3.3* 4.0    102 102   CO2 27 26 27   * 133* 118*   BUN 12 6 7   CREA 1.25* 0.47* 0.60   CA 9.3 9.0 9.4   MG  --   --  1.7   ALB  --   --  3.0*   TBILI  --   --  0.3   SGOT  --   --  23   ALT  --   --  15     Lab Results   Component Value Date/Time    Glucose (POC) 164 (H) 07/18/2019 06:36 AM    Glucose (POC) 178 (H) 07/17/2019 09:33 PM    Glucose (POC) 190 (H) 07/17/2019 04:29 PM    Glucose (POC) 163 (H) 07/17/2019 10:58 AM    Glucose (POC) 144 (H) 07/17/2019 06:43 AM       ___________________________________________________  ___________________________________________________    Attending Physician: Ann Leong MD

## 2019-07-18 NOTE — PROGRESS NOTES
Pharmacist Note - Vancomycin Dosing  Therapy day 4  Indication: HAP  Current regimen: 1250 mg IV q 12 h    A Trough Level resulted at 27.8 mcg/mL which was obtained 12 hrs post-dose. Goal trough: 15 - 20 mcg/mL     Plan: Called nurse to turn off current infusion (~ 3/4 infused). Pharmacy will continue to monitor this patient daily for changes in clinical status and renal function.

## 2019-07-18 NOTE — PROGRESS NOTES
Music Therapy Assessment  . [id number] [facility] [id number]     1950  70 y.o.  female    Patient Telephone Number: [phone] (home)   Amish Affiliation: [facility]   Language: English   Patient Active Problem List    Diagnosis Date Noted    Chronic hypoxemic respiratory failure ([facility] [address] 07/16/2019     Priority: 3 - Three    Bleomycin lung toxicity 07/16/2019     Priority: 4 - Four    Pneumonia 07/15/2019    Acute respiratory failure ([facility] 02/03/2019    Leg swelling 02/03/2019    [facility] 02/03/2019    CAP (community acquired pneumonia) 02/03/2019    Lymphadenopathy 08/24/2018    Supraclavicular mass 08/14/2018    Overweight (BMI 25.0-29.9) 01/30/2018    Diabetes mellitus type 2, controlled ([facility] 09/08/2016    Dyslipidemia 09/08/2016    SVT (supraventricular tachycardia) ([facility] 08/29/2016    Essential hypertension with goal blood pressure less than 140/90 08/29/2016        Date: 7/18/2019            Total Time (in minutes): 5          [facility] 4 IMCU 2    Mental Status:   [x] Alert [  ] Yudi Avis [  ]  Confused  [  ] Minimally responsive  [  ] Sleeping    Communication Status: [  ] Impaired Speech [  ] Nonverbal -N/A    Physical Status:   [x] Oxygen in use-High flow  [  ] Hard of Hearing [  ] Vision Impaired  [  ] Ambulatory  [  ] Ambulatory with assistance [  ] Non-ambulatory     Music Preferences, Background: N/A: Please see Session Observations below. Clinical Problem to be addressed: Increase relaxation, support healthy pt/family coping. Goal(s) met in session: N/A: Please see Session Observations below. Physical/Pain management (Scale of 1-10):    Pre-session rating: Pt denied pain. Post-session rating: N/A: Please see Session Observations below.   [  ] Increased relaxation   [  ] Affected breathing patterns  [  ] Decreased muscle tension   [  ] Decreased agitation  [  ] Affected heart rate    [  ] Increased alertness     Emotional/Psychological:  [  ] Increased self-expression   [  ] Decreased aggressive behavior   [  ] Decreased feelings of stress  [  ] Discussed healthy coping skills     [  ] Improved mood    [  ] Decreased withdrawn behavior     Social:  [  ] Decreased feelings of isolation/loneliness [  ] Positive social interaction   [  ] Provided support and/or comfort for family/friends    Spiritual:  [  ] Spiritual support    [  ] Expressed peace  [  ] Expressed viri    [  ] Discussed beliefs    Techniques Utilized (Check all that apply): N/A: Please see Session Observations below. [  ] Procedural support MT [  ] Music for relaxation [  ] Patient preferred music  [  ] Aixa analysis  [  ] Song choice  [  ] Music for validation  [  ] Entrainment  [  ] Movement to music [  ] Guided visualization  [  ] Awais Pintos  [  ] Patient instrument playing [  ] Ora Pickles writing  [  ] Wendy Westbrook along   [  ] Abdi Peña  [  ] Sensory stimulation  [  ] Active Listening  [  ] Music for spiritual support [  ] Making of CDs as gifts    Session Observations:  Referral from [address] [place] [address]. Patient (pt) was alert sitting up in bed on high flow oxygen. Her sister Amelia Green was at bedside, visiting from [place]. After asking how pt was feeling, this music therapist (MT) briefly explained role and then asked pt about her music preferences. Pt declined sharing these and having music therapy for today. It appeared to require much energy for the pt to speak. Pt's sister shared that pt has had much going on today with changes occurring so another day may be better. MT expressed understanding and will follow as able.     Paola Robles MT-BC (Music Therapist-Board Certified)  Spiritual Care Department  Referral-based service

## 2019-07-18 NOTE — PROGRESS NOTES
Renal Dosing/Monitoring  Medication:  Levofloxacin   Current regimen:  750 mg every 24 hr  Recent Labs     07/18/19  0023 07/17/19  0100   CREA 1.25* 0.47*   BUN 12 6     Estimated CrCl:  42.8 ml/min  Plan: Change to 750 mg IV every 48 hours with respect to renal function. Pharmacy will continue to monitor the patient daily and make changes as needed.     Mayito Painter, PharmD, BCPS

## 2019-07-18 NOTE — PROGRESS NOTES
Problem: Pneumonia: Day 1  Goal: Medications  Outcome: Progressing Towards Goal  Patient is maintaining oxygen saturation above 92%. She is on 15 L midflow. When she sleeps, she puts on a venti mask. Goal: Respiratory  Outcome: Progressing Towards Goal     Problem: Falls - Risk of  Goal: *Absence of Falls  Description  Document Dony Tomasz Fall Risk and appropriate interventions in the flowsheet. Outcome: Progressing Towards Goal  Note:   Fall Risk Interventions:            Medication Interventions: Evaluate medications/consider consulting pharmacy    Elimination Interventions: Call light in reach, Patient to call for help with toileting needs     Pt remains free of falls during admission. Call bell and frequently used items within reach. Bedside table within reach. Patient provided non skid socks and instructed to call out for nurse when in need of assistance. Last 3 Recorded Weights in this Encounter    07/16/19 0330 07/17/19 0305 07/18/19 0522   Weight: 79.5 kg (175 lb 3.2 oz) 77.4 kg (170 lb 10.2 oz) 76.4 kg (168 lb 8 oz)     Bedside and Verbal shift change report given to Raz Meade (oncoming nurse) by Reji Hall (offgoing nurse). Report included the following information SBAR, Kardex and Quality Measures.

## 2019-07-19 NOTE — PROGRESS NOTES
Problem: Dysphagia (Adult)  Goal: *Acute Goals and Plan of Care (Insert Text)  Description  Speech therapy goals  Initiated 7/17/2019   1. Patient will tolerate full liquid diet without s/s of aspiration within 7 days   2. Patient will tolerate solid trials with SLP as respiratory status allows within 7 days    Outcome: Progressing Towards Goal   SPEECH LANGUAGE PATHOLOGY DYSPHAGIA TREATMENT  Patient: Sravani Cash (31 y.o. female)  Date: 7/19/2019  Diagnosis: Pneumonia [J18.9] Pneumonia       Precautions:aspiration      ASSESSMENT:  Patient tolerating full liquid diet though intake is limited. She continues with suspected functional oropharyngeal swallow; however, remains limited by respiratory status. Patient able to independently verbalize increased risk of aspiration with increased work of breathing. She is aware of the need to take small, single sips only and slow rate. No overt s/s aspiration with single cup sips or bites of applesauce. Patient not yet appropriate for diet advancement. Risk of aspiration remains given respiratory status. PLAN:  Recommendations and Planned Interventions:  Continue full liquid diet. SINGLE cup sips only- no straws  Strict upright positioning  Bites/sips and slow rate. Stop with fatigue or shortness of breath  Slp to follow up next week to determine readiness for advancement pending respiratory status    Patient continues to benefit from skilled intervention to address the above impairments. Continue treatment per established plan of care. Discharge Recommendations: To Be Determined     SUBJECTIVE:   Patient stated I need to take small sips.     OBJECTIVE:   Cognitive and Communication Status:  Neurologic State: Alert  Orientation Level: Oriented X4  Cognition: Follows commands  Perception: Appears intact  Perseveration: No perseveration noted  Safety/Judgement: Awareness of environment  Dysphagia Treatment:  Oral Assessment:  Oral Assessment  Labial: No impairment  Dentition: Natural  Oral Hygiene: (dry)  Lingual: No impairment  Velum: Unable to visualize  Mandible: No impairment  P.O. Trials:  Patient Position: (up in bed)  Vocal quality prior to P.O.: No impairment  Consistency Presented: Thin liquid;Puree  How Presented: SLP-fed/presented;Spoon;Cup/sip; Self-fed/presented     Bolus Acceptance: No impairment  Bolus Formation/Control: No impairment     Propulsion: No impairment  Oral Residue: None  Initiation of Swallow: No impairment  Laryngeal Elevation: Functional  Aspiration Signs/Symptoms: None  Pharyngeal Phase Characteristics: No impairment, issues, or problems         Comments: (poor respiratory status)    Oral Phase Severity: No impairment  Pharyngeal Phase Severity : No impairment         Pain:  Pain Scale 1: Numeric (0 - 10)  Pain Intensity 1: 0     After treatment:   ?              Patient left in no apparent distress sitting up in chair  ? Patient left in no apparent distress in bed  ? Call bell left within reach  ? Nursing notified  ? Caregiver present  ? Bed alarm activated    COMMUNICATION/EDUCATION:     The patients plan of care including recommendations, planned interventions, and recommended diet changes were discussed with: Registered Nurse.     TOÑO Wilson  Time Calculation: 13 mins

## 2019-07-19 NOTE — PROGRESS NOTES
Pulmonary, Critical Care, and Sleep Medicine~Progress Note    Name: Margrett Moritz MRN: [id number]   : 1950 Hospital: [facility]   Date: 2019 11:15 AM Admission: 7/15/2019     Impression Plan   1. Acute on chronic (on 4lpm normally) hypoxic resp failure secondary to below  2. Abnormal CT scan: HCAP possibly, query more bleomycin lung toxicity, now with lack of response. Other concerns were pulmonary edema, but she is quite dried out now  3. Hx of lymphoma. Not on therapy since the beginning of the year. 4. Hx of PE and was on eliquis   5. Hx of asthma  6. Reviewed [facility] records. She was seen last there on 19. Early  cellcept was stopped and at that appointment she was scheduled to taper off of steroids, which she did. Apparently she was on hospice care, but I am unclear as to the details; per patient it was rescinded   1. Strep/legionella negative  2. ECHO; PASP 60mmHg   3. Hold on diuretics   4. Chest film today looks like it has some improvement. 5. Reduced to zosyn alone. Agree    6. O2 titration above 90%, on high flow. Discussed with RT to wean off as able. 7. autoimmune labs pending, noted some   8. Will start on IV steroids and cellcept continue at current dose through the weekend   9. Patient and sister agree  8. To unstable for bronchoscopy   11. High risk for rapid decline   12. PRN over the weekend       Daily Progression:       Not much different. We are starting her on high flow now       Feels possibly a little better. proBNP elevated today       Consult Note requested by Dr Micky Felipe. Patient presented for admission secondary to a one week course of cough, subjective fevers, and malasie. Outpatient PCP starte zpak and she did not have bernadette improvement. No other symptoms reported on this recent course. 7/15/19 CT scan did showed worsening right great than left infiltrates with interlobular septal thickening; no PE.  BNP was 534, not very elevated. This past Jan 2019 she was dx with hodgkin's lymphoma. Was on blecyomcin and developed a pneumonitis associated with it. Was started on cellcept 500mg bid and prednisone. Followed in the office at [facility] in April, however transferred her care to [facility] and saw Dr Almeta Mcburney (sp?). Her last visit with them was this past June and at that visit she was weaned off of the prednisone and cellcept. We do not have those records at this time. I have reviewed the labs and previous days notes. Pertinent items are noted in HPI. Past Medical History:   Diagnosis Date    Asthma     as a child, nothing since menopause    Cancer ([facility] [address] 08/21/2018    Non-Hodgkin's    Cataracts, bilateral     Diabetes ([facility] [address]     type II    Environmental allergies     Fibromyalgia     Treated with alternate medical therapy    Hypertension       Past Surgical History:   Procedure Laterality Date    HX BREAST BIOPSY Right 2006    HX COLONOSCOPY  2015    HX GYN      Pap 2015    HX ORTHOPAEDIC      fractured R ankle/ no surgery    HX OTHER SURGICAL Right 08/27/2018    excision of deep neck cervical LN. Prior to Admission medications    Medication Sig Start Date End Date Taking? Authorizing Provider   acetaminophen (TYLENOL) 650 mg suppository Insert 650 mg into rectum every four (4) hours as needed for Pain. Yes Provider, Historical   amLODIPine (NORVASC) 5 mg tablet Take 5 mg by mouth daily. Yes Provider, Historical   trimethoprim-sulfamethoxazole (BACTRIM DS) 160-800 mg per tablet Take 1 Tab by mouth every Monday, Wednesday, Friday. Yes Provider, Historical   metFORMIN (GLUCOPHAGE) 500 mg tablet Take 500 mg by mouth two (2) times daily (with meals). Indications: Per [address] 3/30/19  Yes Provider, Historical   levalbuterol (XOPENEX) 1.25 mg/0.5 mL nebu 1.25 mg by Nebulization route every four (4) hours as needed.  Indications: Per [address] 3/30/19  Yes Provider, Historical acetaminophen (TYLENOL ARTHRITIS PAIN) 650 mg TbER Take 650 mg by mouth as needed (Q 6hrs PRN). 3/30/19  Yes Provider, Historical   apixaban (ELIQUIS) 5 mg tablet Take 1 Tab by mouth every twelve (12) hours. 3/13/19  Yes Mert Aleman MD   atorvastatin (LIPITOR) 40 mg tablet TAKE 1 TAB BY MOUTH DAILY. 12/3/17  Yes Mert Aleman MD   Oxygen 3 Devices by Nasal route as needed. Indications: SOB PRN    Provider, Historical     No Known Allergies   Social History     Tobacco Use    Smoking status: Never Smoker    Smokeless tobacco: Never Used   Substance Use Topics    Alcohol use: Yes     Comment: socially      Family History   Problem Relation Age of Onset    Heart Disease Mother     Cancer Father         prostate cancer    Cancer Sister         Breast cancer apparent DCIS    Heart Disease Brother         Brother  from congestive heart failure secondary to severe mitral disease       OBJECTIVE:     Vital Signs:       Visit Vitals  BP (!) 169/96 (BP 1 Location: Right arm, BP Patient Position: At rest)   Pulse (!) 118   Temp 97.8 °F (36.6 °C)   Resp 24   Ht 5' 8\" (1.727 m)   Wt 76.8 kg (169 lb 4.8 oz)   SpO2 99%   BMI 25.74 kg/m²      Temp (24hrs), Av.9 °F (36.6 °C), Min:97.5 °F (36.4 °C), Max:98.6 °F (37 °C)     Intake/Output:     Last shift: No intake/output data recorded.     Last 3 shifts:  1901 -  0700  In: 1740 [P.O.:490; I.V.:1250]  Out: 1000 [Urine:1000]          Intake/Output Summary (Last 24 hours) at 2019 1127  Last data filed at 2019 0600  Gross per 24 hour   Intake 550 ml   Output 500 ml   Net 50 ml       Physical Exam:                                        Exam Findings Other   General: No resp distress noted, appears stated age    HEENT:  No ulcers, JVD not elevated, no cervical LAD    Chest: No pectus deformity, normal chest rise b/l    HEART:  RRR, no murmurs/rubs/gallops    Lungs:  Notable crackles, diminished BS at bases    ABD: Soft/NT, non rigid mildly distended    EXT: No cyanosis/clubbing/edema, normal peripheral pulses    Skin: No rashes or ulcers, no mottling    Neuro: A/O x 3        Medications:  Current Facility-Administered Medications   Medication Dose Route Frequency    insulin lispro (HUMALOG)   SubCUTAneous AC&HS    ALPRAZolam (XANAX) tablet 0.25 mg  0.25 mg Oral BID PRN    methylPREDNISolone (PF) (SOLU-MEDROL) injection 60 mg  60 mg IntraVENous Q6H    mycophenolate mofetil (CELLCEPT) capsule 250 mg  250 mg Oral ACB&D    apixaban (ELIQUIS) tablet 5 mg  5 mg Oral BID    dilTIAZem (CARDIZEM) IR tablet 60 mg  60 mg Oral QID    sodium chloride (NS) flush 5-10 mL  5-10 mL IntraVENous PRN    sodium chloride (NS) flush 5-40 mL  5-40 mL IntraVENous Q8H    sodium chloride (NS) flush 5-40 mL  5-40 mL IntraVENous PRN    acetaminophen (TYLENOL) tablet 650 mg  650 mg Oral Q4H PRN    ondansetron (ZOFRAN) injection 4 mg  4 mg IntraVENous Q4H PRN    piperacillin-tazobactam (ZOSYN) 3.375 g in 0.9% sodium chloride (MBP/ADV) 100 mL  3.375 g IntraVENous Q8H    glucose chewable tablet 16 g  4 Tab Oral PRN    dextrose (D50W) injection syrg 12.5-25 g  12.5-25 g IntraVENous PRN    glucagon (GLUCAGEN) injection 1 mg  1 mg IntraMUSCular PRN    lactobac ac& pc-s.therm-b.anim (JUNI Q/RISAQUAD)  1 Cap Oral DAILY    atorvastatin (LIPITOR) tablet 40 mg  40 mg Oral QHS       Labs:  ABG No results for input(s): PHI, PCO2I, PO2I, HCO3I, SO2I, FIO2I in the last 72 hours.      CBC Recent Labs     07/19/19  0107 07/18/19  0023   WBC 13.1* 15.5*   HGB 8.1* 8.6*   HCT 26.6* 27.8*    409*   MCV 96.7 95.9   MCH 29.5 86.9        Metabolic  Panel Recent Labs     07/19/19  0107 07/18/19  0023 07/17/19  0100    137 138   K 3.5 3.8 3.3*    102 102   CO2 24 27 26   * 152* 133*   BUN 21* 12 6   CREA 1.53* 1.25* 0.47*   CA 9.6 9.3 9.0        Pertinent Labs                SAM Ceballos  7/19/2019

## 2019-07-19 NOTE — PROGRESS NOTES
1811- poor PO fluid intake accompanied by little urine output for last 12 hours 300 ml total of output, bladder scan performed 104 ml. Discussed with pt the need of staying hydrated. 2030-Bedside shift change report given to [facility]  (oncoming nurse) by Len Amaya  (offgoing nurse). Report included the following information SBAR, Intake/Output, MAR and Recent Results. Problem: Pneumonia: Day 4  Goal: Nutrition/Diet  Outcome: Progressing Towards Goal    Full liquid diet per speech screening, patient consumes 50% of daily allowance   Goal: Medications  Outcome: Progressing Towards Goal    Zosyn and Levaquin IV therapy   Goal: Respiratory  Outcome: Progressing Towards Goal  Patient is on High Flow, 35% FIO2, HOB elevated 40 degrees, unable to tolerate incentive spirometer. Cough and deep breathing encouraged   Goal: Treatments/Interventions/Procedures  Outcome: Progressing Towards Goal  Daily CBC, BMP, vital sigs q4m continuous O2 monitored,   Goal: Psychosocial  Outcome: Progressing Towards Goal  Frequent reassurance, care plan, and results discussed with patient, xanax to take the edge off     Problem: Pressure Injury - Risk of  Goal: *Prevention of pressure injury  Description  Document Dwayne Scale and appropriate interventions in the flowsheet. 7-16-19: turn q2h and float heels.    Outcome: Progressing Towards Goal  Note:   Pressure Injury Interventions:  Sensory Interventions: Assess need for specialty bed  Daily skin assessment performed, stage I presure ulcer present, opened up today and bleeding, per wound care I have applied duoderm dressing, versa care 500 bed ordered, patient turned q2 hours,  daily baths with skin moisturizer applications     Moisture Interventions: Absorbent underpads, Assess need for specialty bed, Check for incontinence Q2 hours and as needed, Maintain skin hydration (lotion/cream), Limit adult briefs, Minimize layers, Moisture barrier    Activity Interventions: Increase time out of bed, Pressure redistribution bed/mattress(bed type), PT/OT evaluation, Assess need for specialty bed    Mobility Interventions: Assess need for specialty bed, Float heels, HOB 30 degrees or less, Pressure redistribution bed/mattress (bed type), Turn and reposition approx.  every two hours(pillow and wedges)    Nutrition Interventions: Document food/fluid/supplement intake, Discuss nutritional consult with provider, Offer support with meals,snacks and hydration    Friction and Shear Interventions: Apply protective barrier, creams and emollients, Foam dressings/transparent film/skin sealants, HOB 30 degrees or less, Lift sheet, Minimize layers

## 2019-07-19 NOTE — PROGRESS NOTES
Patient maintaining oxygen saturation in the upper 90s on high flow 35 L. Problem: Falls - Risk of  Goal: *Absence of Falls  Description  Document Fernanda Girt Fall Risk and appropriate interventions in the flowsheet. Outcome: Progressing Towards Goal  Note:   Fall Risk Interventions:            Medication Interventions: Evaluate medications/consider consulting pharmacy, Patient to call before getting OOB    Elimination Interventions: Call light in reach     Pt remains free of falls during admission. Call bell and frequently used items within reach. Bedside table within reach. Patient provided non skid socks and instructed to call out for nurse when in need of assistance. Problem: Patient Education: Go to Patient Education Activity  Goal: Patient/Family Education  Outcome: Progressing Towards Goal   Pt educated on fall prevention. Pt demonstrates appropriate understanding. Pt is oriented and calls out appropriately for assistance ambulating. Purposeful hourly rounds initiated by staff. Last 3 Recorded Weights in this Encounter    07/18/19 0522 07/18/19 0938 07/19/19 0324   Weight: 76.4 kg (168 lb 8 oz) 76.2 kg (168 lb) 76.8 kg (169 lb 4.8 oz)     Bedside and Verbal shift change report given to Rupinder (oncoming nurse) by Livan Couch (offgoing nurse). Report included the following information SBAR, Kardex and Quality Measures.

## 2019-07-19 NOTE — PROGRESS NOTES
2005 Bedside and Verbal shift change report given to Mann Cintron RN (oncoming nurse) by Ramya Barrientos RN (offgoing nurse). Report included the following information SBAR, Kardex, Intake/Output, MAR and Recent Results. Problem: Pneumonia: Day 3  Goal: Off Pathway (Use only if patient is Off Pathway)  Outcome: Progressing Towards Goal  Note:   Pt started IV steroids today and was weaned to HF 35 L. Pt instructed to take deep breaths to help with lung expansion and maintain saturation level. Once O2 level increased pt work of breathing decreased.

## 2019-07-19 NOTE — PROGRESS NOTES
Music Therapy Assessment  . [address] [facility] [id number]     1950  70 y.o.  female    Patient Telephone Number: [phone] (home)   Christian Affiliation: [facility]   Language: English   Patient Active Problem List    Diagnosis Date Noted    Chronic hypoxemic respiratory failure ([facility] [address] 07/16/2019     Priority: 3 - Three    Bleomycin lung toxicity 07/16/2019     Priority: 4 - Four    Pneumonia 07/15/2019    Acute respiratory failure ([facility] [address] 02/03/2019    Leg swelling 02/03/2019    Hodgkin lymphoma ([facility] [address] 02/03/2019    CAP (community acquired pneumonia) 02/03/2019    Lymphadenopathy 08/24/2018    Supraclavicular mass 08/14/2018    Overweight (BMI 25.0-29.9) 01/30/2018    Diabetes mellitus type 2, controlled ([facility] [address] 09/08/2016    Dyslipidemia 09/08/2016    SVT (supraventricular tachycardia) ([facility] 08/29/2016    Essential hypertension with goal blood pressure less than 140/90 08/29/2016        Date: 7/19/2019            Total Time (in minutes): 5          [facility] 4 IMCU 2    Mental Status:   [x] Alert [  ] Mai Morgan [  ]  Confused  [  ] Minimally responsive  [  ] Sleeping    Communication Status: [  ] Impaired Speech [  ] Nonverbal -N/A    Physical Status:   [  ] Oxygen in use  [  ] Hard of Hearing [  ] Vision Impaired  [  ] Ambulatory  [  ] Ambulatory with assistance [  ] Non-ambulatory -N/A    Music Preferences, Background: N/A: Please see Session Observations below. Clinical Problem addressed: N/A: Please see Session Observations below. Goal(s) met in session: N/A: Please see Session Observations below.   Physical/Pain management (Scale of 1-10):    Pre-session rating ___________    Post-session rating __________   [  ] Increased relaxation   [  ] Affected breathing patterns  [  ] Decreased muscle tension   [  ] Decreased agitation  [  ] Affected heart rate    [  ] Increased alertness     Emotional/Psychological:  [  ] Increased self-expression   [  ] Decreased aggressive behavior   [  ] Decreased feelings of stress  [  ] Discussed healthy coping skills     [  ] Improved mood    [  ] Decreased withdrawn behavior     Social:  [  ] Decreased feelings of isolation/loneliness [  ] Positive social interaction   [  ] Provided support and/or comfort for family/friends    Spiritual:  [  ] Spiritual support    [  ] Expressed peace  [  ] Expressed viri    [  ] Discussed beliefs    Techniques Utilized (Check all that apply): N/A: Please see Session Observations below. [  ] Procedural support MT [  ] Music for relaxation [  ] Patient preferred music  [  ] Aixa analysis  [  ] Song choice  [  ] Music for validation  [  ] Entrainment  [  ] Movement to music [  ] Guided visualization  [  ] Aurora Riling  [  ] Patient instrument playing [  ] Lucía Dew writing  [  ] Negrita Blackbird along   [  ] Kylee Pink  [  ] Sensory stimulation  [  ] Active Listening  [  ] Music for spiritual support [  ] Making of CDs as gifts    Session Observations: F/up visit; The curtain to patient's room was pulled. This music therapist (MT) knocked on pt's door and announced self. Pt's nurse shared that pt was having a bath and asked that MT wait a few minutes and then pt would be available. MT stepped out of pt's room to wait. Pt's nurse stepped out a moment later and said that pt had declined music therapy for today. Will follow as able.     Paz Lee MT-BC (Music Therapist-Board Certified)  Spiritual Care Department  Referral-based service

## 2019-07-19 NOTE — PROGRESS NOTES
Transition of Care Plan    1. Return to [place] [address] [address] [facility] talked to Carey in wellness at the [address] [facility] (cell [phone])   Carey said she will need to come to the hospital to assess patient before she can return-- they facility has to make sure the staff can meet her needs with or without hospice. 2. TBD if she returns to facility with  [facility] [phone] hospice to come to ER   CM will make referral if ordered      3. If patient returns without hospice, she will need a bed as [facility] picked up the one that was provided by the agency. Patient and sister aware of this. 3. Patient bed and wheelchair bound--On 4 liters 02 at baseline    4. Wound care -open sacrum wound on buttocks    5. Single woman with no children-- sister Sherry Schaeffer [phone] and brother Marilyn Yao [phone] are main contacts     Patient will need ambulance transport.      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     2/19 diagnosed with  Non- Hodgkin's  lymphoma (chemo) followed at [facility]. (last  Seen 6/25/19)

## 2019-07-19 NOTE — WOUND CARE
Wound Care Note:     Re-consult placed by nurse request for left buttock/sacrum with open area. Chart shows:  Admitted for PNA  Past Medical History:   Diagnosis Date    Asthma     as a child, nothing since menopause    Cancer ([facility] [address] 08/21/2018    Non-Hodgkin's    Cataracts, bilateral     Diabetes ([facility] [address]     type II    Environmental allergies     Fibromyalgia     Treated with alternate medical therapy    Hypertension      WBC = 13.1 on 7/19/19    Assessment:   Patient is A&O x 4, communicative, incontinent with some assistance needed in repositioning. Bed: [facility]  Patient wearing briefs for incontinence and has a Pure Fleurette Kenney in place. Diet: Full liquid with nutritional supplements    Bilateral heels skin intact and without erythema. 1. Left upper buttocks with partial thickness wound in an area measuring 2.5 cm x 2.5 cm x 0.1 cm, this area has skipped areas of open wounds, wound beds sukumar, no drainage, lor-wound intact, wound edges are open, sacrum with very small partial thickness wound, also blanches. Hydrocolloid applied. Spoke with Dr. Louis Brunner, wound care orders obtained. Patient repositioned on left side. Heels offloaded on pillow. Recommendations:    Left upper buttock and sacrum- Please maintain Duoderm up to one week or change as needed with soiling or rolled edges. Please use adhesive remover wipes when changing. P-500 bed ordered by nurse. Skin Care & Pressure Prevention:  Minimize layers of linen/pads under patient to optimize support surface. Turn/reposition approximately every 2 hours and offload heels.   Manage incontinence / promote continence     Discussed above plan with patient & Marielle Masy RN    Transition of Care: Plan to follow as needed while admitted to hospital.    Shu Barrios, SAMUELN, RN, [facility], [facility].  office [phone]  pager [phone] or call  to page

## 2019-07-19 NOTE — DIABETES MGMT
DTC Progress Note    Recommendations/ Comments: chart reviewed for hyperglycemia. Pt receiving solu-medrol 60 mg q 6 hours. 1. Starting NPH 6 units q 6 hours timed and linked with solu-medrol dose of 60mg  q 6 hours  2. Adding hgb A1c to next lab draw as current one is > 3 months old    - message sent to Dr. Becerril [facility] DM medication: lispro correction - normal sensitivity     Chart reviewed on Almita Ortega. Patient is a 70 y.o. female with known Type 2 Diabetes on metformin 500 mg BID at home. A1c:   Lab Results   Component Value Date/Time    Hemoglobin A1c 6.9 (H) 08/03/2018 01:45 PM    Hemoglobin A1c 6.8 (H) 01/30/2018 01:25 PM       Recent Glucose Results:   Lab Results   Component Value Date/Time     (H) 07/19/2019 01:07 AM    GLUCPOC 251 (H) 07/19/2019 06:21 AM    GLUCPOC 220 (H) 07/18/2019 09:13 PM    GLUCPOC 195 (H) 07/18/2019 04:06 PM        Lab Results   Component Value Date/Time    Creatinine 1.53 (H) 07/19/2019 01:07 AM     Estimated Creatinine Clearance: 37.9 mL/min (A) (based on SCr of 1.53 mg/dL (H)). Active Orders   Diet    DIET FULL LIQUID        PO intake:   Patient Vitals for the past 72 hrs:   % Diet Eaten   07/18/19 1938 0 %   07/18/19 1521 0 %   07/18/19 1100 0 %   07/17/19 0903 0 %       Will continue to follow as needed.     Thank you  Ricardo Swan, [address]      Time spent: 4  minutes

## 2019-07-19 NOTE — PROGRESS NOTES
Hospital Progress Note    NAME:  Charity Martinez   :   1950   MRN:  [id number]     Date/Time:  2019 8:25 AM    Plan:   1. D/c vanco/levaquin  2. High flow o2  3. Obtain records uva - reviewed and placed on paper chart  4. cellcept and steroids started/pul with improvement   Risk of Deterioration: Low  []           Moderate  []           High  [x]                 Assessment:   Principal Problem:    Pneumonia (7/15/2019)    Active Problems:    Acute on Chronic hypoxemic respiratory failure ([facility] (2019)      Bleomycin lung toxicity (2019)     cellcept and steroids       Diabetes mellitus type 2, controlled ([facility] (2016)      Hodgkin lymphoma ([facility] (2/3/2019)       UTI -  ENTEROCOCCUS SPECIES         Admitting notes:68 y.o. female with past medical history significant for htn, fibromyalgia, muscle atrophy, pulmonary fibrosis, asthma, b/l cataracts, dm, non-hodgkin's, who presents from ems with chief complaint of sob. Pt has \"last night\" onset of worsening sob described as \"labored breathing\" that's continued into this morning. She is normally on 4.5 L of O2 n/c at baseline, but had it increased to 5 L by staff. She also endorses a dry non-productive cough and fever (peak 100) at this time. EMS arrived and measured initial O2 sat of 92% and HR of 140 bpm. Denies pleuritic cp and abd pain. Of note, she has had PNA \"x3 times since Ken" and was treated last week with a z-pack for sinus infection symptomatic of congestion. DNR form in place.  EMS was called to [place];  bpm, 124/78, and 9            Subjective/interium history     Sob continues with minimal activity - non productive cough less - discussed palliative care - does not want Hospice - request continuing DNR, feeling better this am  11 Point Review of Systems:   Negative except no cp    []            Unable to obtain ROS due to:       []            mental status change []            sedated []            intubated     Social History     Tobacco Use    Smoking status: Never Smoker    Smokeless tobacco: Never Used   Substance Use Topics    Alcohol use: Yes     Comment: socially     Medications reviewed:  Current Facility-Administered Medications   Medication Dose Route Frequency    ALPRAZolam (XANAX) tablet 0.25 mg  0.25 mg Oral BID PRN    methylPREDNISolone (PF) (SOLU-MEDROL) injection 60 mg  60 mg IntraVENous Q6H    mycophenolate mofetil (CELLCEPT) capsule 250 mg  250 mg Oral ACB&D    levoFLOXacin (LEVAQUIN) 750 mg in D5W IVPB  750 mg IntraVENous Q48H    apixaban (ELIQUIS) tablet 5 mg  5 mg Oral BID    dilTIAZem (CARDIZEM) IR tablet 60 mg  60 mg Oral QID    sodium chloride (NS) flush 5-10 mL  5-10 mL IntraVENous PRN    sodium chloride (NS) flush 5-40 mL  5-40 mL IntraVENous Q8H    sodium chloride (NS) flush 5-40 mL  5-40 mL IntraVENous PRN    acetaminophen (TYLENOL) tablet 650 mg  650 mg Oral Q4H PRN    ondansetron (ZOFRAN) injection 4 mg  4 mg IntraVENous Q4H PRN    piperacillin-tazobactam (ZOSYN) 3.375 g in 0.9% sodium chloride (MBP/ADV) 100 mL  3.375 g IntraVENous Q8H    insulin lispro (HUMALOG) injection   SubCUTAneous AC&HS    glucose chewable tablet 16 g  4 Tab Oral PRN    dextrose (D50W) injection syrg 12.5-25 g  12.5-25 g IntraVENous PRN    glucagon (GLUCAGEN) injection 1 mg  1 mg IntraMUSCular PRN    lactobac ac& pc-s.therm-b.anim (JUNI Q/RISAQUAD)  1 Cap Oral DAILY    atorvastatin (LIPITOR) tablet 40 mg  40 mg Oral QHS        Objective:   Vitals:  Visit Vitals  BP (!) 169/96 (BP 1 Location: Right arm, BP Patient Position: At rest)   Pulse (!) 124   Temp 97.8 °F (36.6 °C)   Resp 24   Ht 5' 8\" (1.727 m)   Wt 169 lb 4.8 oz (76.8 kg)   SpO2 98%   BMI 25.74 kg/m²     Temp (24hrs), Av.9 °F (36.6 °C), Min:97.5 °F (36.4 °C), Max:98.6 °F (37 °C)    O2 Flow Rate (L/min): 35 l/min O2 Device: Hi flow nasal cannula    Last 24hr Input/Output:    Intake/Output Summary (Last 24 hours) at 2019 0831  Last data filed at 7/18/2019 2000  Gross per 24 hour   Intake 690 ml   Output 400 ml   Net 290 ml        PHYSICAL EXAM:  General:    Alert, cooperative, no distress, appears stated age. Head:   Normocephalic, without obvious abnormality, atraumatic. Eyes:   Conjunctivae/corneas clear. PERRLA  Nose:  Nares normal. No drainage or sinus tenderness. Throat:    Lips, mucosa, and tongue normal.  No Thrush  Neck:  Supple, symmetrical,  no adenopathy, thyroid: non tender    no carotid bruit and no JVD. Back:    Symmetric,  No CVA tenderness. Lungs:   Decreased bs with rales. Chest wall:  No tenderness or deformity. No Accessory muscle use. Heart:   Regular rate and rhythm,  no murmur, rub or gallop. Abdomen:   Soft, non-tender. Not distended. Bowel sounds normal. No masses.        Lab Data Reviewed:    Recent Labs     07/19/19  0107 07/18/19  0023   WBC 13.1* 15.5*   HGB 8.1* 8.6*   HCT 26.6* 27.8*    409*     Recent Labs     07/19/19  0107 07/18/19  0023 07/17/19  0100    137 138   K 3.5 3.8 3.3*    102 102   CO2 24 27 26   * 152* 133*   BUN 21* 12 6   CREA 1.53* 1.25* 0.47*   CA 9.6 9.3 9.0     Lab Results   Component Value Date/Time    Glucose (POC) 251 (H) 07/19/2019 06:21 AM    Glucose (POC) 220 (H) 07/18/2019 09:13 PM    Glucose (POC) 195 (H) 07/18/2019 04:06 PM    Glucose (POC) 193 (H) 07/18/2019 01:45 PM    Glucose (POC) 200 (H) 07/18/2019 12:18 PM       ___________________________________________________  ___________________________________________________    Attending Physician: Emani Hernandez MD

## 2019-07-20 NOTE — PROGRESS NOTES
1426- 10 units of Lantus ordered by Dr. Mitchell Orona, will give as soon as delivered from pharmacy. 1539- called pharmacy Lantus still in rout for delivery to [facility]. 1943-Bedside shift change report given to [address]  (oncoming nurse) by Aubrie Todd  (offgoing nurse). Report included the following information SBAR, Kardex, Intake/Output, MAR and Recent Results.

## 2019-07-20 NOTE — PROGRESS NOTES
[address] July 20, 2019       RE: Rony Bonilla      To Whom It May Concern,    This is to certify that Brea Taylor, has been in the hospital with her sister from 07/15--07/20. Please feel free to contact my office if you have any questions or concerns. Thank you for your assistance in this matter.       Sincerely,  Lele Montoya MD

## 2019-07-20 NOTE — PROGRESS NOTES
Problem: Falls - Risk of  Goal: *Absence of Falls  Description  Document [facility] Fall Risk and appropriate interventions in the flowsheet. Outcome: Not Progressing Towards Goal  Note:   Fall Risk Interventions:            Medication Interventions: Evaluate medications/consider consulting pharmacy    Elimination Interventions: Call light in reach      Bedside shift change report given to Stalin Hendrix RN (oncoming nurse) by Shay Childress RN (offgoing nurse). Report included the following information SBAR, Kardex, Intake/Output, MAR, Accordion and Recent Results.

## 2019-07-20 NOTE — PROGRESS NOTES
Hospitalist Progress Note  Brianna Gonzalez MD  Answering service: [phone] OR [phone] from in house phone        Date of Service:  2019  NAME:  Jeff Stephen  :  1950  MRN:  [id number]      Admission Summary:   :70 y.o. female with past medical history significant for htn, fibromyalgia, muscle atrophy, pulmonary fibrosis, asthma, b/l cataracts, dm, non-hodgkin's, who presents from ems with chief complaint of sob. Interval history / Subjective:    SOB is about the same as yesterday, not any worse. No other complaints no N/V/D, no abdominal pain. Assessment & Plan:     Acute on chronic hypoxic respiratory failure - requiring 40L HFNC, 45% FiO2. Possibly Secondary to potential HCAP (unknwon specific organism) vs. Bleomycin lung toxicity (4L at home)  - Continue HFNC, wean as tolerated  - Holding diuretics per pulmonary   - remains on Zosyn, would suggest 7 day course, but defer to pulm/Dr. Dana Noe. - IV steorids, cellcept   - to remain in IMCU, high risk for further decompensation    Leukocytosis - increased, however also on high dose steroids. - Low threshold to rebroaden Abx if febrile  - continue to monitor     Type 2 diabetes A1c 6.9%, uncontrolled, BS > 200 x 48 hours. Likely worse from steroids.   - SSI, POCT glucose checks, hypoglycemia protocol    - Increase glargine to 20U daily,     Hx of Hodgkins lymphoma - not currently on treatment, unclear prognosis  H/o SVT? - home cardizem  H/o PE - Eliquis       Code status: DNR  DVT prophylaxis:  Ac with eliquis     Care Plan discussed with: Patient/Family  Disposition: TBD     Hospital Problems  Date Reviewed: 2/3/2019          Codes Class Noted POA    Chronic hypoxemic respiratory failure ([facility] [address] ICD-10-CM: J96.11  ICD-9-CM: 518.83, 799.02  2019 Yes        Bleomycin lung toxicity ICD-10-CM: J98.4, T45.1X5A  ICD-9-CM: 508.8, E930.7  2019 Yes        * (Principal) Pneumonia ICD-10-CM: J18.9  ICD-9-CM: 846  7/15/2019 Yes        Hodgkin lymphoma ([facility] ICD-10-CM: C81.90  ICD-9-CM: 201.90  2/3/2019 Yes        Diabetes mellitus type 2, controlled ([facility] ICD-10-CM: E11.9  ICD-9-CM: 250.00  9/8/2016 Yes                Review of Systems:   A comprehensive review of systems was negative except for that written in the HPI. Vital Signs:    Last 24hrs VS reviewed since prior progress note. Most recent are:  Visit Vitals  /75 (BP 1 Location: Right arm, BP Patient Position: At rest)   Pulse (!) 117   Temp 98.2 °F (36.8 °C)   Resp 26   Ht 5' 8\" (1.727 m)   Wt 78.1 kg (172 lb 3.2 oz)   SpO2 92%   BMI 26.18 kg/m²         Intake/Output Summary (Last 24 hours) at 7/20/2019 1346  Last data filed at 7/20/2019 0835  Gross per 24 hour   Intake 100 ml   Output 450 ml   Net -350 ml        Physical Examination:             Constitutional:  No acute distress, cooperative, pleasant    ENT:  Oral mucous moist, oropharynx benign. Neck supple,    Resp:  Wheezing in the bases, no increased work of breathing, HFNC in place. CV:  Regular rhythm, normal rate, no murmurs, gallops, rubs    GI:  Soft, non distended, non tender. normoactive bowel sounds, no hepatosplenomegaly     Musculoskeletal:  No edema, warm, 2+ pulses throughout    Neurologic:  Moves all extremities. AAOx3, CN II-XII reviewed     Skin:  Good turgor, no rashes or ulcers       Data Review:    Imaging and laboratory data reviewed. Labs:     Recent Labs     07/20/19 0313 07/19/19  0107   WBC 16.9* 13.1*   HGB 7.8* 8.1*   HCT 25.4* 26.6*   * 398     Recent Labs     07/20/19  0313 07/19/19  0107 07/18/19  0023   * 139 137   K 3.6 3.5 3.8    102 102   CO2 25 24 27   BUN 31* 21* 12   CREA 1.55* 1.53* 1.25*   * 225* 152*   CA 9.4 9.6 9.3     No results for input(s): SGOT, GPT, ALT, AP, TBIL, TBILI, TP, ALB, GLOB, GGT, AML, LPSE in the last 72 hours.     No lab exists for component: AMYP, HLPSE  No results for input(s): INR, PTP, APTT in the last 72 hours. No lab exists for component: INREXT   No results for input(s): FE, TIBC, PSAT, FERR in the last 72 hours. No results found for: FOL, RBCF   No results for input(s): PH, PCO2, PO2 in the last 72 hours. No results for input(s): CPK, CKNDX, TROIQ in the last 72 hours.     No lab exists for component: CPKMB  Lab Results   Component Value Date/Time    Cholesterol, total 134 08/03/2018 01:45 PM    HDL Cholesterol 56 08/03/2018 01:45 PM    LDL, calculated 64 08/03/2018 01:45 PM    Triglyceride 71 08/03/2018 01:45 PM     Lab Results   Component Value Date/Time    Glucose (POC) 323 (H) 07/20/2019 11:18 AM    Glucose (POC) 289 (H) 07/20/2019 06:34 AM    Glucose (POC) 257 (H) 07/19/2019 09:32 PM    Glucose (POC) 272 (H) 07/19/2019 05:22 PM    Glucose (POC) 238 (H) 07/19/2019 12:37 PM     Lab Results   Component Value Date/Time    Color YELLOW/STRAW 07/15/2019 06:03 PM    Appearance CLOUDY (A) 07/15/2019 06:03 PM    Specific gravity 1.010 07/15/2019 06:03 PM    pH (UA) 5.0 07/15/2019 06:03 PM    Protein TRACE (A) 07/15/2019 06:03 PM    Glucose NEGATIVE  07/15/2019 06:03 PM    Ketone NEGATIVE  07/15/2019 06:03 PM    Bilirubin NEGATIVE  07/15/2019 06:03 PM    Urobilinogen 0.2 07/15/2019 06:03 PM    Nitrites NEGATIVE  07/15/2019 06:03 PM    Leukocyte Esterase SMALL (A) 07/15/2019 06:03 PM    Epithelial cells MODERATE (A) 07/15/2019 06:03 PM    Bacteria 1+ (A) 07/15/2019 06:03 PM    WBC 5-10 07/15/2019 06:03 PM    RBC 20-50 07/15/2019 06:03 PM         Medications Reviewed:     Current Facility-Administered Medications   Medication Dose Route Frequency    insulin lispro (HUMALOG)   SubCUTAneous AC&HS    dilTIAZem (CARDIZEM) IR tablet 90 mg  90 mg Oral QID    insulin glargine (LANTUS) injection 12 Units  12 Units SubCUTAneous QHS    ALPRAZolam (XANAX) tablet 0.25 mg  0.25 mg Oral BID PRN    methylPREDNISolone (PF) (SOLU-MEDROL) injection 60 mg  60 mg IntraVENous Q6H    mycophenolate mofetil (CELLCEPT) capsule 250 mg  250 mg Oral ACB&D    apixaban (ELIQUIS) tablet 5 mg  5 mg Oral BID    sodium chloride (NS) flush 5-10 mL  5-10 mL IntraVENous PRN    sodium chloride (NS) flush 5-40 mL  5-40 mL IntraVENous Q8H    sodium chloride (NS) flush 5-40 mL  5-40 mL IntraVENous PRN    acetaminophen (TYLENOL) tablet 650 mg  650 mg Oral Q4H PRN    ondansetron (ZOFRAN) injection 4 mg  4 mg IntraVENous Q4H PRN    piperacillin-tazobactam (ZOSYN) 3.375 g in 0.9% sodium chloride (MBP/ADV) 100 mL  3.375 g IntraVENous Q8H    glucose chewable tablet 16 g  4 Tab Oral PRN    dextrose (D50W) injection syrg 12.5-25 g  12.5-25 g IntraVENous PRN    glucagon (GLUCAGEN) injection 1 mg  1 mg IntraMUSCular PRN    lactobac ac& pc-s.therm-b.anim (JUNI Q/RISAQUAD)  1 Cap Oral DAILY    atorvastatin (LIPITOR) tablet 40 mg  40 mg Oral QHS     ______________________________________________________________________  EXPECTED LENGTH OF STAY: 4d 4h  ACTUAL LENGTH OF STAY:          5                 Loletha Kayser, MD

## 2019-07-20 NOTE — PROGRESS NOTES
Problem: Pneumonia: Day 3  Goal: Diagnostic Test/Procedures  Outcome: Progressing Towards Goal    Daily labs, CBC, BMP, vital signs q4, continuous pulse oximetry, O2 therapy    Goal: Nutrition/Diet  Outcome: Progressing Towards Goal  Full liquids, consumes less than 50%  Goal: Medications  Outcome: Progressing Towards Goal  Zosyn IV therapy     Goal: Respiratory  Outcome: Progressing Towards Goal   HOB elevated 45 degrees, High Flow, continuous pulse oximetry   Problem: Pressure Injury - Risk of  Goal: *Prevention of pressure injury  Description  Document Dwayne Scale and appropriate interventions in the flowsheet. 7-16-19: turn q2h and float heels. Outcome: Progressing Towards Goal  Note:   Pressure Injury Interventions:  Sensory Interventions: Assess changes in LOC, Float heels, Keep linens dry and wrinkle-free, Monitor skin under medical devices, Pressure redistribution bed/mattress (bed type), Turn and reposition approx. every two hours (pillows and wedges if needed)    Moisture Interventions: Apply protective barrier, creams and emollients, Check for incontinence Q2 hours and as needed, Internal/External urinary devices, Maintain skin hydration (lotion/cream), Minimize layers, Moisture barrier    Activity Interventions: Increase time out of bed, Pressure redistribution bed/mattress(bed type)    Mobility Interventions: HOB 30 degrees or less, Pressure redistribution bed/mattress (bed type), Float heels    Nutrition Interventions: Document food/fluid/supplement intake, Discuss nutritional consult with provider    Friction and Shear Interventions: HOB 30 degrees or less, Lift sheet, Minimize layers    Daily skin assessment performed, duoderm intact changed this morning, q2h turning.

## 2019-07-21 NOTE — PROGRESS NOTES
Hospitalist Progress Note  Dave Brunner MD  Answering service: [phone] OR [phone] from in house phone        Date of Service:  2019  NAME:  Tasha Sandoval  :  1950  MRN:  [id number]      Admission Summary:   :70 y.o. female with past medical history significant for htn, fibromyalgia, muscle atrophy, pulmonary fibrosis, asthma, b/l cataracts, dm, non-hodgkin's, who presents from ems with chief complaint of sob. Interval history / Subjective:    Had an episode of increased RR, worsening shortness of breath. HF increased to 45L. Patient requested to remain off BiPAP. Discussed that ultimately it is her choice whether to proceed with BiPAP if needed. However, also reiterated that if she got to the point where she needed BiPAP, that she would likely decline without it. Confirmed pt is a DNR. Assessment & Plan:     Acute on chronic hypoxic respiratory failure - requiring 40L HFNC, 45% FiO2. Possibly Secondary to potential HCAP (unknwon specific organism) vs. Bleomycin lung toxicity (4L at home)  - Continue HFNC, wean as tolerated, flow had to be increased today   - Holding diuretics per pulmonary   - remains on Zosyn, would suggest 7 day course, but defer to pulm/Dr. Leland Little. - IV steorids, cellcept   - Incentive spirometry as tolerated. - Up in chair (or bed position) as able. - to remain in IMCU, high risk for further decompensation and potentially death. Leukocytosis - decreasing. Assume secondary to steroid use. - Low threshold to rebroaden Abx if febrile  - continue to monitor     Type 2 diabetes A1c 6.9%, uncontrolled, BS > 200 x 48 hours.  Likely worse from steroids.   - SSI, POCT glucose checks, hypoglycemia protocol    - Increase glargine to 30U daily    Hypokalemia - replete and monitor   Acute Kidney injury stage 1 - Cr up to 1.25 from baseline of 0.6 on admission - peaked at 1.55, down to 1.4 today   - I and O, avoid nephrotoxins  - Holding lasix   Hx of Hodgkins lymphoma - not currently on treatment, unclear prognosis? H/o SVT? - home cardizem  H/o PE - Eliquis       Code status: DNR  DVT prophylaxis: Ac with eliquis     Care Plan discussed with: Patient/Family  Disposition: TBD, Would have discussion regarding hospice with the patient but would defer to Dr. Marylou Koo. Hospital Problems  Date Reviewed: 2/3/2019          Codes Class Noted POA    Chronic hypoxemic respiratory failure [facility]) ICD-10-CM: J96.11  ICD-9-CM: 518.83, 799.02  7/16/2019 Yes        Bleomycin lung toxicity ICD-10-CM: J98.4, T45.1X5A  ICD-9-CM: 508.8, E930.7  7/16/2019 Yes        * (Principal) Pneumonia ICD-10-CM: J18.9  ICD-9-CM: 001  7/15/2019 Yes        Hodgkin lymphoma ([facility] ICD-10-CM: C81.90  ICD-9-CM: 201.90  2/3/2019 Yes        Diabetes mellitus type 2, controlled ([facility] ICD-10-CM: E11.9  ICD-9-CM: 250.00  9/8/2016 Yes                Review of Systems:   A comprehensive review of systems was negative except for that written in the HPI. Vital Signs:    Last 24hrs VS reviewed since prior progress note. Most recent are:  Visit Vitals  /82 (BP 1 Location: Right arm, BP Patient Position: At rest;Sitting)   Pulse (!) 109   Temp 96.8 °F (36 °C)   Resp 27   Ht 5' 8\" (1.727 m)   Wt 77.3 kg (170 lb 8 oz)   SpO2 99%   BMI 25.92 kg/m²         Intake/Output Summary (Last 24 hours) at 7/21/2019 1302  Last data filed at 7/21/2019 5842  Gross per 24 hour   Intake 240 ml   Output 650 ml   Net -410 ml        Physical Examination:             Constitutional:  No acute distress, cooperative, pleasant, chronically ill appearing. ENT:  Oral mucous moist, oropharynx benign. Neck supple,    Resp:  Poor inspiration, no increased work of breathing, HFNC in place. CV:  Regular rhythm, normal rate, no murmurs, gallops, rubs    GI:  Soft, non distended, non tender.  normoactive bowel sounds, no hepatosplenomegaly     Musculoskeletal:  No edema, warm, 2+ pulses throughout    Neurologic:  Moves all extremities. AAOx3, CN II-XII reviewed     Skin:  Good turgor, no rashes or ulcers       Data Review:    Imaging and laboratory data reviewed. Labs:     Recent Labs     07/21/19 0242 07/20/19 0313   WBC 15.8* 16.9*   HGB 7.7* 7.8*   HCT 24.5* 25.4*    435*     Recent Labs     07/21/19  0242 07/20/19 0313 07/19/19  0107   * 135* 139   K 3.3* 3.6 3.5   CL 99 100 102   CO2 26 25 24   BUN 32* 31* 21*   CREA 1.39* 1.55* 1.53*   * 262* 225*   CA 9.3 9.4 9.6   MG 2.1  --   --    PHOS 2.7  --   --      No results for input(s): SGOT, GPT, ALT, AP, TBIL, TBILI, TP, ALB, GLOB, GGT, AML, LPSE in the last 72 hours. No lab exists for component: AMYP, HLPSE  No results for input(s): INR, PTP, APTT in the last 72 hours. No lab exists for component: INREXT, INREXT   No results for input(s): FE, TIBC, PSAT, FERR in the last 72 hours. No results found for: FOL, RBCF   No results for input(s): PH, PCO2, PO2 in the last 72 hours. No results for input(s): CPK, CKNDX, TROIQ in the last 72 hours.     No lab exists for component: CPKMB  Lab Results   Component Value Date/Time    Cholesterol, total 134 08/03/2018 01:45 PM    HDL Cholesterol 56 08/03/2018 01:45 PM    LDL, calculated 64 08/03/2018 01:45 PM    Triglyceride 71 08/03/2018 01:45 PM     Lab Results   Component Value Date/Time    Glucose (POC) 299 (H) 07/21/2019 11:34 AM    Glucose (POC) 282 (H) 07/21/2019 06:10 AM    Glucose (POC) 271 (H) 07/20/2019 09:15 PM    Glucose (POC) 315 (H) 07/20/2019 04:29 PM    Glucose (POC) 323 (H) 07/20/2019 11:18 AM     Lab Results   Component Value Date/Time    Color YELLOW/STRAW 07/15/2019 06:03 PM    Appearance CLOUDY (A) 07/15/2019 06:03 PM    Specific gravity 1.010 07/15/2019 06:03 PM    pH (UA) 5.0 07/15/2019 06:03 PM    Protein TRACE (A) 07/15/2019 06:03 PM    Glucose NEGATIVE  07/15/2019 06:03 PM    Ketone NEGATIVE  07/15/2019 06:03 PM    Bilirubin NEGATIVE  07/15/2019 06:03 PM    Urobilinogen 0.2 07/15/2019 06:03 PM    Nitrites NEGATIVE  07/15/2019 06:03 PM    Leukocyte Esterase SMALL (A) 07/15/2019 06:03 PM    Epithelial cells MODERATE (A) 07/15/2019 06:03 PM    Bacteria 1+ (A) 07/15/2019 06:03 PM    WBC 5-10 07/15/2019 06:03 PM    RBC 20-50 07/15/2019 06:03 PM         Medications Reviewed:     Current Facility-Administered Medications   Medication Dose Route Frequency    insulin glargine (LANTUS) injection 25 Units  25 Units SubCUTAneous QHS    glucose chewable tablet 16 g  4 Tab Oral PRN    dextrose (D50W) injection syrg 12.5-25 g  12.5-25 g IntraVENous PRN    glucagon (GLUCAGEN) injection 1 mg  1 mg IntraMUSCular PRN    insulin lispro (HUMALOG) injection   SubCUTAneous TIDAC    dilTIAZem (CARDIZEM) IR tablet 90 mg  90 mg Oral QID    ALPRAZolam (XANAX) tablet 0.25 mg  0.25 mg Oral BID PRN    methylPREDNISolone (PF) (SOLU-MEDROL) injection 60 mg  60 mg IntraVENous Q6H    mycophenolate mofetil (CELLCEPT) capsule 250 mg  250 mg Oral ACB&D    apixaban (ELIQUIS) tablet 5 mg  5 mg Oral BID    sodium chloride (NS) flush 5-10 mL  5-10 mL IntraVENous PRN    sodium chloride (NS) flush 5-40 mL  5-40 mL IntraVENous Q8H    sodium chloride (NS) flush 5-40 mL  5-40 mL IntraVENous PRN    acetaminophen (TYLENOL) tablet 650 mg  650 mg Oral Q4H PRN    ondansetron (ZOFRAN) injection 4 mg  4 mg IntraVENous Q4H PRN    piperacillin-tazobactam (ZOSYN) 3.375 g in 0.9% sodium chloride (MBP/ADV) 100 mL  3.375 g IntraVENous Q8H    lactobac ac& pc-s.therm-b.anim (JUNI Q/RISAQUAD)  1 Cap Oral DAILY    atorvastatin (LIPITOR) tablet 40 mg  40 mg Oral QHS     ______________________________________________________________________  EXPECTED LENGTH OF STAY: 4d 4h  ACTUAL LENGTH OF STAY:          6                 Karyna Freedman MD

## 2019-07-21 NOTE — PROGRESS NOTES
Problem: Falls - Risk of  Goal: *Absence of Falls  Description  Document Riky Krishnamurthy Fall Risk and appropriate interventions in the flowsheet. Outcome: Not Progressing Towards Goal  Note:   Fall Risk Interventions:            Medication Interventions: Patient to call before getting OOB    Elimination Interventions: Call light in reach     Bedside shift change report given to Ferna Schwab RN (oncoming nurse) by Joan Leigh RN (offgoing nurse). Report included the following information SBAR, Kardex, Intake/Output, MAR, Accordion and Recent Results.

## 2019-07-21 NOTE — PROGRESS NOTES
Goal: Nutrition/Diet  Outcome: Progressing Towards Goal  On full liquids + protein supplements     Goal: Medications  Outcome: Progressing Towards Goal  Zosyn IV  Goal: Respiratory  Outcome: Progressing Towards Goal  On high flow, adjusted as needed, HOB 45%,   Goal: Psychosocial  Outcome: Progressing Towards Goal  Xanax as needed      Problem: Pressure Injury - Risk of  Goal: *Prevention of pressure injury  Description  Document Dwayne Scale and appropriate interventions in the flowsheet. 7-16-19: turn q2h and float heels. Outcome: Progressing Towards Goal  Note:   Pressure Injury Interventions:  Daily skin assessment performed, dressing on buttocks clean intact, heels floating, patient turned q2h, specialty bed in use, protein supplements with each meal     1933-Bedside shift change report given to [facility] RN  (oncoming nurse) by Linda Medina RN  (offgoing nurse). Report included the following information SBAR, Kardex, Intake/Output, MAR and Recent Results.

## 2019-07-22 NOTE — DIABETES MGMT
DTC Progress Note    Recommendations/ Comments: chart reviewed for hyperglycemia. Solu-medrol tapered to 40 mg q 6 hours today, dtc will follow    Current hospital DM medication: lispro correction, resistant scale, Lantus 30 units    Chart reviewed on [facility]. Patient is a 70 y.o. female with known Type 2 Diabetes on metformin 500 mg BID at home. A1c:   Lab Results   Component Value Date/Time    Hemoglobin A1c 6.9 (H) 08/03/2018 01:45 PM    Hemoglobin A1c 6.8 (H) 01/30/2018 01:25 PM       Recent Glucose Results:   Lab Results   Component Value Date/Time     (H) 07/22/2019 03:50 AM    GLUCPOC 284 (H) 07/22/2019 04:33 PM    GLUCPOC 280 (H) 07/22/2019 11:30 AM    GLUCPOC 270 (H) 07/22/2019 06:29 AM        Lab Results   Component Value Date/Time    Creatinine 1.34 (H) 07/22/2019 03:50 AM     Estimated Creatinine Clearance: 44 mL/min (A) (based on SCr of 1.34 mg/dL (H)). Active Orders   Diet    DIET FULL LIQUID        PO intake:   Patient Vitals for the past 72 hrs:   % Diet Eaten   07/22/19 1324 50 %   07/22/19 0800 50 %   07/21/19 1241 20 %   07/21/19 0819 30 %   07/20/19 1852 50 %       Will continue to follow as needed.     Thank you  Angelica Valencia MS, RN, CDE    Time spent: 4  minutes

## 2019-07-22 NOTE — PROGRESS NOTES
Problem: Falls - Risk of  Goal: *Absence of Falls  Description  Document Phan Fells Fall Risk and appropriate interventions in the flowsheet. Outcome: Progressing Towards Goal  Note:   Fall Risk Interventions:        Hourly rounds performed. Medication Interventions: Patient to call before getting OOB    Elimination Interventions: Call light in reach        Bedside shift change report given to [address] Leon[address] Bolanos (oncoming nurse) by Nicole Russell RN (offgoing nurse). Report included the following information SBAR, Kardex, Intake/Output, MAR, Accordion and Recent Results.

## 2019-07-22 NOTE — PROGRESS NOTES
1930: Bedside shift change report given to Dennis Rosales (oncoming nurse) by Rina Urena RN (offgoing nurse). Report included the following information SBAR, Kardex, Intake/Output, MAR, Recent Results and Cardiac Rhythm ST. Problem: Pneumonia: Day 4  Goal: Activity/Safety  Outcome: Progressing Towards Goal  Note:   PT/OT consulted today. Speech following  Goal: Nutrition/Diet  Outcome: Progressing Towards Goal  Note:   Full liquid diet. Boost supplements. Pt tolerating well. Goal: Medications  Outcome: Progressing Towards Goal  Note:   Receiving IV zosyn     Problem: Falls - Risk of  Goal: *Absence of Falls  Description  Document Gerardo Suggs Fall Risk and appropriate interventions in the flowsheet. Outcome: Progressing Towards Goal  Note:   Fall Risk Interventions:  Mobility Interventions: Assess mobility with egress test, Communicate number of staff needed for ambulation/transfer, Mechanical lift, OT consult for ADLs, Patient to call before getting OOB, PT Consult for mobility concerns, PT Consult for assist device competence, Utilize walker, cane, or other assistive device, Strengthening exercises (ROM-active/passive)         Medication Interventions: Assess postural VS orthostatic hypotension, Evaluate medications/consider consulting pharmacy, Patient to call before getting OOB, Teach patient to arise slowly    Elimination Interventions: Call light in reach, Patient to call for help with toileting needs, Stay With Me (per policy), Toileting schedule/hourly rounds, Toilet paper/wipes in reach              Problem: Pressure Injury - Risk of  Goal: *Prevention of pressure injury  Description  Document Dwayne Scale and appropriate interventions in the flowsheet. 7-16-19: turn q2h and float heels.    Outcome: Progressing Towards Goal  Note:   Pressure Injury Interventions:  Sensory Interventions: Assess changes in LOC, Assess need for specialty bed, Check visual cues for pain, Discuss PT/OT consult with provider, Float heels, Keep linens dry and wrinkle-free, Maintain/enhance activity level, Minimize linen layers, Pressure redistribution bed/mattress (bed type), Turn and reposition approx. every two hours (pillows and wedges if needed)    Moisture Interventions: Absorbent underpads, Apply protective barrier, creams and emollients, Assess need for specialty bed, Check for incontinence Q2 hours and as needed, Internal/External urinary devices, Limit adult briefs, Maintain skin hydration (lotion/cream), Minimize layers, Moisture barrier    Activity Interventions: Assess need for specialty bed, Increase time out of bed, Pressure redistribution bed/mattress(bed type), PT/OT evaluation    Mobility Interventions: Assess need for specialty bed, Float heels, HOB 30 degrees or less, Pressure redistribution bed/mattress (bed type), PT/OT evaluation, Turn and reposition approx. every two hours(pillow and wedges)    Nutrition Interventions: Document food/fluid/supplement intake, Discuss nutritional consult with provider, Offer support with meals,snacks and hydration    Friction and Shear Interventions: Apply protective barrier, creams and emollients, Feet elevated on foot rest, Foam dressings/transparent film/skin sealants, HOB 30 degrees or less, Lift sheet, Lift team/patient mobility team, Minimize layers, Transferring/repositioning devices                Problem: Pneumonia: Day 4  Goal: Respiratory  Outcome: Not Progressing Towards Goal  Note:   Pt on High Flow NC. Receiving steroids, neb treatments. Encouraging use of I/S.

## 2019-07-22 NOTE — PROGRESS NOTES
Hospital Progress Note    NAME:  Hayward Duane   :   1950   MRN:  [id number]     Date/Time:  2019 8:25 AM    Plan:   1. Complete 7 da coarse of zosyn  2. High flow o2  3. cellcept and steroids    4. Increase activity - dangle -oob in chair  Risk of Deterioration: Low  []           Moderate  []           High  [x]                 Assessment:   Principal Problem:    Pneumonia (7/15/2019)    Active Problems:    Acute on Chronic hypoxemic respiratory failure (HCC) (2019)     HFNC      Bleomycin lung toxicity (2019)     cellcept and steroids       Diabetes mellitus type 2, controlled ([facility] [address] (2016)      Hodgkin lymphoma ([facility] [address] (2/3/2019)       UTI -  ENTEROCOCCUS SPECIES         Admitting notes:68 y.o. female with past medical history significant for htn, fibromyalgia, muscle atrophy, pulmonary fibrosis, asthma, b/l cataracts, dm, non-hodgkin's, who presents from ems with chief complaint of sob. Pt has \"last night\" onset of worsening sob described as \"labored breathing\" that's continued into this morning. She is normally on 4.5 L of O2 n/c at baseline, but had it increased to 5 L by staff. She also endorses a dry non-productive cough and fever (peak 100) at this time. EMS arrived and measured initial O2 sat of 92% and HR of 140 bpm. Denies pleuritic cp and abd pain. Of note, she has had PNA \"x3 times since Ken" and was treated last week with a z-pack for sinus infection symptomatic of congestion. DNR form in place.  EMS was called to [place];  bpm, 124/78, and 9            Subjective/interium history     Continues with sob but feels better  11 Point Review of Systems:   Negative except no cp    []            Unable to obtain ROS due to:       []            mental status change []            sedated []            intubated     Social History     Tobacco Use    Smoking status: Never Smoker    Smokeless tobacco: Never Used   Substance Use Topics    Alcohol use: Yes     Comment: socially     Medications reviewed:  Current Facility-Administered Medications   Medication Dose Route Frequency    insulin glargine (LANTUS) injection 30 Units  30 Units SubCUTAneous QHS    glucose chewable tablet 16 g  4 Tab Oral PRN    dextrose (D50W) injection syrg 12.5-25 g  12.5-25 g IntraVENous PRN    glucagon (GLUCAGEN) injection 1 mg  1 mg IntraMUSCular PRN    insulin lispro (HUMALOG) injection   SubCUTAneous TIDAC    dilTIAZem (CARDIZEM) IR tablet 90 mg  90 mg Oral QID    ALPRAZolam (XANAX) tablet 0.25 mg  0.25 mg Oral BID PRN    methylPREDNISolone (PF) (SOLU-MEDROL) injection 60 mg  60 mg IntraVENous Q6H    mycophenolate mofetil (CELLCEPT) capsule 250 mg  250 mg Oral ACB&D    apixaban (ELIQUIS) tablet 5 mg  5 mg Oral BID    sodium chloride (NS) flush 5-10 mL  5-10 mL IntraVENous PRN    sodium chloride (NS) flush 5-40 mL  5-40 mL IntraVENous Q8H    sodium chloride (NS) flush 5-40 mL  5-40 mL IntraVENous PRN    acetaminophen (TYLENOL) tablet 650 mg  650 mg Oral Q4H PRN    ondansetron (ZOFRAN) injection 4 mg  4 mg IntraVENous Q4H PRN    piperacillin-tazobactam (ZOSYN) 3.375 g in 0.9% sodium chloride (MBP/ADV) 100 mL  3.375 g IntraVENous Q8H    lactobac ac& pc-s.therm-b.anim (JUNI Q/RISAQUAD)  1 Cap Oral DAILY    atorvastatin (LIPITOR) tablet 40 mg  40 mg Oral QHS        Objective:   Vitals:  Visit Vitals  /77 (BP 1 Location: Right arm, BP Patient Position: At rest)   Pulse 98   Temp 97.7 °F (36.5 °C)   Resp (!) 33   Ht 5' 8\" (1.727 m)   Wt 176 lb 8 oz (80.1 kg)   SpO2 100%   BMI 26.84 kg/m²     Temp (24hrs), Av.6 °F (36.4 °C), Min:96.8 °F (36 °C), Max:98.2 °F (36.8 °C)    O2 Flow Rate (L/min): 50 l/min O2 Device: Hi flow nasal cannula    Last 24hr Input/Output:    Intake/Output Summary (Last 24 hours) at 2019 0803  Last data filed at 2019 0359  Gross per 24 hour   Intake 400 ml   Output 700 ml   Net -300 ml        PHYSICAL EXAM:  General:    Alert, cooperative, no distress, appears stated age. Head:   Normocephalic, without obvious abnormality, atraumatic. Eyes:   Conjunctivae/corneas clear. PERRLA  Nose:  Nares normal. No drainage or sinus tenderness. Throat:    Lips, mucosa, and tongue normal.  No Thrush  Neck:  Supple, symmetrical,  no adenopathy, thyroid: non tender    no carotid bruit and no JVD. Back:    Symmetric,  No CVA tenderness. Lungs:   Decreased bs with rales. Chest wall:  No tenderness or deformity. No Accessory muscle use. Heart:   Regular rate and rhythm,  no murmur, rub or gallop. Abdomen:   Soft, non-tender. Not distended. Bowel sounds normal. No masses.        Lab Data Reviewed:    Recent Labs     07/22/19  0350 07/21/19  0242 07/20/19  0313   WBC 15.1* 15.8* 16.9*   HGB 7.6* 7.7* 7.8*   HCT 24.5* 24.5* 25.4*    390 435*     Recent Labs     07/22/19  0350 07/21/19  0242 07/20/19  0313    135* 135*   K 3.6 3.3* 3.6    99 100   CO2 27 26 25   * 271* 262*   BUN 30* 32* 31*   CREA 1.34* 1.39* 1.55*   CA 9.0 9.3 9.4   MG 2.2 2.1  --    PHOS 2.4* 2.7  --      Lab Results   Component Value Date/Time    Glucose (POC) 270 (H) 07/22/2019 06:29 AM    Glucose (POC) 286 (H) 07/21/2019 09:22 PM    Glucose (POC) 271 (H) 07/21/2019 04:18 PM    Glucose (POC) 299 (H) 07/21/2019 11:34 AM    Glucose (POC) 282 (H) 07/21/2019 06:10 AM       ___________________________________________________  ___________________________________________________    Attending Physician: Reema Singh MD

## 2019-07-22 NOTE — PROGRESS NOTES
Pulmonary, Critical Care, and Sleep Medicine~Progress Note    Name: Pedro Amaya MRN: [id number]   : 1950 Hospital: [facility]   Date: 2019 11:15 AM Admission: 7/15/2019     Impression Plan   1. Acute on chronic (on 4lpm normally) hypoxic resp failure secondary to below  2. Abnormal CT scan: HCAP possibly, query more bleomycin lung toxicity, now with lack of response. Other concerns were pulmonary edema, but she is quite dried out now  3. Hx of lymphoma. Not on therapy since the beginning of the year. 4. Hx of PE and was on eliquis   5. Hx of asthma  6. Reviewed [facility] records. She was seen last there on 19. Early  cellcept was stopped and at that appointment she was scheduled to taper off of steroids, which she did. Apparently she was on hospice care, but I am unclear as to the details; per patient it was rescinded   1. Strep/legionella negative  2. ECHO; PASP 60mmHg   3. Chest film today looks like it has some improvement. 4. zosyn   5. O2 titration above 90%, on high flow. 35lpm and 80% FiO2. Please continue to wean. 6. autoimmune labs mostly negative, but did have a + PEACE with elevated CRP and ESR; query coexisting autoimmune process. Once discharge would recommend rheumatology follow up  7. Decrease IV steroids and increase cellcept   8. To unstable for bronchoscopy   9. High risk for rapid decline   10. Discussed with attending        Daily Progression:      Creatinine improving   Was increased in Flow and FiO2, unclear why as she is feeling better   Chest film today looks fairly similar        Not much different. We are starting her on high flow now       Feels possibly a little better. proBNP elevated today       Consult Note requested by Dr Tasha Gomez. Patient presented for admission secondary to a one week course of cough, subjective fevers, and malasie. Outpatient PCP starte zpak and she did not have bernadette improvement.  No other symptoms reported on this recent course. 7/15/19 CT scan did showed worsening right great than left infiltrates with interlobular septal thickening; no PE. BNP was 534, not very elevated. This past Jan 2019 she was dx with hodgkin's lymphoma. Was on blecyomcin and developed a pneumonitis associated with it. Was started on cellcept 500mg bid and prednisone. Followed in the office at [facility] in April, however transferred her care to [facility] and saw Dr Daisy Pearce (sp?). Her last visit with them was this past June and at that visit she was weaned off of the prednisone and cellcept. We do not have those records at this time. I have reviewed the labs and previous days notes. Pertinent items are noted in HPI. Past Medical History:   Diagnosis Date    Asthma     as a child, nothing since menopause    Cancer ([facility] [address] 08/21/2018    Non-Hodgkin's    Cataracts, bilateral     Diabetes ([facility] [address]     type II    Environmental allergies     Fibromyalgia     Treated with alternate medical therapy    Hypertension       Past Surgical History:   Procedure Laterality Date    HX BREAST BIOPSY Right 2006    HX COLONOSCOPY  2015    HX GYN      Pap 2015    HX ORTHOPAEDIC      fractured R ankle/ no surgery    HX OTHER SURGICAL Right 08/27/2018    excision of deep neck cervical LN. Prior to Admission medications    Medication Sig Start Date End Date Taking? Authorizing Provider   acetaminophen (TYLENOL) 650 mg suppository Insert 650 mg into rectum every four (4) hours as needed for Pain. Yes Provider, Historical   amLODIPine (NORVASC) 5 mg tablet Take 5 mg by mouth daily. Yes Provider, Historical   trimethoprim-sulfamethoxazole (BACTRIM DS) 160-800 mg per tablet Take 1 Tab by mouth every Monday, Wednesday, Friday. Yes Provider, Historical   metFORMIN (GLUCOPHAGE) 500 mg tablet Take 500 mg by mouth two (2) times daily (with meals).  Indications: Per [address] 3/30/19  Yes Provider, Historical   levalbuterol (XOPENEX) 1.25 mg/0.5 mL nebu 1.25 mg by Nebulization route every four (4) hours as needed. Indications: Per [address] 3/30/19  Yes Provider, Historical   acetaminophen (TYLENOL ARTHRITIS PAIN) 650 mg TbER Take 650 mg by mouth as needed (Q 6hrs PRN). 3/30/19  Yes Provider, Historical   apixaban (ELIQUIS) 5 mg tablet Take 1 Tab by mouth every twelve (12) hours. 3/13/19  Yes Adriel Zarco MD   atorvastatin (LIPITOR) 40 mg tablet TAKE 1 TAB BY MOUTH DAILY. 12/3/17  Yes Adriel Zarco MD   Oxygen 3 Devices by Nasal route as needed. Indications: SOB PRN    Provider, Historical     No Known Allergies   Social History     Tobacco Use    Smoking status: Never Smoker    Smokeless tobacco: Never Used   Substance Use Topics    Alcohol use: Yes     Comment: socially      Family History   Problem Relation Age of Onset    Heart Disease Mother     Cancer Father         prostate cancer    Cancer Sister         Breast cancer apparent DCIS    Heart Disease Brother         Brother  from congestive heart failure secondary to severe mitral disease       OBJECTIVE:     Vital Signs:       Visit Vitals  /77 (BP 1 Location: Right arm, BP Patient Position: At rest)   Pulse 98   Temp 97.7 °F (36.5 °C)   Resp (!) 33   Ht 5' 8\" (1.727 m)   Wt 80.1 kg (176 lb 8 oz)   SpO2 100%   BMI 26.84 kg/m²      Temp (24hrs), Av.6 °F (36.4 °C), Min:96.8 °F (36 °C), Max:98.2 °F (36.8 °C)     Intake/Output:     Last shift: No intake/output data recorded.     Last 3 shifts: 1901 -  0700  In: 400 [P.O.:400]  Out: 1000 [Urine:1000]          Intake/Output Summary (Last 24 hours) at 2019 0928  Last data filed at 2019 0359  Gross per 24 hour   Intake 200 ml   Output 700 ml   Net -500 ml       Physical Exam:                                        Exam Findings Other   General: No resp distress noted, appears stated age    HEENT:  No ulcers, JVD not elevated, no cervical LAD    Chest: No pectus deformity, normal chest rise b/l    HEART:  RRR, no murmurs/rubs/gallops    Lungs:  Notable crackles, diminished BS at bases    ABD: Soft/NT, non rigid mildly distended    EXT: No cyanosis/clubbing/edema, normal peripheral pulses    Skin: No rashes or ulcers, no mottling    Neuro: A/O x 3        Medications:  Current Facility-Administered Medications   Medication Dose Route Frequency    insulin glargine (LANTUS) injection 30 Units  30 Units SubCUTAneous QHS    glucose chewable tablet 16 g  4 Tab Oral PRN    dextrose (D50W) injection syrg 12.5-25 g  12.5-25 g IntraVENous PRN    glucagon (GLUCAGEN) injection 1 mg  1 mg IntraMUSCular PRN    insulin lispro (HUMALOG) injection   SubCUTAneous TIDAC    dilTIAZem (CARDIZEM) IR tablet 90 mg  90 mg Oral QID    ALPRAZolam (XANAX) tablet 0.25 mg  0.25 mg Oral BID PRN    methylPREDNISolone (PF) (SOLU-MEDROL) injection 60 mg  60 mg IntraVENous Q6H    mycophenolate mofetil (CELLCEPT) capsule 250 mg  250 mg Oral ACB&D    apixaban (ELIQUIS) tablet 5 mg  5 mg Oral BID    sodium chloride (NS) flush 5-10 mL  5-10 mL IntraVENous PRN    sodium chloride (NS) flush 5-40 mL  5-40 mL IntraVENous Q8H    sodium chloride (NS) flush 5-40 mL  5-40 mL IntraVENous PRN    acetaminophen (TYLENOL) tablet 650 mg  650 mg Oral Q4H PRN    ondansetron (ZOFRAN) injection 4 mg  4 mg IntraVENous Q4H PRN    piperacillin-tazobactam (ZOSYN) 3.375 g in 0.9% sodium chloride (MBP/ADV) 100 mL  3.375 g IntraVENous Q8H    lactobac ac& pc-s.therm-b.anim (JUNI Q/RISAQUAD)  1 Cap Oral DAILY    atorvastatin (LIPITOR) tablet 40 mg  40 mg Oral QHS       Labs:  ABG No results for input(s): PHI, PCO2I, PO2I, HCO3I, SO2I, FIO2I in the last 72 hours.      CBC Recent Labs     07/22/19  0350 07/21/19  0242 07/20/19  0313   WBC 15.1* 15.8* 16.9*   HGB 7.6* 7.7* 7.8*   HCT 24.5* 24.5* 25.4*    390 435*   MCV 94.6 93.9 95.5   MCH 29.3 29.5 19.3        Metabolic  Panel Recent Labs     07/22/19  0350 07/21/19  0241 07/20/19  0313    135* 135*   K 3.6 3.3* 3.6    99 100   CO2 27 26 25   * 271* 262*   BUN 30* 32* 31*   CREA 1.34* 1.39* 1.55*   CA 9.0 9.3 9.4   MG 2.2 2.1  --    PHOS 2.4* 2.7  --         Pertinent Labs                SAM Chambers  7/22/2019

## 2019-07-23 NOTE — PROGRESS NOTES
Problem: Mobility Impaired (Adult and Pediatric)  Goal: *Acute Goals and Plan of Care (Insert Text)  Description  Physical Therapy Goals  Initiated 7/23/2019  1. Patient will move from supine to sit and sit to supine , scoot up and down and roll side to side in bed with minimal assistance/contact guard assist within 7 day(s). 2.  Patient will tolerate sitting in WC and propel self through room. 3.  Patient will perform HEP sitting in WC or bed. Outcome: Progressing Towards Goal     PHYSICAL THERAPY EVALUATION  Patient: Derik Roblero (47 y.o. female)  Date: 7/23/2019  Primary Diagnosis: Pneumonia [J18.9]        Precautions: fall, quick desaturation, nonambulatory. ASSESSMENT :  Based on the objective data described below, the patient presents with decreased mobility and poor functional baseline with pt marcella transferred into chair at baseline. Pt states that she usually sits up EOB but that she has help with almost everything. Pt transfers to EOB with max A, sits in full kyphosis/flexion for approx 8 min. Pt on high flow but tends to desat to 88% with discussions. Will continue to follow to increase sitting tolerance    Patient will benefit from skilled intervention to address the above impairments. Patients rehabilitation potential is considered to be Guarded to fair  Factors which may influence rehabilitation potential include:   ? None noted  ? Mental ability/status  ? Medical condition  ? Home/family situation and support systems  ? Safety awareness  ? Pain tolerance/management  ? Other:      PLAN :  Recommendations and Planned Interventions:  ?           Bed Mobility Training             ? Neuromuscular Re-Education  ? Transfer Training                   ? Orthotic/Prosthetic Training  ? Gait Training                         ? Modalities  ? Therapeutic Exercises           ?     Edema Management/Control  ? Therapeutic Activities            ? Patient and Family Training/Education  ? Other (comment):    Frequency/Duration: Patient will be followed by physical therapy  3 times a week to address goals. Discharge Recommendations: [facility]  Further Equipment Recommendations for Discharge: TBD- previously marcella to [facility]      SUBJECTIVE:   Patient stated I am worried about my breathing.     OBJECTIVE DATA SUMMARY:   HISTORY:    Past Medical History:   Diagnosis Date    Asthma     as a child, nothing since menopause    Cancer ([facility] [address] 08/21/2018    Non-Hodgkin's    Cataracts, bilateral     Diabetes ([facility] [address]     type II    Environmental allergies     Fibromyalgia     Treated with alternate medical therapy    Hypertension      Past Surgical History:   Procedure Laterality Date    HX BREAST BIOPSY Right 2006    HX COLONOSCOPY  2015    HX GYN      Pap 2015    HX ORTHOPAEDIC      fractured R ankle/ no surgery    HX OTHER SURGICAL Right 08/27/2018    excision of deep neck cervical LN. Prior Level of Function/Home Situation: mod-max A for ADLs, nonambulatory  Personal factors and/or comorbidities impacting plan of care:     Home Situation  [facility] [address] Name: maria  # Steps to Enter: 0  One/Two Story Residence: One story  Living Alone: No  Support Systems: Shelter, Friends \ neighbors, Assisted living  Patient Expects to be Discharged to[de-identified] Rehabilitation facility  Current DME Used/Available at Home: (marcella lift into [facility])    EXAMINATION/PRESENTATION/DECISION MAKING:   Critical Behavior:  Neurologic State: Alert  Orientation Level: Oriented X4  Cognition: Follows commands  Safety/Judgement: Awareness of environment  Hearing: Auditory  Auditory Impairment: None  Skin:  intact where visible  Edema: none  Range Of Motion:                          Strength:                          Tone & Sensation: Coordination:     Vision:      Functional Mobility:  Bed Mobility:  Rolling: Minimum assistance  Supine to Sit: Moderate assistance  Sit to Supine: Moderate assistance  Scooting: Maximum assistance  Transfers:                             Balance:   Sitting: Impaired; With support  Sitting - Static: Fair (occasional)  Sitting - Dynamic: Fair (occasional)  Ambulation/Gait Training:        Functional Measure:  Barthel Index:    Bathin  Bladder: 0  Bowels: 0  Groomin  Dressin  Feedin  Mobility: 0  Stairs: 0  Toilet Use: 0  Transfer (Bed to Chair and Back): 0  Total: 5/100       Percentage of impairment   0%   1-19%   20-39%   40-59%   60-79%   80-99%   100%   Barthel Score 0-100 100 99-80 79-60 59-40 20-39 1-19   0     The Barthel ADL Index: Guidelines  1. The index should be used as a record of what a patient does, not as a record of what a patient could do. 2. The main aim is to establish degree of independence from any help, physical or verbal, however minor and for whatever reason. 3. The need for supervision renders the patient not independent. 4. A patient's performance should be established using the best available evidence. Asking the patient, friends/relatives and nurses are the usual sources, but direct observation and common sense are also important. However direct testing is not needed. 5. Usually the patient's performance over the preceding 24-48 hours is important, but occasionally longer periods will be relevant. 6. Middle categories imply that the patient supplies over 50 per cent of the effort. 7. Use of aids to be independent is allowed. Hailey Gonzalez., Barthel, D.W. ([id number]). Functional evaluation: the Barthel Index. [address] [facility] (14)2. Evelyn [place] k[employer] [place], J.J.M.F, Margie Steen., Valeria Yoo., [place], [address] (). Measuring the change indisability after inpatient rehabilitation; comparison of the responsiveness of the Barthel Index and Functional [place] Measure.  Journal of Neurology, Neurosurgery, and Psychiatry, 66(5), 741-820. ALISHA Vasquez, JONNATHAN Owens, & Gisella Rush M.A. (2004.) Assessment of post-stroke quality of life in cost-effectiveness studies: The usefulness of the Barthel Index and the EuroQoL-5D. Quality of Life Research, 15, 228-99            Physical Therapy Evaluation Charge Determination   History Examination Presentation Decision-Making   HIGH Complexity :3+ comorbidities / personal factors will impact the outcome/ POC  HIGH Complexity : 4+ Standardized tests and measures addressing body structure, function, activity limitation and / or participation in recreation  HIGH Complexity : Unstable and unpredictable characteristics  Other outcome measures barthel  HIGH       Based on the above components, the patient evaluation is determined to be of the following complexity level: HIGH     Pain:  Pain Scale 1: Numeric (0 - 10)  Pain Intensity 1: 0              Activity Tolerance:   Fair to poor with desaturation while talking. Please refer to the flowsheet for vital signs taken during this treatment. After treatment:   ?         Patient left in no apparent distress sitting up in chair  ? Patient left in no apparent distress in bed  ? Call bell left within reach  ? Nursing notified  ? Caregiver present  ? Bed alarm activated    COMMUNICATION/EDUCATION:   The patients plan of care was discussed with: Registered Nurse and . ?         Fall prevention education was provided and the patient/caregiver indicated understanding. ? Patient/family have participated as able in goal setting and plan of care. ?         Patient/family agree to work toward stated goals and plan of care. ?         Patient understands intent and goals of therapy, but is neutral about his/her participation. ? Patient is unable to participate in goal setting and plan of care.     Thank you for this referral.  Matilde Mejias, PT   Time Calculation: 21 mins

## 2019-07-23 NOTE — PROGRESS NOTES
Problem: Self Care Deficits Care Plan (Adult)  Goal: *Acute Goals and Plan of Care (Insert Text)  Description  Occupational Therapy Goals  Initiated 7/23/2019  1. Patient will perform sitting EOB for grooming task with minimal assistance/contact guard assist within 7 day(s). 2.  Patient will perform anterior UB bathing with minimal assistance/contact guard assist within 7 day(s). 3.  Patient will perform rolling in bed in prep to assist with toileting with minimal assistance/contact guard assist within 7 day(s). 4.  Patient will don gown with minimum assistance within 7 days. 5.  Patient will participate in upper extremity therapeutic exercise/activities with supervision/set-up for >8 minutes within 7 day(s). Outcome: Progressing Towards Goal    OCCUPATIONAL THERAPY EVALUATION  Patient: Mir Jones (25 y.o. female)  Date: 7/23/2019  Primary Diagnosis: Pneumonia [J18.9]        Precautions: fall       ASSESSMENT :  Based on the objective data described below, the patient presents with global weakness, L UE edema, impaired pulmonary endurance, need for increased O2 demands and decline in functional status. PTA, pt lived in [address] [place], required assist for ADLs (see below). At baseline, pt wears 4.5L but received on high flow 35L, 80% FiO2 this date. Pt required max A for rolling to her R with -120 at rest, RR 28-41 and O2 ranging from 88-95% with talking and minimal exertion. Performed grooming task at bed level with setup. Recommend SNF rehab and increasing HOB/bed to chair positions as tolerated. Will issue yellow theraband next session to increase UE strength to assist with bed mobility and ADLs. Will follow 3x/week for OT to work on sitting balance, anterior body bathing, UE exercises and bed mobility to assist staff with ADLs     Patient will benefit from skilled intervention to address the above impairments.   Patients rehabilitation potential is considered to be Fair  Factors which may influence rehabilitation potential include:   ? None noted  ? Mental ability/status  ? Medical condition  ? Home/family situation and support systems  ? Safety awareness  ? Pain tolerance/management  ? Other:      PLAN :  Recommendations and Planned Interventions:  ?               Self Care Training                  ? Therapeutic Activities  ? Functional Mobility Training    ? Cognitive Retraining  ? Therapeutic Exercises           ? Endurance Activities  ? Balance Training                   ? Neuromuscular Re-Education  ? Visual/Perceptual Training     ? Home Safety Training  ? Patient Education                 ? Family Training/Education  ? Other (comment):    Frequency/Duration: Patient will be followed by occupational therapy 3 times a week to address goals. Discharge Recommendations: Skilled Nursing Facility  Further Equipment Recommendations for Discharge: TBD     SUBJECTIVE:   Patient stated I can usually roll myself over myself.     OBJECTIVE DATA SUMMARY:   HISTORY:   Past Medical History:   Diagnosis Date    Asthma     as a child, nothing since menopause    Cancer ([facility] [address] 08/21/2018    Non-Hodgkin's    Cataracts, bilateral     Diabetes ([facility] [address]     type II    Environmental allergies     Fibromyalgia     Treated with alternate medical therapy    Hypertension      Past Surgical History:   Procedure Laterality Date    HX BREAST BIOPSY Right 2006    HX COLONOSCOPY  2015    HX GYN      Pap 2015    HX ORTHOPAEDIC      fractured R ankle/ no surgery    HX OTHER SURGICAL Right 08/27/2018    excision of deep neck cervical LN. Prior Level of Function/Environment/Context: resides at [address] [place] [address] since June. Pt is marcella lifted to w/c and BSC.  She is mainly bed bound but is able to feed herself, perform grooming tasks, assist with UB anterior bathing and donning shirts. She is max to total for LB ADLs. Pt wears briefs but reports she is continent, however, she will have accidents if staff cannot attend to her quickly enough. Recently has been using bed pan more than BSC (uses for BMs). Pt stated she is able to roll herself in bed to assist staff. Occupations in which the patient is/was successful, what are the barriers preventing that success:   Performance Patterns (routines, roles, habits, and rituals):   Personal Interests and/or values:   Expanded or extensive additional review of patient history:     Home Situation  Home Environment: [address] [place] [address] Road Name: maria  # Steps to Enter: 0  One/Two Story Residence: One story  Living Alone: No  Support Systems: Shelter, Friends \ neighbors, Assisted living  Patient Expects to be Discharged to[de-identified] Rehabilitation facility  Current DME Used/Available at Home: (marcella lift into WC)    Hand dominance: Right    EXAMINATION OF PERFORMANCE DEFICITS:  Cognitive/Behavioral Status:  Neurologic State: Alert  Orientation Level: Oriented X4  Cognition: Follows commands             Skin: intact    Edema: L UE    Hearing: Auditory  Auditory Impairment: None    Vision/Perceptual:    Tracking: Able to track stimulus in all quadrants w/o difficulty                      Acuity: Within Defined Limits    Corrective Lenses: Glasses    Range of Motion:  B UEs WFL                            Strength:  Generally decreased, 3/5 B UE, decreased R                    Coordination:     Fine Motor Skills-Upper: Left Intact; Right Intact    Gross Motor Skills-Upper: Left Intact; Right Intact    Tone & Sensation:  intact                            Balance:  Sitting: Impaired; With support  Sitting - Static: Fair (occasional)  Sitting - Dynamic: Fair (occasional)    Functional Mobility and Transfers for ADLs:  Bed Mobility:  Pt sat EOB with PT, fatigued quickly. Performed bed level assessment 2* tachycardia and tachypnea at rest via high flow NC    ADL Assessment:  Feeding: Setup    Oral Facial Hygiene/Grooming: Setup    Bathing: Maximum assistance    Upper Body Dressing: Moderate assistance    Lower Body Dressing: Maximum assistance    Toileting: Maximum assistance                ADL Intervention and task modifications:                                          Therapeutic Exercise: With 5 UE simultaneous reps of shoulder flexion, O2, HR remained stable    Functional Measure:  Barthel Index:    Bathin  Bladder: 0  Bowels: 0  Groomin  Dressin  Feedin  Mobility: 0  Stairs: 0  Toilet Use: 0  Transfer (Bed to Chair and Back): 0  Total: 5/100        Percentage of impairment   0%   1-19%   20-39%   40-59%   60-79%   80-99%   100%   Barthel Score 0-100 100 99-80 79-60 59-40 20-39 1-19   0     The Barthel ADL Index: Guidelines  1. The index should be used as a record of what a patient does, not as a record of what a patient could do. 2. The main aim is to establish degree of independence from any help, physical or verbal, however minor and for whatever reason. 3. The need for supervision renders the patient not independent. 4. A patient's performance should be established using the best available evidence. Asking the patient, friends/relatives and nurses are the usual sources, but direct observation and common sense are also important. However direct testing is not needed. 5. Usually the patient's performance over the preceding 24-48 hours is important, but occasionally longer periods will be relevant. 6. Middle categories imply that the patient supplies over 50 per cent of the effort. 7. Use of aids to be independent is allowed. Susan Castro Barthel, D.W. ([id number]). Functional evaluation: the Barthel Index. [address] [facility] (14)2. Tami [place] kodak Sandra, [email or web address].F, Titus Cole., Natacha Button., [place], [address] [facility] ().  Measuring the change indisability after inpatient rehabilitation; comparison of the responsiveness of the Barthel Index and Functional [place] Measure. Journal of Neurology, Neurosurgery, and Psychiatry, 66(4), 308-533. DARIEN Ramirez.AUGUSTINA, JONNATHAN Owens, & Marni Orellana M.A. (2004.) Assessment of post-stroke quality of life in cost-effectiveness studies: The usefulness of the Barthel Index and the EuroQoL-5D. Quality of Life Research, 15, 899-73         Occupational Therapy Evaluation Charge Determination   History Examination Decision-Making   MEDIUM Complexity : Expanded review of history including physical, cognitive and psychosocial  history  MEDIUM Complexity : 3-5 performance deficits relating to physical, cognitive , or psychosocial skils that result in activity limitations and / or participation restrictions MEDIUM Complexity : Patient may present with comorbidities that affect occupational performnce. Miniml to moderate modification of tasks or assistance (eg, physical or verbal ) with assesment(s) is necessary to enable patient to complete evaluation       Based on the above components, the patient evaluation is determined to be of the following complexity level: MEDIUM  Pain:  Pain Scale 1: Numeric (0 - 10)  Pain Intensity 1: 0              Activity Tolerance:   fair  Please refer to the flowsheet for vital signs taken during this treatment. After treatment:   ? Patient left in no apparent distress sitting up in chair  ? Patient left in no apparent distress in bed  ? Call bell left within reach  ? Nursing notified  ? Caregiver present  ? Bed alarm activated    COMMUNICATION/EDUCATION:   The patients plan of care was discussed with: Physical Therapist and Registered Nurse.  ? Home safety education was provided and the patient/caregiver indicated understanding. ? Patient/family have participated as able in goal setting and plan of care. ? Patient/family agree to work toward stated goals and plan of care.   ? Patient understands intent and goals of therapy, but is neutral about his/her participation. ? Patient is unable to participate in goal setting and plan of care. This patients plan of care is appropriate for delegation to ERICA.     Thank you for this referral.  Stephanie Knapp OT  Time Calculation: 19 mins

## 2019-07-23 NOTE — PROGRESS NOTES
NUTRITION COMPLETE ASSESSMENT    RECOMMENDATIONS:   1. Continue current diet, Supplements (specify)  2. Encourage pt to consume at least 75% supplements  3. Weekly weights     Interventions/Plan:   Food/Nutrient Delivery:           RD to add additional supplements    Assessment:   Reason for Assessment:    [x]Reassessment     Diet: Full liquids  Supplements: Boost TID  Nutritionally Significant Medications: [x] Reviewed & Includes: Lantus, Humalog, FloraQ, Methylprednisone, Zofran prn  Meal Intake:   Patient Vitals for the past 100 hrs:   % Diet Eaten   07/23/19 1200 50 %   07/23/19 0800 50 %   07/22/19 1600 50 %   07/22/19 1324 50 %   07/22/19 0800 50 %   07/21/19 1241 20 %   07/21/19 0819 30 %   07/20/19 1852 50 %       Subjective:  \"I only want to have broth and Boost; they are working well\"    Objective:  Pt seen for reassessment. Pt admitted with pneumonia. PMHx: hypertension, fibromyalgia, pulmonary fibrosis, Hodgkin's lymphoma. Reviewed chart, spoke with pt and RN. Pt continues on full liquid diet per pt request (per RN). Per EHR pt consuming 50% meals but noted meals only consisting of broth, juice which provides <100kcal/meal. Pt is drinking Boost TID but noted 2-3 unopened at bedside. Encouraged pt to choose cream based soups at meals but pt states broth is working well and would rather continue drinking it vs any other full liquid items. Boost TID (if consuming 100%) + broth TID, juice TID providing ~1100 kcal, 45g protein meeting ~66% caloric, 56% protein needs. Offered pt additional supplements and pt willing to try Magic cup q day which provides an additional 290 kcal, 9g protein. Likely nutrition support not appropriate ? due to pt on hospice PTA. Noted palliative consult ordered. Noted pt with hyperglycemic episodes- pt on steroids; DTC following. Will continue to follow pt po intake, weight status, plan of care.      Estimated Nutrition Needs:   Kcals/day: 9085 XPUHQ/OUN(1608-6789 kcal/day (MSJ x 1.2-1.3))  Protein: 80 g(80-83g/day (1-1.1g/kg))  Fluid:  1700mL/day (1mL/kcal)     Based On: [address] Jeor  Weight Used: Actual wt    Pt expected to meet estimated nutrient needs:  []   Yes     [x]  No - not with current po intake    Nutrition Diagnosis:   1. Unintended weight loss related to fair to poor po intake PTA as evidenced by pt with 25 lb wt loss over past year    2. Inadequate protein-energy intake related to full liquid diet with limited caloric intake as evidenced by pt only consuming broth and supplements    Goals:      Pt will consume >50% meals, 4-5 supplements per day     Monitoring & Evaluation:    - Total energy intake, Liquid meal replacement   - Weight/weight change   -      Previous Nutrition Goals Met:  N/A  Previous Recommendations:      N/A    Education & Discharge Needs:   [x] None Identified   [] Identified and addressed    [] Participated in care plan, discharge planning, and/or interdisciplinary rounds       Skin Integrity: []Intact  [x]Other - sacral wound  Edema: []None [x]Other - LUE, RLE, LLE 1+; RUE trace  Last BM: 7/23/2019  Food Allergies: [x]None []Other    Anthropometrics:    Weight Loss Metrics 7/23/2019 4/10/2019 2/9/2019 1/21/2019 11/15/2018 9/13/2018 9/12/2018   Today's Wt 179 lb 0.2 oz - 238 lb 8.6 oz 180 lb 180 lb 198 lb 201 lb 1 oz   BMI 27.22 kg/m2 35.74 kg/m2 36.27 kg/m2 27.37 kg/m2 27.37 kg/m2 30.11 kg/m2 29.69 kg/m2      Last 3 Recorded Weights in this Encounter    07/21/19 0243 07/22/19 0348 07/23/19 0315   Weight: 77.3 kg (170 lb 8 oz) 80.1 kg (176 lb 8 oz) 81.2 kg (179 lb 0.2 oz)      Weight Source: Bed  Height: 5' 8\" (172.7 cm),    Body mass index is 27.22 kg/m².       ,      Labs:    Lab Results   Component Value Date/Time    Sodium 139 07/23/2019 04:07 AM    Potassium 3.6 07/23/2019 04:07 AM    Chloride 104 07/23/2019 04:07 AM    CO2 28 07/23/2019 04:07 AM    Glucose 236 (H) 07/23/2019 04:07 AM    BUN 33 (H) 07/23/2019 04:07 AM    Creatinine 1.21 (H) 07/23/2019 04:07 AM    Calcium 9.6 07/23/2019 04:07 AM    Magnesium 2.2 07/22/2019 03:50 AM    Phosphorus 2.4 (L) 07/22/2019 03:50 AM    Albumin 2.6 (L) 07/23/2019 04:07 AM     Lab Results   Component Value Date/Time    Hemoglobin A1c 6.9 (H) 08/03/2018 01:45 PM     Lab Results   Component Value Date/Time    Glucose 236 (H) 07/23/2019 04:07 AM    Glucose (POC) 291 (H) 07/23/2019 11:39 AM      Lab Results   Component Value Date/Time    ALT (SGPT) 12 07/23/2019 04:07 AM    AST (SGOT) 14 (L) 07/23/2019 04:07 AM    Alk.  phosphatase 151 (H) 07/23/2019 04:07 AM    Bilirubin, total 0.2 07/23/2019 04:07 AM        Oliver Sandoval RD

## 2019-07-23 NOTE — PROGRESS NOTES
1930: Bedside shift change report given to Johanna Garcia RN (oncoming nurse) by Radha Maria RN (offgoing nurse). Report included the following information SBAR, Kardex, Intake/Output, MAR, Recent Results and Cardiac Rhythm ST. Problem: Pneumonia: Day 4  Goal: Activity/Safety  Outcome: Progressing Towards Goal  Note:   PT/OT consulted. Goal: Nutrition/Diet  Outcome: Progressing Towards Goal  Note:   On full liquid diet. Drinking broth and boost.   Goal: Medications  Outcome: Progressing Towards Goal  Note:   Completed course of antibiotics  Goal: Respiratory  Outcome: Progressing Towards Goal  Note:   On High Flow NC at 35L. Maintaining SpO2>90%. Dyspneic and tachypneic on exertion. Receiving neb treatments, steroids, and one dose of lasix due to increase in weight. Will continue to monitor. Problem: Falls - Risk of  Goal: *Absence of Falls  Description  Document Vicki Porter Fall Risk and appropriate interventions in the flowsheet. Outcome: Progressing Towards Goal  Note:   Fall Risk Interventions:  Mobility Interventions: Communicate number of staff needed for ambulation/transfer, OT consult for ADLs, Patient to call before getting OOB, PT Consult for mobility concerns, Strengthening exercises (ROM-active/passive), PT Consult for assist device competence, Utilize walker, cane, or other assistive device         Medication Interventions: Assess postural VS orthostatic hypotension, Evaluate medications/consider consulting pharmacy, Patient to call before getting OOB, Teach patient to arise slowly    Elimination Interventions: Call light in reach, Patient to call for help with toileting needs, Stay With Me (per policy), Toilet paper/wipes in reach, Toileting schedule/hourly rounds              Problem: Pressure Injury - Risk of  Goal: *Prevention of pressure injury  Description  Document Dwayne Scale and appropriate interventions in the flowsheet. 7-16-19: turn q2h and float heels.    Outcome: Progressing Towards Goal  Note:   Pressure Injury Interventions:  Sensory Interventions: Assess changes in LOC, Assess need for specialty bed, Discuss PT/OT consult with provider, Keep linens dry and wrinkle-free, Float heels, Maintain/enhance activity level, Minimize linen layers, Monitor skin under medical devices, Pressure redistribution bed/mattress (bed type)    Moisture Interventions: Absorbent underpads, Apply protective barrier, creams and emollients, Assess need for specialty bed, Check for incontinence Q2 hours and as needed, Internal/External urinary devices, Limit adult briefs, Maintain skin hydration (lotion/cream), Minimize layers, Moisture barrier    Activity Interventions: Assess need for specialty bed, Increase time out of bed, Pressure redistribution bed/mattress(bed type), PT/OT evaluation    Mobility Interventions: Assess need for specialty bed, Float heels, HOB 30 degrees or less, Pressure redistribution bed/mattress (bed type), PT/OT evaluation, Turn and reposition approx.  every two hours(pillow and wedges)    Nutrition Interventions: Document food/fluid/supplement intake, Discuss nutritional consult with provider, Offer support with meals,snacks and hydration    Friction and Shear Interventions: Apply protective barrier, creams and emollients, Foam dressings/transparent film/skin sealants, HOB 30 degrees or less, Lift sheet, Lift team/patient mobility team, Minimize layers, Transferring/repositioning devices 9

## 2019-07-23 NOTE — PROGRESS NOTES
Pulmonary, Critical Care, and Sleep Medicine~Progress Note    Name: Lzi Montgomery MRN: [id number]   : 1950 Hospital: [facility]   Date: 2019 11:15 AM Admission: 7/15/2019     Impression Plan   1. Acute on chronic (on 4lpm normally) hypoxic resp failure secondary to below  2. Abnormal CT scan: HCAP possibly, query more bleomycin lung toxicity, now with lack of response. Other concerns were pulmonary edema, but she is quite dried out now  3. Hx of lymphoma. Not on therapy since the beginning of the year. 4. Hx of PE and was on eliquis   5. Hx of asthma  6. Reviewed [facility] records. She was seen last there on 19. Early  cellcept was stopped and at that appointment she was scheduled to taper off of steroids, which she did. Apparently she was on hospice care, but I am unclear as to the details; per patient it was rescinded   1. Strep/legionella negative  2. ECHO; PASP 60mmHg   3. Chest film today looks like it has some improvement. 4. Zosyn; consider stopping soon   5. O2 titration above 90%, on high flow. 25lpm and 94% FiO2. Please continue to wean. 6. autoimmune labs mostly negative, but did have a + PEACE with elevated CRP and ESR; query coexisting autoimmune process. Once discharge would recommend rheumatology follow up  7. Decrease IV steroids and increase cellcept   8. To unstable for bronchoscopy   9. High risk for rapid decline   10. Discussed with attending        Daily Progression:      Looks stronger  Desats quickly with movement, but stabilizes; this is to be expected with her chronic lung disease  Weight gain over night? Agree with diuretics  Agree with getting up and out of bed as much as possible        Creatinine improving   Was increased in Flow and FiO2, unclear why as she is feeling better   Chest film today looks fairly similar        Not much different. We are starting her on high flow now       Feels possibly a little better. proBNP elevated today     7/16  Consult Note requested by Dr Micky Felipe. Patient presented for admission secondary to a one week course of cough, subjective fevers, and malasie. Outpatient PCP starte zpak and she did not have bernadtete improvement. No other symptoms reported on this recent course. 7/15/19 CT scan did showed worsening right great than left infiltrates with interlobular septal thickening; no PE. BNP was 534, not very elevated. This past Jan 2019 she was dx with hodgkin's lymphoma. Was on blecyomcin and developed a pneumonitis associated with it. Was started on cellcept 500mg bid and prednisone. Followed in the office at [facility] in April, however transferred her care to [facility] and saw Dr Fate Jeans (sp?). Her last visit with them was this past June and at that visit she was weaned off of the prednisone and cellcept. We do not have those records at this time. I have reviewed the labs and previous days notes. Pertinent items are noted in HPI. Past Medical History:   Diagnosis Date    Asthma     as a child, nothing since menopause    Cancer ([address] 08/21/2018    Non-Hodgkin's    Cataracts, bilateral     Diabetes ([facility] [address]     type II    Environmental allergies     Fibromyalgia     Treated with alternate medical therapy    Hypertension       Past Surgical History:   Procedure Laterality Date    HX BREAST BIOPSY Right 2006    HX COLONOSCOPY  2015    HX GYN      Pap 2015    HX ORTHOPAEDIC      fractured R ankle/ no surgery    HX OTHER SURGICAL Right 08/27/2018    excision of deep neck cervical LN. Prior to Admission medications    Medication Sig Start Date End Date Taking? Authorizing Provider   acetaminophen (TYLENOL) 650 mg suppository Insert 650 mg into rectum every four (4) hours as needed for Pain. Yes Provider, Historical   amLODIPine (NORVASC) 5 mg tablet Take 5 mg by mouth daily.    Yes Provider, Historical   trimethoprim-sulfamethoxazole (BACTRIM DS) 160-800 mg per tablet Take 1 Tab by mouth every Monday, Wednesday, Friday. Yes Provider, Historical   metFORMIN (GLUCOPHAGE) 500 mg tablet Take 500 mg by mouth two (2) times daily (with meals). Indications: Per  [address] 3/30/19  Yes Provider, Historical   levalbuterol (XOPENEX) 1.25 mg/0.5 mL nebu 1.25 mg by Nebulization route every four (4) hours as needed. Indications: Per  [address] 3/30/19  Yes Provider, Historical   acetaminophen (TYLENOL ARTHRITIS PAIN) 650 mg TbER Take 650 mg by mouth as needed (Q 6hrs PRN). 3/30/19  Yes Provider, Historical   apixaban (ELIQUIS) 5 mg tablet Take 1 Tab by mouth every twelve (12) hours. 3/13/19  Yes Mary Armando MD   atorvastatin (LIPITOR) 40 mg tablet TAKE 1 TAB BY MOUTH DAILY. 12/3/17  Yes Mary Armando MD   Oxygen 3 Devices by Nasal route as needed.  Indications: SOB PRN    Provider, Historical     No Known Allergies   Social History     Tobacco Use    Smoking status: Never Smoker    Smokeless tobacco: Never Used   Substance Use Topics    Alcohol use: Yes     Comment: socially      Family History   Problem Relation Age of Onset    Heart Disease Mother     Cancer Father         prostate cancer    Cancer Sister         Breast cancer apparent DCIS    Heart Disease Brother         Brother  from congestive heart failure secondary to severe mitral disease       OBJECTIVE:     Vital Signs:       Visit Vitals  BP (!) 172/91 (BP 1 Location: Right arm, BP Patient Position: At rest)   Pulse (!) 105   Temp 98.1 °F (36.7 °C)   Resp 28   Ht 5' 8\" (1.727 m)   Wt 81.2 kg (179 lb 0.2 oz)   SpO2 96%   BMI 27.22 kg/m²      Temp (24hrs), Av.6 °F (36.4 °C), Min:96.9 °F (36.1 °C), Max:98.1 °F (36.7 °C)     Intake/Output:     Last shift: 701 - 1900  In: 240 [P.O.:240]  Out: 650 [Urine:650]    Last 3 shifts: 1901 -  0700  In: 720 [P.O.:720]  Out: 750 [Urine:750]          Intake/Output Summary (Last 24 hours) at 2019 1058  Last data filed at 2019 1006  Gross per 24 hour   Intake 720 ml   Output 1200 ml   Net -480 ml       Physical Exam:                                        Exam Findings Other   General: No resp distress noted, appears stated age    [de-identified]:  No ulcers, JVD not elevated, no cervical LAD    Chest: No pectus deformity, normal chest rise b/l    HEART:  RRR, no murmurs/rubs/gallops    Lungs:  Notable crackles, diminished BS at bases    ABD: Soft/NT, non rigid mildly distended    EXT: No cyanosis/clubbing/edema, normal peripheral pulses    Skin: No rashes or ulcers, no mottling    Neuro: A/O x 3        Medications:  Current Facility-Administered Medications   Medication Dose Route Frequency    dilTIAZem (CARDIZEM) IR tablet 120 mg  120 mg Oral QID    insulin glargine (LANTUS) injection 44 Units  44 Units SubCUTAneous QHS    methylPREDNISolone (PF) (Solu-MEDROL) injection 40 mg  40 mg IntraVENous Q8H    mycophenolate mofetil (CELLCEPT) capsule 500 mg  500 mg Oral ACB&D    glucose chewable tablet 16 g  4 Tab Oral PRN    dextrose (D50W) injection syrg 12.5-25 g  12.5-25 g IntraVENous PRN    glucagon (GLUCAGEN) injection 1 mg  1 mg IntraMUSCular PRN    insulin lispro (HUMALOG) injection   SubCUTAneous TIDAC    ALPRAZolam (XANAX) tablet 0.25 mg  0.25 mg Oral BID PRN    apixaban (ELIQUIS) tablet 5 mg  5 mg Oral BID    sodium chloride (NS) flush 5-10 mL  5-10 mL IntraVENous PRN    sodium chloride (NS) flush 5-40 mL  5-40 mL IntraVENous Q8H    sodium chloride (NS) flush 5-40 mL  5-40 mL IntraVENous PRN    acetaminophen (TYLENOL) tablet 650 mg  650 mg Oral Q4H PRN    ondansetron (ZOFRAN) injection 4 mg  4 mg IntraVENous Q4H PRN    lactobac ac& pc-s.therm-b.anim (JUNI Q/RISAQUAD)  1 Cap Oral DAILY    atorvastatin (LIPITOR) tablet 40 mg  40 mg Oral QHS       Labs:  ABG No results for input(s): PHI, PCO2I, PO2I, HCO3I, SO2I, FIO2I in the last 72 hours.      CBC Recent Labs     07/22/19  0350 07/21/19  0242   WBC 15.1* 15.8*   HGB 7.6* 7.7* HCT 24.5* 24.5*    390   MCV 94.6 93.9   MCH 29.3 65.9        Metabolic  Panel Recent Labs     07/23/19  0407 07/22/19  0350 07/21/19  0242    137 135*   K 3.6 3.6 3.3*    104 99   CO2 28 27 26   * 244* 271*   BUN 33* 30* 32*   CREA 1.21* 1.34* 1.39*   CA 9.6 9.0 9.3   MG  --  2.2 2.1   PHOS  --  2.4* 2.7   ALB 2.6*  --   --    SGOT 14*  --   --    ALT 12  --   --         Pertinent Labs                SAM Salazar  7/23/2019

## 2019-07-23 NOTE — DIABETES MGMT
DTC Progress Note    Recommendations/ Comments: chart reviewed for hyperglycemia. BG ranging from 245-284 mg/dl. FBG was 247 mg/dl this am. Solu-medrol tapered to 30 mg q 8 hours today. Lantus increased from 30 to 44 units today. Pt required 14 units of correction yesterday. Will continue to follow trends. Current hospital DM medication: lispro correction, resistant scale, Lantus 30 units    Chart reviewed on [facility]. Patient is a 70 y.o. female with known Type 2 Diabetes on metformin 500 mg BID at home. A1c:   Lab Results   Component Value Date/Time    Hemoglobin A1c 6.9 (H) 08/03/2018 01:45 PM    Hemoglobin A1c 6.8 (H) 01/30/2018 01:25 PM       Recent Glucose Results:   Lab Results   Component Value Date/Time     (H) 07/23/2019 04:07 AM    GLUCPOC 291 (H) 07/23/2019 11:39 AM    GLUCPOC 247 (H) 07/23/2019 07:01 AM    GLUCPOC 245 (H) 07/22/2019 09:23 PM        Lab Results   Component Value Date/Time    Creatinine 1.21 (H) 07/23/2019 04:07 AM     Estimated Creatinine Clearance: 49 mL/min (A) (based on SCr of 1.21 mg/dL (H)). Active Orders   Diet    DIET FULL LIQUID        PO intake:   Patient Vitals for the past 72 hrs:   % Diet Eaten   07/23/19 0800 50 %   07/22/19 1600 50 %   07/22/19 1324 50 %   07/22/19 0800 50 %   07/21/19 1241 20 %   07/21/19 0819 30 %   07/20/19 1852 50 %       Will continue to follow as needed.     Thank you  Shanice Marte, MS, RN, CDE    Time spent: 4  minutes

## 2019-07-23 NOTE — PROGRESS NOTES
Hospital Progress Note    NAME:  Sravani Cash   :   1950   MRN:  [id number]     Date/Time:  2019 8:25 AM    Plan:   1. Increase lantus  2. Increase dilt  3. Lasix 20 mg today  4. High flow o2  5. cellcept and steroids    6. Increase activity - dangle -oob in chair  Risk of Deterioration: Low  []           Moderate  []           High  [x]                 Assessment:   Principal Problem:    Pneumonia (7/15/2019)    Active Problems:    Acute on Chronic hypoxemic respiratory failure (HCC) (2019)     HFNC      Bleomycin lung toxicity (2019)     cellcept and steroids       Diabetes mellitus type 2, controlled ([facility] [address] (2016)      Hodgkin lymphoma ([facility] [address] (2/3/2019)       UTI -  ENTEROCOCCUS SPECIES         Admitting notes:68 y.o. female with past medical history significant for htn, fibromyalgia, muscle atrophy, pulmonary fibrosis, asthma, b/l cataracts, dm, non-hodgkin's, who presents from ems with chief complaint of sob. Pt has \"last night\" onset of worsening sob described as \"labored breathing\" that's continued into this morning. She is normally on 4.5 L of O2 n/c at baseline, but had it increased to 5 L by staff. She also endorses a dry non-productive cough and fever (peak 100) at this time. EMS arrived and measured initial O2 sat of 92% and HR of 140 bpm. Denies pleuritic cp and abd pain. Of note, she has had PNA \"x3 times since Ken" and was treated last week with a z-pack for sinus infection symptomatic of congestion. DNR form in place.  EMS was called to [place];  bpm, 124/78, and 9            Subjective/interium history     Continues with sob but feels better  11 Point Review of Systems:   Negative except no cp    []            Unable to obtain ROS due to:       []            mental status change []            sedated []            intubated     Social History     Tobacco Use    Smoking status: Never Smoker    Smokeless tobacco: Never Used   Substance Use Topics    Alcohol use: Yes     Comment: socially     Medications reviewed:  Current Facility-Administered Medications   Medication Dose Route Frequency    dilTIAZem (CARDIZEM) IR tablet 120 mg  120 mg Oral QID    insulin glargine (LANTUS) injection 44 Units  44 Units SubCUTAneous QHS    methylPREDNISolone (PF) (Solu-MEDROL) injection 40 mg  40 mg IntraVENous Q8H    mycophenolate mofetil (CELLCEPT) capsule 500 mg  500 mg Oral ACB&D    glucose chewable tablet 16 g  4 Tab Oral PRN    dextrose (D50W) injection syrg 12.5-25 g  12.5-25 g IntraVENous PRN    glucagon (GLUCAGEN) injection 1 mg  1 mg IntraMUSCular PRN    insulin lispro (HUMALOG) injection   SubCUTAneous TIDAC    ALPRAZolam (XANAX) tablet 0.25 mg  0.25 mg Oral BID PRN    apixaban (ELIQUIS) tablet 5 mg  5 mg Oral BID    sodium chloride (NS) flush 5-10 mL  5-10 mL IntraVENous PRN    sodium chloride (NS) flush 5-40 mL  5-40 mL IntraVENous Q8H    sodium chloride (NS) flush 5-40 mL  5-40 mL IntraVENous PRN    acetaminophen (TYLENOL) tablet 650 mg  650 mg Oral Q4H PRN    ondansetron (ZOFRAN) injection 4 mg  4 mg IntraVENous Q4H PRN    lactobac ac& pc-s.therm-b.anim (JUNI Q/RISAQUAD)  1 Cap Oral DAILY    atorvastatin (LIPITOR) tablet 40 mg  40 mg Oral QHS        Objective:   Vitals:  Visit Vitals  /79 (BP 1 Location: Right arm, BP Patient Position: At rest)   Pulse (!) 101   Temp 97.5 °F (36.4 °C)   Resp (!) 33   Ht 5' 8\" (1.727 m)   Wt 179 lb 0.2 oz (81.2 kg)   SpO2 96%   BMI 27.22 kg/m²     Temp (24hrs), Av.4 °F (36.3 °C), Min:96.9 °F (36.1 °C), Max:97.8 °F (36.6 °C)    O2 Flow Rate (L/min): 35 l/min O2 Device: Hi flow nasal cannula    Last 24hr Input/Output:    Intake/Output Summary (Last 24 hours) at 2019 0753  Last data filed at 2019 1845  Gross per 24 hour   Intake 720 ml   Output 550 ml   Net 170 ml        PHYSICAL EXAM:  General:    Alert, cooperative, no distress, appears stated age.      Head:   Normocephalic, without obvious abnormality, atraumatic. Eyes:   Conjunctivae/corneas clear. PERRLA  Nose:  Nares normal. No drainage or sinus tenderness. Throat:    Lips, mucosa, and tongue normal.  No Thrush  Neck:  Supple, symmetrical,  no adenopathy, thyroid: non tender    no carotid bruit and no JVD. Back:    Symmetric,  No CVA tenderness. Lungs:   Decreased bs with rales. Chest wall:  No tenderness or deformity. No Accessory muscle use. Heart:   Regular rate and rhythm,  no murmur, rub or gallop. Abdomen:   Soft, non-tender. Not distended. Bowel sounds normal. No masses.        Lab Data Reviewed:    Recent Labs     07/22/19  0350 07/21/19  0242   WBC 15.1* 15.8*   HGB 7.6* 7.7*   HCT 24.5* 24.5*    390     Recent Labs     07/23/19  0407 07/22/19  0350 07/21/19  0242    137 135*   K 3.6 3.6 3.3*    104 99   CO2 28 27 26   * 244* 271*   BUN 33* 30* 32*   CREA 1.21* 1.34* 1.39*   CA 9.6 9.0 9.3   MG  --  2.2 2.1   PHOS  --  2.4* 2.7   ALB 2.6*  --   --    TBILI 0.2  --   --    SGOT 14*  --   --    ALT 12  --   --      Lab Results   Component Value Date/Time    Glucose (POC) 247 (H) 07/23/2019 07:01 AM    Glucose (POC) 245 (H) 07/22/2019 09:23 PM    Glucose (POC) 284 (H) 07/22/2019 04:33 PM    Glucose (POC) 280 (H) 07/22/2019 11:30 AM    Glucose (POC) 270 (H) 07/22/2019 06:29 AM       ___________________________________________________  ___________________________________________________    Attending Physician: Diana Chen MD

## 2019-07-23 NOTE — PROGRESS NOTES
Last 3 Recorded Weights in this Encounter    07/21/19 0243 07/22/19 0348 07/23/19 0315   Weight: 77.3 kg (170 lb 8 oz) 80.1 kg (176 lb 8 oz) 81.2 kg (179 lb 0.2 oz)   Pt with increasing weight gain. 6 lbs in 24 hrs.  Day Rn informed of Pt's status

## 2019-07-24 NOTE — PROGRESS NOTES
Palliative Medicine Consult  [place]: [phone] ([phone])    Patient Name: Mir Jones  YOB: 1950    Date of Initial Consult: July 17, 2019  Reason for Consult: Care Decisions  Requesting Provider: Kolton Lepe  Primary Care Physician: Katherin Banks MD     SUMMARY:   Mir Jones is a 70 y.o. with a past history of Lymphoma (chemo) followed at [facility], HTN, fibromyalgia, muscle atrophy, pulmonary fibrosis, asthma, cataracts, DM, chronic respiratory failure, recurrent pna, sinus infection,  who was admitted on 7/15/2019 from [facility] ([facility] with a diagnosis of PNA, acute on chronic hypoxemic respiratory failure, bleomycin lung toxicity. Pt had 7 rounds of chemo and then developed significant side effects. [facility] for [facility]. Current medical issues leading to Palliative Medicine involvement include: support with care decisions. Social: single, no children, well supported by brother and sister, moved to [facility] from [address] [place] [address] in [address] [place] [address] to work as the Director of International Studies at [employer] before transitioning to a position  for [employer], [place], has not been home in 6 months as she has been in and out of facilities for rehab and care. Interval History:  7/18/19: hypoxia and tachycardia with removal of mask for brief period. Pul to start cellcept with steroids to see if she responds. S/s due to disease progression not pneumonia   Pt declined intubation for respiratory distress if needed. Plans for hi flow today   7/24/19: continues cellcept therapy. Still on hi flow     PALLIATIVE DIAGNOSES:   1. Shortness of breath  2. Hypoxemia  3. Leg swelling   4. Wheel chair bound     PLAN:   1. Pt seen without family present  2. Pt appears to have clinically declined since last visit  3. She feels the medication is working but just needs more time. She has been on hiflow support for a while now.   I am unsure of other options for her. Pt remains hopeful and goals are not aligned with hospice care. .    4. Pt appears fatigued, appetite poor, visible weight loss, declining function yet already Jr Dyers lifted for transfer  5. Will continue transparent communication regarding medical conditions and re-address goals as helpful  6. Continue current level of care  7. Will assist with AMD when she is able  8. Initial consult note routed to primary continuity provider and/or primary health care team members  9. Communicated plan of care with: Palliative IDT, Andie [address] Team     GOALS OF CARE / TREATMENT PREFERENCES:     GOALS OF CARE:  Patient/Health Care Proxy Stated Goals: Rehabilitation    TREATMENT PREFERENCES:   Code Status: DNR    Advance Care Planning:  [x] [facility] Interdisciplinary Team has updated the ACP Navigator with Health Care Decision Maker and Patient Capacity    Siblings Bhupinder Melchor and Disha gAuilera Planning 7/15/2019   Patient's Healthcare Decision Maker is: -   Primary Decision Maker Name -   Primary Decision Maker Phone Number -   Primary Decision Maker Relationship to Patient -   Confirm Advance Directive None   Patient Would Like to Complete Advance Directive Yes   Does the patient have other document types Do Not Resuscitate       Medical Interventions: Limited additional interventions     Other Instructions:   Artificially Administered Nutrition: No feeding tube     Other:    As far as possible, the palliative care team has discussed with patient / health care proxy about goals of care / treatment preferences for patient.      HISTORY:     History obtained from: chart, pt, team    CHIEF COMPLAINT: pt admitted with aforementioned issues    HPI/SUBJECTIVE:    The patient is:   [x] Verbal and participatory  [] Non-participatory due to:   Denies pain, dyspnea managed with hiflow     Clinical Pain Assessment (nonverbal scale for severity on nonverbal patients):   Clinical Pain Assessment  Severity: 0          Duration: for how long has pt been experiencing pain (e.g., 2 days, 1 month, years)  Frequency: how often pain is an issue (e.g., several times per day, once every few days, constant)     FUNCTIONAL ASSESSMENT:     Palliative Performance Scale (PPS):  PPS: 30       PSYCHOSOCIAL/SPIRITUAL SCREENING:     Palliative IDT has assessed this patient for cultural preferences / practices and a referral made as appropriate to needs (Cultural Services, Patient Advocacy, Ethics, etc.)    Any spiritual / Latter day concerns:  [] Yes /  [x] No    Caregiver Burnout:  [] Yes /  [x] No /  [] No Caregiver Present      Anticipatory grief assessment:   [x] Normal  / [] Maladaptive       ESAS Anxiety: Anxiety: 0    ESAS Depression:          REVIEW OF SYSTEMS:     Positive and pertinent negative findings in ROS are noted above in HPI. The following systems were [x] reviewed / [] unable to be reviewed as noted in HPI  Other findings are noted below. Systems: constitutional, ears/nose/mouth/throat, respiratory, gastrointestinal, genitourinary, musculoskeletal, integumentary, neurologic, psychiatric, endocrine. Positive findings noted below. Modified ESAS Completed by: provider   Fatigue: 7 Drowsiness: 0     Pain: 0   Anxiety: 0 Nausea: 0   Anorexia: 5 Dyspnea: 3           Stool Occurrence(s): 1        PHYSICAL EXAM:     From RN flowsheet:  Wt Readings from Last 3 Encounters:   07/24/19 177 lb 11.1 oz (80.6 kg)   02/09/19 238 lb 8.6 oz (108.2 kg)   01/21/19 180 lb (81.6 kg)     Blood pressure 151/77, pulse (!) 108, temperature 96.7 °F (35.9 °C), resp. rate 22, height 5' 8\" (1.727 m), weight 177 lb 11.1 oz (80.6 kg), SpO2 100 %.     Pain Scale 1: Numeric (0 - 10)  Pain Intensity 1: 0              Pain Intervention(s) 1: Medication (see MAR)  Last bowel movement, if known:     Constitutional: alert, nad, conversant, fatigued, frail  Eyes: pupils equal, anicteric  ENMT: no nasal discharge, moist mucous membranes  Cardiovascular:distal pulses intact, leg swelling  Respiratory: breathing not labored with hi flow, symmetric  Gastrointestinal: soft non-tender, +bowel sounds  Musculoskeletal: no deformity, no tenderness to palpation, atrophy   Skin: warm, dry  Neurologic: following commands, moving all extremities, debility   Psychiatric: full affect, no hallucinations  Other:       HISTORY:     Principal Problem:    Pneumonia (7/15/2019)    Active Problems:    Chronic hypoxemic respiratory failure ([address] (2019)      Bleomycin lung toxicity (2019)      Diabetes mellitus type 2, controlled ([address] (2016)      Hodgkin lymphoma ([address] (2/3/2019)      Past Medical History:   Diagnosis Date    Asthma     as a child, nothing since menopause    Cancer ([address] 2018    Non-Hodgkin's    Cataracts, bilateral     Diabetes ([address]     type II    Environmental allergies     Fibromyalgia     Treated with alternate medical therapy    Hypertension       Past Surgical History:   Procedure Laterality Date    HX BREAST BIOPSY Right 2006    HX COLONOSCOPY      HX GYN      Pap     HX ORTHOPAEDIC      fractured R ankle/ no surgery    HX OTHER SURGICAL Right 2018    excision of deep neck cervical LN. Family History   Problem Relation Age of Onset    Heart Disease Mother     Cancer Father         prostate cancer    Cancer Sister         Breast cancer apparent DCIS    Heart Disease Brother         Brother  from congestive heart failure secondary to severe mitral disease      History reviewed, no pertinent family history.   Social History     Tobacco Use    Smoking status: Never Smoker    Smokeless tobacco: Never Used   Substance Use Topics    Alcohol use: Yes     Comment: socially     No Known Allergies   Current Facility-Administered Medications   Medication Dose Route Frequency    insulin glargine (LANTUS) injection 50 Units  50 Units SubCUTAneous QHS    hydrALAZINE (APRESOLINE) tablet 25 mg  25 mg Oral TID    dilTIAZem (CARDIZEM) IR tablet 120 mg  120 mg Oral QID    methylPREDNISolone (PF) (SOLU-MEDROL) injection 30 mg  30 mg IntraVENous Q8H    mycophenolate mofetil (CELLCEPT) capsule 500 mg  500 mg Oral ACB&D    glucose chewable tablet 16 g  4 Tab Oral PRN    dextrose (D50W) injection syrg 12.5-25 g  12.5-25 g IntraVENous PRN    glucagon (GLUCAGEN) injection 1 mg  1 mg IntraMUSCular PRN    insulin lispro (HUMALOG) injection   SubCUTAneous TIDAC    ALPRAZolam (XANAX) tablet 0.25 mg  0.25 mg Oral BID PRN    apixaban (ELIQUIS) tablet 5 mg  5 mg Oral BID    sodium chloride (NS) flush 5-10 mL  5-10 mL IntraVENous PRN    sodium chloride (NS) flush 5-40 mL  5-40 mL IntraVENous Q8H    sodium chloride (NS) flush 5-40 mL  5-40 mL IntraVENous PRN    acetaminophen (TYLENOL) tablet 650 mg  650 mg Oral Q4H PRN    ondansetron (ZOFRAN) injection 4 mg  4 mg IntraVENous Q4H PRN    lactobac ac& pc-s.therm-b.anim (JUNI Q/RISAQUAD)  1 Cap Oral DAILY    atorvastatin (LIPITOR) tablet 40 mg  40 mg Oral QHS          LAB AND IMAGING FINDINGS:     Lab Results   Component Value Date/Time    WBC 15.1 (H) 07/22/2019 03:50 AM    HGB 7.6 (L) 07/22/2019 03:50 AM    PLATELET 458 35/63/3269 03:50 AM     Lab Results   Component Value Date/Time    Sodium 139 07/23/2019 04:07 AM    Potassium 3.6 07/23/2019 04:07 AM    Chloride 104 07/23/2019 04:07 AM    CO2 28 07/23/2019 04:07 AM    BUN 33 (H) 07/23/2019 04:07 AM    Creatinine 1.21 (H) 07/23/2019 04:07 AM    Calcium 9.6 07/23/2019 04:07 AM    Magnesium 2.2 07/22/2019 03:50 AM    Phosphorus 2.4 (L) 07/22/2019 03:50 AM      Lab Results   Component Value Date/Time    AST (SGOT) 14 (L) 07/23/2019 04:07 AM    Alk.  phosphatase 151 (H) 07/23/2019 04:07 AM    Protein, total 6.6 07/23/2019 04:07 AM    Albumin 2.6 (L) 07/23/2019 04:07 AM    Globulin 4.0 07/23/2019 04:07 AM     Lab Results   Component Value Date/Time    INR 1.3 (H) 02/05/2019 03:07 AM    Prothrombin time 13.1 (H) 02/05/2019 03:07 AM    aPTT 30.2 08/27/2018 08:15 AM      Lab Results   Component Value Date/Time    Iron 24 (L) 07/16/2019 01:24 AM    TIBC 200 (L) 07/16/2019 01:24 AM    Iron % saturation 12 (L) 07/16/2019 01:24 AM      No results found for: PH, PCO2, PO2  No components found for: GLPOC   No results found for: CPK, CKMB             Total time: 35 minutes  Counseling / coordination time, spent as noted above: 30 minutes  > 50% counseling / coordination?: y    Prolonged service was provided for  []30 min   []75 min in face to face time in the presence of the patient, spent as noted above. Time Start:   Time End:   Note: this can only be billed with 70573 (initial) or 93482 (follow up). If multiple start / stop times, list each separately.

## 2019-07-24 NOTE — PROGRESS NOTES
Hospital Progress Note    NAME:  Nazario Del Castillo   :   1950   MRN:  [id number]     Date/Time:  2019 8:25 AM    Plan:   1. Increase lantus  2. High flow o2  3. cellcept and steroids    4. Increase activity - dangle -oob in chair  Risk of Deterioration: Low  []           Moderate  []           High  [x]                 Assessment:   Principal Problem:    Pneumonia (7/15/2019)    Active Problems:    Acute on Chronic hypoxemic respiratory failure ([facility] (2019)     HFNC           Bleomycin lung toxicity (2019)     cellcept and steroids       Diabetes mellitus type 2, controlled ([facility] (2016)      Hodgkin lymphoma ([facility] (2/3/2019)       UTI -  ENTEROCOCCUS SPECIES       treated        [de-identified] notes:68 y.o. female with past medical history significant for htn, fibromyalgia, muscle atrophy, pulmonary fibrosis, asthma, b/l cataracts, dm, non-hodgkin's, who presents from ems with chief complaint of sob. Pt has \"last night\" onset of worsening sob described as \"labored breathing\" that's continued into this morning. She is normally on 4.5 L of O2 n/c at baseline, but had it increased to 5 L by staff. She also endorses a dry non-productive cough and fever (peak 100) at this time. EMS arrived and measured initial O2 sat of 92% and HR of 140 bpm. Denies pleuritic cp and abd pain. Of note, she has had PNA \"x3 times since Ken" and was treated last week with a z-pack for sinus infection symptomatic of congestion. DNR form in place.  EMS was called to [place];  bpm, 124/78, and 9            Subjective/interium history     Continues with sob but feels better - d/w brother at bedside  11 Point Review of Systems:   Negative except no cp    []            Unable to obtain ROS due to:       []            mental status change []            sedated []            intubated     Social History     Tobacco Use    Smoking status: Never Smoker    Smokeless tobacco: Never Used   Substance Use Topics    Alcohol use: Yes     Comment: socially     Medications reviewed:  Current Facility-Administered Medications   Medication Dose Route Frequency    dilTIAZem (CARDIZEM) IR tablet 120 mg  120 mg Oral QID    insulin glargine (LANTUS) injection 44 Units  44 Units SubCUTAneous QHS    methylPREDNISolone (PF) (SOLU-MEDROL) injection 30 mg  30 mg IntraVENous Q8H    mycophenolate mofetil (CELLCEPT) capsule 500 mg  500 mg Oral ACB&D    glucose chewable tablet 16 g  4 Tab Oral PRN    dextrose (D50W) injection syrg 12.5-25 g  12.5-25 g IntraVENous PRN    glucagon (GLUCAGEN) injection 1 mg  1 mg IntraMUSCular PRN    insulin lispro (HUMALOG) injection   SubCUTAneous TIDAC    ALPRAZolam (XANAX) tablet 0.25 mg  0.25 mg Oral BID PRN    apixaban (ELIQUIS) tablet 5 mg  5 mg Oral BID    sodium chloride (NS) flush 5-10 mL  5-10 mL IntraVENous PRN    sodium chloride (NS) flush 5-40 mL  5-40 mL IntraVENous Q8H    sodium chloride (NS) flush 5-40 mL  5-40 mL IntraVENous PRN    acetaminophen (TYLENOL) tablet 650 mg  650 mg Oral Q4H PRN    ondansetron (ZOFRAN) injection 4 mg  4 mg IntraVENous Q4H PRN    lactobac ac& pc-s.therm-b.anim (JUNI Q/RISAQUAD)  1 Cap Oral DAILY    atorvastatin (LIPITOR) tablet 40 mg  40 mg Oral QHS        Objective:   Vitals:  Visit Vitals  BP (!) 169/102 (BP 1 Location: Right arm, BP Patient Position: At rest;Supine; Head of bed elevated (Comment degrees))   Pulse (!) 112   Temp 96.7 °F (35.9 °C)   Resp 28   Ht 5' 8\" (1.727 m)   Wt 177 lb 11.1 oz (80.6 kg)   SpO2 94%   BMI 27.02 kg/m²     Temp (24hrs), Av.2 °F (36.2 °C), Min:96.7 °F (35.9 °C), Max:97.7 °F (36.5 °C)    O2 Flow Rate (L/min): 25 l/min O2 Device: Hi flow nasal cannula    Last 24hr Input/Output:    Intake/Output Summary (Last 24 hours) at 2019 0805  Last data filed at 2019 1909  Gross per 24 hour   Intake 240 ml   Output 1800 ml   Net -1560 ml        PHYSICAL EXAM:  General:    Alert, cooperative, no distress, appears stated age. Head:   Normocephalic, without obvious abnormality, atraumatic. Eyes:   Conjunctivae/corneas clear. PERRLA  Nose:  Nares normal. No drainage or sinus tenderness. Throat:    Lips, mucosa, and tongue normal.  No Thrush  Neck:  Supple, symmetrical,  no adenopathy, thyroid: non tender    no carotid bruit and no JVD. Back:    Symmetric,  No CVA tenderness. Lungs:   Decreased bs with rales. Chest wall:  No tenderness or deformity. No Accessory muscle use. Heart:   Regular rate and rhythm,  no murmur, rub or gallop. Abdomen:   Soft, non-tender. Not distended. Bowel sounds normal. No masses.        Lab Data Reviewed:    Recent Labs     07/22/19  0350   WBC 15.1*   HGB 7.6*   HCT 24.5*        Recent Labs     07/23/19  0407 07/22/19  0350    137   K 3.6 3.6    104   CO2 28 27   * 244*   BUN 33* 30*   CREA 1.21* 1.34*   CA 9.6 9.0   MG  --  2.2   PHOS  --  2.4*   ALB 2.6*  --    TBILI 0.2  --    SGOT 14*  --    ALT 12  --      Lab Results   Component Value Date/Time    Glucose (POC) 211 (H) 07/24/2019 06:58 AM    Glucose (POC) 266 (H) 07/23/2019 09:27 PM    Glucose (POC) 340 (H) 07/23/2019 04:08 PM    Glucose (POC) 291 (H) 07/23/2019 11:39 AM    Glucose (POC) 247 (H) 07/23/2019 07:01 AM       ___________________________________________________  ___________________________________________________    Attending Physician: Cal Stevenson MD

## 2019-07-24 NOTE — DIABETES MGMT
DTC Progress Note    Recommendations/ Comments: chart reviewed for hyperglycemia. BG ranging from 247-340 mg/dl. FBG was 211 mg/dl this am. Solu-medrol remains at 30 mg q 8 hours. Lantus increased from 44 to 50 units today. Pt required 21 units of correction yesterday. Pt remains on full liquid diet at this time. If appropriate, please consider:  While pt remains on steroids, consider scheduling 2 units AC lispro TID    Current hospital DM medication:   Lantus 50 units  lispro correction, resistant sensitivity    Chart reviewed on [facility] Moritz. Patient is a 70 y.o. female with known Type 2 Diabetes on metformin 500 mg BID at home. A1c:   Lab Results   Component Value Date/Time    Hemoglobin A1c 6.9 (H) 08/03/2018 01:45 PM    Hemoglobin A1c 6.8 (H) 01/30/2018 01:25 PM       Recent Glucose Results:   Lab Results   Component Value Date/Time    GLUCPOC 211 (H) 07/24/2019 06:58 AM    GLUCPOC 266 (H) 07/23/2019 09:27 PM    GLUCPOC 340 (H) 07/23/2019 04:08 PM        Lab Results   Component Value Date/Time    Creatinine 1.21 (H) 07/23/2019 04:07 AM     Estimated Creatinine Clearance: 48.9 mL/min (A) (based on SCr of 1.21 mg/dL (H)). Active Orders   Diet    DIET FULL LIQUID        PO intake:   Patient Vitals for the past 72 hrs:   % Diet Eaten   07/23/19 1200 50 %   07/23/19 0800 50 %   07/22/19 1600 50 %   07/22/19 1324 50 %   07/22/19 0800 50 %   07/21/19 1241 20 %       Will continue to follow as needed.     Thank you  Juancarlos Lujan RD, Diabetes Clinician     Time spent: 4  minutes

## 2019-07-24 NOTE — WOUND CARE
WOCN Note:      Follow up for: healed pressure ulcer on sacrum / [facility] / slightly open.      Assessment:   Patient is A&O x 3,  communicative, incontinent and not mobile 2 assists in repositioning unable to turn independently for assessment. Patient attempts a partial turn. Patient wearing briefs for incontinence and using pure wick. Bed: Versa Care Bed  Diet: cardiac regular  Patient reports no pain with current assessment.     Bilateral heels and sacral skin intact and without erythema. Heels offloaded on pillows. POA: left buttock chaffing: NOT pressure:patient incontinent and skin stays moist in brief. Area covered is:2.5cm x 2.0cm x <0.1cm / smaller in size from admission: duoderm applied and to be changed weekly. Sacrum with a very small partial thickness wound that blanches.        Recommendations:    Left and right buttock with turning: apply aloe vesta cream. 3x daily and PRN incontinence. duoderm to left upper buttock and sacrum ;change weekly and date.     Minimize layers of linen/pads under patient to optimize support surface. Turn/reposition approximately every 2 hours and offload heels. Manage incontinence / promote continence; Aloe Vesta to buttocks and sacrum daily and as needed with incontinence care. Specialty bed: Versa Care Bed  Discussed above plan with patient and RN / Shannon Valencia. .     Transition of Care: Plan to follow weekly and as needed while admitted to hospital.   KENNA Hernandez 80 BSN RN  Wound Care Department  Office: [phone]  Pager: [phone]

## 2019-07-24 NOTE — PROGRESS NOTES
Music Therapy Assessment  . [address] [facility] [id number]     1950  70 y.o.  female    Patient Telephone Number: [phone] (home)   Mu-ism Affiliation: [employer]   Language: English   Patient Active Problem List    Diagnosis Date Noted    Chronic hypoxemic respiratory failure ([facility] 07/16/2019     Priority: 3 - Three    Bleomycin lung toxicity 07/16/2019     Priority: 4 - Four    Pneumonia 07/15/2019    Acute respiratory failure ([facility] 02/03/2019    Leg swelling 02/03/2019    Hodgkin lymphoma ([facility] 02/03/2019    CAP (community acquired pneumonia) 02/03/2019    Lymphadenopathy 08/24/2018    Supraclavicular mass 08/14/2018    Overweight (BMI 25.0-29.9) 01/30/2018    Diabetes mellitus type 2, controlled ([facility] 09/08/2016    Dyslipidemia 09/08/2016    SVT (supraventricular tachycardia) ([facility] 08/29/2016    Essential hypertension with goal blood pressure less than 140/90 08/29/2016        Date: 7/24/2019            Total Time (in minutes): 5          [facility] 4 IMCU 2    Mental Status:   [x] Alert [  ] José Antonio Reining [  ]  Confused  [  ] Minimally responsive  [  ] Sleeping    Communication Status: [  ] Impaired Speech [  ] Nonverbal -N/A    Physical Status:   [x] Oxygen in use-high flow  [  ] Hard of Hearing [  ] Vision Impaired  [  ] Ambulatory  [  ] Ambulatory with assistance [  ] Non-ambulatory     Music Preferences, Background: N/A: Please see Session Observations below. Clinical Problem addressed: N/A: Please see Session Observations below.     Goal(s) met in session: N/A: \"                                       \"  Physical/Pain management (Scale of 1-10):    Pre-session rating ___________    Post-session rating __________   [  ] Increased relaxation   [  ] Affected breathing patterns  [  ] Decreased muscle tension   [  ] Decreased agitation  [  ] Affected heart rate    [  ] Increased alertness     Emotional/Psychological:  [  ] Increased self-expression   [  ] Decreased aggressive behavior   [  ] Decreased feelings of stress  [  ] Discussed healthy coping skills     [  ] Improved mood    [  ] Decreased withdrawn behavior     Social:  [  ] Decreased feelings of isolation/loneliness [  ] Positive social interaction   [  ] Provided support and/or comfort for family/friends    Spiritual:  [  ] Spiritual support    [  ] Expressed peace  [  ] Expressed viri    [  ] Discussed beliefs    Techniques Utilized (Check all that apply): N/A: Please see Session Observations below. [  ] Procedural support MT [  ] Music for relaxation [  ] Patient preferred music  [  ] Aixa analysis  [  ] Song choice  [  ] Music for validation  [  ] Entrainment  [  ] Movement to music [  ] Guided visualization  [  ] Arliss Members  [  ] Patient instrument playing [  ] Hakeem Wally writing  [  ] Grullon Shilpi along   [  ] [facility]  [  ] Sensory stimulation  [  ] Active Listening  [  ] Music for spiritual support [  ] Making of CDs as gifts    Session Observations:  F/up visit; Patient (pt) was alert sitting up in bed with high-flow oxygen in use. She remembered this music therapist (MT) from MT's previous visits. Pt denied pain and reported that breathing has been difficult today. She said her brother had been in to visit her. Pt declined having music therapy today and was agreeable to a follow up visit. She denied any needs at this time. Will follow as able.     KI Christiansen (Music Therapist-Board Certified)  Spiritual Care Department  Referral-based service

## 2019-07-24 NOTE — PROGRESS NOTES
Adebayo Koo about pt blood pressure of 189/84. He said he would address it. 1016- Spoke with pulmonologist about putting pt on 15 L and try midflow    1017- spoke to respiratory said they were dropping to 10 L and they would switch to midflow. 36- charge nurse informed that echo had not been completed up to that point. Charge nurse called echo to see status. 1930- Bedside and Verbal shift change report given to [facility], RN (oncoming nurse) by Angie Méndez RN (offgoing nurse). Report included the following information SBAR, Kardex, Procedure Summary, Intake/Output, MAR, Accordion and Recent Results. Pt has call bell within reach and has been instructed to call out if she needs anything. Problem: Falls - Risk of  Goal: *Absence of Falls  Description  Document Inés Cerna Fall Risk and appropriate interventions in the flowsheet.   Outcome: Progressing Towards Goal  Note:   Fall Risk Interventions:  Mobility Interventions: Communicate number of staff needed for ambulation/transfer, Patient to call before getting OOB         Medication Interventions: Patient to call before getting OOB    Elimination Interventions: Call light in reach, Patient to call for help with toileting needs

## 2019-07-24 NOTE — PROGRESS NOTES
Bedside shift change report given to Tanvi Carpio RN (oncoming nurse) by Sarah Beth Kc (offgoing nurse). Report included the following information SBAR, Procedure Summary, MAR, Recent Results and Quality Measures.

## 2019-07-24 NOTE — PROGRESS NOTES
Problem: Pneumonia: Discharge Outcomes  Goal: *Demonstrates progressive activity  Outcome: Progressing Towards Goal  Note:   Pt remaining on high flow NC to keep O2 Sats above 90%     Problem: Falls - Risk of  Goal: *Absence of Falls  Description  Document Nicci Fitzpatrick Fall Risk and appropriate interventions in the flowsheet.   Outcome: Progressing Towards Goal  Note:   Fall Risk Interventions:  Mobility Interventions: Communicate number of staff needed for ambulation/transfer, Patient to call before getting OOB, PT Consult for assist device competence, PT Consult for mobility concerns         Medication Interventions: Evaluate medications/consider consulting pharmacy, Patient to call before getting OOB    Elimination Interventions: Call light in reach, Patient to call for help with toileting needs, Toileting schedule/hourly rounds

## 2019-07-24 NOTE — PROGRESS NOTES
Problem: Dysphagia (Adult)  Goal: *Acute Goals and Plan of Care (Insert Text)  Description  Speech therapy goals  Initiated 7/17/2019; re-evaluation 7/24/2019   1. Patient will tolerate full liquid diet without s/s of aspiration within 7 days. MET  2. Patient will tolerate solid trials with SLP as respiratory status allows within 7 days. Continue for 7 days   Outcome: Progressing Towards Goal     SPEECH LANGUAGE PATHOLOGY DYSPHAGIA TREATMENT: WEEKLY REASSESSMENT  Patient: Rony Bonilla (83 y.o. female)  Date: 7/24/2019  Diagnosis: Pneumonia [J18.9] Pneumonia       Precautions: swallow      ASSESSMENT:  Patient with continued no oral or pharyngeal dysphagia and excellent use of strategies to compensate for respiratory status. Not yet appropriate for solids secondary to respiratory status. Patient's O2 sats low 90's on HFNC with RR 30 at rest increasing up to 38 with minimal trials of thin liquid. Patient's progression toward goals since last assessment: Fair. Patient with good tolerance of current diet with independent use of compensatory strategies, however she will not be appropriate for diet liberalization to solids until respiratory status improves. Will continue to follow 1x/week for readiness for solids. PLAN:  Goals have been updated based on progression since last assessment. Patient continues to benefit from skilled intervention to address the above impairments. Continue to follow the patient 1 time a week to address goals. Recommendations and Planned Interventions:  --Continue full liquid diet  --Continue compensatory strategies as you are, including small/single bites and sips, slow rate, and frequent rest breaks to breathe  --SLP to follow up next week for readiness for solids  Discharge Recommendations: None     SUBJECTIVE:   Patient stated Because of my breathing when asked if she knows why she is on a liquid diet.     OBJECTIVE:   Cognitive and Communication Status:  Neurologic State: Alert, Appropriate for age  Orientation Level: Oriented X4  Cognition: Follows commands  Perception: Appears intact  Perseveration: No perseveration noted  Safety/Judgement: Awareness of environment  Dysphagia Treatment:     P.O. Trials:  Patient Position: upright in bed  Vocal quality prior to P.O.: No impairment  Consistency Presented: Thin liquid(minimal trials due to respiratory status)  How Presented: Self-fed/presented;Cup/sip     Bolus Acceptance: No impairment  Bolus Formation/Control: No impairment     Propulsion: No impairment  Oral Residue: None  Initiation of Swallow: No impairment  Laryngeal Elevation: Functional  Aspiration Signs/Symptoms: None  Pharyngeal Phase Characteristics: No impairment, issues, or problems   Effective Modifications: Cup/sip;Small sips and bites  Cues for Modifications: None  Comments: O2 sats low 90's and RR 30 increasing up to 38 with minimal activity on HFNC                   Pain:  Pain Scale 1: Numeric (0 - 10)  Pain Intensity 1: 0     After treatment:   ?                Patient left in no apparent distress sitting up in chair  ? Patient left in no apparent distress in bed  ? Call bell left within reach  ? Nursing notified  ? Caregiver present  ? Bed alarm activated    COMMUNICATION/EDUCATION:   The patients plan of care including recommendations, planned interventions, and recommended diet changes were discussed with: Registered Nurse.       Shay Reyes, SLP  Time Calculation: 8 mins

## 2019-07-24 NOTE — PROGRESS NOTES
CM reviewed case with treatment team during Bedside Interdisciplinary Rounds. Patient continues medically unstable for discharge with high/mid flow oxygen. Regarding Transition of Care, see CM note of 7/19/19 for clear explanation of relevant issues regarding clarification of final discharge plan. CM noted that PT and OT as of 7/23/19 are recommending SNF rehab. Clarification of plan of care will be needed before any CM action can be taken. CM to continue to follow for discharge planning needs.

## 2019-07-24 NOTE — PROGRESS NOTES
Pulmonary, Critical Care, and Sleep Medicine~Progress Note    Name: Charity Martinez MRN: [id number]   : 1950 Hospital: [facility]   Date: 2019 11:15 AM Admission: 7/15/2019     Impression Plan   1. Acute on chronic (on 4lpm normally) hypoxic resp failure secondary to below  2. Abnormal CT scan: HCAP possibly, query more bleomycin lung toxicity, now with lack of response. Other concerns were pulmonary edema, but she is quite dried out now  3. Hx of lymphoma. Not on therapy since the beginning of the year. 4. Hx of PE and was on eliquis   5. Hx of asthma  6. Reviewed [facility] records. She was seen last there on 19. Early  cellcept was stopped and at that appointment she was scheduled to taper off of steroids, which she did. Apparently she was on hospice care, but I am unclear as to the details; per patient it was rescinded   1. Strep/legionella negative  2. ECHO; PASP 60mmHg   3. Chest film today  4. O2 titration above 90%, on high flow. 25lpm and 94% FiO2 (though it was reported in the chart as 15lpm). I reduced to 20lpm and hopefully she can be transitioned to midflow today. Please continue to wean. 5. autoimmune labs mostly negative, but did have a + PEACE with elevated CRP and ESR; query coexisting autoimmune process. Once discharge would recommend rheumatology follow up  6. hold IV steroids at this level and continue cellcept    7. High risk for rapid decline   8. Discussed with attending     9. NEEDS TO GET UP AND OUT OF BED      Daily Progression:      Noted question of lung transplant query; I think recent malignancy would not allow her to be placed on the list. But since she is followed at [facility] they could assisted better. She feels better today       Looks stronger  Desats quickly with movement, but stabilizes; this is to be expected with her chronic lung disease  Weight gain over night?  Agree with diuretics  Agree with getting up and out of bed as much as possible      7/22  Creatinine improving   Was increased in Flow and FiO2, unclear why as she is feeling better   Chest film today looks fairly similar     7/18   Not much different. We are starting her on high flow now     7/17  Feels possibly a little better. proBNP elevated today     7/16  Consult Note requested by Dr Ky Leonard. Patient presented for admission secondary to a one week course of cough, subjective fevers, and malasie. Outpatient PCP starte zpak and she did not have bernadette improvement. No other symptoms reported on this recent course. 7/15/19 CT scan did showed worsening right great than left infiltrates with interlobular septal thickening; no PE. BNP was 534, not very elevated. This past Jan 2019 she was dx with hodgkin's lymphoma. Was on blecyomcin and developed a pneumonitis associated with it. Was started on cellcept 500mg bid and prednisone. Followed in the office at [facility] in April, however transferred her care to [facility] and saw Dr Green Mohs (sp?). Her last visit with them was this past June and at that visit she was weaned off of the prednisone and cellcept. We do not have those records at this time. I have reviewed the labs and previous days notes. Pertinent items are noted in HPI. Past Medical History:   Diagnosis Date    Asthma     as a child, nothing since menopause    Cancer ([address] 08/21/2018    Non-Hodgkin's    Cataracts, bilateral     Diabetes ([address]     type II    Environmental allergies     Fibromyalgia     Treated with alternate medical therapy    Hypertension       Past Surgical History:   Procedure Laterality Date    HX BREAST BIOPSY Right 2006    HX COLONOSCOPY  2015    HX GYN      Pap 2015    HX ORTHOPAEDIC      fractured R ankle/ no surgery    HX OTHER SURGICAL Right 08/27/2018    excision of deep neck cervical LN. Prior to Admission medications    Medication Sig Start Date End Date Taking?  Authorizing Provider   acetaminophen (TYLENOL) 650 mg suppository Insert 650 mg into rectum every four (4) hours as needed for Pain. Yes Provider, Historical   amLODIPine (NORVASC) 5 mg tablet Take 5 mg by mouth daily. Yes Provider, Historical   trimethoprim-sulfamethoxazole (BACTRIM DS) 160-800 mg per tablet Take 1 Tab by mouth every Monday, Wednesday, Friday. Yes Provider, Historical   metFORMIN (GLUCOPHAGE) 500 mg tablet Take 500 mg by mouth two (2) times daily (with meals). Indications: Per  [address] 3/30/19  Yes Provider, Historical   levalbuterol (XOPENEX) 1.25 mg/0.5 mL nebu 1.25 mg by Nebulization route every four (4) hours as needed. Indications: Per  [address] 3/30/19  Yes Provider, Historical   acetaminophen (TYLENOL ARTHRITIS PAIN) 650 mg TbER Take 650 mg by mouth as needed (Q 6hrs PRN). 3/30/19  Yes Provider, Historical   apixaban (ELIQUIS) 5 mg tablet Take 1 Tab by mouth every twelve (12) hours. 3/13/19  Yes Shayy Clark MD   atorvastatin (LIPITOR) 40 mg tablet TAKE 1 TAB BY MOUTH DAILY. 12/3/17  Yes Shayy Clark MD   Oxygen 3 Devices by Nasal route as needed.  Indications: SOB PRN    Provider, Historical     No Known Allergies   Social History     Tobacco Use    Smoking status: Never Smoker    Smokeless tobacco: Never Used   Substance Use Topics    Alcohol use: Yes     Comment: socially      Family History   Problem Relation Age of Onset    Heart Disease Mother     Cancer Father         prostate cancer    Cancer Sister         Breast cancer apparent DCIS    Heart Disease Brother         Brother  from congestive heart failure secondary to severe mitral disease       OBJECTIVE:     Vital Signs:       Visit Vitals  BP (!) 158/103   Pulse (!) 113   Temp 96.7 °F (35.9 °C)   Resp 28   Ht 5' 8\" (1.727 m)   Wt 80.6 kg (177 lb 11.1 oz)   SpO2 94%   BMI 27.02 kg/m²      Temp (24hrs), Av.2 °F (36.2 °C), Min:96.7 °F (35.9 °C), Max:97.7 °F (36.5 °C)     Intake/Output:     Last shift: No intake/output data recorded.     Last 3 shifts: 07/22 1901 - 07/24 0700  In: 480 [P.O.:480]  Out: 1800 [Urine:1800]          Intake/Output Summary (Last 24 hours) at 7/24/2019 1048  Last data filed at 7/23/2019 1909  Gross per 24 hour   Intake 240 ml   Output 1150 ml   Net -910 ml       Physical Exam:                                        Exam Findings Other   General: No resp distress noted, appears stated age    [de-identified]:  No ulcers, JVD not elevated, no cervical LAD    Chest: No pectus deformity, normal chest rise b/l    HEART:  RRR, no murmurs/rubs/gallops    Lungs:  Notable crackles, diminished BS at bases    ABD: Soft/NT, non rigid mildly distended    EXT: No cyanosis/clubbing/edema, normal peripheral pulses    Skin: No rashes or ulcers, no mottling    Neuro: A/O x 3        Medications:  Current Facility-Administered Medications   Medication Dose Route Frequency    insulin glargine (LANTUS) injection 50 Units  50 Units SubCUTAneous QHS    hydrALAZINE (APRESOLINE) tablet 25 mg  25 mg Oral TID    dilTIAZem (CARDIZEM) IR tablet 120 mg  120 mg Oral QID    methylPREDNISolone (PF) (SOLU-MEDROL) injection 30 mg  30 mg IntraVENous Q8H    mycophenolate mofetil (CELLCEPT) capsule 500 mg  500 mg Oral ACB&D    glucose chewable tablet 16 g  4 Tab Oral PRN    dextrose (D50W) injection syrg 12.5-25 g  12.5-25 g IntraVENous PRN    glucagon (GLUCAGEN) injection 1 mg  1 mg IntraMUSCular PRN    insulin lispro (HUMALOG) injection   SubCUTAneous TIDAC    ALPRAZolam (XANAX) tablet 0.25 mg  0.25 mg Oral BID PRN    apixaban (ELIQUIS) tablet 5 mg  5 mg Oral BID    sodium chloride (NS) flush 5-10 mL  5-10 mL IntraVENous PRN    sodium chloride (NS) flush 5-40 mL  5-40 mL IntraVENous Q8H    sodium chloride (NS) flush 5-40 mL  5-40 mL IntraVENous PRN    acetaminophen (TYLENOL) tablet 650 mg  650 mg Oral Q4H PRN    ondansetron (ZOFRAN) injection 4 mg  4 mg IntraVENous Q4H PRN    lactobac ac& pc-s.therm-b.anim (JUNI Q/RISAQUAD)  1 Cap Oral DAILY    atorvastatin (LIPITOR) tablet 40 mg  40 mg Oral QHS       Labs:  ABG No results for input(s): PHI, PCO2I, PO2I, HCO3I, SO2I, FIO2I in the last 72 hours.      CBC Recent Labs     07/22/19  0350   WBC 15.1*   HGB 7.6*   HCT 24.5*      MCV 94.6   MCH 82.5        Metabolic  Panel Recent Labs     07/23/19  0407 07/22/19  0350    137   K 3.6 3.6    104   CO2 28 27   * 244*   BUN 33* 30*   CREA 1.21* 1.34*   CA 9.6 9.0   MG  --  2.2   PHOS  --  2.4*   ALB 2.6*  --    SGOT 14*  --    ALT 12  --         Pertinent Labs                Dheeraj Bruno [place], [place]  7/24/2019

## 2019-07-25 NOTE — PROGRESS NOTES
Problem: Pneumonia: Day 1  Goal: Activity/Safety  Outcome: Progressing Towards Goal  Goal: Diagnostic Test/Procedures  Description  Completed pertinent labs and diagnostics   Outcome: Progressing Towards Goal     Problem: Falls - Risk of  Goal: *Absence of Falls  Description  Document Aixa Ugarte Fall Risk and appropriate interventions in the flowsheet. Outcome: Progressing Towards Goal  Note:   Fall Risk Interventions:  Mobility Interventions: Communicate number of staff needed for ambulation/transfer, Patient to call before getting OOB, Strengthening exercises (ROM-active/passive)         Medication Interventions: Patient to call before getting OOB, Teach patient to arise slowly    Elimination Interventions: Call light in reach, Patient to call for help with toileting needs, Toileting schedule/hourly rounds       Patient receiving IV antibiotics and fluids. Turned q2hr. HOB elevated with meals.

## 2019-07-25 NOTE — PROGRESS NOTES
Bedside and Verbal shift change report given to [address] [place] [address] (oncoming nurse) by Sirisha Novoa (offgoing nurse). Report included the following information SBAR, Kardex, ED Summary, MAR, Accordion, Recent Results, Cardiac Rhythm NSR and Quality Measures. Problem: Falls - Risk of  Goal: *Absence of Falls  Description  Document Mahin Ferreira Fall Risk and appropriate interventions in the flowsheet.   Outcome: Progressing Towards Goal  Note:   Fall Risk Interventions:  Mobility Interventions: Patient to call before getting OOB, PT Consult for mobility concerns, PT Consult for assist device competence, Strengthening exercises (ROM-active/passive), Communicate number of staff needed for ambulation/transfer, OT consult for ADLs         Medication Interventions: Patient to call before getting OOB, Teach patient to arise slowly, Evaluate medications/consider consulting pharmacy    Elimination Interventions: Call light in reach, Patient to call for help with toileting needs, Stay With Me (per policy), Toileting schedule/hourly rounds

## 2019-07-25 NOTE — PROGRESS NOTES
10:55 AM  Patient reviewed in ID rounds. Informed patient with poor prognosis. Goal to ween to mid flow today. Not medically stable at this time. Regarding Transition of Care, see CM note of 7/19/19 for clear explanation of relevant issues regarding clarification of final discharge plan.     CM noted that PT and OT as of 7/23/19 are recommending SNF rehab. Clarification of plan of care will be needed before any CM action can be taken.      CM to continue to follow for transition of care needs as they arise. Goals of care TBD.     RAND Cheng/CRM  Care Management

## 2019-07-25 NOTE — PROGRESS NOTES
Hospital Progress Note    NAME:  Cielo Veloz   :   1950   MRN:  [id number]     Date/Time:  2019 8:25 AM    Plan:     1. High flow o2  2. cellcept and steroids    3. Increase activity - dangle -oob in chair  Risk of Deterioration: Low  []           Moderate  []           High  [x]                 Assessment:   Principal Problem:    Pneumonia (7/15/2019)    Active Problems:    Acute on Chronic hypoxemic respiratory failure ([address] (2019)     HFNC         Bleomycin lung toxicity (2019)     cellcept and steroids       Diabetes mellitus type 2, controlled ([facility] (2016)     basal bolus insulin      Hodgkin lymphoma ([facility] (2/3/2019)       UTI -  ENTEROCOCCUS SPECIES       treated        [de-identified] notes:68 y.o. female with past medical history significant for htn, fibromyalgia, muscle atrophy, pulmonary fibrosis, asthma, b/l cataracts, dm, non-hodgkin's, who presents from ems with chief complaint of sob. Pt has \"last night\" onset of worsening sob described as \"labored breathing\" that's continued into this morning. She is normally on 4.5 L of O2 n/c at baseline, but had it increased to 5 L by staff. She also endorses a dry non-productive cough and fever (peak 100) at this time. EMS arrived and measured initial O2 sat of 92% and HR of 140 bpm. Denies pleuritic cp and abd pain. Of note, she has had PNA \"x3 times since Ken" and was treated last week with a z-pack for sinus infection symptomatic of congestion. DNR form in place. EMS was called to [place];  bpm, 124/78, and 9            Subjective/interium history     Continues with sob with conversation  11 Point Review of Systems:   Negative except no cp    []            Unable to obtain ROS due to:       []            mental status change []            sedated []            intubated     Social History     Tobacco Use    Smoking status: Never Smoker    Smokeless tobacco: Never Used   Substance Use Topics    Alcohol use:  Yes Comment: socially     Medications reviewed:  Current Facility-Administered Medications   Medication Dose Route Frequency    insulin glargine (LANTUS) injection 50 Units  50 Units SubCUTAneous QHS    hydrALAZINE (APRESOLINE) tablet 25 mg  25 mg Oral TID    dilTIAZem (CARDIZEM) IR tablet 120 mg  120 mg Oral QID    methylPREDNISolone (PF) (SOLU-MEDROL) injection 30 mg  30 mg IntraVENous Q8H    mycophenolate mofetil (CELLCEPT) capsule 500 mg  500 mg Oral ACB&D    glucose chewable tablet 16 g  4 Tab Oral PRN    dextrose (D50W) injection syrg 12.5-25 g  12.5-25 g IntraVENous PRN    glucagon (GLUCAGEN) injection 1 mg  1 mg IntraMUSCular PRN    insulin lispro (HUMALOG) injection   SubCUTAneous TIDAC    ALPRAZolam (XANAX) tablet 0.25 mg  0.25 mg Oral BID PRN    apixaban (ELIQUIS) tablet 5 mg  5 mg Oral BID    sodium chloride (NS) flush 5-10 mL  5-10 mL IntraVENous PRN    sodium chloride (NS) flush 5-40 mL  5-40 mL IntraVENous Q8H    sodium chloride (NS) flush 5-40 mL  5-40 mL IntraVENous PRN    acetaminophen (TYLENOL) tablet 650 mg  650 mg Oral Q4H PRN    ondansetron (ZOFRAN) injection 4 mg  4 mg IntraVENous Q4H PRN    lactobac ac& pc-s.therm-b.anim (JUNI Q/RISAQUAD)  1 Cap Oral DAILY    atorvastatin (LIPITOR) tablet 40 mg  40 mg Oral QHS        Objective:   Vitals:  Visit Vitals  /80 (BP 1 Location: Left arm, BP Patient Position: At rest)   Pulse 96   Temp 96.4 °F (35.8 °C)   Resp 22   Ht 5' 8\" (1.727 m)   Wt 177 lb 11.1 oz (80.6 kg)   SpO2 97%   BMI 27.02 kg/m²     Temp (24hrs), Av.8 °F (36 °C), Min:96.4 °F (35.8 °C), Max:97.3 °F (36.3 °C)    O2 Flow Rate (L/min): 20 l/min O2 Device: Hi flow nasal cannula    Last 24hr Input/Output:    Intake/Output Summary (Last 24 hours) at 2019 9006  Last data filed at 2019 1833  Gross per 24 hour   Intake    Output 700 ml   Net -700 ml        PHYSICAL EXAM:  General:    Alert, cooperative, no distress, appears stated age.      Head: Normocephalic, without obvious abnormality, atraumatic. Eyes:   Conjunctivae/corneas clear. PERRLA  Nose:  Nares normal. No drainage or sinus tenderness. Throat:    Lips, mucosa, and tongue normal.  No Thrush  Neck:  Supple, symmetrical,  no adenopathy, thyroid: non tender    no carotid bruit and no JVD. Back:    Symmetric,  No CVA tenderness. Lungs:   Decreased bs with rales. Chest wall:  No tenderness or deformity. No Accessory muscle use. Heart:   Regular rate and rhythm,  no murmur, rub or gallop. Abdomen:   Soft, non-tender. Not distended. Bowel sounds normal. No masses. Lab Data Reviewed:    No results for input(s): WBC, HGB, HCT, PLT, HGBEXT, HCTEXT, PLTEXT, HGBEXT, HCTEXT, PLTEXT in the last 72 hours.   Recent Labs     07/23/19  0407      K 3.6      CO2 28   *   BUN 33*   CREA 1.21*   CA 9.6   ALB 2.6*   TBILI 0.2   SGOT 14*   ALT 12     Lab Results   Component Value Date/Time    Glucose (POC) 130 (H) 07/24/2019 09:34 PM    Glucose (POC) 171 (H) 07/24/2019 06:23 PM    Glucose (POC) 145 (H) 07/24/2019 04:40 PM    Glucose (POC) 192 (H) 07/24/2019 11:38 AM    Glucose (POC) 211 (H) 07/24/2019 06:58 AM       ___________________________________________________  ___________________________________________________    Attending Physician: Emani Hernandez MD

## 2019-07-25 NOTE — PROGRESS NOTES
Problem: Falls - Risk of  Goal: *Absence of Falls  Description  Document Phan Perrins Fall Risk and appropriate interventions in the flowsheet. Outcome: Progressing Towards Goal  Note:   Fall Risk Interventions:  Mobility Interventions: Communicate number of staff needed for ambulation/transfer, Patient to call before getting OOB, Strengthening exercises (ROM-active/passive), PT Consult for assist device competence, PT Consult for mobility concerns         Medication Interventions: Evaluate medications/consider consulting pharmacy, Patient to call before getting OOB, Teach patient to arise slowly    Elimination Interventions: Call light in reach, Patient to call for help with toileting needs, Toileting schedule/hourly rounds, Urinal in reach              Problem: Pressure Injury - Risk of  Goal: *Prevention of pressure injury  Description  Document Dwayne Scale and appropriate interventions in the flowsheet. 7-16-19: turn q2h and float heels. Note:   Pressure Injury Interventions:  Sensory Interventions: Float heels, Minimize linen layers, Pad between skin to skin, Turn and reposition approx. every two hours (pillows and wedges if needed), Chair cushion    Moisture Interventions: Check for incontinence Q2 hours and as needed, Internal/External urinary devices, Minimize layers, Offer toileting Q_hr    Activity Interventions: Chair cushion, Pressure redistribution bed/mattress(bed type), PT/OT evaluation    Mobility Interventions: Chair cushion, Pressure redistribution bed/mattress (bed type), PT/OT evaluation, Turn and reposition approx.  every two hours(pillow and wedges)    Nutrition Interventions: Document food/fluid/supplement intake, Discuss nutritional consult with provider    Friction and Shear Interventions: Apply protective barrier, creams and emollients, Foam dressings/transparent film/skin sealants, Feet elevated on foot rest, Lift sheet, Transferring/repositioning devices, Minimize layers

## 2019-07-25 NOTE — PROGRESS NOTES
Pulmonary, Critical Care, and Sleep Medicine~Progress Note    Name: Elvin Cary MRN: [id number]   : 1950 Hospital: [address]   Date: 2019 11:15 AM Admission: 7/15/2019     Impression Plan   1. Acute on chronic (on 4lpm normally) hypoxic resp failure secondary to below  2. Abnormal CT scan: HCAP possibly, query more bleomycin lung toxicity, now with lack of response. Other concerns were pulmonary edema, but she is quite dried out now  3. Hx of lymphoma. Not on therapy since the beginning of the year. 4. Hx of PE and was on eliquis   5. Hx of asthma  6. Reviewed [facility] records. She was seen last there on 19. Early  cellcept was stopped and at that appointment she was scheduled to taper off of steroids, which she did. Apparently she was on hospice care, but I am unclear as to the details; per patient it was rescinded   1. Strep/legionella negative  2. ECHO; PASP 60mmHg   3. Chest film today  4. O2 titration above 90%, on high flow. 20lpm and 94% FiO2. I reduced to 15lpm and hopefully she can be transitioned to midflow today; I discussed with the RN. Please continue to wean. 5. autoimmune labs mostly negative, but did have a + PEACE with elevated CRP and ESR; query coexisting autoimmune process. Once discharge would recommend rheumatology follow up  6. hold IV steroids at this level for today and continue cellcept    7. Add low dose diuretic PO   8. High risk for rapid decline   9. Discussed with attending     10. NEEDS TO GET UP AND OUT OF BED      Daily Progression:      She looks so weak, but states she feels better. About to work with PT. Still +.       Noted question of lung transplant query; I think recent malignancy would not allow her to be placed on the list. But since she is followed at [facility] they could assisted better.    She feels better today       Looks stronger  Desats quickly with movement, but stabilizes; this is to be expected with her chronic lung disease  Weight gain over night? Agree with diuretics  Agree with getting up and out of bed as much as possible      7/22  Creatinine improving   Was increased in Flow and FiO2, unclear why as she is feeling better   Chest film today looks fairly similar     7/18   Not much different. We are starting her on high flow now     7/17  Feels possibly a little better. proBNP elevated today     7/16  Consult Note requested by Dr Dana Noe. Patient presented for admission secondary to a one week course of cough, subjective fevers, and malasie. Outpatient PCP starte zpak and she did not have bernadette improvement. No other symptoms reported on this recent course. 7/15/19 CT scan did showed worsening right great than left infiltrates with interlobular septal thickening; no PE. BNP was 534, not very elevated. This past Jan 2019 she was dx with hodgkin's lymphoma. Was on blecyomcin and developed a pneumonitis associated with it. Was started on cellcept 500mg bid and prednisone. Followed in the office at [facility] in April, however transferred her care to [facility] and saw Dr Lisette Neal (sp?). Her last visit with them was this past June and at that visit she was weaned off of the prednisone and cellcept. We do not have those records at this time. I have reviewed the labs and previous days notes. Pertinent items are noted in HPI. Past Medical History:   Diagnosis Date    Asthma     as a child, nothing since menopause    Cancer ([address] 08/21/2018    Non-Hodgkin's    Cataracts, bilateral     Diabetes ([address]     type II    Environmental allergies     Fibromyalgia     Treated with alternate medical therapy    Hypertension       Past Surgical History:   Procedure Laterality Date    HX BREAST BIOPSY Right 2006    HX COLONOSCOPY  2015    HX GYN      Pap 2015    HX ORTHOPAEDIC      fractured R ankle/ no surgery    HX OTHER SURGICAL Right 08/27/2018    excision of deep neck cervical LN.       Prior to Admission medications    Medication Sig Start Date End Date Taking? Authorizing Provider   acetaminophen (TYLENOL) 650 mg suppository Insert 650 mg into rectum every four (4) hours as needed for Pain. Yes Provider, Historical   amLODIPine (NORVASC) 5 mg tablet Take 5 mg by mouth daily. Yes Provider, Historical   trimethoprim-sulfamethoxazole (BACTRIM DS) 160-800 mg per tablet Take 1 Tab by mouth every Monday, Wednesday, Friday. Yes Provider, Historical   metFORMIN (GLUCOPHAGE) 500 mg tablet Take 500 mg by mouth two (2) times daily (with meals). Indications: Per  [address] 3/30/19  Yes Provider, Historical   levalbuterol (XOPENEX) 1.25 mg/0.5 mL nebu 1.25 mg by Nebulization route every four (4) hours as needed. Indications: Per  [address] 3/30/19  Yes Provider, Historical   acetaminophen (TYLENOL ARTHRITIS PAIN) 650 mg TbER Take 650 mg by mouth as needed (Q 6hrs PRN). 3/30/19  Yes Provider, Historical   apixaban (ELIQUIS) 5 mg tablet Take 1 Tab by mouth every twelve (12) hours. 3/13/19  Yes Daysi Buckley MD   atorvastatin (LIPITOR) 40 mg tablet TAKE 1 TAB BY MOUTH DAILY. 12/3/17  Yes Daysi Buckley MD   Oxygen 3 Devices by Nasal route as needed.  Indications: SOB PRN    Provider, Historical     No Known Allergies   Social History     Tobacco Use    Smoking status: Never Smoker    Smokeless tobacco: Never Used   Substance Use Topics    Alcohol use: Yes     Comment: socially      Family History   Problem Relation Age of Onset    Heart Disease Mother     Cancer Father         prostate cancer    Cancer Sister         Breast cancer apparent DCIS    Heart Disease Brother         Brother  from congestive heart failure secondary to severe mitral disease       OBJECTIVE:     Vital Signs:       Visit Vitals  BP (!) 172/120 (BP 1 Location: Right arm, BP Patient Position: Sitting)   Pulse (!) 125   Temp 97.3 °F (36.3 °C)   Resp 26   Ht 5' 8\" (1.727 m)   Wt 81 kg (178 lb 8 oz)   SpO2 90% BMI 27.14 kg/m²      Temp (24hrs), Av °F (36.1 °C), Min:96.4 °F (35.8 °C), Max:97.3 °F (36.3 °C)     Intake/Output:     Last shift: No intake/output data recorded.     Last 3 shifts:  1901 -  0700  In: -   Out: 900 [Urine:900]          Intake/Output Summary (Last 24 hours) at 2019 1028  Last data filed at 2019 1833  Gross per 24 hour   Intake    Output 700 ml   Net -700 ml       Physical Exam:                                        Exam Findings Other   General: No resp distress noted, appears stated age    [de-identified]:  No ulcers, JVD not elevated, no cervical LAD    Chest: No pectus deformity, normal chest rise b/l    HEART:  RRR, no murmurs/rubs/gallops    Lungs:  Notable crackles, diminished BS at bases    ABD: Soft/NT, non rigid mildly distended    EXT: No cyanosis/clubbing/edema, normal peripheral pulses    Skin: No rashes or ulcers, no mottling    Neuro: A/O x 3        Medications:  Current Facility-Administered Medications   Medication Dose Route Frequency    insulin glargine (LANTUS) injection 50 Units  50 Units SubCUTAneous QHS    hydrALAZINE (APRESOLINE) tablet 25 mg  25 mg Oral TID    dilTIAZem (CARDIZEM) IR tablet 120 mg  120 mg Oral QID    methylPREDNISolone (PF) (SOLU-MEDROL) injection 30 mg  30 mg IntraVENous Q8H    mycophenolate mofetil (CELLCEPT) capsule 500 mg  500 mg Oral ACB&D    glucose chewable tablet 16 g  4 Tab Oral PRN    dextrose (D50W) injection syrg 12.5-25 g  12.5-25 g IntraVENous PRN    glucagon (GLUCAGEN) injection 1 mg  1 mg IntraMUSCular PRN    insulin lispro (HUMALOG) injection   SubCUTAneous TIDAC    ALPRAZolam (XANAX) tablet 0.25 mg  0.25 mg Oral BID PRN    apixaban (ELIQUIS) tablet 5 mg  5 mg Oral BID    sodium chloride (NS) flush 5-10 mL  5-10 mL IntraVENous PRN    sodium chloride (NS) flush 5-40 mL  5-40 mL IntraVENous Q8H    sodium chloride (NS) flush 5-40 mL  5-40 mL IntraVENous PRN    acetaminophen (TYLENOL) tablet 650 mg  650 mg Oral Q4H PRN    ondansetron (ZOFRAN) injection 4 mg  4 mg IntraVENous Q4H PRN    lactobac ac& pc-s.therm-b.anim (JUNI Q/RISAQUAD)  1 Cap Oral DAILY    atorvastatin (LIPITOR) tablet 40 mg  40 mg Oral QHS       Labs:  ABG No results for input(s): PHI, PCO2I, PO2I, HCO3I, SO2I, FIO2I in the last 72 hours.      CBC Recent Labs     07/25/19  0605   WBC 17.3*   HGB 8.5*   HCT 27.7*   *   MCV 95.2   MCH 27.8        Metabolic  Panel Recent Labs     07/25/19  0605 07/23/19  0407    139   K 3.5 3.6    104   CO2 27 28   * 236*   BUN 31* 33*   CREA 0.99 1.21*   CA 9.6 9.6   ALB  --  2.6*   SGOT  --  14*   ALT  --  12        Pertinent Labs                [place], [place]  7/25/2019

## 2019-07-25 NOTE — PROGRESS NOTES
visit for routine follow up. Patient sitting up in bed resting and watching television. Appears tired, but good eye contact, calm. Says she is feeling so so today. Provided spiritual presence and listening as she spoke briefly of her present thoughts, feelings, and concerns. Speaks slowly with a little difficulty. Says she is comfortable and not in need of anything at this time other than prayer. A prayer was offered and she appeared comforted as a result of this prayer and visit and expressed gratitude for this prayer and visit. Visited by Rev. Mandy Coelho MDiv, [facility], [address]   paging service: [phone])

## 2019-07-25 NOTE — PROGRESS NOTES
Bedside shift change report [facility] [address] [facility] [address] [facility] by [facility] RN (offgoing nurse). Report included the following information SBAR, Intake/Output, MAR, Recent Results and Quality Measures.

## 2019-07-25 NOTE — PROGRESS NOTES
Problem: Self Care Deficits Care Plan (Adult)  Goal: *Acute Goals and Plan of Care (Insert Text)  Description  Occupational Therapy Goals  Initiated 7/23/2019  1. Patient will perform sitting EOB for grooming task with minimal assistance/contact guard assist within 7 day(s). 2.  Patient will perform anterior UB bathing with minimal assistance/contact guard assist within 7 day(s). 3.  Patient will perform rolling in bed in prep to assist with toileting with minimal assistance/contact guard assist within 7 day(s). 4.  Patient will don gown with minimum assistance within 7 days. 5.  Patient will participate in upper extremity therapeutic exercise/activities with supervision/set-up for >8 minutes within 7 day(s). Outcome: Not Progressing Towards Goal     OCCUPATIONAL THERAPY TREATMENT  Patient: Emanuel Morse (48 y.o. female)  Date: 7/25/2019  Diagnosis: Pneumonia [J18.9] Pneumonia       Precautions:    Chart, occupational therapy assessment, plan of care, and goals were reviewed. ASSESSMENT:  Patient continues to demonstrate poor activity tolerance, desaturation with minimal ADL exertion, fair dynamic sitting balance and need for A with all ADLs and mobility. Pt received on hi flow nasal cannula and requested to sit EOB. She was mod A x 2 for supine to sit where pt sat EOB x 14 minutes. Sitting without activity, pt's , O2 88-94% but recovered in less than 1 minute. With anterior body bathing with max A, pt desaturated to 79% and required 1 minute with activity ceased to recover to 88%. Pt fatigued and assisted back to supine with O2 in 90s. RT at bedside at notified. Palliative is following, ideally would benefit from hospice but will need SNF rehab at discharge. Progression toward goals:  ?       Improving appropriately and progressing toward goals  ? Improving slowly and progressing toward goals  ?        Not making progress toward goals and plan of care will be adjusted PLAN:  Patient continues to benefit from skilled intervention to address the above impairments. Continue treatment per established plan of care. Discharge Recommendations:  Skilled Nursing Facility  Further Equipment Recommendations for Discharge:  TBD     SUBJECTIVE:   Patient stated I want to sit up.     OBJECTIVE DATA SUMMARY:   Cognitive/Behavioral Status:                      Functional Mobility and Transfers for ADLs:  Bed Mobility:  Supine to Sit: Moderate assistance;Assist x2  Sit to Supine: Maximum assistance;Assist x2  Scooting: Maximum assistance    Transfers:             Balance:  Sitting: Impaired; Without support  Sitting - Static: Good (unsupported)  Sitting - Dynamic: Fair (occasional)    ADL Intervention:            Upper Body Bathing  Bathing Assistance: Maximum assistance  Position Performed: Seated edge of bed                   Pain:  Pain Scale 1: Numeric (0 - 10)  Pain Intensity 1: 0              Activity Tolerance:   Poor with activity to 79% but recovered 88% in one minute with cues for PLB  Please refer to the flowsheet for vital signs taken during this treatment. After treatment:   ? Patient left in no apparent distress sitting up in chair  ? Patient left in no apparent distress in bed  ? Call bell left within reach  ? Nursing notified  ? Caregiver present  ?  Bed alarm activated    COMMUNICATION/COLLABORATION:   The patients plan of care was discussed with: Registered Nurse and Respiratory Therapist    Frances Torre OT  Time Calculation: 34 mins

## 2019-07-26 NOTE — PROGRESS NOTES
Bedside and Verbal shift change report given to [address] (oncoming nurse) by [address] (offgoing nurse). Report included the following information SBAR, Kardex, ED Summary, MAR, Accordion, Recent Results and Cardiac Rhythm NSR-NST . Problem: Falls - Risk of  Goal: *Absence of Falls  Description  Document Raquelpatricia Adamee Fall Risk and appropriate interventions in the flowsheet.   Outcome: Progressing Towards Goal  Note:   Fall Risk Interventions:  Mobility Interventions: OT consult for ADLs, PT Consult for mobility concerns, PT Consult for assist device competence, Utilize gait belt for transfers/ambulation, Communicate number of staff needed for ambulation/transfer         Medication Interventions: Evaluate medications/consider consulting pharmacy, Patient to call before getting OOB, Utilize gait belt for transfers/ambulation, Assess postural VS orthostatic hypotension    Elimination Interventions: Call light in reach, Patient to call for help with toileting needs, Stay With Me (per policy), Toileting schedule/hourly rounds              Problem: Nutrition Deficit  Goal: *Optimize nutritional status  7/26/2019 1954 by Kathleen Whaley  Outcome: Progressing Towards Goal  7/26/2019 1929 by Kathleen Whaley  Outcome: Progressing Towards Goal

## 2019-07-26 NOTE — PROGRESS NOTES
Bedside shift change report given to Rukhsana RN (oncoming nurse) by Osmar Mederos RN (offgoing nurse). Report included the following information SBAR, Intake/Output, MAR, Recent Results and Quality Measures.

## 2019-07-26 NOTE — PROGRESS NOTES
Pulmonary, Critical Care, and Sleep Medicine~Progress Note    Name: Grace Fields MRN: [id number]   : 1950 Hospital: [facility]   Date: 2019 11:15 AM Admission: 7/15/2019     Impression Plan   1. Acute on chronic (on 4lpm normally) hypoxic resp failure secondary to below  2. Abnormal CT scan: HCAP possibly, query more bleomycin lung toxicity, now with lack of response. Other concerns were pulmonary edema, but she is quite dried out now  3. Hx of lymphoma. Not on therapy since the beginning of the year. 4. Hx of PE and was on eliquis   5. Hx of asthma  6. Reviewed [facility] records. She was seen last there on 19. Early  cellcept was stopped and at that appointment she was scheduled to taper off of steroids, which she did. Apparently she was on hospice care, but I am unclear as to the details; per patient it was rescinded   1. Strep/legionella negative  2. ECHO; PASP 60mmHg   3. Chest film today  4. O2 titration above 90%, on high flow. 20lpm and 94% FiO2. I reduced to 15lpm and hopefully she can be transitioned to midflow today; I discussed with the RN. Please continue to wean. 5. autoimmune labs mostly negative, but did have a + PEACE with elevated CRP and ESR; query coexisting autoimmune process. Once discharge would recommend rheumatology follow up  6. Reduce IV steroids  and continue cellcept    7. low dose diuretic PO   8. High risk for rapid decline   9. Discussed with attending     10. NEEDS TO GET UP AND OUT OF BED  11. Noted palliative care involvement    12. PRN over the weekend     Daily Progression:      On 15lpm; theoretically can be switched to midflow, but wean you talk about it she gets anxious and her saturations drop. I discussed with RN; suggest doing one hour on midlflow and one hour on highflow during the day to get her use to it.       She looks so weak, but states she feels better. About to work with PT.  Still +.       Noted question of lung transplant query; I think recent malignancy would not allow her to be placed on the list. But since she is followed at [facility] they could assisted better. She feels better today     7/23  Looks stronger  Desats quickly with movement, but stabilizes; this is to be expected with her chronic lung disease  Weight gain over night? Agree with diuretics  Agree with getting up and out of bed as much as possible      7/22  Creatinine improving   Was increased in Flow and FiO2, unclear why as she is feeling better   Chest film today looks fairly similar     7/18   Not much different. We are starting her on high flow now     7/17  Feels possibly a little better. proBNP elevated today     7/16  Consult Note requested by Dr Osman Duarte. Patient presented for admission secondary to a one week course of cough, subjective fevers, and malasie. Outpatient PCP starte zpak and she did not have bernadette improvement. No other symptoms reported on this recent course. 7/15/19 CT scan did showed worsening right great than left infiltrates with interlobular septal thickening; no PE. BNP was 534, not very elevated. This past Jan 2019 she was dx with hodgkin's lymphoma. Was on blecyomcin and developed a pneumonitis associated with it. Was started on cellcept 500mg bid and prednisone. Followed in the office at [facility] in April, however transferred her care to [facility] and saw Dr Lita Lopez (sp?). Her last visit with them was this past June and at that visit she was weaned off of the prednisone and cellcept. We do not have those records at this time. I have reviewed the labs and previous days notes. Pertinent items are noted in HPI.   Past Medical History:   Diagnosis Date    Asthma     as a child, nothing since menopause    Cancer ([facility] 08/21/2018    Non-Hodgkin's    Cataracts, bilateral     Diabetes ([facility] [address]     type II    Environmental allergies     Fibromyalgia     Treated with alternate medical therapy    Hypertension Past Surgical History:   Procedure Laterality Date    HX BREAST BIOPSY Right 2006    HX COLONOSCOPY  2015    HX GYN      Pap 2015    HX ORTHOPAEDIC      fractured R ankle/ no surgery    HX OTHER SURGICAL Right 2018    excision of deep neck cervical LN. Prior to Admission medications    Medication Sig Start Date End Date Taking? Authorizing Provider   acetaminophen (TYLENOL) 650 mg suppository Insert 650 mg into rectum every four (4) hours as needed for Pain. Yes Provider, Historical   amLODIPine (NORVASC) 5 mg tablet Take 5 mg by mouth daily. Yes Provider, Historical   trimethoprim-sulfamethoxazole (BACTRIM DS) 160-800 mg per tablet Take 1 Tab by mouth every Monday, Wednesday, Friday. Yes Provider, Historical   metFORMIN (GLUCOPHAGE) 500 mg tablet Take 500 mg by mouth two (2) times daily (with meals). Indications: Per [address] 3/30/19  Yes Provider, Historical   levalbuterol (XOPENEX) 1.25 mg/0.5 mL nebu 1.25 mg by Nebulization route every four (4) hours as needed. Indications: Per [address] 3/30/19  Yes Provider, Historical   acetaminophen (TYLENOL ARTHRITIS PAIN) 650 mg TbER Take 650 mg by mouth as needed (Q 6hrs PRN). 3/30/19  Yes Provider, Historical   apixaban (ELIQUIS) 5 mg tablet Take 1 Tab by mouth every twelve (12) hours. 3/13/19  Yes Emiliano Rubio MD   atorvastatin (LIPITOR) 40 mg tablet TAKE 1 TAB BY MOUTH DAILY. 12/3/17  Yes Emiliano Rubio MD   Oxygen 3 Devices by Nasal route as needed.  Indications: SOB PRN    Provider, Historical     No Known Allergies   Social History     Tobacco Use    Smoking status: Never Smoker    Smokeless tobacco: Never Used   Substance Use Topics    Alcohol use: Yes     Comment: socially      Family History   Problem Relation Age of Onset    Heart Disease Mother     Cancer Father         prostate cancer    Cancer Sister         Breast cancer apparent DCIS    Heart Disease Brother         Brother  from congestive heart failure secondary to severe mitral disease       OBJECTIVE:     Vital Signs:       Visit Vitals  /74   Pulse (!) 115   Temp 97.3 °F (36.3 °C)   Resp (!) 33   Ht 5' 8\" (1.727 m)   Wt 80.4 kg (177 lb 4 oz)   SpO2 94%   BMI 26.95 kg/m²      Temp (24hrs), Av.3 °F (36.3 °C), Min:96.3 °F (35.7 °C), Max:97.6 °F (36.4 °C)     Intake/Output:     Last shift: No intake/output data recorded.     Last 3 shifts:  1901 -  0700  In: -   Out: 500 [Urine:500]          Intake/Output Summary (Last 24 hours) at 2019 1016  Last data filed at 2019 1759  Gross per 24 hour   Intake    Output 500 ml   Net -500 ml       Physical Exam:                                        Exam Findings Other   General: No resp distress noted, appears stated age    [de-identified]:  No ulcers, JVD not elevated, no cervical LAD    Chest: No pectus deformity, normal chest rise b/l    HEART:  RRR, no murmurs/rubs/gallops    Lungs:  Notable crackles, diminished BS at bases    ABD: Soft/NT, non rigid mildly distended    EXT: No cyanosis/clubbing/edema, normal peripheral pulses    Skin: No rashes or ulcers, no mottling    Neuro: A/O x 3        Medications:  Current Facility-Administered Medications   Medication Dose Route Frequency    furosemide (LASIX) tablet 20 mg  20 mg Oral DAILY    insulin glargine (LANTUS) injection 50 Units  50 Units SubCUTAneous QHS    hydrALAZINE (APRESOLINE) tablet 25 mg  25 mg Oral TID    dilTIAZem (CARDIZEM) IR tablet 120 mg  120 mg Oral QID    methylPREDNISolone (PF) (SOLU-MEDROL) injection 30 mg  30 mg IntraVENous Q8H    mycophenolate mofetil (CELLCEPT) capsule 500 mg  500 mg Oral ACB&D    glucose chewable tablet 16 g  4 Tab Oral PRN    dextrose (D50W) injection syrg 12.5-25 g  12.5-25 g IntraVENous PRN    glucagon (GLUCAGEN) injection 1 mg  1 mg IntraMUSCular PRN    insulin lispro (HUMALOG) injection   SubCUTAneous TIDAC    ALPRAZolam (XANAX) tablet 0.25 mg  0.25 mg Oral BID PRN    apixaban (ELIQUIS) tablet 5 mg  5 mg Oral BID    sodium chloride (NS) flush 5-10 mL  5-10 mL IntraVENous PRN    sodium chloride (NS) flush 5-40 mL  5-40 mL IntraVENous Q8H    sodium chloride (NS) flush 5-40 mL  5-40 mL IntraVENous PRN    acetaminophen (TYLENOL) tablet 650 mg  650 mg Oral Q4H PRN    ondansetron (ZOFRAN) injection 4 mg  4 mg IntraVENous Q4H PRN    lactobac ac& pc-s.therm-b.anim (JUNI Q/RISAQUAD)  1 Cap Oral DAILY    atorvastatin (LIPITOR) tablet 40 mg  40 mg Oral QHS       Labs:  ABG No results for input(s): PHI, PCO2I, PO2I, HCO3I, SO2I, FIO2I in the last 72 hours.      CBC Recent Labs     07/25/19  0605   WBC 17.3*   HGB 8.5*   HCT 27.7*   *   MCV 95.2   MCH 12.2        Metabolic  Panel Recent Labs     07/25/19  0605      K 3.5      CO2 27   *   BUN 31*   CREA 0.99   CA 9.6        Pertinent Labs                Dheeraj Arreola [place]  7/26/2019

## 2019-07-26 NOTE — PROGRESS NOTES
Hospitalist Progress Note  Jerilyn Mtz MD  Answering service: [phone] from in house phone        Date of Service:  2019  NAME:  Sravani Cash  :  1950  MRN:  [id number]      Admission Summary:   70 y.o F with PMh of hodgkin's lymphoma,pneumonitis due to bleomycin,DM,HTN came to the hospital because of SOB,fever and cough. Interval history / Subjective:       Patient still has conversational dyspnea,unable to complete sentences. She asked if she is a candidate for lung transplant - I deferred that conversation to her pulmonologist.       Assessment & Plan:     #Acute on chronic hypoxemic resp failure:  -suspected bleomycin toxicity and autoimmune etiology-PEACE positive,elevated ESR and CRP  -she completed abx course with vanc and zosyn for suspected pneumonia  -Still on high flow -upto 15 lt. -Appears euvolemic on exam.  -on methylprednisone and CellCept   -CXR today shows persistent abnormal hazy density involving both lungs. Prominence of the pulmonary interstitial markings. #History of hodgkin's lymphoma:  -per chart review,she was on hospice and that was rescinded.  -not on treatment currently    #HTN: controlled on current regimen    #Diabetes: BS controlled- lantus and SSI    #history of PE: eliquis        Code status: DNR  DVT prophylaxis: anticoagulaed     Care Plan discussed with: patient,nurse  Disposition: TBD    High risk of detoriation due to lung issues.   Guarded prognosis over all     Hospital Problems  Date Reviewed: 2/3/2019          Codes Class Noted POA    Chronic hypoxemic respiratory failure (HCC) ICD-10-CM: J96.11  ICD-9-CM: 518.83, 799.02  2019 Yes        Bleomycin lung toxicity ICD-10-CM: J98.4, T45.1X5A  ICD-9-CM: 508.8, E930.7  2019 Yes        * (Principal) Pneumonia ICD-10-CM: J18.9  ICD-9-CM: 339  7/15/2019 Yes        Hod[facility] lymphoma ([facility] ICD-10-CM: C81.90  ICD-9-CM: 201.90 2/3/2019 Yes        Diabetes mellitus type 2, controlled ([facility] [address] ICD-10-CM: E11.9  ICD-9-CM: 250.00  9/8/2016 Yes                Review of Systems:   Pertinent items are noted in HPI. Vital Signs:    Last 24hrs VS reviewed since prior progress note. Most recent are:  Visit Vitals  /59   Pulse (!) 109   Temp 97.8 °F (36.6 °C)   Resp (!) 34   Ht 5' 8\" (1.727 m)   Wt 80.4 kg (177 lb 4 oz)   SpO2 97%   BMI 26.95 kg/m²         Intake/Output Summary (Last 24 hours) at 7/26/2019 1722  Last data filed at 7/26/2019 1340  Gross per 24 hour   Intake    Output 1400 ml   Net -1400 ml        Physical Examination:             Constitutional:  in mild distreess   ENT:  Oral mucous moist, oropharynx benign. Neck supple,    Resp:  bilateral inspiratory crackles, conversational dyspnea   CV:  Regular rhythm, normal rate    GI:  Soft, non distended, non tender. Musculoskeletal:  No edema    Neurologic:  Moves all extremities. AAOx3,            Data Review:    Review and/or order of clinical lab test  Review and/or order of tests in the radiology section of CPT  Review and/or order of tests in the medicine section of CPT      Labs:     Recent Labs     07/25/19  0605   WBC 17.3*   HGB 8.5*   HCT 27.7*   *     Recent Labs     07/25/19  0605      K 3.5      CO2 27   BUN 31*   CREA 0.99   *   CA 9.6     No results for input(s): SGOT, GPT, ALT, AP, TBIL, TBILI, TP, ALB, GLOB, GGT, AML, LPSE in the last 72 hours. No lab exists for component: AMYP, HLPSE  No results for input(s): INR, PTP, APTT in the last 72 hours. No lab exists for component: INREXT   No results for input(s): FE, TIBC, PSAT, FERR in the last 72 hours. No results found for: FOL, RBCF   No results for input(s): PH, PCO2, PO2 in the last 72 hours. No results for input(s): CPK, CKNDX, TROIQ in the last 72 hours.     No lab exists for component: CPKMB  Lab Results   Component Value Date/Time    Cholesterol, total 134 08/03/2018 01:45 PM    HDL Cholesterol 56 08/03/2018 01:45 PM    LDL, calculated 64 08/03/2018 01:45 PM    Triglyceride 71 08/03/2018 01:45 PM     Lab Results   Component Value Date/Time    Glucose (POC) 96 07/26/2019 04:51 PM    Glucose (POC) 84 07/26/2019 11:49 AM    Glucose (POC) 84 07/26/2019 06:26 AM    Glucose (POC) 115 (H) 07/25/2019 09:10 PM    Glucose (POC) 145 (H) 07/25/2019 05:36 PM     Lab Results   Component Value Date/Time    Color YELLOW/STRAW 07/15/2019 06:03 PM    Appearance CLOUDY (A) 07/15/2019 06:03 PM    Specific gravity 1.010 07/15/2019 06:03 PM    pH (UA) 5.0 07/15/2019 06:03 PM    Protein TRACE (A) 07/15/2019 06:03 PM    Glucose NEGATIVE  07/15/2019 06:03 PM    Ketone NEGATIVE  07/15/2019 06:03 PM    Bilirubin NEGATIVE  07/15/2019 06:03 PM    Urobilinogen 0.2 07/15/2019 06:03 PM    Nitrites NEGATIVE  07/15/2019 06:03 PM    Leukocyte Esterase SMALL (A) 07/15/2019 06:03 PM    Epithelial cells MODERATE (A) 07/15/2019 06:03 PM    Bacteria 1+ (A) 07/15/2019 06:03 PM    WBC 5-10 07/15/2019 06:03 PM    RBC 20-50 07/15/2019 06:03 PM         Medications Reviewed:     Current Facility-Administered Medications   Medication Dose Route Frequency    methylPREDNISolone (PF) (SOLU-MEDROL) injection 20 mg  20 mg IntraVENous Q8H    furosemide (LASIX) tablet 20 mg  20 mg Oral DAILY    insulin glargine (LANTUS) injection 50 Units  50 Units SubCUTAneous QHS    hydrALAZINE (APRESOLINE) tablet 25 mg  25 mg Oral TID    dilTIAZem (CARDIZEM) IR tablet 120 mg  120 mg Oral QID    mycophenolate mofetil (CELLCEPT) capsule 500 mg  500 mg Oral ACB&D    glucose chewable tablet 16 g  4 Tab Oral PRN    dextrose (D50W) injection syrg 12.5-25 g  12.5-25 g IntraVENous PRN    glucagon (GLUCAGEN) injection 1 mg  1 mg IntraMUSCular PRN    insulin lispro (HUMALOG) injection   SubCUTAneous TIDAC    ALPRAZolam (XANAX) tablet 0.25 mg  0.25 mg Oral BID PRN    apixaban (ELIQUIS) tablet 5 mg  5 mg Oral BID    sodium chloride (NS) flush 5-10 mL  5-10 mL IntraVENous PRN    sodium chloride (NS) flush 5-40 mL  5-40 mL IntraVENous Q8H    sodium chloride (NS) flush 5-40 mL  5-40 mL IntraVENous PRN    acetaminophen (TYLENOL) tablet 650 mg  650 mg Oral Q4H PRN    ondansetron (ZOFRAN) injection 4 mg  4 mg IntraVENous Q4H PRN    lactobac ac& pc-s.therm-b.anim (JUNI Q/RISAQUAD)  1 Cap Oral DAILY    atorvastatin (LIPITOR) tablet 40 mg  40 mg Oral QHS     ______________________________________________________________________  EXPECTED LENGTH OF STAY: 4d 4h  ACTUAL LENGTH OF STAY:          11                 Trinidad Wolff MD

## 2019-07-26 NOTE — PROGRESS NOTES
Problem: Falls - Risk of  Goal: *Absence of Falls  Description  Document Inés Cerna Fall Risk and appropriate interventions in the flowsheet.   Outcome: Progressing Towards Goal  Note:   Fall Risk Interventions:  Mobility Interventions: Patient to call before getting OOB, Communicate number of staff needed for ambulation/transfer, Bed/chair exit alarm, PT Consult for assist device competence, PT Consult for mobility concerns         Medication Interventions: Evaluate medications/consider consulting pharmacy, Teach patient to arise slowly, Bed/chair exit alarm    Elimination Interventions: Call light in reach, Patient to call for help with toileting needs, Toileting schedule/hourly rounds, Urinal in reach

## 2019-07-27 NOTE — PROGRESS NOTES
Problem: Patient Education: Go to Patient Education Activity  Goal: Patient/Family Education  Outcome: Progressing Towards Goal     Problem: Pneumonia: Day 1  Goal: Off Pathway (Use only if patient is Off Pathway)  Outcome: Progressing Towards Goal  Goal: Activity/Safety  Outcome: Progressing Towards Goal  Goal: Consults, if ordered  Outcome: Progressing Towards Goal  Goal: Diagnostic Test/Procedures  Description  Completed pertinent labs and diagnostics   Outcome: Progressing Towards Goal  Goal: Nutrition/Diet  Outcome: Progressing Towards Goal  Goal: Medications  Outcome: Progressing Towards Goal  Goal: Respiratory  Outcome: Progressing Towards Goal  Goal: Treatments/Interventions/Procedures  Outcome: Progressing Towards Goal  Goal: Psychosocial  Outcome: Progressing Towards Goal  Goal: *Oxygen saturation within defined limits  Outcome: Progressing Towards Goal  Goal: *Influenza vaccine administered (October-March)  Outcome: Progressing Towards Goal  Goal: *Pneumoccocal vaccine administered  Outcome: Progressing Towards Goal  Goal: *Hemodynamically stable  Outcome: Progressing Towards Goal  Goal: *Demonstrates progressive activity  Outcome: Progressing Towards Goal  Goal: *Tolerating diet  Outcome: Progressing Towards Goal

## 2019-07-27 NOTE — PROGRESS NOTES
Viviana NP Progress note    Name: Kasey Ferrer  YOB: 1950  MRN: [id number]  Admission Date: 7/15/2019 10:19 AM    Date of service: 7/27/2019 1:39 AM    Subjective:   RN called re: concerned about giving 50u lantus as BS is 87    Objective:   BS were previously high and patient started on 50u Lantus on 7/24 according to chart. BS are much improved, however, RN is concerned about giving 50u of lantus when pt's BS is only 87. Review of chart shows that 50u of lantus was given last night when BS was 115 at 2110 and had been 145 at 1736. BS has been less than 100 all day today. Assessment & Plan:     Concern for giving 50u Lantus w/ BS 87  -Will decrease amount and give 20u lantus tonight. Defer further changes to AM shift.        Carmen Pak NP  [phone] or Navya

## 2019-07-27 NOTE — PROGRESS NOTES
HYPOGLYCEMIC EPISODE DOCUMENTATION    Patient with hypoglycemic episode(s) at 1221 on 7/27/19. BG value(s) pre-treatment 68    Was patient symptomatic? [] yes, [x] no  Patient was treated with the following rescue medications/treatments:  [x] 4oz juice  BG value post-treatment: 84  Once BG treated and value greater than 80mg/dl, pt was provided with the following:  [x] snack  [x] meal  Pt also has boost glucose control at the bedside. RN trying to encourage pt to eat and/or drink more.

## 2019-07-27 NOTE — PROGRESS NOTES
Hospitalist Progress Note  Ruthell Nissen, MD  Answering service: [phone] from in house phone        Date of Service:  2019  NAME:  Storm Pryor  :  1950  MRN:  [id number]      Admission Summary:   70 y.o F with PMh of hodgkin's lymphoma,pneumonitis due to bleomycin,DM,HTN came to the hospital because of SOB,fever and cough. Interval history / Subjective:       She continues to remain SOB at rest. Has conversation dyspnea even speaking few words. Remains on 15 lt oxygen  Per nursing,desaturates while repositioning  in bed. Assessment & Plan:     #Acute on chronic hypoxemic resp failure:not improving  -suspected bleomycin toxicity and autoimmune etiology-PEACE positive,elevated ESR and CRP  -she completed abx course with vanc and zosyn for suspected pneumonia  -Still on high flow -upto 15 lt. -Appears euvolemic on exam.  -on methylprednisone -per pulmonary note -needs to be weaned -decrease to 20 mg once a day  and CellCept   -CXR yesterday shows persistent abnormal hazy density involving both lungs. Prominence of the pulmonary interstitial markings. #History of hodgkin's lymphoma:  -per chart review,she was on hospice and that was rescinded.  -not on treatment currently    #HTN: controlled on current regimen    #Diabetes:BS now in 70-80s - d/c lantus. #history of PE: eliquis        Code status: DNR  DVT prophylaxis: anticoagulaed     Care Plan discussed with: patient,nurse  Disposition: TBD    High risk of detoriation due to resp  issues.   Guarded prognosis over all     Hospital Problems  Date Reviewed: 2/3/2019          Codes Class Noted POA    Chronic hypoxemic respiratory failure [facility]) ICD-10-CM: J96.11  ICD-9-CM: 518.83, 799.02  2019 Yes        Bleomycin lung toxicity ICD-10-CM: J98.4, T45.1X5A  ICD-9-CM: 508.8, E930.7  2019 Yes        * (Principal) Pneumonia ICD-10-CM: J18.9  ICD-9-CM: 311 7/15/2019 Yes        Hodgkin lymphoma ([facility] ICD-10-CM: C81.90  ICD-9-CM: 201.90  2/3/2019 Yes        Diabetes mellitus type 2, controlled ([facility] ICD-10-CM: E11.9  ICD-9-CM: 250.00  9/8/2016 Yes                Review of Systems:   Pertinent items are noted in HPI. Vital Signs:    Last 24hrs VS reviewed since prior progress note. Most recent are:  Visit Vitals  /74 (BP 1 Location: Left arm, BP Patient Position: At rest)   Pulse (!) 114   Temp 96.8 °F (36 °C)   Resp 28   Ht 5' 8\" (1.727 m)   Wt 78.7 kg (173 lb 8 oz)   SpO2 97%   BMI 26.38 kg/m²         Intake/Output Summary (Last 24 hours) at 7/27/2019 1453  Last data filed at 7/27/2019 0351  Gross per 24 hour   Intake    Output 250 ml   Net -250 ml        Physical Examination:             Constitutional:  in mild distreess   ENT:  Oral mucous moist, oropharynx benign. Neck supple,    Resp:  bilateral inspiratory crackles, conversational dyspnea   CV:  Regular rhythm, normal rate    GI:  Soft, non distended, non tender. Musculoskeletal:  No edema    Neurologic:  Moves all extremities. AAOx3,            Data Review:    Review and/or order of clinical lab test,tele  Review and/or order of tests in the medicine section of [facility]      Labs:     Recent Labs     07/27/19  0343 07/25/19  0605   WBC 17.3* 17.3*   HGB 8.8* 8.5*   HCT 27.7* 27.7*   * 464*     Recent Labs     07/27/19  0343 07/25/19  0605    140   K 2.9* 3.5    104   CO2 29 27   BUN 33* 31*   CREA 0.99 0.99   GLU 90 132*   CA 9.1 9.6     No results for input(s): SGOT, GPT, ALT, AP, TBIL, TBILI, TP, ALB, GLOB, GGT, AML, LPSE in the last 72 hours. No lab exists for component: AMYP, HLPSE  No results for input(s): INR, PTP, APTT in the last 72 hours. No lab exists for component: INREXT, INREXT   No results for input(s): FE, TIBC, PSAT, FERR in the last 72 hours. No results found for: FOL, RBCF   No results for input(s): PH, PCO2, PO2 in the last 72 hours.   No results for input(s): CPK, CKNDX, TROIQ in the last 72 hours.     No lab exists for component: CPKMB  Lab Results   Component Value Date/Time    Cholesterol, total 134 08/03/2018 01:45 PM    HDL Cholesterol 56 08/03/2018 01:45 PM    LDL, calculated 64 08/03/2018 01:45 PM    Triglyceride 71 08/03/2018 01:45 PM     Lab Results   Component Value Date/Time    Glucose (POC) 84 07/27/2019 12:44 PM    Glucose (POC) 76 07/27/2019 12:21 PM    Glucose (POC) 95 07/27/2019 06:21 AM    Glucose (POC) 87 07/27/2019 01:29 AM    Glucose (POC) 85 07/26/2019 09:35 PM     Lab Results   Component Value Date/Time    Color YELLOW/STRAW 07/15/2019 06:03 PM    Appearance CLOUDY (A) 07/15/2019 06:03 PM    Specific gravity 1.010 07/15/2019 06:03 PM    pH (UA) 5.0 07/15/2019 06:03 PM    Protein TRACE (A) 07/15/2019 06:03 PM    Glucose NEGATIVE  07/15/2019 06:03 PM    Ketone NEGATIVE  07/15/2019 06:03 PM    Bilirubin NEGATIVE  07/15/2019 06:03 PM    Urobilinogen 0.2 07/15/2019 06:03 PM    Nitrites NEGATIVE  07/15/2019 06:03 PM    Leukocyte Esterase SMALL (A) 07/15/2019 06:03 PM    Epithelial cells MODERATE (A) 07/15/2019 06:03 PM    Bacteria 1+ (A) 07/15/2019 06:03 PM    WBC 5-10 07/15/2019 06:03 PM    RBC 20-50 07/15/2019 06:03 PM         Medications Reviewed:     Current Facility-Administered Medications   Medication Dose Route Frequency    potassium chloride SR (KLOR-CON 10) tablet 40 mEq  40 mEq Oral BID    methylPREDNISolone (PF) (SOLU-MEDROL) injection 20 mg  20 mg IntraVENous Q12H    furosemide (LASIX) tablet 20 mg  20 mg Oral DAILY    hydrALAZINE (APRESOLINE) tablet 25 mg  25 mg Oral TID    dilTIAZem (CARDIZEM) IR tablet 120 mg  120 mg Oral QID    mycophenolate mofetil (CELLCEPT) capsule 500 mg  500 mg Oral ACB&D    glucose chewable tablet 16 g  4 Tab Oral PRN    dextrose (D50W) injection syrg 12.5-25 g  12.5-25 g IntraVENous PRN    glucagon (GLUCAGEN) injection 1 mg  1 mg IntraMUSCular PRN    insulin lispro (HUMALOG) injection SubCUTAneous TIDAC    ALPRAZolam (XANAX) tablet 0.25 mg  0.25 mg Oral BID PRN    apixaban (ELIQUIS) tablet 5 mg  5 mg Oral BID    sodium chloride (NS) flush 5-10 mL  5-10 mL IntraVENous PRN    sodium chloride (NS) flush 5-40 mL  5-40 mL IntraVENous Q8H    sodium chloride (NS) flush 5-40 mL  5-40 mL IntraVENous PRN    acetaminophen (TYLENOL) tablet 650 mg  650 mg Oral Q4H PRN    ondansetron (ZOFRAN) injection 4 mg  4 mg IntraVENous Q4H PRN    lactobac ac& pc-s.therm-b.anim (JUNI Q/RISAQUAD)  1 Cap Oral DAILY    atorvastatin (LIPITOR) tablet 40 mg  40 mg Oral QHS     ______________________________________________________________________  EXPECTED LENGTH OF STAY: 4d 4h  ACTUAL LENGTH OF STAY:          12                 Tanvir Mathias MD

## 2019-07-27 NOTE — PROGRESS NOTES
1945- Bedside shift change report given to Mortimer Nails, RN by [employer], RN. Report included the following information SBAR, Kardex, ED Summary, Intake/Output, MAR, Recent Results and Cardiac Rhythm Afib. Problem: Pneumonia: Discharge Outcomes  Goal: *Demonstrates progressive activity  Outcome: Not Progressing Towards Goal  Note:   Patient is dyspneic on exertion and desats with any exertion. Patient is on 15L midflow and educated to take deep breaths. Will continue to monitor. Goal: *Optimal pain control at patient's stated goal  Outcome: Progressing Towards Goal  Note:   Patient has no complaints of pain. Problem: Nutrition Deficit  Goal: *Optimize nutritional status  Outcome: Not Progressing Towards Goal  Note:   Patient has a poor appetite. She receives boost glucose control with meals and RN encourages pt to drink them. Patient eats approximately 0-25% of meals. Will continue to encourage pt to eat.

## 2019-07-28 NOTE — PROGRESS NOTES
1200- RN notified by monitor tech that patients O2 saturation at 85%. RN went to the bedside to assess patient. Patients eye rapidly fluttering and patient not responding to name or sternal rub. Blood glucose 158. Rapid response called. Dr. Shruthi Gerardo and rapid response team came to the bedside. Patient unable to lift her arms up, which is a change from the 0900 assessment. Patient unable to speak or follow commands, which is also a change from the 0900 assessment. Patient placed on a non-rebreather due to not being able to keep her O2 saturations up . Respiratory therapist trialed patient on hi-flow, but O2 saturations dropped to 82% due to patient mouth breathing. RN will keep patient on a non-rebreather until patient is able to breathe through her nose. Orders received from Dr. Shruthi Gerardo to call a Code S. Code S called, neuro NP and stroke team came to the bedside. Orders received for a stat head CT. Patient taken to CT by neuro RN and I.     3766- Teleneurologist Dr. Ricardo Turcios assessed patient. Orders received to administer Keppra stat. RN administered ordered dose of Keppra to patient. 18- Dr. Deedee Paulino came to the bedside to exam patient. Dr. Deedee Paulino ordered a STAT brain MRI and EEG. Bedside shift change report given to Marly Villaseñor RN by Derrick Pulido RN. Report included the following information SBAR, Kardex, ED Summary, Intake/Output, MAR, Recent Results and Cardiac Rhythm NSR. Problem: Pneumonia: Discharge Outcomes  Goal: *Respiratory status at baseline  Outcome: Not Progressing Towards Goal  Note:   Patient was on 15L midflow and is now on a non-rebreather. Patient is currently breathing through her mouth and is not able to follow commands to breathe through her nose. Will switch back to midflow when patient is more alert and breathing through her nose.       Problem: Seizure Disorder (Adult)  Goal: *STG: Remains free of seizure activity  Outcome: Not Progressing Towards Goal  Note:   Patient had a seizure like episode witnessed by RN at the bedside. Patients family educated about seizures, such as rapid fluttering of the eyes and/or body shakes to notify the RN. Patients family verbalized understanding. RN doing more frequent rounds.

## 2019-07-28 NOTE — PROGRESS NOTES
1215: 0.5mg ativan drawn up during RRT per Dr. Otha Curling. Prior to administration order discontinued. 2mg ativan wasted. No ativan given to patient.

## 2019-07-28 NOTE — PROGRESS NOTES
Code stroke called for patient. Dr. Claude Lutz on scene. BS = 158. NIH stroke scale @ 23. Patient unable to speak or follow most directions at this time. Teleneurologist Dr. Mirian Howell on call. Patient down for CT.

## 2019-07-28 NOTE — PROGRESS NOTES
Problem: Pneumonia: Day 4  Goal: Off Pathway (Use only if patient is Off Pathway)  Outcome: Progressing Towards Goal  Pt. Alert and oriented, on mid flow O2 15 L, and maintaining O2 sats of 96%, SOLIZ observed during activity in bed, Sinus Tach with HR in 100's. Problem: Falls - Risk of  Goal: *Absence of Falls  Description  Document Spenceralanjuan j Fitzpatrick Fall Risk and appropriate interventions in the flowsheet. Outcome: Progressing Towards Goal  Pt. To call for assistance, bed locked and in low position, Pt. Educated on fall prevention, hourly rounds performed. Last 3 Recorded Weights in this Encounter    07/26/19 0724 07/27/19 0345 07/28/19 0346   Weight: 80.4 kg (177 lb 4 oz) 78.7 kg (173 lb 8 oz) 77.8 kg (171 lb 8 oz)   Pt. Weight source bed.    0435: Hospitalist Bart Bianchi NP notified about Pt. Hard stick tried to stick 3 times for AM labs and if she can order arterial stick, orders received to wait till morning. Will communicate to the morning Nurse. Bedside shift change report given to Aundrea Vickers RN (oncoming nurse) by Candido Campos RN (offgoing nurse). Report included the following information SBAR, Kardex, ED Summary, OR Summary, Procedure Summary, Intake/Output, Accordion, Recent Results, Med Rec Status, Cardiac Rhythm NSR to Sinus Tach and Quality Measures.

## 2019-07-28 NOTE — CONSULTS
Consult dictated. Rapid response/code stroke called earlier today for unresponsiveness, fluttering of the eyes. She is awake now but remains unresponsive. Check MRI brain to rule out stroke and also stat EEG to rule out subclinical seizures. Continue Rad.    Chasidy Castro MD

## 2019-07-28 NOTE — PROGRESS NOTES
responded to RRT which then a code stroke was called. Pt's brother, sister, aunt, and nieces were present.  provided pastoral listening, support and assurance of prayer. Let family know of  support and availability. Family was appreciative of support though wanted to stay close to pt to know what all was going on. Please contact [phone] [address] [facility] for further support.  follow up as needed.      GREG LeeDiv, MACE   [phone]

## 2019-07-28 NOTE — PROGRESS NOTES
Hospitalist Progress Note  Zahida Bangura MD  Answering service: [phone] from in house phone        Date of Service:  2019  NAME:  Pedro Amaya  :  1950  MRN:  [id number]      Admission Summary:   70 y.o F with PMh of hodgkin's lymphoma,pneumonitis due to bleomycin,DM,HTN came to the hospital because of SOB,fever and cough. Interval history / Subjective:       I saw her this morning,she was alert,remains dyspneic at rest , nodding her head appropriately when questions asked. Denied any pain. Sister at bed side. Later, RRT was called as she was unresponsive and nurse noted flickering eye movements. On my eval,she was gazing upward,unresponsive,not following commands or moving extremities. STAT labs ordered -ABG showed Co2 wnl,slightly alkalotic Ph. Ordered stat CT head,head and neck angio. Not a Tpa candidate as she is on eliquis. CT head - no acute changes. CT angio did not show any vascular occlusion. Discussed with tele neurology -possible seizure episode,patient was given 1500 mg keppra. Stat EEG ordered. Electrolytes,trop,ammonia wnl. Updated family at bed side -patient's brother and sister who are next of kin and medical POA - clinical course today,risk of deterioration- discussed code status and they confirmed DNR/DNI. Reevaluated again in the evening -she remains alert with open eyes but still unresponsive to commands. EEG about to start. Assessment & Plan:     #Acute encephalopathy:  -suspected seizure with post ictal state ? Hypoxic event  -MRI prelim negative for stroke,CT head and neck - no bleed,vascular occlusion. -ABG slightly high Ph.Electrolytes,ammonia,trop,lactic acid wnl  -Neurology following  -On keppra. -follow up EEG results.   -NPO      Acute on chronic hypoxemic resp failure:not improving  -Ssuspected bleomycin toxicity (-Per chart review at [facility] -was prescribed cellcept and steroids which she did not take as she was in hospice few months ago) and autoimmune etiology-PEACE positive,elevated ESR and CRP  -she completed abx course with vanc and zosyn for suspected pneumonia  -Still on high flow -upto 15 lt.-unable to wean off.  -Appears euvolemic on exam.  -on methylprednisone -per pulmonary note -needs to be weaned -decrease to 20 mg once a day  and receiving cellCept   -CXR ordered again. #History of hodgkin's lymphoma:  -per chart review,she was on hospice and that was rescinded.  -not on treatment currently    #HTN: will use Iv antihypertensives if needed. #Diabetes:-180s. Monitor for now      #history of PE: eliquis        Code status: DNR  DVT prophylaxis: anticoagulaed     Care Plan discussed with: patient's family,nurse  Disposition: TBD    High risk of detoriation due to resp and neurological issues. Guarded prognosis over all    Total time spent: 60 minutes. Hospital Problems  Date Reviewed: 2/3/2019          Codes Class Noted POA    Chronic hypoxemic respiratory failure [facility]) ICD-10-CM: J96.11  ICD-9-CM: 518.83, 799.02  7/16/2019 Yes        Bleomycin lung toxicity ICD-10-CM: J98.4, T45.1X5A  ICD-9-CM: 508.8, E930.7  7/16/2019 Yes        * (Principal) Pneumonia ICD-10-CM: J18.9  ICD-9-CM: 268  7/15/2019 Yes        Hodgkin lymphoma ([facility] ICD-10-CM: C81.90  ICD-9-CM: 201.90  2/3/2019 Yes        Diabetes mellitus type 2, controlled ([facility] ICD-10-CM: E11.9  ICD-9-CM: 250.00  9/8/2016 Yes                Review of Systems:   Pertinent items are noted in HPI. Vital Signs:    Last 24hrs VS reviewed since prior progress note.  Most recent are:  Visit Vitals  /66 (BP 1 Location: Right arm, BP Patient Position: At rest;Supine)   Pulse 100   Temp 97.5 °F (36.4 °C)   Resp 26   Ht 5' 8\" (1.727 m)   Wt 77.8 kg (171 lb 8 oz)   SpO2 100%   BMI 26.08 kg/m²         Intake/Output Summary (Last 24 hours) at 7/28/2019 1847  Last data filed at 7/28/2019 0347  Gross per 24 hour   Intake    Output 900 ml   Net -900 ml        Physical Examination:             Constitutional: Elderly patient   ENT:  Oral mucous moist   Resp:  bilateral inspiratory crackles   CV:  Regular rhythm, normal rate    GI:  Soft, non distended, non tender. Musculoskeletal:  No edema    Neurologic:  she is unresponsive -eyes open,upward gaze,minimal withdrawal to pain,non verbal.            Data Review:    Review and/or order of clinical lab test,tele,imaging   Review and/or order of tests in the medicine section of [facility]      Labs:     Recent Labs     07/28/19  0909 07/27/19  0343   WBC 15.0* 17.3*   HGB 8.4* 8.8*   HCT 27.6* 27.7*    435*     Recent Labs     07/28/19  1216 07/28/19  0909 07/27/19  0343    144 141   K 3.9 4.0 2.9*    106 103   CO2 30 34* 29   BUN 39* 37* 33*   CREA 1.14* 1.17* 0.99   * 140* 90   CA 9.7 9.6 9.1   MG 2.5* 2.6*  --      No results for input(s): SGOT, GPT, ALT, AP, TBIL, TBILI, TP, ALB, GLOB, GGT, AML, LPSE in the last 72 hours. No lab exists for component: AMYP, HLPSE  Recent Labs     07/28/19  1216   INR 1.4*   PTP 14.1*      No results for input(s): FE, TIBC, PSAT, FERR in the last 72 hours. No results found for: FOL, RBCF   No results for input(s): PH, PCO2, PO2 in the last 72 hours.   Recent Labs     07/28/19  1216   TROIQ <0.05     Lab Results   Component Value Date/Time    Cholesterol, total 134 08/03/2018 01:45 PM    HDL Cholesterol 56 08/03/2018 01:45 PM    LDL, calculated 64 08/03/2018 01:45 PM    Triglyceride 71 08/03/2018 01:45 PM     Lab Results   Component Value Date/Time    Glucose (POC) 150 (H) 07/28/2019 04:34 PM    Glucose (POC) 178 (H) 07/28/2019 02:03 PM    Glucose (POC) 158 (H) 07/28/2019 12:02 PM    Glucose (POC) 164 (H) 07/28/2019 06:44 AM    Glucose (POC) 179 (H) 07/27/2019 10:33 PM     Lab Results   Component Value Date/Time    Color YELLOW/STRAW 07/15/2019 06:03 PM    Appearance CLOUDY (A) 07/15/2019 06:03 PM    Specific gravity 1.010 07/15/2019 06:03 PM    pH (UA) 5.0 07/15/2019 06:03 PM    Protein TRACE (A) 07/15/2019 06:03 PM    Glucose NEGATIVE  07/15/2019 06:03 PM    Ketone NEGATIVE  07/15/2019 06:03 PM    Bilirubin NEGATIVE  07/15/2019 06:03 PM    Urobilinogen 0.2 07/15/2019 06:03 PM    Nitrites NEGATIVE  07/15/2019 06:03 PM    Leukocyte Esterase SMALL (A) 07/15/2019 06:03 PM    Epithelial cells MODERATE (A) 07/15/2019 06:03 PM    Bacteria 1+ (A) 07/15/2019 06:03 PM    WBC 5-10 07/15/2019 06:03 PM    RBC 20-50 07/15/2019 06:03 PM         Medications Reviewed:     Current Facility-Administered Medications   Medication Dose Route Frequency    0.9% sodium chloride infusion  50 mL/hr IntraVENous CONTINUOUS    [START ON 7/29/2019] levETIRAcetam (KEPPRA) 1,000 mg in 0.9% sodium chloride 100 mL IVPB  1,000 mg IntraVENous Q12H    methylPREDNISolone (PF) (SOLU-MEDROL) injection 20 mg  20 mg IntraVENous Q12H    hydrALAZINE (APRESOLINE) tablet 25 mg  25 mg Oral TID    dilTIAZem (CARDIZEM) IR tablet 120 mg  120 mg Oral QID    mycophenolate mofetil (CELLCEPT) capsule 500 mg  500 mg Oral ACB&D    glucose chewable tablet 16 g  4 Tab Oral PRN    dextrose (D50W) injection syrg 12.5-25 g  12.5-25 g IntraVENous PRN    glucagon (GLUCAGEN) injection 1 mg  1 mg IntraMUSCular PRN    insulin lispro (HUMALOG) injection   SubCUTAneous TIDAC    ALPRAZolam (XANAX) tablet 0.25 mg  0.25 mg Oral BID PRN    apixaban (ELIQUIS) tablet 5 mg  5 mg Oral BID    sodium chloride (NS) flush 5-10 mL  5-10 mL IntraVENous PRN    sodium chloride (NS) flush 5-40 mL  5-40 mL IntraVENous Q8H    sodium chloride (NS) flush 5-40 mL  5-40 mL IntraVENous PRN    acetaminophen (TYLENOL) tablet 650 mg  650 mg Oral Q4H PRN    ondansetron (ZOFRAN) injection 4 mg  4 mg IntraVENous Q4H PRN    lactobac ac& pc-s.therm-b.anim (JUNI Q/RISAQUAD)  1 Cap Oral DAILY    atorvastatin (LIPITOR) tablet 40 mg  40 mg Oral QHS ______________________________________________________________________  EXPECTED LENGTH OF STAY: 4d 4h  ACTUAL LENGTH OF STAY:          15                 Jon Murry MD

## 2019-07-29 NOTE — CONSULTS
[address]    Name:  Shanel Alfaro  MR#:  [id number]  :  1950  ACCOUNT #:  [de-identified]  DATE OF SERVICE:  2019      REQUESTING PHYSICIAN:  Cedrick Curtis MD    REASON FOR EVALUATION:  Altered mental status. HISTORY OF PRESENT ILLNESS:  The patient is a 69-year-old female with history of Hodgkin's lymphoma who is currently admitted for acute on chronic hypoxemic respiratory failure suspected to be due to bleomycin toxicity and autoimmune etiology. She is PEACE positive and has elevated ESR and C-reactive protein. Other medical issues include diabetes, hypertension. She was awake and took her pills earlier this morning and around noon today, she went unresponsive and per RN, she was noted to have fluttering movements of her eyes. A rapid response team was called initially, but then it was changed to code stroke. She was evaluated by Teleneurology and was started on Keppra empirically. She is awaiting MRI scan of the brain. She does not have any ongoing eye fluttering, but has remained unresponsive, but awake. She has weakness in both lower extremities at baseline and does not ambulate. She does have a use of her arms and some tremor was noted in the arms by staff earlier today. Detailed review of systems was difficult. She did have blood gases tested and they were unremarkable. She did drop her saturations briefly prior to the episode with oxygen saturation at around 84% per family. PAST MEDICAL HISTORY:  As mentioned above. FAMILY HISTORY:  Noncontributory. SOCIAL HISTORY:  No history of smoking, alcohol or IV drug use. ALLERGIES:  NONE. HOME MEDICATIONS:  Tylenol, Norvasc, Eliquis Lipitor, metformin, Bactrim. PHYSICAL EXAMINATION:  GENERAL:  she is lying in bed in no acute distress. She has non-rebreather face mask on. VITAL SIGNS:  Blood pressure 174/87, pulse is 113, respiratory rate is 20, temperature 97.7.   HEENT:  Pupils are equal and reactive. She has her eyes open, but does not blink to command or to threat. Face is symmetric. HEART:  Regular rate and rhythm. CHEST:  Clear. ABDOMEN:  Soft, positive bowel sounds. EXTREMITIES:  No edema. Muscle tone is decreased in all extremities. She did not follow any commands/move her upper extremities or lower extremities to stimulation. She has foot drop bilaterally. Deep tendon reflexes are hypoactive. Toes are mute bilaterally. Sensory, cerebellar, gait exam was deferred. LABORATORY DATA:  CBC with WBC 15.0, hemoglobin 8.4, hematocrit is 27.6, platelets 307. Chemistry, sodium 142, potassium 3.9, BUN 39, creatinine 1. 14. ABG with pH of 7.488, pCO2 of 41.6, pO2 of 143, bicarb 31.6. CT scan of the brain did not show any acute abnormalities. CTA of the head and neck was also done and it did not show any intraluminal thrombus or flow-limiting stenosis within the neck. ASSESSMENT/PLAN:  A 58-year-old female who had an acute change in mental status where she became unresponsive and had fluttering of the eyelids lasting a few minutes. She is awake now, but remains unresponsive. It is possible that she may have had a seizure and is still in a post ictal state. A metabolic event due to hypoxemia would be another possibility or less likely brain stem infarct. She will need complete workup including MRI scan of the brain and we will try to obtain it today. We will also request a stat EEG. Continue Keppra and we will follow up once imaging studies are completed. Thank you for this consultation.       Raúl Sousa MD      [id number]  D:  07/28/2019 15:47  T:  07/28/2019 15:51  JOB #:  [id number]

## 2019-07-29 NOTE — PROGRESS NOTES
responded to RRT. Pt was being attended to and pt's sister Dede Franco was at bedside. She share about her sister and her illness being in and out of rehab facilities and in and out of the hospital. Betsy share about being here every weekend for the past several years taking care of her brother and now her sister, she lived in [place], where her and her siblings were raised. Pt's sister shared that pt had been under care with hospice and palliative previously. She is now a palliative pt here. Pt's brother, Elliot Farooq arrived as well.  spoke with pt and had prayer together.  provided pastoral listening, support and prayer. Let family know of  support and availability.  follow up as needed.      Fly Lee, [facility]   [phone])

## 2019-07-29 NOTE — CONSULTS
Jairo Guerin    Name:  Inocencia Lopez  MR#:  [id number]  :  1950  ACCOUNT #:  [de-identified]  DATE OF SERVICE:  2019      REASON FOR ADMISSION:  Pulmonary infiltrates. REASON FOR CONSULTATION:  History of Hodgkin's lymphoma. HISTORY OF PRESENT ILLNESS:  The patient is a 67-year-old woman followed closely by my partner Dr. Adithya Hdez.  She was initially seen in 2018 when a masseuse noted an abnormality in her right supraclavicular fossa. A lymph node was palpated. She ultimately underwent a CAT scan of the neck showing a right level III and IV lymph node that was enlarged with right supraclavicular adenopathy measuring 3.1 x 1.9 cm as well as a 6-mm left upper lobe lung nodule with extensive adenopathy extending into the mediastinum and compressing the SVC. She underwent a CT PET scan which showed increased metabolic activity in the right neck extending in the mediastinum. There was increased metabolic activity in the mediastinum and right hilum. There was a 1.5-cm right inguinal lymph node with slight increased metabolic activity 3.2. She ultimately underwent treatment with curative intent with ABVD dose reduced by 10% reduction for fatigue cycle #2. Following cycle 4 day 1, she had unfortunately abrupt onset of dyspnea and was ultimately found to have air space disease and pulmonary emboli, treated at [facility] for bleomycin toxicity and acute pulmonary emboli. Her original biopsy was done by evaluation of her neck mass and was read out as classical Hodgkin's lymphoma, nodular sclerosing type. She underwent bronchoscopy on 2019, of the right lower lobe. Bronchial washing was done which showed cytology negative. She has been in rehab with high-flow oxygen since then and also underwent an evaluation at [facility]. She was seen by the Pulmonary Department most recently on 2019.   At that time, she was started on a prednisone taper with a plan to transition to CellCept. She continues to take apixaban for her pulmonary emboli. At that time, she was enrolled in hospice. Currently, she presented to the emergency room with worsening shortness of breath and labored breathing. EMS was called to [facility]. She was brought here for further evaluation and treatment. Imaging was done including CTA of the chest on 07/15/2019. This revealed no acute pulmonary emboli. Overall, increased diffuse interlobular septal thickening and bilateral passage right greater than left when compared to CT scan from 04/10/2019, stable mediastinal lymphadenopathy. She has been treated presumptively for pneumonia. Unfortunately yesterday, she developed an episode of confusion. Her family noted that her pulse ox was low at 84. She was noted to have a rapid movement of her eyelids and she was seen by Neurology. A presumptive diagnosis of seizure was made and she was stared on IV Keppra. Last evening, she was following commands and was able to squeeze their fingers on direction, but this morning, she is nonresponsive again. Her eyes are awake, but she does not track movements. PAST MEDICAL HISTORY:  1. Hodgkin's lymphoma as above. 2.  Muscle wasting due to a general medical condition as above. 3.  Asthma. 4.  Type 2 diabetes. 5.  Hypertension. ALLERGIES:  NO KNOWN DRUG ALLERGIES. CURRENT MEDICATIONS IN THE HOSPITAL:  Lipitor 40 mg once daily, heparin subcu q.8 hours, insulin, Keprra 1000 mg IV q.12, Solu-Medrol 40 mg IV q.8, metoprolol 2.5 IV, CellCept 500 mg two times daily before meals. SOCIAL HISTORY:  She is single. 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 FAMILY HISTORY:  Significant for coronary artery disease, prostate cancer, breast cancer. REVIEW OF SYSTEMS:  Unavailable given mental status.     PHYSICAL EXAMINATION:  GENERAL:  Pleasant, in no acute distress. VITAL SIGNS:  /67, pulse 105, temp 97.5. HEENT:  Sclerae anicteric. Oropharynx clear. NECK:  Supple without lymphadenopathy or thyromegaly. HEART:  Regular rate and rhythm without murmur, rub, or gallop. LUNGS:  Clear to auscultation bilaterally. ABDOMEN:  Nontender and nondistended. Normoactive bowel sounds. No hepatosplenomegaly. EXTREMITIES:  No clubbing, cyanosis, or edema. PSYCHIATRIC:  Normal mood and affect. She is alert, but not oriented. RADIOLOGIC DATA:  CT scan of the chest, I first reviewed the images of her chest making note of bilateral air space disease, right greater than left, with air bronchograms bilaterally. She does have a subcarinal lymphadenopathy and thickening of her right pleura. ASSESSMENT AND PLAN:  A 69-year-old woman with bulky stage 2 Hodgkin's disease, classical type, status post treatment early this year by Dr. Leopoldo Lea with ABVD, course truncated due to abrupt onset of bleomycin toxicity. Now, admitted with \"pneumonia. \"  Had an episode whereby she became hypoxic and nonresponsive yesterday. MRI normal.  Started on intravenous Keppra. 1.  Decreased level of arousal:  It is not clear to me why her mental status has changed. We will await Neurology workup. 2.  Pulmonary infiltrates:  Diagnosis of bleomycin toxicity was made clinically. I do not see that she had a lung biopsy in the past.  Differential diagnosis includes lymphangitic spread of her lymphoma. She completed 3-1/2 treatments of ABVD, less than standard 6. I raised the potential concern for recurrent disease with the family; however, given the current state, a biopsy will not  as she would not be a candidate for chemotherapy currently. 3.  History of pulmonary embolism, on apixaban. Thank you for consult.       MD DENI [id number]  D:  07/29/2019 13:45  T:  07/29/2019 15:53  JOB #:  [id number]  CC:  Leopoldo Lea, MD

## 2019-07-29 NOTE — CONSULTS
Patient seen, chart reviewed, note dictated. [id number]    69 y/o woman with bulky stage II HD (classical),s/p treatment earlier this year by Dr. Lien Palomares with ABVD. Course truncated due to abrupt onset bleomycin toxicity. Now admitted with \"pneumonia\", had an episode whereby she became hypoxic and unresponsive yesterday. MRI normal. Started on IV keppra. 1. Decreased level of arousal: it is not clear to me where her mental status has changed. Will await neurology work-up. 2. Pulm infiltrates: diagnosis of bleomycin toxicity made clinically. I do not see that she had a lung biopsy. Diff dx includes lymphangitic spread of lymphoma. She completed 3 1/2 treatments of ABVD, less than the standard 6. I raised the potential concern for recurrent disease with her family. 3. H/o pulmonary embolism: on paixiban    Thank you for consult.      Clarita Natarajan MD  Hem/Onc

## 2019-07-29 NOTE — PROGRESS NOTES
PT Note:    Chart reviewed and spoke with nursing. RN states patient is poorly responsive currently and not appropriate for PT. Will continue to follow.

## 2019-07-29 NOTE — PROGRESS NOTES
1452; 1mg Ativan IV administered. Pt /89, . Pt resting in bed quietly, eyes and mouth open, non- responsive to any stimuli. 1455; No change  1500; No change  1502; No change in pt. status.

## 2019-07-29 NOTE — PROGRESS NOTES
Palliative Medicine Consult  [place]: [phone] ([phone])    Patient Name: Pedro Amaya  YOB: 1950    Date of Initial Consult: July 17, 2019  Reason for Consult: Care Decisions  Requesting Provider: Yosi Rodriguez  Primary Care Physician: Vania Serrano MD     SUMMARY:   Pedro Amaya is a 70 y.o. with a past history of Lymphoma (chemo) followed at [facility], HTN, fibromyalgia, muscle atrophy, pulmonary fibrosis, asthma, cataracts, DM, chronic respiratory failure, recurrent pna, sinus infection,  who was admitted on 7/15/2019 from [place] with a diagnosis of PNA, acute on chronic hypoxemic respiratory failure, bleomycin lung toxicity. Pt had 7 rounds of chemo and then developed significant side effects. DNR with Hospice services at the facility [facility]) revoked benefit for hospitalization. Current medical issues leading to Palliative Medicine involvement include: support with care decisions. Social: single, no children, well supported by brother and sister, moved to [place] from [place] in [address] [place] [address] to work as the Director of International Studies at [employer] before transitioning to a position  for [employer], [employer], has not been home in 6 months as she has been in and out of facilities for rehab and care. Interval History:  7/18/19: hypoxia and tachycardia with removal of mask for brief period. Pul to start cellcept with steroids to see if she responds. S/s due to disease progression not pneumonia   Pt declined intubation for respiratory distress if needed. Plans for hi flow today   7/24/19: continues cellcept therapy. Still on hi flow  7/29/29: pt with acute mental change over the weekend. Suspect seizures but eeg neg,  MRI without contrast neg. Catatonic state     PALLIATIVE DIAGNOSES:   1. catatonic stupor  2. Shortness of breath  3. Hypoxemia  4. Feeding difficulties  5. Leg swelling   6. Wheel chair bound     PLAN:   1. Pt seen with family present.  Pt not responding to sternal rub or other stimuli. Catatonic state  2. Pt continues to decline in status  3. Unsure if the pt will survive this admission. Discussed with family that decisions regarding feeding may be needed if the pt does not improve and the goal is to continue aggressive management. 4.   Neuro to follow and oncology re consulted by attending     5. Initial consult note routed to primary continuity provider and/or primary health care team members  6. Communicated plan of care with: Palliative IDT, [facility] Team     GOALS OF CARE / TREATMENT PREFERENCES:     GOALS OF CARE:  Patient/Health Care Proxy Stated Goals: Rehabilitation    TREATMENT PREFERENCES:   Code Status: DNR    Advance Care Planning:  [x] [facility] Interdisciplinary Team has updated the ACP Navigator with Health Care Decision Maker and Patient Capacity    Siblings Glenn Gilliland and Disha Aguilera Planning 7/15/2019   Patient's Healthcare Decision Maker is: -   Primary Decision Maker Name -   Primary Decision Maker Phone Number -   Primary Decision Maker Relationship to Patient -   Confirm Advance Directive None   Patient Would Like to Complete Advance Directive Yes   Does the patient have other document types Do Not Resuscitate       Medical Interventions: Limited additional interventions     Other Instructions:   Artificially Administered Nutrition: No feeding tube     Other:    As far as possible, the palliative care team has discussed with patient / health care proxy about goals of care / treatment preferences for patient. HISTORY:     History obtained from: chart, pt, team    CHIEF COMPLAINT: pt admitted with aforementioned issues    HPI/SUBJECTIVE:    The patient is:   [] Verbal and participatory  [x] Non-participatory due to:    Unresponsive     Clinical Pain Assessment (nonverbal scale for severity on nonverbal patients):   Clinical Pain Assessment  Severity: 0     Activity (Movement): Lying quietly, normal position    Duration: for how long has pt been experiencing pain (e.g., 2 days, 1 month, years)  Frequency: how often pain is an issue (e.g., several times per day, once every few days, constant)     FUNCTIONAL ASSESSMENT:     Palliative Performance Scale (PPS):  PPS: 20       PSYCHOSOCIAL/SPIRITUAL SCREENING:     Palliative IDT has assessed this patient for cultural preferences / practices and a referral made as appropriate to needs (Cultural Services, Patient Advocacy, Ethics, etc.)    Any spiritual / Mandaeism concerns:  [] Yes /  [x] No    Caregiver Burnout:  [] Yes /  [x] No /  [] No Caregiver Present      Anticipatory grief assessment:   [x] Normal  / [] Maladaptive       ESAS Anxiety: Anxiety: 0    ESAS Depression:          REVIEW OF SYSTEMS:     Positive and pertinent negative findings in ROS are noted above in HPI. The following systems were [x] reviewed / [] unable to be reviewed as noted in HPI  Other findings are noted below. Systems: constitutional, ears/nose/mouth/throat, respiratory, gastrointestinal, genitourinary, musculoskeletal, integumentary, neurologic, psychiatric, endocrine. Positive findings noted below. Modified ESAS Completed by: provider   Fatigue: 7 Drowsiness: 0     Pain: 0   Anxiety: 0 Nausea: 0   Anorexia: 5 Dyspnea: 3           Stool Occurrence(s): 1        PHYSICAL EXAM:     From RN flowsheet:  Wt Readings from Last 3 Encounters:   07/29/19 168 lb 6.9 oz (76.4 kg)   02/09/19 238 lb 8.6 oz (108.2 kg)   01/21/19 180 lb (81.6 kg)     Blood pressure (!) 177/96, pulse (!) 120, temperature 98 °F (36.7 °C), resp. rate 26, height 5' 8\" (1.727 m), weight 168 lb 6.9 oz (76.4 kg), SpO2 98 %.     Pain Scale 1: Adult Nonverbal Pain Scale  Pain Intensity 1: 0              Pain Intervention(s) 1: Medication (see MAR)  Last bowel movement, if known:     Constitutional: unresponsive  Eyes: pupils equal, anicteric, eyes remain open without blinking  ENMT: no nasal discharge, moist mucous membranes  Cardiovascular:distal pulses intact, leg swelling  Respiratory: breathing  labored with NC oxygen, symmetric  Gastrointestinal: soft non-tender, +bowel sounds  Musculoskeletal: no deformity, no tenderness to palpation, atrophy   Skin: warm, dry  Neurologic: catatonic   Psychiatric:   Other:       HISTORY:     Principal Problem:    Pneumonia (7/15/2019)    Active Problems:    Chronic hypoxemic respiratory failure ([address] (2019)      Bleomycin lung toxicity (2019)      Diabetes mellitus type 2, controlled ([address] (2016)      Hodgkin lymphoma ([address] (2/3/2019)      Past Medical History:   Diagnosis Date    Asthma     as a child, nothing since menopause    Cancer [facility] 2018    Non-Hodgkin's    Cataracts, bilateral     Diabetes ([address]     type II    Environmental allergies     Fibromyalgia     Treated with alternate medical therapy    Hypertension       Past Surgical History:   Procedure Laterality Date    HX BREAST BIOPSY Right 2006    HX COLONOSCOPY  2015    HX GYN      Pap     HX ORTHOPAEDIC      fractured R ankle/ no surgery    HX OTHER SURGICAL Right 2018    excision of deep neck cervical LN. Family History   Problem Relation Age of Onset    Heart Disease Mother     Cancer Father         prostate cancer    Cancer Sister         Breast cancer apparent DCIS    Heart Disease Brother         Brother  from congestive heart failure secondary to severe mitral disease      History reviewed, no pertinent family history.   Social History     Tobacco Use    Smoking status: Never Smoker    Smokeless tobacco: Never Used   Substance Use Topics    Alcohol use: Yes     Comment: socially     No Known Allergies   Current Facility-Administered Medications   Medication Dose Route Frequency    levETIRAcetam (KEPPRA) 1,000 mg in 0.9% sodium chloride 100 mL IVPB  1,000 mg IntraVENous Q12H    methylPREDNISolone (PF) (SOLU-MEDROL) injection 20 mg  20 mg IntraVENous Q12H    hydrALAZINE (APRESOLINE) tablet 25 mg  25 mg Oral TID    dilTIAZem (CARDIZEM) IR tablet 120 mg  120 mg Oral QID    mycophenolate mofetil (CELLCEPT) capsule 500 mg  500 mg Oral ACB&D    glucose chewable tablet 16 g  4 Tab Oral PRN    dextrose (D50W) injection syrg 12.5-25 g  12.5-25 g IntraVENous PRN    glucagon (GLUCAGEN) injection 1 mg  1 mg IntraMUSCular PRN    insulin lispro (HUMALOG) injection   SubCUTAneous TIDAC    ALPRAZolam (XANAX) tablet 0.25 mg  0.25 mg Oral BID PRN    apixaban (ELIQUIS) tablet 5 mg  5 mg Oral BID    sodium chloride (NS) flush 5-10 mL  5-10 mL IntraVENous PRN    sodium chloride (NS) flush 5-40 mL  5-40 mL IntraVENous Q8H    sodium chloride (NS) flush 5-40 mL  5-40 mL IntraVENous PRN    acetaminophen (TYLENOL) tablet 650 mg  650 mg Oral Q4H PRN    ondansetron (ZOFRAN) injection 4 mg  4 mg IntraVENous Q4H PRN    lactobac ac& pc-s.therm-b.anim (JUNI Q/RISAQUAD)  1 Cap Oral DAILY    atorvastatin (LIPITOR) tablet 40 mg  40 mg Oral QHS          LAB AND IMAGING FINDINGS:     Lab Results   Component Value Date/Time    WBC 18.1 (H) 07/29/2019 06:03 AM    HGB 8.5 (L) 07/29/2019 06:03 AM    PLATELET 443 69/22/5437 06:03 AM     Lab Results   Component Value Date/Time    Sodium 145 07/29/2019 06:03 AM    Potassium 4.0 07/29/2019 06:03 AM    Chloride 106 07/29/2019 06:03 AM    CO2 29 07/29/2019 06:03 AM    BUN 36 (H) 07/29/2019 06:03 AM    Creatinine 1.07 (H) 07/29/2019 06:03 AM    Calcium 9.2 07/29/2019 06:03 AM    Magnesium 2.5 (H) 07/28/2019 12:16 PM    Phosphorus 2.4 (L) 07/22/2019 03:50 AM      Lab Results   Component Value Date/Time    AST (SGOT) 14 (L) 07/23/2019 04:07 AM    Alk.  phosphatase 151 (H) 07/23/2019 04:07 AM    Protein, total 6.6 07/23/2019 04:07 AM    Albumin 2.6 (L) 07/23/2019 04:07 AM    Globulin 4.0 07/23/2019 04:07 AM     Lab Results   Component Value Date/Time    INR 1.4 (H) 07/28/2019 12:16 PM    Prothrombin time 14.1 (H) 07/28/2019 12:16 PM    aPTT 30.2 08/27/2018 08:15 AM      Lab Results   Component Value Date/Time    Iron 24 (L) 07/16/2019 01:24 AM    TIBC 200 (L) 07/16/2019 01:24 AM    Iron % saturation 12 (L) 07/16/2019 01:24 AM      No results found for: PH, PCO2, PO2  No components found for: GLPOC   No results found for: CPK, CKMB             Total time: 45 minutes  Counseling / coordination time, spent as noted above: 30 minutes  > 50% counseling / coordination?: y    Prolonged service was provided for  []30 min   []75 min in face to face time in the presence of the patient, spent as noted above. Time Start:   Time End:   Note: this can only be billed with 54273 (initial) or 41058 (follow up). If multiple start / stop times, list each separately.

## 2019-07-29 NOTE — PROGRESS NOTES
Occupational Therapy: Noted per chart and Physical Therapy note that RN states patient is poorly responsive and not appropriate for therapy. Will continue to follow.   JABIER Anglin/L

## 2019-07-29 NOTE — PROGRESS NOTES
07/29/19 0857   Vital Signs   BP (!) 177/96   MAP (Calculated) 123   Personal recheck following PCT BP check. Informed Dr Ric Joseph, awaiting IV Hydralazine. Will continue to monitor. 1055; Received order for Hydralizine from Dr Ric Joseph, on reassessment of BP the new reading was 169/94, Parameters of PRN are for above 820 systolic. Will continue to monitor.

## 2019-07-29 NOTE — PROCEDURES
[address]   EEG    Name:  Perla Engle  MR#:  [id number]  :  1950  ACCOUNT #:  [de-identified]  DATE OF SERVICE:  2019      REQUESTING PHYSICIAN:  Kev Saavedra MD    HISTORY:  The patient is a 67-year-old female who is being evaluated for altered mental status. DESCRIPTION:  This is an 18-channel EEG performed on an awake but nonresponsive patient. The dominant posterior background rhythm consists of medium voltage rhythms ranging from 7-8 Hz frequency range out of the posterior head region. Faster frequencies and muscle tension artifact was seen during most part of this recording. Stimulation of the patient produced more muscle artifact and reactivity of the background rhythms. Photic stimulation elicits a symmetric driving response. Hyperventilation was not performed. EEG SUMMARY:  Study within normal limits. CLINICAL INTERPRETATION:  This EEG represents normal wakeful state. No clearly lateralizing or epileptiform features were noted. No seizure was recorded.         Demetria Dutton MD      [id number]  D:  2019 8:54  T:  2019 8:58  JOB #:  [id number]

## 2019-07-29 NOTE — PROGRESS NOTES
07/29/19 0756   Vital Signs   Temp 98 °F (36.7 °C)   Temp Source Axillary   Pulse (Heart Rate) (!) 120   Heart Rate Source Monitor   Resp Rate 26   O2 Sat (%) 98 %   Level of Consciousness (!) Unresponsive   BP (!) 187/97   MAP (Calculated) 127   BP 1 Method Automatic   BP 1 Location Right arm   BP Patient Position At rest   MEWS Score 7   Will contact MD. Dr Aida Kumar made aware, she will order hydralazine, no new active orders at this time.

## 2019-07-29 NOTE — PROGRESS NOTES
Neurology Progress Note    Patient ID:  Nazario Del Castillo  [id number]  70 y.o.  1950    HISTORY OF PRESENT ILLNESS:  The patient is a 16-year-old female with history of Hodgkin's lymphoma who is currently admitted for acute on chronic hypoxemic respiratory failure suspected to be due to bleomycin toxicity and autoimmune etiology. She is PEACE positive and has elevated ESR and C-reactive protein. Other medical issues include diabetes, hypertension. She was awake and took her pills earlier this morning and around noon today, she went unresponsive and per RN, she was noted to have fluttering movements of her eyes. A rapid response team was called initially, but then it was changed to code stroke. She was evaluated by Teleneurology and was started on Keppra empirically. She is awaiting MRI scan of the brain. She does not have any ongoing eye fluttering, but has remained unresponsive, but awake. She has weakness in both lower extremities at baseline and does not ambulate. She does have a use of her arms and some tremor was noted in the arms by staff earlier today. Detailed review of systems was difficult. She did have blood gases tested and they were unremarkable. She did drop her saturations briefly prior to the episode with oxygen saturation at around 84% per family. Subjective:   Sister at the bedside. Sister reports that 12:00 AM she was more responsive able to follow commands, able to lift her hands. She reports that around 130am shewent back into this unresponsive state. She is currently getting Keppra 500 mg twice daily. MRI unremarkable. EEG unremarkable. Objective:    All records in [facility] reviewed and noted    ROS:  Unable to obtain     Meds:  Current Facility-Administered Medications   Medication Dose Route Frequency    methylPREDNISolone (PF) (SOLU-MEDROL) injection 40 mg  40 mg IntraVENous Q8H    hydrALAZINE (APRESOLINE) 20 mg/mL injection 10 mg  10 mg IntraVENous Q6H PRN    levETIRAcetam (KEPPRA) 1,000 mg in 0.9% sodium chloride 100 mL IVPB  1,000 mg IntraVENous Q12H    hydrALAZINE (APRESOLINE) tablet 25 mg  25 mg Oral TID    dilTIAZem (CARDIZEM) IR tablet 120 mg  120 mg Oral QID    mycophenolate mofetil (CELLCEPT) capsule 500 mg  500 mg Oral ACB&D    glucose chewable tablet 16 g  4 Tab Oral PRN    dextrose (D50W) injection syrg 12.5-25 g  12.5-25 g IntraVENous PRN    glucagon (GLUCAGEN) injection 1 mg  1 mg IntraMUSCular PRN    insulin lispro (HUMALOG) injection   SubCUTAneous TIDAC    ALPRAZolam (XANAX) tablet 0.25 mg  0.25 mg Oral BID PRN    sodium chloride (NS) flush 5-10 mL  5-10 mL IntraVENous PRN    sodium chloride (NS) flush 5-40 mL  5-40 mL IntraVENous Q8H    sodium chloride (NS) flush 5-40 mL  5-40 mL IntraVENous PRN    acetaminophen (TYLENOL) tablet 650 mg  650 mg Oral Q4H PRN    ondansetron (ZOFRAN) injection 4 mg  4 mg IntraVENous Q4H PRN    lactobac ac& pc-s.therm-b.anim (JUNI Q/RISAQUAD)  1 Cap Oral DAILY    atorvastatin (LIPITOR) tablet 40 mg  40 mg Oral QHS       Imaging:      Lab Review   Recent Results (from the past 24 hour(s))   GLUCOSE, POC    Collection Time: 07/28/19 12:02 PM   Result Value Ref Range    Glucose (POC) 158 (H) 65 - 100 mg/dL    Performed by Shaniqua Sellers    POC G3 - PUL    Collection Time: 07/28/19 12:09 PM   Result Value Ref Range    FIO2 (POC) 1.0 %    pH (POC) 7.488 (H) 7.35 - 7.45      pCO2 (POC) 41.6 35.0 - 45.0 MMHG    pO2 (POC) 143 (H) 80 - 100 MMHG    HCO3 (POC) 31.6 (H) 22 - 26 MMOL/L    sO2 (POC) 99 (H) 92 - 97 %    Base excess (POC) 8 mmol/L    Site LEFT RADIAL      Device: Non rebreather      Flow rate (POC) 15 L/M    Allens test (POC) YES      Specimen type (POC) ARTERIAL      Total resp.  rate 24     METABOLIC PANEL, BASIC    Collection Time: 07/28/19 12:16 PM   Result Value Ref Range    Sodium 142 136 - 145 mmol/L    Potassium 3.9 3.5 - 5.1 mmol/L    Chloride 104 97 - 108 mmol/L    CO2 30 21 - 32 mmol/L    Anion gap 8 5 - 15 mmol/L    Glucose 164 (H) 65 - 100 mg/dL    BUN 39 (H) 6 - 20 MG/DL    Creatinine 1.14 (H) 0.55 - 1.02 MG/DL    BUN/Creatinine ratio 34 (H) 12 - 20      GFR est AA 57 (L) >60 ml/min/1.73m2    GFR est non-AA 47 (L) >60 ml/min/1.73m2    Calcium 9.7 8.5 - 10.1 MG/DL   TROPONIN I    Collection Time: 07/28/19 12:16 PM   Result Value Ref Range    Troponin-I, Qt. <0.05 <0.05 ng/mL   PROTHROMBIN TIME + INR    Collection Time: 07/28/19 12:16 PM   Result Value Ref Range    INR 1.4 (H) 0.9 - 1.1      Prothrombin time 14.1 (H) 9.0 - 11.1 sec   MAGNESIUM    Collection Time: 07/28/19 12:16 PM   Result Value Ref Range    Magnesium 2.5 (H) 1.6 - 2.4 mg/dL   AMMONIA    Collection Time: 07/28/19 12:24 PM   Result Value Ref Range    Ammonia <10 <32 UMOL/L   LACTIC ACID    Collection Time: 07/28/19 12:25 PM   Result Value Ref Range    Lactic acid 1.2 0.4 - 2.0 MMOL/L   GLUCOSE, POC    Collection Time: 07/28/19  2:03 PM   Result Value Ref Range    Glucose (POC) 178 (H) 65 - 100 mg/dL    Performed by Mars Oneill    GLUCOSE, POC    Collection Time: 07/28/19  4:34 PM   Result Value Ref Range    Glucose (POC) 150 (H) 65 - 100 mg/dL    Performed by Carol CARRASCO    GLUCOSE, POC    Collection Time: 07/28/19  9:49 PM   Result Value Ref Range    Glucose (POC) 131 (H) 65 - 100 mg/dL    Performed by Tita Pride    POC G3 - PUL    Collection Time: 07/29/19  2:51 AM   Result Value Ref Range    FIO2 (POC) 100 %    pH (POC) 7.438 7.35 - 7.45      pCO2 (POC) 50.4 (H) 35.0 - 45.0 MMHG    pO2 (POC) 185 (H) 80 - 100 MMHG    HCO3 (POC) 34.1 (H) 22 - 26 MMOL/L    sO2 (POC) 100 (H) 92 - 97 %    Base excess (POC) 10 mmol/L    Site RIGHT RADIAL      Device: Non rebreather      Flow rate (POC) 15 L/M    Allens test (POC) YES      Specimen type (POC) ARTERIAL      Total resp.  rate 14     GLUCOSE, POC    Collection Time: 07/29/19  3:04 AM   Result Value Ref Range    Glucose (POC) 140 (H) 65 - 100 mg/dL    Performed by Joe Rodriguez    [facility] WITH AUTOMATED DIFF    Collection Time: 07/29/19  6:03 AM   Result Value Ref Range    WBC 18.1 (H) 3.6 - 11.0 K/uL    RBC 2.88 (L) 3.80 - 5.20 M/uL    HGB 8.5 (L) 11.5 - 16.0 g/dL    HCT 28.5 (L) 35.0 - 47.0 %    MCV 99.0 80.0 - 99.0 FL    MCH 29.5 26.0 - 34.0 PG    MCHC 29.8 (L) 30.0 - 36.5 g/dL    RDW 14.9 (H) 11.5 - 14.5 %    PLATELET 084 512 - 312 K/uL    MPV 11.0 8.9 - 12.9 FL    NRBC 0.0 0  WBC    ABSOLUTE NRBC 0.00 0.00 - 0.01 K/uL    NEUTROPHILS 95 (H) 32 - 75 %    LYMPHOCYTES 2 (L) 12 - 49 %    MONOCYTES 2 (L) 5 - 13 %    EOSINOPHILS 0 0 - 7 %    BASOPHILS 0 0 - 1 %    METAMYELOCYTES 1 (H) 0 %    IMMATURE GRANULOCYTES 0 %    ABS. NEUTROPHILS 17.2 (H) 1.8 - 8.0 K/UL    ABS. LYMPHOCYTES 0.4 (L) 0.8 - 3.5 K/UL    ABS. MONOCYTES 0.4 0.0 - 1.0 K/UL    ABS. EOSINOPHILS 0.0 0.0 - 0.4 K/UL    ABS. BASOPHILS 0.0 0.0 - 0.1 K/UL    ABS. IMM.  GRANS. 0.0 K/UL    DF MANUAL      PLATELET COMMENTS Large Platelets      RBC COMMENTS ANISOCYTOSIS  1+        RBC COMMENTS MACROCYTOSIS  1+        RBC COMMENTS OVALOCYTES  PRESENT       METABOLIC PANEL, BASIC    Collection Time: 07/29/19  6:03 AM   Result Value Ref Range    Sodium 145 136 - 145 mmol/L    Potassium 4.0 3.5 - 5.1 mmol/L    Chloride 106 97 - 108 mmol/L    CO2 29 21 - 32 mmol/L    Anion gap 10 5 - 15 mmol/L    Glucose 157 (H) 65 - 100 mg/dL    BUN 36 (H) 6 - 20 MG/DL    Creatinine 1.07 (H) 0.55 - 1.02 MG/DL    BUN/Creatinine ratio 34 (H) 12 - 20      GFR est AA >60 >60 ml/min/1.73m2    GFR est non-AA 51 (L) >60 ml/min/1.73m2    Calcium 9.2 8.5 - 10.1 MG/DL   GLUCOSE, POC    Collection Time: 07/29/19  6:40 AM   Result Value Ref Range    Glucose (POC) 167 (H) 65 - 100 mg/dL    Performed by Loreto Castillo        Exam:  Visit Vitals  BP (!) 177/96   Pulse (!) 120   Temp 98 °F (36.7 °C)   Resp 26   Ht 5' 8\" (1.727 m)   Wt 76.4 kg (168 lb 6.9 oz)   SpO2 98%   BMI 24.87 kg/m²     Gen:  CV: RRR  Lungs: non labored breathing  Abd: non distending  Neuro:unreponsive   CN II-XII:  face symmetric, tongue/palate midline  ,Motor: no following commands , atrophy bilateral legs. Foot droop, 1+ LE- DTR   Gait: deferred     Assessment:     Patient Active Problem List   Diagnosis Code    SVT (supraventricular tachycardia) ([facility]) I47.1    Essential hypertension with goal blood pressure less than 140/90 I10    Diabetes mellitus type 2, controlled ([facility] [address] E11.9    Dyslipidemia E78.5    Overweight (BMI 25.0-29. 9) E66.3    Supraclavicular mass R22.2    Lymphadenopathy R59.1    Acute respiratory failure ([facility]) J96.00    Leg swelling M79.89    Hodgkin lymphoma ([facility]) C81.90    CAP (community acquired pneumonia) J18.9    Pneumonia J18.9    Chronic hypoxemic respiratory failure ([facility]) J96.11    Bleomycin lung toxicity J98.4, T45.1X5A     Plan:   58-year-old female who had acute changes of mental status which she became unresponsive. She this seems to be more so an catatonic state. Differentials are quite broad which includes seizures and possibly still in a post ictal state. However recent EEG was unremarkable. We will proceed with a 24-hour EEG giving that her symptoms are still present. We will also get an MRI with contrast to rule out any inflammatory causes. Catatonic state can also be related to a psychiatric cause we will do a challenge of lorazepam 1 mg to see if she responds, this will confirm catatonia. Partial, temporary relief of signs 5 to 10 minutes after IV administration of lorazepam is consistent with a diagnosis of catatonia. If all comes back unremarkable and patient continues to stay in this catatonic state will move forward with an LP. She is currently on Eliquis so this will need to be held.     Signed:  Taylor Jerome NP  7/29/2019  11:38 AM

## 2019-07-29 NOTE — PROGRESS NOTES
07/29/19 1140   Vital Signs   Temp 98.3 °F (36.8 °C)   Temp Source Axillary   Pulse (Heart Rate) (!) 117   Heart Rate Source Monitor   Resp Rate 29   O2 Sat (%) 100 %   Level of Consciousness (!) Unresponsive   BP (!) 169/94   MAP (Calculated) 119   BP 1 Method Automatic   BP 1 Location Right arm   BP Patient Position At rest   MEWS Score 7   MD aware, no new orders at this time. BP below PRN limit for treatment, remains tachycardic, will continue to monitor.

## 2019-07-29 NOTE — PROGRESS NOTES
Pulmonary, Critical Care, and Sleep Medicine~Progress Note    Name: Agnes Cordova MRN: [id number]   : 1950 Hospital: [facility] [address]   Date: 2019 11:15 AM Admission: 7/15/2019     Impression Plan   1. Acute on chronic (on 4lpm normally) hypoxic resp failure secondary to below  2. Abnormal CT scan: HCAP possibly (treated), query more bleomycin lung toxicity, now with lack of response. Other concerns were pulmonary edema, but she is quite dried out now  3. Hx of lymphoma. Not on therapy since the beginning of the year. 4. Hx of PE and was on eliquis   5. Hx of asthma  6. Reviewed [facility] records. She was seen last there on 19. Early  cellcept was stopped and at that appointment she was scheduled to taper off of steroids, which she did. Apparently she was on hospice care, but I am unclear as to the details; per patient it was rescinded    7. AMS~ per neurology  1. Strep/legionella negative  2. ECHO; PASP 60mmHg   3. O2 titration above 90%, on high flow. 20lpm and 94% FiO2. I reduced to 15lpm and hopefully she can be transitioned to midflow today; I discussed with the RN. Please continue to wean. 4. autoimmune labs mostly negative, but did have a + PEACE with elevated CRP and ESR; query coexisting autoimmune process. Once discharge would recommend rheumatology follow up  5. Now with mental status change we will need to increase IV steroids as to not loose ground, because she cannot take PO now to continue cellcept. Discussed with family and they agree. 6. All questions asked of me were answered to the best of my ability and their apparent satisfaction   7. High risk for rapid decline   8. Discussed with attending        Daily Progression:      FiO2 requirements are remarkably down.  chest film shows no worsening of pulmonary infiltrates. Dramatic change in mental status yesterday with unclear etiology.  Seizures vs CVA, but all testing thus far is negative 7/26  On 15lpm; theoretically can be switched to midflow, but wean you talk about it she gets anxious and her saturations drop. I discussed with RN; suggest doing one hour on midlflow and one hour on highflow during the day to get her use to it.     7/25  She looks so weak, but states she feels better. About to work with PT. Still +.     7/24  Noted question of lung transplant query; I think recent malignancy would not allow her to be placed on the list. But since she is followed at [facility] they could assisted better. She feels better today     7/23  Looks stronger  Desats quickly with movement, but stabilizes; this is to be expected with her chronic lung disease  Weight gain over night? Agree with diuretics  Agree with getting up and out of bed as much as possible      7/22  Creatinine improving   Was increased in Flow and FiO2, unclear why as she is feeling better   Chest film today looks fairly similar     7/18   Not much different. We are starting her on high flow now     7/17  Feels possibly a little better. proBNP elevated today     7/16  Consult Note requested by Dr Brendon Reynolds. Patient presented for admission secondary to a one week course of cough, subjective fevers, and malasie. Outpatient PCP starte zpak and she did not have bernadette improvement. No other symptoms reported on this recent course. 7/15/19 CT scan did showed worsening right great than left infiltrates with interlobular septal thickening; no PE. BNP was 534, not very elevated. This past Jan 2019 she was dx with hodgkin's lymphoma. Was on blecyomcin and developed a pneumonitis associated with it. Was started on cellcept 500mg bid and prednisone. Followed in the office at [facility] in April, however transferred her care to [facility] and saw Dr Cynthia Enrique (sp?). Her last visit with them was this past June and at that visit she was weaned off of the prednisone and cellcept. We do not have those records at this time.       I have reviewed the labs and previous days notes. Pertinent items are noted in HPI. Past Medical History:   Diagnosis Date    Asthma     as a child, nothing since menopause    Cancer ([facility] [address] 08/21/2018    Non-Hodgkin's    Cataracts, bilateral     Diabetes ([facility] [address]     type II    Environmental allergies     Fibromyalgia     Treated with alternate medical therapy    Hypertension       Past Surgical History:   Procedure Laterality Date    HX BREAST BIOPSY Right 2006    HX COLONOSCOPY  2015    HX GYN      Pap 2015    HX ORTHOPAEDIC      fractured R ankle/ no surgery    HX OTHER SURGICAL Right 08/27/2018    excision of deep neck cervical LN. Prior to Admission medications    Medication Sig Start Date End Date Taking? Authorizing Provider   acetaminophen (TYLENOL) 650 mg suppository Insert 650 mg into rectum every four (4) hours as needed for Pain. Yes Provider, Historical   amLODIPine (NORVASC) 5 mg tablet Take 5 mg by mouth daily. Yes Provider, Historical   trimethoprim-sulfamethoxazole (BACTRIM DS) 160-800 mg per tablet Take 1 Tab by mouth every Monday, Wednesday, Friday. Yes Provider, Historical   metFORMIN (GLUCOPHAGE) 500 mg tablet Take 500 mg by mouth two (2) times daily (with meals). Indications: Per [address] 3/30/19  Yes Provider, Historical   levalbuterol (XOPENEX) 1.25 mg/0.5 mL nebu 1.25 mg by Nebulization route every four (4) hours as needed. Indications: Per [address] 3/30/19  Yes Provider, Historical   acetaminophen (TYLENOL ARTHRITIS PAIN) 650 mg TbER Take 650 mg by mouth as needed (Q 6hrs PRN). 3/30/19  Yes Provider, Historical   apixaban (ELIQUIS) 5 mg tablet Take 1 Tab by mouth every twelve (12) hours. 3/13/19  Yes Kayleigh Mendieta MD   atorvastatin (LIPITOR) 40 mg tablet TAKE 1 TAB BY MOUTH DAILY. 12/3/17  Yes Kayleigh Mendieta MD   Oxygen 3 Devices by Nasal route as needed.  Indications: SOB PRN    Provider, Historical     No Known Allergies   Social History     Tobacco Use    Smoking status: Never Smoker    Smokeless tobacco: Never Used   Substance Use Topics    Alcohol use: Yes     Comment: socially      Family History   Problem Relation Age of Onset    Heart Disease Mother     Cancer Father         prostate cancer    Cancer Sister         Breast cancer apparent DCIS    Heart Disease Brother         Brother  from congestive heart failure secondary to severe mitral disease       OBJECTIVE:     Vital Signs:       Visit Vitals  BP (!) 177/96   Pulse (!) 120   Temp 98 °F (36.7 °C)   Resp 26   Ht 5' 8\" (1.727 m)   Wt 76.4 kg (168 lb 6.9 oz)   SpO2 98%   BMI 24.87 kg/m²      Temp (24hrs), Av.6 °F (36.4 °C), Min:97.3 °F (36.3 °C), Max:98 °F (36.7 °C)     Intake/Output:     Last shift: No intake/output data recorded.     Last 3 shifts:  1901 -  0700  In: 195 [I.V.:195]  Out: 1900 [Urine:1900]          Intake/Output Summary (Last 24 hours) at 2019 1023  Last data filed at 2019 0400  Gross per 24 hour   Intake 195 ml   Output 1400 ml   Net -1205 ml       Physical Exam:                                        Exam Findings Other   General: No resp distress noted, appears stated age    [de-identified]:  No ulcers, JVD not elevated, no cervical LAD    Chest: No pectus deformity, normal chest rise b/l    HEART:  RRR, no murmurs/rubs/gallops    Lungs:  Notable crackles, diminished BS at bases    ABD: Soft/NT, non rigid mildly distended    EXT: No cyanosis/clubbing/edema, normal peripheral pulses    Skin: No rashes or ulcers, no mottling    Neuro: Unresponsive         Medications:  Current Facility-Administered Medications   Medication Dose Route Frequency    levETIRAcetam (KEPPRA) 1,000 mg in 0.9% sodium chloride 100 mL IVPB  1,000 mg IntraVENous Q12H    methylPREDNISolone (PF) (SOLU-MEDROL) injection 20 mg  20 mg IntraVENous Q12H    hydrALAZINE (APRESOLINE) tablet 25 mg  25 mg Oral TID    dilTIAZem (CARDIZEM) IR tablet 120 mg  120 mg Oral QID    mycophenolate mofetil (CELLCEPT) capsule 500 mg  500 mg Oral ACB&D    glucose chewable tablet 16 g  4 Tab Oral PRN    dextrose (D50W) injection syrg 12.5-25 g  12.5-25 g IntraVENous PRN    glucagon (GLUCAGEN) injection 1 mg  1 mg IntraMUSCular PRN    insulin lispro (HUMALOG) injection   SubCUTAneous TIDAC    ALPRAZolam (XANAX) tablet 0.25 mg  0.25 mg Oral BID PRN    apixaban (ELIQUIS) tablet 5 mg  5 mg Oral BID    sodium chloride (NS) flush 5-10 mL  5-10 mL IntraVENous PRN    sodium chloride (NS) flush 5-40 mL  5-40 mL IntraVENous Q8H    sodium chloride (NS) flush 5-40 mL  5-40 mL IntraVENous PRN    acetaminophen (TYLENOL) tablet 650 mg  650 mg Oral Q4H PRN    ondansetron (ZOFRAN) injection 4 mg  4 mg IntraVENous Q4H PRN    lactobac ac& pc-s.therm-b.anim (JUNI Q/RISAQUAD)  1 Cap Oral DAILY    atorvastatin (LIPITOR) tablet 40 mg  40 mg Oral QHS       Labs:  ABG Recent Labs     07/29/19  0251 07/28/19  1209   PHI 7.438 7.488*   PCO2I 50.4* 41.6   PO2I 185* 143*   HCO3I 34.1* 31.6*   SO2I 100* 99*   FIO2I 100 1.0        CBC Recent Labs     07/29/19  0603 07/28/19  0909 07/27/19  0343   WBC 18.1* 15.0* 17.3*   HGB 8.5* 8.4* 8.8*   HCT 28.5* 27.6* 27.7*    368 435*   MCV 99.0 96.2 92.3   MCH 29.5 29.3 78.9        Metabolic  Panel Recent Labs     07/29/19  0603 07/28/19  1216 07/28/19  0909    142 144   K 4.0 3.9 4.0    104 106   CO2 29 30 34*   * 164* 140*   BUN 36* 39* 37*   CREA 1.07* 1.14* 1.17*   CA 9.2 9.7 9.6   MG  --  2.5* 2.6*   INR  --  1.4*  --         Pertinent Labs                Dheeraj Varner PA  7/29/2019

## 2019-07-30 NOTE — PROGRESS NOTES
1800; EEG technician paged. Informed of order for EEG placement. Will come tonight, per EEG technician. 2015:Bedside shift change report given to [facility] Anastacia Acuña (oncoming nurse) by Mirian Jiménez (offgoing nurse). Report included the following information SBAR, Kardex, MAR, Accordion, Cardiac Rhythm Sinus Tachycardia, Alarm Parameters , Procedure Verification and Quality Measures.  \

## 2019-07-30 NOTE — PROGRESS NOTES
Hematology-Oncology Progress Note      Tasha Sandoval  1950  [id number]  7/30/2019      Subjective:     Episode of increased level of arousal last night, by report, following commands. Now, not responsive, does not track. Sister at bedside. MRI with results noted. Allergies: Patient has no known allergies.   Current Facility-Administered Medications   Medication Dose Route Frequency Provider Last Rate Last Dose    methylPREDNISolone (PF) (SOLU-MEDROL) injection 40 mg  40 mg IntraVENous Q8H Dheeraj Vallejo PA   40 mg at 07/30/19 0832    metoprolol (LOPRESSOR) injection 2.5 mg  2.5 mg IntraVENous BID Donta Alonso MD   2.5 mg at 07/30/19 7969    heparin (porcine) injection 5,000 Units  5,000 Units SubCUTAneous Q8H Donta Alonso MD   5,000 Units at 07/30/19 0547    levETIRAcetam (KEPPRA) 1,000 mg in 0.9% sodium chloride 100 mL IVPB  1,000 mg IntraVENous Q12H Donta Alonso MD   1,000 mg at 07/30/19 0131    mycophenolate mofetil (CELLCEPT) capsule 500 mg  500 mg Oral ACB&D Dheeraj Persaud PA   Stopped at 07/28/19 1603    glucose chewable tablet 16 g  4 Tab Oral PRN Easton Guzman MD        dextrose (D50W) injection syrg 12.5-25 g  12.5-25 g IntraVENous PRN Easton Guzman MD        glucagon [facility]) injection 1 mg  1 mg IntraMUSCular PRN Easton Guzman MD        insulin lispro (HUMALOG) injection   SubCUTAneous [facility] Easton AMEZCUA MD   4 Units at 07/30/19 1744    ALPRAZolam [facility]) tablet 0.25 mg  0.25 mg Oral BID PRN Grupo Uamnzor MD   0.25 mg at 07/27/19 1454    sodium chloride (NS) flush 5-10 mL  5-10 mL IntraVENous PRN Sony Archuleta MD        sodium chloride (NS) flush 5-40 mL  5-40 mL IntraVENous Q8H Jaylon Sesay MD   10 mL at 07/30/19 0554    sodium chloride (NS) flush 5-40 mL  5-40 mL IntraVENous PRN Dena Gama MD        acetaminophen (TYLENOL) tablet 650 mg  650 mg Oral Q4H PRN Dena Gama MD        ondansetron [facility]) injection 4 mg  4 mg IntraVENous Q4H PRN Jo Ann Jiménez MD        lactobac ac& pc-s.therm-b.anim (JUNI Q/RISAQUAD)  1 Cap Oral DAILY Jo Ann Jiménez MD   Stopped at 07/29/19 0900    atorvastatin (LIPITOR) tablet 40 mg  40 mg Oral QHS Jo Ann Jiménez MD   Stopped at 07/28/19 2200     Objective:     Patient Vitals for the past 24 hrs:   BP Temp Pulse Resp SpO2 Weight   07/30/19 0833     100 %    07/30/19 0749 141/76 98.5 °F (36.9 °C) (!) 108 19 100 %    07/30/19 0735 141/76 98.1 °F (36.7 °C) (!) 109 24 100 %    07/30/19 0433 135/82 98.1 °F (36.7 °C) 92 16 100 % 75.9 kg (167 lb 4.8 oz)   07/30/19 0000 132/72 97.2 °F (36.2 °C) 87 15 100 %    07/29/19 2000 119/68 97.4 °F (36.3 °C) 85 14 100 %    07/29/19 1744 137/74  94      07/29/19 1728 115/70 97.3 °F (36.3 °C) 96 16 100 %    07/29/19 1555 137/80 97.4 °F (36.3 °C) (!) 101 17 100 %    07/29/19 1330 135/67  (!) 105 25 100 %    07/29/19 1322   (!) 104 26 100 %    07/29/19 1321   (!) 105 25 100 %    07/29/19 1320   (!) 104 24 100 %    07/29/19 1319   (!) 102 25 100 %    07/29/19 1318   (!) 102 27 100 %    07/29/19 1317   (!) 104 29 100 %    07/29/19 1316   (!) 104 27 100 %    07/29/19 1315 159/78  (!) 104 (!) 31 100 %    07/29/19 1314   (!) 106 (!) 31 100 %    07/29/19 1313   (!) 104 28 100 %    07/29/19 1312   (!) 105 25 100 %    07/29/19 1311   (!) 106 28 100 %    07/29/19 1310   (!) 105 30 100 %    07/29/19 1309 (!) 160/100  (!) 106 30 100 %    07/29/19 1308 (!) 160/100  (!) 105 24 100 %    07/29/19 1307   (!) 105 (!) 36 100 %    07/29/19 1306   (!) 105 25 100 %    07/29/19 1305   (!) 105 (!) 44 100 %    07/29/19 1304   (!) 106 25 100 %    07/29/19 1303   (!) 105 29 100 %    07/29/19 1302   (!) 106 28 100 %    07/29/19 1300 (!) 184/102  (!) 104 23 100 %    07/29/19 1259 (!) 184/102  (!) 105 24 100 %    07/29/19 1258   (!) 105 22 100 %    07/29/19 1257   (!) 105 24 100 %  07/29/19 1256   (!) 104 22 100 %    07/29/19 1255   (!) 105 28 100 %    07/29/19 1254   (!) 103 24 100 %    07/29/19 1253   (!) 105 27 100 %    07/29/19 1252   (!) 103 23 100 %    07/29/19 1251   (!) 104 26 100 %    07/29/19 1250   (!) 104 27 100 %    07/29/19 1249   (!) 103 25 100 %    07/29/19 1248   (!) 103 17 100 %    07/29/19 1247   (!) 104 23 100 %    07/29/19 1246   (!) 103 27 100 %    07/29/19 1245   (!) 101 27 100 %    07/29/19 1244   (!) 102 25 100 %    07/29/19 1243   (!) 101 23 100 %    07/29/19 1242   (!) 101 20 100 %    07/29/19 1241   (!) 101 25 100 %    07/29/19 1240   (!) 101 21 100 %    07/29/19 1239   (!) 101 22 100 %    07/29/19 1238   100 22 100 %    07/29/19 1237   (!) 102 22 100 %    07/29/19 1236   (!) 101 23 100 %    07/29/19 1235   (!) 102 24 100 %    07/29/19 1234   (!) 102 21 100 %    07/29/19 1233 (!) 170/111  (!) 112 22 100 %    07/29/19 1233 (!) 170/111  (!) 102 23 100 %    07/29/19 1230   (!) 102 22 100 %    07/29/19 1229 (!) 192/96 97.5 °F (36.4 °C) (!) 107 25 100 %    07/29/19 1226 (!) 192/96 97.5 °F (36.4 °C) (!) 107 25 100 %    07/29/19 1215 (!) 212/114  (!) 116 (!) 33 99 %    07/29/19 1145 (!) 169/94  (!) 121 (!) 31 99 %    07/29/19 1140 (!) 169/94 98.3 °F (36.8 °C) (!) 117 29 100 %    07/29/19 1000   (!) 123 23 97 %    07/29/19 0916 177/87        07/29/19 0857 (!) 177/96            Gen: not responsive  HEENT: PERRL, Sclerae anicteric  Cv: RRR without m/r/g  Pulm: CTA bilaterally  Abd: NABS, NTND, No HSM  Ext: No c/c/e    Available labs reviewed:  Labs:    Recent Results (from the past 24 hour(s))   GLUCOSE, POC    Collection Time: 07/29/19 11:42 AM   Result Value Ref Range    Glucose (POC) 155 (H) 65 - 100 mg/dL    Performed by Devon Leo    POC G3 - PUL    Collection Time: 07/29/19 12:27 PM   Result Value Ref Range    pH (POC) 7.491 (H) 7.35 - 7.45      pCO2 (POC) 40.5 35.0 - 45.0 MMHG    pO2 (POC) 72 (L) 80 - 100 MMHG    HCO3 (POC) 30.9 (H) 22 - 26 MMOL/L    sO2 (POC) 95 92 - 97 %    Base excess (POC) 8 mmol/L    Site RIGHT RADIAL      Device: High Flow Nasal Cannula      Flow rate (POC) 8 L/M    Allens test (POC) YES      Specimen type (POC) ARTERIAL      Total resp. rate 24     EKG, 12 LEAD, INITIAL    Collection Time: 07/29/19 12:28 PM   Result Value Ref Range    Ventricular Rate 103 BPM    Atrial Rate 103 BPM    P-R Interval 156 ms    QRS Duration 78 ms    Q-T Interval 372 ms    QTC Calculation (Bezet) 487 ms    Calculated P Axis 37 degrees    Calculated R Axis -28 degrees    Calculated T Axis 25 degrees    Diagnosis       Sinus tachycardia  Minimal voltage criteria for LVH, may be normal variant  Cannot rule out Anterior infarct , age undetermined  Abnormal ECG  When compared with ECG of 15-JUL-2019 10:30,  No significant change was found  Confirmed by Laveta Cushing MD. ([id number]) on 7/30/2019 8:48:59 AM     GLUCOSE, POC    Collection Time: 07/29/19  5:27 PM   Result Value Ref Range    Glucose (POC) 175 (H) 65 - 100 mg/dL    Performed by Charles House, POC    Collection Time: 07/29/19  9:14 PM   Result Value Ref Range    Glucose (POC) 152 (H) 65 - 100 mg/dL    Performed by Elliott Bateman    GLUCOSE, POC    Collection Time: 07/30/19  6:46 AM   Result Value Ref Range    Glucose (POC) 207 (H) 65 - 100 mg/dL    Performed by Shana Brooks and Plan     69 y/o woman with a h/o classical Hodgkin's lymphoma, s/p 3 1/2 cycles of ABVD, truncated due to severe bleomycin toxicity in the lung. Now admitted with \"pneumonia\", asked to see following acute mental status change. Currently undergoing work-up by Neurology. 1. AMS change/h/o Hodgkin's lymphoma: I spoke with her sister about the possibility of CNS involvement and the possible need to do a lumbar puncture for diagnostic purposes.  She explains she spoke with her brother and they did \"not want to put her through that\". Has not had a biopsy of her lung infiltrates. It is unclear to me what the cause of her mental status change is. Will continue to follow, but from a distance. If her family did consent to a LP and a recurrence was found, she would not be a candidate for even palliative chemotherapy with her current mental status. Thank you for allowing us to participate in the care of this very pleasant patient.     Clarita Natarajan MD  Hematology/Oncology  Phone [phone]

## 2019-07-30 NOTE — PROGRESS NOTES
Spoke with nurse. Patient is unresponsive and not appropriate for therapy at this time. Will continue to check on, but may sign off later in the week if status does not improve.     Maria De Jesus Gamez MS, OTR/L

## 2019-07-30 NOTE — PROGRESS NOTES
Speech pathology note  Reviewed chart and note patient continues to decline with poor long term prognosis. Patient only intermittently minimally responsive, however mostly unresponsive. Will sign off at this time, however if patient's mental status improves and there is concern for her oropharyngeal swallow function, please re-consult SLP. Of note, patient with no oropharyngeal dysphagia prior to decline in mental status, but she was at risk for aspiration secondary to tenuous respiratory status. Thank you.     Johnna Pacheco., CCC-SLP

## 2019-07-30 NOTE — PROGRESS NOTES
Spiritual Care Partner Volunteer visited patient in room 416 on 7.30.19. Documented by: : Rev. Destini Downs.  Gunner Jones; [facility], to contact [phone] [address] call: [phone]

## 2019-07-30 NOTE — PROGRESS NOTES
Problem: Pneumonia: Day 4  Goal: Off Pathway (Use only if patient is Off Pathway)  Outcome: Progressing Towards Goal   During bedside report pt opening eyes a little to RNs voice, pt able to lift arms bilaterally and  RNs hands. Pt unresponsive overnight. With RNs shift assessment, pt unresponsive again. No eye opening or movement to any stimulus. MDs aware of pts condition. Will continue to monitor pts level of consciousness. Bedside shift change report given to Gokul Thurman RN (oncoming nurse) by Keagan Dowell RN (offgoing nurse). Report included the following information SBAR, Kardex, ED Summary, Procedure Summary, Intake/Output, MAR, Accordion, Recent Results, Med Rec Status, Cardiac Rhythm NSR and Alarm Parameters .

## 2019-07-30 NOTE — PROGRESS NOTES
Hospital Progress Note    NAME:  Rosaura Santana   :   1950   MRN:  [id number]     Date/Time:  2019 8:25 AM    Plan:     1. 24 hour eeg pending  2. cellcept and steroids    3. D/w sister  Risk of Deterioration: Low  []           Moderate  []           High  [x]                 Assessment:   Principal Problem:    Pneumonia (7/15/2019)    Active Problems:     Acute encephalopathy:  -unclear etiology  -seizure was suspected but  EEG yesterday was unremarkable. -MRI was negative for stroke,CT head and neck - no bleed,vascular occlusion. -ABG reviewed. Electrolytes,ammonia,trop,lactic acid wnl  -Neurology following  -Continue  keppra. -MRI brain w contrast - prelim results - no acute process. -Will be on 24 hr EEG  -May need LP for cytology as she has history of hodgkin's lymphoma. Hold eliquis.  -Oncology following      Acute on Chronic hypoxemic respiratory failure ([address] (2019)     On nasal canula         Bleomycin lung toxicity (2019)     cellcept and steroids       Diabetes mellitus type 2, controlled ([address] (2016)     basal bolus insulin      Hodgkin [address] (2/3/2019)       bulky stage II HD (classical),s/p treatment earlier this year by Dr. Manuel Mcgarry with ABVD. Course truncated due to abrupt onset bleomycin toxicity. Diff dx includes lymphangitic spread of lymphoma. She completed 3 1/2 treatments of ABVD, less than the standard 6. oncol raised the potential concern for recurrent disease with her family. UTI -  ENTEROCOCCUS SPECIES       treated        Admitting notes:68 y.o. female with past medical history significant for htn, fibromyalgia, muscle atrophy, pulmonary fibrosis, asthma, b/l cataracts, dm, non-hodgkin's, who presents from ems with chief complaint of sob. Pt has \"last night\" onset of worsening sob described as \"labored breathing\" that's continued into this morning. She is normally on 4.5 L of O2 n/c at baseline, but had it increased to 5 L by staff.  She also endorses a dry non-productive cough and fever (peak 100) at this time. EMS arrived and measured initial O2 sat of 92% and HR of 140 bpm. Denies pleuritic cp and abd pain. Of note, she has had PNA \"x3 times since Ken" and was treated last week with a z-pack for sinus infection symptomatic of congestion. DNR form in place.  EMS was called to [address];  bpm, 124/78, and 9            Subjective/interium history   Unresponsive at time of my exam - sister reports earlier this am - following commands  11 Point Review of Systems:   Negative except no cp    []            Unable to obtain ROS due to:       []            mental status change []            sedated []            intubated     Social History     Tobacco Use    Smoking status: Never Smoker    Smokeless tobacco: Never Used   Substance Use Topics    Alcohol use: Yes     Comment: socially     Medications reviewed:  Current Facility-Administered Medications   Medication Dose Route Frequency    methylPREDNISolone (PF) (SOLU-MEDROL) injection 40 mg  40 mg IntraVENous Q8H    metoprolol (LOPRESSOR) injection 2.5 mg  2.5 mg IntraVENous BID    heparin (porcine) injection 5,000 Units  5,000 Units SubCUTAneous Q8H    levETIRAcetam (KEPPRA) 1,000 mg in 0.9% sodium chloride 100 mL IVPB  1,000 mg IntraVENous Q12H    mycophenolate mofetil (CELLCEPT) capsule 500 mg  500 mg Oral ACB&D    glucose chewable tablet 16 g  4 Tab Oral PRN    dextrose (D50W) injection syrg 12.5-25 g  12.5-25 g IntraVENous PRN    glucagon (GLUCAGEN) injection 1 mg  1 mg IntraMUSCular PRN    insulin lispro (HUMALOG) injection   SubCUTAneous TIDAC    ALPRAZolam (XANAX) tablet 0.25 mg  0.25 mg Oral BID PRN    sodium chloride (NS) flush 5-10 mL  5-10 mL IntraVENous PRN    sodium chloride (NS) flush 5-40 mL  5-40 mL IntraVENous Q8H    sodium chloride (NS) flush 5-40 mL  5-40 mL IntraVENous PRN    acetaminophen (TYLENOL) tablet 650 mg  650 mg Oral Q4H PRN    ondansetron (ZOFRAN) injection 4 mg  4 mg IntraVENous Q4H PRN    lactobac ac& pc-s.therm-b.anim (JUNI Q/RISAQUAD)  1 Cap Oral DAILY    atorvastatin (LIPITOR) tablet 40 mg  40 mg Oral QHS        Objective:   Vitals:  Visit Vitals  /76 (BP 1 Location: Right arm, BP Patient Position: At rest)   Pulse (!) 108   Temp 98.5 °F (36.9 °C)   Resp 19   Ht 5' 8\" (1.727 m)   Wt 167 lb 4.8 oz (75.9 kg)   SpO2 100%   BMI 24.71 kg/m²     Temp (24hrs), Av.8 °F (33.2 °C), Min:32 °F (0 °C), Max:98.5 °F (36.9 °C)    O2 Flow Rate (L/min): 10 l/min O2 Device: Hi flow nasal cannula    Last 24hr Input/Output:    Intake/Output Summary (Last 24 hours) at 2019 0802  Last data filed at 2019 2106  Gross per 24 hour   Intake 0 ml   Output 800 ml   Net -800 ml        PHYSICAL EXAM:  General:    Alert, cooperative, no distress, appears stated age. Head:   Normocephalic, without obvious abnormality, atraumatic. Eyes:   Conjunctivae/corneas clear. PERRLA  Nose:  Nares normal. No drainage or sinus tenderness. Throat:    Lips, mucosa, and tongue normal.  No Thrush  Neck:  Supple, symmetrical,  no adenopathy, thyroid: non tender    no carotid bruit and no JVD. Back:    Symmetric,  No CVA tenderness. Lungs:   Decreased bs with rales. Chest wall:  No tenderness or deformity. No Accessory muscle use. Heart:   Regular rate and rhythm,  no murmur, rub or gallop. Abdomen:   Soft, non-tender. Not distended. Bowel sounds normal. No masses.        Lab Data Reviewed:    Recent Labs     19  0603 19  0909   WBC 18.1* 15.0*   HGB 8.5* 8.4*   HCT 28.5* 27.6*    368     Recent Labs     19  0603 19  1216 19  0909    142 144   K 4.0 3.9 4.0    104 106   CO2 29 30 34*   * 164* 140*   BUN 36* 39* 37*   CREA 1.07* 1.14* 1.17*   CA 9.2 9.7 9.6   MG  --  2.5* 2.6*   INR  --  1.4*  --      Lab Results   Component Value Date/Time    Glucose (POC) 207 (H) 2019 06:46 AM    Glucose (POC) 152 (H) 07/29/2019 09:14 PM    Glucose (POC) 175 (H) 07/29/2019 05:27 PM    Glucose (POC) 155 (H) 07/29/2019 11:42 AM    Glucose (POC) 167 (H) 07/29/2019 06:40 AM       ___________________________________________________  ___________________________________________________    Attending Physician: Yajaira Shelby MD

## 2019-07-30 NOTE — PROGRESS NOTES
Patient is unresponsive and not appropriate for therapy at this time. Will follow up as appropriate for weekly re-eval, however recommend signing off later in the week if status does not improve. Thanks!     Leia Yeung PT, DPT  Geriatric Clinical Specialist

## 2019-07-30 NOTE — WOUND CARE
WOCN Note:      Follow up for: healed pressure ulcer on sacrum / Naomia Fend / slightly open.     Chart shows:  Admitted for pneumonia; history of : CAP / SVT / HTN / DM / dyslipidemia / obesity / clavicular mass / lymphadenopathy / ARF / leg swelling / Hodgkins lymphoma. WBC = 12.1  On = 7-11-19  Admitted from [facility].     Assessment:   Patient is not following commands, awake opens eyes and no tracking. High risk of diversion. Patient wearing briefs for incontinence and using pure wick. Bed: Versa Care Bed  Diet: cardiac regular  Patient non communicating now.     Bilateral heels and sacral skin intact and without erythema. Heels offloaded on pillows. POA: left buttock chaffing: NOT pressure:patient incontinent and skin stays moist in brief. Area covered is: 4.0cm x 2.0cm x <0.1cm / 100% pink dry skin. Significantly smaller. Applied Aloe Vesta Cream        Recommendations:    Left and right buttock with turning: apply aloe vesta cream. 3x daily and PRN incontinence.     Minimize layers of linen/pads under patient to optimize support surface. Turn/reposition approximately every 2 hours and offload heels. Manage incontinence / promote continence; Aloe Vesta to buttocks and sacrum daily and as needed with incontinence care.   Specialty bed: Versa Care Bed  Discussed above plan with patient and RN / Sivan Fernandez.     Transition of Care: Plan to follow weekly and as needed while admitted to hospital.   KENNA Hernandez 79 BSN RN  Wound Care Department  Office: [phone]  Pager: [phone]

## 2019-07-30 NOTE — PROGRESS NOTES
Neurology Progress Note    Patient ID:  Rony Bonilla  [id number]  70 y.o.  1950    HISTORY OF PRESENT ILLNESS:  The patient is a 69-year-old female with history of Hodgkin's lymphoma who is currently admitted for acute on chronic hypoxemic respiratory failure suspected to be due to bleomycin toxicity and autoimmune etiology. She is PEACE positive and has elevated ESR and C-reactive protein. Other medical issues include diabetes, hypertension. She was awake and took her pills earlier this morning and around noon today, she went unresponsive and per RN, she was noted to have fluttering movements of her eyes. A rapid response team was called initially, but then it was changed to code stroke. She was evaluated by Teleneurology and was started on Keppra empirically. She is awaiting MRI scan of the brain. She does not have any ongoing eye fluttering, but has remained unresponsive, but awake. She has weakness in both lower extremities at baseline and does not ambulate. She does have a use of her arms and some tremor was noted in the arms by staff earlier today. Detailed review of systems was difficult. She did have blood gases tested and they were unremarkable. She did drop her saturations briefly prior to the episode with oxygen saturation at around 84% per family. Subjective:   Sister at the bedside. No response to stimuli. Around 8 am sister reports she was more responsive and followed commands. Objective:    All records in [facility] reviewed and noted    ROS:  Unable to obtain     Meds:  Current Facility-Administered Medications   Medication Dose Route Frequency    methylPREDNISolone (PF) (SOLU-MEDROL) injection 40 mg  40 mg IntraVENous Q8H    metoprolol (LOPRESSOR) injection 2.5 mg  2.5 mg IntraVENous BID    heparin (porcine) injection 5,000 Units  5,000 Units SubCUTAneous Q8H    levETIRAcetam (KEPPRA) 1,000 mg in 0.9% sodium chloride 100 mL IVPB  1,000 mg IntraVENous Q12H    mycophenolate mofetil (CELLCEPT) capsule 500 mg  500 mg Oral ACB&D    glucose chewable tablet 16 g  4 Tab Oral PRN    dextrose (D50W) injection syrg 12.5-25 g  12.5-25 g IntraVENous PRN    glucagon (GLUCAGEN) injection 1 mg  1 mg IntraMUSCular PRN    insulin lispro (HUMALOG) injection   SubCUTAneous TIDAC    ALPRAZolam (XANAX) tablet 0.25 mg  0.25 mg Oral BID PRN    sodium chloride (NS) flush 5-10 mL  5-10 mL IntraVENous PRN    sodium chloride (NS) flush 5-40 mL  5-40 mL IntraVENous Q8H    sodium chloride (NS) flush 5-40 mL  5-40 mL IntraVENous PRN    acetaminophen (TYLENOL) tablet 650 mg  650 mg Oral Q4H PRN    ondansetron (ZOFRAN) injection 4 mg  4 mg IntraVENous Q4H PRN    lactobac ac& pc-s.therm-b.anim (JUNI Q/RISAQUAD)  1 Cap Oral DAILY    atorvastatin (LIPITOR) tablet 40 mg  40 mg Oral QHS     Imaging:      Lab Review   Recent Results (from the past 24 hour(s))   GLUCOSE, POC    Collection Time: 07/29/19 11:42 AM   Result Value Ref Range    Glucose (POC) 155 (H) 65 - 100 mg/dL    Performed by Meghann Michelle    POC G3 - PUL    Collection Time: 07/29/19 12:27 PM   Result Value Ref Range    pH (POC) 7.491 (H) 7.35 - 7.45      pCO2 (POC) 40.5 35.0 - 45.0 MMHG    pO2 (POC) 72 (L) 80 - 100 MMHG    HCO3 (POC) 30.9 (H) 22 - 26 MMOL/L    sO2 (POC) 95 92 - 97 %    Base excess (POC) 8 mmol/L    Site RIGHT RADIAL      Device: High Flow Nasal Cannula      Flow rate (POC) 8 L/M    Allens test (POC) YES      Specimen type (POC) ARTERIAL      Total resp.  rate 24     EKG, 12 LEAD, INITIAL    Collection Time: 07/29/19 12:28 PM   Result Value Ref Range    Ventricular Rate 103 BPM    Atrial Rate 103 BPM    P-R Interval 156 ms    QRS Duration 78 ms    Q-T Interval 372 ms    QTC Calculation (Bezet) 487 ms    Calculated P Axis 37 degrees    Calculated R Axis -28 degrees    Calculated T Axis 25 degrees    Diagnosis       Sinus tachycardia  Minimal voltage criteria for LVH, may be normal variant  Cannot rule out Anterior infarct , age undetermined  Abnormal ECG  When compared with ECG of 15-JUL-2019 10:30,  No significant change was found  Confirmed by Harinder Novak MD. ([id number]) on 7/30/2019 8:48:59 AM     GLUCOSE, POC    Collection Time: 07/29/19  5:27 PM   Result Value Ref Range    Glucose (POC) 175 (H) 65 - 100 mg/dL    Performed by Tino Lemons, POC    Collection Time: 07/29/19  9:14 PM   Result Value Ref Range    Glucose (POC) 152 (H) 65 - 100 mg/dL    Performed by Chiquis Patel    GLUCOSE, POC    Collection Time: 07/30/19  6:46 AM   Result Value Ref Range    Glucose (POC) 207 (H) 65 - 100 mg/dL    Performed by Annette Scott        Exam:  Visit Vitals  /76 (BP 1 Location: Right arm, BP Patient Position: At rest)   Pulse (!) 108   Temp 98.5 °F (36.9 °C)   Resp 19   Ht 5' 8\" (1.727 m)   Wt 75.9 kg (167 lb 4.8 oz)   SpO2 100%   BMI 24.71 kg/m²     Gen:  CV: RRR  Lungs: non labored breathing  Abd: non distending  Neuro:unreponsive   CN II-XII:  face symmetric, tongue/palate midline  ,Motor: no following commands , atrophy bilateral legs. Foot droop, 1+ LE- DTR   Gait: deferred     Assessment:     Patient Active Problem List   Diagnosis Code    SVT (supraventricular tachycardia) ([facility]) I47.1    Essential hypertension with goal blood pressure less than 140/90 I10    Diabetes mellitus type 2, controlled ([facility] E11.9    Dyslipidemia E78.5    Overweight (BMI 25.0-29. 9) E66.3    Supraclavicular mass R22.2    Lymphadenopathy R59.1    Acute respiratory failure (HCC) J96.00    Leg swelling M79.89    Hodgkin lymphoma (HCC) C81.90    CAP (community acquired pneumonia) J18.9    Pneumonia J18.9    Chronic hypoxemic respiratory failure (HCC) J96.11    Bleomycin lung toxicity J98.4, T45.1X5A     Plan:   69-year-old female who had acute changes of mental status. MRI,unremarkable.    -Continuous EEG, pending, this should be started today.  Will hold off on LP for now, families request.  -Since we are holding off on the LP,she can restart her Eliquis   -Continue Keppra 500 mg BID   Signed:  Yin Hussein NP  7/30/2019  11:38 AM

## 2019-07-30 NOTE — PROGRESS NOTES
Pulmonary, Critical Care, and Sleep Medicine~Progress Note    Name: Pedro Amaya MRN: [id number]   : 1950 Hospital: [facility] [address]   Date: 2019 11:15 AM Admission: 7/15/2019     Impression Plan   1. Acute on chronic (on 4lpm normally) hypoxic resp failure secondary to below  2. Abnormal CT scan: HCAP possibly (treated), query more bleomycin lung toxicity, now with lack of response. Other concerns were pulmonary edema, but she is quite dried out now  3. Hx of lymphoma. Not on therapy since the beginning of the year. 4. Hx of PE and was on eliquis   5. Hx of asthma  6. Reviewed [facility] records. She was seen last there on 19. Early  cellcept was stopped and at that appointment she was scheduled to taper off of steroids, which she did. Apparently she was on hospice care, but I am unclear as to the details; per patient it was rescinded    7. AMS~ per neurology. Noted oncology's thoughts  1. Strep/legionella negative  2. ECHO; PASP 60mmHg   3. O2 titration above 90%, on mid flow today. Continue to narrow   4. autoimmune labs mostly negative, but did have a + PEACE with elevated CRP and ESR; query coexisting autoimmune process. Once discharge would recommend rheumatology follow up  5. Now with mental status change we will need to increase IV steroids as to not loose ground, because she cannot take PO now to continue cellcept. Discussed with family and they agree. 6. All questions asked of me were answered to the best of my ability and their apparent satisfaction   7. High risk for rapid decline   8. Long term prognosis poor       Daily Progression:      Per family and RN reports more responsive today. Was suppose to have EEG; not done yet. Family is declining LP.       FiO2 requirements are remarkably down.  chest film shows no worsening of pulmonary infiltrates. Dramatic change in mental status yesterday with unclear etiology.  Seizures vs CVA, but all testing thus far is negative     7/26  On 15lpm; theoretically can be switched to midflow, but wean you talk about it she gets anxious and her saturations drop. I discussed with RN; suggest doing one hour on midlflow and one hour on highflow during the day to get her use to it.     7/25  She looks so weak, but states she feels better. About to work with PT. Still +.     7/24  Noted question of lung transplant query; I think recent malignancy would not allow her to be placed on the list. But since she is followed at [facility] they could assisted better. She feels better today     7/23  Looks stronger  Desats quickly with movement, but stabilizes; this is to be expected with her chronic lung disease  Weight gain over night? Agree with diuretics  Agree with getting up and out of bed as much as possible      7/22  Creatinine improving   Was increased in Flow and FiO2, unclear why as she is feeling better   Chest film today looks fairly similar     7/18   Not much different. We are starting her on high flow now     7/17  Feels possibly a little better. proBNP elevated today     7/16  Consult Note requested by Dr Dana Noe. Patient presented for admission secondary to a one week course of cough, subjective fevers, and malasie. Outpatient PCP starte zpak and she did not have bernadette improvement. No other symptoms reported on this recent course. 7/15/19 CT scan did showed worsening right great than left infiltrates with interlobular septal thickening; no PE. BNP was 534, not very elevated. This past Jan 2019 she was dx with hodgkin's lymphoma. Was on blecyomcin and developed a pneumonitis associated with it. Was started on cellcept 500mg bid and prednisone. Followed in the office at [facility] in April, however transferred her care to [facility] and saw Dr Lisette Neal (sp?). Her last visit with them was this past June and at that visit she was weaned off of the prednisone and cellcept. We do not have those records at this time.       I have reviewed the labs and previous days notes. Pertinent items are noted in HPI. Past Medical History:   Diagnosis Date    Asthma     as a child, nothing since menopause    Cancer ([facility] [address] 08/21/2018    Non-Hodgkin's    Cataracts, bilateral     Diabetes ([facility] [address]     type II    Environmental allergies     Fibromyalgia     Treated with alternate medical therapy    Hypertension       Past Surgical History:   Procedure Laterality Date    HX BREAST BIOPSY Right 2006    HX COLONOSCOPY  2015    HX GYN      Pap 2015    HX ORTHOPAEDIC      fractured R ankle/ no surgery    HX OTHER SURGICAL Right 08/27/2018    excision of deep neck cervical LN. Prior to Admission medications    Medication Sig Start Date End Date Taking? Authorizing Provider   acetaminophen (TYLENOL) 650 mg suppository Insert 650 mg into rectum every four (4) hours as needed for Pain. Yes Provider, Historical   amLODIPine (NORVASC) 5 mg tablet Take 5 mg by mouth daily. Yes Provider, Historical   trimethoprim-sulfamethoxazole (BACTRIM DS) 160-800 mg per tablet Take 1 Tab by mouth every Monday, Wednesday, Friday. Yes Provider, Historical   metFORMIN (GLUCOPHAGE) 500 mg tablet Take 500 mg by mouth two (2) times daily (with meals). Indications: Per [address] 3/30/19  Yes Provider, Historical   levalbuterol (XOPENEX) 1.25 mg/0.5 mL nebu 1.25 mg by Nebulization route every four (4) hours as needed. Indications: Per [address] 3/30/19  Yes Provider, Historical   acetaminophen (TYLENOL ARTHRITIS PAIN) 650 mg TbER Take 650 mg by mouth as needed (Q 6hrs PRN). 3/30/19  Yes Provider, Historical   apixaban (ELIQUIS) 5 mg tablet Take 1 Tab by mouth every twelve (12) hours. 3/13/19  Yes Nba Parker MD   atorvastatin (LIPITOR) 40 mg tablet TAKE 1 TAB BY MOUTH DAILY. 12/3/17  Yes Nba Parker MD   Oxygen 3 Devices by Nasal route as needed.  Indications: SOB PRN    Provider, Historical     No Known Allergies   Social History Tobacco Use    Smoking status: Never Smoker    Smokeless tobacco: Never Used   Substance Use Topics    Alcohol use: Yes     Comment: socially      Family History   Problem Relation Age of Onset    Heart Disease Mother     Cancer Father         prostate cancer    Cancer Sister         Breast cancer apparent DCIS    Heart Disease Brother         Brother  from congestive heart failure secondary to severe mitral disease       OBJECTIVE:     Vital Signs:       Visit Vitals  /76 (BP 1 Location: Right arm, BP Patient Position: At rest)   Pulse (!) 108   Temp 98.5 °F (36.9 °C)   Resp 19   Ht 5' 8\" (1.727 m)   Wt 75.9 kg (167 lb 4.8 oz)   SpO2 100%   BMI 24.71 kg/m²      Temp (24hrs), Av.7 °F (36.5 °C), Min:97.2 °F (36.2 °C), Max:98.5 °F (36.9 °C)     Intake/Output:     Last shift: No intake/output data recorded.     Last 3 shifts:  1901 -  0700  In: 195 [I.V.:195]  Out: 1500 [Urine:1500]          Intake/Output Summary (Last 24 hours) at 2019 0949  Last data filed at 2019 4754  Gross per 24 hour   Intake 0 ml   Output 800 ml   Net -800 ml       Physical Exam:                                        Exam Findings Other   General: No resp distress noted, appears stated age    [de-identified]:  No ulcers, JVD not elevated, no cervical LAD    Chest: No pectus deformity, normal chest rise b/l    HEART:  RRR, no murmurs/rubs/gallops    Lungs:  Notable crackles, diminished BS at bases    ABD: Soft/NT, non rigid mildly distended    EXT: No cyanosis/clubbing/edema, normal peripheral pulses    Skin: No rashes or ulcers, no mottling    Neuro: Unresponsive         Medications:  Current Facility-Administered Medications   Medication Dose Route Frequency    methylPREDNISolone (PF) (SOLU-MEDROL) injection 40 mg  40 mg IntraVENous Q8H    metoprolol (LOPRESSOR) injection 2.5 mg  2.5 mg IntraVENous BID    heparin (porcine) injection 5,000 Units  5,000 Units SubCUTAneous Q8H    levETIRAcetam (KEPPRA) 1,000 mg in 0.9% sodium chloride 100 mL IVPB  1,000 mg IntraVENous Q12H    mycophenolate mofetil (CELLCEPT) capsule 500 mg  500 mg Oral ACB&D    glucose chewable tablet 16 g  4 Tab Oral PRN    dextrose (D50W) injection syrg 12.5-25 g  12.5-25 g IntraVENous PRN    glucagon (GLUCAGEN) injection 1 mg  1 mg IntraMUSCular PRN    insulin lispro (HUMALOG) injection   SubCUTAneous TIDAC    ALPRAZolam (XANAX) tablet 0.25 mg  0.25 mg Oral BID PRN    sodium chloride (NS) flush 5-10 mL  5-10 mL IntraVENous PRN    sodium chloride (NS) flush 5-40 mL  5-40 mL IntraVENous Q8H    sodium chloride (NS) flush 5-40 mL  5-40 mL IntraVENous PRN    acetaminophen (TYLENOL) tablet 650 mg  650 mg Oral Q4H PRN    ondansetron (ZOFRAN) injection 4 mg  4 mg IntraVENous Q4H PRN    lactobac ac& pc-s.therm-b.anim (JUNI Q/RISAQUAD)  1 Cap Oral DAILY    atorvastatin (LIPITOR) tablet 40 mg  40 mg Oral QHS       Labs:  ABG Recent Labs     07/29/19  1227 07/29/19  0251 07/28/19  1209   PHI 7.491* 7.438 7.488*   PCO2I 40.5 50.4* 41.6   PO2I 72* 185* 143*   HCO3I 30.9* 34.1* 31.6*   SO2I 95 100* 99*   FIO2I  --  100 1.0        CBC Recent Labs     07/29/19  0603 07/28/19  0909   WBC 18.1* 15.0*   HGB 8.5* 8.4*   HCT 28.5* 27.6*    368   MCV 99.0 96.2   MCH 29.5 55.5        Metabolic  Panel Recent Labs     07/29/19  0603 07/28/19  1216 07/28/19  0909    142 144   K 4.0 3.9 4.0    104 106   CO2 29 30 34*   * 164* 140*   BUN 36* 39* 37*   CREA 1.07* 1.14* 1.17*   CA 9.2 9.7 9.6   MG  --  2.5* 2.6*   INR  --  1.4*  --         Pertinent Labs                SAM Cuello  7/30/2019

## 2019-07-30 NOTE — PROGRESS NOTES
07/29/19 1215 07/29/19 1225 07/29/19 1226   Vital Signs   Pulse (Heart Rate) (!) 116  --  (!) 107   Cardiac Rhythm  --  Sinus Tach  (120s)  --    Resp Rate (!) 33  --  25   O2 Sat (%) 99 %  --  100 %   BP (!) 212/114  --  (!) 192/96   MAP (Monitor) 146  --   --    MAP (Calculated) 147  --  128      07/29/19 1229 07/29/19 1230 07/29/19 1233   Vital Signs   Pulse (Heart Rate) (!) 107 (!) 102 (!) 102   Cardiac Rhythm Other (comment)  (st)  --   --    Resp Rate 25 22 23   O2 Sat (%) 100 % 100 % 100 %   BP (!) 192/96  --  (!) 170/111   MAP (Monitor)  --   --  133   MAP (Calculated)  --   --  131      07/29/19 1233 07/29/19 1234   Vital Signs   Pulse (Heart Rate) (!) 112 (!) 102   Cardiac Rhythm  --   --    Resp Rate 22 21   O2 Sat (%) 100 % 100 %   BP (!) 170/111  --    MAP (Monitor)  --   --    MAP (Calculated)  --   --        1225; Following providing incontinence care patient semi-flat in bed, patient began audibly having noise in upper airways, eyes somewhat flickering for a matter of seconds. Immediately sat pt. Up./144. Rapid response called, family at bedside. VS as above. Recent BG WNL. Dr Ayana Siddiqui at bedside ordered one time dose of Metoprolol IV push 5mg. Administered. BP results above. After approximately 1 minute patient was no longer making audible upper airway noises. Patient remained unresponsive throughout rapid response. Dr Ayana Siddiqui ordered Cardene gtt to be initiated. Following administration BP dropped rapidly to 975X systolic. Gtt stopped. Dr Ayana Siddiqui informed and agreeable to stopping drip. Will continue to monitor.

## 2019-07-30 NOTE — PROGRESS NOTES
Hospitalist Progress Note  Jerilyn Mtz MD  Answering service: [phone] from in house phone        Date of Service:  2019  NAME:  Sravani Cash  :  1950  MRN:  [id number]      Admission Summary:   70 y.o F with PMh of hodgkin's lymphoma,pneumonitis due to bleomycin,DM,HTN came to the hospital because of SOB,fever and cough. Interval history / Subjective:      Noted overnight events,she was briefly responsive at midnight -followed commands per nursing but early in the morning,she again became unresponsive. This morning, she is still not following commands, awake -open eyes but does not track. Assessment & Plan:     #Acute encephalopathy:  -unclear etiology  -seizure was suspected but  EEG yesterday was unremarkable. -MRI was negative for stroke,CT head and neck - no bleed,vascular occlusion. -ABG reviewed. Electrolytes,ammonia,trop,lactic acid wnl  -Neurology following  -Continue  keppra. -MRI brain w contrast - prelim results - no acute process. -Will be on 24 hr EEG  -May need LP for cytology as she has history of hodgkin's lymphoma. Hold eliquis.  -Oncology consulted. Acute on chronic hypoxemic resp failure:not improving  -Ssuspected bleomycin toxicity (-Per chart review at [facility] -was prescribed cellcept and steroids which she did not take as she was in hospice few months ago) and autoimmune etiology-PEACE positive,elevated ESR and CRP. -she completed abx course with vanc and zosyn for suspected pneumonia  -On midflow - 10 lt oxygen. -Appears euvolemic on exam.  -on methylprednisone. Cellcept held -NPO. -Per oncology note -lymphangitic spread of lymphoma is also possible. #History of hodgkin's lymphoma:  -Was diagnosed in 2018 -followed with [facility]  -Was on ABVP -treatment was not completed due to bleomycin toxicity. #HTN: will use Iv antihypertensives if needed. #Diabetes:-180s.  Monitor for now      #history of PE: eliquis/anticoagulation held now for possible lumbar puncture. Code status: DNR  DVT prophylaxis: heparin     Care Plan discussed with: patient's family,nurse  Disposition: TBD    High risk of detoriation due to resp and neurological issues. Guarded prognosis over all         Hospital Problems  Date Reviewed: 2/3/2019          Codes Class Noted POA    Chronic hypoxemic respiratory failure (HCC) ICD-10-CM: J96.11  ICD-9-CM: 518.83, 799.02  7/16/2019 Yes        Bleomycin lung toxicity ICD-10-CM: J98.4, T45.1X5A  ICD-9-CM: 508.8, E930.7  7/16/2019 Yes        * (Principal) Pneumonia ICD-10-CM: J18.9  ICD-9-CM: 381  7/15/2019 Yes        Hodgkin lymphoma ([facility] ICD-10-CM: C81.90  ICD-9-CM: 201.90  2/3/2019 Yes        Diabetes mellitus type 2, controlled ([facility] ICD-10-CM: E11.9  ICD-9-CM: 250.00  9/8/2016 Yes                Review of Systems:   Unable to obtain due to patient's condition    Vital Signs:    Last 24hrs VS reviewed since prior progress note. Most recent are:  Visit Vitals  /68 (BP 1 Location: Right arm, BP Patient Position: At rest)   Pulse 85   Temp 97.4 °F (36.3 °C)   Resp 14   Ht 5' 8\" (1.727 m)   Wt 76.4 kg (168 lb 6.9 oz)   SpO2 100%   BMI 24.87 kg/m²         Intake/Output Summary (Last 24 hours) at 7/29/2019 2141  Last data filed at 7/29/2019 2106  Gross per 24 hour   Intake 195 ml   Output 1500 ml   Net -1305 ml        Physical Examination:             Constitutional: Elderly patient   ENT:  Oral mucous dry   Resp:  bilateral inspiratory crackles   CV:  Regular rhythm, normal rate    GI:  Soft, non distended, non tender. Musculoskeletal:  No edema    Neurologic:  unresponsive,not following commands.             Data Review:    Review and/or order of clinical lab test,,imaging   Review and/or order of tests in the medicine section of CPT      Labs:     Recent Labs     07/29/19  0603 07/28/19  0909   WBC 18.1* 15.0*   HGB 8.5* 8.4*   HCT 28.5* 27.6*    368 Recent Labs     07/29/19  0603 07/28/19  1216 07/28/19  0909    142 144   K 4.0 3.9 4.0    104 106   CO2 29 30 34*   BUN 36* 39* 37*   CREA 1.07* 1.14* 1.17*   * 164* 140*   CA 9.2 9.7 9.6   MG  --  2.5* 2.6*     No results for input(s): SGOT, GPT, ALT, AP, TBIL, TBILI, TP, ALB, GLOB, GGT, AML, LPSE in the last 72 hours. No lab exists for component: AMYP, HLPSE  Recent Labs     07/28/19  1216   INR 1.4*   PTP 14.1*      No results for input(s): FE, TIBC, PSAT, FERR in the last 72 hours. No results found for: FOL, RBCF   No results for input(s): PH, PCO2, PO2 in the last 72 hours.   Recent Labs     07/28/19  1216   TROIQ <0.05     Lab Results   Component Value Date/Time    Cholesterol, total 134 08/03/2018 01:45 PM    HDL Cholesterol 56 08/03/2018 01:45 PM    LDL, calculated 64 08/03/2018 01:45 PM    Triglyceride 71 08/03/2018 01:45 PM     Lab Results   Component Value Date/Time    Glucose (POC) 152 (H) 07/29/2019 09:14 PM    Glucose (POC) 175 (H) 07/29/2019 05:27 PM    Glucose (POC) 155 (H) 07/29/2019 11:42 AM    Glucose (POC) 167 (H) 07/29/2019 06:40 AM    Glucose (POC) 140 (H) 07/29/2019 03:04 AM     Lab Results   Component Value Date/Time    Color YELLOW/STRAW 07/15/2019 06:03 PM    Appearance CLOUDY (A) 07/15/2019 06:03 PM    Specific gravity 1.010 07/15/2019 06:03 PM    pH (UA) 5.0 07/15/2019 06:03 PM    Protein TRACE (A) 07/15/2019 06:03 PM    Glucose NEGATIVE  07/15/2019 06:03 PM    Ketone NEGATIVE  07/15/2019 06:03 PM    Bilirubin NEGATIVE  07/15/2019 06:03 PM    Urobilinogen 0.2 07/15/2019 06:03 PM    Nitrites NEGATIVE  07/15/2019 06:03 PM    Leukocyte Esterase SMALL (A) 07/15/2019 06:03 PM    Epithelial cells MODERATE (A) 07/15/2019 06:03 PM    Bacteria 1+ (A) 07/15/2019 06:03 PM    WBC 5-10 07/15/2019 06:03 PM    RBC 20-50 07/15/2019 06:03 PM         Medications Reviewed:     Current Facility-Administered Medications   Medication Dose Route Frequency    methylPREDNISolone (PF) (SOLU-MEDROL) injection 40 mg  40 mg IntraVENous Q8H    metoprolol (LOPRESSOR) injection 2.5 mg  2.5 mg IntraVENous BID    heparin (porcine) injection 5,000 Units  5,000 Units SubCUTAneous Q8H    levETIRAcetam (KEPPRA) 1,000 mg in 0.9% sodium chloride 100 mL IVPB  1,000 mg IntraVENous Q12H    mycophenolate mofetil (CELLCEPT) capsule 500 mg  500 mg Oral ACB&D    glucose chewable tablet 16 g  4 Tab Oral PRN    dextrose (D50W) injection syrg 12.5-25 g  12.5-25 g IntraVENous PRN    glucagon (GLUCAGEN) injection 1 mg  1 mg IntraMUSCular PRN    insulin lispro (HUMALOG) injection   SubCUTAneous TIDAC    ALPRAZolam (XANAX) tablet 0.25 mg  0.25 mg Oral BID PRN    sodium chloride (NS) flush 5-10 mL  5-10 mL IntraVENous PRN    sodium chloride (NS) flush 5-40 mL  5-40 mL IntraVENous Q8H    sodium chloride (NS) flush 5-40 mL  5-40 mL IntraVENous PRN    acetaminophen (TYLENOL) tablet 650 mg  650 mg Oral Q4H PRN    ondansetron (ZOFRAN) injection 4 mg  4 mg IntraVENous Q4H PRN    lactobac ac& pc-s.therm-b.anim (JUNI Q/RISAQUAD)  1 Cap Oral DAILY    atorvastatin (LIPITOR) tablet 40 mg  40 mg Oral QHS     ______________________________________________________________________  EXPECTED LENGTH OF STAY: 4d 4h  ACTUAL LENGTH OF STAY:          14                 Kev Saavedra MD

## 2019-07-31 PROBLEM — G93.40 ENCEPHALOPATHY: Status: ACTIVE | Noted: 2019-01-01

## 2019-07-31 NOTE — PROGRESS NOTES
Palliative Medicine Consult [place]: [phone] ([phone]) Patient Name: Antonieta Clark YOB: 1950 Date of Initial Consult: July 17, 2019 Reason for Consult: Care Decisions Requesting Provider: Loni Samuel Primary Care Physician: Shaina Parks MD 
 
 SUMMARY:  
Antonieta Clark is a 70 y.o. with a past history of Lymphoma (chemo) followed at [facility], HTN, fibromyalgia, muscle atrophy, pulmonary fibrosis, asthma, cataracts, DM, chronic respiratory failure, recurrent pna, sinus infection,  who was admitted on 7/15/2019 from [place] with a diagnosis of PNA, acute on chronic hypoxemic respiratory failure, bleomycin lung toxicity. Pt had 7 rounds of chemo and then developed significant side effects. DNR with Hospice services at the facility [facility]) revoked benefit for hospitalization. Current medical issues leading to Palliative Medicine involvement include: support with care decisions. Social: single, no children, well supported by brother and sister, moved to [place] from [place] in [address] [place] [address] to work as the Director of International Studies at [employer] before transitioning to a position  for [employer], [employer], has not been home in 6 months as she has been in and out of facilities for rehab and care. Interval History: 
7/18/19: hypoxia and tachycardia with removal of mask for brief period. Pul to start cellcept with steroids to see if she responds. S/s due to disease progression not pneumonia Pt declined intubation for respiratory distress if needed. Plans for hi flow today 7/24/19: continues cellcept therapy. Still on hi flow 7/29/29: pt with acute mental change over the weekend. Suspect seizures but eeg neg,  MRI without contrast neg. Catatonic state PALLIATIVE DIAGNOSES:  
1. catatonic stupor 2. Shortness of breath 3. Hypoxemia 4. Feeding difficulties 5. Leg swelling 6. Wheel chair bound PLAN:  
1. Pt fails to improve clinically 2. Reports of periods of full lucidity lasting about 15 minutes. Sister has not left the bedside but for brief periods. Nursing has witnessed 3. Discussed feeding with family today. Explained risks of artificial nutrition. Family concerned of multiple tubing in nasal cannula. Explained that pt is still able to breath despite tubing 4. Family undecided today about tube but will follow up with me or attending tomorrow with decision. 5. Transparent discussion is imperative. If we have nothing else to offer then will peg tube be beneficial?? 
6. Continue current level of care 7. Initial consult note routed to primary continuity provider and/or primary health care team members 8. Communicated plan of care with: Palliative IDT, [facility] Team 
 
 GOALS OF CARE / TREATMENT PREFERENCES:  
 
GOALS OF CARE: 
Patient/Health Care Proxy Stated Goals: Rehabilitation TREATMENT PREFERENCES:  
Code Status: DNR Advance Care Planning: 
[x] The [facility] Interdisciplinary Team has updated the ACP Navigator with [place] and Patient Capacity Siblings Donte Pathak and Tracy Wolf Advance Care Planning 7/15/2019 Patient's Healthcare Decision Maker is: -  
Primary Decision Maker Name -  
Primary Decision Maker Phone Number -  
Primary Decision Maker Relationship to Patient -  
Confirm Advance Directive None Patient Would Like to Complete Advance Directive Yes Does the patient have other document types Do Not Resuscitate Medical Interventions: Limited additional interventions Other Instructions:  
Artificially Administered Nutrition: (family deciding) Other: As far as possible, the palliative care team has discussed with patient / health care proxy about goals of care / treatment preferences for patient. HISTORY:  
 
History obtained from: chart, pt, team 
 
CHIEF COMPLAINT: pt admitted with aforementioned issues HPI/SUBJECTIVE: The patient is: [] Verbal and participatory [x] Non-participatory due to: Unresponsive Clinical Pain Assessment (nonverbal scale for severity on nonverbal patients):  
Clinical Pain Assessment Severity: 0 Activity (Movement): Lying quietly, normal position Duration: for how long has pt been experiencing pain (e.g., 2 days, 1 month, years) Frequency: how often pain is an issue (e.g., several times per day, once every few days, constant) FUNCTIONAL ASSESSMENT:  
 
Palliative Performance Scale (PPS): PPS: 20 
 
 
 PSYCHOSOCIAL/SPIRITUAL SCREENING:  
 
Palliative IDT has assessed this patient for cultural preferences / practices and a referral made as appropriate to needs (Cultural Services, Patient Advocacy, Ethics, etc.) Any spiritual / Rastafarian concerns: 
[] Yes /  [x] No 
 
Caregiver Burnout: 
[] Yes /  [x] No /  [] No Caregiver Present Anticipatory grief assessment:  
[x] Normal  / [] Maladaptive ESAS Anxiety: Anxiety: 0 
 
ESAS Depression:    
 
 
 REVIEW OF SYSTEMS:  
 
Positive and pertinent negative findings in ROS are noted above in HPI. The following systems were [x] reviewed / [] unable to be reviewed as noted in HPI Other findings are noted below. Systems: constitutional, ears/nose/mouth/throat, respiratory, gastrointestinal, genitourinary, musculoskeletal, integumentary, neurologic, psychiatric, endocrine. Positive findings noted below. Modified ESAS Completed by: provider Fatigue: 7 Drowsiness: 0 Pain: 0 Anxiety: 0 Nausea: 0 Anorexia: 5 Dyspnea: 3 Stool Occurrence(s): 1 PHYSICAL EXAM:  
 
From RN flowsheet: 
Wt Readings from Last 3 Encounters:  
07/31/19 170 lb 4.8 oz (77.2 kg) 02/09/19 238 lb 8.6 oz (108.2 kg) 01/21/19 180 lb (81.6 kg) Blood pressure 176/87, pulse (!) 103, temperature 98.4 °F (36.9 °C), resp. rate 22, height 5' 9\" (1.753 m), weight 170 lb 4.8 oz (77.2 kg), SpO2 100 %. Pain Scale 1: Adult Nonverbal Pain Scale Pain Intensity 1: 0 Pain Intervention(s) 1: Medication (see MAR) Last bowel movement, if known:  
 
Constitutional: unresponsive Eyes: pupils equal, anicteric, eyes remain open without blinking ENMT: no nasal discharge, moist mucous membranes Cardiovascular:distal pulses intact, leg swelling Respiratory: breathing  labored with NC oxygen, symmetric Gastrointestinal: soft non-tender, +bowel sounds Musculoskeletal: no deformity, no tenderness to palpation, atrophy Skin: warm, dry Neurologic: catatonic Psychiatric:  
Other: 
 
 
 HISTORY:  
 
Principal Problem: 
  Pneumonia (7/15/2019) Active Problems: 
  Chronic hypoxemic respiratory failure ([address] (2019) Bleomycin lung toxicity (2019) Diabetes mellitus type 2, controlled ([address] (2016) Hodgkin lymphoma ([facility] (2/3/2019) Past Medical History:  
Diagnosis Date  Asthma   
 as a child, nothing since menopause  Cancer ([address] 2018 Non-Hodgkin's  Cataracts, bilateral   
 Diabetes ([address] type II  
 Environmental allergies  Fibromyalgia Treated with alternate medical therapy  Hypertension Past Surgical History:  
Procedure Laterality Date  HX BREAST BIOPSY Right 2006  HX COLONOSCOPY  2015  HX GYN Pap   HX ORTHOPAEDIC    
 fractured R ankle/ no surgery  HX OTHER SURGICAL Right 2018  
 excision of deep neck cervical LN. Family History Problem Relation Age of Onset  Heart Disease Mother  Cancer Father   
     prostate cancer  Cancer Sister Breast cancer apparent DCIS  
 Heart Disease Brother Brother  from congestive heart failure secondary to severe mitral disease History reviewed, no pertinent family history. Social History Tobacco Use  Smoking status: Never Smoker  Smokeless tobacco: Never Used Substance Use Topics  Alcohol use: Yes Comment: socially No Known Allergies Current Facility-Administered Medications Medication Dose Route Frequency  dextrose 5% - 0.45% NaCl with KCl 10 mEq/L infusion  60 mL/hr IntraVENous CONTINUOUS  
 hydrALAZINE (APRESOLINE) 20 mg/mL injection 10 mg  10 mg IntraVENous Q6H PRN  
 magnesium sulfate 500 mg in 0.9% sodium chloride IVPB  500 mg IntraVENous TID  amantadine HCl (SYMMETREL) capsule 100 mg  100 mg Oral BID  methylPREDNISolone (PF) (SOLU-MEDROL) injection 40 mg  40 mg IntraVENous Q8H  
 metoprolol (LOPRESSOR) injection 2.5 mg  2.5 mg IntraVENous BID  heparin (porcine) injection 5,000 Units  5,000 Units SubCUTAneous Q8H  
 levETIRAcetam (KEPPRA) 1,000 mg in 0.9% sodium chloride 100 mL IVPB  1,000 mg IntraVENous Q12H  mycophenolate mofetil (CELLCEPT) capsule 500 mg  500 mg Oral ACB&D  
 glucose chewable tablet 16 g  4 Tab Oral PRN  
 dextrose (D50W) injection syrg 12.5-25 g  12.5-25 g IntraVENous PRN  
 glucagon (GLUCAGEN) injection 1 mg  1 mg IntraMUSCular PRN  
 insulin lispro (HUMALOG) injection   SubCUTAneous TIDAC  ALPRAZolam (XANAX) tablet 0.25 mg  0.25 mg Oral BID PRN  
 sodium chloride (NS) flush 5-10 mL  5-10 mL IntraVENous PRN  
 sodium chloride (NS) flush 5-40 mL  5-40 mL IntraVENous Q8H  
 sodium chloride (NS) flush 5-40 mL  5-40 mL IntraVENous PRN  
 acetaminophen (TYLENOL) tablet 650 mg  650 mg Oral Q4H PRN  
 ondansetron (ZOFRAN) injection 4 mg  4 mg IntraVENous Q4H PRN  
 lactobac ac& pc-s.therm-b.anim (JUNI Q/RISAQUAD)  1 Cap Oral DAILY  atorvastatin (LIPITOR) tablet 40 mg  40 mg Oral QHS  
 
 
 
 LAB AND IMAGING FINDINGS:  
 
Lab Results Component Value Date/Time WBC 18.1 (H) 07/29/2019 06:03 AM  
 HGB 8.5 (L) 07/29/2019 06:03 AM  
 PLATELET 191 83/72/7499 06:03 AM  
 
Lab Results Component Value Date/Time  Sodium 145 07/29/2019 06:03 AM  
 Potassium 4.0 07/29/2019 06:03 AM  
 Chloride 106 07/29/2019 06:03 AM  
 CO2 29 07/29/2019 06:03 AM  
 BUN 36 (H) 07/29/2019 06:03 AM  
 Creatinine 1.07 (H) 07/29/2019 06:03 AM  
 Calcium 9.2 07/29/2019 06:03 AM  
 Magnesium 2.5 (H) 07/28/2019 12:16 PM  
 Phosphorus 2.4 (L) 07/22/2019 03:50 AM  
  
Lab Results Component Value Date/Time AST (SGOT) 14 (L) 07/23/2019 04:07 AM  
 Alk. phosphatase 151 (H) 07/23/2019 04:07 AM  
 Protein, total 6.6 07/23/2019 04:07 AM  
 Albumin 2.6 (L) 07/23/2019 04:07 AM  
 Globulin 4.0 07/23/2019 04:07 AM  
 
Lab Results Component Value Date/Time INR 1.4 (H) 07/28/2019 12:16 PM  
 Prothrombin time 14.1 (H) 07/28/2019 12:16 PM  
 aPTT 30.2 08/27/2018 08:15 AM  
  
Lab Results Component Value Date/Time Iron 24 (L) 07/16/2019 01:24 AM  
 TIBC 200 (L) 07/16/2019 01:24 AM  
 Iron % saturation 12 (L) 07/16/2019 01:24 AM  
  
No results found for: PH, PCO2, PO2 No components found for: [place] No results found for: CPK, CKMB Total time: 45 minutes Counseling / coordination time, spent as noted above: 30 minutes 
> 50% counseling / coordination?: y 
 
Prolonged service was provided for  []30 min   []75 min in face to face time in the presence of the patient, spent as noted above. Time Start:  
Time End:  
Note: this can only be billed with 45452 (initial) or [id number] (follow up). If multiple start / stop times, list each separately.

## 2019-07-31 NOTE — PROGRESS NOTES
Pulmonary, Critical Care, and Sleep Medicine~Progress Note Name: Belkis Taylor MRN: [id number] : 1950 Hospital: [facility] Date: 2019 11:15 AM Admission: 7/15/2019 Impression Plan 1. Acute on chronic (on 4lpm normally) hypoxic resp failure secondary to below 2. Abnormal CT scan: HCAP possibly (treated), query more bleomycin lung toxicity, now with lack of response. Other concerns were pulmonary edema, but she is quite dried out now 3. Hx of lymphoma. Not on therapy since the beginning of the year. 4. Hx of PE and was on eliquis 5. Hx of asthma 6. Reviewed [facility] records. She was seen last there on 19. Early  cellcept was stopped and at that appointment she was scheduled to taper off of steroids, which she did. Apparently she was on hospice care, but I am unclear as to the details; per patient it was rescinded 7. AMS~ per neurology. Noted oncology's thoughts  1. Strep/legionella negative 2. ECHO; PASP 60mmHg 3. O2 titration above 90%, on low flow today. Continue to wean as able. 4. autoimmune labs mostly negative, but did have a + PEACE with elevated CRP and ESR; query coexisting autoimmune process. Once discharge would recommend rheumatology follow up 5. Continue IV steroids 6. All questions asked of me were answered to the best of my ability and their apparent satisfaction 7. High risk for rapid decline 8. Long term prognosis poor Daily Progression: 
 
 A couple periods of lucidity overnight per her sister's report. Discussed with her and answered questions, offered support.  Per family and RN reports more responsive today. Was suppose to have EEG; not done yet. Family is declining LP.  FiO2 requirements are remarkably down.  chest film shows no worsening of pulmonary infiltrates.  Dramatic change in mental status yesterday with unclear etiology. Seizures vs CVA, but all testing thus far is negative 7/26 On 15lpm; theoretically can be switched to midflow, but wean you talk about it she gets anxious and her saturations drop. I discussed with RN; suggest doing one hour on midlflow and one hour on highflow during the day to get her use to it.  
 
7/25 She looks so weak, but states she feels better. About to work with PT. Still +.  
 
7/24 Noted question of lung transplant query; I think recent malignancy would not allow her to be placed on the list. But since she is followed at [facility] they could assisted better. She feels better today 7/23 Looks stronger Desats quickly with movement, but stabilizes; this is to be expected with her chronic lung disease Weight gain over night? Agree with diuretics Agree with getting up and out of bed as much as possible 7/22 Creatinine improving Was increased in Flow and FiO2, unclear why as she is feeling better Chest film today looks fairly similar 7/18 Not much different. We are starting her on high flow now 7/17 Feels possibly a little better. proBNP elevated today 7/16 Consult Note requested by Dr Gattis Dakins. Patient presented for admission secondary to a one week course of cough, subjective fevers, and malasie. Outpatient PCP starte zpak and she did not have bernadette improvement. No other symptoms reported on this recent course. 7/15/19 CT scan did showed worsening right great than left infiltrates with interlobular septal thickening; no PE. BNP was 534, not very elevated. This past Jan 2019 she was dx with hodgkin's lymphoma. Was on blecyomcin and developed a pneumonitis associated with it. Was started on cellcept 500mg bid and prednisone. Followed in the office at [facility] in April, however transferred her care to [facility] and saw Dr Rosanne Granger (sp?).  Her last visit with them was this past June and at that visit she was weaned off of the prednisone and cellcept. We do not have those records at this time. I have reviewed the labs and previous days notes. Pertinent items are noted in HPI. Past Medical History:  
Diagnosis Date  Asthma   
 as a child, nothing since menopause  Cancer ([facility] [address] 08/21/2018 Non-Hodgkin's  Cataracts, bilateral   
 Diabetes ([facility] [address] type II  
 Environmental allergies  Fibromyalgia Treated with alternate medical therapy  Hypertension Past Surgical History:  
Procedure Laterality Date  HX BREAST BIOPSY Right 2006  HX COLONOSCOPY  2015  HX GYN Pap 2015  HX ORTHOPAEDIC    
 fractured R ankle/ no surgery  HX OTHER SURGICAL Right 08/27/2018  
 excision of deep neck cervical LN. Prior to Admission medications Medication Sig Start Date End Date Taking? Authorizing Provider  
acetaminophen (TYLENOL) 650 mg suppository Insert 650 mg into rectum every four (4) hours as needed for Pain. Yes Provider, Historical  
amLODIPine (NORVASC) 5 mg tablet Take 5 mg by mouth daily. Yes Provider, Historical  
trimethoprim-sulfamethoxazole (BACTRIM DS) 160-800 mg per tablet Take 1 Tab by mouth every Monday, Wednesday, Friday. Yes Provider, Historical  
metFORMIN (GLUCOPHAGE) 500 mg tablet Take 500 mg by mouth two (2) times daily (with meals). Indications: Per [address] 3/30/19  Yes Provider, Historical  
levalbuterol (XOPENEX) 1.25 mg/0.5 mL nebu 1.25 mg by Nebulization route every four (4) hours as needed. Indications: Per [address] 3/30/19  Yes Provider, Historical  
acetaminophen (TYLENOL ARTHRITIS PAIN) 650 mg TbER Take 650 mg by mouth as needed (Q 6hrs PRN). 3/30/19  Yes Provider, Historical  
apixaban (ELIQUIS) 5 mg tablet Take 1 Tab by mouth every twelve (12) hours. 3/13/19  Yes Sheryl Smith MD  
atorvastatin (LIPITOR) 40 mg tablet TAKE 1 TAB BY MOUTH DAILY.  12/3/17  Yes Sheryl Smith MD  
 Oxygen 3 Devices by Nasal route as needed. Indications: SOB PRN    Provider, Historical  
 
No Known Allergies Social History Tobacco Use  Smoking status: Never Smoker  Smokeless tobacco: Never Used Substance Use Topics  Alcohol use: Yes Comment: socially Family History Problem Relation Age of Onset  Heart Disease Mother  Cancer Father   
     prostate cancer  Cancer Sister Breast cancer apparent DCIS  
 Heart Disease Brother Brother  from congestive heart failure secondary to severe mitral disease OBJECTIVE: 
 
 Vital Signs: 
    
Visit Vitals /87 (BP 1 Location: Right arm, BP Patient Position: At rest) Pulse (!) 103 Temp 98.4 °F (36.9 °C) Resp 22 Ht 5' 9\" (1.753 m) Wt 77.2 kg (170 lb 4.8 oz) SpO2 100% BMI 25.15 kg/m² Temp (24hrs), Av.3 °F (36.8 °C), Min:97.6 °F (36.4 °C), Max:98.8 °F (37.1 °C) Intake/Output:  
  Last shift: 701 -  190 In: 500 [I.V.:500] Out: 350 [Urine:350] Last 3 shifts:  190 -  0700 In: 0 Out: 750 [Urine:750] Intake/Output Summary (Last 24 hours) at 2019 1226 Last data filed at 2019 9480 Gross per 24 hour Intake 500 ml Output 1100 ml Net -600 ml Physical Exam:                                       
Exam Findings Other General: No resp distress noted, appears stated age HEENT:  No ulcers, JVD not elevated, no cervical LAD Chest: No pectus deformity, normal chest rise b/l HEART:  RRR, no murmurs/rubs/gallops Lungs:  Notable crackles, diminished BS at bases ABD: Soft/NT, non rigid mildly distended EXT: No cyanosis/clubbing/edema, normal peripheral pulses Skin: No rashes or ulcers, no mottling Neuro: Unresponsive Medications: 
Current Facility-Administered Medications Medication Dose Route Frequency  dextrose 5% - 0.45% NaCl with KCl 10 mEq/L infusion  60 mL/hr IntraVENous CONTINUOUS  
  hydrALAZINE (APRESOLINE) 20 mg/mL injection 10 mg  10 mg IntraVENous Q6H PRN  
 magnesium sulfate 500 mg in 0.9% sodium chloride IVPB  500 mg IntraVENous TID  amantadine HCl (SYMMETREL) capsule 100 mg  100 mg Oral BID  methylPREDNISolone (PF) (SOLU-MEDROL) injection 40 mg  40 mg IntraVENous Q8H  
 metoprolol (LOPRESSOR) injection 2.5 mg  2.5 mg IntraVENous BID  heparin (porcine) injection 5,000 Units  5,000 Units SubCUTAneous Q8H  
 levETIRAcetam (KEPPRA) 1,000 mg in 0.9% sodium chloride 100 mL IVPB  1,000 mg IntraVENous Q12H  mycophenolate mofetil (CELLCEPT) capsule 500 mg  500 mg Oral ACB&D  
 glucose chewable tablet 16 g  4 Tab Oral PRN  
 dextrose (D50W) injection syrg 12.5-25 g  12.5-25 g IntraVENous PRN  
 glucagon (GLUCAGEN) injection 1 mg  1 mg IntraMUSCular PRN  
 insulin lispro (HUMALOG) injection   SubCUTAneous TIDAC  ALPRAZolam (XANAX) tablet 0.25 mg  0.25 mg Oral BID PRN  
 sodium chloride (NS) flush 5-10 mL  5-10 mL IntraVENous PRN  
 sodium chloride (NS) flush 5-40 mL  5-40 mL IntraVENous Q8H  
 sodium chloride (NS) flush 5-40 mL  5-40 mL IntraVENous PRN  
 acetaminophen (TYLENOL) tablet 650 mg  650 mg Oral Q4H PRN  
 ondansetron (ZOFRAN) injection 4 mg  4 mg IntraVENous Q4H PRN  
 lactobac ac& pc-s.therm-b.anim (JUNI Q/RISAQUAD)  1 Cap Oral DAILY  atorvastatin (LIPITOR) tablet 40 mg  40 mg Oral QHS Labs: ABG Recent Labs  
  07/29/19 
1227 07/29/19 
0251 PHI 7.491* 7.438 PCO2I 40.5 50.4* PO2I 72* 185* HCO3I 30.9* 34.1*  
SO2I 95 100* FIO2I  --  100 CBC Recent Labs  
  07/29/19 
0603 WBC 18.1* HGB 8.5* HCT 28.5*  
 MCV 99.0 MCH 29.5 Metabolic Panel Recent Labs  
  07/29/19 
6378   
K 4.0  
 CO2 29 * BUN 36* CREA 1.07* CA 9.2 Pertinent Labs [place], [place] 
7/31/2019

## 2019-07-31 NOTE — PROGRESS NOTES
Patient receiving 24 hour EEG and is on 6 liters of oxygen. Patient remains unresponsive with report of brief periods lucidity. Palliative care following. CM to monitor.   
 
Salima Khan MS

## 2019-07-31 NOTE — PROGRESS NOTES
Physical Therapy Note Patient evaluated 7/23 and unable to participate in therapy since evaluation. Patient has been unresponsive and today is receiving 24 hour EEG. Discussed with RN. Will discontinue orders at this time. Please re-consult when patient is medically stable. Thank you, 
Jeni GREENBERGT

## 2019-07-31 NOTE — PROGRESS NOTES
SUBJECTIVE Marylee Eon is a 70 y.o. female who has had altered mental status for the past several days. No change in clinical status. She was on continuous video EEG for the last 24 hours and was noted to be awake for a period of 10 to 15 minutes at 1:30 AM and then again at around 6 AM.  She was able to follow simple commands and respond appropriately during that time. MRI of the brain with contrast was negative. Has not been eating or drinking anything for the past several days. Family undecided about NG tube versus PEG tube or if they are going to do it at all.  
  
 
EXAM 
Exam: 
Visit Vitals BP (!) 170/93 Pulse (!) 127 Temp 98.4 °F (36.9 °C) Resp 27 Ht 5' 9\" (1.753 m) Wt 77.2 kg (170 lb 4.8 oz) SpO2 100% BMI 25.15 kg/m² Gen: 
CV: RRR Lungs: non labored breathing Abd: non distending Neuro: Difficult to arouse CN II-XII: Pupils equal and reactive, face symmetric, tongue/palate midline Motor: All extremities flaccid Sensory:  deferred Gait: Deferred LABS/ IMAGING Continuous EEG did not capture any seizure-like activity Lab Results Component Value Date/Time WBC 18.1 (H) 07/29/2019 06:03 AM  
 HGB 8.5 (L) 07/29/2019 06:03 AM  
 Hematocrit (POC) 34 (L) 09/12/2018 07:33 AM  
 HCT 28.5 (L) 07/29/2019 06:03 AM  
 PLATELET 621 37/60/8693 06:03 AM  
 MCV 99.0 07/29/2019 06:03 AM  
 
Lab Results Component Value Date/Time  Sodium 145 07/29/2019 06:03 AM  
 Potassium 4.0 07/29/2019 06:03 AM  
 Chloride 106 07/29/2019 06:03 AM  
 CO2 29 07/29/2019 06:03 AM  
 Anion gap 10 07/29/2019 06:03 AM  
 Glucose 157 (H) 07/29/2019 06:03 AM  
 BUN 36 (H) 07/29/2019 06:03 AM  
 Creatinine 1.07 (H) 07/29/2019 06:03 AM  
 BUN/Creatinine ratio 34 (H) 07/29/2019 06:03 AM  
 GFR est AA >60 07/29/2019 06:03 AM  
 GFR est non-AA 51 (L) 07/29/2019 06:03 AM  
 Calcium 9.2 07/29/2019 06:03 AM  
 Bilirubin, total 0.2 07/23/2019 04:07 AM  
 AST (SGOT) 14 (L) 07/23/2019 04:07 AM  
 Alk. phosphatase 151 (H) 07/23/2019 04:07 AM  
 Protein, total 6.6 07/23/2019 04:07 AM  
 Albumin 2.6 (L) 07/23/2019 04:07 AM  
 Globulin 4.0 07/23/2019 04:07 AM  
 A-G Ratio 0.7 (L) 07/23/2019 04:07 AM  
 ALT (SGPT) 12 07/23/2019 04:07 AM  
 
 
ASSESSMENT Hospital Problems  Date Reviewed: 2/3/2019 Codes Class Noted POA Chronic hypoxemic respiratory failure (HCC) ICD-10-CM: J96.11 
ICD-9-CM: 518.83, 799.02  7/16/2019 Yes Bleomycin lung toxicity ICD-10-CM: J98.4, T45.1X5A 
ICD-9-CM: 508.8, E930.7  7/16/2019 Yes Encephalopathy ICD-10-CM: G93.40 ICD-9-CM: 348.30  7/31/2019 Unknown [facility]gkin [facility] ICD-10-CM: C81.90 ICD-9-CM: 201.90  2/3/2019 Yes PLAN Encephalopathy persists with intermittent very short periods of alertness and activity Difficult to explain this presentation Metabolic encephalopathy versus akinetic mutism from cerebral hypoxia Since she is very drowsy and EEG did not capture any seizures/show any abnormalities we will discontinue Keppra Family deciding about future course of care and feeding tube I will be available to reassess as needed.   
 
Harsha Ruvalcaba MD

## 2019-07-31 NOTE — PROGRESS NOTES
Hospital Progress Note NAME:  Tasha Sandoval :   1950 MRN:  [id number] Date/Time:  2019 8:25 AM 
 
Plan: 1. 24 hour eeg in progress 2. cellcept and steroids 3. D/w sister/brother Risk of Deterioration: Low  []           Moderate  []           High  [x] Assessment:  
Principal Problem: 
  Pneumonia (7/15/2019) Active Problems: 
   Acute encephalopathy: 
-unclear etiology - transiently awaken with appropriate conversation 
-seizure was suspected but  EEG did not confirm 
-MRI was negative for stroke,CT head and neck - no bleed,vascular occlusion. -ABG reviewed. Electrolytes,ammonia,trop,lactic acid wnl 
-Neurology following 
-Continue  keppra. -MRI brain w contrast  - no acute process. -Will be on 24 hr EEG 
-May need LP for cytology as she has history of hodgkin's lymphoma. Hold eliquis. 
-Oncology following Acute on Chronic hypoxemic respiratory failure ([facility] [address] (2019) On nasal canula Bleomycin lung toxicity (2019) 
   cellcept and steroids Diabetes mellitus type 2, controlled ([facility] [address] (2016) 
   basal bolus insulin Hodgkin lymphoma ([facility] [address] (2/3/2019) bulky stage II HD (classical),s/p treatment earlier this year by Dr. Katherine Leon with ABVD. Course truncated due to abrupt onset bleomycin toxicity. Diff dx includes lymphangitic spread of lymphoma. She completed 3 1/2 treatments of ABVD, less than the standard 6. oncol raised the potential concern for recurrent disease with her family. UTI -  ENTEROCOCCUS SPECIES  
    treated Admitting notes:68 y.o. female with past medical history significant for htn, fibromyalgia, muscle atrophy, pulmonary fibrosis, asthma, b/l cataracts, dm, non-hodgkin's, who presents from ems with chief complaint of sob. Pt has \"last night\" onset of worsening sob described as \"labored breathing\" that's continued into this morning.  She is normally on 4.5 L of O2 n/c at baseline, but had it increased to 5 L by staff. She also endorses a dry non-productive cough and fever (peak 100) at this time. EMS arrived and measured initial O2 sat of 92% and HR of 140 bpm. Denies pleuritic cp and abd pain. Of note, she has had PNA \"x3 times since Ken" and was treated last week with a z-pack for sinus infection symptomatic of congestion. DNR form in place. EMS was called to [place];  bpm, 124/78, and 9 Subjective/interium history Unresponsive at time of my exam - sister/borther reports earlier this am - following commands and appropriate 11 Point Review of Systems:  
Negative except no cp 
 
[]            Unable to obtain ROS due to:      
[]            mental status change []            sedated []            intubated Social History Tobacco Use  Smoking status: Never Smoker  Smokeless tobacco: Never Used Substance Use Topics  Alcohol use: Yes Comment: socially Medications reviewed: 
Current Facility-Administered Medications Medication Dose Route Frequency  magnesium sulfate 500 mg in 0.9% sodium chloride IVPB  500 mg IntraVENous TID  amantadine HCl (SYMMETREL) capsule 100 mg  100 mg Oral BID  methylPREDNISolone (PF) (SOLU-MEDROL) injection 40 mg  40 mg IntraVENous Q8H  
 metoprolol (LOPRESSOR) injection 2.5 mg  2.5 mg IntraVENous BID  heparin (porcine) injection 5,000 Units  5,000 Units SubCUTAneous Q8H  
 levETIRAcetam (KEPPRA) 1,000 mg in 0.9% sodium chloride 100 mL IVPB  1,000 mg IntraVENous Q12H  mycophenolate mofetil (CELLCEPT) capsule 500 mg  500 mg Oral ACB&D  
 glucose chewable tablet 16 g  4 Tab Oral PRN  
 dextrose (D50W) injection syrg 12.5-25 g  12.5-25 g IntraVENous PRN  
 glucagon (GLUCAGEN) injection 1 mg  1 mg IntraMUSCular PRN  
 insulin lispro (HUMALOG) injection   SubCUTAneous TIDAC  ALPRAZolam (XANAX) tablet 0.25 mg  0.25 mg Oral BID PRN  
  sodium chloride (NS) flush 5-10 mL  5-10 mL IntraVENous PRN  
 sodium chloride (NS) flush 5-40 mL  5-40 mL IntraVENous Q8H  
 sodium chloride (NS) flush 5-40 mL  5-40 mL IntraVENous PRN  
 acetaminophen (TYLENOL) tablet 650 mg  650 mg Oral Q4H PRN  
 ondansetron (ZOFRAN) injection 4 mg  4 mg IntraVENous Q4H PRN  
 lactobac ac& pc-s.therm-b.anim (JNUI Q/RISAQUAD)  1 Cap Oral DAILY  atorvastatin (LIPITOR) tablet 40 mg  40 mg Oral QHS Objective:  
Vitals: 
Visit Vitals /75 (BP 1 Location: Left arm, BP Patient Position: At rest) Pulse (!) 102 Temp 98.2 °F (36.8 °C) Resp 22 Ht 5' 9\" (1.753 m) Wt 170 lb 4.8 oz (77.2 kg) SpO2 99% BMI 25.15 kg/m² Temp (24hrs), Av.1 °F (36.7 °C), Min:97.6 °F (36.4 °C), Max:98.5 °F (36.9 °C) O2 Flow Rate (L/min): 6 l/min O2 Device: Hi flow nasal cannula(midflow) Last 24hr Input/Output: 
 
Intake/Output Summary (Last 24 hours) at 2019 0746 Last data filed at 2019 9522 Gross per 24 hour Intake 0 ml Output 750 ml Net -750 ml PHYSICAL EXAM: 
General:    Alert, cooperative, no distress, appears stated age. Head:   Normocephalic, without obvious abnormality, atraumatic. Eyes:   Conjunctivae/corneas clear. PERRLA Nose:  Nares normal. No drainage or sinus tenderness. Throat:    Lips, mucosa, and tongue normal.  No Thrush Neck:  Supple, symmetrical,  no adenopathy, thyroid: non tender 
  no carotid bruit and no JVD. Back:    Symmetric,  No CVA tenderness. Lungs:   Decreased bs with rales. Chest wall:  No tenderness or deformity. No Accessory muscle use. Heart:   Regular rate and rhythm,  no murmur, rub or gallop. Abdomen:   Soft, non-tender. Not distended. Bowel sounds normal. No masses. Lab Data Reviewed: 
 
Recent Labs  
  19 
0603 19 
6448 WBC 18.1* 15.0* HGB 8.5* 8.4* HCT 28.5* 27.6*  
 368 Recent Labs  
  19 
0603 19 
1216 19 
7807  142 144 K 4.0 3.9 4.0  
 104 106 CO2 29 30 34* * 164* 140* BUN 36* 39* 37* CREA 1.07* 1.14* 1.17* CA 9.2 9.7 9.6 MG  --  2.5* 2.6* INR  --  1.4*  --   
 
Lab Results Component Value Date/Time Glucose (POC) 217 (H) 07/31/2019 07:02 AM  
 Glucose (POC) 148 (H) 07/30/2019 09:16 PM  
 Glucose (POC) 165 (H) 07/30/2019 04:35 PM  
 Glucose (POC) 169 (H) 07/30/2019 12:00 PM  
 Glucose (POC) 207 (H) 07/30/2019 06:46 AM  
 
 
___________________________________________________ 
___________________________________________________ Attending Physician: Efrain Payne MD

## 2019-07-31 NOTE — PROGRESS NOTES
Problem: Seizure Disorder (Adult) Goal: *STG: Remains free of seizure activity Outcome: Progressing Towards Goal 
 Pt unresponsive upon assessment this AM. Pt with periods of alertness and responsiveness overnight. Problem: Pneumonia: Day 4 Goal: Off Pathway (Use only if patient is Off Pathway) Outcome: Not Progressing Towards Goal 
 Pts MEWs score increased from 4 to 7 this AM. Pts heart rate in the 120's-130's, respirations up to 40's, blood pressures elevated. Pt given IV metoprolol. Pt bladder scanned, 305 ml. Dr. Jennifer Kulkarni notified. Orders received for straight cath and prn hydralazine. 0950- 350 ml  
1200- pt given prn hydralazine for SBP>170 
 
1345- pts blood pressure 170/93, heart rate in the 120's-130's, respirations in the 20's, pts breathing labored, Dr. Jennifer Kulkarni notified, orders received for one time dose of metoprolol Bedside shift change report given to Arina Herbert RN (oncoming nurse) by Dominik Tong RN (offgoing nurse). Report included the following information SBAR, Kardex, ED Summary, Procedure Summary, Intake/Output, MAR, Accordion, Recent Results, Med Rec Status, Cardiac Rhythm NSR and Alarm Parameters .

## 2019-07-31 NOTE — PROGRESS NOTES
Pt awake, responsive, conversant. Spoke sister's  name clearly. Counted in sequence 11, 12, 13, 14. Lifted arms above head, clapped, touched L. ear, R  ear to command. Squeezed my hands and then resisted when I pulled (using full strength). Will continue to monitor.

## 2019-07-31 NOTE — PROGRESS NOTES
Occupational Therapy Patient evaluated 7/23 and unable to participate in therapy since evaluation. Patient has been unresponsive and today is receiving 24 hour EEG. Discussed with RN. Will discontinue orders at this time. Please re-consult when patient is medically stable. Ye Moon.  MS Preeti OTR/L

## 2019-07-31 NOTE — PROGRESS NOTES
Pt alert, oriented x three. Pt able to state birthday, described the beckie as pink( which it was )stuck out her tongue, raised her arms, followed commands regarding finger directions. Pt also address several family members via face time , with appropriate responses returned. Will continue to monitor.

## 2019-08-01 NOTE — PROGRESS NOTES
Pulmonary, Critical Care, and Sleep Medicine~Progress Note Name: Derik Roblero MRN: [id number] : 1950 Hospital: [address] Date: 2019 11:15 AM Admission: 7/15/2019 Impression Plan 1. Acute on chronic (on 4lpm normally) hypoxic resp failure secondary to below 2. Abnormal CT scan: HCAP possibly (treated), query more bleomycin lung toxicity, now with lack of response. Other concerns were pulmonary edema, but she is quite dried out now 3. Hx of lymphoma. Not on therapy since the beginning of the year. 4. Hx of PE and was on eliquis 5. Hx of asthma 6. Reviewed [facility] records. She was seen last there on 19. Early  cellcept was stopped and at that appointment she was scheduled to taper off of steroids, which she did. Apparently she was on hospice care, but I am unclear as to the details; per patient it was rescinded 7. AMS~ per neurology. Noted oncology's thoughts  1. Strep/legionella negative 2. ECHO; PASP 60mmHg 3. O2 titration above 90%, on low flow today. Continue to wean as able. 4. autoimmune labs mostly negative, but did have a + PEACE with elevated CRP and ESR; query coexisting autoimmune process. Once discharge would recommend rheumatology follow up 5. Continue IV steroids / cellcept 6. All questions asked of me were answered to the best of my ability and their apparent satisfaction 7. High risk for rapid decline 8. Long term prognosis poor Daily Progression: 
 
 Long discussion with extended family at bedside. Questions answered. O2 requirement down to 3 l/m -- better than when she was at her baseline. The initial pulmonary insult seems to have mostly resolved, though clearly there are other issues now at play.  A couple periods of lucidity overnight per her sister's report. Discussed with her and answered questions, offered support.  Per family and RN reports more responsive today. Was suppose to have EEG; not done yet. Family is declining LP. 7/29 FiO2 requirements are remarkably down. 7/28 chest film shows no worsening of pulmonary infiltrates. Dramatic change in mental status yesterday with unclear etiology. Seizures vs CVA, but all testing thus far is negative 7/26 On 15lpm; theoretically can be switched to midflow, but wean you talk about it she gets anxious and her saturations drop. I discussed with RN; suggest doing one hour on midlflow and one hour on highflow during the day to get her use to it.  
 
7/25 She looks so weak, but states she feels better. About to work with PT. Still +.  
 
7/24 Noted question of lung transplant query; I think recent malignancy would not allow her to be placed on the list. But since she is followed at [facility] they could assisted better. She feels better today 7/23 Looks stronger Desats quickly with movement, but stabilizes; this is to be expected with her chronic lung disease Weight gain over night? Agree with diuretics Agree with getting up and out of bed as much as possible 7/22 Creatinine improving Was increased in Flow and FiO2, unclear why as she is feeling better Chest film today looks fairly similar 7/18 Not much different. We are starting her on high flow now 7/17 Feels possibly a little better. proBNP elevated today 7/16 Consult Note requested by Dr Darlene Iverson. Patient presented for admission secondary to a one week course of cough, subjective fevers, and malasie. Outpatient PCP starte zpak and she did not have bernadette improvement. No other symptoms reported on this recent course. 7/15/19 CT scan did showed worsening right great than left infiltrates with interlobular septal thickening; no PE. BNP was 534, not very elevated. This past Jan 2019 she was dx with hodgkin's lymphoma.  Was on blecyomcin and developed a pneumonitis associated with it. Was started on cellcept 500mg bid and prednisone. Followed in the office at [facility] in April, however transferred her care to [facility] and saw Dr Meredith Garcia (sp?). Her last visit with them was this past June and at that visit she was weaned off of the prednisone and cellcept. We do not have those records at this time. I have reviewed the labs and previous days notes. Pertinent items are noted in HPI. Past Medical History:  
Diagnosis Date  Asthma   
 as a child, nothing since menopause  Cancer ([facility] 08/21/2018 Non-Hodgkin's  Cataracts, bilateral   
 Diabetes ([facility] type II  
 Environmental allergies  Fibromyalgia Treated with alternate medical therapy  Hypertension Past Surgical History:  
Procedure Laterality Date  HX BREAST BIOPSY Right 2006  HX COLONOSCOPY  2015  HX GYN Pap 2015  HX ORTHOPAEDIC    
 fractured R ankle/ no surgery  HX OTHER SURGICAL Right 08/27/2018  
 excision of deep neck cervical LN. Prior to Admission medications Medication Sig Start Date End Date Taking? Authorizing Provider  
acetaminophen (TYLENOL) 650 mg suppository Insert 650 mg into rectum every four (4) hours as needed for Pain. Yes Provider, Historical  
amLODIPine (NORVASC) 5 mg tablet Take 5 mg by mouth daily. Yes Provider, Historical  
trimethoprim-sulfamethoxazole (BACTRIM DS) 160-800 mg per tablet Take 1 Tab by mouth every Monday, Wednesday, Friday. Yes Provider, Historical  
metFORMIN (GLUCOPHAGE) 500 mg tablet Take 500 mg by mouth two (2) times daily (with meals). Indications: Per [address] [employer] [address] 3/30/19  Yes Provider, Historical  
levalbuterol (XOPENEX) 1.25 mg/0.5 mL nebu 1.25 mg by Nebulization route every four (4) hours as needed.  Indications: Per [address] 3/30/19  Yes Provider, Historical  
acetaminophen (TYLENOL ARTHRITIS PAIN) 650 mg TbER Take 650 mg by mouth as needed (Q 6hrs PRN). 3/30/19  Yes Provider, Historical  
apixaban (ELIQUIS) 5 mg tablet Take 1 Tab by mouth every twelve (12) hours. 3/13/19  Yes Emiliano Rubio MD  
atorvastatin (LIPITOR) 40 mg tablet TAKE 1 TAB BY MOUTH DAILY. 12/3/17  Yes Emiliano Rubio MD  
Oxygen 3 Devices by Nasal route as needed. Indications: SOB PRN    Provider, Historical  
 
No Known Allergies Social History Tobacco Use  Smoking status: Never Smoker  Smokeless tobacco: Never Used Substance Use Topics  Alcohol use: Yes Comment: socially Family History Problem Relation Age of Onset  Heart Disease Mother  Cancer Father   
     prostate cancer  Cancer Sister Breast cancer apparent DCIS  
 Heart Disease Brother Brother  from congestive heart failure secondary to severe mitral disease OBJECTIVE: 
 
 Vital Signs: 
    
Visit Vitals BP (!) 163/92 (BP 1 Location: Right arm, BP Patient Position: At rest) Pulse (!) 121 Temp 97.7 °F (36.5 °C) Resp 25 Ht 5' 9\" (1.753 m) Wt 77.6 kg (171 lb) SpO2 97% BMI 25.25 kg/m² Temp (24hrs), Av.2 °F (36.8 °C), Min:97.7 °F (36.5 °C), Max:98.7 °F (37.1 °C) Intake/Output:  
  Last shift: No intake/output data recorded. Last 3 shifts:  1901 -  0700 In: 657 [I.V.:657] Out: 2000 [[facility]:[id number]] Intake/Output Summary (Last 24 hours) at 2019 1058 Last data filed at 2019 3004 Gross per 24 hour Intake 157 ml Output 900 ml Net -743 ml Physical Exam:                                       
Exam Findings Other General: No resp distress noted, appears stated age HEENT:  No ulcers, JVD not elevated, no cervical LAD Chest: No pectus deformity, normal chest rise b/l HEART:  RRR, no murmurs/rubs/gallops Lungs:  Notable crackles, diminished BS at bases ABD: Soft/NT, non rigid mildly distended EXT: No cyanosis/clubbing/edema, normal peripheral pulses Skin: No rashes or ulcers, no mottling Neuro: Unresponsive Medications: 
Current Facility-Administered Medications Medication Dose Route Frequency  dextrose 5% - 0.45% NaCl with KCl 10 mEq/L infusion  60 mL/hr IntraVENous CONTINUOUS  
 hydrALAZINE (APRESOLINE) 20 mg/mL injection 10 mg  10 mg IntraVENous Q6H PRN  
 amantadine HCl (SYMMETREL) capsule 100 mg  100 mg Oral BID  methylPREDNISolone (PF) (SOLU-MEDROL) injection 40 mg  40 mg IntraVENous Q8H  
 metoprolol (LOPRESSOR) injection 2.5 mg  2.5 mg IntraVENous BID  heparin (porcine) injection 5,000 Units  5,000 Units SubCUTAneous Q8H  
 levETIRAcetam (KEPPRA) 1,000 mg in 0.9% sodium chloride 100 mL IVPB  1,000 mg IntraVENous Q12H  mycophenolate mofetil (CELLCEPT) capsule 500 mg  500 mg Oral ACB&D  
 glucose chewable tablet 16 g  4 Tab Oral PRN  
 dextrose (D50W) injection syrg 12.5-25 g  12.5-25 g IntraVENous PRN  
 glucagon (GLUCAGEN) injection 1 mg  1 mg IntraMUSCular PRN  
 insulin lispro (HUMALOG) injection   SubCUTAneous TIDAC  ALPRAZolam (XANAX) tablet 0.25 mg  0.25 mg Oral BID PRN  
 sodium chloride (NS) flush 5-10 mL  5-10 mL IntraVENous PRN  
 sodium chloride (NS) flush 5-40 mL  5-40 mL IntraVENous Q8H  
 sodium chloride (NS) flush 5-40 mL  5-40 mL IntraVENous PRN  
 acetaminophen (TYLENOL) tablet 650 mg  650 mg Oral Q4H PRN  
 ondansetron (ZOFRAN) injection 4 mg  4 mg IntraVENous Q4H PRN  
 lactobac ac& pc-s.therm-b.anim (JUNI Q/RISAQUAD)  1 Cap Oral DAILY  atorvastatin (LIPITOR) tablet 40 mg  40 mg Oral QHS Labs: ABG Recent Labs  
  07/29/19 
1227 PHI 7.491* PCO2I 40.5 PO2I 72* HCO3I 30.9* SO2I 95  
  
 
CBC No results for input(s): WBC, HGB, HCT, PLT, MCV, MCH, HGBEXT, HCTEXT, PLTEXT, HGBEXT, HCTEXT, PLTEXT in the last 72 hours. Metabolic Panel Recent Labs 08/01/19 
0449 *  
K 5.0  
* CO2 28  
* BUN 38* CREA 0.88 CA 9.2 Pertinent Labs [facility], [address] 
8/1/2019

## 2019-08-01 NOTE — PROGRESS NOTES
NUTRITION brief Recommendations: 1. Start tube feeds with: - Osmolite 1.5 @ 20ml/hr advanced 10ml/hr q12hr to goal rate of Osmolite 1.5 @ 46ml/hr + 1 pkt prosource + 145ml flush q4hr 
 - adjust water flush pending sodium levels 2. At very high risk for refeeding syndrome: 
 - add 100mg thiamine 
 - daily MVI (in addition to tube feeds) 
 - check Mg and Phos with BMP tomorrow and until stable 3. Follow BG trends with start of tube feeds, currently on D5 Diet: NPO Meds: SSI, Shantel-Q, keppra, mag sulfate, solu-medrol, cellcept, D5 NaCl w/ KCl @ 60ml/hr MD consult for tube feeds received. Chart reviewed and family electing for NGT placement. NGT on place and just waiting on KUB per discussion with RN. Met with family and discussed tube feed progression and monitoring of tolerance and NGT vs PEG, good questions from family answered during visit. Recommend slow start of tube feeds since at high risk for refeeding syndrome. Start with: Osmolite 1.5 @ 20ml/hr advanced 10ml/hr q12hr to goal rate of Osmolite 1.5 @ 46ml/hr + 1 pkt prosource + 145ml flush q4hr. Provides: 1104ml, 1716kcal, 85g protein, 839ml free fluid + 870ml fluid = 1709ml fluid. Meets 100% energy and protein needs. **Hypernatremia today. Follow trends with additional fluid PRN. D5 infusing. BG also elevated. Will continue to follow for tube feed advancement/tolerance, BG, electrolytes, wt/fluid trends. Estimated Nutrition Needs:  
Kcals/day: 8717 Kcals/day(9210-9826 kcal/day (MSJ x 1.2-1.3)) Protein: 80 g(80-83g/day (1-1.1g/kg)) Fluid: 1650 ml(1ml/kcal) Based On: Kelsie 1898 Weight Used: Actual wt Michelle Jurado, RD [address], Pager #[phone] or [phone]

## 2019-08-01 NOTE — PROGRESS NOTES
Music Therapy Assessment [address] Lorra Duverney [id number]    
1950  70 y.o.  female Patient Telephone Number: [phone] (home) Shinto Affiliation: [facility]  
Language: [place] Patient Active Problem List  
 Diagnosis Date Noted  Chronic hypoxemic respiratory failure ([facility] 07/16/2019 Priority: 3 - Three  Bleomycin lung toxicity 07/16/2019 Priority: 4 - Four  Encephalopathy 07/31/2019  Pneumonia 07/15/2019  Acute respiratory failure ([facility] 02/03/2019  Leg swelling 02/03/2019  Hodgkin lymphoma ([facility] 02/03/2019  CAP (community acquired pneumonia) 02/03/2019  Lymphadenopathy 08/24/2018  Supraclavicular mass 08/14/2018  Overweight (BMI 25.0-29.9) 01/30/2018  Diabetes mellitus type 2, controlled ([facility] 09/08/2016  Dyslipidemia 09/08/2016  SVT (supraventricular tachycardia) ([facility] 08/29/2016  Essential hypertension with goal blood pressure less than 140/90 08/29/2016 Date: 8/1/2019            Total Time (in minutes): 40          [facility] 2 Mental Status:   [  ] Alert [  ] [place] [employer] [  ]  Confused  [x] Minimally responsive  [  ] Sleeping Communication Status: [  ] Impaired Speech [  ] Nonverbal -N/A Physical Status:   [x] Oxygen in use  [  ] Hard of Hearing [  ] Vision Impaired [  ] Ambulatory  [  ] Ambulatory with assistance [  ] Non-ambulatory Music Preferences, Background: Pt's family said pt listens to all kinds of music. Pt's sister shared that their uncle was a  for musicians including Bobby Andrey and Gabby Ortiz 17. Clinical Problem addressed: Support healthy pt/family coping. Goal(s) met in session: 
Physical/Pain management (Scale of 1-10): Pre-session rating: Pt could not report. Post-session rating: Pt could not report. [  ] Increased relaxation   [  ] Affected breathing patterns [  ] Decreased muscle tension   [  ] Decreased agitation [  ] Affected heart rate    [  ] Increased alertness Emotional/Psychological: 
[  ] Increased self-expression   [  ] Decreased aggressive behavior [  ] Decreased feelings of stress  [  ] Discussed healthy coping skills [  ] Improved mood    [  ] Decreased withdrawn behavior Social: 
[  ] Decreased feelings of isolation/loneliness [  ] Positive social interaction  
[x] Provided support and/or comfort for family/friends Spiritual: 
[x] Spiritual support    [  ] Expressed peace [  ] Expressed viri    [  ] Discussed beliefs Techniques Utilized (Check all that apply):  
[  ] Procedural support MT [  ] Music for relaxation [x] Patient preferred music 
[  ] Aixa analysis  [x] Song choice  [  ] Music for validation [  ] Entrainment  [  ] Movement to music [  ] Guided visualization [  ] James Moreno  [  ] Patient instrument playing [  ] Renato Mclean writing [  ] Margie Sparrwo along   [  ] Neto Marie  [  ] Sensory stimulation 
[x] Active Listening  [x] Music for spiritual support [  ] Making of CDs as gifts Session Observations: F/up visit; Patient (pt) was lying in bed with her eyes about a quarter of the way open. Pt's sister Betsy, pt's two nieces, and pt's grandniece were at bedside. This music therapist (MT) greeted Winsome Pearl and introduced self to pt's nieces. Pt's family members shared about pt becoming less alert over the past week and the struggle this has been. MT provided active listening and words of validation. Pt's family members were receptive to pt receiving music therapy. Pt's nieces shared a song that has become meaningful to them called Rise Up by [facility]. They played a recording of this for pt and MT with a smart phone before they needed to leave. MT sat at bedside and sang and played the [address] with zora. Pt's sister expressed enjoyment in the music and shared more about their family's music background.  MT sang and played the [address] (Don't Know Much About) with guitar. Pt's sister chose to hear the [place] song What a Friend We Have in [employer], which MT sang and played. Pt was minimally responsive throughout the session. Pt's sister expressed gratitude for the session and requested MT team follow up in the future as able. Brittany Aponte MT-BC (Music Therapist-Board Certified) Spiritual Care Department Referral-based service

## 2019-08-01 NOTE — PROGRESS NOTES
Palliative Medicine Consult [place]: [phone] ([phone]) Patient Name: Rony Bonilla YOB: 1950 Date of Initial Consult: July 17, 2019 Reason for Consult: Care Decisions Requesting Provider: Rolando Castillo Primary Care Physician: Bella Pyle MD 
 
 SUMMARY:  
Rony Bonilla is a 70 y.o. with a past history of Lymphoma (chemo) followed at [facility], HTN, fibromyalgia, muscle atrophy, pulmonary fibrosis, asthma, cataracts, DM, chronic respiratory failure, recurrent pna, sinus infection,  who was admitted on 7/15/2019 from [place] with a diagnosis of PNA, acute on chronic hypoxemic respiratory failure, bleomycin lung toxicity. Pt had 7 rounds of chemo and then developed significant side effects. DNR with Hospice services at the facility [facility]) revoked benefit for hospitalization. Current medical issues leading to Palliative Medicine involvement include: support with care decisions. Social: single, no children, well supported by brother and sister, moved to [facility] from [place] in 45 to work as the Director of International Studies at [employer] before transitioning to a position  for [employer], [employer], has not been home in 6 months as she has been in and out of facilities for rehab and care. Interval History: 
7/18/19: hypoxia and tachycardia with removal of mask for brief period. Pul to start cellcept with steroids to see if she responds. S/s due to disease progression not pneumonia Pt declined intubation for respiratory distress if needed. Plans for hi flow today 7/24/19: continues cellcept therapy. Still on hi flow 7/29/29: pt with acute mental change over the weekend. Suspect seizures but eeg neg,  MRI without contrast neg. Catatonic state 8/1/19: on nasal cannula, somnolent state for close to 24 hours, TF started today PALLIATIVE DIAGNOSES:  
1. catatonic stupor 2. Shortness of breath 3. Hypoxemia 4. Feeding difficulties 5. Leg swelling 6. Wheel chair bound PLAN:  
1. Pt fails to improve clinically 2. No reports of alert periods today from sister. Says pt has mainly been sleeping 3. Agitation with placement of NG tube 4. Feedings to be initiated today 5. Family in from out of town 6. Continue current level of care 7. Initial consult note routed to primary continuity provider and/or primary health care team members 8. Communicated plan of care with: Palliative IDTAndie 192 Team 
 
 GOALS OF CARE / TREATMENT PREFERENCES:  
 
GOALS OF CARE: 
Patient/Health Care Proxy Stated Goals: Prolong life TREATMENT PREFERENCES:  
Code Status: DNR Advance Care Planning: 
[x] [facility] Interdisciplinary Team has updated the ACP Navigator with [place] and Patient Capacity Siblings Liana Tracy and Sherie Vazquez Advance Care Planning 7/15/2019 Patient's Healthcare Decision Maker is: -  
Primary Decision Maker Name -  
Primary Decision Maker Phone Number -  
Primary Decision Maker Relationship to Patient -  
Confirm Advance Directive None Patient Would Like to Complete Advance Directive Yes Does the patient have other document types Do Not Resuscitate Medical Interventions: Limited additional interventions Other Instructions:  
Artificially Administered Nutrition: Feeding tube for a defined trial period Other: As far as possible, the palliative care team has discussed with patient / health care proxy about goals of care / treatment preferences for patient. HISTORY:  
 
History obtained from: chart, pt, team 
 
CHIEF COMPLAINT: pt admitted with aforementioned issues HPI/SUBJECTIVE: The patient is:  
[] Verbal and participatory [x] Non-participatory due to: Unresponsive Clinical Pain Assessment (nonverbal scale for severity on nonverbal patients):  
Clinical Pain Assessment Severity: 0 Activity (Movement): Lying quietly, normal position Duration: for how long has pt been experiencing pain (e.g., 2 days, 1 month, years) Frequency: how often pain is an issue (e.g., several times per day, once every few days, constant) FUNCTIONAL ASSESSMENT:  
 
Palliative Performance Scale (PPS): PPS: 30 
 
 
 PSYCHOSOCIAL/SPIRITUAL SCREENING:  
 
Palliative IDT has assessed this patient for cultural preferences / practices and a referral made as appropriate to needs (Cultural Services, Patient Advocacy, Ethics, etc.) Any spiritual / Mormon concerns: 
[] Yes /  [x] No 
 
Caregiver Burnout: 
[] Yes /  [x] No /  [] No Caregiver Present Anticipatory grief assessment:  
[x] Normal  / [] Maladaptive ESAS Anxiety: Anxiety: 0 
 
ESAS Depression:    
 
 
 REVIEW OF SYSTEMS:  
 
Positive and pertinent negative findings in ROS are noted above in HPI. The following systems were [x] reviewed / [] unable to be reviewed as noted in HPI Other findings are noted below. Systems: constitutional, ears/nose/mouth/throat, respiratory, gastrointestinal, genitourinary, musculoskeletal, integumentary, neurologic, psychiatric, endocrine. Positive findings noted below. Modified ESAS Completed by: provider Fatigue: 7 Drowsiness: 0 Pain: 0 Anxiety: 0 Nausea: 0 Anorexia: 5 Dyspnea: 0 Stool Occurrence(s): 1 PHYSICAL EXAM:  
 
From RN flowsheet: 
Wt Readings from Last 3 Encounters:  
08/01/19 171 lb (77.6 kg) 02/09/19 238 lb 8.6 oz (108.2 kg) 01/21/19 180 lb (81.6 kg) Blood pressure 176/89, pulse (!) 116, temperature 98.6 °F (37 °C), resp. rate 25, height 5' 9\" (1.753 m), weight 171 lb (77.6 kg), SpO2 97 %. Pain Scale 1: Adult Nonverbal Pain Scale Pain Intensity 1: 0 Pain Intervention(s) 1: Medication (see MAR) Last bowel movement, if known:  
 
Constitutional: unresponsive Eyes: pupils equal, anicteric, eyes closed ENMT: no nasal discharge, moist mucous membranes Cardiovascular:distal pulses intact, leg swelling Respiratory: breathing not  labored with NC oxygen, symmetric Gastrointestinal: soft non-tender, +bowel sounds Musculoskeletal: no deformity, no tenderness to palpation, atrophy Skin: warm, dry Neurologic: catatonic Psychiatric:  
Other: 
 
 
 HISTORY:  
 
Active Problems: 
  Chronic hypoxemic respiratory failure ([address] (2019) Bleomycin lung toxicity (2019) Hodgkin lymphoma ([address] (2/3/2019) Encephalopathy (2019) Past Medical History:  
Diagnosis Date  Asthma   
 as a child, nothing since menopause  Cancer ([address] 2018 Non-Hodgkin's  Cataracts, bilateral   
 Diabetes ([address] type II  
 Environmental allergies  Fibromyalgia Treated with alternate medical therapy  Hypertension Past Surgical History:  
Procedure Laterality Date  HX BREAST BIOPSY Right 2006  HX COLONOSCOPY  2015  HX GYN Pap   HX ORTHOPAEDIC    
 fractured R ankle/ no surgery  HX OTHER SURGICAL Right 2018  
 excision of deep neck cervical LN. Family History Problem Relation Age of Onset  Heart Disease Mother  Cancer Father   
     prostate cancer  Cancer Sister Breast cancer apparent DCIS  
 Heart Disease Brother Brother  from congestive heart failure secondary to severe mitral disease History reviewed, no pertinent family history. Social History Tobacco Use  Smoking status: Never Smoker  Smokeless tobacco: Never Used Substance Use Topics  Alcohol use: Yes Comment: socially No Known Allergies Current Facility-Administered Medications Medication Dose Route Frequency  [START ON 2019] thiamine HCL (B-1) tablet 100 mg  100 mg Oral DAILY  [START ON 2019] multivitamin, tx-iron-ca-min (THERA-M w/ IRON) tablet 1 Tab  1 Tab Oral DAILY  dextrose 5% - 0.45% NaCl with KCl 10 mEq/L infusion  60 mL/hr IntraVENous CONTINUOUS  
  hydrALAZINE (APRESOLINE) 20 mg/mL injection 10 mg  10 mg IntraVENous Q6H PRN  
 amantadine HCl (SYMMETREL) capsule 100 mg  100 mg Oral BID  methylPREDNISolone (PF) (SOLU-MEDROL) injection 40 mg  40 mg IntraVENous Q8H  
 metoprolol (LOPRESSOR) injection 2.5 mg  2.5 mg IntraVENous BID  heparin (porcine) injection 5,000 Units  5,000 Units SubCUTAneous Q8H  
 levETIRAcetam (KEPPRA) 1,000 mg in 0.9% sodium chloride 100 mL IVPB  1,000 mg IntraVENous Q12H  mycophenolate mofetil (CELLCEPT) capsule 500 mg  500 mg Oral ACB&D  
 glucose chewable tablet 16 g  4 Tab Oral PRN  
 dextrose (D50W) injection syrg 12.5-25 g  12.5-25 g IntraVENous PRN  
 glucagon (GLUCAGEN) injection 1 mg  1 mg IntraMUSCular PRN  
 insulin lispro (HUMALOG) injection   SubCUTAneous TIDAC  ALPRAZolam (XANAX) tablet 0.25 mg  0.25 mg Oral BID PRN  
 sodium chloride (NS) flush 5-10 mL  5-10 mL IntraVENous PRN  
 sodium chloride (NS) flush 5-40 mL  5-40 mL IntraVENous Q8H  
 sodium chloride (NS) flush 5-40 mL  5-40 mL IntraVENous PRN  
 acetaminophen (TYLENOL) tablet 650 mg  650 mg Oral Q4H PRN  
 ondansetron (ZOFRAN) injection 4 mg  4 mg IntraVENous Q4H PRN  
 lactobac ac& pc-s.therm-b.anim (JUNI Q/RISAQUAD)  1 Cap Oral DAILY  atorvastatin (LIPITOR) tablet 40 mg  40 mg Oral QHS  
 
 
 
 LAB AND IMAGING FINDINGS:  
 
Lab Results Component Value Date/Time WBC 18.1 (H) 07/29/2019 06:03 AM  
 HGB 8.5 (L) 07/29/2019 06:03 AM  
 PLATELET 959 85/97/5323 06:03 AM  
 
Lab Results Component Value Date/Time Sodium 148 (H) 08/01/2019 04:49 AM  
 Potassium 5.0 08/01/2019 04:49 AM  
 Chloride 114 (H) 08/01/2019 04:49 AM  
 CO2 28 08/01/2019 04:49 AM  
 BUN 38 (H) 08/01/2019 04:49 AM  
 Creatinine 0.88 08/01/2019 04:49 AM  
 Calcium 9.2 08/01/2019 04:49 AM  
 Magnesium 2.5 (H) 07/28/2019 12:16 PM  
 Phosphorus 2.4 (L) 07/22/2019 03:50 AM  
  
Lab Results Component Value Date/Time  AST (SGOT) 14 (L) 07/23/2019 04:07 AM  
 Alk. phosphatase 151 (H) 07/23/2019 04:07 AM  
 Protein, total 6.6 07/23/2019 04:07 AM  
 Albumin 2.6 (L) 07/23/2019 04:07 AM  
 Globulin 4.0 07/23/2019 04:07 AM  
 
Lab Results Component Value Date/Time INR 1.4 (H) 07/28/2019 12:16 PM  
 Prothrombin time 14.1 (H) 07/28/2019 12:16 PM  
 aPTT 30.2 08/27/2018 08:15 AM  
  
Lab Results Component Value Date/Time Iron 24 (L) 07/16/2019 01:24 AM  
 TIBC 200 (L) 07/16/2019 01:24 AM  
 Iron % saturation 12 (L) 07/16/2019 01:24 AM  
  
No results found for: PH, PCO2, PO2 No components found for: [place] No results found for: CPK, CKMB Total time: 35 minutes Counseling / coordination time, spent as noted above: 30 minutes 
> 50% counseling / coordination?: y 
 
Prolonged service was provided for  []30 min   []75 min in face to face time in the presence of the patient, spent as noted above. Time Start:  
Time End:  
Note: this can only be billed with 66528 (initial) or 22185 (follow up). If multiple start / stop times, list each separately.

## 2019-08-01 NOTE — DIABETES MGMT
DTC Progress Note Recommendations/ Comments: chart reviewed for hyperglycemia. Noted steroids discontinued. Last Lantus dose given was 20 units on 7/27/19. Pt remains on solu-medrol 40 mg q 8 hrs. TF to begin once KUB placement verifies placement of TF. If appropriate, please consider: 
Discontinuing D5 
 - If unable to discontinue D5 - consider starting NPH 7 units BID daily; Pt will likely require adjustments when TF initiated Current hospital DM medication:  
 
lispro correction, resistant sensitivity Chart reviewed on [facility]. Patient is a 70 y.o. female with known Type 2 Diabetes on metformin 500 mg BID at home. A1c:  
Lab Results Component Value Date/Time Hemoglobin A1c 6.9 (H) 08/03/2018 01:45 PM  
 Hemoglobin A1c 6.8 (H) 01/30/2018 01:25 PM  
 
 
Recent Glucose Results:  
Lab Results Component Value Date/Time  (H) 08/01/2019 04:49 AM  
 GLUCPOC 293 (H) 08/01/2019 12:12 PM  
 GLUCPOC 233 (H) 08/01/2019 05:59 AM  
 GLUCPOC 143 (H) 07/31/2019 09:50 PM  
  
 
Lab Results Component Value Date/Time Creatinine 0.88 08/01/2019 04:49 AM  
 
Estimated Creatinine Clearance: 63.1 mL/min (based on SCr of 0.88 mg/dL). Active Orders Diet DIET NPO  
  
 
PO intake:  
Patient Vitals for the past 72 hrs: 
 % Diet Eaten  
07/29/19 2106 0 % Will continue to follow as needed. Thank you JACKI Hernandez, Diabetes Clinician Time spent: 4  minutes

## 2019-08-01 NOTE — PROCEDURES
[address] [facility] 
EEG Name:  Will Ansari 
MR#:  [id number] :  1950 ACCOUNT #:  [de-identified] DATE OF SERVICE:  2019 REQUESTING PHYSICIAN:  Carlos Bradford NP 
 
HISTORY:  The patient is a 71-year-old female who is being evaluated for altered mental status. DESCRIPTION:  This is an 18-channel prolonged EEG with video monitoring performed on 2019. The recording starts at 03:58 p.m. and continues until 01:44 p.m. on 2019. During majority of this recording, there is slowing of the background rhythms with vertex waves in the central head region indicating sleep architecture. Generalized delta frequencies were also seen intermittently. The patient did wake up around 01:30 a.m., when she was noted to be following commands for the nurse and was able to respond to simple questions with one-word answers. She remained awake for about 12-14 minutes and then again drifts back in to sleep with EEG representing sleep architecture. Similarly, she became awake at 06:00 a.m. for a period of 10-12 minutes and seemed to be talking to a person on the phone/face time and was following simple commands, nodding appropriately. Then, she again goes back to sleep and EEG predominately represents sleep architecture thereafter. EEG SUMMARY:  Mildly abnormal EEG due to mild slowing of the background rhythms during wakeful state. CLINICAL INTERPRETATION:  This EEG shows a mild generalized encephalopathic process, nonspecific in type. It represents sleep architecture during most part of the recording. The patient did become awake for brief periods during this recording but otherwise was noted to be asleep on the video. No convincingly lateralizing or epileptiform features were noted. No seizure was recorded. Kristen Knight MD 
 
 
[id number] 
D:  2019 15:47 
T:  2019 15:55 JOB #:  [id number]

## 2019-08-01 NOTE — PROGRESS NOTES
Hospital Progress Note NAME:  Margrett Moritz :   1950 MRN:  [id number] Date/Time:  2019 8:25 AM 
 
Plan: 1. Ng tube / family request 
2. cellcept and steroids 3. 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 
Risk of Deterioration: Low  []           Moderate  []           High  [x] Assessment:  
Principal Problem: 
  Pneumonia (7/15/2019) Active Problems: 
   Acute encephalopathy: 
-unclear etiology - transiently awaken with appropriate conversation 
-seizure was suspected but  EEG did not confirm 
-MRI was negative for stroke,CT head and neck - no bleed,vascular occlusion. -ABG reviewed. Electrolytes,ammonia,trop,lactic acid wnl 
-Neurology following 
-Continue  keppra. -MRI brain w contrast  - no acute process. -Will be on 24 hr EEG 
-May need LP for cytology as she has history of hodgkin's lymphoma. Hold eliquis. 
-Oncology following Acute on Chronic hypoxemic respiratory failure ([facility] (2019) On nasal canula Bleomycin lung toxicity (2019) 
   cellcept and steroids Diabetes mellitus type 2, controlled ([address] (2016) 
   basal bolus insulin Hodgkin lymphoma ([facility] (2/3/2019) bulky stage II HD (classical),s/p treatment earlier this year by Dr. Chelsea Joseph with ABVD. Course truncated due to abrupt onset bleomycin toxicity. Diff dx includes lymphangitic spread of lymphoma. She completed 3 1/2 treatments of ABVD, less than the standard 6. oncol raised the potential concern for recurrent disease with her family. UTI -  ENTEROCOCCUS SPECIES  
    treated Admitting notes:68 y.o. female with past medical history significant for htn, fibromyalgia, muscle atrophy, pulmonary fibrosis, asthma, b/l cataracts, dm, non-hodgkin's, who presents from ems with chief complaint of sob. Pt has \"last night\" onset of worsening sob described as \"labored breathing\" that's continued into this morning.  She is normally on 4.5 L of O2 n/c at baseline, but had it increased to 5 L by staff. She also endorses a dry non-productive cough and fever (peak 100) at this time. EMS arrived and measured initial O2 sat of 92% and HR of 140 bpm. Denies pleuritic cp and abd pain. Of note, she has had PNA \"x3 times since Ken" and was treated last week with a z-pack for sinus infection symptomatic of congestion. DNR form in place. EMS was called to [place];  bpm, 124/78, and 9 Subjective/interium history Unresponsive at time of my exam  
11 Point Review of Systems:  
Negative except no cp 
 
[]            Unable to obtain ROS due to:      
[]            mental status change []            sedated []            intubated Social History Tobacco Use  Smoking status: Never Smoker  Smokeless tobacco: Never Used Substance Use Topics  Alcohol use: Yes Comment: socially Medications reviewed: 
Current Facility-Administered Medications Medication Dose Route Frequency  dextrose 5% - 0.45% NaCl with KCl 10 mEq/L infusion  60 mL/hr IntraVENous CONTINUOUS  
 hydrALAZINE (APRESOLINE) 20 mg/mL injection 10 mg  10 mg IntraVENous Q6H PRN  
 amantadine HCl (SYMMETREL) capsule 100 mg  100 mg Oral BID  methylPREDNISolone (PF) (SOLU-MEDROL) injection 40 mg  40 mg IntraVENous Q8H  
 metoprolol (LOPRESSOR) injection 2.5 mg  2.5 mg IntraVENous BID  heparin (porcine) injection 5,000 Units  5,000 Units SubCUTAneous Q8H  
 levETIRAcetam (KEPPRA) 1,000 mg in 0.9% sodium chloride 100 mL IVPB  1,000 mg IntraVENous Q12H  mycophenolate mofetil (CELLCEPT) capsule 500 mg  500 mg Oral ACB&D  
 glucose chewable tablet 16 g  4 Tab Oral PRN  
 dextrose (D50W) injection syrg 12.5-25 g  12.5-25 g IntraVENous PRN  
 glucagon (GLUCAGEN) injection 1 mg  1 mg IntraMUSCular PRN  
 insulin lispro (HUMALOG) injection   SubCUTAneous TIDAC  ALPRAZolam (XANAX) tablet 0.25 mg  0.25 mg Oral BID PRN  
  sodium chloride (NS) flush 5-10 mL  5-10 mL IntraVENous PRN  
 sodium chloride (NS) flush 5-40 mL  5-40 mL IntraVENous Q8H  
 sodium chloride (NS) flush 5-40 mL  5-40 mL IntraVENous PRN  
 acetaminophen (TYLENOL) tablet 650 mg  650 mg Oral Q4H PRN  
 ondansetron (ZOFRAN) injection 4 mg  4 mg IntraVENous Q4H PRN  
 lactobac ac& pc-s.therm-b.anim (JUNI Q/RISAQUAD)  1 Cap Oral DAILY  atorvastatin (LIPITOR) tablet 40 mg  40 mg Oral QHS Objective:  
Vitals: 
Visit Vitals /86 (BP 1 Location: Right arm, BP Patient Position: At rest) Pulse (!) 104 Temp 97.9 °F (36.6 °C) Resp 17 Ht 5' 9\" (1.753 m) Wt 171 lb (77.6 kg) SpO2 100% BMI 25.25 kg/m² Temp (24hrs), Av.4 °F (36.9 °C), Min:97.9 °F (36.6 °C), Max:98.8 °F (37.1 °C) O2 Flow Rate (L/min): 6 l/min O2 Device: Hi flow nasal cannula Last 24hr Input/Output: 
 
Intake/Output Summary (Last 24 hours) at 2019 9662 Last data filed at 2019 6149 Gross per 24 hour Intake 657 ml Output 1250 ml Net -593 ml PHYSICAL EXAM: 
General:    Alert, cooperative, no distress, appears stated age. Head:   Normocephalic, without obvious abnormality, atraumatic. Eyes:   Conjunctivae/corneas clear. PERRLA Nose:  Nares normal. No drainage or sinus tenderness. Throat:    Lips, mucosa, and tongue normal.  No Thrush Neck:  Supple, symmetrical,  no adenopathy, thyroid: non tender 
  no carotid bruit and no JVD. Back:    Symmetric,  No CVA tenderness. Lungs:   Decreased bs with rales. Chest wall:  No tenderness or deformity. No Accessory muscle use. Heart:   Regular rate and rhythm,  no murmur, rub or gallop. Abdomen:   Soft, non-tender. Not distended. Bowel sounds normal. No masses. Lab Data Reviewed: 
 
No results for input(s): WBC, HGB, HCT, PLT, HGBEXT, HCTEXT, PLTEXT, HGBEXT, HCTEXT, PLTEXT in the last 72 hours. Recent Labs 19 
0449 *  
K 5.0  
* CO2 28  
* BUN 38*  
 CREA 0.88 CA 9.2 Lab Results Component Value Date/Time Glucose (POC) 233 (H) 08/01/2019 05:59 AM  
 Glucose (POC) 143 (H) 07/31/2019 09:50 PM  
 Glucose (POC) 205 (H) 07/31/2019 04:44 PM  
 Glucose (POC) 159 (H) 07/31/2019 11:46 AM  
 Glucose (POC) 217 (H) 07/31/2019 07:02 AM  
 
 
___________________________________________________ 
___________________________________________________ Attending Physician: Mihai Pathak MD

## 2019-08-01 NOTE — PROGRESS NOTES
Pt for NGT placement for feeding Problem: Pneumonia: Day 1 Goal: Nutrition/Diet Outcome: Not Progressing Towards Goal

## 2019-08-01 NOTE — CDMP QUERY
#3 
 
Pt admitted with Pneumonia /Pt noted to have encephalopathy documented [place] If possible, please document in the progress notes and d/c summary if you are evaluating and / or treating any of the following: Hypoxic Encephalopathy Hypertensive Encephalopathy Other, please specify Clinically unable to determine The medical record reflects the following: 
  Risk Factors: pneumonia Clinical Indicators: AMS 
//92 Progress note 8/1 Acute encephalopathy: 
-unclear etiology - transiently awaken with appropriate conversation 7/31neuro Encephalopathy persists with intermittent very short periods of alertness and activity Difficult to explain this presentation Metabolic encephalopathy versus akinetic mutism from cerebral hypoxia Since she is very drowsy and EEG did not capture any seizures/show any abnormalities we will discontinue Keppra Treatment: discontinued keppra, neuro consult, apresoline IV, supplemental oxygen at 6 lts/min Thank you, 
       Ag [facility], [address]

## 2019-08-02 NOTE — PROGRESS NOTES
Hospital Progress Note NAME:  Kasey Ferrer :   1950 MRN:  [id number] Date/Time:  2019 8:25 AM 
 
Plan: 1. Ng tube 2. Add lantus and titrate 3. D/c iv fluids 4. cellcept and steroids 5. D/w sister Risk of Deterioration: Low  []           Moderate  []           High  [x] Assessment:  
Principal Problem: 
  Pneumonia (7/15/2019) Active Problems: 
   Acute encephalopathy: 
-unclear etiology - transiently awaken with appropriate conversation 
-seizure was suspected but  EEG did not confirm 
-MRI was negative for stroke,CT head and neck - no bleed,vascular occlusion. -ABG reviewed. Electrolytes,ammonia,trop,lactic acid wnl 
-Neurology following 
-Continue  keppra. -MRI brain w contrast  - no acute process. -Will be on 24 hr EEG 
-May need LP for cytology as she has history of hodgkin's lymphoma. Hold eliquis. 
-Oncology following Acute on Chronic hypoxemic respiratory failure ([facility] [address] (2019) On nasal canula Bleomycin lung toxicity (2019) 
   cellcept and steroids Diabetes mellitus type 2, controlled ([address] (2016) 
   basal bolus insulin Hodgkin lymphoma ([facility] [address] (2/3/2019) bulky stage II HD (classical),s/p treatment earlier this year by Dr. Keysha Pineda with ABVD. Course truncated due to abrupt onset bleomycin toxicity. Diff dx includes lymphangitic spread of lymphoma. She completed 3 1/2 treatments of ABVD, less than the standard 6. oncol raised the potential concern for recurrent disease with her family. UTI -  ENTEROCOCCUS SPECIES  
    treated Admitting notes:68 y.o. female with past medical history significant for htn, fibromyalgia, muscle atrophy, pulmonary fibrosis, asthma, b/l cataracts, dm, non-hodgkin's, who presents from ems with chief complaint of sob. Pt has \"last night\" onset of worsening sob described as \"labored breathing\" that's continued into this morning.  She is normally on 4.5 L of O2 n/c at baseline, but had it increased to 5 L by staff. She also endorses a dry non-productive cough and fever (peak 100) at this time. EMS arrived and measured initial O2 sat of 92% and HR of 140 bpm. Denies pleuritic cp and abd pain. Of note, she has had PNA \"x3 times since Ken" and was treated last week with a z-pack for sinus infection symptomatic of congestion. DNR form in place. EMS was called to [place];  bpm, 124/78, and 9 Subjective/interium history Unresponsive at time of my exam  
11 Point Review of Systems:  
Negative except no cp 
 
[]            Unable to obtain ROS due to:      
[]            mental status change []            sedated []            intubated Social History Tobacco Use  Smoking status: Never Smoker  Smokeless tobacco: Never Used Substance Use Topics  Alcohol use: Yes Comment: socially Medications reviewed: 
Current Facility-Administered Medications Medication Dose Route Frequency  metoprolol (LOPRESSOR) injection 2.5 mg  2.5 mg IntraVENous Q6H  
 insulin lispro (HUMALOG) injection   SubCUTAneous Q6H  
 insulin glargine (LANTUS) injection 16 Units  16 Units SubCUTAneous ONCE  
 insulin glargine (LANTUS) injection 20 Units  20 Units SubCUTAneous QHS  thiamine HCL (B-1) tablet 100 mg  100 mg Oral DAILY  multivitamin, tx-iron-ca-min (THERA-M w/ IRON) tablet 1 Tab  1 Tab Oral DAILY  hydrALAZINE (APRESOLINE) 20 mg/mL injection 10 mg  10 mg IntraVENous Q6H PRN  
 amantadine HCl (SYMMETREL) capsule 100 mg  100 mg Oral BID  methylPREDNISolone (PF) (SOLU-MEDROL) injection 40 mg  40 mg IntraVENous Q8H  
 heparin (porcine) injection 5,000 Units  5,000 Units SubCUTAneous Q8H  
 mycophenolate mofetil (CELLCEPT) capsule 500 mg  500 mg Oral ACB&D  
 glucose chewable tablet 16 g  4 Tab Oral PRN  
 dextrose (D50W) injection syrg 12.5-25 g  12.5-25 g IntraVENous PRN  
  glucagon (GLUCAGEN) injection 1 mg  1 mg IntraMUSCular PRN  
 ALPRAZolam (XANAX) tablet 0.25 mg  0.25 mg Oral BID PRN  
 sodium chloride (NS) flush 5-10 mL  5-10 mL IntraVENous PRN  
 sodium chloride (NS) flush 5-40 mL  5-40 mL IntraVENous Q8H  
 sodium chloride (NS) flush 5-40 mL  5-40 mL IntraVENous PRN  
 acetaminophen (TYLENOL) tablet 650 mg  650 mg Oral Q4H PRN  
 ondansetron (ZOFRAN) injection 4 mg  4 mg IntraVENous Q4H PRN  
 lactobac ac& pc-s.therm-b.anim (JUNI Q/RISAQUAD)  1 Cap Oral DAILY  atorvastatin (LIPITOR) tablet 40 mg  40 mg Oral QHS Objective:  
Vitals: 
Visit Vitals BP (!) 154/94 (BP 1 Location: Right arm, BP Patient Position: At rest;Supine) Pulse (!) 132 Temp 98.3 °F (36.8 °C) Resp (!) 33 Ht 5' 9\" (1.753 m) Wt 166 lb 9.6 oz (75.6 kg) SpO2 93% BMI 24.60 kg/m² Temp (24hrs), Av.2 °F (36.8 °C), Min:98 °F (36.7 °C), Max:98.6 °F (37 °C) O2 Flow Rate (L/min): 6 l/min O2 Device: Hi flow nasal cannula Last 24hr Input/Output: 
 
Intake/Output Summary (Last 24 hours) at 2019 0126 Last data filed at 2019 9579 Gross per 24 hour Intake 3495 ml Output 500 ml Net 2995 ml PHYSICAL EXAM: 
General:    Alert, cooperative, no distress, appears stated age. Head:   Normocephalic, without obvious abnormality, atraumatic. Eyes:   Conjunctivae/corneas clear. PERRLA Nose:  Nares normal. No drainage or sinus tenderness. Throat:    Lips, mucosa, and tongue normal.  No Thrush Neck:  Supple, symmetrical,  no adenopathy, thyroid: non tender 
  no carotid bruit and no JVD. Back:    Symmetric,  No CVA tenderness. Lungs:   Decreased bs with rales. Chest wall:  No tenderness or deformity. No Accessory muscle use. Heart:   Regular rate and rhythm,  no murmur, rub or gallop. Abdomen:   Soft, non-tender. Not distended. Bowel sounds normal. No masses. Lab Data Reviewed: 
 
Recent Labs 19 
0502 WBC 21.3* HGB 8.0*  
HCT 27.3*  
  Recent Labs 08/02/19 
0502 08/01/19 
0016  148*  
K 5.0 5.0  
* 114* CO2 28 28 * 229* BUN 40* 38* CREA 1.05* 0.88 CA 9.5 9.2 MG 2.8*  --   
PHOS 2.9  --   
 
Lab Results Component Value Date/Time Glucose (POC) 511 (H) 08/02/2019 07:04 AM  
 Glucose (POC) 502 (H) 08/02/2019 07:01 AM  
 Glucose (POC) 332 (H) 08/01/2019 11:18 PM  
 Glucose (POC) 316 (H) 08/01/2019 05:08 PM  
 Glucose (POC) 293 (H) 08/01/2019 12:12 PM  
 
 
___________________________________________________ 
___________________________________________________ Attending Physician: Caro Almonte MD

## 2019-08-02 NOTE — PROGRESS NOTES
Bedside and Verbal shift change report given to [id number] Clemens (oncoming nurse) by Gillian Dodd (offgoing nurse). Report included the following information SBAR, Kardex, Intake/Output, MAR, Accordion and Recent Results. Pt blood glucose in morning reading 502. Paged Dr. Epi Brooks and orders received. Last 3 Recorded Weights in this Encounter 07/31/19 0422 08/01/19 8254 08/02/19 6768 Weight: 77.2 kg (170 lb 4.8 oz) 77.6 kg (171 lb) 75.6 kg (166 lb 9.6 oz) Problem: Patient Education: Go to Patient Education Activity Goal: Patient/Family Education Outcome: Progressing Towards Goal 
  
Problem: Falls - Risk of 
Goal: *Absence of Falls Description Document Cherreginee Tomasz Fall Risk and appropriate interventions in the flowsheet. Outcome: Progressing Towards Goal 
Note:  
Fall Risk Interventions: 
Mobility Interventions: Communicate number of staff needed for ambulation/transfer, Strengthening exercises (ROM-active/passive) Mentation Interventions: Adequate sleep, hydration, pain control, Door open when patient unattended, Evaluate medications/consider consulting pharmacy, Familiar objects from home, Family/sitter at bedside, More frequent rounding, Reorient patient, Room close to nurse's station, Toileting rounds, Update white board Medication Interventions: Evaluate medications/consider consulting pharmacy Elimination Interventions: Toileting schedule/hourly rounds Problem: Pressure Injury - Risk of 
Goal: *Prevention of pressure injury Description Document Dwayne Scale and appropriate interventions in the flowsheet. 7-16-19: turn q2h and float heels.   
Outcome: Progressing Towards Goal 
Note:  
Pressure Injury Interventions: 
Sensory Interventions: Assess changes in LOC, Avoid rigorous massage over bony prominences, Check visual cues for pain, Float heels, Keep linens dry and wrinkle-free, Maintain/enhance activity level, Minimize linen layers, Monitor skin under medical devices, Pad between skin to skin, Pressure redistribution bed/mattress (bed type), Turn and reposition approx. every two hours (pillows and wedges if needed) Moisture Interventions: Absorbent underpads, Assess need for specialty bed, Check for incontinence Q2 hours and as needed, Internal/External urinary devices Activity Interventions: Increase time out of bed, Pressure redistribution bed/mattress(bed type), PT/OT evaluation Mobility Interventions: Float heels, Turn and reposition approx. every two hours(pillow and wedges), Pressure redistribution bed/mattress (bed type) Nutrition Interventions: Document food/fluid/supplement intake Friction and Shear Interventions: Lift sheet, Lift team/patient mobility team, Minimize layers Problem: Nutrition Deficit Goal: *Optimize nutritional status Outcome: Progressing Towards Goal 
  
Problem: Seizure Disorder (Adult) Goal: *STG: Remains free of seizure activity Outcome: Progressing Towards Goal

## 2019-08-02 NOTE — PROGRESS NOTES
NGT inserted for feeding -procedure tolerated well- feeding started -no unt s/s Bedside shift change report given to [facility] (oncoming nurse) by ZULEIMA Velazquez (offgoing nurse). Report included the following information SBAR, Kardex, Procedure Summary, MAR and Recent Results.

## 2019-08-02 NOTE — PROGRESS NOTES
Renal Dosing/Monitoring Medication: Cefepime Current regimen:  1 gram IV every 12 hr 
Recent Labs 08/02/19 
0502 08/01/19 1999 CREA 1.05* 0.88  
BUN 40* 38* Estimated CrCl:  52.8 ml/min Plan: Change to 2 grams IV Q 12 hours with respect to renal function per P&T protocol. Pharmacy will continue to monitor the patient daily and make  changes as needed.  
 
Aroldo Jama, PharmD, BCPS

## 2019-08-02 NOTE — DIABETES MGMT
DTC Progress Note Recommendations/ Comments: chart reviewed for extreme hyperglycemia -  mg/dL this AM. Nurse contacted Dr Leland Little and orders received. Pt remains on Solu-medrol 40 mg q 8 hrs. TF to began 8/1/2019, Osmolite 1.5 titrating to goal 
Noted: 
Lantus 16 units given this AM and dose will increase tonight to 20 units at bedtime If appropriate please consider Continue to titrate insulin as needed Current hospital DM medication:  
Lantus 20 units at bedtime beginning tonight 
lispro correction, resistant sensitivity Chart reviewed on Tasha Sandoval. Patient is a 70 y.o. female with known Type 2 Diabetes on metformin 500 mg BID at home. A1c:  
Lab Results Component Value Date/Time Hemoglobin A1c 6.9 (H) 08/03/2018 01:45 PM  
 Hemoglobin A1c 6.8 (H) 01/30/2018 01:25 PM  
 
 
Recent Glucose Results:  
Lab Results Component Value Date/Time  (H) 08/02/2019 05:02 AM  
 GLUCPOC 511 (H) 08/02/2019 07:04 AM  
 GLUCPOC 502 (H) 08/02/2019 07:01 AM  
 GLUCPOC 332 (H) 08/01/2019 11:18 PM  
  
 
Lab Results Component Value Date/Time Creatinine 1.05 (H) 08/02/2019 05:02 AM  
 
Estimated Creatinine Clearance: 52.8 mL/min (A) (based on SCr of 1.05 mg/dL (H)). Active Orders Diet DIET NPO  
  
 
PO intake:  
Patient Vitals for the past 72 hrs: 
 % Diet Eaten 08/01/19 1836 0 % Will continue to follow as needed. Thank you Merary Cline RN, CDE Pager [phone] Time spent: 7  minutes

## 2019-08-02 NOTE — PROGRESS NOTES
Pulmonary, Critical Care, and Sleep Medicine~Progress Note Name: Gray Lopez MRN: [id number] : 1950 Hospital: [address] Date: 2019 11:15 AM Admission: 7/15/2019 Impression Plan 1. Acute on chronic (on 4lpm normally) hypoxic resp failure secondary to below 2. Abnormal CT scan: HCAP possibly (treated), query more bleomycin lung toxicity, now with lack of response. Other concerns were pulmonary edema, but she is quite dried out now 3. Hx of lymphoma. Not on therapy since the beginning of the year. 4. Hx of PE and was on eliquis 5. Hx of asthma 6. Reviewed [facility] records. She was seen last there on 19. Early  cellcept was stopped and at that appointment she was scheduled to taper off of steroids, which she did. Apparently she was on hospice care, but I am unclear as to the details; per patient it was rescinded 7. AMS~ per neurology. 1. Strep/legionella negative 2. Check ua and start empiric cefepime as wbc going up. 3. ECHO; PASP 60mmHg 4. O2 titration above 90%, on low flow today. Continue to wean as able. 5. autoimmune labs mostly negative, but did have a + PEACE with elevated CRP and ESR; query coexisting autoimmune process. Once discharge would recommend rheumatology follow up 6. Continue IV steroids / cellcept 7. Will consider imaging of chest next week. 8. All questions asked of me were answered to the best of my ability and their apparent satisfaction 9. High risk for rapid decline 10. Long term prognosis poor Daily Progression: 
 
 On 4 lpm by nc. Altered mental status for the past 2-3 days. Eeg, ct head all negative. Neuro on the case, signed off -suspect metabolic encephalopathy. Wbc up. Has purwick. CXr still looks the same - continuous to be on cellcept and prednisone.  Long discussion with extended family at bedside. Questions answered.   O2 requirement down to 3 l/m -- better than when she was at her baseline. The initial pulmonary insult seems to have mostly resolved, though clearly there are other issues now at play. 7/31 A couple periods of lucidity overnight per her sister's report. Discussed with her and answered questions, offered support. 7/30 Per family and RN reports more responsive today. Was suppose to have EEG; not done yet. Family is declining LP. 7/29 FiO2 requirements are remarkably down. 7/28 chest film shows no worsening of pulmonary infiltrates. Dramatic change in mental status yesterday with unclear etiology. Seizures vs CVA, but all testing thus far is negative 7/26 On 15lpm; theoretically can be switched to midflow, but wean you talk about it she gets anxious and her saturations drop. I discussed with RN; suggest doing one hour on midlflow and one hour on highflow during the day to get her use to it.  
 
7/25 She looks so weak, but states she feels better. About to work with PT. Still +.  
 
7/24 Noted question of lung transplant query; I think recent malignancy would not allow her to be placed on the list. But since she is followed at [facility] they could assisted better. She feels better today 7/23 Looks stronger Desats quickly with movement, but stabilizes; this is to be expected with her chronic lung disease Weight gain over night? Agree with diuretics Agree with getting up and out of bed as much as possible 7/22 Creatinine improving Was increased in Flow and FiO2, unclear why as she is feeling better Chest film today looks fairly similar 7/18 Not much different. We are starting her on high flow now 7/17 Feels possibly a little better. proBNP elevated today 7/16 Consult Note requested by Dr Brendon Reynolds. Patient presented for admission secondary to a one week course of cough, subjective fevers, and malasie.  Outpatient PCP starte zpak and she did not have bernadette improvement. No other symptoms reported on this recent course. 7/15/19 CT scan did showed worsening right great than left infiltrates with interlobular septal thickening; no PE. BNP was 534, not very elevated. This past Jan 2019 she was dx with hodgkin's lymphoma. Was on blecyomcin and developed a pneumonitis associated with it. Was started on cellcept 500mg bid and prednisone. Followed in the office at [facility] in April, however transferred her care to [facility] and saw Dr Rosanne Granger (sp?). Her last visit with them was this past June and at that visit she was weaned off of the prednisone and cellcept. We do not have those records at this time. I have reviewed the labs and previous days notes. Pertinent items are noted in HPI. Past Medical History:  
Diagnosis Date  Asthma   
 as a child, nothing since menopause  Cancer ([facility] [address] 08/21/2018 Non-Hodgkin's  Cataracts, bilateral   
 Diabetes ([facility] [address] type II  
 Environmental allergies  Fibromyalgia Treated with alternate medical therapy  Hypertension Past Surgical History:  
Procedure Laterality Date  HX BREAST BIOPSY Right 2006  HX COLONOSCOPY  2015  HX GYN Pap 2015  HX ORTHOPAEDIC    
 fractured R ankle/ no surgery  HX OTHER SURGICAL Right 08/27/2018  
 excision of deep neck cervical LN. Prior to Admission medications Medication Sig Start Date End Date Taking? Authorizing Provider  
acetaminophen (TYLENOL) 650 mg suppository Insert 650 mg into rectum every four (4) hours as needed for Pain. Yes Provider, Historical  
amLODIPine (NORVASC) 5 mg tablet Take 5 mg by mouth daily. Yes Provider, Historical  
trimethoprim-sulfamethoxazole (BACTRIM DS) 160-800 mg per tablet Take 1 Tab by mouth every Monday, Wednesday, Friday. Yes Provider, Historical  
metFORMIN (GLUCOPHAGE) 500 mg tablet Take 500 mg by mouth two (2) times daily (with meals).  Indications: Per [address] 3/30/19  Yes Provider, Historical  
levalbuterol (XOPENEX) 1.25 mg/0.5 mL nebu 1.25 mg by Nebulization route every four (4) hours as needed. Indications: Per [address] 3/30/19  Yes Provider, Historical  
acetaminophen (TYLENOL ARTHRITIS PAIN) 650 mg TbER Take 650 mg by mouth as needed (Q 6hrs PRN). 3/30/19  Yes Provider, Historical  
apixaban (ELIQUIS) 5 mg tablet Take 1 Tab by mouth every twelve (12) hours. 3/13/19  Yes Remy Riley MD  
atorvastatin (LIPITOR) 40 mg tablet TAKE 1 TAB BY MOUTH DAILY. 12/3/17  Yes Remy Riley MD  
Oxygen 3 Devices by Nasal route as needed. Indications: SOB PRN    Provider, Historical  
 
No Known Allergies Social History Tobacco Use  Smoking status: Never Smoker  Smokeless tobacco: Never Used Substance Use Topics  Alcohol use: Yes Comment: socially Family History Problem Relation Age of Onset  Heart Disease Mother  Cancer Father   
     prostate cancer  Cancer Sister Breast cancer apparent DCIS  
 Heart Disease Brother Brother  from congestive heart failure secondary to severe mitral disease OBJECTIVE: 
 
 Vital Signs: 
    
Visit Vitals /59 (BP 1 Location: Right arm, BP Patient Position: At rest) Pulse (!) 116 Temp 98.4 °F (36.9 °C) Resp (!) 45 Ht 5' 9\" (1.753 m) Wt 75.6 kg (166 lb 9.6 oz) SpO2 98% BMI 24.60 kg/m² Temp (24hrs), Av.2 °F (36.8 °C), Min:98 °F (36.7 °C), Max:98.4 °F (36.9 °C) Intake/Output:  
  Last shift: No intake/output data recorded. Last 3 shifts:  1901 -  0700 In: 8413 [I.V.:2320] Out: 1400 [facility] Intake/Output Summary (Last 24 hours) at 2019 1331 Last data filed at 2019 0289 Gross per 24 hour Intake 3495 ml Output 500 ml Net 2995 ml Physical Exam:                                       
Exam Findings Other General: No resp distress noted, appears stated age HEENT:  No ulcers, JVD not elevated, no cervical LAD Chest: No pectus deformity, normal chest rise b/l HEART:  RRR, no murmurs/rubs/gallops Lungs:  Notable crackles, diminished BS at bases ABD: Soft/NT, non rigid mildly distended EXT: No cyanosis/clubbing/edema, normal peripheral pulses Skin: No rashes or ulcers, no mottling Neuro: Unresponsive Medications: 
Current Facility-Administered Medications Medication Dose Route Frequency  metoprolol (LOPRESSOR) injection 2.5 mg  2.5 mg IntraVENous Q6H  
 insulin lispro (HUMALOG) injection   SubCUTAneous Q6H  
 insulin glargine (LANTUS) injection 20 Units  20 Units SubCUTAneous QHS  thiamine HCL (B-1) tablet 100 mg  100 mg Oral DAILY  multivitamin, tx-iron-ca-min (THERA-M w/ IRON) tablet 1 Tab  1 Tab Oral DAILY  hydrALAZINE (APRESOLINE) 20 mg/mL injection 10 mg  10 mg IntraVENous Q6H PRN  
 amantadine HCl (SYMMETREL) capsule 100 mg  100 mg Oral BID  methylPREDNISolone (PF) (SOLU-MEDROL) injection 40 mg  40 mg IntraVENous Q8H  
 heparin (porcine) injection 5,000 Units  5,000 Units SubCUTAneous Q8H  
 mycophenolate mofetil (CELLCEPT) capsule 500 mg  500 mg Oral ACB&D  
 glucose chewable tablet 16 g  4 Tab Oral PRN  
 dextrose (D50W) injection syrg 12.5-25 g  12.5-25 g IntraVENous PRN  
 glucagon (GLUCAGEN) injection 1 mg  1 mg IntraMUSCular PRN  
 sodium chloride (NS) flush 5-10 mL  5-10 mL IntraVENous PRN  
 sodium chloride (NS) flush 5-40 mL  5-40 mL IntraVENous Q8H  
 sodium chloride (NS) flush 5-40 mL  5-40 mL IntraVENous PRN  
 acetaminophen (TYLENOL) tablet 650 mg  650 mg Oral Q4H PRN  
 lactobac ac& pc-s.therm-b.anim (JUNI Q/RISAQUAD)  1 Cap Oral DAILY  atorvastatin (LIPITOR) tablet 40 mg  40 mg Oral QHS Labs: ABG No results for input(s): PHI, PCO2I, PO2I, HCO3I, SO2I, FIO2I in the last 72 hours. CBC Recent Labs 08/02/19 
0502 WBC 21.3* HGB 8.0*  
HCT 27.3*  
 .0*  
 MCH 29.3 Metabolic Panel Recent Labs 08/02/19 
0502 08/01/19 
0257  148*  
K 5.0 5.0  
* 114* CO2 28 28 * 229* BUN 40* 38* CREA 1.05* 0.88 CA 9.5 9.2 MG 2.8*  --   
PHOS 2.9  --   
  
 
Pertinent Labs Jameson Gallardo MD 
8/2/2019

## 2019-08-03 NOTE — PROGRESS NOTES
Problem: Pneumonia: Day 4 Goal: Off Pathway (Use only if patient is Off Pathway) Outcome: Progressing Towards Goal 
  
Problem: Falls - Risk of 
Goal: *Absence of Falls Description Document Simi Omar Fall Risk and appropriate interventions in the flowsheet. Outcome: Progressing Towards Goal 
Bed locked and in low position, hourly rounds performed. Problem: Seizure Disorder (Adult) Goal: *STG: Remains free of seizure activity Outcome: Progressing Towards Goal 
 Pt. Unresponsive, no seizure activity observed, Pt. On midflow 6L and maintaining O2 sats of 97%, NSR to Sinus Tach with HR in 97 to 110's at rest.  
 
Bedside shift change report given to Ben Joy, [address] [place] [address] (oncoming nurse) by John Carter RN (offgoing nurse). Report included the following information SBAR, Kardex, ED Summary, Procedure Summary, Intake/Output, MAR, Accordion, Recent Results, Med Rec Status, Cardiac Rhythm NSR to Sinus Tach, Alarm Parameters  and Quality Measures.

## 2019-08-03 NOTE — ROUTINE PROCESS
Bedside and Verbal shift change report given to Nadir Woodruff RN (oncoming nurse) by Yecenia Alvarez RN (offgoing nurse). Report included the following information SBAR, Kardex, Intake/Output, MAR, Recent Results and Cardiac Rhythm NSR/ST.

## 2019-08-03 NOTE — PROGRESS NOTES
Hospital Progress Note NAME:  Kendall Hart :   1950 MRN:  [id number] Date/Time:  8/3/2019 8:25 AM 
 
Plan: 1. Ng tube feedings 2. Add lantus and titrate 3. cellcept and steroids 4. D/w sister Risk of Deterioration: Low  []           Moderate  []           High  [x] Assessment:  
Principal Problem: 
  Pneumonia (7/15/2019) Active Problems: 
   Acute encephalopathy: 
-unclear etiology - transiently awaken with appropriate conversation 
-seizure was suspected but  EEG did not confirm 
-MRI was negative for stroke,CT head and neck - no bleed,vascular occlusion. -ABG reviewed. Electrolytes,ammonia,trop,lactic acid wnl 
-Neurology following 
-Continue  keppra. -MRI brain w contrast  - no acute process. -Will be on 24 hr EEG 
-May need LP for cytology as she has history of hodgkin's lymphoma. Hold eliquis. 
-Oncology following Acute on Chronic hypoxemic respiratory failure ([facility] [address] (2019) On nasal canula Bleomycin lung toxicity (2019) 
   cellcept and steroids Diabetes mellitus type 2, controlled ([facility] [address] (2016) 
   basal bolus insulin Hodgkin lymphoma ([facility] [address] (2/3/2019) bulky stage II HD (classical),s/p treatment earlier this year by Dr. Anthony Brown with ABVD. Course truncated due to abrupt onset bleomycin toxicity. Diff dx includes lymphangitic spread of lymphoma. She completed 3 1/2 treatments of ABVD, less than the standard 6. oncol raised the potential concern for recurrent disease with her family. UTI -  ENTEROCOCCUS SPECIES  
    treated Admitting notes:68 y.o. female with past medical history significant for htn, fibromyalgia, muscle atrophy, pulmonary fibrosis, asthma, b/l cataracts, dm, non-hodgkin's, who presents from ems with chief complaint of sob. Pt has \"last night\" onset of worsening sob described as \"labored breathing\" that's continued into this morning.  She is normally on 4.5 L of O2 n/c at baseline, but had it increased to 5 L by staff. She also endorses a dry non-productive cough and fever (peak 100) at this time. EMS arrived and measured initial O2 sat of 92% and HR of 140 bpm. Denies pleuritic cp and abd pain. Of note, she has had PNA \"x3 times since Ken" and was treated last week with a z-pack for sinus infection symptomatic of congestion. DNR form in place. EMS was called to [place];  bpm, 124/78, and 9 Subjective/interium history Unresponsive at time of my exam  
11 Point Review of Systems:  
Negative except [x]            Unable to obtain ROS due to:      
[x]            mental status change []            sedated []            intubated Social History Tobacco Use  Smoking status: Never Smoker  Smokeless tobacco: Never Used Substance Use Topics  Alcohol use: Yes Comment: socially Medications reviewed: 
Current Facility-Administered Medications Medication Dose Route Frequency  metoprolol (LOPRESSOR) injection 2.5 mg  2.5 mg IntraVENous Q6H  
 insulin lispro (HUMALOG) injection   SubCUTAneous Q6H  
 insulin glargine (LANTUS) injection 20 Units  20 Units SubCUTAneous QHS  cefepime (MAXIPIME) 2 g in 0.9% sodium chloride (MBP/ADV) 100 mL  2 g IntraVENous Q12H  thiamine HCL (B-1) tablet 100 mg  100 mg Oral DAILY  multivitamin, tx-iron-ca-min (THERA-M w/ IRON) tablet 1 Tab  1 Tab Oral DAILY  hydrALAZINE (APRESOLINE) 20 mg/mL injection 10 mg  10 mg IntraVENous Q6H PRN  
 amantadine HCl (SYMMETREL) capsule 100 mg  100 mg Oral BID  methylPREDNISolone (PF) (SOLU-MEDROL) injection 40 mg  40 mg IntraVENous Q8H  
 heparin (porcine) injection 5,000 Units  5,000 Units SubCUTAneous Q8H  
 mycophenolate mofetil (CELLCEPT) capsule 500 mg  500 mg Oral ACB&D  
 glucose chewable tablet 16 g  4 Tab Oral PRN  
 dextrose (D50W) injection syrg 12.5-25 g  12.5-25 g IntraVENous PRN  
  glucagon (GLUCAGEN) injection 1 mg  1 mg IntraMUSCular PRN  
 sodium chloride (NS) flush 5-10 mL  5-10 mL IntraVENous PRN  
 sodium chloride (NS) flush 5-40 mL  5-40 mL IntraVENous Q8H  
 sodium chloride (NS) flush 5-40 mL  5-40 mL IntraVENous PRN  
 acetaminophen (TYLENOL) tablet 650 mg  650 mg Oral Q4H PRN  
 lactobac ac& pc-s.therm-b.anim (JUNI Q/RISAQUAD)  1 Cap Oral DAILY  atorvastatin (LIPITOR) tablet 40 mg  40 mg Oral QHS Objective:  
Vitals: 
Visit Vitals /90 Pulse (!) 131 Temp 98.8 °F (37.1 °C) Resp 30 Ht 5' 9\" (1.753 m) Wt 166 lb 9.6 oz (75.6 kg) SpO2 99% BMI 24.60 kg/m² Temp (24hrs), Av.6 °F (37 °C), Min:98.2 °F (36.8 °C), Max:99.1 °F (37.3 °C) O2 Flow Rate (L/min): 6 l/min O2 Device: Other (comment)(midflow) Last 24hr Input/Output: 
 
Intake/Output Summary (Last 24 hours) at 8/3/2019 1027 Last data filed at 8/3/2019 3226 Gross per 24 hour Intake 1805 ml Output 1400 ml Net 405 ml PHYSICAL EXAM: 
General:    Alert, cooperative, no distress, appears stated age. Head:   Normocephalic, without obvious abnormality, atraumatic. Eyes:   Conjunctivae/corneas clear. PERRLA Nose:  Nares normal. No drainage or sinus tenderness. Throat:    Lips, mucosa, and tongue normal.  No Thrush Neck:  Supple, symmetrical,  no adenopathy, thyroid: non tender 
  no carotid bruit and no JVD. Back:    Symmetric,  No CVA tenderness. Lungs:   Decreased bs with rales. Chest wall:  No tenderness or deformity. No Accessory muscle use. Heart:   Regular rate and rhythm,  no murmur, rub or gallop. Abdomen:   Soft, non-tender. Not distended. Bowel sounds normal. No masses. Lab Data Reviewed: 
 
Recent Labs 19 
0156 19 
0502 WBC 21.6* 21.3* HGB 7.4* 8.0*  
HCT 25.0* 27.3*  
 233 Recent Labs 19 
5012 19 
0502 19 
1364 * 145 148* K 4.7 5.0 5.0  
* 110* 114* CO2 32 28 28 * 456* 229* BUN 43* 40* 38* CREA 1.06* 1.05* 0.88 CA 9.0 9.5 9.2 MG  --  2.8*  --   
PHOS  --  2.9  --   
 
Lab Results Component Value Date/Time Glucose (POC) 427 (H) 08/03/2019 06:52 AM  
 Glucose (POC) 351 (H) 08/02/2019 11:57 PM  
 Glucose (POC) 292 (H) 08/02/2019 09:23 PM  
 Glucose (POC) 241 (H) 08/02/2019 04:58 PM  
 Glucose (POC) 276 (H) 08/02/2019 02:48 PM  
 
 
___________________________________________________ 
___________________________________________________ Attending Physician: Karlie Daniel MD

## 2019-08-03 NOTE — PROGRESS NOTES
Bedside shift change report given to [place] (oncoming nurse) by ZULEIMA Velazquez (offgoing nurse). Report included the following information SBAR, Kardex, Procedure Summary, MAR and Recent Results.

## 2019-08-03 NOTE — PROGRESS NOTES
Problem: Pneumonia: Day 4 Goal: Off Pathway (Use only if patient is Off Pathway) Outcome: Not Progressing Towards Goal 
Note:  
Patient main issues are neurological status at this time. Patient unresponsive, spontaneous movements noted in LUE rarely.

## 2019-08-04 NOTE — PROGRESS NOTES
MD Kylee Parks paged regarding patient's platelets. Orders received to D/C heparin and use SCDs Bedside shift change report given to Ling Gutierrez (oncoming nurse) by Jessica Phelan (offgoing nurse). Report included the following information SBAR, Kardex, Procedure Summary, Intake/Output, MAR, Recent Results and Cardiac Rhythm NSR/ST.

## 2019-08-04 NOTE — PROGRESS NOTES
Hospital Progress Note NAME:  Lisa Hathaway :   1950 MRN:  [id number] Date/Time:  2019 8:25 AM 
 
Plan: 1. Ng tube feedings 2. Add lantus and titrate 3. cellcept and steroids 4. D/w sister Risk of Deterioration: Low  []           Moderate  []           High  [x] Assessment:  
Principal Problem: 
  Pneumonia (7/15/2019) Active Problems: 
   Acute encephalopathy: 
-unclear etiology - transiently awaken with appropriate conversation 
-seizure was suspected but  EEG did not confirm 
-MRI was negative for stroke,CT head and neck - no bleed,vascular occlusion. -ABG reviewed. Electrolytes,ammonia,trop,lactic acid wnl 
-Neurology following 
-Continue  keppra. -MRI brain w contrast  - no acute process. -Will be on 24 hr EEG 
-May need LP for cytology as she has history of hodgkin's lymphoma. Hold eliquis. 
-Oncology following Acute on Chronic hypoxemic respiratory failure ([facility] [address] (2019) On nasal canula Bleomycin lung toxicity (2019) 
   cellcept and steroids Diabetes mellitus type 2, controlled ([facility] [address] (2016) 
   basal bolus insulin Hodgkin lymphoma ([facility] [address] (2/3/2019) bulky stage II HD (classical),s/p treatment earlier this year by Dr. Stefano Rand with ABVD. Course truncated due to abrupt onset bleomycin toxicity. Diff dx includes lymphangitic spread of lymphoma. She completed 3 1/2 treatments of ABVD, less than the standard 6. oncol raised the potential concern for recurrent disease with her family. UTI -  ENTEROCOCCUS SPECIES  
    treated Admitting notes:68 y.o. female with past medical history significant for htn, fibromyalgia, muscle atrophy, pulmonary fibrosis, asthma, b/l cataracts, dm, non-hodgkin's, who presents from ems with chief complaint of sob. Pt has \"last night\" onset of worsening sob described as \"labored breathing\" that's continued into this morning.  She is normally on 4.5 L of O2 n/c at baseline, but had it increased to 5 L by staff. She also endorses a dry non-productive cough and fever (peak 100) at this time. EMS arrived and measured initial O2 sat of 92% and HR of 140 bpm. Denies pleuritic cp and abd pain. Of note, she has had PNA \"x3 times since Ken" and was treated last week with a z-pack for sinus infection symptomatic of congestion. DNR form in place. EMS was called to [place];  bpm, 124/78, and 9 Subjective/interium history Unresponsive at time of my exam  
11 Point Review of Systems:  
Negative except [x]            Unable to obtain ROS due to:      
[x]            mental status change []            sedated []            intubated Social History Tobacco Use  Smoking status: Never Smoker  Smokeless tobacco: Never Used Substance Use Topics  Alcohol use: Yes Comment: socially Medications reviewed: 
Current Facility-Administered Medications Medication Dose Route Frequency  insulin glargine (LANTUS) injection 40 Units  40 Units SubCUTAneous QHS  metoprolol (LOPRESSOR) injection 2.5 mg  2.5 mg IntraVENous Q6H  
 insulin lispro (HUMALOG) injection   SubCUTAneous Q6H  
 cefepime (MAXIPIME) 2 g in 0.9% sodium chloride (MBP/ADV) 100 mL  2 g IntraVENous Q12H  thiamine HCL (B-1) tablet 100 mg  100 mg Oral DAILY  multivitamin, tx-iron-ca-min (THERA-M w/ IRON) tablet 1 Tab  1 Tab Oral DAILY  hydrALAZINE (APRESOLINE) 20 mg/mL injection 10 mg  10 mg IntraVENous Q6H PRN  
 amantadine HCl (SYMMETREL) capsule 100 mg  100 mg Oral BID  methylPREDNISolone (PF) (SOLU-MEDROL) injection 40 mg  40 mg IntraVENous Q8H  
 heparin (porcine) injection 5,000 Units  5,000 Units SubCUTAneous Q8H  
 mycophenolate mofetil (CELLCEPT) capsule 500 mg  500 mg Oral ACB&D  
 glucose chewable tablet 16 g  4 Tab Oral PRN  
 dextrose (D50W) injection syrg 12.5-25 g  12.5-25 g IntraVENous PRN  
  glucagon (GLUCAGEN) injection 1 mg  1 mg IntraMUSCular PRN  
 sodium chloride (NS) flush 5-10 mL  5-10 mL IntraVENous PRN  
 sodium chloride (NS) flush 5-40 mL  5-40 mL IntraVENous Q8H  
 sodium chloride (NS) flush 5-40 mL  5-40 mL IntraVENous PRN  
 acetaminophen (TYLENOL) tablet 650 mg  650 mg Oral Q4H PRN  
 lactobac ac& pc-s.therm-b.anim (JUNI Q/RISAQUAD)  1 Cap Oral DAILY  atorvastatin (LIPITOR) tablet 40 mg  40 mg Oral QHS Objective:  
Vitals: 
Visit Vitals /81 (BP 1 Location: Right arm, BP Patient Position: At rest) Pulse 99 Temp 97.5 °F (36.4 °C) Resp 23 Ht 5' 9\" (1.753 m) Wt 164 lb 0.4 oz (74.4 kg) SpO2 100% BMI 24.22 kg/m² Temp (24hrs), Av.1 °F (36.7 °C), Min:97.5 °F (36.4 °C), Max:98.8 °F (37.1 °C) O2 Flow Rate (L/min): 3 l/min O2 Device: Nasal cannula Last 24hr Input/Output: 
 
Intake/Output Summary (Last 24 hours) at 2019 3845 Last data filed at 2019 2158 Gross per 24 hour Intake 875 ml Output 1300 ml Net -425 ml PHYSICAL EXAM: 
General:    Alert, cooperative, no distress, appears stated age. Head:   Normocephalic, without obvious abnormality, atraumatic. Eyes:   Conjunctivae/corneas clear. PERRLA Nose:  Nares normal. No drainage or sinus tenderness. Throat:    Lips, mucosa, and tongue normal.  No Thrush Neck:  Supple, symmetrical,  no adenopathy, thyroid: non tender 
  no carotid bruit and no JVD. Back:    Symmetric,  No CVA tenderness. Lungs:   Decreased bs with rales. Chest wall:  No tenderness or deformity. No Accessory muscle use. Heart:   Regular rate and rhythm,  no murmur, rub or gallop. Abdomen:   Soft, non-tender. Not distended. Bowel sounds normal. No masses. Lab Data Reviewed: 
 
Recent Labs 19 
8643 19 
5275 19 
0502 WBC 21.2* 21.6* 21.3* HGB 7.1* 7.4* 8.0*  
HCT 24.3* 25.0* 27.3*  
* 193 233 Recent Labs 19 
2097 19 
5933 19 
0506 * 152* 145  
K 4.7 4.7 5.0  
* 113* 110* CO2 25 32 28 * 375* 456* BUN 44* 43* 40* CREA 1.12* 1.06* 1.05* CA 8.7 9.0 9.5 MG  --   --  2.8* PHOS  --   --  2.9 Lab Results Component Value Date/Time Glucose (POC) 408 (H) 08/04/2019 06:13 AM  
 Glucose (POC) 420 (H) 08/04/2019 12:08 AM  
 Glucose (POC) 360 (H) 08/03/2019 09:44 PM  
 Glucose (POC) 367 (H) 08/03/2019 04:51 PM  
 Glucose (POC) 423 (H) 08/03/2019 12:18 PM  
 
 
___________________________________________________ 
___________________________________________________ Attending Physician: Leann Powell MD

## 2019-08-04 NOTE — PROGRESS NOTES
Problem: Pneumonia: Day 4 Goal: Off Pathway (Use only if patient is Off Pathway) Outcome: Progressing Towards Goal 
  
Problem: Falls - Risk of 
Goal: *Absence of Falls Description Document Neetu Miguelangel Fall Risk and appropriate interventions in the flowsheet. Outcome: Progressing Towards Goal 
Bed locked and in low position, hourly rounds performed. Problem: Seizure Disorder (Adult) Goal: *STG: Remains free of seizure activity Outcome: Progressing Towards Goal 
 Pt. Unresponsive, non-purposeful movement of LUE occasionally, no seizure activity observed. Last 3 Recorded Weights in this Encounter 08/02/19 0421 08/03/19 4583 08/04/19 7240 Weight: 75.6 kg (166 lb 9.6 oz) 75.6 kg (166 lb 9.6 oz) 74.4 kg (164 lb 0.4 oz) Pt. Weight source weight. Bedside shift change report given to [facility], RN (oncoming nurse) by Valeria Castillo RN (offgoing nurse). Report included the following information SBAR, Kardex, ED Summary, Procedure Summary, Intake/Output, MAR, Accordion, Recent Results, Med Rec Status, Cardiac Rhythm NSR to Sinus Tach and Quality Measures.

## 2019-08-05 NOTE — PROGRESS NOTES
Problem: Patient Education: Go to Patient Education Activity Goal: Patient/Family Education Outcome: Progressing Towards Goal 
Note:  
Pt unresponsive, no purposeful interaction. Pt on 3L NC with failed attempts to wean. NGT in placed for continuous tube feeding. Q2 hours turns. HOB elevated 35 degrees. Will continue to monitor and educate. Bedside shift change report given to Dulce Maria Rojas RN (oncoming nurse) by Justen Cota RN (offgoing nurse). Report included the following information SBAR, Kardex, MAR, Accordion, Recent Results, Med Rec Status, Cardiac Rhythm NSR and Alarm Parameters .

## 2019-08-05 NOTE — DIABETES MGMT
[facility] Progress Note Recommendations/ Comments:Review for extreme hyperglycemia on steroids. Noted Lantus increased from 64 units to 80 units tonight. Pt required 46 units of correction in past 24 hours with no improvement in BS. All results >300 mg/dl at this time. Noted IV steroids d/c and oral meds to begin in am.   
 
May benefit from NPH insulin to peak with action of steroids for improved control instead of Lantus. Current hospital DM medication: Lispro insulin resistant scale with instruction up to BS >451 mg/dl; Lantus 80 units at bedtime Chart reviewed on Rosaura Santana. Patient is a 70 y.o. female with known  Type 2 Diabetes on oral agent (monotherapy): metformin (generic) at home. A1c:  
Lab Results Component Value Date/Time Hemoglobin A1c 6.9 (H) 08/03/2018 01:45 PM  
 Hemoglobin A1c 6.8 (H) 01/30/2018 01:25 PM  
 
 
Recent Glucose Results:  
Lab Results Component Value Date/Time  (H) 08/05/2019 03:05 AM  
 GLUCPOC 314 (H) 08/05/2019 06:00 AM  
 GLUCPOC 394 (H) 08/04/2019 11:20 PM  
 GLUCPOC 353 (H) 08/04/2019 09:57 PM  
  
 
Lab Results Component Value Date/Time Creatinine 1.02 08/05/2019 03:05 AM  
 
Estimated Creatinine Clearance: 54.4 mL/min (based on SCr of 1.02 mg/dL). Active Orders Diet DIET NPO  
  
 
PO intake:  
Patient Vitals for the past 72 hrs: 
 % Diet Eaten 08/02/19 1354 0 % Will continue to follow as needed. Thank you Time spent: 5 min

## 2019-08-05 NOTE — PROGRESS NOTES
NUTRITION COMPLETE ASSESSMENT 
 
RECOMMENDATIONS:  
1. Follow BG trends while on steroids with insulin adjustments per DTC 2. Continue continuous tube feeds for now with insulin/steroids changes 
 - if BG remains elevated with changes may consider switch to Glucerna 3. Add bowel regimen - last documented BM on 7/31 Interventions/Plan:  
Food/Nutrient Delivery:          (continue tube feeds) Assessment:  
Reason for Assessment:  [x]Reassessment Diet: NPO Tube feeds: Osmolite 1.5 @ 46ml/hr + 1 pkt prosource + 145ml flush q4hr Nutritionally Significant Medications: [x] Reviewed & Includes: cefepime, lantus (80 units), SSI, Shantel-Q, MVI + iron, keppra, solu-medrol, cellcept, thiamine Subjective: Pt unresponsive. Spoke with family via Facetime and in the room regarding BG and tube feeds. Objective: 
Pt admitted with pneumonia. PMHx: HTN, fibromyalgia, pulmonary fibrosis, Hodgkin's lymphoma. Had been Hospice at one point but since revoked. Code stroke but stroke ruled out. Remains unresponsive on and off with neuro following. Family declining LP. Poor prognosis noted. NGT placed on 8/1, possible PEG pending goals of care once off steroids per MD notes. Tube feeds running at goal rate. Osmolite 1.5 @ 46ml/hr + 1 pkt prosource + 145ml flush q4hr. Provides: 1104ml, 1716kcal, 85g protein, 839ml free fluid + 870ml fluid = 1709ml fluid. Meets 100% energy and protein needs. Will continue with current order for now. Once BG controlled can switch to nocturnal or bolus feeds BG extremely high, over 500mg/dl, over the weekend while on IV steroids. Switch to oral steroids noted and lantus increased from 64 to 80 units. Will follow for BG trends with above changes, if BG remains elevated may benefit from switch to Glucerna. Of note: last BM on 7/31 with tube feeds over the weekend. Recommend adding bowel regimen.  Will continue to follow for mental status/PO intake, tube feed tolerance, BG trends, weight status, plan of care. Estimated Nutrition Needs:  
Kcals/day: 1530 Kcals/day(1472-8274 kcal/day (MSJ x 1.2-1.3)) Protein: 80 g(80-83g/day (1-1.1g/kg)) Fluid: 1650 ml(1ml/kcal) Based On: [place] 1899 Weight Used: Actual wt Pt expected to meet estimated nutrient needs:  [x]   Yes  - with EN    []  No  
 
Nutrition Diagnosis:  
1. Unintended weight loss(Malnutrition) related to fair to poor po intake PTA as evidenced by severe wt loss x 1 yr; <50% meals x 5+ days 2. Inadequate protein-energy intake related to AMS as evidenced by NPO x 3 days; minimal PO intake prior; plans for EN 3. Altered nutrition-related lab values related to steroids, EN as evidenced by severe hyperglycemia on IV steroids and tube feeds Goals:   
 EN continuing to meet at least 90% needs in 5-7 days; BG between 80-180mg/dl Monitoring & Evaluation: - Total energy intake - Weight/weight change Previous Nutrition Goals Met: Yes Previous Recommendations:    Yes 
 
Education & Discharge Needs:  
 [x] None Identified 
 [x] Identified and addressed  
 [] Participated in care plan, discharge planning, and/or interdisciplinary rounds Cultural concerns: None Skin Integrity: []Intact  [x]Other - sacral wound Edema: []None [x]Other -trace Last BM: 7/31/2019 Food Allergies: [x]None []Other Anthropometrics:   
Weight Loss Metrics 8/5/2019 4/10/2019 2/9/2019 1/21/2019 11/15/2018 9/13/2018 9/12/2018 Today's Wt 163 lb 2.3 oz - 238 lb 8.6 oz 180 lb 180 lb 198 lb 201 lb 1 oz BMI 24.09 kg/m2 35.74 kg/m2 36.27 kg/m2 27.37 kg/m2 27.37 kg/m2 30.11 kg/m2 29.69 kg/m2 Last 3 Recorded Weights in this Encounter 08/03/19 6264 08/04/19 0717 08/05/19 0304 Weight: 75.6 kg (166 lb 9.6 oz) 74.4 kg (164 lb 0.4 oz) 74 kg (163 lb 2.3 oz) Weight Source: Bed Height: 5' 9\" (175.3 cm), Body mass index is 24.09 kg/m². IBW : 65.8 kg (145 lb), % IBW (Calculated): 115.38 % Labs:   
Lab Results Component Value Date/Time Sodium 148 (H) 08/05/2019 03:05 AM  
 Potassium 4.9 08/05/2019 03:05 AM  
 Chloride 112 (H) 08/05/2019 03:05 AM  
 CO2 30 08/05/2019 03:05 AM  
 Glucose 315 (H) 08/05/2019 03:05 AM  
 BUN 43 (H) 08/05/2019 03:05 AM  
 Creatinine 1.02 08/05/2019 03:05 AM  
 Calcium 8.8 08/05/2019 03:05 AM  
 Magnesium 2.5 (H) 08/04/2019 09:25 AM  
 Phosphorus 2.9 08/02/2019 05:02 AM  
 Albumin 2.6 (L) 07/23/2019 04:07 AM  
 
Yulia Garcia, RD [address] [place] , Pager #[phone] or [phone]

## 2019-08-05 NOTE — PROGRESS NOTES
Hematology-Oncology Progress Note Mushtaq Rowan 1950 
[id number] 
8/5/2019 Subjective:  
Pt is awake but non interactive, appears to be breathing comfortably,  D/w nephew Allergies: Patient has no known allergies. Current Facility-Administered Medications Medication Dose Route Frequency Provider Last Rate Last Dose  insulin glargine (LANTUS) injection 80 Units  80 Units SubCUTAneous QHS Estelita Umanzor MD      
 mycophenolate mofetil (CELLCEPT) 200 mg/mL oral suspension 500 mg  500 mg Oral Q12H Estelita Umanzor MD   500 mg at 08/04/19 2245  white petrolatum-mineral oil (SOOTHE NIGHT TIME) 80-20 % ophthalmic ointment   Both Eyes PRN Estelita Umanzor MD   1 Each at 08/04/19 2234  apixaban (ELIQUIS) tablet 5 mg  5 mg Oral BID Grupo Umanzor MD   5 mg at 08/05/19 0914  
 metoprolol tartrate (LOPRESSOR) tablet 12.5 mg  12.5 mg Oral Q12H Estelita Umanzor MD   12.5 mg at 08/05/19 6311  insulin lispro (HUMALOG) injection   SubCUTAneous Q6H Estelita Umanzor MD   10 Units at 08/05/19 3570  cefepime (MAXIPIME) 2 g in 0.9% sodium chloride (MBP/ADV) 100 mL  2 g IntraVENous Q12H Nereida Hester  mL/hr at 08/05/19 0249 2 g at 08/05/19 0249  thiamine HCL (B-1) tablet 100 mg  100 mg Oral DAILY Estelita Umanzor MD   100 mg at 08/05/19 0742  multivitamin, tx-iron-ca-min (THERA-M w/ IRON) tablet 1 Tab  1 Tab Oral DAILY Sharri Burnett MD   1 Tab at 08/05/19 1319  hydrALAZINE (APRESOLINE) 20 mg/mL injection 10 mg  10 mg IntraVENous Q6H PRN Sharri Burnett MD   Stopped at 08/01/19 6454  amantadine HCl (SYMMETREL) capsule 100 mg  100 mg Oral BID Trina Romero MD   100 mg at 08/05/19 3704  methylPREDNISolone (PF) (SOLU-MEDROL) injection 40 mg  40 mg IntraVENous Q8H Dheeraj Vallejo PA   40 mg at 08/05/19 0915  
 glucose chewable tablet 16 g  4 Tab Oral PRN Yudy Taveras MD      
  dextrose (D50W) injection syrg 12.5-25 g  12.5-25 g IntraVENous PRN Claire Rodriguez MD      
 glucagon [facility]) injection 1 mg  1 mg IntraMUSCular PRN Audrey Oscar MD      
 sodium chloride (NS) flush 5-10 mL  5-10 mL IntraVENous PRN Gunner Bustamante MD   10 mL at 08/03/19 1741  
 sodium chloride (NS) flush 5-40 mL  5-40 mL IntraVENous Q8H Tiana Sneed MD   10 mL at 08/05/19 4587  
 sodium chloride (NS) flush 5-40 mL  5-40 mL IntraVENous PRN Tiana Sneed MD      
 acetaminophen (TYLENOL) tablet 650 mg  650 mg Oral Q4H PRN Tiana Sneed MD      
 lactobac ac& pc-s.therm-b.anim (JUNI Q/RISAQUAD)  1 Cap Oral DAILY Tiana Sneed MD   1 Cap at 08/05/19 0913  
 atorvastatin (LIPITOR) tablet 40 mg  40 mg Oral QHS Tiana Sneed MD   40 mg at 08/04/19 2210 Objective:  
 
Patient Vitals for the past 24 hrs: 
 BP Temp Pulse Resp SpO2 Weight 08/05/19 0913 142/82  92     
08/05/19 0800   99 30 98 %   
08/05/19 0740 157/81 97.1 °F (36.2 °C) (!) 102 23 99 %   
08/05/19 0304      74 kg (163 lb 2.3 oz) 08/05/19 0250 134/62 98.3 °F (36.8 °C) 89 20 99 %   
08/04/19 2323 (!) 151/95 98.2 °F (36.8 °C) 79 30 100 %   
08/04/19 2210 155/78  (!) 103     
08/04/19 1907 131/69 97.5 °F (36.4 °C) (!) 108 23 100 %   
08/04/19 1521 136/66 98.9 °F (37.2 °C) 95 25 100 %   
08/04/19 1449   96     
08/04/19 1412   (!) 120     
08/04/19 1211 138/82 98.2 °F (36.8 °C) (!) 101 24 100 %  Gen: not responsive HEENT: PERRL, Sclerae anicteric ngt in place Cv: RRR without m/r/g Pulm: CTA bilaterally Abd: NABS, NTND, No HSM Ext: No c/c/e Available labs reviewed: 
Labs:   
Recent Results (from the past 24 hour(s)) GLUCOSE, POC Collection Time: 08/04/19 12:15 PM  
Result Value Ref Range Glucose (POC) 373 (H) 65 - 100 mg/dL Performed by Phylicia Carolina GLUCOSE, POC Collection Time: 08/04/19  5:16 PM  
Result Value Ref Range Glucose (POC) 354 (H) 65 - 100 mg/dL Performed by Unkown  GLUCOSE, POC Collection Time: 08/04/19  9:57 PM  
Result Value Ref Range Glucose (POC) 353 (H) 65 - 100 mg/dL Performed by Sandee Castillo GLUCOSE, POC Collection Time: 08/04/19 11:20 PM  
Result Value Ref Range Glucose (POC) 394 (H) 65 - 100 mg/dL Performed by Sandee Castillo METABOLIC PANEL, BASIC Collection Time: 08/05/19  3:05 AM  
Result Value Ref Range Sodium 148 (H) 136 - 145 mmol/L Potassium 4.9 3.5 - 5.1 mmol/L Chloride 112 (H) 97 - 108 mmol/L  
 CO2 30 21 - 32 mmol/L Anion gap 6 5 - 15 mmol/L Glucose 315 (H) 65 - 100 mg/dL BUN 43 (H) 6 - 20 MG/DL Creatinine 1.02 0.55 - 1.02 MG/DL  
 BUN/Creatinine ratio 42 (H) 12 - 20 GFR est AA >60 >60 ml/min/1.73m2 GFR est non-AA 54 (L) >60 ml/min/1.73m2 Calcium 8.8 8.5 - 10.1 MG/DL  
CBC WITH AUTOMATED DIFF Collection Time: 08/05/19  3:05 AM  
Result Value Ref Range WBC 23.5 (H) 3.6 - 11.0 K/uL  
 RBC 2.47 (L) 3.80 - 5.20 M/uL HGB 7.3 (L) 11.5 - 16.0 g/dL HCT 24.4 (L) 35.0 - 47.0 % MCV 98.8 80.0 - 99.0 FL  
 MCH 29.6 26.0 - 34.0 PG  
 MCHC 29.9 (L) 30.0 - 36.5 g/dL  
 RDW 15.5 (H) 11.5 - 14.5 % PLATELET 178 (L) 528 - 400 K/uL MPV 12.5 8.9 - 12.9 FL  
 NRBC 0.0 0  WBC ABSOLUTE NRBC 0.00 0.00 - 0.01 K/uL NEUTROPHILS 95 (H) 32 - 75 % LYMPHOCYTES 3 (L) 12 - 49 % MONOCYTES 1 (L) 5 - 13 % EOSINOPHILS 0 0 - 7 % BASOPHILS 0 0 - 1 % IMMATURE GRANULOCYTES 1 (H) 0.0 - 0.5 % ABS. NEUTROPHILS 22.4 (H) 1.8 - 8.0 K/UL  
 ABS. LYMPHOCYTES 0.7 (L) 0.8 - 3.5 K/UL  
 ABS. MONOCYTES 0.2 0.0 - 1.0 K/UL  
 ABS. EOSINOPHILS 0.0 0.0 - 0.4 K/UL  
 ABS. BASOPHILS 0.0 0.0 - 0.1 K/UL  
 ABS. IMM. GRANS. 0.2 (H) 0.00 - 0.04 K/UL  
 DF SMEAR SCANNED    
 PLATELET COMMENTS Large Platelets RBC COMMENTS OVALOCYTES PRESENT 
    
 RBC COMMENTS POLYCHROMASIA 1+ 
    
 RBC COMMENTS ANISOCYTOSIS 1+ GLUCOSE, POC  
 Collection Time: 08/05/19  6:00 AM  
Result Value Ref Range Glucose (POC) 314 (H) 65 - 100 mg/dL Performed by Michelle López Assessment and Plan  
 
69 y/o woman with a h/o classical Hodgkin's lymphoma, s/p 3 1/2 cycles of ABVD, truncated due to severe bleomycin toxicity in the lung. Now admitted with \"pneumonia\",  
 
1. AMS change/h/o Hodgkin's lymphoma: I spoke with her sister about the possibility of CNS involvement and the possible need to do a lumbar puncture for diagnostic purposes. She explains she spoke with her brother and they did \"not want to put her through that\". Has not had a biopsy of her lung infiltrates. She is stable, from an oncology stand point,  Not a candidate for Rx if relapse/progression identified due to poor performance status.     
 
Kim Vargas MD

## 2019-08-05 NOTE — PROGRESS NOTES
Hospital Progress Note NAME:  Sravani Cash :   1950 MRN:  [id number] Date/Time:  2019 8:25 AM 
 
Plan: 1. Ng tube feedings 2. Increase lantlus 3. cellcept 4. Will place peg and snf placement when patient is no longer on solumedrol 5. D/w brother Risk of Deterioration: Low  []           Moderate  []           High  [x] Assessment:  
Principal Problem: 
  Pneumonia (7/15/2019) Active Problems: 
   Acute encephalopathy: 
-unclear etiology - transiently awaken with appropriate conversation 
-seizure was suspected but  EEG did not confirm 
-MRI was negative for stroke,CT head and neck - no bleed,vascular occlusion. -ABG reviewed. Electrolytes,ammonia,trop,lactic acid wnl 
-Neurology following 
-Continue  keppra. -MRI brain w contrast  - no acute process. -Will be on 24 hr EEG 
-May need LP for cytology as she has history of hodgkin's lymphoma. Hold eliquis. 
-Oncology following Acute on Chronic hypoxemic respiratory failure ([address] (2019) On nasal canula Bleomycin lung toxicity (2019) 
   cellcept and steroids Diabetes mellitus type 2, controlled ([address] (2016) 
   basal bolus insulin Hodgkin lymphoma ([address] (2/3/2019) bulky stage II HD (classical),s/p treatment earlier this year by Dr. Liliane Mitchell with ABVD. Course truncated due to abrupt onset bleomycin toxicity. Diff dx includes lymphangitic spread of lymphoma. She completed 3 1/2 treatments of ABVD, less than the standard 6. oncol raised the potential concern for recurrent disease with her family. UTI -  ENTEROCOCCUS SPECIES  
    treated Admitting notes:68 y.o. female with past medical history significant for htn, fibromyalgia, muscle atrophy, pulmonary fibrosis, asthma, b/l cataracts, dm, non-hodgkin's, who presents from ems with chief complaint of sob.  Pt has \"last night\" onset of worsening sob described as Kevyn Bal breathing\" that's continued into this morning. She is normally on 4.5 L of O2 n/c at baseline, but had it increased to 5 L by staff. She also endorses a dry non-productive cough and fever (peak 100) at this time. EMS arrived and measured initial O2 sat of 92% and HR of 140 bpm. Denies pleuritic cp and abd pain. Of note, she has had PNA \"x3 times since Ken" and was treated last week with a z-pack for sinus infection symptomatic of congestion. DNR form in place. EMS was called to [place];  bpm, 124/78, and 9 Subjective/interium history Unresponsive at time of my exam  
11 Point Review of Systems:  
Negative except [x]            Unable to obtain ROS due to:      
[x]            mental status change []            sedated []            intubated Social History Tobacco Use  Smoking status: Never Smoker  Smokeless tobacco: Never Used Substance Use Topics  Alcohol use: Yes Comment: socially Medications reviewed: 
Current Facility-Administered Medications Medication Dose Route Frequency  insulin glargine (LANTUS) injection 64 Units  64 Units SubCUTAneous QHS  mycophenolate mofetil (CELLCEPT) 200 mg/mL oral suspension 500 mg  500 mg Oral Q12H  white petrolatum-mineral oil (SOOTHE NIGHT TIME) 80-20 % ophthalmic ointment   Both Eyes PRN  
 apixaban (ELIQUIS) tablet 5 mg  5 mg Oral BID  metoprolol tartrate (LOPRESSOR) tablet 12.5 mg  12.5 mg Oral Q12H  
 insulin lispro (HUMALOG) injection   SubCUTAneous Q6H  
 cefepime (MAXIPIME) 2 g in 0.9% sodium chloride (MBP/ADV) 100 mL  2 g IntraVENous Q12H  thiamine HCL (B-1) tablet 100 mg  100 mg Oral DAILY  multivitamin, tx-iron-ca-min (THERA-M w/ IRON) tablet 1 Tab  1 Tab Oral DAILY  hydrALAZINE (APRESOLINE) 20 mg/mL injection 10 mg  10 mg IntraVENous Q6H PRN  
 amantadine HCl (SYMMETREL) capsule 100 mg  100 mg Oral BID  methylPREDNISolone (PF) (SOLU-MEDROL) injection 40 mg  40 mg IntraVENous Q8H  
  glucose chewable tablet 16 g  4 Tab Oral PRN  
 dextrose (D50W) injection syrg 12.5-25 g  12.5-25 g IntraVENous PRN  
 glucagon (GLUCAGEN) injection 1 mg  1 mg IntraMUSCular PRN  
 sodium chloride (NS) flush 5-10 mL  5-10 mL IntraVENous PRN  
 sodium chloride (NS) flush 5-40 mL  5-40 mL IntraVENous Q8H  
 sodium chloride (NS) flush 5-40 mL  5-40 mL IntraVENous PRN  
 acetaminophen (TYLENOL) tablet 650 mg  650 mg Oral Q4H PRN  
 lactobac ac& pc-s.therm-b.anim (JUNI Q/RISAQUAD)  1 Cap Oral DAILY  atorvastatin (LIPITOR) tablet 40 mg  40 mg Oral QHS Objective:  
Vitals: 
Visit Vitals /81 (BP 1 Location: Right arm, BP Patient Position: At rest) Pulse (!) 102 Temp 97.1 °F (36.2 °C) Resp 23 Ht 5' 9\" (1.753 m) Wt 163 lb 2.3 oz (74 kg) SpO2 99% BMI 24.09 kg/m² Temp (24hrs), Av °F (36.7 °C), Min:97.1 °F (36.2 °C), Max:98.9 °F (37.2 °C) O2 Flow Rate (L/min): 3 l/min O2 Device: Nasal cannula Last 24hr Input/Output: 
 
Intake/Output Summary (Last 24 hours) at 2019 8178 Last data filed at 2019 2204 Gross per 24 hour Intake 1270 ml Output 1200 ml Net 70 ml PHYSICAL EXAM: 
General:    Unresponsive, no distress, appears stated age. Head:   Normocephalic, without obvious abnormality, atraumatic. Lungs:   Decreased bs with rales. Chest wall:  No tenderness or deformity. No Accessory muscle use. Heart:   Regular rate and rhythm,  no murmur, rub or gallop. Abdomen:   Soft, non-tender. Not distended. Bowel sounds normal. No masses. Lab Data Reviewed: 
 
Recent Labs 19 
6179 19 
0248 19 
0732 WBC 23.5* 21.2* 21.6* HGB 7.3* 7.1* 7.4* HCT 24.4* 24.3* 25.0*  
* 140* 193 Recent Labs 19 
9799 19 
4331 19 
0248 19 
7811 *  --  147* 152* K 4.9  --  4.7 4.7 *  --  114* 113* CO2 30  --  25 32 *  --  409* 375* BUN 43*  --  44* 43* CREA 1.02  --  1.12* 1.06* CA 8.8  --  8.7 9.0 MG  --  2.5*  --   --   
 
Lab Results Component Value Date/Time Glucose (POC) 314 (H) 08/05/2019 06:00 AM  
 Glucose (POC) 394 (H) 08/04/2019 11:20 PM  
 Glucose (POC) 353 (H) 08/04/2019 09:57 PM  
 Glucose (POC) 354 (H) 08/04/2019 05:16 PM  
 Glucose (POC) 373 (H) 08/04/2019 12:15 PM  
 
 
___________________________________________________ 
___________________________________________________ Attending Physician: Caro Almonte MD

## 2019-08-05 NOTE — PROGRESS NOTES
Problem: Pneumonia: Day 4 Goal: Off Pathway (Use only if patient is Off Pathway) Outcome: Progressing Towards Goal 
  
Problem: Falls - Risk of 
Goal: *Absence of Falls Description Document Dony Tomasz Fall Risk and appropriate interventions in the flowsheet. Outcome: Progressing Towards Goal 
 Pt. Responds to pain only, non-purposeful movement of LUE and LLE, on 3L O2 and maintaining O2 sats of 97%, tried to wean off but not tolerated. No seizure activity observed, Pt. Has NG tube and feeding Osmolite at 45ml/hr, Pt. Tolerating well. Bedside shift change report given to Merlin Brannon RN (oncoming nurse) by Melody Hamlin RN (offgoing nurse). Report included the following information SBAR, Kardex, ED Summary, Procedure Summary, Intake/Output, MAR, Accordion, Recent Results, Med Rec Status, Cardiac Rhythm NSR to Sinus Tach and Quality Measures.

## 2019-08-05 NOTE — PROGRESS NOTES
Pulmonary, Critical Care, and Sleep Medicine~Progress Note Name: Lavell Coates MRN: [id number] : 1950 Hospital: [address] Date: 2019 11:15 AM Admission: 7/15/2019 Impression Plan 1. Acute on chronic (on 4lpm normally) hypoxic resp failure secondary to below 2. Abnormal CT scan: HCAP possibly (treated), query more bleomycin lung toxicity, now with lack of response. Other concerns were pulmonary edema, but she is quite dried out now 3. Hx of lymphoma. Not on therapy since the beginning of the year. 4. Hx of PE and was on eliquis 5. Hx of asthma 6. Reviewed [facility] records. She was seen last there on 19. Early  cellcept was stopped and at that appointment she was scheduled to taper off of steroids, which she did. Apparently she was on hospice care, but I am unclear as to the details; per patient it was rescinded 7. AMS~ per neurology. 1. Strep/legionella negative 2. ECHO; PASP 60mmHg 3. O2 titration above 90%, Continue to wean as able. 4. autoimmune labs mostly negative, but did have a + PEACE with elevated CRP and ESR; query coexisting autoimmune process. Once discharge would recommend rheumatology follow up pending clinical course 5. Switch to prednisone per PEG / cellcept 6. On Cefepime 7. F/u CXR 
8. High risk for rapid decline 9. Difficult situation - prognosis poor Daily Progression: 
 
 Decreasing o2 needs - on 3LPM. 
Mental status unchanged. No distress, no cough per nephew. WBC 23. Afebrile. 8/ On 4 lpm by nc. Altered mental status for the past 2-3 days. Eeg, ct head all negative. Neuro on the case, signed off -suspect metabolic encephalopathy. Wbc up. Has purwick. CXr still looks the same - continuous to be on cellcept and prednisone.  Long discussion with extended family at bedside. Questions answered. O2 requirement down to 3 l/m -- better than when she was at her baseline. The initial pulmonary insult seems to have mostly resolved, though clearly there are other issues now at play. 7/31 A couple periods of lucidity overnight per her sister's report. Discussed with her and answered questions, offered support. 7/30 Per family and RN reports more responsive today. Was suppose to have EEG; not done yet. Family is declining LP. 7/29 FiO2 requirements are remarkably down. 7/28 chest film shows no worsening of pulmonary infiltrates. Dramatic change in mental status yesterday with unclear etiology. Seizures vs CVA, but all testing thus far is negative 7/26 On 15lpm; theoretically can be switched to midflow, but wean you talk about it she gets anxious and her saturations drop. I discussed with RN; suggest doing one hour on midlflow and one hour on highflow during the day to get her use to it.  
 
7/25 She looks so weak, but states she feels better. About to work with PT. Still +.  
 
7/24 Noted question of lung transplant query; I think recent malignancy would not allow her to be placed on the list. But since she is followed at [facility] they could assisted better. She feels better today 7/23 Looks stronger Desats quickly with movement, but stabilizes; this is to be expected with her chronic lung disease Weight gain over night? Agree with diuretics Agree with getting up and out of bed as much as possible 7/22 Creatinine improving Was increased in Flow and FiO2, unclear why as she is feeling better Chest film today looks fairly similar 7/18 Not much different. We are starting her on high flow now 7/17 Feels possibly a little better. proBNP elevated today 7/16 Consult Note requested by Dr Rock Archuleta. Patient presented for admission secondary to a one week course of cough, subjective fevers, and malasie. Outpatient PCP starte zpak and she did not have bernadette improvement. No other symptoms reported on this recent course. 7/15/19 CT scan did showed worsening right great than left infiltrates with interlobular septal thickening; no PE. BNP was 534, not very elevated. This past Jan 2019 she was dx with hodgkin's lymphoma. Was on blecyomcin and developed a pneumonitis associated with it. Was started on cellcept 500mg bid and prednisone. Followed in the office at [facility] in April, however transferred her care to [facility] and saw Dr David Trotter (sp?). Her last visit with them was this past June and at that visit she was weaned off of the prednisone and cellcept. We do not have those records at this time. I have reviewed the labs and previous days notes. Pertinent items are noted in HPI. Past Medical History:  
Diagnosis Date  Asthma   
 as a child, nothing since menopause  Cancer ([facility] 08/21/2018 Non-Hodgkin's  Cataracts, bilateral   
 Diabetes ([facility] type II  
 Environmental allergies  Fibromyalgia Treated with alternate medical therapy  Hypertension Past Surgical History:  
Procedure Laterality Date  HX BREAST BIOPSY Right 2006  HX COLONOSCOPY  2015  HX GYN Pap 2015  HX ORTHOPAEDIC    
 fractured R ankle/ no surgery  HX OTHER SURGICAL Right 08/27/2018  
 excision of deep neck cervical LN. Prior to Admission medications Medication Sig Start Date End Date Taking? Authorizing Provider  
acetaminophen (TYLENOL) 650 mg suppository Insert 650 mg into rectum every four (4) hours as needed for Pain. Yes Provider, Historical  
amLODIPine (NORVASC) 5 mg tablet Take 5 mg by mouth daily. Yes Provider, Historical  
trimethoprim-sulfamethoxazole (BACTRIM DS) 160-800 mg per tablet Take 1 Tab by mouth every Monday, Wednesday, Friday. Yes Provider, Historical  
metFORMIN (GLUCOPHAGE) 500 mg tablet Take 500 mg by mouth two (2) times daily (with meals).  Indications: Per [address] 3/30/19  Yes Provider, Historical  
 levalbuterol (XOPENEX) 1.25 mg/0.5 mL nebu 1.25 mg by Nebulization route every four (4) hours as needed. Indications: Per [address] 3/30/19  Yes Provider, Historical  
acetaminophen (TYLENOL ARTHRITIS PAIN) 650 mg TbER Take 650 mg by mouth as needed (Q 6hrs PRN). 3/30/19  Yes Provider, Historical  
apixaban (ELIQUIS) 5 mg tablet Take 1 Tab by mouth every twelve (12) hours. 3/13/19  Yes Juan F Bai MD  
atorvastatin (LIPITOR) 40 mg tablet TAKE 1 TAB BY MOUTH DAILY. 12/3/17  Yes Juan F Bai MD  
Oxygen 3 Devices by Nasal route as needed. Indications: SOB PRN    Provider, Historical  
 
No Known Allergies Social History Tobacco Use  Smoking status: Never Smoker  Smokeless tobacco: Never Used Substance Use Topics  Alcohol use: Yes Comment: socially Family History Problem Relation Age of Onset  Heart Disease Mother  Cancer Father   
     prostate cancer  Cancer Sister Breast cancer apparent DCIS  
 Heart Disease Brother Brother  from congestive heart failure secondary to severe mitral disease OBJECTIVE: 
 
 Vital Signs: 
    
Visit Vitals /82 Pulse 92 Temp 97.1 °F (36.2 °C) Resp 30 Ht 5' 9\" (1.753 m) Wt 74 kg (163 lb 2.3 oz) SpO2 98% BMI 24.09 kg/m² Temp (24hrs), Av °F (36.7 °C), Min:97.1 °F (36.2 °C), Max:98.9 °F (37.2 °C) Intake/Output:  
  Last shift: 701 - 1900 In: 320 Out: - Last 3 shifts: 1901 -  0700 In: 6887 [I.V.:250] Out: 7233 [Urine:2270] Intake/Output Summary (Last 24 hours) at 2019 1036 Last data filed at 2019 5311 Gross per 24 hour Intake 1385 ml Output 1200 ml Net 185 ml Physical Exam:                                       
Exam Findings Other General: No resp distress noted, appears stated age HEENT:  No ulcers, JVD not elevated, no cervical LAD Chest: No pectus deformity, normal chest rise b/l HEART:  RRR, no murmurs/rubs/gallops Lungs:  Notable crackles, diminished BS at bases ABD: Soft/NT, non rigid mildly distended EXT: No cyanosis/clubbing/edema, normal peripheral pulses Skin: No rashes or ulcers, no mottling Neuro: Unresponsive Medications: 
Current Facility-Administered Medications Medication Dose Route Frequency  insulin glargine (LANTUS) injection 80 Units  80 Units SubCUTAneous QHS  mycophenolate mofetil (CELLCEPT) 200 mg/mL oral suspension 500 mg  500 mg Oral Q12H  white petrolatum-mineral oil (SOOTHE NIGHT TIME) 80-20 % ophthalmic ointment   Both Eyes PRN  
 apixaban (ELIQUIS) tablet 5 mg  5 mg Oral BID  metoprolol tartrate (LOPRESSOR) tablet 12.5 mg  12.5 mg Oral Q12H  
 insulin lispro (HUMALOG) injection   SubCUTAneous Q6H  
 cefepime (MAXIPIME) 2 g in 0.9% sodium chloride (MBP/ADV) 100 mL  2 g IntraVENous Q12H  thiamine HCL (B-1) tablet 100 mg  100 mg Oral DAILY  multivitamin, tx-iron-ca-min (THERA-M w/ IRON) tablet 1 Tab  1 Tab Oral DAILY  hydrALAZINE (APRESOLINE) 20 mg/mL injection 10 mg  10 mg IntraVENous Q6H PRN  
 amantadine HCl (SYMMETREL) capsule 100 mg  100 mg Oral BID  methylPREDNISolone (PF) (SOLU-MEDROL) injection 40 mg  40 mg IntraVENous Q8H  
 glucose chewable tablet 16 g  4 Tab Oral PRN  
 dextrose (D50W) injection syrg 12.5-25 g  12.5-25 g IntraVENous PRN  
 glucagon (GLUCAGEN) injection 1 mg  1 mg IntraMUSCular PRN  
 sodium chloride (NS) flush 5-10 mL  5-10 mL IntraVENous PRN  
 sodium chloride (NS) flush 5-40 mL  5-40 mL IntraVENous Q8H  
 sodium chloride (NS) flush 5-40 mL  5-40 mL IntraVENous PRN  
 acetaminophen (TYLENOL) tablet 650 mg  650 mg Oral Q4H PRN  
 lactobac ac& pc-s.therm-b.anim (JUNI Q/RISAQUAD)  1 Cap Oral DAILY  atorvastatin (LIPITOR) tablet 40 mg  40 mg Oral QHS Labs: ABG No results for input(s): PHI, PCO2I, PO2I, HCO3I, SO2I, FIO2I in the last 72 hours. CBC Recent Labs 08/05/19 
4480 08/04/19 
0248 08/03/19 
5758 WBC 23.5* 21.2* 21.6* HGB 7.3* 7.1* 7.4* HCT 24.4* 24.3* 25.0*  
* 140* 193 MCV 98.8 99.6* 100.0*  
MCH 29.6 29.1 29.6 Metabolic Panel Recent Labs 08/05/19 2021 08/04/19 
4743 08/04/19 0248 08/03/19 
6705 *  --  147* 152* K 4.9  --  4.7 4.7 *  --  114* 113* CO2 30  --  25 32 *  --  409* 375* BUN 43*  --  44* 43* CREA 1.02  --  1.12* 1.06* CA 8.8  --  8.7 9.0 MG  --  2.5*  --   --   
  
 
Pertinent Labs Katina Kruse, JANA 
8/5/2019

## 2019-08-06 NOTE — PROGRESS NOTES
CM met pt's niece at bedside, niece recommended to talk to Felicity Khan ([phone]) on the niece's phone. CM will consult previous CMs to better understand the case, review other notes, and give a call back to [facility] tomorrow. CM will continue to assist.  
 
[facility] 
[facility] [phone]

## 2019-08-06 NOTE — ROUTINE PROCESS
Bedside shift change report given to Shantelle Nelson RN (oncoming nurse) by Loren Trotter RN (offgoing nurse). Report included the following information SBAR, Kardex, ED Summary, Intake/Output, MAR, Accordion, Recent Results and Cardiac Rhythm NSR/ST.

## 2019-08-06 NOTE — PROGRESS NOTES
Hospital Progress Note NAME:  Emanuel Morse :   1950 MRN:  [id number] Date/Time:  2019 8:25 AM 
 
Plan: 1. Ng tube feedings 2. Increase lantlus 3. cellcept / prednisone 4. Gi consult for peg 5. Hold eliquis 6. D/c tele Risk of Deterioration: Low  []           Moderate  []           High  [x] Assessment:  
Principal Problem: 
  Pneumonia (7/15/2019) Active Problems: 
   Acute encephalopathy: 
-unclear etiology - transiently awaken with appropriate conversation 
-seizure was suspected but  EEG did not confirm 
-MRI was negative for stroke,CT head and neck - no bleed,vascular occlusion. -ABG reviewed. Electrolytes,ammonia,trop,lactic acid wnl 
-Neurology following 
-Continue  keppra. -MRI brain w contrast  - no acute process. -Will be on 24 hr EEG 
-May need LP for cytology as she has history of hodgkin's lymphoma. Hold eliquis. 
-Oncology following Acute on Chronic hypoxemic respiratory failure ([address] (2019) On nasal canula Bleomycin lung toxicity (2019) 
   cellcept and steroids Diabetes mellitus type 2, controlled ([address] (2016) 
   basal bolus insulin Hodgkin lymphoma ([facility] (2/3/2019) bulky stage II HD (classical),s/p treatment earlier this year by Dr. Brittanie Vargas with ABVD. Course truncated due to abrupt onset bleomycin toxicity. Diff dx includes lymphangitic spread of lymphoma. She completed 3 1/2 treatments of ABVD, less than the standard 6. oncol raised the potential concern for recurrent disease with her family. UTI -  ENTEROCOCCUS SPECIES Treated UTI - ESCHERICHIA COLI 19 Cefepime Admitting notes:68 y.o. female with past medical history significant for htn, fibromyalgia, muscle atrophy, pulmonary fibrosis, asthma, b/l cataracts, dm, non-hodgkin's, who presents from ems with chief complaint of sob.  Pt has \"last night\" onset of worsening sob described as Nicholas Vaughn breathing\" that's continued into this morning. She is normally on 4.5 L of O2 n/c at baseline, but had it increased to 5 L by staff. She also endorses a dry non-productive cough and fever (peak 100) at this time. EMS arrived and measured initial O2 sat of 92% and HR of 140 bpm. Denies pleuritic cp and abd pain. Of note, she has had PNA \"x3 times since Ken" and was treated last week with a z-pack for sinus infection symptomatic of congestion. DNR form in place. EMS was called to [place];  bpm, 124/78, and 9 Subjective/interium history Unresponsive at time of my exam  
11 Point Review of Systems:  
Negative except [x]            Unable to obtain ROS due to:      
[x]            mental status change []            sedated []            intubated Social History Tobacco Use  Smoking status: Never Smoker  Smokeless tobacco: Never Used Substance Use Topics  Alcohol use: Yes Comment: socially Medications reviewed: 
Current Facility-Administered Medications Medication Dose Route Frequency  insulin glargine (LANTUS) injection 80 Units  80 Units SubCUTAneous QHS  predniSONE (DELTASONE) tablet 40 mg  40 mg Per NG tube DAILY WITH BREAKFAST  mycophenolate mofetil (CELLCEPT) 200 mg/mL oral suspension 500 mg  500 mg Oral Q12H  white petrolatum-mineral oil (SOOTHE NIGHT TIME) 80-20 % ophthalmic ointment   Both Eyes PRN  
 apixaban (ELIQUIS) tablet 5 mg  5 mg Oral BID  metoprolol tartrate (LOPRESSOR) tablet 12.5 mg  12.5 mg Oral Q12H  
 insulin lispro (HUMALOG) injection   SubCUTAneous Q6H  
 cefepime (MAXIPIME) 2 g in 0.9% sodium chloride (MBP/ADV) 100 mL  2 g IntraVENous Q12H  thiamine HCL (B-1) tablet 100 mg  100 mg Oral DAILY  multivitamin, tx-iron-ca-min (THERA-M w/ IRON) tablet 1 Tab  1 Tab Oral DAILY  hydrALAZINE (APRESOLINE) 20 mg/mL injection 10 mg  10 mg IntraVENous Q6H PRN  
 amantadine HCl (SYMMETREL) capsule 100 mg  100 mg Oral BID  
  glucose chewable tablet 16 g  4 Tab Oral PRN  
 dextrose (D50W) injection syrg 12.5-25 g  12.5-25 g IntraVENous PRN  
 glucagon (GLUCAGEN) injection 1 mg  1 mg IntraMUSCular PRN  
 sodium chloride (NS) flush 5-10 mL  5-10 mL IntraVENous PRN  
 sodium chloride (NS) flush 5-40 mL  5-40 mL IntraVENous Q8H  
 sodium chloride (NS) flush 5-40 mL  5-40 mL IntraVENous PRN  
 acetaminophen (TYLENOL) tablet 650 mg  650 mg Oral Q4H PRN  
 lactobac ac& pc-s.therm-b.anim (JUNI Q/RISAQUAD)  1 Cap Oral DAILY  atorvastatin (LIPITOR) tablet 40 mg  40 mg Oral QHS Objective:  
Vitals: 
Visit Vitals /77 (BP 1 Location: Right arm, BP Patient Position: At rest) Pulse 98 Temp 97.2 °F (36.2 °C) Resp 25 Ht 5' 9\" (1.753 m) Wt 163 lb 2.3 oz (74 kg) SpO2 100% BMI 24.09 kg/m² Temp (24hrs), Av.7 °F (36.5 °C), Min:97.2 °F (36.2 °C), Max:98.1 °F (36.7 °C) O2 Flow Rate (L/min): 3 l/min O2 Device: Nasal cannula Last 24hr Input/Output: 
 
Intake/Output Summary (Last 24 hours) at 2019 0810 Last data filed at 2019 7057 Gross per 24 hour Intake 1025 ml Output 1575 ml Net -550 ml PHYSICAL EXAM: 
General:    Unresponsive, no distress, appears stated age. Head:   Normocephalic, without obvious abnormality, atraumatic. Lungs:   Decreased bs with rales. Chest wall:  No tenderness or deformity. No Accessory muscle use. Heart:   Regular rate and rhythm,  no murmur, rub or gallop. Abdomen:   Soft, non-tender. Not distended. Bowel sounds normal. No masses. Lab Data Reviewed: 
 
Recent Labs 19 
9544 19 
8032 WBC 23.5* 21.2* HGB 7.3* 7.1*  
HCT 24.4* 24.3*  
* 140* Recent Labs 19 
0538 19 
7164 19 
4795 *  --  147* K 4.9  --  4.7 *  --  114* CO2 30  --  25  
*  --  409* BUN 43*  --  44* CREA 1.02  --  1.12* CA 8.8  --  8.7 MG  --  2.5*  --   
 
Lab Results Component Value Date/Time Glucose (POC) 270 (H) 08/06/2019 06:10 AM  
 Glucose (POC) 305 (H) 08/05/2019 11:17 PM  
 Glucose (POC) 336 (H) 08/05/2019 05:15 PM  
 Glucose (POC) 322 (H) 08/05/2019 11:58 AM  
 Glucose (POC) 314 (H) 08/05/2019 06:00 AM  
 
 
___________________________________________________ 
___________________________________________________ Attending Physician: Inocencia Gonzalez MD

## 2019-08-06 NOTE — PROGRESS NOTES
Problem: Falls - Risk of 
Goal: *Absence of Falls Description Document Temo Olman Fall Risk and appropriate interventions in the flowsheet. Outcome: Progressing Towards Goal 
Note:  
Fall Risk Interventions: 
Mobility Interventions: Communicate number of staff needed for ambulation/transfer Mentation Interventions: Adequate sleep, hydration, pain control Medication Interventions: Evaluate medications/consider consulting pharmacy Elimination Interventions: Toileting schedule/hourly rounds, Call light in reach History of Falls Interventions: Door open when patient unattended Problem: Pressure Injury - Risk of 
Goal: *Prevention of pressure injury Description Document Dwayne Scale and appropriate interventions in the flowsheet. 7-16-19: turn q2h and float heels. Outcome: Progressing Towards Goal 
Note:  
Pressure Injury Interventions: 
Sensory Interventions: Assess changes in LOC, Turn and reposition approx. every two hours (pillows and wedges if needed), Pressure redistribution bed/mattress (bed type) Moisture Interventions: Absorbent underpads, Internal/External urinary devices Activity Interventions: Pressure redistribution bed/mattress(bed type) Mobility Interventions: Turn and reposition approx. every two hours(pillow and wedges), Pressure redistribution bed/mattress (bed type), Float heels Nutrition Interventions: Document food/fluid/supplement intake Friction and Shear Interventions: HOB 30 degrees or less, Minimize layers Problem: Nutrition Deficit Goal: *Optimize nutritional status Outcome: Progressing Towards Goal 
  
Problem: Seizure Disorder (Adult) Goal: *STG: Remains free of seizure activity Outcome: Progressing Towards Goal

## 2019-08-06 NOTE — PROGRESS NOTES
Problem: Patient Education: Go to Patient Education Activity Goal: Patient/Family Education Outcome: Progressing Towards Goal 
  
Pt 100% on 3L via NC, RR 20s. Lung sounds diminished in lower bases. HOB elevated. Benefits of repositioning for respiratory status explained. Family verbalized understanding, pt unresponsive. Problem: Pressure Injury - Risk of 
Goal: *Prevention of pressure injury Description Document Dwayne Scale and appropriate interventions in the flowsheet. 7-16-19: turn q2h and float heels. Outcome: Progressing Towards Goal 
Note:  
Pressure Injury Interventions: 
Sensory Interventions: Keep linens dry and wrinkle-free, Minimize linen layers, Pressure redistribution bed/mattress (bed type) Moisture Interventions: Absorbent underpads, Check for incontinence Q2 hours and as needed, Internal/External urinary devices, Minimize layers Activity Interventions: Pressure redistribution bed/mattress(bed type) Mobility Interventions: HOB 30 degrees or less, Pressure redistribution bed/mattress (bed type) Nutrition Interventions: Document food/fluid/supplement intake Friction and Shear Interventions: HOB 30 degrees or less, Minimize layers Pt turned q2-3. Bedside shift change report given to Katie Martinez RN (oncoming nurse) by Amari Colon RN (offgoing nurse). Report included the following information SBAR, Kardex, Intake/Output, MAR, Recent Results and Cardiac Rhythm Sinus Tach.

## 2019-08-06 NOTE — PROGRESS NOTES
Follow up visit with Ms. Lei Shrestha. Pt's niece was present at bedside. Pt's niece provided an update, sharing her hope that pt is improving. Uncertain if Ms. Lei Shrestha was aware of 's presence, but niece invited  to offer prayer.  offered prayer at bedside and assurance of continued prayers. Niece spoke of the continued support that family is trying to offer patient through their presence. Pt's case discussed with Palliative Dr Nicolle Mccullough prior to and following my visit. Chaplains will continue to follow for support. Pt has received music therapy. Masha John, Palliative

## 2019-08-06 NOTE — PROGRESS NOTES
08/06/19 1453 Vital Signs Temp 98.2 °F (36.8 °C) Temp Source Axillary Pulse (Heart Rate) 90 Heart Rate Source Monitor Resp Rate 30  
O2 Sat (%) 93 % Level of Consciousness Responds to Voice /80 MAP (Calculated) 101 BP 1 Method Automatic  
BP 1 Location Right arm BP Patient Position At rest  
MEWS Score 4 Oxygen Therapy O2 Device Nasal cannula O2 Flow Rate (L/min) 3 l/min MD made aware. Continue to monitor

## 2019-08-06 NOTE — PROGRESS NOTES
Pulmonary, Critical Care, and Sleep Medicine~Progress Note Name: Cassandra Gonzalez MRN: [id number] : 1950 Hospital: [address] Date: 2019 11:15 AM Admission: 7/15/2019 Impression Plan 1. Acute on chronic (on 4lpm normally) hypoxic resp failure secondary to below 2. Abnormal CT scan: HCAP possibly (treated), query more bleomycin lung toxicity, now with lack of response. Other concerns were pulmonary edema, but she is quite dried out now 3. Hx of lymphoma. Not on therapy since the beginning of the year. 4. Hx of PE and was on eliquis 5. Hx of asthma 6. Reviewed [facility] records. She was seen last there on 19. Early  cellcept was stopped and at that appointment she was scheduled to taper off of steroids, which she did. Apparently she was on hospice care, but unclear as to the details; per patient it was rescinded 7. AMS~ per neurology. 1. Strep/legionella negative 2. ECHO; PASP 60mmHg 3. O2 titration above 90%, Continue to wean as able. 4. autoimmune labs mostly negative, but did have a + PEACE with elevated CRP and ESR; query coexisting autoimmune process. Once discharge would recommend rheumatology follow up pending clinical course 5. prednisone per PEG / cellcept 6. On Cefepime 7. F/u CXR 
8. High risk for rapid decline 9. Difficult situation - prognosis poor Daily Progression: 
 No changes. Niece at bedside. Remains minimally responsive. No distress  Decreasing o2 needs - on 3LPM. 
Mental status unchanged. No distress, no cough per nephew. WBC 23. Afebrile. 8/2 On 4 lpm by nc. Altered mental status for the past 2-3 days. Eeg, ct head all negative. Neuro on the case, signed off -suspect metabolic encephalopathy. Wbc up. Has purwick. CXr still looks the same - continuous to be on cellcept and prednisone.  Long discussion with extended family at bedside. Questions answered.   O2 requirement down to 3 l/m -- better than when she was at her baseline. The initial pulmonary insult seems to have mostly resolved, though clearly there are other issues now at play. 7/31 A couple periods of lucidity overnight per her sister's report. Discussed with her and answered questions, offered support. 7/30 Per family and RN reports more responsive today. Was suppose to have EEG; not done yet. Family is declining LP. 7/29 FiO2 requirements are remarkably down. 7/28 chest film shows no worsening of pulmonary infiltrates. Dramatic change in mental status yesterday with unclear etiology. Seizures vs CVA, but all testing thus far is negative 7/26 On 15lpm; theoretically can be switched to midflow, but wean you talk about it she gets anxious and her saturations drop. I discussed with RN; suggest doing one hour on midlflow and one hour on highflow during the day to get her use to it.  
 
7/25 She looks so weak, but states she feels better. About to work with PT. Still +.  
 
7/24 Noted question of lung transplant query; I think recent malignancy would not allow her to be placed on the list. But since she is followed at [facility] they could assisted better. She feels better today 7/23 Looks stronger Desats quickly with movement, but stabilizes; this is to be expected with her chronic lung disease Weight gain over night? Agree with diuretics Agree with getting up and out of bed as much as possible 7/22 Creatinine improving Was increased in Flow and FiO2, unclear why as she is feeling better Chest film today looks fairly similar 7/18 Not much different. We are starting her on high flow now 7/17 Feels possibly a little better. proBNP elevated today 7/16 Consult Note requested by Dr Kirsten Nath. Patient presented for admission secondary to a one week course of cough, subjective fevers, and malasie.  Outpatient PCP starte zpak and she did not have bernadette improvement. No other symptoms reported on this recent course. 7/15/19 CT scan did showed worsening right great than left infiltrates with interlobular septal thickening; no PE. BNP was 534, not very elevated. This past Jan 2019 she was dx with hodgkin's lymphoma. Was on blecyomcin and developed a pneumonitis associated with it. Was started on cellcept 500mg bid and prednisone. Followed in the office at [facility] in April, however transferred her care to [facility] and saw Dr Zain Douglas (sp?). Her last visit with them was this past June and at that visit she was weaned off of the prednisone and cellcept. We do not have those records at this time. I have reviewed the labs and previous days notes. Pertinent items are noted in HPI. Past Medical History:  
Diagnosis Date  Asthma   
 as a child, nothing since menopause  Cancer ([facility] [address] 08/21/2018 Non-Hodgkin's  Cataracts, bilateral   
 Diabetes ([facility] [address] type II  
 Environmental allergies  Fibromyalgia Treated with alternate medical therapy  Hypertension Past Surgical History:  
Procedure Laterality Date  HX BREAST BIOPSY Right 2006  HX COLONOSCOPY  2015  HX GYN Pap 2015  HX ORTHOPAEDIC    
 fractured R ankle/ no surgery  HX OTHER SURGICAL Right 08/27/2018  
 excision of deep neck cervical LN. Prior to Admission medications Medication Sig Start Date End Date Taking? Authorizing Provider  
acetaminophen (TYLENOL) 650 mg suppository Insert 650 mg into rectum every four (4) hours as needed for Pain. Yes Provider, Historical  
amLODIPine (NORVASC) 5 mg tablet Take 5 mg by mouth daily. Yes Provider, Historical  
trimethoprim-sulfamethoxazole (BACTRIM DS) 160-800 mg per tablet Take 1 Tab by mouth every Monday, Wednesday, Friday. Yes Provider, Historical  
metFORMIN (GLUCOPHAGE) 500 mg tablet Take 500 mg by mouth two (2) times daily (with meals).  Indications: Per [employer] [address] [address] 3/30/19  Yes Provider, Historical  
levalbuterol (XOPENEX) 1.25 mg/0.5 mL nebu 1.25 mg by Nebulization route every four (4) hours as needed. Indications: Per [address] 3/30/19  Yes Provider, Historical  
acetaminophen (TYLENOL ARTHRITIS PAIN) 650 mg TbER Take 650 mg by mouth as needed (Q 6hrs PRN). 3/30/19  Yes Provider, Historical  
apixaban (ELIQUIS) 5 mg tablet Take 1 Tab by mouth every twelve (12) hours. 3/13/19  Yes Dhiraj Harris MD  
atorvastatin (LIPITOR) 40 mg tablet TAKE 1 TAB BY MOUTH DAILY. 12/3/17  Yes Dhiraj Harris MD  
Oxygen 3 Devices by Nasal route as needed. Indications: SOB PRN    Provider, Historical  
 
No Known Allergies Social History Tobacco Use  Smoking status: Never Smoker  Smokeless tobacco: Never Used Substance Use Topics  Alcohol use: Yes Comment: socially Family History Problem Relation Age of Onset  Heart Disease Mother  Cancer Father   
     prostate cancer  Cancer Sister Breast cancer apparent DCIS  
 Heart Disease Brother Brother  from congestive heart failure secondary to severe mitral disease OBJECTIVE: 
 
 Vital Signs: 
    
Visit Vitals /77 (BP 1 Location: Right arm, BP Patient Position: At rest) Pulse 98 Temp 97.2 °F (36.2 °C) Resp 25 Ht 5' 9\" (1.753 m) Wt 76.7 kg (169 lb) SpO2 100% BMI 24.96 kg/m² Temp (24hrs), Av.7 °F (36.5 °C), Min:97.2 °F (36.2 °C), Max:98.1 °F (36.7 °C) Intake/Output:  
  Last shift: No intake/output data recorded. Last 3 shifts:  1901 -  0700 In: 2170 [I.V.:100] Out: 2375 [Urine:2375] Intake/Output Summary (Last 24 hours) at 2019 0194 Last data filed at 2019 0086 Gross per 24 hour Intake 850 ml Output 1675 ml Net -825 ml Physical Exam:                                       
Exam Findings Other General: No resp distress noted, appears stated age HEENT:  No ulcers, JVD not elevated, no cervical LAD   
 Chest: No pectus deformity, normal chest rise b/l HEART:  RRR, no murmurs/rubs/gallops Lungs:  Notable crackles, diminished BS at bases ABD: Soft/NT, non rigid mildly distended EXT: No cyanosis/clubbing/edema, normal peripheral pulses Skin: No rashes or ulcers, no mottling Neuro: Unresponsive Medications: 
Current Facility-Administered Medications Medication Dose Route Frequency  metoprolol tartrate (LOPRESSOR) tablet 25 mg  25 mg Oral Q12H  
 insulin glargine (LANTUS) injection 80 Units  80 Units SubCUTAneous QHS  predniSONE (DELTASONE) tablet 40 mg  40 mg Per NG tube DAILY WITH BREAKFAST  mycophenolate mofetil (CELLCEPT) 200 mg/mL oral suspension 500 mg  500 mg Oral Q12H  white petrolatum-mineral oil (SOOTHE NIGHT TIME) 80-20 % ophthalmic ointment   Both Eyes PRN  
 [Held by provider] apixaban (ELIQUIS) tablet 5 mg  5 mg Oral BID  insulin lispro (HUMALOG) injection   SubCUTAneous Q6H  
 cefepime (MAXIPIME) 2 g in 0.9% sodium chloride (MBP/ADV) 100 mL  2 g IntraVENous Q12H  thiamine HCL (B-1) tablet 100 mg  100 mg Oral DAILY  multivitamin, tx-iron-ca-min (THERA-M w/ IRON) tablet 1 Tab  1 Tab Oral DAILY  hydrALAZINE (APRESOLINE) 20 mg/mL injection 10 mg  10 mg IntraVENous Q6H PRN  
 amantadine HCl (SYMMETREL) capsule 100 mg  100 mg Oral BID  
 glucose chewable tablet 16 g  4 Tab Oral PRN  
 dextrose (D50W) injection syrg 12.5-25 g  12.5-25 g IntraVENous PRN  
 glucagon (GLUCAGEN) injection 1 mg  1 mg IntraMUSCular PRN  
 sodium chloride (NS) flush 5-10 mL  5-10 mL IntraVENous PRN  
 sodium chloride (NS) flush 5-40 mL  5-40 mL IntraVENous Q8H  
 sodium chloride (NS) flush 5-40 mL  5-40 mL IntraVENous PRN  
 acetaminophen (TYLENOL) tablet 650 mg  650 mg Oral Q4H PRN  
 lactobac ac& pc-s.therm-b.anim (JUNI Q/RISAQUAD)  1 Cap Oral DAILY  atorvastatin (LIPITOR) tablet 40 mg  40 mg Oral QHS Labs: ABG No results for input(s): PHI, PCO2I, PO2I, HCO3I, SO2I, FIO2I in the last 72 hours. CBC Recent Labs 08/05/19 
0474 08/04/19 
7805 WBC 23.5* 21.2* HGB 7.3* 7.1*  
HCT 24.4* 24.3*  
* 140* MCV 98.8 99.6*  
MCH 29.6 29.1 Metabolic Panel Recent Labs 08/05/19 
5497 08/04/19 
9896 08/04/19 
9397 *  --  147* K 4.9  --  4.7 *  --  114* CO2 30  --  25  
*  --  409* BUN 43*  --  44* CREA 1.02  --  1.12* CA 8.8  --  8.7 MG  --  2.5*  --   
  
 
Pertinent Labs Shubham Murcia NP 
8/6/2019

## 2019-08-06 NOTE — CONSULTS
118 [facility]. 
 [address] [place] [id number] GASTROENTEROLOGY CONSULTATION NOTE Yasmin Gao office [phone] NP in-hospital cell phone M-F until 4:30 After 5pm or on weekends, please call  for physician on call NAME:  Nazario Del Castillo :   1950 MRN:   [id number] Referring Physician: Rock Archuleta Consult Date: 2019 11:03 AM 
 
Chief Complaint: PEG History of Present Illness:  Patient is a 70 y.o. who is seen in consultation at the request of Dr. Rock Archuleta for PEG. Medical history as listed below includes Hodgkin's lymphoma followed at [facility], respiratory failure/bleomycin toxicity. She was admitted 7/15 for respiratory failure and treated for pneumonia. She had a change in mental status thought to be seizures, on treatment but not confirmed with EEG. Possibility of CNS involvement of lymphoma has been discussed. Discussed with niece, brother, and sister. I have reviewed the emergency room note, hospital admission note, notes by all other clinicians who have seen the patient during this hospitalization to date. I have reviewed the problem list and the reason for this hospitalization. I have reviewed the allergies and the medications the patient was taking at home prior to this hospitalization. PMH: 
Past Medical History:  
Diagnosis Date  Asthma   
 as a child, nothing since menopause  Cancer ([address] 2018 Non-Hodgkin's  Cataracts, bilateral   
 Diabetes ([address] type II  
 Environmental allergies  Fibromyalgia Treated with alternate medical therapy  Hypertension PSH: 
Past Surgical History:  
Procedure Laterality Date  HX BREAST BIOPSY Right 2006  HX COLONOSCOPY  2015  HX GYN Pap 2015  HX ORTHOPAEDIC    
 fractured R ankle/ no surgery  HX OTHER SURGICAL Right 2018  
 excision of deep neck cervical LN. Allergies: 
No Known Allergies Home Medications: 
Prior to Admission Medications Prescriptions Last Dose Informant Patient Reported? Taking? Oxygen   Yes No  
Sig: 3 Devices by Nasal route as needed. Indications: SOB PRN  
acetaminophen (TYLENOL ARTHRITIS PAIN) 650 mg TbER   Yes Yes Sig: Take 650 mg by mouth as needed (Q 6hrs PRN). acetaminophen (TYLENOL) 650 mg suppository   Yes Yes Sig: Insert 650 mg into rectum every four (4) hours as needed for Pain. amLODIPine (NORVASC) 5 mg tablet 7/15/2019 at AM  Yes Yes Sig: Take 5 mg by mouth daily. apixaban (ELIQUIS) 5 mg tablet 7/15/2019 at AM  No Yes Sig: Take 1 Tab by mouth every twelve (12) hours. atorvastatin (LIPITOR) 40 mg tablet 2019 at PM  No Yes Sig: TAKE 1 TAB BY MOUTH DAILY. levalbuterol (XOPENEX) 1.25 mg/0.5 mL nebu   Yes Yes Si.25 mg by Nebulization route every four (4) hours as needed. Indications: Per [facility]  
metFORMIN (GLUCOPHAGE) 500 mg tablet   Yes Yes Sig: Take 500 mg by mouth two (2) times daily (with meals). Indications: Per [facility]  
trimethoprim-sulfamethoxazole (BACTRIM DS) 160-800 mg per tablet 2019 at PM  Yes Yes Sig: Take 1 Tab by mouth every Monday, Wednesday, Friday. Facility-Administered Medications: None Hospital Medications: 
Current Facility-Administered Medications Medication Dose Route Frequency  metoprolol tartrate (LOPRESSOR) tablet 25 mg  25 mg Oral Q12H  
 insulin glargine (LANTUS) injection 80 Units  80 Units SubCUTAneous QHS  predniSONE (DELTASONE) tablet 40 mg  40 mg Per NG tube DAILY WITH BREAKFAST  mycophenolate mofetil (CELLCEPT) 200 mg/mL oral suspension 500 mg  500 mg Oral Q12H  white petrolatum-mineral oil (SOOTHE NIGHT TIME) 80-20 % ophthalmic ointment   Both Eyes PRN  
 [Held by provider] apixaban (ELIQUIS) tablet 5 mg  5 mg Oral BID  insulin lispro (HUMALOG) injection   SubCUTAneous Q6H  
  cefepime (MAXIPIME) 2 g in 0.9% sodium chloride (MBP/ADV) 100 mL  2 g IntraVENous Q12H  thiamine HCL (B-1) tablet 100 mg  100 mg Oral DAILY  multivitamin, tx-iron-ca-min (THERA-M w/ IRON) tablet 1 Tab  1 Tab Oral DAILY  hydrALAZINE (APRESOLINE) 20 mg/mL injection 10 mg  10 mg IntraVENous Q6H PRN  
 amantadine HCl (SYMMETREL) capsule 100 mg  100 mg Oral BID  
 glucose chewable tablet 16 g  4 Tab Oral PRN  
 dextrose (D50W) injection syrg 12.5-25 g  12.5-25 g IntraVENous PRN  
 glucagon (GLUCAGEN) injection 1 mg  1 mg IntraMUSCular PRN  
 sodium chloride (NS) flush 5-10 mL  5-10 mL IntraVENous PRN  
 sodium chloride (NS) flush 5-40 mL  5-40 mL IntraVENous Q8H  
 sodium chloride (NS) flush 5-40 mL  5-40 mL IntraVENous PRN  
 acetaminophen (TYLENOL) tablet 650 mg  650 mg Oral Q4H PRN  
 lactobac ac& pc-s.therm-b.anim (JUNI Q/RISAQUAD)  1 Cap Oral DAILY  atorvastatin (LIPITOR) tablet 40 mg  40 mg Oral QHS Social History: 
Social History Tobacco Use  Smoking status: Never Smoker  Smokeless tobacco: Never Used Substance Use Topics  Alcohol use: Yes Comment: socially Family History: 
Family History Problem Relation Age of Onset  Heart Disease Mother  Cancer Father   
     prostate cancer  Cancer Sister Breast cancer apparent DCIS  
 Heart Disease Brother Brother  from congestive heart failure secondary to severe mitral disease Review of Systems: 
Unable to obtain due to patient condition Objective:  
 
Patient Vitals for the past 8 hrs: 
 BP Temp Pulse Resp SpO2 Weight 19 1051 (!) 191/98 97.7 °F (36.5 °C) 100 30 98 %   
19 0827      76.7 kg (169 lb) 19 0759 169/77 97.2 °F (36.2 °C) 98 25 100 %   
 
 07 -  1900 In: -  
Out: 883 [DVRKR:224] 1901 -  0700 In: 2170 [I.V.:100] Out: 2375 [Urine:2375] EXAM:   
 CONST:  No acute distress NEURO:  Minimally responsive HEENT: No EOMI LUNGS: RR increases with stimulation CARD:   S1 S2 
 ABD:  soft, no apparent tenderness, no rebound, bowel sounds (+) all 4 quadrants, no masses, non distended EXT:  warm PSYCH: Unable to assess Data Review Recent Labs 08/05/19 
0807 08/04/19 
3680 WBC 23.5* 21.2* HGB 7.3* 7.1*  
HCT 24.4* 24.3*  
* 140* Recent Labs 08/05/19 
5864 08/04/19 
7890 * 147* K 4.9 4.7 * 114* CO2 30 25 BUN 43* 44* CREA 1.02 1.12* * 409* CA 8.8 8.7 No results for input(s): SGOT, GPT, AP, TBIL, TP, ALB, GLOB, GGT, AML, LPSE in the last 72 hours. No lab exists for component: AMYP, HLPSE No results for input(s): INR, PTP, APTT in the last 72 hours. No lab exists for component: INREXT Assessment:  
 
· Feeding difficulties · PE: on Eliquis · Encephalopathy · Respiratory failure · Hodgkin lymphoma Patient Active Problem List  
Diagnosis Code  SVT (supraventricular tachycardia) ([facility]) I47.1  Essential hypertension with goal blood pressure less than 140/90 I10  
 Diabetes mellitus type 2, controlled ([facility] E11.9  Dyslipidemia E78.5  Overweight (BMI 25.0-29. 9) E66.3  
 Supraclavicular mass R22.2  Lymphadenopathy R59.1  Acute respiratory failure (HCC) J96.00  Leg swelling M79.89  
 Hodgkin lymphoma (HCC) C81.90  
 CAP (community acquired pneumonia) J18.9  Pneumonia J18.9  Chronic hypoxemic respiratory failure ([facility] J96.11  Bleomycin lung toxicity J98.4, T45.1X5A  
 Encephalopathy G93.40 Plan:  
 
 
· Continue NG tube feeds per RD 
· I discussed with brother and sister via FaceTime per their request - they are not happy that we have been consulted stating they are not going to make their sister a \"pin cushion\" and they wanted to give her more time before considering PEG placement (at least until the end of this week) · Palliative care involved, would benefit from regrouping · If family decides on PEG placement would need to hold Eliquis for 2 days prior to procedure · Patient discussed with and will be seen by Dr. Jorge Pham · Thank you for allowing me to participate in care of Emanuel Morse Signed By: Rick Brooks NP   
 8/6/2019  11:03 AM  
 
This patient was seen and examined by me in a face-to-face visit today. I reviewed the medical record including lab work, imaging and other provider notes. I confirmed the history as described above. I spoke to the patient. I reviewed the medical record including lab work, imaging and other provider notes. I confirmed the history as described above. The patient was incapable of giving a meaningful history. I discussed this case in detail with Jackson BATES. I formulated an  assessment of this patient and developed a treatment plan. I agree with the above consultation note. I agree with the history, exam and assessment and plan as outlined in the note. I would like to add the following:  
 
Abd: normoactive BS, nt, nd, no rebound/guarding. Feeding difficulties. NGT in place. Confirmed the above. Family doesn't wish to move forward with PEG tube at this time. Would need to be off Eliquis for 2 days. Dr. Jorge Pham

## 2019-08-06 NOTE — DIABETES MGMT
[facility] Progress Note Recommendations/ Comments: Review for  hyperglycemia with patient on steroids. Noted steroids now po. DTC will follow. Current hospital DM medication: Lispro insulin resistant scale with instruction up to BS >451 mg/dl; Lantus 80 units at bedtime Chart reviewed on Nazario Del Castillo. Patient is a 70 y.o. female with known  Type 2 Diabetes on oral agent (monotherapy): metformin (generic) at home. A1c:  
Lab Results Component Value Date/Time Hemoglobin A1c 6.9 (H) 08/03/2018 01:45 PM  
 Hemoglobin A1c 6.8 (H) 01/30/2018 01:25 PM  
 
 
Recent Glucose Results:  
Lab Results Component Value Date/Time GLUCPOC 270 (H) 08/06/2019 06:10 AM  
 GLUCPOC 305 (H) 08/05/2019 11:17 PM  
 GLUCPOC 336 (H) 08/05/2019 05:15 PM  
  
 
Lab Results Component Value Date/Time Creatinine 1.02 08/05/2019 03:05 AM  
 
Estimated Creatinine Clearance: 54.4 mL/min (based on SCr of 1.02 mg/dL). Active Orders Diet DIET NPO  
  
 
PO intake:  
No data found. Will continue to follow as needed. Thank you Miesha Mays MS, RN, CDE Time spent: 5 min

## 2019-08-07 NOTE — PROGRESS NOTES
Pulmonary, Critical Care, and Sleep Medicine~Progress Note Name: Kasey Ferrer MRN: [id number] : 1950 Hospital: [address] Date: 2019 11:15 AM Admission: 7/15/2019 Impression Plan 1. Acute on chronic (on 4lpm normally) hypoxic resp failure secondary to below 2. Abnormal CT scan: HCAP possibly (treated), query more bleomycin lung toxicity, now with lack of response. Other concerns were pulmonary edema, but she is quite dried out now 3. Hx of lymphoma. Not on therapy since the beginning of the year. 4. Hx of PE and was on eliquis 5. Hx of asthma 6. Reviewed [facility] records. She was seen last there on 19. Early  cellcept was stopped and at that appointment she was scheduled to taper off of steroids, which she did. Apparently she was on hospice care, but unclear as to the details; per patient it was rescinded 7. AMS~ per neurology. 1. Strep/legionella negative 2. ECHO; PASP 60mmHg 3. O2 titration above 90%, Continue to wean as able. 4. autoimmune labs mostly negative, but did have a + PEACE with elevated CRP and ESR; query coexisting autoimmune process. Once discharge would recommend rheumatology follow up pending clinical course 5. prednisone per PEG / cellcept 6. On Cefepime 7. High risk for rapid decline 8. Difficult situation - prognosis poor Daily Progression: 
 CXR stable. Afebrile. Remains on 3LPM. Plans for possible PEG placement.  No changes. Niece at bedside. Remains minimally responsive. No distress  Decreasing o2 needs - on 3LPM. 
Mental status unchanged. No distress, no cough per nephew. WBC 23. Afebrile.  On 4 lpm by nc. Altered mental status for the past 2-3 days. Eeg, ct head all negative. Neuro on the case, signed off -suspect metabolic encephalopathy. Wbc up. Has purwick. CXr still looks the same - continuous to be on cellcept and prednisone.  
 
 
 Long discussion with extended family at bedside. Questions answered. O2 requirement down to 3 l/m -- better than when she was at her baseline. The initial pulmonary insult seems to have mostly resolved, though clearly there are other issues now at play. 7/31 A couple periods of lucidity overnight per her sister's report. Discussed with her and answered questions, offered support. 7/30 Per family and RN reports more responsive today. Was suppose to have EEG; not done yet. Family is declining LP. 7/29 FiO2 requirements are remarkably down. 7/28 chest film shows no worsening of pulmonary infiltrates. Dramatic change in mental status yesterday with unclear etiology. Seizures vs CVA, but all testing thus far is negative 7/26 On 15lpm; theoretically can be switched to midflow, but wean you talk about it she gets anxious and her saturations drop. I discussed with RN; suggest doing one hour on midlflow and one hour on highflow during the day to get her use to it.  
 
7/25 She looks so weak, but states she feels better. About to work with PT. Still +.  
 
7/24 Noted question of lung transplant query; I think recent malignancy would not allow her to be placed on the list. But since she is followed at [facility] they could assisted better. She feels better today 7/23 Looks stronger Desats quickly with movement, but stabilizes; this is to be expected with her chronic lung disease Weight gain over night? Agree with diuretics Agree with getting up and out of bed as much as possible 7/22 Creatinine improving Was increased in Flow and FiO2, unclear why as she is feeling better Chest film today looks fairly similar 7/18 Not much different. We are starting her on high flow now 7/17 Feels possibly a little better. proBNP elevated today 7/16 Consult Note requested by Dr Ky Leonard.  
 
Patient presented for admission secondary to a one week course of cough, subjective fevers, and malasie. Outpatient PCP starte zpak and she did not have bernadette improvement. No other symptoms reported on this recent course. 7/15/19 CT scan did showed worsening right great than left infiltrates with interlobular septal thickening; no PE. BNP was 534, not very elevated. This past Jan 2019 she was dx with hodgkin's lymphoma. Was on blecyomcin and developed a pneumonitis associated with it. Was started on cellcept 500mg bid and prednisone. Followed in the office at [facility] in April, however transferred her care to [facility] and saw Dr Siddhartha Pineda (sp?). Her last visit with them was this past June and at that visit she was weaned off of the prednisone and cellcept. We do not have those records at this time. I have reviewed the labs and previous days notes. Pertinent items are noted in HPI. Past Medical History:  
Diagnosis Date  Asthma   
 as a child, nothing since menopause  Cancer ([facility] 08/21/2018 Non-Hodgkin's  Cataracts, bilateral   
 Diabetes ([facility] type II  
 Environmental allergies  Fibromyalgia Treated with alternate medical therapy  Hypertension Past Surgical History:  
Procedure Laterality Date  HX BREAST BIOPSY Right 2006  HX COLONOSCOPY  2015  HX GYN Pap 2015  HX ORTHOPAEDIC    
 fractured R ankle/ no surgery  HX OTHER SURGICAL Right 08/27/2018  
 excision of deep neck cervical LN. Prior to Admission medications Medication Sig Start Date End Date Taking? Authorizing Provider  
acetaminophen (TYLENOL) 650 mg suppository Insert 650 mg into rectum every four (4) hours as needed for Pain. Yes Provider, Historical  
amLODIPine (NORVASC) 5 mg tablet Take 5 mg by mouth daily. Yes Provider, Historical  
trimethoprim-sulfamethoxazole (BACTRIM DS) 160-800 mg per tablet Take 1 Tab by mouth every Monday, Wednesday, Friday.    Yes Provider, Historical  
metFORMIN (GLUCOPHAGE) 500 mg tablet Take 500 mg by mouth two (2) times daily (with meals). Indications: Per  [address] 3/30/19  Yes Provider, Historical  
levalbuterol (XOPENEX) 1.25 mg/0.5 mL nebu 1.25 mg by Nebulization route every four (4) hours as needed. Indications: Per  [address] 3/30/19  Yes Provider, Historical  
acetaminophen (TYLENOL ARTHRITIS PAIN) 650 mg TbER Take 650 mg by mouth as needed (Q 6hrs PRN). 3/30/19  Yes Provider, Historical  
apixaban (ELIQUIS) 5 mg tablet Take 1 Tab by mouth every twelve (12) hours. 3/13/19  Yes Emiliano Rubio MD  
atorvastatin (LIPITOR) 40 mg tablet TAKE 1 TAB BY MOUTH DAILY. 12/3/17  Yes Emiliano Rubio MD  
Oxygen 3 Devices by Nasal route as needed. Indications: SOB PRN    Provider, Historical  
 
No Known Allergies Social History Tobacco Use  Smoking status: Never Smoker  Smokeless tobacco: Never Used Substance Use Topics  Alcohol use: Yes Comment: socially Family History Problem Relation Age of Onset  Heart Disease Mother  Cancer Father   
     prostate cancer  Cancer Sister Breast cancer apparent DCIS  
 Heart Disease Brother Brother  from congestive heart failure secondary to severe mitral disease OBJECTIVE: 
 
 Vital Signs: 
    
Visit Vitals /86 (BP 1 Location: Left arm, BP Patient Position: At rest) Pulse (!) 106 Temp 97.6 °F (36.4 °C) Resp 18 Ht 5' 9\" (1.753 m) Wt 76.7 kg (169 lb 1.6 oz) SpO2 97% BMI 24.97 kg/m² Temp (24hrs), Av.9 °F (36.6 °C), Min:97.6 °F (36.4 °C), Max:98.2 °F (36.8 °C) Intake/Output:  
  Last shift: No intake/output data recorded. Last 3 shifts:  1901 -  0700 In:  Out: 1951 [QGKJT:0434] Intake/Output Summary (Last 24 hours) at 2019 0940 Last data filed at 2019 5590 Gross per 24 hour Intake 544 ml Output 725 ml Net -181 ml Physical Exam:                                       
Exam Findings Other General: No resp distress noted, appears stated age HEENT:  No ulcers, JVD not elevated, no cervical LAD Chest: No pectus deformity, normal chest rise b/l HEART:  RRR, no murmurs/rubs/gallops Lungs:  Notable crackles, diminished BS at bases ABD: Soft/NT, non rigid mildly distended EXT: No cyanosis/clubbing/edema, normal peripheral pulses Skin: No rashes or ulcers, no mottling Neuro: Unresponsive Medications: 
Current Facility-Administered Medications Medication Dose Route Frequency  metoprolol tartrate (LOPRESSOR) tablet 25 mg  25 mg Oral Q12H  
 metFORMIN (GLUCOPHAGE) tablet 500 mg  500 mg Oral BID WITH MEALS  insulin glargine (LANTUS) injection 80 Units  80 Units SubCUTAneous QHS  predniSONE (DELTASONE) tablet 40 mg  40 mg Per NG tube DAILY WITH BREAKFAST  mycophenolate mofetil (CELLCEPT) 200 mg/mL oral suspension 500 mg  500 mg Oral Q12H  white petrolatum-mineral oil (SOOTHE NIGHT TIME) 80-20 % ophthalmic ointment   Both Eyes PRN  
 [Held by provider] apixaban (ELIQUIS) tablet 5 mg  5 mg Oral BID  insulin lispro (HUMALOG) injection   SubCUTAneous Q6H  
 cefepime (MAXIPIME) 2 g in 0.9% sodium chloride (MBP/ADV) 100 mL  2 g IntraVENous Q12H  thiamine HCL (B-1) tablet 100 mg  100 mg Oral DAILY  multivitamin, tx-iron-ca-min (THERA-M w/ IRON) tablet 1 Tab  1 Tab Oral DAILY  hydrALAZINE (APRESOLINE) 20 mg/mL injection 10 mg  10 mg IntraVENous Q6H PRN  
 amantadine HCl (SYMMETREL) capsule 100 mg  100 mg Oral BID  
 glucose chewable tablet 16 g  4 Tab Oral PRN  
 dextrose (D50W) injection syrg 12.5-25 g  12.5-25 g IntraVENous PRN  
 glucagon (GLUCAGEN) injection 1 mg  1 mg IntraMUSCular PRN  
 sodium chloride (NS) flush 5-10 mL  5-10 mL IntraVENous PRN  
 sodium chloride (NS) flush 5-40 mL  5-40 mL IntraVENous Q8H  
 sodium chloride (NS) flush 5-40 mL  5-40 mL IntraVENous PRN  
 acetaminophen (TYLENOL) tablet 650 mg  650 mg Oral Q4H PRN  
  lactobac ac& pc-s.therm-b.anim (JUNI Q/RISAQUAD)  1 Cap Oral DAILY  atorvastatin (LIPITOR) tablet 40 mg  40 mg Oral QHS Labs: ABG No results for input(s): PHI, PCO2I, PO2I, HCO3I, SO2I, FIO2I in the last 72 hours. CBC Recent Labs 08/07/19 0226 08/05/19 
4683 WBC 18.3* 23.5* HGB 7.0* 7.3* HCT 22.3* 24.4*  
* 143* MCV 95.3 98.8 MCH 29.9 29.6 Metabolic Panel Recent Labs 08/07/19 0226 08/05/19 
2181  148* K 4.4 4.9  112* CO2 29 30 * 315* BUN 42* 43* CREA 0.81 1.02  
CA 8.8 8.8 Pertinent Labs Adriel Abbasi NP 
8/7/2019

## 2019-08-07 NOTE — PROGRESS NOTES
CHARI Plan: 1. Disposition TBD; Pt's emergency contacts consist of the following: Ruth Pineda [phone] and Sister, Dl Leger [phone]. 
 
2. GI following; waiting PEG placement; awaiting family decision; family wants more time before considering PEG placement 3. Palliative continues to follow 4. Pt was open to [facility] with [facility]; Received notifcation that patient revoked hospice to come to the ER and had revoked in past to seek aggressive treatment CM continues to follow the case, reviewed chart, spoke with Dl Leger ([phone]) yesterday and touch based on the case. CM to assist in transition of care planning. Manuel Shipman 
[facility] [phone]

## 2019-08-07 NOTE — PROGRESS NOTES
Pt now responding and shaking head appropriately. O2 sats 89-96% Pt turned and repositioned several times. Pt's breathing becomes tacky and she becomes anxious when turned. Family present in room.

## 2019-08-07 NOTE — PALLIATIVE CARE
Palliative Medicine Social Work Chart reviewed and note conversation between attending and patient's brother, Kyler Pena ([phone]) regarding decision for PEG placement tentatively scheduled for Monday. Patient continues to be transiently alert for about 10-15 minutes at a time; falls back into state of non-responsiveness. Etiology for her neuro status is still unclear. MRI of brain was negative; EEG did not confirm seizures; does not appear to be catatonia. Family declined LP to rule out infection/inflammatory cause. Patient had been enrolled with [facility] for Hodgkin's Disease exacerbated by lung toxicity from treatments. She has been in decline for the last several months, as evidenced by chronic bouts of PNA and significant weight loss. Family revoked hospice benefit due to some concern for her inability to receive rehab. Patient is clearly not a candidate for SNF of rehab in her current condition. Dr. Dylan Ramirez and I checked in on patient; briefly met with her niece, Sandra, who requested we call patient's sister, Lalita Lancaster ([phone]) who shares in decision-making role with Jane. We were able to FaceTime with Betsy today. She expressed frustrations with the \"hospital system\" and poor communication. She confirms her brother agreed to PEG, but made it clear that she has not yet \"signed off\" on this decision. She is making plans to come to [address] this week and will be present on Monday to further discuss and confirm decision. We talked at length about the overall concerns that a PEG will be a bridge to meaningful recovery or improve her neuro state. Discussed the potential complications that can arise for artificial feedings (GI upset, diarrhea, infection, wounds) especially in her immobile state. Sister was appreciative of this information and agrees that both she and Jane need all the facts before proceeding with PEG.   She does ultimately have hope patient can \"wake up\" and resume somewhere near her baseline functioning. We confirms that nutrition would be necessary for any chance of recovery, but again stated our concerns that it may create more risk. Discussed ongoing belief that her life expectancy is limited, regardless of decision and suggested hospice as likely best option even if PEG is placed. Will plan to meet with both siblings on Monday at 10am to confirm plan. Thank you for the opportunity to be involved in the care of Ms. Palma Fernandez. Cami Garza, SHILA, [facility]- Palliative Medicine  Respecting Choices ® ACP Facilitator [phone]

## 2019-08-07 NOTE — DIABETES MGMT
[facility] Progress Note Recommendations/ Comments: Review for  hyperglycemia with patient on steroids. Noted steroids now po, Prednisone 40 mg daily On TF - changed to Glucerna Noted Metformin 500 mg BID added If appropriate please consider Increase lantus to 85 units at bedtime Current hospital DM medication: Lispro insulin resistant scale with instruction up to BS >451 mg/dl; Lantus 80 units at bedtime Metformin 500 mg BID Chart reviewed on Kasey Ferrer. Patient is a 70 y.o. female with known  Type 2 Diabetes on oral agent (monotherapy): metformin (generic) at home. A1c:  
Lab Results Component Value Date/Time Hemoglobin A1c 6.9 (H) 08/03/2018 01:45 PM  
 Hemoglobin A1c 6.8 (H) 01/30/2018 01:25 PM  
 
 
Recent Glucose Results:  
Lab Results Component Value Date/Time  (H) 08/07/2019 02:26 AM  
 GLUCPOC 198 (H) 08/07/2019 11:07 AM  
 GLUCPOC 204 (H) 08/07/2019 06:05 AM  
 GLUCPOC 268 (H) 08/06/2019 09:12 PM  
  
 
Lab Results Component Value Date/Time Creatinine 0.81 08/07/2019 02:26 AM  
 
Estimated Creatinine Clearance: 68.5 mL/min (based on SCr of 0.81 mg/dL). Active Orders Diet DIET NPO With Tube Feedings PO intake:  
No data found. Will continue to follow as needed. Thank you Sal Rios RN, CDE Time spent: 5 min

## 2019-08-07 NOTE — WOUND CARE
Wound Care Note: Follow-up visit for left buttock Chart shows: 
Admitted for Hodgkin lymphoma, chronic hypoxemic respiratory failure, bleomycin lung toxicity and encephalopathy Past Medical History:  
Diagnosis Date  Asthma   
 as a child, nothing since menopause  Cancer ([facility] [address] 08/21/2018 Non-Hodgkin's  Cataracts, bilateral   
 Diabetes ([facility] [address] type II  
 Environmental allergies  Fibromyalgia Treated with alternate medical therapy  Hypertension WBC = 18.3 on 8/7/19 Assessment:  
Patient is groggy this morning, eyes opened briefly and closed again, incontinent with moderate assistance needed in repositioning. Bed: [place] Patient wearing briefs for incontinence. Diet: NPO with tube feeding Bilateral heels, buttocks, and sacral skin intact and without erythema. 1. Left buttock with healed area in approximately a 2 cm area, no open area, dry skin resolved, skin intact. Aloe Vesta lotion applied. Patient repositioned on right side. Heels offloaded in Prevalon boots. Recommendations:   
 
Skin Care & Pressure Prevention: 
Minimize layers of linen/pads under patient to optimize support surface. Turn/reposition approximately every 2 hours and offload heels. Manage incontinence / promote continence Discussed above plan with patient & Artie Wolfe RN Transition of Care: Plan to follow as needed while admitted to hospital. 
 
HENRI Carcamo, RN, [facility], [facility]. 
office [phone] 
pager [phone] or call  to page

## 2019-08-07 NOTE — PROGRESS NOTES
Hospital Progress Note NAME:  Belkis Taylor :   1950 MRN:  [id number] Date/Time:  2019 8:25 AM 
 
Plan: 1. Ng tube feedings -    Had previously discussed PEG with family at time of ng tube - all were in agreement at that time. - discussed PEG with brother at bedside - he states family agrees to peg and subsequent snf placement 2. titrate lantlus 3. cellcept / prednisone 4. Hold eliquis Risk of Deterioration: Low  []           Moderate  []           High  [x] Assessment:  
Principal Problem: 
  Pneumonia (7/15/2019) Active Problems: 
   Acute encephalopathy: 
-unclear etiology - transiently awaken with appropriate conversation 
-seizure was suspected but  EEG did not confirm 
-MRI was negative for stroke,CT head and neck - no bleed,vascular occlusion. -ABG reviewed. Electrolytes,ammonia,trop,lactic acid wnl 
-Neurology following 
-Continue  keppra. -MRI brain w contrast  - no acute process. -Will be on 24 hr EEG 
-May need LP for cytology as she has history of hodgkin's lymphoma. Hold eliquis. 
-Oncology following Acute on Chronic hypoxemic respiratory failure ([address] (2019) On nasal canula Bleomycin lung toxicity (2019) 
   cellcept and steroids Diabetes mellitus type 2, controlled ([address] (2016) 
   basal bolus insulin Hodgkin lymphoma ([facility] [address] (2/3/2019) bulky stage II HD (classical),s/p treatment earlier this year by Dr. Amaris Peralta with ABVD. Course truncated due to abrupt onset bleomycin toxicity. Diff dx includes lymphangitic spread of lymphoma. She completed 3 1/2 treatments of ABVD, less than the standard 6. oncol raised the potential concern for recurrent disease with her family. UTI -  ENTEROCOCCUS SPECIES Treated UTI - ESCHERICHIA COLI 19 Cefepime  [de-identified] notes:68 y.o. female with past medical history significant for htn, fibromyalgia, muscle atrophy, pulmonary fibrosis, asthma, b/l cataracts, dm, non-hodgkin's, who presents from ems with chief complaint of sob. Pt has \"last night\" onset of worsening sob described as \"labored breathing\" that's continued into this morning. She is normally on 4.5 L of O2 n/c at baseline, but had it increased to 5 L by staff. She also endorses a dry non-productive cough and fever (peak 100) at this time. EMS arrived and measured initial O2 sat of 92% and HR of 140 bpm. Denies pleuritic cp and abd pain. Of note, she has had PNA \"x3 times since Ken" and was treated last week with a z-pack for sinus infection symptomatic of congestion. DNR form in place. EMS was called to [place];  bpm, 124/78, and 9 Subjective/interium history Unresponsive at time of my exam  
11 Point Review of Systems:  
Negative except [x]            Unable to obtain ROS due to:      
[x]            mental status change []            sedated []            intubated Social History Tobacco Use  Smoking status: Never Smoker  Smokeless tobacco: Never Used Substance Use Topics  Alcohol use: Yes Comment: socially Medications reviewed: 
Current Facility-Administered Medications Medication Dose Route Frequency  metoprolol tartrate (LOPRESSOR) tablet 25 mg  25 mg Oral Q12H  
 metFORMIN (GLUCOPHAGE) tablet 500 mg  500 mg Oral BID WITH MEALS  insulin glargine (LANTUS) injection 80 Units  80 Units SubCUTAneous QHS  predniSONE (DELTASONE) tablet 40 mg  40 mg Per NG tube DAILY WITH BREAKFAST  mycophenolate mofetil (CELLCEPT) 200 mg/mL oral suspension 500 mg  500 mg Oral Q12H  white petrolatum-mineral oil (SOOTHE NIGHT TIME) 80-20 % ophthalmic ointment   Both Eyes PRN  
 [Held by provider] apixaban (ELIQUIS) tablet 5 mg  5 mg Oral BID  insulin lispro (HUMALOG) injection   SubCUTAneous Q6H  
 cefepime (MAXIPIME) 2 g in 0.9% sodium chloride (MBP/ADV) 100 mL  2 g IntraVENous Q12H  thiamine HCL (B-1) tablet 100 mg  100 mg Oral DAILY  multivitamin, tx-iron-ca-min (THERA-M w/ IRON) tablet 1 Tab  1 Tab Oral DAILY  hydrALAZINE (APRESOLINE) 20 mg/mL injection 10 mg  10 mg IntraVENous Q6H PRN  
 amantadine HCl (SYMMETREL) capsule 100 mg  100 mg Oral BID  
 glucose chewable tablet 16 g  4 Tab Oral PRN  
 dextrose (D50W) injection syrg 12.5-25 g  12.5-25 g IntraVENous PRN  
 glucagon (GLUCAGEN) injection 1 mg  1 mg IntraMUSCular PRN  
 sodium chloride (NS) flush 5-10 mL  5-10 mL IntraVENous PRN  
 sodium chloride (NS) flush 5-40 mL  5-40 mL IntraVENous Q8H  
 sodium chloride (NS) flush 5-40 mL  5-40 mL IntraVENous PRN  
 acetaminophen (TYLENOL) tablet 650 mg  650 mg Oral Q4H PRN  
 lactobac ac& pc-s.therm-b.anim (JUNI Q/RISAQUAD)  1 Cap Oral DAILY  atorvastatin (LIPITOR) tablet 40 mg  40 mg Oral QHS Objective:  
Vitals: 
Visit Vitals BP (!) 123/92 (BP 1 Location: Left arm, BP Patient Position: At rest) Pulse 99 Temp 98.2 °F (36.8 °C) Resp 18 Ht 5' 9\" (1.753 m) Wt 169 lb 1.6 oz (76.7 kg) SpO2 95% BMI 24.97 kg/m² Temp (24hrs), Av °F (36.7 °C), Min:97.7 °F (36.5 °C), Max:98.2 °F (36.8 °C) O2 Flow Rate (L/min): 3 l/min O2 Device: Nasal cannula Last 24hr Input/Output: 
 
Intake/Output Summary (Last 24 hours) at 2019 1735 Last data filed at 2019 Gross per 24 hour Intake 544 ml Output 725 ml Net -181 ml PHYSICAL EXAM: 
General:    Unresponsive, no distress, appears stated age. Head:   Normocephalic, without obvious abnormality, atraumatic. Lungs:   Decreased bs with rales. Chest wall:  No tenderness or deformity. No Accessory muscle use. Heart:   Regular rate and rhythm,  no murmur, rub or gallop. Abdomen:   Soft, non-tender. Not distended. Bowel sounds normal. No masses. Lab Data Reviewed: 
 
Recent Labs 196 19 
1878 WBC 18.3* 23.5* HGB 7.0* 7.3* HCT 22.3* 24.4*  
 * 143* Recent Labs 08/07/19 
3175 08/05/19 
8075 08/04/19 
1382  148*  --   
K 4.4 4.9  --   
 112*  --   
CO2 29 30  --   
* 315*  --   
BUN 42* 43*  --   
CREA 0.81 1.02  --   
CA 8.8 8.8  --   
MG  --   --  2.5* Lab Results Component Value Date/Time Glucose (POC) 204 (H) 08/07/2019 06:05 AM  
 Glucose (POC) 268 (H) 08/06/2019 09:12 PM  
 Glucose (POC) 237 (H) 08/06/2019 03:49 PM  
 Glucose (POC) 231 (H) 08/06/2019 11:52 AM  
 Glucose (POC) 270 (H) 08/06/2019 06:10 AM  
 
 
___________________________________________________ 
___________________________________________________ Attending Physician: Akbar Geiger MD

## 2019-08-08 NOTE — PROGRESS NOTES
Pulmonary, Critical Care, and Sleep Medicine~Progress Note Name: Storm Pryor MRN: [id number] : 1950 Hospital: [address] Date: 2019 11:15 AM Admission: 7/15/2019 Impression Plan 1. Acute on chronic (on 4lpm normally) hypoxic resp failure secondary to below 2. Abnormal CT scan: HCAP possibly (treated), query more bleomycin lung toxicity, now with lack of response. Other concerns were pulmonary edema, but she is quite dried out now 3. Hx of lymphoma. Not on therapy since the beginning of the year. 4. Hx of PE and was on eliquis 5. Hx of asthma 6. Reviewed [facility] records. She was seen last there on 19. Early  cellcept was stopped and at that appointment she was scheduled to taper off of steroids, which she did. Apparently she was on hospice care, but unclear as to the details; per patient it was rescinded 7. AMS~ per neurology. 1. Strep/legionella negative 2. ECHO; PASP 60mmHg 3. O2 titration above 90%, Continue to wean as able. 4. autoimmune labs mostly negative, but did have a + PEACE with elevated CRP and ESR; query coexisting autoimmune process. Once discharge would recommend rheumatology follow up pending clinical course 5. prednisone per PEG / cellcept 6. On Cefepime 7. High risk for rapid decline 8. Difficult situation - prognosis poor 9. Will check back in after family meeting Monday Daily Progression: 
 
 Plans for family meeting Monday and possible PEG tube Mental status unchanged. Respiratory status stable. D/w family  CXR stable. Afebrile. Remains on 3LPM. Plans for possible PEG placement.  No changes. Niece at bedside. Remains minimally responsive. No distress  Decreasing o2 needs - on 3LPM. 
Mental status unchanged. No distress, no cough per nephew. WBC 23. Afebrile. 8 On 4 lpm by nc. Altered mental status for the past 2-3 days. Eeg, ct head all negative. Neuro on the case, signed off -suspect metabolic encephalopathy. Wbc up. Has purwick. CXr still looks the same - continuous to be on cellcept and prednisone. 8/1 Long discussion with extended family at bedside. Questions answered. O2 requirement down to 3 l/m -- better than when she was at her baseline. The initial pulmonary insult seems to have mostly resolved, though clearly there are other issues now at play. 7/31 A couple periods of lucidity overnight per her sister's report. Discussed with her and answered questions, offered support. 7/30 Per family and RN reports more responsive today. Was suppose to have EEG; not done yet. Family is declining LP. 7/29 FiO2 requirements are remarkably down. 7/28 chest film shows no worsening of pulmonary infiltrates. Dramatic change in mental status yesterday with unclear etiology. Seizures vs CVA, but all testing thus far is negative 7/26 On 15lpm; theoretically can be switched to midflow, but wean you talk about it she gets anxious and her saturations drop. I discussed with RN; suggest doing one hour on midlflow and one hour on highflow during the day to get her use to it.  
 
7/25 She looks so weak, but states she feels better. About to work with PT. Still +.  
 
7/24 Noted question of lung transplant query; I think recent malignancy would not allow her to be placed on the list. But since she is followed at [facility] they could assisted better. She feels better today 7/23 Looks stronger Desats quickly with movement, but stabilizes; this is to be expected with her chronic lung disease Weight gain over night? Agree with diuretics Agree with getting up and out of bed as much as possible 7/22 Creatinine improving Was increased in Flow and FiO2, unclear why as she is feeling better Chest film today looks fairly similar 7/18 Not much different. We are starting her on high flow now 7/17 Feels possibly a little better. proBNP elevated today 7/16 Consult Note requested by Dr Marsha Campos. Patient presented for admission secondary to a one week course of cough, subjective fevers, and malasie. Outpatient PCP starte zpak and she did not have bernadette improvement. No other symptoms reported on this recent course. 7/15/19 CT scan did showed worsening right great than left infiltrates with interlobular septal thickening; no PE. BNP was 534, not very elevated. This past Jan 2019 she was dx with hodgkin's lymphoma. Was on blecyomcin and developed a pneumonitis associated with it. Was started on cellcept 500mg bid and prednisone. Followed in the office at [facility] in April, however transferred her care to [facility] and saw Dr Zeke Wheeler (sp?). Her last visit with them was this past June and at that visit she was weaned off of the prednisone and cellcept. We do not have those records at this time. I have reviewed the labs and previous days notes. Pertinent items are noted in HPI. Past Medical History:  
Diagnosis Date  Asthma   
 as a child, nothing since menopause  Cancer ([facility] [address] 08/21/2018 Non-Hodgkin's  Cataracts, bilateral   
 Diabetes ([facility] [address] type II  
 Environmental allergies  Fibromyalgia Treated with alternate medical therapy  Hypertension Past Surgical History:  
Procedure Laterality Date  HX BREAST BIOPSY Right 2006  HX COLONOSCOPY  2015  HX GYN Pap 2015  HX ORTHOPAEDIC    
 fractured R ankle/ no surgery  HX OTHER SURGICAL Right 08/27/2018  
 excision of deep neck cervical LN. Prior to Admission medications Medication Sig Start Date End Date Taking? Authorizing Provider  
acetaminophen (TYLENOL) 650 mg suppository Insert 650 mg into rectum every four (4) hours as needed for Pain. Yes Provider, Historical  
amLODIPine (NORVASC) 5 mg tablet Take 5 mg by mouth daily.    Yes Provider, Historical  
 trimethoprim-sulfamethoxazole (BACTRIM DS) 160-800 mg per tablet Take 1 Tab by mouth every Monday, Wednesday, Friday. Yes Provider, Historical  
metFORMIN (GLUCOPHAGE) 500 mg tablet Take 500 mg by mouth two (2) times daily (with meals). Indications: Per  [address] 3/30/19  Yes Provider, Historical  
levalbuterol (XOPENEX) 1.25 mg/0.5 mL nebu 1.25 mg by Nebulization route every four (4) hours as needed. Indications: Per  [address] 3/30/19  Yes Provider, Historical  
acetaminophen (TYLENOL ARTHRITIS PAIN) 650 mg TbER Take 650 mg by mouth as needed (Q 6hrs PRN). 3/30/19  Yes Provider, Historical  
apixaban (ELIQUIS) 5 mg tablet Take 1 Tab by mouth every twelve (12) hours. 3/13/19  Yes Valiant Gowers, MD  
atorvastatin (LIPITOR) 40 mg tablet TAKE 1 TAB BY MOUTH DAILY. 12/3/17  Yes Valiant Gowers, MD  
Oxygen 3 Devices by Nasal route as needed. Indications: SOB PRN    Provider, Historical  
 
No Known Allergies Social History Tobacco Use  Smoking status: Never Smoker  Smokeless tobacco: Never Used Substance Use Topics  Alcohol use: Yes Comment: socially Family History Problem Relation Age of Onset  Heart Disease Mother  Cancer Father   
     prostate cancer  Cancer Sister Breast cancer apparent DCIS  
 Heart Disease Brother Brother  from congestive heart failure secondary to severe mitral disease OBJECTIVE: 
 
 Vital Signs: 
    
Visit Vitals /81 (BP 1 Location: Right arm, BP Patient Position: At rest) Pulse (!) 107 Temp 98.1 °F (36.7 °C) Resp 20 Ht 5' 9\" (1.753 m) Wt 74.6 kg (164 lb 6.4 oz) SpO2 92% BMI 24.28 kg/m² Temp (24hrs), Av.6 °F (36.4 °C), Min:97.1 °F (36.2 °C), Max:98.1 °F (36.7 °C) Intake/Output:  
  Last shift: No intake/output data recorded. Last 3 shifts:  1901 -  0700 In: 380 Out: 2100 [Urine:2100] Intake/Output Summary (Last 24 hours) at 2019 1047 Last data filed at 8/8/2019 0602 Gross per 24 hour Intake 225 ml Output 1550 ml Net -1325 ml Physical Exam:                                       
Exam Findings Other General: No resp distress noted, appears stated age HEENT:  No ulcers, JVD not elevated, no cervical LAD Chest: No pectus deformity, normal chest rise b/l HEART:  RRR, no murmurs/rubs/gallops Lungs:  Notable crackles, diminished BS at bases ABD: Soft/NT, non rigid mildly distended EXT: No cyanosis/clubbing/edema, normal peripheral pulses Skin: No rashes or ulcers, no mottling Neuro: Unresponsive Medications: 
Current Facility-Administered Medications Medication Dose Route Frequency  magnesium hydroxide (MILK OF MAGNESIA) 400 mg/5 mL oral suspension 30 mL  30 mL Oral DAILY PRN  
 bisacodyl (DULCOLAX) suppository 10 mg  10 mg Rectal DAILY PRN  
 0.9% sodium chloride infusion 250 mL  250 mL IntraVENous PRN  
 insulin glargine (LANTUS) injection 30 Units  30 Units SubCUTAneous QHS  metoprolol tartrate (LOPRESSOR) tablet 25 mg  25 mg Oral Q12H  
 metFORMIN (GLUCOPHAGE) tablet 500 mg  500 mg Oral BID WITH MEALS  predniSONE (DELTASONE) tablet 40 mg  40 mg Per NG tube DAILY WITH BREAKFAST  mycophenolate mofetil (CELLCEPT) 200 mg/mL oral suspension 500 mg  500 mg Oral Q12H  white petrolatum-mineral oil (SOOTHE NIGHT TIME) 80-20 % ophthalmic ointment   Both Eyes PRN  
 apixaban (ELIQUIS) tablet 5 mg  5 mg Oral BID  insulin lispro (HUMALOG) injection   SubCUTAneous Q6H  
 cefepime (MAXIPIME) 2 g in 0.9% sodium chloride (MBP/ADV) 100 mL  2 g IntraVENous Q12H  thiamine HCL (B-1) tablet 100 mg  100 mg Oral DAILY  multivitamin, tx-iron-ca-min (THERA-M w/ IRON) tablet 1 Tab  1 Tab Oral DAILY  hydrALAZINE (APRESOLINE) 20 mg/mL injection 10 mg  10 mg IntraVENous Q6H PRN  
 amantadine HCl (SYMMETREL) capsule 100 mg  100 mg Oral BID  
 glucose chewable tablet 16 g  4 Tab Oral PRN  
  dextrose (D50W) injection syrg 12.5-25 g  12.5-25 g IntraVENous PRN  
 glucagon (GLUCAGEN) injection 1 mg  1 mg IntraMUSCular PRN  
 sodium chloride (NS) flush 5-10 mL  5-10 mL IntraVENous PRN  
 sodium chloride (NS) flush 5-40 mL  5-40 mL IntraVENous Q8H  
 sodium chloride (NS) flush 5-40 mL  5-40 mL IntraVENous PRN  
 acetaminophen (TYLENOL) tablet 650 mg  650 mg Oral Q4H PRN  
 lactobac ac& pc-s.therm-b.anim (JUNI Q/RISAQUAD)  1 Cap Oral DAILY  atorvastatin (LIPITOR) tablet 40 mg  40 mg Oral QHS Labs: ABG No results for input(s): PHI, PCO2I, PO2I, HCO3I, SO2I, FIO2I in the last 72 hours. CBC Recent Labs 08/08/19 
1822 08/07/19 
2852 WBC 16.7* 18.3* HGB 6.6* 7.0*  
HCT 21.6* 22.3*  
* 117* MCV 96.4 95.3 MCH 29.5 29.9 Metabolic Panel Recent Labs 08/08/19 
8706 08/07/19 
3604  140  
K 4.4 4.4  106 CO2 27 29 GLU 72 183* BUN 39* 42* CREA 0.90 0.81 CA 8.9 8.8 Pertinent Labs Cristobal Hicks NP 
8/8/2019

## 2019-08-08 NOTE — ROUTINE PROCESS
HYPOGLYCEMIC EPISODE DOCUMENTATION Patient with hypoglycemic episode(s) at 1144(time) on 8-8-19(date). BG value(s) pre-treatment 77 Was patient symptomatic? [] yes, [x] no Patient was treated with the following rescue medications/treatments: [x] D50 [] Glucose tablets 
              [] Glucagon 
              [] 4oz juice 
              [] 6oz reg soda 
              [] 8oz low fat milk BG value post-treatment: 134 Once BG treated and value greater than 80mg/dl, pt was provided with the following: 
[x] snack 
[] meal 
Name of MD notified:Noé The following orders were received: Per Protocol

## 2019-08-08 NOTE — PROCEDURES
Midline Insertion and Progress Note PRE-PROCEDURE VERIFICATION Correct Procedure: yes Correct Site:  yes Temperature: Temp: 96 °F (35.6 °C), Temperature Source: Temp Source: Axillary Recent Labs 08/08/19 
5206 BUN 39* CREA 0.90 * WBC 16.7* Allergies: Patient has no known allergies. PROCEDURE DETAIL A single lumen midline IV catheter was started for vascular access. The following documentation is in addition to the Midline properties in the lines/airways flowsheet : 
Lot #: [id number] Catheter Total Length: 14 (cm) External Catheter Length: 0 (cm) Circumference: 30 (cm) Vein Selection for Midline :right basilic Complication Related to Insertion: none Line is okay to use.

## 2019-08-08 NOTE — PROGRESS NOTES
Hospital Progress Note NAME:  Michael Lai :   1950 MRN:  [id number] Date/Time:  2019 8:25 AM 
 
Plan: 1. Ng tube feedings - 
2. titrate lantlus 3. cellcept / prednisone 4. Family discussions with palliative care planned for Monday at [facility] 
5. Discussed with daughter via phone and god-daughter in room 6. Hold eliquis  7. D/c cefepime after 7 days for uti 8. D/w brother - anemia noted - will transfuse 1 unit of pack cells 9. Hematology opinion requested Risk of Deterioration: Low  []           Moderate  []           High  [x] Assessment:  
Principal Problem: 
  Pneumonia (7/15/2019) Active Problems: 
   Acute encephalopathy: 
-unclear etiology - transiently awaken with appropriate conversation 
-seizure was suspected but  EEG did not confirm 
-MRI was negative for stroke,CT head and neck - no bleed,vascular occlusion. -ABG reviewed. Electrolytes,ammonia,trop,lactic acid wnl 
-Neurology evaluated 
-MRI brain w contrast  - no acute process. -EEG neg 
-May need LP for cytology as she has history of hodgkin's lymphoma - family declined 
-Oncology evaluated Acute on Chronic hypoxemic respiratory failure ([address] (2019) On nasal canula Bleomycin lung toxicity (2019) 
   cellcept and steroids Diabetes mellitus type 2, controlled ([address] (2016) 
   basal bolus insulin Hodgkin lymphoma ([facility] (2/3/2019) bulky stage II HD (classical),s/p treatment earlier this year by Dr. Janice Noonan with ABVD. Course truncated due to abrupt onset bleomycin toxicity. Diff dx includes lymphangitic spread of lymphoma. She completed 3 1/2 treatments of ABVD, less than the standard 6. oncol raised the potential concern for recurrent disease with her family. UTI -  ENTEROCOCCUS SPECIES Treated UTI - ESCHERICHIA COLI 19 Cefepime  [de-identified] notes:68 y.o. female with past medical history significant for htn, fibromyalgia, muscle atrophy, pulmonary fibrosis, asthma, b/l cataracts, dm, non-hodgkin's, who presents from ems with chief complaint of sob. Pt has \"last night\" onset of worsening sob described as \"labored breathing\" that's continued into this morning. She is normally on 4.5 L of O2 n/c at baseline, but had it increased to 5 L by staff. She also endorses a dry non-productive cough and fever (peak 100) at this time. EMS arrived and measured initial O2 sat of 92% and HR of 140 bpm. Denies pleuritic cp and abd pain. Of note, she has had PNA \"x3 times since Ken" and was treated last week with a z-pack for sinus infection symptomatic of congestion. DNR form in place. EMS was called to [place];  bpm, 124/78, and 9 Subjective/interium history Unresponsive at time of my exam  
11 Point Review of Systems:  
Negative except [x]            Unable to obtain ROS due to:      
[x]            mental status change []            sedated []            intubated Social History Tobacco Use  Smoking status: Never Smoker  Smokeless tobacco: Never Used Substance Use Topics  Alcohol use: Yes Comment: socially Medications reviewed: 
Current Facility-Administered Medications Medication Dose Route Frequency  metoprolol tartrate (LOPRESSOR) tablet 25 mg  25 mg Oral Q12H  
 metFORMIN (GLUCOPHAGE) tablet 500 mg  500 mg Oral BID WITH MEALS  insulin glargine (LANTUS) injection 80 Units  80 Units SubCUTAneous QHS  predniSONE (DELTASONE) tablet 40 mg  40 mg Per NG tube DAILY WITH BREAKFAST  mycophenolate mofetil (CELLCEPT) 200 mg/mL oral suspension 500 mg  500 mg Oral Q12H  white petrolatum-mineral oil (SOOTHE NIGHT TIME) 80-20 % ophthalmic ointment   Both Eyes PRN  
 [Held by provider] apixaban (ELIQUIS) tablet 5 mg  5 mg Oral BID  insulin lispro (HUMALOG) injection   SubCUTAneous Q6H  
 cefepime (MAXIPIME) 2 g in 0.9% sodium chloride (MBP/ADV) 100 mL  2 g IntraVENous Q12H  thiamine HCL (B-1) tablet 100 mg  100 mg Oral DAILY  multivitamin, tx-iron-ca-min (THERA-M w/ IRON) tablet 1 Tab  1 Tab Oral DAILY  hydrALAZINE (APRESOLINE) 20 mg/mL injection 10 mg  10 mg IntraVENous Q6H PRN  
 amantadine HCl (SYMMETREL) capsule 100 mg  100 mg Oral BID  
 glucose chewable tablet 16 g  4 Tab Oral PRN  
 dextrose (D50W) injection syrg 12.5-25 g  12.5-25 g IntraVENous PRN  
 glucagon (GLUCAGEN) injection 1 mg  1 mg IntraMUSCular PRN  
 sodium chloride (NS) flush 5-10 mL  5-10 mL IntraVENous PRN  
 sodium chloride (NS) flush 5-40 mL  5-40 mL IntraVENous Q8H  
 sodium chloride (NS) flush 5-40 mL  5-40 mL IntraVENous PRN  
 acetaminophen (TYLENOL) tablet 650 mg  650 mg Oral Q4H PRN  
 lactobac ac& pc-s.therm-b.anim (JUNI Q/RISAQUAD)  1 Cap Oral DAILY  atorvastatin (LIPITOR) tablet 40 mg  40 mg Oral QHS Objective:  
Vitals: 
Visit Vitals /76 (BP 1 Location: Right arm, BP Patient Position: At rest;Head of bed elevated (Comment degrees)) Pulse 99 Temp 97.4 °F (36.3 °C) Resp 18 Ht 5' 9\" (1.753 m) Wt 164 lb 6.4 oz (74.6 kg) SpO2 92% BMI 24.28 kg/m² Temp (24hrs), Av.5 °F (36.4 °C), Min:97.1 °F (36.2 °C), Max:97.7 °F (36.5 °C) O2 Flow Rate (L/min): 3.5 l/min O2 Device: Nasal cannula Last 24hr Input/Output: 
 
Intake/Output Summary (Last 24 hours) at 2019 9516 Last data filed at 2019 0602 Gross per 24 hour Intake 225 ml Output 1550 ml Net -1325 ml PHYSICAL EXAM: 
General:    Unresponsive, no distress, appears stated age. Head:   Normocephalic, without obvious abnormality, atraumatic. Lungs:   Decreased bs with rales. Chest wall:  No tenderness or deformity. No Accessory muscle use. Heart:   Regular rate and rhythm,  no murmur, rub or gallop. Abdomen:   Soft, non-tender. Not distended. Bowel sounds normal. No masses. Lab Data Reviewed: 
 
Recent Labs 19 
0650 19 
5757 WBC 16.7* 18.3* HGB 6.6* 7.0*  
HCT 21.6* 22.3*  
* 117* Recent Labs 08/08/19 
4208 08/07/19 
2915  140  
K 4.4 4.4  106 CO2 27 29 GLU 72 183* BUN 39* 42* CREA 0.90 0.81 CA 8.9 8.8 Lab Results Component Value Date/Time Glucose (POC) 146 (H) 08/08/2019 06:54 AM  
 Glucose (POC) 61 (L) 08/08/2019 06:36 AM  
 Glucose (POC) 120 (H) 08/07/2019 09:09 PM  
 Glucose (POC) 200 (H) 08/07/2019 04:05 PM  
 Glucose (POC) 198 (H) 08/07/2019 11:07 AM  
 
 
___________________________________________________ 
___________________________________________________ Attending Physician: Caro Almonte MD

## 2019-08-08 NOTE — PROGRESS NOTES
HYPOGLYCEMIC EPISODE DOCUMENTATION Patient with hypoglycemic episode(s) at 0640(time) on 8/8/19(date). BG value(s) pre-treatment 61 Was patient symptomatic? [] yes, [x] no Patient was treated with the following rescue medications/treatments: [x] D50 [] Glucose tablets 
              [] Glucagon 
              [] 4oz juice 
              [] 6oz reg soda 
              [] 8oz low fat milk BG value post-treatment: 146 Once BG treated and value greater than 80mg/dl, pt was provided with the following: 
[] snack 
[] meal 
Name of MD notified:Dr Micky Felipe The following orders were received: N/A

## 2019-08-08 NOTE — ROUTINE PROCESS
Bedside shift change report given to Barber (oncoming nurse) by Robert Self (offgoing nurse). Report included the following information SBAR, Kardex, MAR and Recent Results.

## 2019-08-08 NOTE — PROGRESS NOTES
CHARI Plan: 
  
1. Disposition TBD; Pt's emergency contacts consist of the following: Lolislisandra Klein [phone] and Jose Rodriguez [phone]. 
  
2. GI following; waiting PEG placement; awaiting family decision; family wants more time before considering PEG placement; Family meeting has been scheduled for 10 AM Monday. 
  
3. Palliative continues to follow; had discussion with  Velma Dickson [phone] yesterday (08/07/2019) per palliative note.  
  
4. Pt was open to [facility] with [facility]; Received notifcation that patient revoked hospice to come to the ER and had revoked in past to seek aggressive treatment /inability to receive rehab.  
  
 
CM to assist in transition of care planning as needed.   
 
Zahida Zabala 
[facility] [phone]

## 2019-08-08 NOTE — PROGRESS NOTES
Palliative Medicine Consult [place]: [phone]) Patient Name: Derik Roblero YOB: 1950 Date of Initial Consult: July 17, 2019 Reason for Consult: Care Decisions Requesting Provider: Cassidy Garcia Primary Care Physician: Mert Aleman MD 
 
 SUMMARY:  
Derik Roblero is a 70 y.o. with a past history of Lymphoma (chemo) followed at [facility], HTN, fibromyalgia, muscle atrophy, pulmonary fibrosis, asthma, cataracts, DM, chronic respiratory failure, recurrent pna, sinus infection,  who was admitted on 7/15/2019 from [place] with a diagnosis of PNA, acute on chronic hypoxemic respiratory failure, bleomycin lung toxicity. Pt had 7 rounds of chemo and then developed significant side effects. [facility] for [facility]. Current medical issues leading to Palliative Medicine involvement include: support with care decisions. 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 Interval History: 
7/18/19: hypoxia and tachycardia with removal of mask for brief period. Pul to start cellcept with steroids to see if she responds. S/s due to disease progression not pneumonia Pt declined intubation for respiratory distress if needed. Plans for hi flow today 7/24/19: continues cellcept therapy. Still on hi flow 7/29/29: pt with acute mental change over the weekend. Suspect seizures but eeg neg,  MRI without contrast neg. Catatonic state 8/1/19: on nasal cannula, somnolent state for close to 24 hours, TF started today PALLIATIVE DIAGNOSES:  
1. Encephalopathy - metabolic vs akinetic mutism from cerebral hypoxia 2. Chronic hypoxic respiratory failure dt bleomycin lung toxicity 3. Feeding difficulties 4. Leg swelling 5. Goals of care discussion PLAN:  
1. Last seen by my palliative team colleague Johnna Zee on 8/1.   
2. Last documented periods of alertness (10-15 min) on 7/31. 3. Followed up today alongside [facility] LCSW as there is ongoing uncertainty about her care plan and family's desire to pursue PEG. 4. Spoke briefly to niece outside the room and then via Face Time with pt's sister / Llari Caman. She shared many recent frustrations about communication difficulties. She is hopeful to have all information prior to making decisions about a PEG. Please refer to Cami's note for full details of conversation. 5.   We plan to meet in person with Betsy and pt's brother Karel Osullivan who share joint decision making on Monday 7/12 at 10 am.   
6. I notified GI NP Kami Davis of mtg on Monday. 7. Will continue to follow; please call with questions. 8. Communicated plan of care with: Palliative IDTDenaeiijitendra [facility] Team 
 
 GOALS OF CARE / TREATMENT PREFERENCES:  
 
GOALS OF CARE: 
Patient/Health Care Proxy Stated Goals: Prolong life TREATMENT PREFERENCES:  
Code Status: DNR Advance Care Planning: 
[x] [facility] Interdisciplinary Team has updated the ACP Navigator with Josy and Patient Capacity Siblings Peyton Sanders and Noni Hutchinson Medical Interventions: Limited additional interventions Other Instructions:  
Artificially Administered Nutrition: Feeding tube for a defined trial period Other: As far as possible, the palliative care team has discussed with patient / health care proxy about goals of care / treatment preferences for patient. HISTORY:  
 
History obtained from: chart, family CHIEF COMPLAINT: pt admitted with aforementioned issues HPI/SUBJECTIVE: The patient is:  
[] Verbal and participatory [x] Non-participatory due to: Unresponsive Clinical Pain Assessment (nonverbal scale for severity on nonverbal patients):  
Clinical Pain Assessment Severity: 0 Activity (Movement): Lying quietly, normal position Duration: for how long has pt been experiencing pain (e.g., 2 days, 1 month, years) Frequency: how often pain is an issue (e.g., several times per day, once every few days, constant) FUNCTIONAL ASSESSMENT:  
 
Palliative Performance Scale (PPS): PPS: 10 PSYCHOSOCIAL/SPIRITUAL SCREENING:  
 
Palliative IDT has assessed this patient for cultural preferences / practices and a referral made as appropriate to needs (Cultural Services, Patient Advocacy, Ethics, etc.) Any spiritual / Shinto concerns: 
[] Yes /  [x] No 
 
Caregiver Burnout: 
[] Yes /  [x] No /  [] No Caregiver Present Anticipatory grief assessment:  
[x] Normal  / [] Maladaptive ESAS Anxiety: Anxiety: 0 
 
ESAS Depression:    
 
 
 REVIEW OF SYSTEMS:  
 
Positive and pertinent negative findings in ROS are noted above in HPI. The following systems were [x] reviewed / [] unable to be reviewed as noted in HPI Other findings are noted below. Systems: constitutional, ears/nose/mouth/throat, respiratory, gastrointestinal, genitourinary, musculoskeletal, integumentary, neurologic, psychiatric, endocrine. Positive findings noted below. Modified ESAS Completed by: provider Fatigue: 7 Drowsiness: 0 Pain: 0 Anxiety: 0 Nausea: 0 Anorexia: 5 Dyspnea: 0 Stool Occurrence(s): 1 PHYSICAL EXAM:  
 
From RN flowsheet: 
Wt Readings from Last 3 Encounters:  
08/07/19 76.7 kg (169 lb 1.6 oz) 02/09/19 108.2 kg (238 lb 8.6 oz) 01/21/19 81.6 kg (180 lb) Blood pressure 145/82, pulse (!) 102, temperature 97.1 °F (36.2 °C), resp. rate 18, height 5' 9\" (1.753 m), weight 76.7 kg (169 lb 1.6 oz), SpO2 95 %. Pain Scale 1: Adult Nonverbal Pain Scale Pain Intensity 1: 0 Pain Intervention(s) 1: Medication (see MAR) Last bowel movement, if known:  
 
Constitutional: chronically ill appearing Eyes: pupils equal, anicteric, eyes closed ENMT: no nasal discharge, moist mucous membranes, NG tube in nare Cardiovascular:distal pulses intact, leg swelling Respiratory: breathing not  labored with NC oxygen, symmetric Gastrointestinal: soft non-tender, +bowel sounds Musculoskeletal: no deformity, no tenderness to palpation, atrophy Skin: warm, dry Neurologic: lying still, not responsive HISTORY:  
 
Active Problems: 
  Chronic hypoxemic respiratory failure ([address] (2019) Bleomycin lung toxicity (2019) Hodgkin [facility] (2/3/2019) Encephalopathy (2019) Past Medical History:  
Diagnosis Date  Asthma   
 as a child, nothing since menopause  Cancer ([facility] 2018 Non-Hodgkin's  Cataracts, bilateral   
 Diabetes ([facility] type II  
 Environmental allergies  Fibromyalgia Treated with alternate medical therapy  Hypertension Past Surgical History:  
Procedure Laterality Date  HX BREAST BIOPSY Right 2006  HX COLONOSCOPY    HX GYN Pap   HX ORTHOPAEDIC    
 fractured R ankle/ no surgery  HX OTHER SURGICAL Right 2018  
 excision of deep neck cervical LN. Family History Problem Relation Age of Onset  Heart Disease Mother  Cancer Father   
     prostate cancer  Cancer Sister Breast cancer apparent DCIS  
 Heart Disease Brother Brother  from congestive heart failure secondary to severe mitral disease History reviewed, no pertinent family history. Social History Tobacco Use  Smoking status: Never Smoker  Smokeless tobacco: Never Used Substance Use Topics  Alcohol use: Yes Comment: socially No Known Allergies Current Facility-Administered Medications Medication Dose Route Frequency  metoprolol tartrate (LOPRESSOR) tablet 25 mg  25 mg Oral Q12H  metFORMIN (GLUCOPHAGE) tablet 500 mg  500 mg Oral BID WITH MEALS  insulin glargine (LANTUS) injection 80 Units  80 Units SubCUTAneous QHS  predniSONE (DELTASONE) tablet 40 mg  40 mg Per NG tube DAILY WITH BREAKFAST  mycophenolate mofetil (CELLCEPT) 200 mg/mL oral suspension 500 mg  500 mg Oral Q12H  white petrolatum-mineral oil (SOOTHE NIGHT TIME) 80-20 % ophthalmic ointment   Both Eyes PRN  
 [Held by provider] apixaban (ELIQUIS) tablet 5 mg  5 mg Oral BID  insulin lispro (HUMALOG) injection   SubCUTAneous Q6H  
 cefepime (MAXIPIME) 2 g in 0.9% sodium chloride (MBP/ADV) 100 mL  2 g IntraVENous Q12H  thiamine HCL (B-1) tablet 100 mg  100 mg Oral DAILY  multivitamin, tx-iron-ca-min (THERA-M w/ IRON) tablet 1 Tab  1 Tab Oral DAILY  hydrALAZINE (APRESOLINE) 20 mg/mL injection 10 mg  10 mg IntraVENous Q6H PRN  
 amantadine HCl (SYMMETREL) capsule 100 mg  100 mg Oral BID  
 glucose chewable tablet 16 g  4 Tab Oral PRN  
 dextrose (D50W) injection syrg 12.5-25 g  12.5-25 g IntraVENous PRN  
 glucagon (GLUCAGEN) injection 1 mg  1 mg IntraMUSCular PRN  
 sodium chloride (NS) flush 5-10 mL  5-10 mL IntraVENous PRN  
 sodium chloride (NS) flush 5-40 mL  5-40 mL IntraVENous Q8H  
 sodium chloride (NS) flush 5-40 mL  5-40 mL IntraVENous PRN  
 acetaminophen (TYLENOL) tablet 650 mg  650 mg Oral Q4H PRN  
 lactobac ac& pc-s.therm-b.anim (JUNI Q/RISAQUAD)  1 Cap Oral DAILY  atorvastatin (LIPITOR) tablet 40 mg  40 mg Oral QHS  
 
 
 
 LAB AND IMAGING FINDINGS:  
 
Lab Results Component Value Date/Time WBC 18.3 (H) 08/07/2019 02:26 AM  
 HGB 7.0 (L) 08/07/2019 02:26 AM  
 PLATELET 104 (L) 88/16/5247 02:26 AM  
 
Lab Results Component Value Date/Time  Sodium 140 08/07/2019 02:26 AM  
 Potassium 4.4 08/07/2019 02:26 AM  
 Chloride 106 08/07/2019 02:26 AM  
 CO2 29 08/07/2019 02:26 AM  
 BUN 42 (H) 08/07/2019 02:26 AM  
 Creatinine 0.81 08/07/2019 02:26 AM  
 Calcium 8.8 08/07/2019 02:26 AM  
 Magnesium 2.5 (H) 08/04/2019 09:25 AM  
 Phosphorus 2.9 08/02/2019 05:02 AM  
  
Lab Results Component Value Date/Time AST (SGOT) 14 (L) 07/23/2019 04:07 AM  
 Alk. phosphatase 151 (H) 07/23/2019 04:07 AM  
 Protein, total 6.6 07/23/2019 04:07 AM  
 Albumin 2.6 (L) 07/23/2019 04:07 AM  
 Globulin 4.0 07/23/2019 04:07 AM  
 
Lab Results Component Value Date/Time INR 1.4 (H) 07/28/2019 12:16 PM  
 Prothrombin time 14.1 (H) 07/28/2019 12:16 PM  
 aPTT 30.2 08/27/2018 08:15 AM  
  
Lab Results Component Value Date/Time Iron 24 (L) 07/16/2019 01:24 AM  
 TIBC 200 (L) 07/16/2019 01:24 AM  
 Iron % saturation 12 (L) 07/16/2019 01:24 AM  
  
No results found for: PH, PCO2, PO2 No components found for: [place] No results found for: CPK, CKMB Total time: 45 minutes Counseling / coordination time, spent as noted above: 40 minutes 
> 50% counseling / coordination?: y 
 
Prolonged service was provided for  []30 min   []75 min in face to face time in the presence of the patient, spent as noted above. Time Start:  
Time End:  
Note: this can only be billed with 14029 (initial) or 72271 (follow up). If multiple start / stop times, list each separately.

## 2019-08-08 NOTE — PROGRESS NOTES
GI note: 
 
Updated by Palliative Care. Family meeting planned for Monday. If family agrees to [facility], plan for Tuesday. Therefore, Eliquis can be resumed and held starting on Sunday. One of my partners will follow-up on Monday to see what the final decision is. Dr. Mattie Carrillo

## 2019-08-08 NOTE — PROGRESS NOTES
Hospital Progress Note NAME:  Mir Jones :   1950 MRN:  [id number] Date/Time:  2019 8:25 AM 
 
Plan: 1. Ng tube feedings - 
2. titrate lantlus 3. cellcept / prednisone 4. Family discussions with palliative care planned for Monday at [address] [facility] 
5. Discussed with daughter via phone and god-daughter in room 6. Hold eliquis  7. D/c cefepime after 7 days for uti Risk of Deterioration: Low  []           Moderate  []           High  [x] Assessment:  
Principal Problem: 
  Pneumonia (7/15/2019) Active Problems: 
   Acute encephalopathy: 
-unclear etiology - transiently awaken with appropriate conversation 
-seizure was suspected but  EEG did not confirm 
-MRI was negative for stroke,CT head and neck - no bleed,vascular occlusion. -ABG reviewed. Electrolytes,ammonia,trop,lactic acid wnl 
-Neurology evaluated 
-MRI brain w contrast  - no acute process. -EEG neg 
-May need LP for cytology as she has history of hodgkin's lymphoma - family declined 
-Oncology evaluated Acute on Chronic hypoxemic respiratory failure ([address] (2019) On nasal canula Bleomycin lung toxicity (2019) 
   cellcept and steroids Diabetes mellitus type 2, controlled ([address] (2016) 
   basal bolus insulin Hodgkin lymphoma ([facility] [address] (2/3/2019) bulky stage II HD (classical),s/p treatment earlier this year by Dr. Eduardo Elizabeth with ABVD. Course truncated due to abrupt onset bleomycin toxicity. Diff dx includes lymphangitic spread of lymphoma. She completed 3 1/2 treatments of ABVD, less than the standard 6. oncol raised the potential concern for recurrent disease with her family. UTI -  ENTEROCOCCUS SPECIES Treated UTI - ESCHERICHIA COLI 19 Cefepime  Admitting notes:68 y.o. female with past medical history significant for htn, fibromyalgia, muscle atrophy, pulmonary fibrosis, asthma, b/l cataracts, dm, non-hodgkin's, who presents from ems with chief complaint of sob. Pt has \"last night\" onset of worsening sob described as \"labored breathing\" that's continued into this morning. She is normally on 4.5 L of O2 n/c at baseline, but had it increased to 5 L by staff. She also endorses a dry non-productive cough and fever (peak 100) at this time. EMS arrived and measured initial O2 sat of 92% and HR of 140 bpm. Denies pleuritic cp and abd pain. Of note, she has had PNA \"x3 times since Ken" and was treated last week with a z-pack for sinus infection symptomatic of congestion. DNR form in place. EMS was called to [place];  bpm, 124/78, and 9 Subjective/interium history Unresponsive at time of my exam  
11 Point Review of Systems:  
Negative except [x]            Unable to obtain ROS due to:      
[x]            mental status change []            sedated []            intubated Social History Tobacco Use  Smoking status: Never Smoker  Smokeless tobacco: Never Used Substance Use Topics  Alcohol use: Yes Comment: socially Medications reviewed: 
Current Facility-Administered Medications Medication Dose Route Frequency  metoprolol tartrate (LOPRESSOR) tablet 25 mg  25 mg Oral Q12H  
 metFORMIN (GLUCOPHAGE) tablet 500 mg  500 mg Oral BID WITH MEALS  insulin glargine (LANTUS) injection 80 Units  80 Units SubCUTAneous QHS  predniSONE (DELTASONE) tablet 40 mg  40 mg Per NG tube DAILY WITH BREAKFAST  mycophenolate mofetil (CELLCEPT) 200 mg/mL oral suspension 500 mg  500 mg Oral Q12H  white petrolatum-mineral oil (SOOTHE NIGHT TIME) 80-20 % ophthalmic ointment   Both Eyes PRN  
 [Held by provider] apixaban (ELIQUIS) tablet 5 mg  5 mg Oral BID  insulin lispro (HUMALOG) injection   SubCUTAneous Q6H  
 cefepime (MAXIPIME) 2 g in 0.9% sodium chloride (MBP/ADV) 100 mL  2 g IntraVENous Q12H  thiamine HCL (B-1) tablet 100 mg  100 mg Oral DAILY  multivitamin, tx-iron-ca-min (THERA-M w/ IRON) tablet 1 Tab  1 Tab Oral DAILY  hydrALAZINE (APRESOLINE) 20 mg/mL injection 10 mg  10 mg IntraVENous Q6H PRN  
 amantadine HCl (SYMMETREL) capsule 100 mg  100 mg Oral BID  
 glucose chewable tablet 16 g  4 Tab Oral PRN  
 dextrose (D50W) injection syrg 12.5-25 g  12.5-25 g IntraVENous PRN  
 glucagon (GLUCAGEN) injection 1 mg  1 mg IntraMUSCular PRN  
 sodium chloride (NS) flush 5-10 mL  5-10 mL IntraVENous PRN  
 sodium chloride (NS) flush 5-40 mL  5-40 mL IntraVENous Q8H  
 sodium chloride (NS) flush 5-40 mL  5-40 mL IntraVENous PRN  
 acetaminophen (TYLENOL) tablet 650 mg  650 mg Oral Q4H PRN  
 lactobac ac& pc-s.therm-b.anim (JUNI Q/RISAQUAD)  1 Cap Oral DAILY  atorvastatin (LIPITOR) tablet 40 mg  40 mg Oral QHS Objective:  
Vitals: 
Visit Vitals /76 (BP 1 Location: Right arm, BP Patient Position: At rest;Head of bed elevated (Comment degrees)) Pulse 99 Temp 97.4 °F (36.3 °C) Resp 18 Ht 5' 9\" (1.753 m) Wt 164 lb 6.4 oz (74.6 kg) SpO2 92% BMI 24.28 kg/m² Temp (24hrs), Av.5 °F (36.4 °C), Min:97.1 °F (36.2 °C), Max:97.7 °F (36.5 °C) O2 Flow Rate (L/min): 3.5 l/min O2 Device: Nasal cannula Last 24hr Input/Output: 
 
Intake/Output Summary (Last 24 hours) at 2019 0725 Last data filed at 2019 0602 Gross per 24 hour Intake 225 ml Output 1550 ml Net -1325 ml PHYSICAL EXAM: 
General:    Unresponsive, no distress, appears stated age. Head:   Normocephalic, without obvious abnormality, atraumatic. Lungs:   Decreased bs with rales. Chest wall:  No tenderness or deformity. No Accessory muscle use. Heart:   Regular rate and rhythm,  no murmur, rub or gallop. Abdomen:   Soft, non-tender. Not distended. Bowel sounds normal. No masses. Lab Data Reviewed: 
 
Recent Labs 19 
3161 19 
7454 WBC 16.7* 18.3*  
 HGB 6.6* 7.0*  
HCT 21.6* 22.3*  
* 117* Recent Labs 08/08/19 
2527 08/07/19 
7956  140  
K 4.4 4.4  106 CO2 27 29 GLU 72 183* BUN 39* 42* CREA 0.90 0.81 CA 8.9 8.8 Lab Results Component Value Date/Time Glucose (POC) 146 (H) 08/08/2019 06:54 AM  
 Glucose (POC) 61 (L) 08/08/2019 06:36 AM  
 Glucose (POC) 120 (H) 08/07/2019 09:09 PM  
 Glucose (POC) 200 (H) 08/07/2019 04:05 PM  
 Glucose (POC) 198 (H) 08/07/2019 11:07 AM  
 
 
___________________________________________________ 
___________________________________________________ Attending Physician: Renetta Dias MD

## 2019-08-08 NOTE — DIABETES MGMT
[facility] Progress Note Recommendations/ Comments: Chart review for hypoglycemia - BG 61 mg/dL this AM and 77 mg/dl pre lunch in spite of decrease in Lantus to 40 units last night. Addendum - received 11 unitsof lispro correction yesterday On TF - changed to Glucerna Noted Lantus decreased to 30 units at bedtime starting tonight (8/8/2019) If appropriate please change lispro correction scale to normal  sensitivity Current hospital DM medication: Lispro insulin resistant scale with instruction up to BS >451 mg/dl; Lantus 40 units at bedtime, will decrease to 30 units tonight Metformin 500 mg BID Chart reviewed on [facility]. Patient is a 70 y.o. female with known  Type 2 Diabetes on oral agent (monotherapy): metformin (generic) at home. DNR 
 
A1c:  
Lab Results Component Value Date/Time Hemoglobin A1c 6.9 (H) 08/03/2018 01:45 PM  
 Hemoglobin A1c 6.8 (H) 01/30/2018 01:25 PM  
 
 
Recent Glucose Results:  
Lab Results Component Value Date/Time GLU 72 08/08/2019 02:33 AM  
 GLUCPOC 134 (H) 08/08/2019 12:18 PM  
 GLUCPOC 77 08/08/2019 11:44 AM  
 GLUCPOC 146 (H) 08/08/2019 06:54 AM  
  
 
Lab Results Component Value Date/Time Creatinine 0.90 08/08/2019 02:33 AM  
 
Estimated Creatinine Clearance: 61.7 mL/min (based on SCr of 0.9 mg/dL). Active Orders Diet DIET NPO With Tube Feedings PO intake:  
No data found. Will continue to follow as needed. Thank you Nely Couch RN, CDE Time spent: 5 min

## 2019-08-08 NOTE — PROGRESS NOTES
Hematology-Oncology Progress Note Sravani Been 1950 
[id number] 
8/8/2019 Subjective:  
 
Asked to see patient in f/u for declining counts. She is well known to our service. Briefly, 71 y/o woman diagnosed and treated for classical Hodgkin's lymphoma by Dr. Dea Oneil in our group. Course complicated by bleomycin toxicity after d1c4 of ABVD. This was relatively severe and precluded continued treatment with ABVD. Seen briefly at [facility] and placed on cellcept (mycophenolate) in the hopes this would help. Admitted now with \"pneumonia\". While here, had an episode of hypoxia and was noted since to have waxing and waning levels or arousal. Family has experienced episode of volitional movement and reactions to their verbal stimuli. Neurology has been following closely and are unable to give a clear cause or prognosis. Ultimately plan is for a family meeting next Monday with likely placement of a PEG tube and transfer to [place] [address]. Now, noted to have declining HGB, PLT and WBC over the last several days. Currently in bed surrounded by brother and family. Has NG with tube feeds. On cellcept 500 mg po bid as well as cefepime since 8/2/19. Allergies: Patient has no known allergies. Current Facility-Administered Medications Medication Dose Route Frequency Provider Last Rate Last Dose  magnesium hydroxide (MILK OF MAGNESIA) 400 mg/5 mL oral suspension 30 mL  30 mL Oral DAILY PRN Isi Umanzor MD      
 bisacodyl (DULCOLAX) suppository 10 mg  10 mg Rectal DAILY PRN Isi Umanzor MD      
 0.9% sodium chloride infusion 250 mL  250 mL IntraVENous PRN Isi Umanzor MD      
 insulin glargine (LANTUS) injection 30 Units  30 Units SubCUTAneous QHS Grupo Umanzor MD      
 metoprolol tartrate (LOPRESSOR) tablet 25 mg  25 mg Oral Q12H Padmini Scruggs MD   25 mg at 08/08/19 7488  metFORMIN (GLUCOPHAGE) tablet 500 mg  500 mg Oral BID WITH MEALS Everardo Hunt MD   Stopped at 08/08/19 0800  predniSONE (DELTASONE) tablet 40 mg  40 mg Per NG tube DAILY WITH BREAKFAST Marva Robles NP   40 mg at 08/08/19 8177  mycophenolate mofetil (CELLCEPT) 200 mg/mL oral suspension 500 mg  500 mg Oral Q12H Mushtaq Umanzor MD   500 mg at 08/08/19 1024  white petrolatum-mineral oil (SOOTHE NIGHT TIME) 80-20 % ophthalmic ointment   Both Eyes PRN Mushtaq Umanzor MD   1 Each at 08/04/19 2234  apixaban (ELIQUIS) tablet 5 mg  5 mg Oral BID Mushtaq Umanzor MD   5 mg at 08/08/19 4433  insulin lispro (HUMALOG) injection   SubCUTAneous Q6H Everardo Hunt MD   Stopped at 08/08/19 0000  cefepime (MAXIPIME) 2 g in 0.9% sodium chloride (MBP/ADV) 100 mL  2 g IntraVENous Q12H Brenda Baez  mL/hr at 08/08/19 1555 2 g at 08/08/19 1555  thiamine HCL (B-1) tablet 100 mg  100 mg Oral DAILY Mushtaq Umanzor MD   100 mg at 08/08/19 1716  multivitamin, tx-iron-ca-min (THERA-M w/ IRON) tablet 1 Tab  1 Tab Oral DAILY Everardo Hunt MD   1 Tab at 08/08/19 1093  hydrALAZINE (APRESOLINE) 20 mg/mL injection 10 mg  10 mg IntraVENous Q6H PRN Everardo Hunt MD   Stopped at 08/01/19 9151  amantadine HCl (SYMMETREL) capsule 100 mg  100 mg Oral BID Cielo Tran MD   100 mg at 08/08/19 7630  
 glucose chewable tablet 16 g  4 Tab Oral PRN [place] Stoner MD      
 dextrose (D50W) injection syrg 12.5-25 g  12.5-25 g IntraVENous PRN [place] AMEZCUA MD   25 g at 08/08/19 1155  
 glucagon (GLUCAGEN) injection 1 mg  1 mg IntraMUSCular PRN [place] Stoner MD      
 sodium chloride (NS) flush 5-10 mL  5-10 mL IntraVENous PRN Ariel Arriaga MD   10 mL at 08/03/19 1741  
 sodium chloride (NS) flush 5-40 mL  5-40 mL IntraVENous Q8H Cameron Green MD   10 mL at 08/08/19 1556  sodium chloride (NS) flush 5-40 mL  5-40 mL IntraVENous PRN Cameron Green MD      
  acetaminophen (TYLENOL) tablet 650 mg  650 mg Oral Q4H PRN Jones Otoole MD      
 lactobac ac& pc-s.therm-b.anim (JUNI Q/RISAQUAD)  1 Cap Oral DAILY Jones Otoole MD   1 Cap at 08/08/19 0266  atorvastatin (LIPITOR) tablet 40 mg  40 mg Oral QHS Jones Otoole MD   40 mg at 08/07/19 2205 Objective:  
 
Patient Vitals for the past 24 hrs: 
 BP Temp Pulse Resp SpO2 Weight 08/08/19 1641 149/85 97.9 °F (36.6 °C) 98 17 100 %   
08/08/19 1624 148/87 98 °F (36.7 °C) 97 17 100 %   
08/08/19 1610 145/80 96.9 °F (36.1 °C) 96 17 100 %   
08/08/19 1453 152/89 96 °F (35.6 °C) 93 22 98 %   
08/08/19 0849 157/81 98.1 °F (36.7 °C) (!) 107 20    
08/08/19 0417     92 %   
08/08/19 0325 124/76 97.4 °F (36.3 °C) 99 18 (!) 82 %   
08/08/19 0224      74.6 kg (164 lb 6.4 oz) 08/07/19 2308     97 %   
08/07/19 2035 145/82 97.1 °F (36.2 °C) (!) 102 18 95 %  Gen: Does not respond to commands HEENT: PERRL, Sclerae anicteric Cv: RRR without m/r/g Pulm: CTA bilaterally Abd: NABS, NTND, No HSM Ext: No c/c/e Available labs reviewed: 
Labs:   
Recent Results (from the past 24 hour(s)) GLUCOSE, POC Collection Time: 08/07/19  9:09 PM  
Result Value Ref Range Glucose (POC) 120 (H) 65 - 100 mg/dL Performed by Peter Hua CBC WITH AUTOMATED DIFF Collection Time: 08/08/19  2:33 AM  
Result Value Ref Range WBC 16.7 (H) 3.6 - 11.0 K/uL  
 RBC 2.24 (L) 3.80 - 5.20 M/uL HGB 6.6 (L) 11.5 - 16.0 g/dL HCT 21.6 (L) 35.0 - 47.0 % MCV 96.4 80.0 - 99.0 FL  
 MCH 29.5 26.0 - 34.0 PG  
 MCHC 30.6 30.0 - 36.5 g/dL  
 RDW 15.7 (H) 11.5 - 14.5 % PLATELET 585 (L) 513 - 400 K/uL NRBC 0.0 0  WBC ABSOLUTE NRBC 0.00 0.00 - 0.01 K/uL NEUTROPHILS 86 (H) 32 - 75 % LYMPHOCYTES 8 (L) 12 - 49 % MONOCYTES 3 (L) 5 - 13 % EOSINOPHILS 2 0 - 7 % BASOPHILS 0 0 - 1 % IMMATURE GRANULOCYTES 1 (H) 0.0 - 0.5 % ABS. NEUTROPHILS 14.4 (H) 1.8 - 8.0 K/UL ABS. LYMPHOCYTES 1.3 0.8 - 3.5 K/UL  
 ABS. MONOCYTES 0.5 0.0 - 1.0 K/UL  
 ABS. EOSINOPHILS 0.3 0.0 - 0.4 K/UL  
 ABS. BASOPHILS 0.0 0.0 - 0.1 K/UL  
 ABS. IMM. GRANS. 0.2 (H) 0.00 - 0.04 K/UL  
 DF SMEAR SCANNED    
 PLATELET COMMENTS Large Platelets RBC COMMENTS POLYCHROMASIA 1+ 
    
 RBC COMMENTS HYPOCHROMIA 1+ 
    
 RBC COMMENTS ANISOCYTOSIS 
1+ METABOLIC PANEL, BASIC Collection Time: 08/08/19  2:33 AM  
Result Value Ref Range Sodium 138 136 - 145 mmol/L Potassium 4.4 3.5 - 5.1 mmol/L Chloride 105 97 - 108 mmol/L  
 CO2 27 21 - 32 mmol/L Anion gap 6 5 - 15 mmol/L Glucose 72 65 - 100 mg/dL BUN 39 (H) 6 - 20 MG/DL Creatinine 0.90 0.55 - 1.02 MG/DL  
 BUN/Creatinine ratio 43 (H) 12 - 20 GFR est AA >60 >60 ml/min/1.73m2 GFR est non-AA >60 >60 ml/min/1.73m2 Calcium 8.9 8.5 - 10.1 MG/DL  
GLUCOSE, POC Collection Time: 08/08/19  6:36 AM  
Result Value Ref Range Glucose (POC) 61 (L) 65 - 100 mg/dL Performed by Kandi Burnham GLUCOSE, POC Collection Time: 08/08/19  6:54 AM  
Result Value Ref Range Glucose (POC) 146 (H) 65 - 100 mg/dL Performed by Brandy GEORGE + CROSSMATCH Collection Time: 08/08/19  9:24 AM  
Result Value Ref Range Crossmatch Expiration 08/11/2019 ABO/Rh(D) A POSITIVE Antibody screen NEG Unit number [id number] Blood component type RC LRIR Unit division 00 Status of unit ISSUED Crossmatch result Compatible GLUCOSE, POC Collection Time: 08/08/19 11:44 AM  
Result Value Ref Range Glucose (POC) 77 65 - 100 mg/dL Performed by Agatha Reddy, POC Collection Time: 08/08/19 12:18 PM  
Result Value Ref Range Glucose (POC) 134 (H) 65 - 100 mg/dL Performed by Lynn Lugo GLUCOSE, POC Collection Time: 08/08/19  4:25 PM  
Result Value Ref Range Glucose (POC) 101 (H) 65 - 100 mg/dL Performed by Ravindra Valles Assessment and Plan 71 y/o woman with classical Hodgkin's lymphoma, s/p ABVD X 3 1/2 cycles, course complicated by acute bleomycin toxicity, requiring prolonged rehab. Seen at [facility] and started on cellcept with the hope this may ameliorate her reduced lung function. Now admitted here with \"pneumonia\". Developed worsening level or arousal after a period of hypoxia. I told family that there was a chance she may have a recurrence of her HD in her brain and discussed an LP which they declined. Now asked to see for cytopenias. 1. Cytopenias: diff dx includes drug effect vs hodgkin's involvement. It is not clear to me if the cellcept is giving her any benefit and therefore, I have spoken with the family about stopping it and they agree. If the [facility] service feels strongly, I am happy to speak with them about starting back at a lower dose, using concurrent growth factors/etc., but cellcept is not a universally recognized treatment for this and is likely giving rise to at least part of her cytopenias. I am also curious about what benefit she is still receiving from cefepime and prednisone. I would favor stopping both, but defer to the primary and pulmonary service on that issue. If she does not have an improvement in her counts over the next 7 days, I would propose a BM biopsy to r/o Judi's involvement of her marrow. Case d/w family, questions answered to their satisfaction. Thank you for allowing us to participate in the care of this very pleasant patient. Lazarus Curlin, MD 
Hematology/Oncology Phone [phone]

## 2019-08-09 NOTE — PROGRESS NOTES
Hematology-Oncology Progress Note Cielo Veloz 1950 
[id number] 
8/9/2019 Subjective: No new developments. Patient non verbal. Not following commands for me. God daughter at bedside. Allergies: Patient has no known allergies. Current Facility-Administered Medications Medication Dose Route Frequency Provider Last Rate Last Dose  magnesium hydroxide (MILK OF MAGNESIA) 400 mg/5 mL oral suspension 30 mL  30 mL Oral DAILY PRN Malcolm Umanzor MD      
 bisacodyl (DULCOLAX) suppository 10 mg  10 mg Rectal DAILY PRN Malcolm Umanzor MD      
 0.9% sodium chloride infusion 250 mL  250 mL IntraVENous PRN Malcolm Umanzor MD      
 insulin glargine (LANTUS) injection 30 Units  30 Units SubCUTAneous QHS Malcolm Umanzor MD   Stopped at 08/08/19 2200  
 metoprolol tartrate (LOPRESSOR) tablet 25 mg  25 mg Oral Q12H Iman Polo MD   25 mg at 08/08/19 2118  metFORMIN (GLUCOPHAGE) tablet 500 mg  500 mg Oral BID WITH MEALS Malcolm Umanzor MD   500 mg at 08/09/19 5562  predniSONE (DELTASONE) tablet 40 mg  40 mg Per NG tube DAILY WITH BREAKFAST Sung JANA Dodge   40 mg at 08/09/19 2963  white petrolatum-mineral oil (SOOTHE NIGHT TIME) 80-20 % ophthalmic ointment   Both Eyes PRN Malcolm Umanzor MD   1 Each at 08/04/19 2234  apixaban (ELIQUIS) tablet 5 mg  5 mg Oral BID Malcolm Umanzor MD   5 mg at 08/08/19 1923  
 insulin lispro (HUMALOG) injection   SubCUTAneous Q6H Malcolm Umanzor MD   Stopped at 08/08/19 0000  cefepime (MAXIPIME) 2 g in 0.9% sodium chloride (MBP/ADV) 100 mL  2 g IntraVENous Q12H Scott Bansal  mL/hr at 08/09/19 0308 2 g at 08/09/19 0308  thiamine HCL (B-1) tablet 100 mg  100 mg Oral DAILY Malcolm Umanzor MD   100 mg at 08/08/19 9956  multivitamin, tx-iron-ca-min (THERA-M w/ IRON) tablet 1 Tab  1 Tab Oral DAILY Iman Polo MD   1 Tab at 08/08/19 9253  hydrALAZINE (APRESOLINE) 20 mg/mL injection 10 mg  10 mg IntraVENous Q6H PRN Audrey Castro MD   Stopped at 08/01/19 9172  amantadine HCl (SYMMETREL) capsule 100 mg  100 mg Oral BID Kim Infante MD   100 mg at 08/08/19 1922  
 glucose chewable tablet 16 g  4 Tab Oral PRN Jason Yoo MD      
 dextrose (D50W) injection syrg 12.5-25 g  12.5-25 g IntraVENous PRN Jason AMEZCUA MD   25 g at 08/08/19 2115  
 glucagon (GLUCAGEN) injection 1 mg  1 mg IntraMUSCular PRN Jason Yoo MD      
 sodium chloride (NS) flush 5-10 mL  5-10 mL IntraVENous PRN Concepcion Bishop MD   10 mL at 08/03/19 1741  
 sodium chloride (NS) flush 5-40 mL  5-40 mL IntraVENous Q8H Jo Ann Jiménez MD   10 mL at 08/09/19 0600  
 sodium chloride (NS) flush 5-40 mL  5-40 mL IntraVENous PRN Jo Ann Jiménez MD      
 acetaminophen (TYLENOL) tablet 650 mg  650 mg Oral Q4H PRN Jo Ann Jiménez MD      
 lactobac ac& pc-s.therm-b.anim (JUNI Q/RISAQUAD)  1 Cap Oral DAILY Jo Ann Jiménez MD   1 Cap at 08/08/19 8595  atorvastatin (LIPITOR) tablet 40 mg  40 mg Oral QHS Jo Ann Jiménez MD   40 mg at 08/08/19 2118 Objective:  
 
Patient Vitals for the past 24 hrs: 
 BP Temp Pulse Resp SpO2 Weight 08/09/19 0435 (!) 168/92 97.7 °F (36.5 °C) (!) 115 18 (!) 85 % 77.8 kg (171 lb 8 oz) 08/09/19 0009 149/77       
08/08/19 2001 (!) 169/102 98.3 °F (36.8 °C) 96 18 99 %   
08/08/19 1816 135/79 97.9 °F (36.6 °C) 95 17 100 %   
08/08/19 1714 145/80 97.9 °F (36.6 °C) 97 17 100 %   
08/08/19 1657 147/80 97.9 °F (36.6 °C) 98 17 100 %   
08/08/19 1641 149/85 97.9 °F (36.6 °C) 98 17 100 %   
08/08/19 1624 148/87 98 °F (36.7 °C) 97 17 100 %   
08/08/19 1610 145/80 96.9 °F (36.1 °C) 96 17 100 %   
08/08/19 1453 152/89 96 °F (35.6 °C) 93 22 98 %  Gen: Non verbal. Not following commands. HEENT: PERRL, Sclerae anicteric Cv: RRR without m/r/g Pulm: CTA bilaterally Abd: NABS, NTND, No HSM Ext: No c/c/e Available labs reviewed: 
Labs:   
Recent Results (from the past 24 hour(s)) TYPE + CROSSMATCH Collection Time: 08/08/19  9:24 AM  
Result Value Ref Range Crossmatch Expiration 08/11/2019 ABO/Rh(D) A POSITIVE Antibody screen NEG Unit number [id number] Blood component type RC LRIR Unit division 00 Status of unit TRANSFUSED Crossmatch result Compatible GLUCOSE, POC Collection Time: 08/08/19 11:44 AM  
Result Value Ref Range Glucose (POC) 77 65 - 100 mg/dL Performed by Gustavo Cruz, POC Collection Time: 08/08/19 12:18 PM  
Result Value Ref Range Glucose (POC) 134 (H) 65 - 100 mg/dL Performed by Isaura Cardoza GLUCOSE, POC Collection Time: 08/08/19  4:25 PM  
Result Value Ref Range Glucose (POC) 101 (H) 65 - 100 mg/dL Performed by Juan Pablo Diallo GLUCOSE, POC Collection Time: 08/08/19  9:10 PM  
Result Value Ref Range Glucose (POC) 77 65 - 100 mg/dL Performed by Cali Lopez, POC Collection Time: 08/08/19  9:34 PM  
Result Value Ref Range Glucose (POC) 146 (H) 65 - 100 mg/dL Performed by Madan Hinton   
CBC WITH AUTOMATED DIFF Collection Time: 08/09/19  3:13 AM  
Result Value Ref Range WBC 17.7 (H) 3.6 - 11.0 K/uL  
 RBC 2.73 (L) 3.80 - 5.20 M/uL HGB 7.9 (L) 11.5 - 16.0 g/dL HCT 24.7 (L) 35.0 - 47.0 % MCV 90.5 80.0 - 99.0 FL  
 MCH 28.9 26.0 - 34.0 PG  
 MCHC 32.0 30.0 - 36.5 g/dL  
 RDW 17.3 (H) 11.5 - 14.5 % PLATELET 911 (L) 325 - 400 K/uL MPV 13.0 (H) 8.9 - 12.9 FL  
 NRBC 0.0 0  WBC ABSOLUTE NRBC 0.00 0.00 - 0.01 K/uL NEUTROPHILS 89 (H) 32 - 75 % LYMPHOCYTES 8 (L) 12 - 49 % MONOCYTES 3 (L) 5 - 13 % EOSINOPHILS 0 0 - 7 % BASOPHILS 0 0 - 1 % IMMATURE GRANULOCYTES 0 %  
 ABS. NEUTROPHILS 15.8 (H) 1.8 - 8.0 K/UL  
 ABS. LYMPHOCYTES 1.4 0.8 - 3.5 K/UL  
 ABS. MONOCYTES 0.5 0.0 - 1.0 K/UL  
 ABS. EOSINOPHILS 0.0 0.0 - 0.4 K/UL ABS. BASOPHILS 0.0 0.0 - 0.1 K/UL  
 ABS. IMM. GRANS. 0.0 K/UL  
 DF MANUAL PLATELET COMMENTS Large Platelets RBC COMMENTS ANISOCYTOSIS 
1+ METABOLIC PANEL, BASIC Collection Time: 08/09/19  3:13 AM  
Result Value Ref Range Sodium 138 136 - 145 mmol/L Potassium 4.6 3.5 - 5.1 mmol/L Chloride 105 97 - 108 mmol/L  
 CO2 25 21 - 32 mmol/L Anion gap 8 5 - 15 mmol/L Glucose 83 65 - 100 mg/dL BUN 41 (H) 6 - 20 MG/DL Creatinine 0.85 0.55 - 1.02 MG/DL  
 BUN/Creatinine ratio 48 (H) 12 - 20 GFR est AA >60 >60 ml/min/1.73m2 GFR est non-AA >60 >60 ml/min/1.73m2 Calcium 9.0 8.5 - 10.1 MG/DL  
GLUCOSE, POC Collection Time: 08/09/19  6:35 AM  
Result Value Ref Range Glucose (POC) 89 65 - 100 mg/dL Performed by Agustin Everett Assessment and Plan  
 
69 y/o woman with a h/o classical Hodgkin's lymphoma, complicated by acute bleomycin toxicity during treatment after d1c4. Seen briefly at [facility] and started on cellcept in the hopes of curbing the pulmonary damage from bleomycin. Now admitted here with \"pneumonia\" and FTT. Has had worsening level or arousal with extensive evaluation by Neurology here. Asked to see eventually about level or arousal issues and I spoke about the remote possibility of lymphomatous involvement of the brain. Family declined LP. Now re-consuled for cytopenias. I recommended holding cellcept. 1. Cytopenias: Appropriate rise of HGB after transfusion. No real change in WBC or PLT. Too early to see a benefit from holding cellcept. 2. Hodgkin's lymphoma: Not currently a candidate for treatment in my opinion. 3. Goals of care: plan is for family meeting at 8 AM to define goals of care. 4. 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 Thank you for allowing us to participate in the care of this very pleasant patient. Nedra Santiago MD 
Hematology/Oncology Phone [phone]

## 2019-08-09 NOTE — PROGRESS NOTES
GI note: 
 
Hold Eliquis starting on Sunday. Dr. Willy Jaffe to follow-up on Monday. Dr. Monica Velasquez

## 2019-08-09 NOTE — PROGRESS NOTES
Music Therapy Assessment Piotr Williamson [address] [facility] [id number]    
1950  70 y.o.  female Patient Telephone Number: [phone] (home) Gnosticism Affiliation: [facility]  
Language: [place] Patient Active Problem List  
 Diagnosis Date Noted  Chronic hypoxemic respiratory failure ([facility] 07/16/2019 Priority: 3 - Three  Bleomycin lung toxicity 07/16/2019 Priority: 4 - Four  Encephalopathy 07/31/2019  Pneumonia 07/15/2019  Acute respiratory failure ([address] 02/03/2019  Leg swelling 02/03/2019  Hodgkin lymphoma ([facility] 02/03/2019  CAP (community acquired pneumonia) 02/03/2019  Lymphadenopathy 08/24/2018  Supraclavicular mass 08/14/2018  Overweight (BMI 25.0-29.9) 01/30/2018  Diabetes mellitus type 2, controlled ([facility] 09/08/2016  Dyslipidemia 09/08/2016  SVT (supraventricular tachycardia) ([facility] 08/29/2016  Essential hypertension with goal blood pressure less than 140/90 08/29/2016 Date: 8/9/2019            Total Time (in minutes): 35          [facility] 2N MED SURG Mental Status:   [x] Alert -with her eyes thinly open [  ] Sanjana Osgood [  ]  Confused  [  ] Minimally responsive  [  ] Sleeping Communication Status: [  ] Impaired Speech [  ] Nonverbal -N/A Physical Status:   [x] Oxygen in use  [  ] Hard of Hearing [  ] Vision Impaired [  ] Ambulatory  [  ] Ambulatory with assistance [  ] Non-ambulatory Music Preferences, Background: Pt's family said pt listens to all kinds of music. Pt's sister shared that their uncle was a  for musicians including Jeramy Joon and [employer]. Clinical Problem addressed: Support relaxation, improve mood, support healthy pt/family coping. Goal(s) met in session: 
Physical/Pain management (Scale of 1-10): Pre-session rating: Pt was unable to report on pain, no pain indicators were observed. Post-session rating: No pain indicators were observed. [x] Increased relaxation   [x] Affected breathing patterns [  ] Decreased muscle tension   [  ] Decreased agitation [  ] Affected heart rate    [  ] Increased alertness Emotional/Psychological: 
[x] Increased self-expression   [  ] Decreased aggressive behavior [  ] Decreased feelings of stress  [  ] Discussed healthy coping skills [x] Improved mood    [  ] Decreased withdrawn behavior Social: 
[  ] Decreased feelings of isolation/loneliness [x] Positive social interaction  
[x] Provided support and/or comfort for family/friends Spiritual: 
[x] Spiritual support    [  ] Expressed peace [  ] Expressed viri    [  ] Discussed beliefs Techniques Utilized (Check all that apply):  
[  ] Procedural support MT [x] Music for relaxation [  ] Patient preferred music 
[  ] Aixa analysis  [x] Song choice  [  ] Music for validation 
[x] Entrainment  [  ] Movement to music [  ] Guided visualization [  ] Thao Kalpesh  [  ] Patient instrument playing [  ] OwlTing ???f writing 
[x] Sing along - mouthing [  ] Improvisation  [x] Sensory stimulation [  ] Active Listening  [  ] Music for spiritual support [  ] Making of CDs as gifts Session Observations: F/up visit; Patient (pt) was alert with flat affect and with eyes thinly open lying in bed. Pt's niece was at bedside. This music therapy intern (MT intern) asked pt's niece how she was feeling and about pt. Pt's niece shared that today is the first day that pt has increased alertness. Pt's niece requested to hear songs that are relaxing. MT intern used the entrainment of matching the tempo of the music to pt's current breathing patten and then gradually altering the music to work toward the desired change in regulating breathing pattern. MT intern sang and played the Illene He and the [employer] with guayesha.  Pt increased alertness and positive social interaction in response to the music as evidenced by (AEB) having her eyes widely open at times and making eye contact with MT intern. MT intern sang and played the [employer] with guitar. Pt increased relaxation in response to the music AEB breathing more deeply. Pt's niece chose to hear an uplifting spiritual music. MT intern sang and played the hymns Love Lifted Me and Blessed Assurance with guitar. Pt's affect brightened and she increased self-expression in response to the music AEB mouthing along at times and having some tears in her eyes. Pt's niece chose to hear the hymn [facility], which MT intern sang and played. Pt increased relaxation in response to this as evidenced by (AEB) closing her eyes. Pt's breathing pattern appeared to become more deep and relaxed in response to each songs. MT intern softly said goodbye and gently touched on pt's arm at the conclusion of the session. Pt smiled, nodded her head, and made eye contact with MT intern in response to this. Pt's niece expressed enjoyment in the music and gratitude for the visit. Marti \"Floresita\" Romario Villeda Music Therapy Intern Spiritual Care Department Referral-based service

## 2019-08-09 NOTE — ROUTINE PROCESS
HYPOGLYCEMIC EPISODE DOCUMENTATION Patient with hypoglycemic episode(s) at 11:15(time) on 8/9/2019(date). BG value(s) pre-treatment 67 Was patient symptomatic? [] yes, [x] no Patient was treated with the following rescue medications/treatments: [] D50 [] Glucose tablets [x] Glucagon 
              [] 4oz juice 
              [] 6oz reg soda 
              [] 8oz low fat milk BG value post-treatment: 134 Once BG treated and value greater than 80mg/dl, pt was provided with the following: 
[] snack 
[] meal 
Name of MD notified:Noé The following orders were received: per protocol

## 2019-08-09 NOTE — DIABETES MGMT
[facility] Progress Note Recommendations/ Comments: Chart review for hypoglycemia - BG 67 mg/dL today @ 1117 in spite of Lantus being held last night Noted BG 61 mg/dL in AM and 77 mg/dl @1144 on 8/8/2019 Noted tentative plans for PEG placement 8/12/2109 depending on outcome of family meeting Higher risk hypoglycemia once NPO for surgery If appropriate please consider D/C Lantus Decrease Metformin 500 mg to daily 
change lispro correction scale to normal  sensitivity Current hospital DM medication:  
Lantus 30 units at bedtime (held last night) Lispro insulin resistant scale with instruction up to BS >451 mg/dl; Metformin 500 mg BID Chart reviewed on Belkis Taylor. Patient is a 70 y.o. female with known  Type 2 Diabetes on oral agent (monotherapy): metformin (generic) at home. DNR 
 
A1c:  
Lab Results Component Value Date/Time Hemoglobin A1c 6.9 (H) 08/03/2018 01:45 PM  
 Hemoglobin A1c 6.8 (H) 01/30/2018 01:25 PM  
 
 
Recent Glucose Results:  
Lab Results Component Value Date/Time GLU 83 08/09/2019 03:13 AM  
 GLUCPOC 134 (H) 08/09/2019 12:00 PM  
 GLUCPOC 67 08/09/2019 11:17 AM  
 GLUCPOC 89 08/09/2019 06:35 AM  
  
 
Lab Results Component Value Date/Time Creatinine 0.85 08/09/2019 03:13 AM  
 
Estimated Creatinine Clearance: 65.3 mL/min (based on SCr of 0.85 mg/dL). Active Orders Diet DIET NPO With Tube Feedings PO intake:  
No data found. Will continue to follow as needed. Thank you Griselda Pikes RN, CDE Time spent: 7 min

## 2019-08-09 NOTE — PROGRESS NOTES
Bedside shift change report given to Albin Galo (oncoming nurse) by Andrzej Perez RN (offgoing nurse). Report included the following information SBAR, Kardex and MAR.

## 2019-08-09 NOTE — PROGRESS NOTES
HYPOGLYCEMIC EPISODE DOCUMENTATION Patient with hypoglycemic episode(s) at 1650(time) on 8/9/2019(date). BG value(s) pre-treatment 65 Was patient symptomatic? [] yes, [x] no Patient was treated with the following rescue medications/treatments: [x] D50 [] Glucose tablets 
              [] Glucagon 
              [] 4oz juice 
              [] 6oz reg soda 
              [] 8oz low fat milk BG value post-treatment: 118 Once BG treated and value greater than 80mg/dl, pt was provided with the following: 
[] snack 
[] meal 
Name of MD notified:Noé The following orders were received: per protocol

## 2019-08-09 NOTE — PROGRESS NOTES
Palliative Medicine Consult Rakan: [phone] ([phone]) Patient Name: Michael Lai YOB: 1950 Date of Initial Consult: July 17, 2019 Reason for Consult: Care Decisions Requesting Provider: Shell Montiel Primary Care Physician: Alayna Sim MD 
 
 SUMMARY:  
Michael Lai is a 70 y.o. with a past history of Lymphoma (chemo) followed at [facility], HTN, fibromyalgia, muscle atrophy, pulmonary fibrosis, asthma, cataracts, DM, chronic respiratory failure, recurrent pna, sinus infection,  who was admitted on 7/15/2019 from [place] with a diagnosis of PNA, acute on chronic hypoxemic respiratory failure, bleomycin lung toxicity. Pt had 7 rounds of chemo and then developed significant side effects. DNR with Hospice services at the facility [facility]) revoked benefit for hospitalization. Current medical issues leading to Palliative Medicine involvement include: support with care decisions. Social: single, no children, well supported by brother and sister, moved to [place] from [place] in [address] [place] [address] to work as the Director of International Studies at [employer] before transitioning to a position  for [employer], [employer], has not been home in 6 months as she has been in and out of facilities for rehab and care. Interval History: 
7/18/19: hypoxia and tachycardia with removal of mask for brief period. Pul to start cellcept with steroids to see if she responds. S/s due to disease progression not pneumonia Pt declined intubation for respiratory distress if needed. 7/24/19: continues cellcept therapy. Still on hi flow 7/29/29: pt with acute mental change over the weekend. Suspect seizures but eeg neg,  MRI without contrast neg. Catatonic state 8/1/19: on nasal cannula, somnolent state for close to 24 hours, TF started today 8/9:  Resting comfortably. On 4L NC. Taken off Cellcept yesterday due to cytopenias.    
 
 PALLIATIVE DIAGNOSES:  
 1. Encephalopathy - metabolic vs akinetic mutism from cerebral hypoxia 2. Chronic hypoxic respiratory failure dt bleomycin lung toxicity 3. Feeding difficulties - dobhoff with enteral feeds 4. Debility PLAN:  
1. Followed up on 2N today. Niece Raheem Casper at bedside maintaining briceño. 2. Raheem Casper has been appreciating increased response of her aunt in the form of what she calls \"anxiety attacks\" - described as periods of increased respirations, restlessness of limb in response to conversation topics which are disheartening. Raheem Casper works to soothe her and activity aborts within minutes. 3. At time of my assessment, her eyes were closed and she lacked meaningful response, no command following. She did exhibit some brief twitching in her face. 4. Last documented periods of alertness (10-15 min) on 7/31. 
5. Heme/onc reconsulted yesterday for cytopenias - Cellcept stopped. 6. Plan is for fam meeting Mon 10 am to discuss PEG risks/benefits in more depth and in person with pt's sister and brother. Anticipate family will want to proceed with a PEG therefore [facility] anticipation of placement on Tues. 7. Will continue to follow; please call with questions. 8. Communicated plan of care with: Palliative Andie LISA Team 
 
 GOALS OF CARE / TREATMENT PREFERENCES:  
 
GOALS OF CARE: 
Patient/Health Care Proxy Stated Goals: Prolong life TREATMENT PREFERENCES:  
Code Status: DNR Advance Care Planning: 
[x] [facility] Interdisciplinary Team has updated the ACP Navigator with Josy and Patient Capacity Siblings Peyton Sanders and Noni Hutchinson Medical Interventions: Limited additional interventions Other Instructions:  
Artificially Administered Nutrition: Feeding tube for a defined trial period Other: As far as possible, the palliative care team has discussed with patient / health care proxy about goals of care / treatment preferences for patient. HISTORY:  
 
History obtained from: chart, family CHIEF COMPLAINT: pt admitted with aforementioned issues HPI/SUBJECTIVE: The patient is:  
[] Verbal and participatory [x] Non-participatory due to: Unresponsive Clinical Pain Assessment (nonverbal scale for severity on nonverbal patients):  
Clinical Pain Assessment Severity: 0 Activity (Movement): Lying quietly, normal position Duration: for how long has pt been experiencing pain (e.g., 2 days, 1 month, years) Frequency: how often pain is an issue (e.g., several times per day, once every few days, constant) FUNCTIONAL ASSESSMENT:  
 
Palliative Performance Scale (PPS): PPS: 10 PSYCHOSOCIAL/SPIRITUAL SCREENING:  
 
Palliative IDT has assessed this patient for cultural preferences / practices and a referral made as appropriate to needs (Cultural Services, Patient Advocacy, Ethics, etc.) Any spiritual / Buddhist concerns: 
[] Yes /  [x] No 
 
Caregiver Burnout: 
[] Yes /  [x] No /  [] No Caregiver Present Anticipatory grief assessment:  
[x] Normal  / [] Maladaptive ESAS Anxiety: Anxiety: 0 
 
ESAS Depression:    
 
 
 REVIEW OF SYSTEMS:  
 
Positive and pertinent negative findings in ROS are noted above in HPI. The following systems were [x] reviewed / [] unable to be reviewed as noted in HPI Other findings are noted below. Systems: constitutional, ears/nose/mouth/throat, respiratory, gastrointestinal, genitourinary, musculoskeletal, integumentary, neurologic, psychiatric, endocrine. Positive findings noted below. Modified ESAS Completed by: provider Fatigue: 7 Drowsiness: 0 Pain: 0 Anxiety: 0 Nausea: 0 Anorexia: 5 Dyspnea: 0 Stool Occurrence(s): 1 PHYSICAL EXAM:  
 
From RN flowsheet: 
Wt Readings from Last 3 Encounters:  
08/09/19 77.8 kg (171 lb 8 oz) 02/09/19 108.2 kg (238 lb 8.6 oz) 01/21/19 81.6 kg (180 lb) Blood pressure 154/87, pulse (!) 105, temperature 97.7 °F (36.5 °C), resp. rate 18, height 5' 9\" (1.753 m), weight 77.8 kg (171 lb 8 oz), SpO2 95 %. Pain Scale 1: Adult Nonverbal Pain Scale Pain Intensity 1: 0 Pain Intervention(s) 1: Medication (see MAR) Last bowel movement, if known:  
 
Constitutional: chronically ill appearing Eyes: pupils equal, anicteric, eyes closed ENMT: no nasal discharge, moist mucous membranes, dobhoff tube in nare with enteral feeds running Cardiovascular:distal pulses intact, leg swelling Respiratory: breathing not  labored with NC oxygen, symmetric bs anteriorly Gastrointestinal: soft non-tender, +bowel sounds Musculoskeletal: no deformity, no tenderness to palpation, atrophy Skin: warm, dry Neurologic: lying still, not responsive. Brief twitching around mouth seen. No agitation or restlessness. HISTORY:  
 
Active Problems: 
  Chronic hypoxemic respiratory failure ([address] (7/16/2019) Bleomycin lung toxicity (7/16/2019) Hodgkin lymphoma ([address] (2/3/2019) Encephalopathy (7/31/2019) Past Medical History:  
Diagnosis Date  Asthma   
 as a child, nothing since menopause  Cancer ([address] 08/21/2018 Non-Hodgkin's  Cataracts, bilateral   
 Diabetes ([address] type II  
 Environmental allergies  Fibromyalgia Treated with alternate medical therapy  Hypertension Past Surgical History:  
Procedure Laterality Date  HX BREAST BIOPSY Right 2006  HX COLONOSCOPY  2015  HX GYN Pap 2015  HX ORTHOPAEDIC    
 fractured R ankle/ no surgery  HX OTHER SURGICAL Right 08/27/2018  
 excision of deep neck cervical LN. Family History Problem Relation Age of Onset  Heart Disease Mother  Cancer Father   
     prostate cancer  Cancer Sister Breast cancer apparent DCIS  
 Heart Disease Brother Brother  from congestive heart failure secondary to severe mitral disease History reviewed, no pertinent family history. Social History Tobacco Use  Smoking status: Never Smoker  Smokeless tobacco: Never Used Substance Use Topics  Alcohol use: Yes Comment: socially No Known Allergies Current Facility-Administered Medications Medication Dose Route Frequency  magnesium hydroxide (MILK OF MAGNESIA) 400 mg/5 mL oral suspension 30 mL  30 mL Oral DAILY PRN  
 bisacodyl (DULCOLAX) suppository 10 mg  10 mg Rectal DAILY PRN  
 0.9% sodium chloride infusion 250 mL  250 mL IntraVENous PRN  
 insulin glargine (LANTUS) injection 30 Units  30 Units SubCUTAneous QHS  metoprolol tartrate (LOPRESSOR) tablet 25 mg  25 mg Oral Q12H  
 metFORMIN (GLUCOPHAGE) tablet 500 mg  500 mg Oral BID WITH MEALS  predniSONE (DELTASONE) tablet 40 mg  40 mg Per NG tube DAILY WITH BREAKFAST  white petrolatum-mineral oil (SOOTHE NIGHT TIME) 80-20 % ophthalmic ointment   Both Eyes PRN  
 apixaban (ELIQUIS) tablet 5 mg  5 mg Oral BID  insulin lispro (HUMALOG) injection   SubCUTAneous Q6H  
 cefepime (MAXIPIME) 2 g in 0.9% sodium chloride (MBP/ADV) 100 mL  2 g IntraVENous Q12H  thiamine HCL (B-1) tablet 100 mg  100 mg Oral DAILY  multivitamin, tx-iron-ca-min (THERA-M w/ IRON) tablet 1 Tab  1 Tab Oral DAILY  hydrALAZINE (APRESOLINE) 20 mg/mL injection 10 mg  10 mg IntraVENous Q6H PRN  
 amantadine HCl (SYMMETREL) capsule 100 mg  100 mg Oral BID  
 glucose chewable tablet 16 g  4 Tab Oral PRN  
 dextrose (D50W) injection syrg 12.5-25 g  12.5-25 g IntraVENous PRN  
 glucagon (GLUCAGEN) injection 1 mg  1 mg IntraMUSCular PRN  
 sodium chloride (NS) flush 5-10 mL  5-10 mL IntraVENous PRN  
 sodium chloride (NS) flush 5-40 mL  5-40 mL IntraVENous Q8H  
 sodium chloride (NS) flush 5-40 mL  5-40 mL IntraVENous PRN  
 acetaminophen (TYLENOL) tablet 650 mg  650 mg Oral Q4H PRN  
  meg ac& pc-seddiebFrannyanim (JUNI Q/RISAQUAD)  1 Cap Oral DAILY  atorvastatin (LIPITOR) tablet 40 mg  40 mg Oral QHS  
 
 
 
 LAB AND IMAGING FINDINGS:  
 
Lab Results Component Value Date/Time WBC 17.7 (H) 08/09/2019 03:13 AM  
 HGB 7.9 (L) 08/09/2019 03:13 AM  
 PLATELET 628 (L) 71/57/1703 03:13 AM  
 
Lab Results Component Value Date/Time Sodium 138 08/09/2019 03:13 AM  
 Potassium 4.6 08/09/2019 03:13 AM  
 Chloride 105 08/09/2019 03:13 AM  
 CO2 25 08/09/2019 03:13 AM  
 BUN 41 (H) 08/09/2019 03:13 AM  
 Creatinine 0.85 08/09/2019 03:13 AM  
 Calcium 9.0 08/09/2019 03:13 AM  
 Magnesium 2.5 (H) 08/04/2019 09:25 AM  
 Phosphorus 2.9 08/02/2019 05:02 AM  
  
Lab Results Component Value Date/Time AST (SGOT) 14 (L) 07/23/2019 04:07 AM  
 Alk. phosphatase 151 (H) 07/23/2019 04:07 AM  
 Protein, total 6.6 07/23/2019 04:07 AM  
 Albumin 2.6 (L) 07/23/2019 04:07 AM  
 Globulin 4.0 07/23/2019 04:07 AM  
 
Lab Results Component Value Date/Time INR 1.4 (H) 07/28/2019 12:16 PM  
 Prothrombin time 14.1 (H) 07/28/2019 12:16 PM  
 aPTT 30.2 08/27/2018 08:15 AM  
  
Lab Results Component Value Date/Time Iron 24 (L) 07/16/2019 01:24 AM  
 TIBC 200 (L) 07/16/2019 01:24 AM  
 Iron % saturation 12 (L) 07/16/2019 01:24 AM  
  
No results found for: PH, PCO2, PO2 No components found for: [place] No results found for: CPK, CKMB Total time:  
Counseling / coordination time, spent as noted above:  
> 50% counseling / coordination?:  
 
Prolonged service was provided for  []30 min   []75 min in face to face time in the presence of the patient, spent as noted above. Time Start:  
Time End:  
Note: this can only be billed with 36599 (initial) or 94730 (follow up). If multiple start / stop times, list each separately.

## 2019-08-09 NOTE — PROGRESS NOTES
Hospital Progress Note NAME:  Hayward Duane :   1950 MRN:  [id number] Date/Time:  2019 8:25 AM 
 
Plan: 1. Ng tube feedings - 
2. titrate lantlus 3. cellcept / prednisone 4. Family discussions with palliative care planned for Monday at [facility] 
5. Discussed with daughter via phone and god-daughter in room 6. Hold eliquis  7. D/c cefepime today 8. D/w god daughter at bedside 9. Hematology opinion appreciated Risk of Deterioration: Low  []           Moderate  []           High  [x] Assessment:  
Principal Problem: 
  Pneumonia (7/15/2019) Active Problems: 
   Acute encephalopathy: 
-unclear etiology - transiently awaken with appropriate conversation 
-seizure was suspected but  EEG did not confirm 
-MRI was negative for stroke,CT head and neck - no bleed,vascular occlusion. -ABG reviewed. Electrolytes,ammonia,trop,lactic acid wnl 
-Neurology evaluated 
-MRI brain w contrast  - no acute process. -EEG neg 
-May need LP for cytology as she has history of hodgkin's lymphoma - family declined 
-Oncology evaluated Acute on Chronic hypoxemic respiratory failure ([address] (2019) On nasal canula Bleomycin lung toxicity (2019) 
   cellcept and steroids Diabetes mellitus type 2, controlled ([address] (2016) 
   basal bolus insulin Hodgkin lymphoma ([facility] (2/3/2019) bulky stage II HD (classical),s/p treatment earlier this year by Dr. Maria Guadalupe Calixto with ABVD. Course truncated due to abrupt onset bleomycin toxicity. Diff dx includes lymphangitic spread of lymphoma. She completed 3 1/2 treatments of ABVD, less than the standard 6. oncol raised the potential concern for recurrent disease with her family. UTI -  ENTEROCOCCUS SPECIES Treated UTI - ESCHERICHIA COLI 19 Cefepime  [de-identified] notes:68 y.o. female with past medical history significant for htn, fibromyalgia, muscle atrophy, pulmonary fibrosis, asthma, b/l cataracts, dm, non-hodgkin's, who presents from ems with chief complaint of sob. Pt has \"last night\" onset of worsening sob described as \"labored breathing\" that's continued into this morning. She is normally on 4.5 L of O2 n/c at baseline, but had it increased to 5 L by staff. She also endorses a dry non-productive cough and fever (peak 100) at this time. EMS arrived and measured initial O2 sat of 92% and HR of 140 bpm. Denies pleuritic cp and abd pain. Of note, she has had PNA \"x3 times since Ken" and was treated last week with a z-pack for sinus infection symptomatic of congestion. DNR form in place. EMS was called to [place];  bpm, 124/78, and 9 Subjective/interium history Unresponsive at time of my exam  
11 Point Review of Systems:  
Negative except [x]            Unable to obtain ROS due to:      
[x]            mental status change []            sedated []            intubated Social History Tobacco Use  Smoking status: Never Smoker  Smokeless tobacco: Never Used Substance Use Topics  Alcohol use: Yes Comment: socially Medications reviewed: 
Current Facility-Administered Medications Medication Dose Route Frequency  magnesium hydroxide (MILK OF MAGNESIA) 400 mg/5 mL oral suspension 30 mL  30 mL Oral DAILY PRN  
 bisacodyl (DULCOLAX) suppository 10 mg  10 mg Rectal DAILY PRN  
 0.9% sodium chloride infusion 250 mL  250 mL IntraVENous PRN  
 insulin glargine (LANTUS) injection 30 Units  30 Units SubCUTAneous QHS  metoprolol tartrate (LOPRESSOR) tablet 25 mg  25 mg Oral Q12H  
 metFORMIN (GLUCOPHAGE) tablet 500 mg  500 mg Oral BID WITH MEALS  predniSONE (DELTASONE) tablet 40 mg  40 mg Per NG tube DAILY WITH BREAKFAST  white petrolatum-mineral oil (SOOTHE NIGHT TIME) 80-20 % ophthalmic ointment   Both Eyes PRN  
 apixaban (ELIQUIS) tablet 5 mg  5 mg Oral BID  
  insulin lispro (HUMALOG) injection   SubCUTAneous Q6H  
 cefepime (MAXIPIME) 2 g in 0.9% sodium chloride (MBP/ADV) 100 mL  2 g IntraVENous Q12H  thiamine HCL (B-1) tablet 100 mg  100 mg Oral DAILY  multivitamin, tx-iron-ca-min (THERA-M w/ IRON) tablet 1 Tab  1 Tab Oral DAILY  hydrALAZINE (APRESOLINE) 20 mg/mL injection 10 mg  10 mg IntraVENous Q6H PRN  
 amantadine HCl (SYMMETREL) capsule 100 mg  100 mg Oral BID  
 glucose chewable tablet 16 g  4 Tab Oral PRN  
 dextrose (D50W) injection syrg 12.5-25 g  12.5-25 g IntraVENous PRN  
 glucagon (GLUCAGEN) injection 1 mg  1 mg IntraMUSCular PRN  
 sodium chloride (NS) flush 5-10 mL  5-10 mL IntraVENous PRN  
 sodium chloride (NS) flush 5-40 mL  5-40 mL IntraVENous Q8H  
 sodium chloride (NS) flush 5-40 mL  5-40 mL IntraVENous PRN  
 acetaminophen (TYLENOL) tablet 650 mg  650 mg Oral Q4H PRN  
 lactobac ac& pc-s.therm-b.anim (JUNI Q/RISAQUAD)  1 Cap Oral DAILY  atorvastatin (LIPITOR) tablet 40 mg  40 mg Oral QHS Objective:  
Vitals: 
Visit Vitals BP (!) 168/92 (BP 1 Location: Left arm, BP Patient Position: At rest) Pulse (!) 115 Temp 97.7 °F (36.5 °C) Resp 18 Ht 5' 9\" (1.753 m) Wt 171 lb 8 oz (77.8 kg) SpO2 (!) 85% BMI 25.33 kg/m² Temp (24hrs), Av.7 °F (36.5 °C), Min:96 °F (35.6 °C), Max:98.3 °F (36.8 °C) O2 Flow Rate (L/min): 4 l/min O2 Device: Nasal cannula Last 24hr Input/Output: 
 
Intake/Output Summary (Last 24 hours) at 2019 0825 Last data filed at 2019 0730 Gross per 24 hour Intake 160 ml Output 600 ml Net -440 ml PHYSICAL EXAM: 
General:    Unresponsive, no distress, appears stated age. Head:   Normocephalic, without obvious abnormality, atraumatic. Lungs:   Decreased bs with rales. Chest wall:  No tenderness or deformity. No Accessory muscle use. Heart:   Regular rate and rhythm,  no murmur, rub or gallop. Abdomen:   Soft, non-tender. Not distended. Bowel sounds normal. No masses. Lab Data Reviewed: 
 
Recent Labs 08/09/19 
1629 08/08/19 
7289 08/07/19 
6037 WBC 17.7* 16.7* 18.3* HGB 7.9* 6.6* 7.0*  
HCT 24.7* 21.6* 22.3*  
* 107* 117* Recent Labs 08/09/19 
8260 08/08/19 
0204 08/07/19 
1476  138 140  
K 4.6 4.4 4.4  105 106 CO2 25 27 29 GLU 83 72 183* BUN 41* 39* 42* CREA 0.85 0.90 0.81 CA 9.0 8.9 8.8 Lab Results Component Value Date/Time Glucose (POC) 89 08/09/2019 06:35 AM  
 Glucose (POC) 146 (H) 08/08/2019 09:34 PM  
 Glucose (POC) 77 08/08/2019 09:10 PM  
 Glucose (POC) 101 (H) 08/08/2019 04:25 PM  
 Glucose (POC) 134 (H) 08/08/2019 12:18 PM  
 
 
___________________________________________________ 
___________________________________________________ Attending Physician: Margaux Zhong MD

## 2019-08-09 NOTE — PROGRESS NOTES
08/09/19 1530 Vital Signs Temp 97.3 °F (36.3 °C) Temp Source Axillary Pulse (Heart Rate) (!) 105 Heart Rate Source Monitor Resp Rate 19  
O2 Sat (%) (!) 76 % Level of Consciousness Responds to Voice /77 MAP (Calculated) 95 BP 1 Method Automatic  
BP 1 Location Left arm BP Patient Position At rest  
MEWS Score 3 Patient became anxious prior to reinserting of NG tube. Oxygen increases to 4 L. Patient compensated and returned back to 91% MD made aware. Will continue to monitor

## 2019-08-10 NOTE — PROGRESS NOTES
Hospital Progress Note NAME:  Liz Montgomery :   1950 MRN:  [id number] Date/Time:  8/10/2019 8:25 AM 
 
Plan: 1. Ng tube feedings - 
2. titrate lantlus 3.  prednisone 4. Family discussions with palliative care planned for Monday at [address] [facility] 
5. Discussed god-daughter in room 6. Hold eliquis  7. Titrate oxygen Risk of Deterioration: Low  []           Moderate  []           High  [x] Assessment:  
Principal Problem: 
  Pneumonia (7/15/2019) Active Problems: 
   Acute encephalopathy: 
-unclear etiology - transiently awaken with appropriate conversation 
-seizure was suspected but  EEG did not confirm 
-MRI was negative for stroke,CT head and neck - no bleed,vascular occlusion. -ABG reviewed. Electrolytes,ammonia,trop,lactic acid wnl 
-Neurology evaluated 
-MRI brain w contrast  - no acute process. -EEG neg 
-May need LP for cytology as she has history of hodgkin's lymphoma - family declined 
-Oncology evaluated Acute on Chronic hypoxemic respiratory failure ([address] (2019) On nasal canula Bleomycin lung toxicity (2019) 
   cellcept and steroids Diabetes mellitus type 2, controlled ([address] (2016) 
   basal bolus insulin Hodgkin lymphoma ([address] (2/3/2019) bulky stage II HD (classical),s/p treatment earlier this year by Dr. Kym Geronimo with ABVD. Course truncated due to abrupt onset bleomycin toxicity. Diff dx includes lymphangitic spread of lymphoma. She completed 3 1/2 treatments of ABVD, less than the standard 6. oncol raised the potential concern for recurrent disease with her family. UTI -  ENTEROCOCCUS SPECIES Treated UTI - ESCHERICHIA COLI 19 Cefepime  Admitting notes:68 y.o. female with past medical history significant for htn, fibromyalgia, muscle atrophy, pulmonary fibrosis, asthma, b/l cataracts, dm, non-hodgkin's, who presents from ems with chief complaint of sob. Pt has \"last night\" onset of worsening sob described as \"labored breathing\" that's continued into this morning. She is normally on 4.5 L of O2 n/c at baseline, but had it increased to 5 L by staff. She also endorses a dry non-productive cough and fever (peak 100) at this time. EMS arrived and measured initial O2 sat of 92% and HR of 140 bpm. Denies pleuritic cp and abd pain. Of note, she has had PNA \"x3 times since Ken" and was treated last week with a z-pack for sinus infection symptomatic of congestion. DNR form in place. EMS was called to [place];  bpm, 124/78, and 9 Subjective/interium history Unresponsive at time of my exam -eye open - episode of hypoxemia, tachycardia, tachypnea,and increased bp - responding to o2 titration 11 Point Review of Systems:  
Negative except [x]            Unable to obtain ROS due to:      
[x]            mental status change []            sedated []            intubated Social History Tobacco Use  Smoking status: Never Smoker  Smokeless tobacco: Never Used Substance Use Topics  Alcohol use: Yes Comment: socially Medications reviewed: 
Current Facility-Administered Medications Medication Dose Route Frequency  dextrose 10 % infusion 125-250 mL  125-250 mL IntraVENous PRN  
 magnesium hydroxide (MILK OF MAGNESIA) 400 mg/5 mL oral suspension 30 mL  30 mL Oral DAILY PRN  
 bisacodyl (DULCOLAX) suppository 10 mg  10 mg Rectal DAILY PRN  
 0.9% sodium chloride infusion 250 mL  250 mL IntraVENous PRN  
 metoprolol tartrate (LOPRESSOR) tablet 25 mg  25 mg Oral Q12H  predniSONE (DELTASONE) tablet 40 mg  40 mg Per NG tube DAILY WITH BREAKFAST  white petrolatum-mineral oil (SOOTHE NIGHT TIME) 80-20 % ophthalmic ointment   Both Eyes PRN  
 apixaban (ELIQUIS) tablet 5 mg  5 mg Oral BID  insulin lispro (HUMALOG) injection   SubCUTAneous Q6H  
 cefepime (MAXIPIME) 2 g in 0.9% sodium chloride (MBP/ADV) 100 mL  2 g IntraVENous Q12H  thiamine HCL (B-1) tablet 100 mg  100 mg Oral DAILY  multivitamin, tx-iron-ca-min (THERA-M w/ IRON) tablet 1 Tab  1 Tab Oral DAILY  hydrALAZINE (APRESOLINE) 20 mg/mL injection 10 mg  10 mg IntraVENous Q6H PRN  
 amantadine HCl (SYMMETREL) capsule 100 mg  100 mg Oral BID  
 glucose chewable tablet 16 g  4 Tab Oral PRN  
 glucagon (GLUCAGEN) injection 1 mg  1 mg IntraMUSCular PRN  
 sodium chloride (NS) flush 5-10 mL  5-10 mL IntraVENous PRN  
 sodium chloride (NS) flush 5-40 mL  5-40 mL IntraVENous Q8H  
 sodium chloride (NS) flush 5-40 mL  5-40 mL IntraVENous PRN  
 acetaminophen (TYLENOL) tablet 650 mg  650 mg Oral Q4H PRN  
 lactobac ac& pc-s.therm-b.anim (JUNI Q/RISAQUAD)  1 Cap Oral DAILY  atorvastatin (LIPITOR) tablet 40 mg  40 mg Oral QHS Objective:  
Vitals: 
Visit Vitals BP (!) 176/107 (BP 1 Location: Left arm, BP Patient Position: At rest) Pulse (!) 118 Temp 97.9 °F (36.6 °C) Resp 20 Ht 5' 9\" (1.753 m) Wt 171 lb 1.2 oz (77.6 kg) SpO2 (!) 87% BMI 25.26 kg/m² Temp (24hrs), Av.8 °F (36.6 °C), Min:97.3 °F (36.3 °C), Max:98.1 °F (36.7 °C) O2 Flow Rate (L/min): 4 l/min O2 Device: Nasal cannula Last 24hr Input/Output: 
 
Intake/Output Summary (Last 24 hours) at 8/10/2019 7587 Last data filed at 8/10/2019 1393 Gross per 24 hour Intake 150 ml Output 1500 ml Net -1350 ml PHYSICAL EXAM: 
General:    Unresponsive, no distress, appears stated age. Head:   Normocephalic, without obvious abnormality, atraumatic. Lungs:   Decreased bs with rales. Chest wall:  No tenderness or deformity. No Accessory muscle use. Heart:   Regular rate and rhythm,  no murmur, rub or gallop. Abdomen:   Soft, non-tender. Not distended. Bowel sounds normal. No masses. Lab Data Reviewed: 
 
Recent Labs 08/10/19 
5854 19 
2908 19 
6890 WBC 14.3* 17.7* 16.7* HGB 8.1* 7.9* 6.6* HCT 25.6* 24.7* 21.6*  
* 100* 107* Recent Labs 08/10/19 
0680 08/09/19 
3131 08/08/19 
4547  138 138  
K 4.7 4.6 4.4  
 105 105 CO2 23 25 27 GLU 76 83 72 BUN 36* 41* 39* CREA 0.90 0.85 0.90  
CA 8.6 9.0 8.9 Lab Results Component Value Date/Time Glucose (POC) 86 08/10/2019 06:17 AM  
 Glucose (POC) 111 (H) 08/10/2019 12:34 AM  
 Glucose (POC) 52 (L) 08/10/2019 12:01 AM  
 Glucose (POC) 102 (H) 08/09/2019 10:24 PM  
 Glucose (POC) 57 (L) 08/09/2019 09:27 PM  
 
 
___________________________________________________ 
___________________________________________________ Attending Physician: Karlie Daniel MD

## 2019-08-10 NOTE — PROGRESS NOTES
Pt unable to keep SpO2 above 90 on NC at 6L and Ventimask at 100%. Placed pt on Non-rebreather and is able to keep SpO2 above 90.

## 2019-08-10 NOTE — PROGRESS NOTES
HYPOGLYCEMIC EPISODE DOCUMENTATION Patient with hypoglycemic episode(s) at 0001(time) on 08/10/19(date). BG value(s) pre-treatment 52 Was patient symptomatic? [] yes, [x] no Patient was treated with the following rescue medications/treatments: [x] D50 [] Glucose tablets 
              [] Glucagon 
              [] 4oz juice 
              [] 6oz reg soda 
              [] 8oz low fat milk BG value post-treatment: 111 Once BG treated and value greater than 80mg/dl, pt was provided with the following: 
[] snack 
[] meal 
Name of MD notified:No   
The following orders were received: None

## 2019-08-10 NOTE — PROGRESS NOTES
46 Dr Austin Acevedo updated on pt status. Per MD pt too unstable for CT at this time. Ddimer, bilateral lower extremity dopplers ordered. 1640 Dr Austin Acevedo updated on pt status, elevated Ddimer- will attempt to wean [place] for transport to CT. 1800 RN and RT attempted x2 to place pt on NRB for transport to CT. Pt's HR 130s, SpO2 70s.

## 2019-08-10 NOTE — PROGRESS NOTES
HYPOGLYCEMIC EPISODE DOCUMENTATION Patient with hypoglycemic episode(s) at 2127(time) on 08/09/19(date). BG value(s) pre-treatment 57 Was patient symptomatic? [x] yes, [] no 
Patient was treated with the following rescue medications/treatments: [x] D50 [] Glucose tablets 
              [] Glucagon 
              [] 4oz juice 
              [] 6oz reg soda 
              [] 8oz low fat milk BG value post-treatment: 102 Once BG treated and value greater than 80mg/dl, pt was provided with the following: 
[] snack 
[] meal 
Name of MD notified:No  
The following orders were received: None

## 2019-08-10 NOTE — PROGRESS NOTES
[facility] Name: Liz Montgomery  MRN: [id number]  Date: 8/10/2019 12:11 PM 
Admission Date: 7/15/2019  : 1950 Impression Plan 1. Acute on chronic hypoxic resp failure- Bleomycin pulmonary toxicity with probable superimposed lower resp tract infection ( admitted 7/15 with worsening CT chest b/l ASD) and DAD. On pred 40 mg/day here. Was on pred with PJP proph as outpt. 2. Hodgkins lymphoma s/p ABVD - completed all cycles per pts sister in  and was disease free per family. 3. DM2 
4. Encephalopathy- metabolic/hypoxic- MRI brain 19 negative 1. abg without CO2 retention 2. VTE possible 3. Will get CTA chest eval for PE 
4. Place on HFNC 5. Place in ICU 6. Change pred to solumedrol- may have organizing DAD which could respond to steroids 7. If intubated will need BAL. BAL from  no PJP negative AFB/fungal/GS/cx 8. I discussed at length with pts sister and another family member on Face time. I answered all pf the family's questions to the best of my abilities and to their apparent satisfaction Pt is acutely ill and at risk for decline due to resp failure Subjective Pt is somnolent and does not follow commands. abd paradox breathing noted. On NRB Pts sister at bedside and facetime with her another relative Review of systems not obtained due to patient factors. EXAM: 
Visit Vitals BP (!) 176/94 (BP 1 Location: Left arm, BP Patient Position: At rest) Pulse 100 Temp 97.9 °F (36.6 °C) Resp 21 Ht 5' 9\" (1.753 m) Wt 77.6 kg (171 lb 1.2 oz) SpO2 (!) 87% BMI 25.26 kg/m² Temp (24hrs), Av.8 °F (36.6 °C), Min:97.3 °F (36.3 °C), Max:98.1 °F (36.7 °C) GENERAL: well developed and in moderate distress, NECK:  no jugular vein distention, no retractions, no thyromegaly or masses, LUNGS: decreased breath sounds billaterally and with rhonchi , HEART:  Regular rate and rhythm with no MGR; no edema is present, ABDOMEN:  soft with no tenderness, bowel sounds present, EXTREMITIES:  warm with no cyanosis and SKIN:  no jaundice or ecchymosis LABS Recent Labs 08/10/19 
9270 08/09/19 
6157 08/08/19 
1480 WBC 14.3* 17.7* 16.7* HGB 8.1* 7.9* 6.6* HCT 25.6* 24.7* 21.6*  
* 100* 107* Recent Labs 08/10/19 
5794 08/09/19 
7393 08/08/19 
8054  138 138  
K 4.7 4.6 4.4  
 105 105 CO2 23 25 27 GLU 76 83 72 BUN 36* 41* 39* CREA 0.90 0.85 0.90  
CA 8.6 9.0 8.9 ABG Recent Labs 08/10/19 
1201 PHI 7.398 PO2I 49* PCO2I 36.7 Radiology Films have been personally reviewed. PCXR with b/l ILD changes no change from prior CXR- low lung volumes Tamiko Collado MD

## 2019-08-10 NOTE — PROGRESS NOTES
08/10/19 1002 Vital Signs Temp 97.9 °F (36.6 °C) Temp Source Axillary Pulse (Heart Rate) 100 Heart Rate Source Monitor Resp Rate 21  
O2 Sat (%) (!) 87 % Level of Consciousness Responds to Voice BP (!) 176/94 MAP (Calculated) 121 BP 1 Method Automatic  
BP 1 Location Left arm BP Patient Position At rest  
MEWS Score 3 Oxygen Therapy O2 Device Non-rebreather mask O2 Flow Rate (L/min) 12 l/min Dr Kirsten Nath saw earlier and placed orders for a non-rebreather and then to switch to ventri and back to Nasal cannula if possible. Unable to switch out non-rebreather. Pt to be transferred to intermediate care where non-rebreather is allowed. Trying to transfer pt's for a bed to be made available. Pulmonary called and new orders given. Results of ABG and  Sats unstable. down and up.-transfer changed to ICU overflow.

## 2019-08-11 NOTE — PROGRESS NOTES
Hospital Progress Note NAME:  Storm Pryor :   1950 MRN:  [id number] Date/Time:  2019 8:25 AM 
 
Plan: 1. Ng tube feedings - 
2. titrate lantlus 3.  solumedrol 4. Family discussions with palliative care planned for Monday at [address] 
5. Discussed with sister in room - advised of declining condition 6. Titrate oxygen/pul 7. Continue eliquis - not a candidate for peg due to declining condition 8. Continue icu care Risk of Deterioration: Low  []           Moderate  []           High  [x] Assessment:  
Principal Problem: 
  Pneumonia (7/15/2019) Active Problems: 
   Acute encephalopathy: 
-unclear etiology - transiently awaken with appropriate conversation 
-seizure was suspected but  EEG did not confirm 
-MRI was negative for stroke,CT head and neck - no bleed,vascular occlusion. -ABG reviewed. Electrolytes,ammonia,trop,lactic acid wnl 
-Neurology evaluated 
-MRI brain w contrast  - no acute process. -EEG neg 
-May need LP for cytology as she has history of hodgkin's lymphoma - family declined 
-Oncology evaluated Acute on Chronic hypoxemic respiratory failure ([address] (2019) On high flow oxygen Bleomycin lung toxicity (2019) Iv steroids Diabetes mellitus type 2, controlled ([address] (2016) 
   basal bolus insulin Hodgkin lymphoma ([facility] (2/3/2019) bulky stage II HD (classical),s/p treatment earlier this year by Dr. Arianna Nazario with ABVD. Course truncated due to abrupt onset bleomycin toxicity. Diff dx includes lymphangitic spread of lymphoma. She completed 3 1/2 treatments of ABVD, less than the standard 6. oncol raised the potential concern for recurrent disease with her family. UTI -  ENTEROCOCCUS SPECIES Treated UTI - ESCHERICHIA COLI 19 
    treated  [de-identified] notes:68 y.o. female with past medical history significant for htn, fibromyalgia, muscle atrophy, pulmonary fibrosis, asthma, b/l cataracts, dm, non-hodgkin's, who presents from ems with chief complaint of sob. Pt has \"last night\" onset of worsening sob described as \"labored breathing\" that's continued into this morning. She is normally on 4.5 L of O2 n/c at baseline, but had it increased to 5 L by staff. She also endorses a dry non-productive cough and fever (peak 100) at this time. EMS arrived and measured initial O2 sat of 92% and HR of 140 bpm. Denies pleuritic cp and abd pain. Of note, she has had PNA \"x3 times since Ken" and was treated last week with a z-pack for sinus infection symptomatic of congestion. DNR form in place. EMS was called to [place];  bpm, 124/78, and 9 Subjective/interium history Unresponsive at time of my exam -eye open - episode of hypoxemia, tachycardia, tachypnea,and increased bp - minimal activity eg turning results in oxygen decompensation 11 Point Review of Systems:  
Negative except [x]            Unable to obtain ROS due to:      
[x]            mental status change []            sedated []            intubated Social History Tobacco Use  Smoking status: Never Smoker  Smokeless tobacco: Never Used Substance Use Topics  Alcohol use: Yes Comment: socially Medications reviewed: 
Current Facility-Administered Medications Medication Dose Route Frequency  fentaNYL citrate (PF) injection 25 mcg  25 mcg IntraVENous Q1H PRN  
 albuterol-ipratropium (DUO-NEB) 2.5 MG-0.5 MG/3 ML  3 mL Nebulization Q4H RT  
 methylPREDNISolone (PF) (Solu-MEDROL) injection 80 mg  80 mg IntraVENous Q6H  
 dextrose 10 % infusion 125-250 mL  125-250 mL IntraVENous PRN  
 magnesium hydroxide (MILK OF MAGNESIA) 400 mg/5 mL oral suspension 30 mL  30 mL Oral DAILY PRN  
 bisacodyl (DULCOLAX) suppository 10 mg  10 mg Rectal DAILY PRN  
 0.9% sodium chloride infusion 250 mL  250 mL IntraVENous PRN  
  metoprolol tartrate (LOPRESSOR) tablet 25 mg  25 mg Oral Q12H  white petrolatum-mineral oil (SOOTHE NIGHT TIME) 80-20 % ophthalmic ointment   Both Eyes PRN  
 apixaban (ELIQUIS) tablet 5 mg  5 mg Oral BID  insulin lispro (HUMALOG) injection   SubCUTAneous Q6H  
 thiamine HCL (B-1) tablet 100 mg  100 mg Oral DAILY  multivitamin, tx-iron-ca-min (THERA-M w/ IRON) tablet 1 Tab  1 Tab Oral DAILY  hydrALAZINE (APRESOLINE) 20 mg/mL injection 10 mg  10 mg IntraVENous Q6H PRN  
 amantadine HCl (SYMMETREL) capsule 100 mg  100 mg Oral BID  
 glucose chewable tablet 16 g  4 Tab Oral PRN  
 glucagon (GLUCAGEN) injection 1 mg  1 mg IntraMUSCular PRN  
 sodium chloride (NS) flush 5-10 mL  5-10 mL IntraVENous PRN  
 sodium chloride (NS) flush 5-40 mL  5-40 mL IntraVENous Q8H  
 sodium chloride (NS) flush 5-40 mL  5-40 mL IntraVENous PRN  
 acetaminophen (TYLENOL) tablet 650 mg  650 mg Oral Q4H PRN  
 lactobac ac& pc-s.therm-b.anim (JUNI Q/RISAQUAD)  1 Cap Oral DAILY  atorvastatin (LIPITOR) tablet 40 mg  40 mg Oral QHS Objective:  
Vitals: 
Visit Vitals BP (!) 149/92 Pulse (!) 109 Temp 98.7 °F (37.1 °C) Resp 27 Ht 5' 9\" (1.753 m) Wt 166 lb 0.1 oz (75.3 kg) SpO2 96% BMI 24.51 kg/m² Temp (24hrs), Av.7 °F (37.1 °C), Min:97.9 °F (36.6 °C), Max:99.2 °F (37.3 °C) O2 Flow Rate (L/min): 50 l/min O2 Device: Hi flow nasal cannula, Heated Last 24hr Input/Output: 
 
Intake/Output Summary (Last 24 hours) at 2019 Last data filed at 2019 0500 Gross per 24 hour Intake 1110 ml Output 1250 ml Net -140 ml PHYSICAL EXAM: 
General:    Unresponsive, no distress, appears stated age. Head:   Normocephalic, without obvious abnormality, atraumatic. Lungs:   Decreased bs with rales. Chest wall:  No tenderness or deformity. No Accessory muscle use. Heart:   Regular rate and rhythm,  no murmur, rub or gallop. Abdomen:   Soft, non-tender. Not distended. Bowel sounds normal. No masses. Lab Data Reviewed: 
 
Recent Labs 08/11/19 
3682 08/10/19 
0244 08/09/19 
0247 WBC 11.8* 14.3* 17.7* HGB 7.9* 8.1* 7.9*  
HCT 25.6* 25.6* 24.7*  
* 101* 100* Recent Labs 08/11/19 
8936 08/10/19 
0244 08/09/19 
0203 * 137 138  
K 4.8 4.7 4.6  106 105 CO2 23 23 25 * 76 83 BUN 39* 36* 41* CREA 1.04* 0.90 0.85 CA 9.2 8.6 9.0 Lab Results Component Value Date/Time Glucose (POC) 217 (H) 08/11/2019 07:08 AM  
 Glucose (POC) 216 (H) 08/11/2019 01:19 AM  
 Glucose (POC) 115 (H) 08/10/2019 05:33 PM  
 Glucose (POC) 120 (H) 08/10/2019 11:55 AM  
 Glucose (POC) 86 08/10/2019 06:17 AM  
 
 
___________________________________________________ 
___________________________________________________ Attending Physician: Ann Leong MD

## 2019-08-11 NOTE — PROGRESS NOTES
Hematology-Oncology Progress Note Jeff Stephen 1950 
[id number] 
8/11/2019 Subjective:  
 
Continued clinical decline Allergies: Patient has no known allergies. Current Facility-Administered Medications Medication Dose Route Frequency Provider Last Rate Last Dose  fentaNYL citrate (PF) injection 25 mcg  25 mcg IntraVENous Q1H PRN Socorro Frank MD   25 mcg at 08/11/19 1122  albuterol-ipratropium (DUO-NEB) 2.5 MG-0.5 MG/3 ML  3 mL Nebulization Q4H RT Varun Childers MD   3 mL at 08/11/19 1526  
 methylPREDNISolone (PF) (Solu-MEDROL) injection 80 mg  80 mg IntraVENous Q6H Varun Childers MD   80 mg at 08/11/19 1123  dextrose 10 % infusion 125-250 mL  125-250 mL IntraVENous PRN Amalia Alexander MD   Stopped at 08/10/19 0030  
 magnesium hydroxide (MILK OF MAGNESIA) 400 mg/5 mL oral suspension 30 mL  30 mL Oral DAILY PRN Alonzo Umanzor MD      
 bisacodyl (DULCOLAX) suppository 10 mg  10 mg Rectal DAILY PRN Alonzo Umanzor MD      
 0.9% sodium chloride infusion 250 mL  250 mL IntraVENous PRN Alonzo Umanzor MD      
 metoprolol tartrate (LOPRESSOR) tablet 25 mg  25 mg Oral Q12H Ladd Eisenmenger, MD   25 mg at 08/11/19 1759  white petrolatum-mineral oil (SOOTHE NIGHT TIME) 80-20 % ophthalmic ointment   Both Eyes PRN Alonzo Umanzor MD   1 Each at 08/04/19 2234  apixaban (ELIQUIS) tablet 5 mg  5 mg Oral BID Alonzo Umanzor MD   5 mg at 08/11/19 0842  
 insulin lispro (HUMALOG) injection   SubCUTAneous Q6H Alonzo Umanzor MD   6 Units at 08/11/19 1138  multivitamin, tx-iron-ca-min (THERA-M w/ IRON) tablet 1 Tab  1 Tab Oral DAILY Ladd Eisenmenger, MD   1 Tab at 08/11/19 5715  hydrALAZINE (APRESOLINE) 20 mg/mL injection 10 mg  10 mg IntraVENous Q6H PRN Ladd Eisenmenger, MD   10 mg at 08/11/19 1318  amantadine HCl (SYMMETREL) capsule 100 mg  100 mg Oral BID Rg, Madelaine Small MD   100 mg at 08/11/19 0842  
 glucose chewable tablet 16 g  4 Tab Oral PRN Lani Perez MD      
 glucagon [facility]) injection 1 mg  1 mg IntraMUSCular PRN Lani AMEZCUA MD   1 mg at 08/09/19 1129  
 sodium chloride (NS) flush 5-10 mL  5-10 mL IntraVENous PRN Rafita Bianchi MD   10 mL at 08/03/19 1741  
 sodium chloride (NS) flush 5-40 mL  5-40 mL IntraVENous Q8H Ruddy Melvin MD   10 mL at 08/11/19 1400  
 sodium chloride (NS) flush 5-40 mL  5-40 mL IntraVENous PRN Ruddy Melvin MD      
 acetaminophen (TYLENOL) tablet 650 mg  650 mg Oral Q4H PRN Ruddy Melvin MD      
 lactobac ac& pc-s.therm-b.anim (JUNI Q/RISAQUAD)  1 Cap Oral DAILY Ruddy Melvin MD   1 Cap at 08/11/19 0841  
 atorvastatin (LIPITOR) tablet 40 mg  40 mg Oral QHS Ruddy Melvin MD   40 mg at 08/10/19 2109 Objective:  
 
Patient Vitals for the past 24 hrs: 
 BP Temp Pulse Resp SpO2 Weight 08/11/19 1526     93 %   
08/11/19 1400 153/78  (!) 117 (!) 35 95 %   
08/11/19 1318 183/89  (!) 115     
08/11/19 1300 183/89  (!) 116 (!) 36 93 %   
08/11/19 1200 164/80 97.9 °F (36.6 °C) (!) 109 (!) 31 (!) 88 %   
08/11/19 1127     (!) 89 %   
08/11/19 1100 (!) 179/92  (!) 111 (!) 37 (!) 89 %   
08/11/19 1000 146/73  (!) 108 27 93 %   
08/11/19 0900 168/73  (!) 131 (!) 38 90 %   
08/11/19 0800 181/70 97 °F (36.1 °C) (!) 128 (!) 34 90 %   
08/11/19 0744     91 %   
08/11/19 0741     91 %   
08/11/19 0700 181/90  (!) 118 25 92 %   
08/11/19 0619 (!) 167/96  (!) 114 30 92 %   
08/11/19 0600 (!) 179/107  (!) 116 30 91 %   
08/11/19 0548      75.3 kg (166 lb 0.1 oz) 08/11/19 0534 (!) 149/92  (!) 109 27 96 %   
08/11/19 0400 (!) 106/95 98.7 °F (37.1 °C) (!) 106 27 95 %   
08/11/19 0317     98 %   
08/11/19 0300 148/78  (!) 102 28 97 %   
08/11/19 0200 144/81  98 27 97 %   
08/11/19 0100 138/76  99 27 98 %   
 08/11/19 0000 139/82 99.2 °F (37.3 °C) 94 25 97 %   
08/10/19 2338     95 %   
08/10/19 2300 (!) 144/96  93 28 96 %   
08/10/19 2200 134/73  (!) 102 26 98 %   
08/10/19 2100 160/84  (!) 117 28 90 %   
08/10/19 2000 135/85 98.9 °F (37.2 °C) (!) 107 27 92 %   
08/10/19 1919     98 %   
08/10/19 1700 145/86  (!) 108 (!) 31 96 %  Gen: Non verbal. Not following commands. HEENT: PERRL, Sclerae anicteric Cv: RRR without m/r/g Pulm: CTA bilaterally Abd: NABS, NTND, No HSM Ext: No c/c/e Available labs reviewed: 
Labs:   
Recent Results (from the past 24 hour(s)) GLUCOSE, POC Collection Time: 08/10/19  5:33 PM  
Result Value Ref Range Glucose (POC) 115 (H) 65 - 100 mg/dL Performed by Elio Chavarria, POC Collection Time: 08/11/19  1:19 AM  
Result Value Ref Range Glucose (POC) 216 (H) 65 - 100 mg/dL Performed by TOMI HATODD CHARLTON   
CBC WITH AUTOMATED DIFF Collection Time: 08/11/19  5:41 AM  
Result Value Ref Range WBC 11.8 (H) 3.6 - 11.0 K/uL  
 RBC 2.70 (L) 3.80 - 5.20 M/uL HGB 7.9 (L) 11.5 - 16.0 g/dL HCT 25.6 (L) 35.0 - 47.0 % MCV 94.8 80.0 - 99.0 FL  
 MCH 29.3 26.0 - 34.0 PG  
 MCHC 30.9 30.0 - 36.5 g/dL  
 RDW 17.3 (H) 11.5 - 14.5 % PLATELET 511 (L) 078 - 400 K/uL MPV 13.0 (H) 8.9 - 12.9 FL  
 NRBC 0.0 0  WBC ABSOLUTE NRBC 0.00 0.00 - 0.01 K/uL NEUTROPHILS 92 (H) 32 - 75 % BAND NEUTROPHILS 2 0 - 6 % LYMPHOCYTES 4 (L) 12 - 49 % MONOCYTES 2 (L) 5 - 13 % EOSINOPHILS 0 0 - 7 % BASOPHILS 0 0 - 1 % IMMATURE GRANULOCYTES 0 %  
 ABS. NEUTROPHILS 11.1 (H) 1.8 - 8.0 K/UL  
 ABS. LYMPHOCYTES 0.5 (L) 0.8 - 3.5 K/UL  
 ABS. MONOCYTES 0.2 0.0 - 1.0 K/UL  
 ABS. EOSINOPHILS 0.0 0.0 - 0.4 K/UL  
 ABS. BASOPHILS 0.0 0.0 - 0.1 K/UL  
 ABS. IMM. GRANS. 0.0 K/UL  
 DF MANUAL    
 RBC COMMENTS ANISOCYTOSIS 1+ 
    
 RBC COMMENTS ТАТЬЯНА CELLS 1+ RBC COMMENTS SCHISTOCYTES 1+ METABOLIC PANEL, BASIC  
 Collection Time: 08/11/19  5:41 AM  
Result Value Ref Range Sodium 135 (L) 136 - 145 mmol/L Potassium 4.8 3.5 - 5.1 mmol/L Chloride 104 97 - 108 mmol/L  
 CO2 23 21 - 32 mmol/L Anion gap 8 5 - 15 mmol/L Glucose 195 (H) 65 - 100 mg/dL BUN 39 (H) 6 - 20 MG/DL Creatinine 1.04 (H) 0.55 - 1.02 MG/DL  
 BUN/Creatinine ratio 38 (H) 12 - 20 GFR est AA >60 >60 ml/min/1.73m2 GFR est non-AA 53 (L) >60 ml/min/1.73m2 Calcium 9.2 8.5 - 10.1 MG/DL  
GLUCOSE, POC Collection Time: 08/11/19  7:08 AM  
Result Value Ref Range Glucose (POC) 217 (H) 65 - 100 mg/dL Performed by TOMI Kong (TRENTADUE) 92, POC Collection Time: 08/11/19 11:21 AM  
Result Value Ref Range Glucose (POC) 327 (H) 65 - 100 mg/dL Performed by [facility] Assessment and Plan  
 
71 y/o woman with a h/o classical Hodgkin's lymphoma, complicated by acute bleomycin toxicity during treatment after d1c4. Seen briefly at [facility] and started on cellcept in the hopes of curbing the pulmonary damage from bleomycin. 1. Cytopenias: hgb low -- no intervention at this time 2. Hodgkin's lymphoma: s/p treatment as deascribed 3. Goals of care: palliative mtg tomorrow 4. 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

## 2019-08-11 NOTE — PROGRESS NOTES
[facility] Name: Michael Lai  MRN: [id number]  Date: 2019 12:11 PM 
Admission Date: 7/15/2019  : 1950 Impression Plan 1. Acute on chronic hypoxic resp failure- Bleomycin pulmonary toxicity with probable superimposed lower resp tract infection ( admitted 7/15 with worsening CT chest b/l ASD) and DAD. On pred 40 mg/day here. Was on pred with [facility] proph as outpt. 2. Hodgkins lymphoma s/p ABVD - completed all cycles per pts sister in  and was disease free per family. 3. DM2 
4. Encephalopathy- metabolic/hypoxic- MRI brain 19 negative 1. Leg dopplers negative for DVT 2. D dimer up but could be up for many other reasons. Has been getting eliquis via NGT and so less likely VTE. Too unstable for CTA. 3. I spoke to pts sister at length regarding pts poor prognosis- I suspect Diffuse alveolar damage ( ARDS) superimposed on Bleomycin toxicity. Pt working to breath and minimally responsive. Pt is DNR/DNI. I discussed the comfort pathway and pts sister stated she will d/w her family. Palliative meeting tomorrow but I informed pts sister that pt may not make it until then. 4. Pt in negative fluid balance/WBC coming down. 5. Continue eliquis Pt is acutely ill and at risk for decline due to resp failure Subjective  
obtunded and WOB RR into 40s on HFNC Review of systems not obtained due to patient factors. EXAM: 
Visit Vitals /70 Pulse (!) 128 Temp 98.7 °F (37.1 °C) Resp (!) 34 Ht 5' 9\" (1.753 m) Wt 75.3 kg (166 lb 0.1 oz) SpO2 90% BMI 24.51 kg/m² Temp (24hrs), Av.7 °F (37.1 °C), Min:97.9 °F (36.6 °C), Max:99.2 °F (37.3 °C) GENERAL: well developed and in moderate distress, NECK:  no jugular vein distention, no retractions, no thyromegaly or masses, LUNGS: decreased breath sounds billaterally and with rhonchi , HEART:  Regular rate and rhythm with no MGR; no edema is present, ABDOMEN:  soft with no tenderness, bowel sounds present, EXTREMITIES:  warm with no cyanosis and SKIN:  no jaundice or ecchymosis LABS Recent Labs 08/11/19 
6982 08/10/19 
0244 08/09/19 
6356 WBC 11.8* 14.3* 17.7* HGB 7.9* 8.1* 7.9*  
HCT 25.6* 25.6* 24.7*  
* 101* 100* Recent Labs 08/11/19 
2493 08/10/19 
0244 08/09/19 
0194 * 137 138  
K 4.8 4.7 4.6  106 105 CO2 23 23 25 * 76 83 BUN 39* 36* 41* CREA 1.04* 0.90 0.85 CA 9.2 8.6 9.0 ABG Recent Labs 08/10/19 
1201 PHI 7.398 PO2I 49* PCO2I 36.7 Radiology Films have been personally reviewed. PCXR with b/l ILD changes no change from prior CXR- low lung volumes unchanged Zulema Souza MD

## 2019-08-11 NOTE — PROGRESS NOTES
for follow up visit. Pt's sister was at bedside.  has rapport with family. Pt's sister, Jess Lind shared that after speaking with one of pt's physicians, he had mentioned pt is not improving and she is declining. Family understands this is not what she would like to hear but she can accept it. Betsy shared that pt made her wishes and she wants pt to have her dignity to the end. Family was appreciative of information and details. Pt's nieces arrived during visit.  provided pastoral listening, support and prayer. Family is [employer] and have viri that God's presence is here. Family is appreciative of prayer. Reminded family of  support and availability. Tiffanie Reed will continue to follow. Fly Lee, [facility] 
 [phone])

## 2019-08-12 NOTE — PROGRESS NOTES
Marino Pyle [place] [id number] [facility], [facility] [place] [facility] PROGRESS NOTE Margret Calloway, [address] [facility] office [phone] NP in-hospital cell phone M-F until 4:30 After 5pm or on weekends, please call  for physician on call Noted plans for comfort care. NG removed, no plans for PEG.

## 2019-08-12 NOTE — PROGRESS NOTES
Hospital Progress Note NAME:  Elvin Cary :   1950 MRN:  [id number] Date/Time:  2019 8:25 AM 
 
Plan: 1. Ng tube feedings - 
2. titrate lantlus 3.  solumedrol 4. Family discussions with palliative care planned for today at [address] 
5. Discussed with sister and her  in room - advised of declining condition 6. Titrate oxygen/pul 7. Continue eliquis - not a candidate for peg due to declining condition 8. Continue icu care 9. Prognosis - grim Risk of Deterioration: Low  []           Moderate  []           High  [x] Assessment:  
Principal Problem: 
  Pneumonia (7/15/2019) Active Problems: 
   Acute encephalopathy: 
-unclear etiology - transiently awaken with appropriate conversation 
-seizure was suspected but  EEG did not confirm 
-MRI was negative for stroke,CT head and neck - no bleed,vascular occlusion. -ABG reviewed. Electrolytes,ammonia,trop,lactic acid wnl 
-Neurology evaluated 
-MRI brain w contrast  - no acute process. -EEG neg 
-May need LP for cytology as she has history of hodgkin's lymphoma - family declined 
-Oncology evaluated Acute on Chronic hypoxemic respiratory failure ([address] (2019) On high flow oxygen Bleomycin lung toxicity (2019) Iv steroids Diabetes mellitus type 2, controlled ([address] (2016) 
   basal bolus insulin Hodgkin lymphoma ([facility] (2/3/2019) bulky stage II HD (classical),s/p treatment earlier this year by Dr. Lorri Barragan with ABVD. Course truncated due to abrupt onset bleomycin toxicity. Diff dx includes lymphangitic spread of lymphoma. She completed 3 1/2 treatments of ABVD, less than the standard 6. oncol raised the potential concern for recurrent disease with her family. UTI -  ENTEROCOCCUS SPECIES Treated UTI - ESCHERICHIA COLI 19 
    treated  [de-identified] notes:68 y.o. female with past medical history significant for htn, fibromyalgia, muscle atrophy, pulmonary fibrosis, asthma, b/l cataracts, dm, non-hodgkin's, who presents from ems with chief complaint of sob. Pt has \"last night\" onset of worsening sob described as \"labored breathing\" that's continued into this morning. She is normally on 4.5 L of O2 n/c at baseline, but had it increased to 5 L by staff. She also endorses a dry non-productive cough and fever (peak 100) at this time. EMS arrived and measured initial O2 sat of 92% and HR of 140 bpm. Denies pleuritic cp and abd pain. Of note, she has had PNA \"x3 times since Ken" and was treated last week with a z-pack for sinus infection symptomatic of congestion. DNR form in place. EMS was called to [place];  bpm, 124/78, and 9 Subjective/interium history Unresponsive at time of my exam -eye open - episode of hypoxemia, tachycardia, tachypnea,and increased bp - minimal activity eg turning results in oxygen decompensation 11 Point Review of Systems:  
Negative except [x]            Unable to obtain ROS due to:      
[x]            mental status change []            sedated []            intubated Social History Tobacco Use  Smoking status: Never Smoker  Smokeless tobacco: Never Used Substance Use Topics  Alcohol use: Yes Comment: socially Medications reviewed: 
Current Facility-Administered Medications Medication Dose Route Frequency  fentaNYL citrate (PF) injection 25 mcg  25 mcg IntraVENous Q1H PRN  
 albuterol-ipratropium (DUO-NEB) 2.5 MG-0.5 MG/3 ML  3 mL Nebulization Q4H RT  
 methylPREDNISolone (PF) (Solu-MEDROL) injection 80 mg  80 mg IntraVENous Q6H  
 dextrose 10 % infusion 125-250 mL  125-250 mL IntraVENous PRN  
 magnesium hydroxide (MILK OF MAGNESIA) 400 mg/5 mL oral suspension 30 mL  30 mL Oral DAILY PRN  
 bisacodyl (DULCOLAX) suppository 10 mg  10 mg Rectal DAILY PRN  
 0.9% sodium chloride infusion 250 mL  250 mL IntraVENous PRN  
  metoprolol tartrate (LOPRESSOR) tablet 25 mg  25 mg Oral Q12H  white petrolatum-mineral oil (SOOTHE NIGHT TIME) 80-20 % ophthalmic ointment   Both Eyes PRN  
 apixaban (ELIQUIS) tablet 5 mg  5 mg Oral BID  insulin lispro (HUMALOG) injection   SubCUTAneous Q6H  
 multivitamin, tx-iron-ca-min (THERA-M w/ IRON) tablet 1 Tab  1 Tab Oral DAILY  hydrALAZINE (APRESOLINE) 20 mg/mL injection 10 mg  10 mg IntraVENous Q6H PRN  
 amantadine HCl (SYMMETREL) capsule 100 mg  100 mg Oral BID  
 glucose chewable tablet 16 g  4 Tab Oral PRN  
 glucagon (GLUCAGEN) injection 1 mg  1 mg IntraMUSCular PRN  
 sodium chloride (NS) flush 5-10 mL  5-10 mL IntraVENous PRN  
 sodium chloride (NS) flush 5-40 mL  5-40 mL IntraVENous Q8H  
 sodium chloride (NS) flush 5-40 mL  5-40 mL IntraVENous PRN  
 acetaminophen (TYLENOL) tablet 650 mg  650 mg Oral Q4H PRN  
 lactobac ac& pc-s.therm-b.anim (JUNI Q/RISAQUAD)  1 Cap Oral DAILY  atorvastatin (LIPITOR) tablet 40 mg  40 mg Oral QHS Objective:  
Vitals: 
Visit Vitals /67 Pulse (!) 114 Temp 96.9 °F (36.1 °C) Resp 25 Ht 5' 9\" (1.753 m) Wt 166 lb 0.1 oz (75.3 kg) SpO2 95% BMI 24.51 kg/m² Temp (24hrs), Av.8 °F (36.6 °C), Min:96.9 °F (36.1 °C), Max:98 °F (36.7 °C) O2 Flow Rate (L/min): 45 l/min(w/ face tent 12l/ 100%) O2 Device: Hi flow nasal cannula Last 24hr Input/Output: 
 
Intake/Output Summary (Last 24 hours) at 2019 0800 Last data filed at 2019 0600 Gross per 24 hour Intake 1780 ml Output 900 ml Net 880 ml PHYSICAL EXAM: 
General:    Unresponsive, no distress, appears stated age. Head:   Normocephalic, without obvious abnormality, atraumatic. Lungs:   Decreased bs with rales. Chest wall:  No tenderness or deformity. No Accessory muscle use. Heart:   Regular rate and rhythm,  no murmur, rub or gallop. Abdomen:   Soft, non-tender. Not distended. Bowel sounds normal. No masses. Lab Data Reviewed: 
 
Recent Labs 08/12/19 
0330 08/11/19 
0541 08/10/19 
0244 WBC 13.0* 11.8* 14.3* HGB 7.4* 7.9* 8.1* HCT 24.3* 25.6* 25.6*  
* 106* 101* Recent Labs 08/12/19 
0330 08/11/19 
0541 08/10/19 
0244  135* 137  
K 4.8 4.8 4.7  104 106 CO2 25 23 23 * 195* 76 BUN 54* 39* 36* CREA 1.20* 1.04* 0.90  
CA 9.5 9.2 8.6 Lab Results Component Value Date/Time Glucose (POC) 377 (H) 08/12/2019 05:49 AM  
 Glucose (POC) 390 (H) 08/12/2019 01:46 AM  
 Glucose (POC) 368 (H) 08/11/2019 05:25 PM  
 Glucose (POC) 327 (H) 08/11/2019 11:21 AM  
 Glucose (POC) 217 (H) 08/11/2019 07:08 AM  
 
 
___________________________________________________ 
___________________________________________________ Attending Physician: Caro Almonte MD

## 2019-08-12 NOTE — PROGRESS NOTES
[facility] Name: Margrett Moritz  MRN: [id number]  Date: 2019 12:11 PM 
Admission Date: 7/15/2019  : 1950 Impression Plan 1. Acute on chronic hypoxic resp failure- Bleomycin pulmonary toxicity with probable superimposed lower resp tract infection ( admitted 7/15 with worsening CT chest b/l ASD) and DAD. On pred 40 mg/day here. Was on pred with [facility] proph as outpt. 2. Hodgkins lymphoma s/p ABVD - completed all cycles per pts sister in  and was disease free per family. 3. DM2 
4. Encephalopathy- metabolic/hypoxic- MRI brain 19 negative 1. I spoke to pts sister at length regarding pts poor prognosis- I suspect Diffuse alveolar damage ( ARDS) superimposed on Bleomycin toxicity. Pt working to breath and minimally responsive. Pt is DNR/DNI. I discussed the comfort pathway and pts sister stated she will d/w her family. Palliative meeting today 2. Pt in negative fluid balance 3. Continue eliquis 4. Will provide morphine for WOB if comfort pathway decided upon. 5. IV solumedrol Pt is acutely ill and at risk for decline due to resp failure Subjective Remains obtunded on HFNC Does not follow commands. Review of systems not obtained due to patient factors. EXAM: 
Visit Vitals /88 (BP 1 Location: Left arm, BP Patient Position: At rest) Pulse (!) 127 Temp 96.6 °F (35.9 °C) Resp (!) 43 Ht 5' 9\" (1.753 m) Wt 79.2 kg (174 lb 9.7 oz) SpO2 95% BMI 25.78 kg/m² Temp (24hrs), Av.6 °F (36.4 °C), Min:96.6 °F (35.9 °C), Max:98 °F (36.7 °C) GENERAL: well developed and in moderate distress, NECK:  no jugular vein distention, no retractions, no thyromegaly or masses, LUNGS: decreased breath sounds billaterally and with rhonchi , HEART:  Regular rate and rhythm with no MGR; no edema is present, ABDOMEN:  soft with no tenderness, bowel sounds present, EXTREMITIES:  warm with no cyanosis and SKIN:  no jaundice or ecchymosis LABS Recent Labs 08/12/19 
0330 08/11/19 
0541 08/10/19 
0244 WBC 13.0* 11.8* 14.3* HGB 7.4* 7.9* 8.1* HCT 24.3* 25.6* 25.6*  
* 106* 101* Recent Labs 08/12/19 
0330 08/11/19 
0541 08/10/19 
0244  135* 137  
K 4.8 4.8 4.7  104 106 CO2 25 23 23 * 195* 76 BUN 54* 39* 36* CREA 1.20* 1.04* 0.90  
CA 9.5 9.2 8.6 ABG Recent Labs 08/10/19 
1201 PHI 7.398 PO2I 49* PCO2I 36.7 Radiology Films have been personally reviewed. PCXR with b/l ILD changes no change from prior CXR- low lung volumes unchanged Freddy Cavanaugh MD

## 2019-08-12 NOTE — PROGRESS NOTES
Spoke with pt's sister and brother who were waiting outside of pt's room as staff was offering care. Affirmed their emotions and explored any additional ways that we might be of support at this time. Pt's sister shared of Chaplain Prince's supportive visit yesterday and requested assurance of continued prayers. Chaplains are available for continued support as needed. Please page at [phone]. Masha Sauceda, Palliative

## 2019-08-12 NOTE — ADT AUTH CERT NOTES
Pneumonia, Community Acquired - Care Day 26 (8/9/2019) by Asaf Gonzalez RN  
 
   
Review Status Review Entered Completed 8/11/2019 15:38  
   
Criteria Review Care Day: 26 Care Date: 8/9/2019 Level of Care: Inpatient Floor Guideline Day 3 Clinical Status ( ) * Hemodynamic stability   
(X) * Afebrile, or temperature acceptable for next level of care 8/11/2019 15:38:05 EDT by Leonela Addison   
  97.7   
(X) * Tachypnea absent 8/11/2019 15:38:05 EDT by Leonela Addison   
  RR 18   
( ) * Hypoxemia absent   
( ) * Mental status at baseline ( ) * Antibiotic regimen acceptable for next level of care ( ) * Discharge plans and education understood Activity ( ) * Ambulatory Routes   
(X) * Oral hydration, medications, and diet 8/11/2019 15:38:05 EDT by Leonela Addison   
  TF: [email or web address] = 80mL flush Q4h. Amantadine 100mg BID PO, Eliquis 5mg BID  atorvastatin 40mg HS PO, fabi Q 1 cap PO QD, metoprolol 25mg Q12h PO, multivitamin 1 tab PO QD, thiamine 100mg PO QD, metformin 500mg BID PO Interventions ( ) * Oxygen absent or at baseline need (X) WBC   
8/11/2019 15:38:05 EDT by Toño Arboleda WBC 17.7 * Milestone Additional Notes 8/9/19:   
IM: Unresponsive, Decreased bs with rales. Hematology/Oncology: No new developments. Patient non verbal. Not following commands for me. God daughter at bedside. 71 y/o woman with a h/o classical Hodgkin's lymphoma, complicated by acute bleomycin toxicity during treatment after d1c4. Seen briefly at [facility] and started on cellcept in the hopes of curbing the pulmonary damage from bleomycin. Now admitted here with \"pneumonia\" and FTT. Has had worsening level or arousal with extensive evaluation by Neurology here. Asked to see eventually about level or arousal issues and I spoke about the remote possibility of lymphomatous involvement of the brain. Family declined LP.  Now re-consuled for cytopenias. I recommended holding cellcept. Palliative: At time of my assessment, her eyes were closed and she lacked meaningful response, no command following.  She did exhibit some brief twitching in her face. Last documented periods of alertness (10-15 min) on 7/31. Heme/onc reconsulted yesterday for cytopenias - Cellcept stopped. VS: 97.3, 105, 19, 76%, 132/77 4LPM NC Additional meds: prednisone 40mg QD NG, cefepime 2g Q12h IV, glucagen 1 mg PRN IMx1, D10 125ml IVx1, D10 250mL IVx1 Additional labs: hgb 7.9, hct 24.7, plt 100, neutrophils 89, BUN 41, glucose 57 PLAN: DNR, Ng tube feedings, titrate lantlus, cellcept / prednisone Family discussions with palliative care planned for Monday at [address] Discussed with daughter via phone and god-daughter in room Hold eliquis Sunday D/c cefepime today Hematology opinion appreciated Hematology plan: 1. Cytopenias: Appropriate rise of HGB after transfusion. No real change in WBC or PLT. Too early to see a benefit from holding cellcept. 2. Hodgkin's lymphoma: Not currently a candidate for treatment in my opinion. 3. Goals of care: plan is for family meeting at 8 AM to define goals of care. 4. 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    
Palliative: Plan is for fam meeting Mon 10 am to discuss PEG risks/benefits in more depth and in person with pt's sister and brother.  Anticipate family will want to proceed with a PEG therefore Eliquis is being held for Sunday in anticipation of placement on Tues.    
   
Pneumonia, Community Acquired - Care Day 25 (8/8/2019) by Leona Hadley, SUSANNAH  
 
   
Review Status Review Entered Completed 8/11/2019 15:21  
   
Criteria Review Care Day: 25 Care Date: 8/8/2019 Level of Care: Inpatient Floor Guideline Day 3 Clinical Status ( ) * Hemodynamic stability   
(X) * Afebrile, or temperature acceptable for next level of care 8/11/2019 15:19:37 EDT by Zora Cramer   
  98.1   
( ) * Tachypnea absent ( ) * Hypoxemia absent   
( ) * Mental status at baseline ( ) * Antibiotic regimen acceptable for next level of care ( ) * Discharge plans and education understood Activity ( ) * Ambulatory Routes   
(X) * Oral hydration, medications, and diet 8/11/2019 15:19:37 EDT by Deedee Lewis   
  TF: [email or web address] = 80mL flush Q4h. Amantadine 100mg BID PO, Eliquis 5mg BID  atorvastatin 40mg HS PO, fabi Q 1 cap PO QD, metoprolol 25mg Q12h PO, multivitamin 1 tab PO QD, thiamine 100mg PO QD, meformin 500mg BID PO cellcept 500mg Q12h PO, Interventions ( ) * Oxygen absent or at baseline need (X) WBC   
8/11/2019 15:19:37 EDT by Deedee Lewis   
  WBC 16.7 8/11/2019 15:21:55 EDT by Deedee Lewis Subject: Additional Clinical Information Correction to above: MeTformin 500mg BID PO   
  
* Milestone Additional Notes 8/8/19: IM: Unresponsive, Decreased bs with rales. Pulmonary: 8/8: Plans for family meeting Monday and possible PEG tube Mental status unchanged. Respiratory status stable. D/w family VS: 97.4, 107, 20, 82%, 157/81 Labs: hgb 6.6, hct 21.6, plt 107, neutrophils 86, BUN 39, glucose 61 Additional meds: prednisone 40mg QD, D50W 25g PRN IVx3, cefepime 2g Q12h IV, SSI PLAN: Ng tube feedings -   
titrate lantlus   
cellcept / prednisone Family discussions with palliative care planned for Monday at [address] Discussed with daughter via phone and god-daughter in room Hold [facility] Sunday D/c cefepime after 7 days for uti D/w brother - anemia noted - will transfuse 1 unit of pack cells Hematology opinion requested  
   
Pneumonia, Community Acquired - Care Day 24 (8/7/2019) by Jerrald Boxer, RN  
 
   
Review Status Review Entered Completed 8/11/2019 14:48  
   
Criteria Review Care Day: 24 Care Date: 8/7/2019 Level of Care: Inpatient Floor Guideline Day 3 Clinical Status ( ) * Hemodynamic stability (X) * Afebrile, or temperature acceptable for next level of care 8/11/2019 14:48:32 EDT by Obdulia Nixon   
  97.6   
(X) * Tachypnea absent 8/11/2019 14:48:32 EDT by Obdulia Nixon   
  RR 18   
(X) * Hypoxemia absent 8/11/2019 14:48:32 EDT by Obdulia Nixon   
  95% on 3L NC   
( ) * Mental status at baseline ( ) * Antibiotic regimen acceptable for next level of care ( ) * Discharge plans and education understood Activity ( ) * Ambulatory Routes   
(X) * Oral hydration, medications, and diet 8/11/2019 14:48:32 EDT by Obdulia Nixon   
  TF: [email or web address] = 80mL flush Q4h. Amantadine 100mg BID PO, atorvastatin 40mg HS PO, fabi Q 1 cap PO QD, metoprolol 25mg Q12h PO, multivitamin 1 tab PO QD, thiamine 100mg PO QD, meformin 500mg BID PO cellcept 500ng Q12h PO, Interventions ( ) * Oxygen absent or at baseline need (X) WBC   
8/11/2019 14:48:32 EDT by Obdulia Nixon   
  WBC 18.3 * Milestone Additional Notes 8/7/19:   
IM: Unresponsive, Decreased bs with rales. Acute encephalopathy:-unclear etiology - transiently awaken with appropriate conversation   
-seizure was suspected but  EEG did not confirm   
-MRI was negative for stroke,CT head and neck - no bleed,vascular occlusion. -ABG reviewed. Electrolytes,ammonia,trop,lactic acid wnl   
-Neurology following   
-Continue  keppra. -MRI brain w contrast  - no acute process. -Will be on 24 hr EEG   
-May need LP for cytology as she has history of hodgkin's lymphoma. Hold eliquis.   
-Oncology following Acute on Chronic hypoxemic respiratory failure ([facility] [address] (7/16/2019): On nasal canula Bleomycin lung toxicity (7/16/2019): cellcept and steroids Diabetes mellitus type 2, controlled: basal bolus insulin Pulmonary: 8/7CXR stable. Afebrile. Remains on 3LPM. Plans for possible PEG placement. VS: 97.6, 106, 18, 97%, 148/86 Labs: WBC 18.3, hgb 7.0, hct 22.3, plt 117, neutrophils 89, glucose 183, BUN 42,   
 Additional meds: prednisone 40mg QD, lantus 40 units HS SC, cefepime 2g Q12h IV, SSI PLAN: Ng tube feedings -    Had previously discussed PEG with family at time of ng tube - all were in agreement at that time. - discussed PEG with brother at bedside - he states family agrees to peg and subsequent snf placement. Titrate lantlus. Cellcept / prednisone. Hold eliquis. Pulmonary plan: O2 titration above 90%, Continue to wean as able. Autoimmune labs mostly negative, but did have a + PEACE with elevated CRP and ESR; query coexisting autoimmune process. Once discharge would recommend rheumatology follow up pending clinical course. Prednisone per PEG / cellcept. On Cefepime Palliative:  Will plan to meet with both siblings on Monday at 10am to confirm plan.

## 2019-08-12 NOTE — ACP (ADVANCE CARE PLANNING)
ACP Note: 
 
Met with Pt's primary healthcare decision makers among other family members this morning. Discussed transition to care solely focused on comfort knowing that her life expectancy either way is quite limited. Plan is to make this transition today but continue her oxygen at present rate to provide both comfort and more time for family to gather and arrive to say their goodbyes. Discussed option of turning down the O2 rate at any time if family appreciates distress from its ongoing use. Please reference today's palliative care note for full details. Primary Decision Maker: Jack Azul - Brother - [phone] Primary Decision Maker (Active): Edinson Saba - Sister - [phone] Time start:  10:00 Time end:  10:45

## 2019-08-12 NOTE — PROGRESS NOTES
Palliative Medicine Consult [place]: [phone] ([phone]) Patient Name: Hiral Vaughan YOB: 1950 Date of Initial Consult: July 17, 2019 Reason for Consult: Care Decisions Requesting Provider: Huong Garcia Primary Care Physician: Millicent Tineo MD 
 
 SUMMARY:  
Hiral Vaughan is a 70 y.o. with a past history of Lymphoma (chemo) followed at [facility], HTN, fibromyalgia, muscle atrophy, pulmonary fibrosis, asthma, cataracts, DM, chronic respiratory failure, recurrent pna, sinus infection,  who was admitted on 7/15/2019 from [place] with a diagnosis of PNA, acute on chronic hypoxemic respiratory failure, bleomycin lung toxicity. Pt had 7 rounds of chemo and then developed significant side effects. DNR with Hospice services at the facility [facility]) revoked benefit for hospitalization. Current medical issues leading to Palliative Medicine involvement include: support with care decisions. Social: single, no children, well supported by brother and sister, moved to [place] from [place] in [address] [place] [address] to work as the Director of International Studies at [employer] before transitioning to a position  for [employer], has not been home in 6 months as she has been in and out of facilities for rehab and care. Interval History: 
7/18/19: hypoxia and tachycardia with removal of mask for brief period. Pul to start cellcept with steroids to see if she responds. S/s due to disease progression not pneumonia Pt declined intubation for respiratory distress if needed. 7/24/19: continues cellcept therapy. Still on hi flow 7/29/29: pt with acute mental change over the weekend. Suspect seizures but eeg neg,  MRI without contrast neg. Catatonic state 8/1/19: on nasal cannula, somnolent state for close to 24 hours, TF started today 8/9:  Resting comfortably. On 4L NC. Taken off Cellcept yesterday due to cytopenias. PALLIATIVE DIAGNOSES:  
1. Shortness of breath 2. Encephalopathy - metabolic vs akinetic mutism from cerebral hypoxia 3. Chronic hypoxic respiratory failure dt bleomycin lung toxicity 4. Feeding difficulties - dobhoff with enteral feeds 5. Debility 6. Goals of care discussion PLAN:  
1. Events over the weekend noted. Her resp status worsened significantly, she is now on 45L via face tent in the ICU. Pt's sister Paz Werner has been updated thoroughly by Dr. William Saab. 2. She has significant bl lung fibrosis which has been refractory to steroids. 3. [facility] INPATIENT and I met this morning with pt's sister, brother and their children, a cousin and another cousin by phone. All expressed good understanding of her ongoing decline, despite some frustrations surrounding here care along the way. They emphasized that their overarching goal for Ms. Velázquez has been that of her comfort since the very beginning. They have carefully and thoughtfully weighed the risks and benefits of all procedures and interventions while here, which we acknowledged. They shared that Ms. Velázquez had been quite clear in her decision not to be intubated nor resuscitated. 4. We reviewed in detail what a path where we solely focus on her comfort would look like; namely in her current state, the ability to liberalize IV opioids so that we may reduce her work of breathing. I suggest morphine as likely to be more effective than IV fentanyl. I anticipate she would pass with titration down of her O2 given her present needs and expressed this. They have some additional family members that plan to travel here today to see her. 5. Plan: 1. Comfort focused care 2. Continue care in ICU at present in order to maintain her oxygen needs 3. D/c IV fentanyl (ineffective), start morphine 2 mg IV q15 min prn 4. Remove NG from nare and stop enteral feeds (discussed with family) 5. D/c oral meds 6. Allow family to arrive today 7. When family ready tonight or tomorrow; consider titrating down O2 
8. Anticipate EOL hours to days 6. please call with questions. 7. Communicated plan of care with: Palliative IDTAndie Team 
 
 GOALS OF CARE / TREATMENT PREFERENCES:  
 
GOALS OF CARE: 
Patient/Health Care Proxy Stated Goals: Comfort TREATMENT PREFERENCES:  
Code Status: DNR Advance Care Planning: 
[x] [facility] Interdisciplinary Team has updated the ACP Navigator with [place] and Patient Capacity Primary Decision Maker: Tobias Troncoso - Brother - [phone] Primary Decision Maker (Active): Ubaldo Mosley - Sister - [phone] Medical Interventions: Comfort measures Other Instructions:  
Artificially Administered Nutrition: No feeding tube Other: As far as possible, the palliative care team has discussed with patient / health care proxy about goals of care / treatment preferences for patient. HISTORY:  
 
History obtained from: chart, family CHIEF COMPLAINT: pt admitted with aforementioned issues HPI/SUBJECTIVE: The patient is:  
[] Verbal and participatory [x] Non-participatory due to: Unresponsive state Clinical Pain Assessment (nonverbal scale for severity on nonverbal patients):  
Clinical Pain Assessment Severity: 0 Activity (Movement): Lying quietly, normal position Duration: for how long has pt been experiencing pain (e.g., 2 days, 1 month, years) Frequency: how often pain is an issue (e.g., several times per day, once every few days, constant) FUNCTIONAL ASSESSMENT:  
 
Palliative Performance Scale (PPS): PPS: 10 PSYCHOSOCIAL/SPIRITUAL SCREENING:  
 
Palliative IDT has assessed this patient for cultural preferences / practices and a referral made as appropriate to needs (Cultural Services, Patient Advocacy, Ethics, etc.) Any spiritual / Christian concerns: 
[] Yes /  [x] No 
 
Caregiver Burnout: 
 [] Yes /  [x] No /  [] No Caregiver Present Anticipatory grief assessment:  
[x] Normal  / [] Maladaptive ESAS Anxiety: Anxiety: 0 
 
ESAS Depression:    
 
 
 REVIEW OF SYSTEMS:  
 
Positive and pertinent negative findings in ROS are noted above in HPI. The following systems were [x] reviewed / [] unable to be reviewed as noted in HPI Other findings are noted below. Systems: constitutional, ears/nose/mouth/throat, respiratory, gastrointestinal, genitourinary, musculoskeletal, integumentary, neurologic, psychiatric, endocrine. Positive findings noted below. Modified ESAS Completed by: provider Fatigue: 7 Drowsiness: 0 Pain: 0 Anxiety: 0 Nausea: 0 Anorexia: 5 Dyspnea: 10 Stool Occurrence(s): 1 PHYSICAL EXAM:  
 
From RN flowsheet: 
Wt Readings from Last 3 Encounters:  
08/12/19 79.2 kg (174 lb 9.7 oz) 02/09/19 108.2 kg (238 lb 8.6 oz) 01/21/19 81.6 kg (180 lb) Blood pressure (!) 145/16, pulse (!) 105, temperature 96.6 °F (35.9 °C), resp. rate 30, height 5' 9\" (1.753 m), weight 79.2 kg (174 lb 9.7 oz), SpO2 95 %. Pain Scale 1: Adult Nonverbal Pain Scale Pain Intensity 1: 2 Pain Intervention(s) 1: Medication (see MAR) Last bowel movement, if known:  
 
Ill appearing female; sitting up in ICU bed on face tent. 45L.   
+++Inc work of breathing Abd soft, nondistended. Ext w/o edema. Eyes slightly open; no meaningful response to touch or voice. HISTORY:  
 
Active Problems: 
  Chronic hypoxemic respiratory failure ([address] (7/16/2019) Bleomycin lung toxicity (7/16/2019) Hodgkin lymphoma ([address] (2/3/2019) Encephalopathy (7/31/2019) Past Medical History:  
Diagnosis Date  Asthma   
 as a child, nothing since menopause  Cancer ([address] 08/21/2018 Non-Hodgkin's  Cataracts, bilateral   
 Diabetes ([address] type II  
 Environmental allergies  Fibromyalgia Treated with alternate medical therapy  Hypertension Past Surgical History:  
Procedure Laterality Date  HX BREAST BIOPSY Right 2006  HX COLONOSCOPY  2015  HX GYN Pap 2015  HX ORTHOPAEDIC    
 fractured R ankle/ no surgery  HX OTHER SURGICAL Right 2018  
 excision of deep neck cervical LN. Family History Problem Relation Age of Onset  Heart Disease Mother  Cancer Father   
     prostate cancer  Cancer Sister Breast cancer apparent DCIS  
 Heart Disease Brother Brother  from congestive heart failure secondary to severe mitral disease History reviewed, no pertinent family history. Social History Tobacco Use  Smoking status: Never Smoker  Smokeless tobacco: Never Used Substance Use Topics  Alcohol use: Yes Comment: socially No Known Allergies Current Facility-Administered Medications Medication Dose Route Frequency  morphine injection 2 mg  2 mg IntraVENous Q15MIN PRN  
 glycopyrrolate (ROBINUL) injection 0.2 mg  0.2 mg IntraVENous Q4H PRN  
 ketorolac (TORADOL) injection 30 mg  30 mg IntraVENous Q8H PRN  
 albuterol-ipratropium (DUO-NEB) 2.5 MG-0.5 MG/3 ML  3 mL Nebulization Q4H RT  
 methylPREDNISolone (PF) (Solu-MEDROL) injection 80 mg  80 mg IntraVENous Q6H  
 bisacodyl (DULCOLAX) suppository 10 mg  10 mg Rectal DAILY PRN  
 0.9% sodium chloride infusion 250 mL  250 mL IntraVENous PRN  
 white petrolatum-mineral oil (SOOTHE NIGHT TIME) 80-20 % ophthalmic ointment   Both Eyes PRN  
 sodium chloride (NS) flush 5-10 mL  5-10 mL IntraVENous PRN  
 sodium chloride (NS) flush 5-40 mL  5-40 mL IntraVENous Q8H  
 sodium chloride (NS) flush 5-40 mL  5-40 mL IntraVENous PRN  
 
 
 
 LAB AND IMAGING FINDINGS:  
 
Lab Results Component Value Date/Time WBC 13.0 (H) 2019 03:30 AM  
 HGB 7.4 (L) 2019 03:30 AM  
 PLATELET 275 (L)  03:30 AM  
 
Lab Results Component Value Date/Time  Sodium 139 2019 03:30 AM  
 Potassium 4.8 08/12/2019 03:30 AM  
 Chloride 104 08/12/2019 03:30 AM  
 CO2 25 08/12/2019 03:30 AM  
 BUN 54 (H) 08/12/2019 03:30 AM  
 Creatinine 1.20 (H) 08/12/2019 03:30 AM  
 Calcium 9.5 08/12/2019 03:30 AM  
 Magnesium 2.5 (H) 08/04/2019 09:25 AM  
 Phosphorus 2.9 08/02/2019 05:02 AM  
  
Lab Results Component Value Date/Time AST (SGOT) 14 (L) 07/23/2019 04:07 AM  
 Alk. phosphatase 151 (H) 07/23/2019 04:07 AM  
 Protein, total 6.6 07/23/2019 04:07 AM  
 Albumin 2.6 (L) 07/23/2019 04:07 AM  
 Globulin 4.0 07/23/2019 04:07 AM  
 
Lab Results Component Value Date/Time INR 1.4 (H) 07/28/2019 12:16 PM  
 Prothrombin time 14.1 (H) 07/28/2019 12:16 PM  
 aPTT 30.2 08/27/2018 08:15 AM  
  
Lab Results Component Value Date/Time Iron 24 (L) 07/16/2019 01:24 AM  
 TIBC 200 (L) 07/16/2019 01:24 AM  
 Iron % saturation 12 (L) 07/16/2019 01:24 AM  
  
No results found for: PH, PCO2, PO2 No components found for: [place] No results found for: CPK, CKMB Total time:  
Counseling / coordination time, spent as noted above:  
> 50% counseling / coordination?:  
 
Prolonged service was provided for  []30 min   []75 min in face to face time in the presence of the patient, spent as noted above. Time Start: 10:00 Time End: 10:45 Note: this can only be billed with 59783 (initial) or 52253 (follow up). If multiple start / stop times, list each separately.

## 2019-08-12 NOTE — PROGRESS NOTES
Hematology-Oncology Progress Note [place] 1950 
[id number] 
8/12/2019 Subjective:  
Sedated, not responding to voice,,, appears comfortable on face mask Allergies: Patient has no known allergies. Current Facility-Administered Medications Medication Dose Route Frequency Provider Last Rate Last Dose  morphine injection 2 mg  2 mg IntraVENous Q15MIN PRN Shakila Mendoza MD      
 glycopyrrolate (ROBINUL) injection 0.2 mg  0.2 mg IntraVENous Q4H PRN Shakila Mendoza MD      
 ketorolac (TORADOL) injection 30 mg  30 mg IntraVENous Q8H PRN Ryan Patton MD      
 albuterol-ipratropium (DUO-NEB) 2.5 MG-0.5 MG/3 ML  3 mL Nebulization Q4H RT Homero Garza MD   3 mL at 08/12/19 0714  
 methylPREDNISolone (PF) (Solu-MEDROL) injection 80 mg  80 mg IntraVENous Q6H Homero Garza MD   80 mg at 08/12/19 0550  bisacodyl (DULCOLAX) suppository 10 mg  10 mg Rectal DAILY PRN Iris Umanzor MD      
 0.9% sodium chloride infusion 250 mL  250 mL IntraVENous PRN Nahum Cadena MD      
 white petrolatum-mineral oil (SOOTHE NIGHT TIME) 80-20 % ophthalmic ointment   Both Eyes PRN Nahum Cadena MD   1 Each at 08/04/19 2234  sodium chloride (NS) flush 5-10 mL  5-10 mL IntraVENous PRN Rodolfo Pastrana MD   10 mL at 08/12/19 0847  
 sodium chloride (NS) flush 5-40 mL  5-40 mL IntraVENous Q8H Kaila Ortiz MD   10 mL at 08/12/19 2280  sodium chloride (NS) flush 5-40 mL  5-40 mL IntraVENous PRN Kaila Ortiz MD      
 
Objective:  
 
Patient Vitals for the past 24 hrs: 
 BP Temp Pulse Resp SpO2 Weight 08/12/19 1000 128/75  100 (!) 32 97 %   
08/12/19 0900 174/86  (!) 125 27 95 %   
08/12/19 0800 183/88 96.6 °F (35.9 °C) (!) 127 (!) 43 95 %   
08/12/19 0714     95 %   
08/12/19 0700 156/68  (!) 124 (!) 33 96 % 79.2 kg (174 lb 9.7 oz) 08/12/19 0600 124/67  (!) 114 25 96 %   
08/12/19 0500 140/60  (!) 115 (!) 33 95 %   
 08/12/19 0400 163/71 96.9 °F (36.1 °C) (!) 119 26 95 %   
08/12/19 0344     94 %   
08/12/19 0300 177/82  (!) 124 (!) 35 93 %   
08/12/19 0200 145/61  (!) 115 (!) 33 92 %   
08/12/19 0100 110/62  (!) 112 28 94 %   
08/12/19 0000 147/73 98 °F (36.7 °C) (!) 118 (!) 35 92 %   
08/11/19 2343     94 %   
08/11/19 2300 114/69  (!) 105 (!) 33 94 %   
08/11/19 2200 123/62  (!) 106 27 95 %   
08/11/19 2100 154/68  (!) 130 (!) 36 94 %   
08/11/19 2000 134/60 98 °F (36.7 °C) (!) 126 (!) 37 95 %   
08/11/19 1942     95 %   
08/11/19 1904 183/83  (!) 127     
08/11/19 1900 183/83  (!) 127 (!) 39 94 %   
08/11/19 1800 163/74  (!) 122 (!) 33 91 %   
08/11/19 1700 164/68  (!) 120 (!) 31 94 %   
08/11/19 1600 (!) 161/105 98 °F (36.7 °C) (!) 120 (!) 34 94 %   
08/11/19 1526     93 %   
08/11/19 1500 156/70  (!) 118 (!) 35 94 %   
08/11/19 1400 153/78  (!) 117 (!) 35 95 %   
08/11/19 1318 183/89  (!) 115     
08/11/19 1300 183/89  (!) 116 (!) 36 93 %   
08/11/19 1200 164/80 97.9 °F (36.6 °C) (!) 109 (!) 31 (!) 88 %   
08/11/19 1127     (!) 89 %  Gen: Non verbal. Not following commands. HEENT: PERRL, Sclerae anicteric Cv: RRR without m/r/g Pulm: CTA bilaterally Abd: NABS, NTND, No HSM Ext: No c/c/e Available labs reviewed: 
Labs:   
Recent Results (from the past 24 hour(s)) GLUCOSE, POC Collection Time: 08/11/19 11:21 AM  
Result Value Ref Range Glucose (POC) 327 (H) 65 - 100 mg/dL Performed by Sergio Whitmore, POC Collection Time: 08/11/19  5:25 PM  
Result Value Ref Range Glucose (POC) 368 (H) 65 - 100 mg/dL Performed by Sergio Whitmore, POC Collection Time: 08/12/19  1:46 AM  
Result Value Ref Range Glucose (POC) 390 (H) 65 - 100 mg/dL Performed by Jeferson Aponte METABOLIC PANEL, BASIC Collection Time: 08/12/19  3:30 AM  
Result Value Ref Range Sodium 139 136 - 145 mmol/L  Potassium 4.8 3.5 - 5.1 mmol/L  
 Chloride 104 97 - 108 mmol/L  
 CO2 25 21 - 32 mmol/L Anion gap 10 5 - 15 mmol/L Glucose 351 (H) 65 - 100 mg/dL BUN 54 (H) 6 - 20 MG/DL Creatinine 1.20 (H) 0.55 - 1.02 MG/DL  
 BUN/Creatinine ratio 45 (H) 12 - 20 GFR est AA 54 (L) >60 ml/min/1.73m2 GFR est non-AA 45 (L) >60 ml/min/1.73m2 Calcium 9.5 8.5 - 10.1 MG/DL  
CBC WITH AUTOMATED DIFF Collection Time: 08/12/19  3:30 AM  
Result Value Ref Range WBC 13.0 (H) 3.6 - 11.0 K/uL  
 RBC 2.56 (L) 3.80 - 5.20 M/uL HGB 7.4 (L) 11.5 - 16.0 g/dL HCT 24.3 (L) 35.0 - 47.0 % MCV 94.9 80.0 - 99.0 FL  
 MCH 28.9 26.0 - 34.0 PG  
 MCHC 30.5 30.0 - 36.5 g/dL  
 RDW 17.4 (H) 11.5 - 14.5 % PLATELET 328 (L) 785 - 400 K/uL MPV 12.7 8.9 - 12.9 FL  
 NRBC 0.2 (H) 0  WBC ABSOLUTE NRBC 0.02 (H) 0.00 - 0.01 K/uL NEUTROPHILS 92 (H) 32 - 75 % BAND NEUTROPHILS 2 0 - 6 % LYMPHOCYTES 2 (L) 12 - 49 % MONOCYTES 4 (L) 5 - 13 % EOSINOPHILS 0 0 - 7 % BASOPHILS 0 0 - 1 % IMMATURE GRANULOCYTES 0 %  
 ABS. NEUTROPHILS 12.2 (H) 1.8 - 8.0 K/UL  
 ABS. LYMPHOCYTES 0.3 (L) 0.8 - 3.5 K/UL  
 ABS. MONOCYTES 0.5 0.0 - 1.0 K/UL  
 ABS. EOSINOPHILS 0.0 0.0 - 0.4 K/UL  
 ABS. BASOPHILS 0.0 0.0 - 0.1 K/UL  
 ABS. IMM. GRANS. 0.0 K/UL  
 DF MANUAL    
 RBC COMMENTS ANISOCYTOSIS 1+ 
    
 RBC COMMENTS HYPOCHROMIA 1+ 
    
 RBC COMMENTS POLYCHROMASIA PRESENT 
    
 RBC COMMENTS SCHISTOCYTES PRESENT 
    
GLUCOSE, POC Collection Time: 08/12/19  5:49 AM  
Result Value Ref Range Glucose (POC) 377 (H) 65 - 100 mg/dL Performed by Kylah Pollard Assessment and Plan  
 
71 y/o woman with a h/o classical Hodgkin's lymphoma, complicated by acute bleomycin toxicity during treatment after d1c4. Seen briefly at [facility] and started on cellcept in the hopes of curbing the pulmonary damage from bleomycin. 1. Cytopenias: hgb low -- no intervention at this time 2. Hodgkin's lymphoma: s/p treatment as deascribed, no further rx planned 3. Goals of care: palliative mtg today,  
 
4. 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 d/w sister Jose Toro MD

## 2019-08-12 NOTE — PROGRESS NOTES
0800 - Bedside and Verbal shift change report given to melvin hunter (oncoming nurse) by Kathy San (offgoing nurse). Report included the following information SBAR, Kardex, Intake/Output, MAR, Recent Results and Cardiac Rhythm ST.  
1050 - Family decided patient will be comfort measures at this time. 1930 - Bedside and Verbal shift change report given to [address] [place] [address]  (oncoming nurse) by Edwin Layne (offgoing nurse). Report included the following information SBAR, Kardex, Intake/Output, MAR, Recent Results and Cardiac Rhythm ST.

## 2019-08-12 NOTE — PROGRESS NOTES
1945: Bedside shift change report given to Rachael Anderson (oncoming nurse) by Myriam Davila RN (offgoing nurse). Report included the following information SBAR, Kardex, ED Summary, Intake/Output, MAR, Recent Results, Med Rec Status, Cardiac Rhythm ST and Alarm Parameters . 0730: Bedside shift change report given to Concetta Fay RN (oncoming nurse) by Chace Saleh RN (offgoing nurse). Report included the following information SBAR, Kardex, ED Summary, Intake/Output, MAR, Recent Results, Med Rec Status, Cardiac Rhythm ST and Alarm Parameters .

## 2019-08-12 NOTE — PALLIATIVE CARE
Palliative Medicine Social Work Chart reviewed and note patient's ongoing decline; acute worsening of respiratory status; transfer to ICU and calli conversations held by Dr. Brandy Valdez about her grim prognosis. Family meeting held this morning with her two siblings, Regino Lin ([phone]) and Chaitanya Phillips ([phone]); brother-in-law, Tom Mcintosh; nieces, Layla Common and Armida ; nephew, Tom Mcintosh; and two cousins Mari Mchugh and Hamilton Carneyford phone). Reviewed her course.; Hodgkin's; lung toxicity from resulting treatments; now worsening fibrosis and the inability to reverse or improve. Discussed her O2 requirements and reality that she is working hard to breath, using her accessory muscles. Discussed challenges to comfort when still fighting numbers. Discussed recommendation for complete shift to comfort and how that might look. All in agreement with plan to liberalize medications for her comfort; discontinue medications not directly influencing her comfort and taking out NG. Will leave O2 mask and requirements in place for now and wait for cousin, Elsa Butler, to arrive before weaning. Family aware that she is at high risk of dying at any time, even before Elsa Butler, arrives. Goals are clear for no CPR, shock or intubation. 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 Grief process appears adaptive. Will continue to support. Made reservations for Elsa Butler and her  at [facility]. Thank you for the opportunity to be involved in the care of Ms. Blaise Horta. Cami Garza, RONALDOW, [facility]- Palliative Medicine  Respecting Choices ® ACP Facilitator [phone]

## 2019-08-12 NOTE — DIABETES MGMT
[facility] Progress Note Recommendations/ Comments: Chart review for widely variable BG's over the weekend. Initially having hypogylcemia - BG 52 mg/dL at 0001 on 8/10/2019 Cince then steroids changed from Prednisone to Solu Medrol 80 mg Q 6 hours. BG's eros to > 300 mg/dL.  mg/dL today. Received 18 units of lispro correction yesterday Continues on TF Glucerna 1.2 @ 60 Noted Lantus 16 units at bedtime ordered Per Dr Amy Sousa and Dr Skippy Mcburney notes, pt with poor prognosis. Family meeting today to discuss comfort measures Current hospital DM medication:  
Lantus 16 units at bedtime to begin tonight (8/12/2019) Lispro insulin resistant scale with instruction up to BS >451 mg/dl; Chart reviewed on [facility]. Patient is a 70 y.o. female with known  Type 2 Diabetes on oral agent (monotherapy): metformin (generic) at home. DNR 
 
A1c:  
Lab Results Component Value Date/Time Hemoglobin A1c 6.9 (H) 08/03/2018 01:45 PM  
 Hemoglobin A1c 6.8 (H) 01/30/2018 01:25 PM  
 
 
Recent Glucose Results:  
Lab Results Component Value Date/Time  (H) 08/12/2019 03:30 AM  
 GLUCPOC 377 (H) 08/12/2019 05:49 AM  
 GLUCPOC 390 (H) 08/12/2019 01:46 AM  
 GLUCPOC 368 (H) 08/11/2019 05:25 PM  
  
 
Lab Results Component Value Date/Time Creatinine 1.20 (H) 08/12/2019 03:30 AM  
 
Estimated Creatinine Clearance: 46.2 mL/min (A) (based on SCr of 1.2 mg/dL (H)). Active Orders Diet DIET NPO With Tube Feedings PO intake:  
No data found. Will continue to follow as needed. Thank you Eli Flores RN, CDE Time spent: 6 min

## 2019-08-13 NOTE — PROGRESS NOTES
Hospital Progress Note NAME:  Elvin Cary :   1950 MRN:  [id number] Date/Time:  2019 8:25 AM 
 
Plan: 1. Comfort care 2. titrate lantlus 3. Discussed grand daughter 4. Titrate oxygen/pul Risk of Deterioration: Low  []           Moderate  []           High  [x] Assessment:  
Principal Problem: 
  Pneumonia (7/15/2019) Active Problems: 
   Acute encephalopathy: 
-unclear etiology - transiently awaken with appropriate conversation 
-seizure was suspected but  EEG did not confirm 
-MRI was negative for stroke,CT head and neck - no bleed,vascular occlusion. -ABG reviewed. Electrolytes,ammonia,trop,lactic acid wnl 
-Neurology evaluated 
-MRI brain w contrast  - no acute process. -EEG neg 
-May need LP for cytology as she has history of hodgkin's lymphoma - family declined 
-Oncology evaluated Acute on Chronic hypoxemic respiratory failure ([address] (2019) On high flow oxygen Bleomycin lung toxicity (2019) Iv steroids Diabetes mellitus type 2, controlled ([address] (2016) 
   basal bolus insulin Hodgkin lymphoma ([facility] (2/3/2019) bulky stage II HD (classical),s/p treatment earlier this year by Dr. Lorri Barragan with ABVD. Course truncated due to abrupt onset bleomycin toxicity. Diff dx includes lymphangitic spread of lymphoma. She completed 3 1/2 treatments of ABVD, less than the standard 6. oncol raised the potential concern for recurrent disease with her family. UTI -  ENTEROCOCCUS SPECIES Treated UTI - ESCHERICHIA COLI 19 
    treated Admitting notes:68 y.o. female with past medical history significant for htn, fibromyalgia, muscle atrophy, pulmonary fibrosis, asthma, b/l cataracts, dm, non-hodgkin's, who presents from ems with chief complaint of sob. Pt has \"last night\" onset of worsening sob described as \"labored breathing\" that's continued into this morning.  She is normally on 4.5 L of O2 n/c at baseline, but had it increased to 5 L by staff. She also endorses a dry non-productive cough and fever (peak 100) at this time. EMS arrived and measured initial O2 sat of 92% and HR of 140 bpm. Denies pleuritic cp and abd pain. Of note, she has had PNA \"x3 times since Ken" and was treated last week with a z-pack for sinus infection symptomatic of congestion. DNR form in place. EMS was called to [place];  bpm, 124/78, and 9 Subjective/interium history Unresponsive at time of my exam -eye open - not following commands 11 Point Review of Systems:  
Negative except [x]            Unable to obtain ROS due to:      
[x]            mental status change []            sedated []            intubated Social History Tobacco Use  Smoking status: Never Smoker  Smokeless tobacco: Never Used Substance Use Topics  Alcohol use: Yes Comment: socially Medications reviewed: 
Current Facility-Administered Medications Medication Dose Route Frequency  morphine injection 2 mg  2 mg IntraVENous Q15MIN PRN  
 glycopyrrolate (ROBINUL) injection 0.2 mg  0.2 mg IntraVENous Q4H PRN  
 ketorolac (TORADOL) injection 30 mg  30 mg IntraVENous Q8H PRN  
 albuterol-ipratropium (DUO-NEB) 2.5 MG-0.5 MG/3 ML  3 mL Nebulization Q4H PRN  
 methylPREDNISolone (PF) (Solu-MEDROL) injection 80 mg  80 mg IntraVENous Q6H  
 bisacodyl (DULCOLAX) suppository 10 mg  10 mg Rectal DAILY PRN  
 0.9% sodium chloride infusion 250 mL  250 mL IntraVENous PRN  
 white petrolatum-mineral oil (SOOTHE NIGHT TIME) 80-20 % ophthalmic ointment   Both Eyes PRN  
 sodium chloride (NS) flush 5-10 mL  5-10 mL IntraVENous PRN  
 sodium chloride (NS) flush 5-40 mL  5-40 mL IntraVENous Q8H  
 sodium chloride (NS) flush 5-40 mL  5-40 mL IntraVENous PRN Objective:  
Vitals: 
Visit Vitals BP (!) 159/100 (BP 1 Location: Left arm, BP Patient Position: At rest) Pulse (!) 117 Temp 97.4 °F (36.3 °C) Resp 21 Ht 5' 9\" (1.753 m) Wt 160 lb 11.5 oz (72.9 kg) SpO2 95% BMI 23.73 kg/m² Temp (24hrs), Av.6 °F (36.4 °C), Min:97.2 °F (36.2 °C), Max:98.1 °F (36.7 °C) O2 Flow Rate (L/min): 45 l/min O2 Device: Hi flow nasal cannula(Face tent) Last 24hr Input/Output: 
 
Intake/Output Summary (Last 24 hours) at 2019 7592 Last data filed at 2019 0400 Gross per 24 hour Intake 200 ml Output 320 ml Net -120 ml PHYSICAL EXAM: 
General:    Unresponsive, no distress, appears stated age. Head:   Normocephalic, without obvious abnormality, atraumatic. Lungs:   Decreased bs with rales. Chest wall:  No tenderness or deformity. No Accessory muscle use. Heart:   Regular rate and rhythm,  no murmur, rub or gallop. Abdomen:   Soft, non-tender. Not distended. Bowel sounds normal. No masses. Lab Data Reviewed: 
 
Recent Labs 19 
0330 19 
0541 WBC 13.0* 11.8* HGB 7.4* 7.9*  
HCT 24.3* 25.6*  
* 106* Recent Labs 19 
0330 19 
0541  135* K 4.8 4.8  
 104 CO2 25 23 * 195* BUN 54* 39* CREA 1.20* 1.04* CA 9.5 9.2 Lab Results Component Value Date/Time Glucose (POC) 377 (H) 2019 05:49 AM  
 Glucose (POC) 390 (H) 2019 01:46 AM  
 Glucose (POC) 368 (H) 2019 05:25 PM  
 Glucose (POC) 327 (H) 2019 11:21 AM  
 Glucose (POC) 217 (H) 2019 07:08 AM  
 
 
___________________________________________________ 
___________________________________________________ Attending Physician: Umu Meléndez MD

## 2019-08-13 NOTE — PROGRESS NOTES
Bedside and Verbal shift change report given to Traci (oncoming nurse) by Negrita Coughlin (offgoing nurse). Report included the following information SBAR, Kardex, Intake/Output, MAR, Accordion, Recent Results, Med Rec Status, Cardiac Rhythm nsr and Alarm Parameters . 1115: Palliative MD at bedside, orders for continuous pain medication drip received 1705: This RN expressed to patient family that pt now has orders to 2N rather than IMCU, pt family at bedside visibly upset, called uncle/pt POA, who wished to speak with this RN. Over the phone, pt family expressed concern for pt care on 2N, demanding to be placed on the 4th floor/IMCU instead. This RN updated charge RN, will be speaking with supervisor shortly for additional help in this matter 1654: Supervisor Wally Gonzalez on unit speaking with patient family, family agreeing to go to 2N, pt resting quietly at this time 1817: Attempted to call report, RN will call unit back ~15minutes 1846: Attempted to call report for reassigned room still on 6E, RN will be calling this RN back as soon as she can TRANSFER - OUT REPORT: 
Verbal report given to Olivia(name) on [facility]  being transferred to 6E(unit) for routine progression of care Report consisted of patients Situation, Background, Assessment and  
Recommendations(SBAR). Information from the following report(s) SBAR, Kardex, Intake/Output, MAR, Accordion, Recent Results, Med Rec Status, Cardiac Rhythm sinus tach and Alarm Parameters  was reviewed with the receiving nurse. Lines:  
Peripheral IV 08/08/19 Anterior;Right Forearm (Active) Site Assessment Clean, dry, & intact 8/13/2019  4:00 PM  
Phlebitis Assessment 0 8/13/2019  4:00 PM  
Infiltration Assessment 0 8/13/2019  4:00 PM  
Dressing Status Clean, dry, & intact 8/13/2019  4:00 PM  
Dressing Type Transparent;Tape 8/13/2019  4:00 PM  
Hub Color/Line Status Yellow;Capped 8/13/2019  4:00 PM  
 Action Taken Open ports on tubing capped 8/13/2019  4:00 PM  
Alcohol Cap Used Yes 8/13/2019  4:00 PM  
  
Opportunity for questions and clarification was provided.    
Patient transported with: 
Alexis, respiratory therapist

## 2019-08-13 NOTE — PROGRESS NOTES
Noted transfer to ICU, palliative MD following pt. Sister Carlie Walton [phone] and brother Graham Ellis [phone] are her confirmed NOK. Pt revoked [facility] prior to coming to ED for treatment, 7/15, and lived at [facility], [place] [address]. Pt requiring ICU care related to increased work of breathing on 45L via face tent- given morphine 2 mg IV x 11 doses / 24 hrs. Palliative discussed possible hospice enrollment with family.  
 
RAND Evans

## 2019-08-13 NOTE — PROGRESS NOTES
Palliative Medicine Consult [place]: [phone]) Patient Name: Elvin Cary YOB: 1950 Date of Initial Consult: July 17, 2019 Reason for Consult: Care Decisions Requesting Provider: Dinora Martinez Primary Care Physician: Dominique Marte MD 
 
 SUMMARY:  
Elvin Cary is a 70 y.o. with a past history of Lymphoma (chemo) followed at [facility], HTN, fibromyalgia, muscle atrophy, pulmonary fibrosis, asthma, cataracts, DM, chronic respiratory failure, recurrent pna, sinus infection,  who was admitted on 7/15/2019 from [place] with a diagnosis of PNA, acute on chronic hypoxemic respiratory failure, bleomycin lung toxicity. Pt had 7 rounds of chemo and then developed significant side effects. Her prolonged hospital course has been complicated by a persistent encephalopathic state of unclear etiology with evaluation by neuro, family opted not to pursue an LP, as well as worsening respiratory distress in the setting of her bl pulmonary fibrosis, requiring high flow oxygen with failure of high dose steroids. 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 PALLIATIVE DIAGNOSES:  
1. Shortness of breath 2. Encephalopathy - metabolic vs akinetic mutism from cerebral hypoxia 3. Chronic hypoxic respiratory failure dt bleomycin lung toxicity 4. Feeding difficulties 5. Debility 6. Goals of care discussion PLAN:  
1. Assessed pt in the ICU this morning. 2. She continues to exhibit increased work of breathing on 45L via face tent- given morphine 2 mg IV x 11 doses / 24 hrs 3. Per nieces and bedside RN; morphine dose lasts ~ 1 hr and is effective at reducing wob when given. 4. UO ~ 800cc/24 hrs 5. She is more alert this morning as compared to previous few days; she is attempting to communicate but unable to understand with garbled speech. Family appreciative of this interaction. 6. Recommend initiation of continuous infusion morphine starting at 2 mg/hr, no pca dose. Will reassess prn for effectiveness and adjust dose as needed. 7. Discussed with pt's nieces at bedside and brother Alie Sees by phone, family had no additional questions or concerns. 8. Will continue to follow. 9. Consider discussing Hospice enrollment with family tomorrow. Doubt benefit of having this discussion today given her oxygen requirements and increased alertness. 10. Communicated plan of care with: Palliative Andie LISA 192 Team 
 
 GOALS OF CARE / TREATMENT PREFERENCES:  
 
GOALS OF CARE: 
Patient/Health Care Proxy Stated Goals: Comfort TREATMENT PREFERENCES:  
Code Status: DNR Advance Care Planning: 
[x] The [facility] Interdisciplinary Team has updated the ACP Navigator with [place] [address] and Patient Capacity Primary Decision Maker: Cesilia Wrenvick - Brother - [phone] Primary Decision Maker (Active): Maryellen  - Sister - [phone] Medical Interventions: Comfort measures Other Instructions:  
Artificially Administered Nutrition: No feeding tube Other: As far as possible, the palliative care team has discussed with patient / health care proxy about goals of care / treatment preferences for patient. HISTORY:  
 
History obtained from: chart, family CHIEF COMPLAINT: pt admitted with aforementioned issues HPI/SUBJECTIVE: The patient is:  
[] Verbal and participatory [x] Non-participatory due to: Unresponsive state Clinical Pain Assessment (nonverbal scale for severity on nonverbal patients):  
 Clinical Pain Assessment Severity: 0 Activity (Movement): Lying quietly, normal position Duration: for how long has pt been experiencing pain (e.g., 2 days, 1 month, years) Frequency: how often pain is an issue (e.g., several times per day, once every few days, constant) FUNCTIONAL ASSESSMENT:  
 
Palliative Performance Scale (PPS): PPS: 10 PSYCHOSOCIAL/SPIRITUAL SCREENING:  
 
Palliative IDT has assessed this patient for cultural preferences / practices and a referral made as appropriate to needs (Cultural Services, Patient Advocacy, Ethics, etc.) Any spiritual / Cheondoism concerns: 
[] Yes /  [x] No 
 
Caregiver Burnout: 
[] Yes /  [x] No /  [] No Caregiver Present Anticipatory grief assessment:  
[x] Normal  / [] Maladaptive ESAS Anxiety: Anxiety: 0 
 
ESAS Depression:    
 
 
 REVIEW OF SYSTEMS:  
 
Positive and pertinent negative findings in ROS are noted above in HPI. The following systems were [x] reviewed / [] unable to be reviewed as noted in HPI Other findings are noted below. Systems: constitutional, ears/nose/mouth/throat, respiratory, gastrointestinal, genitourinary, musculoskeletal, integumentary, neurologic, psychiatric, endocrine. Positive findings noted below. Modified ESAS Completed by: provider Fatigue: 7 Drowsiness: 0 Pain: 0 Anxiety: 0 Nausea: 0 Anorexia: 5 Dyspnea: 10 Stool Occurrence(s): 1 PHYSICAL EXAM:  
 
From RN flowsheet: 
Wt Readings from Last 3 Encounters:  
08/13/19 72.9 kg (160 lb 11.5 oz) 02/09/19 108.2 kg (238 lb 8.6 oz) 01/21/19 81.6 kg (180 lb) Blood pressure 133/84, pulse (!) 124, temperature 97.6 °F (36.4 °C), resp. rate 30, height 5' 9\" (1.753 m), weight 72.9 kg (160 lb 11.5 oz), SpO2 94 %. Pain Scale 1: Adult Nonverbal Pain Scale Pain Intensity 1: 5 Pain Intervention(s) 1: Medication (see MAR) Last bowel movement, if known: Ill appearing female; sitting up in ICU bed on face tent. 45L.   
+++Inc work of breathing Abd soft, nondistended. Ext w/o edema. Eyes slightly open; no meaningful response to touch or voice. HISTORY:  
 
Active Problems: 
  Chronic hypoxemic respiratory failure ([address] (2019) Bleomycin lung toxicity (2019) Hodgkin lymphoma ([facility] (2/3/2019) Encephalopathy (2019) Past Medical History:  
Diagnosis Date  Asthma   
 as a child, nothing since menopause  Cancer ([facility] 2018 Non-Hodgkin's  Cataracts, bilateral   
 Diabetes ([facility] type II  
 Environmental allergies  Fibromyalgia Treated with alternate medical therapy  Hypertension Past Surgical History:  
Procedure Laterality Date  HX BREAST BIOPSY Right 2006  HX COLONOSCOPY  2015  HX GYN Pap   HX ORTHOPAEDIC    
 fractured R ankle/ no surgery  HX OTHER SURGICAL Right 2018  
 excision of deep neck cervical LN. Family History Problem Relation Age of Onset  Heart Disease Mother  Cancer Father   
     prostate cancer  Cancer Sister Breast cancer apparent DCIS  
 Heart Disease Brother Brother  from congestive heart failure secondary to severe mitral disease History reviewed, no pertinent family history. Social History Tobacco Use  Smoking status: Never Smoker  Smokeless tobacco: Never Used Substance Use Topics  Alcohol use: Yes Comment: socially No Known Allergies Current Facility-Administered Medications Medication Dose Route Frequency  morphine (PF)  mg/30 ml   IntraVENous CONTINUOUS  
 morphine injection 2 mg  2 mg IntraVENous Q15MIN PRN  
 glycopyrrolate (ROBINUL) injection 0.2 mg  0.2 mg IntraVENous Q4H PRN  
 ketorolac (TORADOL) injection 30 mg  30 mg IntraVENous Q8H PRN  
 albuterol-ipratropium (DUO-NEB) 2.5 MG-0.5 MG/3 ML  3 mL Nebulization Q4H PRN  
  methylPREDNISolone (PF) (Solu-MEDROL) injection 80 mg  80 mg IntraVENous Q6H  
 bisacodyl (DULCOLAX) suppository 10 mg  10 mg Rectal DAILY PRN  
 0.9% sodium chloride infusion 250 mL  250 mL IntraVENous PRN  
 white petrolatum-mineral oil (SOOTHE NIGHT TIME) 80-20 % ophthalmic ointment   Both Eyes PRN  
 sodium chloride (NS) flush 5-10 mL  5-10 mL IntraVENous PRN  
 sodium chloride (NS) flush 5-40 mL  5-40 mL IntraVENous Q8H  
 sodium chloride (NS) flush 5-40 mL  5-40 mL IntraVENous PRN  
 
 
 
 LAB AND IMAGING FINDINGS:  
 
Lab Results Component Value Date/Time WBC 13.0 (H) 08/12/2019 03:30 AM  
 HGB 7.4 (L) 08/12/2019 03:30 AM  
 PLATELET 176 (L) 59/03/7298 03:30 AM  
 
Lab Results Component Value Date/Time Sodium 139 08/12/2019 03:30 AM  
 Potassium 4.8 08/12/2019 03:30 AM  
 Chloride 104 08/12/2019 03:30 AM  
 CO2 25 08/12/2019 03:30 AM  
 BUN 54 (H) 08/12/2019 03:30 AM  
 Creatinine 1.20 (H) 08/12/2019 03:30 AM  
 Calcium 9.5 08/12/2019 03:30 AM  
 Magnesium 2.5 (H) 08/04/2019 09:25 AM  
 Phosphorus 2.9 08/02/2019 05:02 AM  
  
Lab Results Component Value Date/Time AST (SGOT) 14 (L) 07/23/2019 04:07 AM  
 Alk. phosphatase 151 (H) 07/23/2019 04:07 AM  
 Protein, total 6.6 07/23/2019 04:07 AM  
 Albumin 2.6 (L) 07/23/2019 04:07 AM  
 Globulin 4.0 07/23/2019 04:07 AM  
 
Lab Results Component Value Date/Time INR 1.4 (H) 07/28/2019 12:16 PM  
 Prothrombin time 14.1 (H) 07/28/2019 12:16 PM  
 aPTT 30.2 08/27/2018 08:15 AM  
  
Lab Results Component Value Date/Time Iron 24 (L) 07/16/2019 01:24 AM  
 TIBC 200 (L) 07/16/2019 01:24 AM  
 Iron % saturation 12 (L) 07/16/2019 01:24 AM  
  
No results found for: PH, PCO2, PO2 No components found for: [place] No results found for: CPK, CKMB Total time:  
Counseling / coordination time, spent as noted above:  
> 50% counseling / coordination?:  
 
Prolonged service was provided for  []30 min   []75 min in face to face time in the presence of the patient, spent as noted above. Time Start: 10:00 Time End: 10:45 Note: this can only be billed with 13572 (initial) or 13330 (follow up). If multiple start / stop times, list each separately.

## 2019-08-13 NOTE — PROGRESS NOTES
.. TRANSFER - IN REPORT: 
 
Verbal report received from SUSANNAH Villa(name) on Marylee Dougie  being received from ICU(unit) for routine progression of care Report consisted of patients Situation, Background, Assessment and  
Recommendations(SBAR). Information from the following report(s) SBAR, Kardex, Procedure Summary and MAR was reviewed with the receiving nurse. Opportunity for questions and clarification was provided. Assessment completed upon patients arrival to unit and care assumed. Per Daisy, pt is minimal responsive and is on comfort measures only. Pt has a [place] PCA. Family at bedside.

## 2019-08-13 NOTE — PROGRESS NOTES
2000: Assumed care of the pt. Pt resting in bed, using Hi Flow and Face Tent. Family members in room. Shift summary: Pt used Hi Flow and Face Tent entire shift. Medicated with PRN Morphine and Toradol for comfort. Otherwise uneventful shift.

## 2019-08-14 NOTE — PROGRESS NOTES
Palliative Medicine Consult [place]: [phone] ([phone]) Patient Name: Kendall Hart YOB: 1950 Date of Initial Consult: July 17, 2019 Reason for Consult: Care Decisions Requesting Provider: Sienna Jimenez Primary Care Physician: Daysi Buckley MD 
 
 SUMMARY:  
Kendall Hart is a 70 y.o. with a past history of Lymphoma (chemo) followed at [facility], HTN, fibromyalgia, muscle atrophy, pulmonary fibrosis, asthma, cataracts, DM, chronic respiratory failure, recurrent pna, sinus infection,  who was admitted on 7/15/2019 from [place] with a diagnosis of PNA, acute on chronic hypoxemic respiratory failure, bleomycin lung toxicity. Pt had 7 rounds of chemo and then developed significant side effects. Her prolonged hospital course has been complicated by a persistent encephalopathic state of unclear etiology with evaluation by neuro, family opted not to pursue an LP, as well as worsening respiratory distress in the setting of her bl pulmonary fibrosis, requiring high flow oxygen with failure of high dose steroids. 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 PALLIATIVE DIAGNOSES:  
1. Shortness of breath 2. Encephalopathy - metabolic vs akinetic mutism from cerebral hypoxia 3. Chronic hypoxic respiratory failure dt bleomycin lung toxicity 4. Feeding difficulties 5. Debility 6. Goals of care discussion PLAN:  
1. Assessed pt on 6E this morning. Sister Jess Lind at bedside. 2. No overnight issues or events.    
3. Morphine initiated at noon yesterday 2mg/hr, she required 2 additional prns of Morphine  2 mg IV since initiation of gtt 4. Her breathing appears comfortable this am- will continue current settings of gtt 5. Early this morning her level of alertness has reduced, she is now minimally responsive; flutters eyes open to voice. 6. She remains on high flow O2 via Face Tent; 45 L with sats 90-95%. Her UO is decreasing. 7. Today will d/c IV steroid as adding little benefit at this juncture. 8. I talked with Betsy about option to gradually reduce rate of O2 today once family gathered and feel prepared to do so. All family has visited that intends to at this point; but not all currently gathered. 9. Please call if questions this afternoon. 10. Communicated plan of care with: Palliative IDTAndie 192 Team 
 
 GOALS OF CARE / TREATMENT PREFERENCES:  
 
GOALS OF CARE: 
Patient/Health Care Proxy Stated Goals: Comfort TREATMENT PREFERENCES:  
Code Status: DNR Advance Care Planning: 
[x] The [facility] Interdisciplinary Team has updated the ACP Navigator with [place] and Patient Capacity Primary Decision Maker: Nicole Alfaro - Brother - [phone] Primary Decision Maker (Active): Saba Pereirael - Sister - [phone] Medical Interventions: Comfort measures Other Instructions:  
Artificially Administered Nutrition: No feeding tube Other: As far as possible, the palliative care team has discussed with patient / health care proxy about goals of care / treatment preferences for patient. HISTORY:  
 
History obtained from: chart, family CHIEF COMPLAINT: pt admitted with aforementioned issues HPI/SUBJECTIVE: The patient is:  
[] Verbal and participatory [x] Non-participatory due to: Unresponsive state Clinical Pain Assessment (nonverbal scale for severity on nonverbal patients):  
Clinical Pain Assessment Severity: 0 Activity (Movement): Lying quietly, normal position Duration: for how long has pt been experiencing pain (e.g., 2 days, 1 month, years) Frequency: how often pain is an issue (e.g., several times per day, once every few days, constant) FUNCTIONAL ASSESSMENT:  
 
Palliative Performance Scale (PPS): PPS: 10 PSYCHOSOCIAL/SPIRITUAL SCREENING:  
 
Palliative IDT has assessed this patient for cultural preferences / practices and a referral made as appropriate to needs (Cultural Services, Patient Advocacy, Ethics, etc.) Any spiritual / Gnosticist concerns: 
[] Yes /  [x] No 
 
Caregiver Burnout: 
[] Yes /  [x] No /  [] No Caregiver Present Anticipatory grief assessment:  
[x] Normal  / [] Maladaptive ESAS Anxiety: Anxiety: 0 
 
ESAS Depression:    
 
 
 REVIEW OF SYSTEMS:  
 
Positive and pertinent negative findings in ROS are noted above in HPI. The following systems were [x] reviewed / [] unable to be reviewed as noted in HPI Other findings are noted below. Systems: constitutional, ears/nose/mouth/throat, respiratory, gastrointestinal, genitourinary, musculoskeletal, integumentary, neurologic, psychiatric, endocrine. Positive findings noted below. Modified ESAS Completed by: provider Fatigue: 7 Drowsiness: 0 Pain: 0 Anxiety: 0 Nausea: 0 Anorexia: 5 Dyspnea: 10 Stool Occurrence(s): 1 PHYSICAL EXAM:  
 
From RN flowsheet: 
Wt Readings from Last 3 Encounters:  
08/14/19 73.4 kg (161 lb 12.8 oz) 02/09/19 108.2 kg (238 lb 8.6 oz) 01/21/19 81.6 kg (180 lb) Blood pressure 139/61, pulse (!) 114, temperature 96.5 °F (35.8 °C), resp. rate 19, height 5' 9\" (1.753 m), weight 73.4 kg (161 lb 12.8 oz), SpO2 91 %. Pain Scale 1: FLACC Pain Intensity 1: 0 Pain Intervention(s) 1: Medication (see MAR) Last bowel movement, if known:  
 
Ill appearing female; sitting up in bed on face tent. 45L. No inc wob this am.  Reduced bs anteriorly. Abd soft, nondistended. Ext w/o edema. Eyes slightly open; no meaningful response to touch or voice. HISTORY:  
 
Active Problems: 
  Chronic hypoxemic respiratory failure ([facility] (2019) Bleomycin lung toxicity (2019) Hodgkin lymphoma ([facility] (2/3/2019) Encephalopathy (2019) Past Medical History:  
Diagnosis Date  Asthma   
 as a child, nothing since menopause  Cancer ([facility] 2018 Non-Hodgkin's  Cataracts, bilateral   
 Diabetes ([facility] type II  
 Environmental allergies  Fibromyalgia Treated with alternate medical therapy  Hypertension Past Surgical History:  
Procedure Laterality Date  HX BREAST BIOPSY Right 2006  HX COLONOSCOPY    HX GYN Pap   HX ORTHOPAEDIC    
 fractured R ankle/ no surgery  HX OTHER SURGICAL Right 2018  
 excision of deep neck cervical LN. Family History Problem Relation Age of Onset  Heart Disease Mother  Cancer Father   
     prostate cancer  Cancer Sister Breast cancer apparent DCIS  
 Heart Disease Brother Brother  from congestive heart failure secondary to severe mitral disease History reviewed, no pertinent family history. Social History Tobacco Use  Smoking status: Never Smoker  Smokeless tobacco: Never Used Substance Use Topics  Alcohol use: Yes Comment: socially No Known Allergies Current Facility-Administered Medications Medication Dose Route Frequency  morphine (PF)  mg/30 ml   IntraVENous CONTINUOUS  
 haloperidol lactate (HALDOL) injection 1 mg  1 mg IntraVENous Q4H PRN  
 haloperidol lactate (HALDOL) injection 2 mg  2 mg IntraVENous Q4H PRN  
 morphine injection 2 mg  2 mg IntraVENous Q15MIN PRN  
 glycopyrrolate (ROBINUL) injection 0.2 mg  0.2 mg IntraVENous Q4H PRN  
 albuterol-ipratropium (DUO-NEB) 2.5 MG-0.5 MG/3 ML  3 mL Nebulization Q4H PRN  
 bisacodyl (DULCOLAX) suppository 10 mg  10 mg Rectal DAILY PRN  
  0.9% sodium chloride infusion 250 mL  250 mL IntraVENous PRN  
 white petrolatum-mineral oil (SOOTHE NIGHT TIME) 80-20 % ophthalmic ointment   Both Eyes PRN  
 sodium chloride (NS) flush 5-10 mL  5-10 mL IntraVENous PRN  
 sodium chloride (NS) flush 5-40 mL  5-40 mL IntraVENous Q8H  
 sodium chloride (NS) flush 5-40 mL  5-40 mL IntraVENous PRN  
 
 
 
 LAB AND IMAGING FINDINGS:  
 
Lab Results Component Value Date/Time WBC 13.0 (H) 08/12/2019 03:30 AM  
 HGB 7.4 (L) 08/12/2019 03:30 AM  
 PLATELET 938 (L) 86/28/1826 03:30 AM  
 
Lab Results Component Value Date/Time Sodium 139 08/12/2019 03:30 AM  
 Potassium 4.8 08/12/2019 03:30 AM  
 Chloride 104 08/12/2019 03:30 AM  
 CO2 25 08/12/2019 03:30 AM  
 BUN 54 (H) 08/12/2019 03:30 AM  
 Creatinine 1.20 (H) 08/12/2019 03:30 AM  
 Calcium 9.5 08/12/2019 03:30 AM  
 Magnesium 2.5 (H) 08/04/2019 09:25 AM  
 Phosphorus 2.9 08/02/2019 05:02 AM  
  
Lab Results Component Value Date/Time AST (SGOT) 14 (L) 07/23/2019 04:07 AM  
 Alk. phosphatase 151 (H) 07/23/2019 04:07 AM  
 Protein, total 6.6 07/23/2019 04:07 AM  
 Albumin 2.6 (L) 07/23/2019 04:07 AM  
 Globulin 4.0 07/23/2019 04:07 AM  
 
Lab Results Component Value Date/Time INR 1.4 (H) 07/28/2019 12:16 PM  
 Prothrombin time 14.1 (H) 07/28/2019 12:16 PM  
 aPTT 30.2 08/27/2018 08:15 AM  
  
Lab Results Component Value Date/Time Iron 24 (L) 07/16/2019 01:24 AM  
 TIBC 200 (L) 07/16/2019 01:24 AM  
 Iron % saturation 12 (L) 07/16/2019 01:24 AM  
  
No results found for: PH, PCO2, PO2 No components found for: [place] No results found for: CPK, CKMB Total time:  
Counseling / coordination time, spent as noted above:  
> 50% counseling / coordination?:  
 
Prolonged service was provided for  []30 min   []75 min in face to face time in the presence of the patient, spent as noted above. Time Start: 10:00 Time End: 10:45  
 Note: this can only be billed with 51197 (initial) or 08116 (follow up). If multiple start / stop times, list each separately.

## 2019-08-14 NOTE — PROGRESS NOTES
Hospital Progress Note NAME:  José Miguel Small :   1950 MRN:  [id number] Date/Time:  2019 8:25 AM 
 
Plan: 1. Comfort care 2. D/w sister Risk of Deterioration: Low  []           Moderate  []           High  [x] Assessment:  
Principal Problem: 
  Pneumonia (7/15/2019) Active Problems: 
   Acute encephalopathy: 
-unclear etiology - transiently awaken with appropriate conversation 
-seizure was suspected but  EEG did not confirm 
-MRI was negative for stroke,CT head and neck - no bleed,vascular occlusion. -ABG reviewed. Electrolytes,ammonia,trop,lactic acid wnl 
-Neurology evaluated 
-MRI brain w contrast  - no acute process. -EEG neg 
-May need LP for cytology as she has history of hodgkin's lymphoma - family declined 
-Oncology evaluated Acute on Chronic hypoxemic respiratory failure ([address] (2019) On high flow oxygen Bleomycin lung toxicity (2019) Iv steroids Diabetes mellitus type 2, controlled ([address] (2016) 
   basal bolus insulin Hodgkin lymphoma ([facility] (2/3/2019) bulky stage II HD (classical),s/p treatment earlier this year by Dr. Darien Mann with ABVD. Course truncated due to abrupt onset bleomycin toxicity. Diff dx includes lymphangitic spread of lymphoma. She completed 3 1/2 treatments of ABVD, less than the standard 6. oncol raised the potential concern for recurrent disease with her family. UTI -  ENTEROCOCCUS SPECIES Treated UTI - ESCHERICHIA COLI 19 
    treated Admitting notes:68 y.o. female with past medical history significant for htn, fibromyalgia, muscle atrophy, pulmonary fibrosis, asthma, b/l cataracts, dm, non-hodgkin's, who presents from ems with chief complaint of sob. Pt has \"last night\" onset of worsening sob described as \"labored breathing\" that's continued into this morning.  She is normally on 4.5 L of O2 n/c at baseline, but had it increased to 5 L by staff. She also endorses a dry non-productive cough and fever (peak 100) at this time. EMS arrived and measured initial O2 sat of 92% and HR of 140 bpm. Denies pleuritic cp and abd pain. Of note, she has had PNA \"x3 times since Ken" and was treated last week with a z-pack for sinus infection symptomatic of congestion. DNR form in place. EMS was called to [place];  bpm, 124/78, and 9 Subjective/interium history Unresponsive at time of my exam -eye open - not following commands 11 Point Review of Systems:  
Negative except [x]            Unable to obtain ROS due to:      
[x]            mental status change []            sedated []            intubated Social History Tobacco Use  Smoking status: Never Smoker  Smokeless tobacco: Never Used Substance Use Topics  Alcohol use: Yes Comment: socially Medications reviewed: 
Current Facility-Administered Medications Medication Dose Route Frequency  morphine (PF)  mg/30 ml   IntraVENous CONTINUOUS  
 haloperidol lactate (HALDOL) injection 1 mg  1 mg IntraVENous Q4H PRN  
 haloperidol lactate (HALDOL) injection 2 mg  2 mg IntraVENous Q4H PRN  
 morphine injection 2 mg  2 mg IntraVENous Q15MIN PRN  
 glycopyrrolate (ROBINUL) injection 0.2 mg  0.2 mg IntraVENous Q4H PRN  
 ketorolac (TORADOL) injection 30 mg  30 mg IntraVENous Q8H PRN  
 albuterol-ipratropium (DUO-NEB) 2.5 MG-0.5 MG/3 ML  3 mL Nebulization Q4H PRN  
 methylPREDNISolone (PF) (Solu-MEDROL) injection 80 mg  80 mg IntraVENous Q6H  
 bisacodyl (DULCOLAX) suppository 10 mg  10 mg Rectal DAILY PRN  
 0.9% sodium chloride infusion 250 mL  250 mL IntraVENous PRN  
 white petrolatum-mineral oil (SOOTHE NIGHT TIME) 80-20 % ophthalmic ointment   Both Eyes PRN  
 sodium chloride (NS) flush 5-10 mL  5-10 mL IntraVENous PRN  
 sodium chloride (NS) flush 5-40 mL  5-40 mL IntraVENous Q8H  
  sodium chloride (NS) flush 5-40 mL  5-40 mL IntraVENous PRN Objective:  
Vitals: 
Visit Vitals /55 (BP 1 Location: Left arm, BP Patient Position: At rest) Pulse (!) 113 Temp (!) 93.6 °F (34.2 °C) Resp 18 Ht 5' 9\" (1.753 m) Wt 161 lb 12.8 oz (73.4 kg) SpO2 90% BMI 23.89 kg/m² Temp (24hrs), Av.7 °F (35.4 °C), Min:93.6 °F (34.2 °C), Max:97.6 °F (36.4 °C) O2 Flow Rate (L/min): 45 l/min O2 Device: Hi flow nasal cannula Last 24hr Input/Output: 
 
Intake/Output Summary (Last 24 hours) at 2019 9404 Last data filed at 2019 1600 Gross per 24 hour Intake  Output 200 ml Net -200 ml PHYSICAL EXAM: 
General:    Unresponsive, no distress, appears stated age. Head:   Normocephalic, without obvious abnormality, atraumatic. Lungs:   Decreased bs with rales. Chest wall:  No tenderness or deformity. No Accessory muscle use. Heart:   Regular rate and rhythm,  no murmur, rub or gallop. Abdomen:   Soft, non-tender. Not distended. Bowel sounds normal. No masses. Lab Data Reviewed: 
 
Recent Labs 19 
0330 WBC 13.0* HGB 7.4* HCT 24.3*  
* Recent Labs 19 
0330   
K 4.8  
 CO2 25 * BUN 54* CREA 1.20* CA 9.5 Lab Results Component Value Date/Time Glucose (POC) 459 (H) 2019 07:09 AM  
 Glucose (POC) 377 (H) 2019 05:49 AM  
 Glucose (POC) 390 (H) 2019 01:46 AM  
 Glucose (POC) 368 (H) 2019 05:25 PM  
 Glucose (POC) 327 (H) 2019 11:21 AM  
 
 
___________________________________________________ 
___________________________________________________ Attending Physician: Fredie Leak, MD

## 2019-08-14 NOTE — PROGRESS NOTES
Pt on comfort measures. Will continue to monitor. 08/13/19 4589 Vitals Temp (!) 94 °F (34.4 °C) Temp Source Oral  
Pulse (Heart Rate) (!) 118 Heart Rate Source Monitor Resp Rate 18  
O2 Sat (%) 93 % Level of Consciousness Responds to Voice /57 MAP (Calculated) 72 BP 1 Location Left arm BP 1 Method Automatic  
BP Patient Position At rest  
MEWS Score 6 Patient Observation Patient Turned Turn on right side Ambulate No (Comment) Activity In bed;Sleeping;Family/Visitors present Noé's office paged due to blood glucose of 459. Will continue to monitor.

## 2019-08-14 NOTE — DIABETES MGMT
[facility] Progress Note Recommendations/ Comments: Chart review for extreme hyperglycemia with patient on steroids. Pt is comfort care. On O2 and MS. Robertonferred from ICU to Rm 620 Noted all insulin has been discontinued. Per nursing note 8/14/2019, reported  mg/dL to Dr Micky Felipe at which time he discontinued BG checks due to patient being comfort care. If her situation changes and would like to address hyperglycemia, consider addition of Lantus 15 units daily and lispro resistant correction scale Current hospital DM medication: None Chart reviewed on Margrett Moritz. Patient is a 70 y.o. female with known  Type 2 Diabetes on oral agent (monotherapy): metformin (generic) at home. DNR 
NGT removed 8/12/2019 with no plans for PEG due to comfort care per Leah Cote NP note A1c:  
Lab Results Component Value Date/Time Hemoglobin A1c 6.9 (H) 08/03/2018 01:45 PM  
 Hemoglobin A1c 6.8 (H) 01/30/2018 01:25 PM  
 
 
Recent Glucose Results:  
Lab Results Component Value Date/Time [facility] 459 (H) 08/14/2019 07:09 AM  
  
 
Lab Results Component Value Date/Time Creatinine 1.20 (H) 08/12/2019 03:30 AM  
 
Estimated Creatinine Clearance: 46.2 mL/min (A) (based on SCr of 1.2 mg/dL (H)). Active Orders Diet DIET NPO With Tube Feedings PO intake:  
No data found. Will continue to follow as needed. Thank you Francisca Cooley RN, CDE Time spent: 6 min

## 2019-08-14 NOTE — WOUND CARE
WOCN Note: Follow-up visit for skin assessment. Patient is Comfort Measures. Assessment:  
Patient is resting comfortably assessed 8-7-19 and all skin issues resolved. WIll sign off and if new needs arise please reconsult. Transition of Care: sign off/ reconsult if needed. Marjorie ÁLVAREZ RN Wound Care Department Office: [phone] Pager: [phone]

## 2019-08-14 NOTE — PROGRESS NOTES
She is on a face tent at 10 lpm as well as an HFNC set on 25lpm, 100%. She is comfort care with family at bedside. I added another tube and water trap inline for the face tent. She satting at 96%.

## 2019-08-14 NOTE — PROGRESS NOTES
08/14/19 1452 Vital Signs Temp 96.9 °F (36.1 °C) (notified RN on all her vitals) Temp Source Axillary Pulse (Heart Rate) (!) 117 Heart Rate Source Monitor Resp Rate 16  
O2 Sat (%) 95 % Level of Consciousness Responds to Voice /62 MAP (Calculated) 76 BP 1 Method Automatic  
BP 1 Location Left arm BP Patient Position At rest  
MEWS Score 4  
MEWS 4, pt comfort care, will continue to monitor.

## 2019-08-14 NOTE — PROGRESS NOTES
Notified Dr. Nathaniel Merida that pt was bladder scanned for 618mLs. Order received to insert [place].

## 2019-08-14 NOTE — PROGRESS NOTES
Notified Dr. Kaylin Vera that pt's BS was [phone] this AM.  Order received to d/c BS checks, as pt is comfort measures.

## 2019-08-14 NOTE — PROGRESS NOTES
08/14/19 0932 Vital Signs Temp 96.5 °F (35.8 °C) Temp Source Axillary Pulse (Heart Rate) (!) 114 Heart Rate Source Monitor Resp Rate 19  
O2 Sat (%) 91 % Level of Consciousness Responds to Voice /61 MAP (Calculated) 87 BP 1 Method Automatic  
BP 1 Location Left arm BP Patient Position At rest  
MEWS Score 4  
 
MEWS 4, pt comfort care, will continue to monitor.

## 2019-08-15 NOTE — PROGRESS NOTES
responded to notification of patients death on Oncology.  went to patients room and patients sister, Romero Milligan, was present in the room. Betsy informed the  that the patient had passed.  offered his condolences to the sister. Betsy started telling some stories of her sister and how much family that she had. There are several members that are on the way to the hospital at this time. The patient had no children of her own but had a large members of nieces and nephews and Godchildren all over the world who loved her. Betsy spoke of the  arrangements and that they were taking the patient back to [place] for burial. Leonard Velasquez was working on the arrangements.  informed the nurse of what the family was planning on doing an watson soon as they had made arrangements they would inform the staff. Betsy asked for prayer for her sister's soul.  prayed with patient and Betsy at the bedside.  informed Betsy of chaplains availability when other family members arrive.  provided pastoral care, support, grief support and prayer during this visit. Spiritual Care will follow upas needed. Alba Elder MDiv Pager: [phone]

## 2019-08-15 NOTE — PROGRESS NOTES
Ctsp  Sister at bedside No response/pupils fixed and dilated No resp activity No cardiac activity Patient  at 8:20am  
Sister will call remaining family members

## 2019-08-15 NOTE — PROGRESS NOTES
Patient's brother and sister at bedside with niece; making calls for  arrangements; awaiting arrival of other family members;  PCA stopped and morphine 25cc wasted with Yancy Roberson RN; green form returned to pharmacy. 10:50:  Patient's brother, Estela Dyson, provided  home of [facility] in [facility] [phone]; awaiting arrival of nephew. Family now gone; post mort. care done; 4 strands of beads around patient's waist left on/in tact (2 blue and white, 1 white and clear, 1 red and white).

## 2019-08-16 NOTE — DISCHARGE SUMMARY
[facility] [address] SUMMARY    Name:  Purnima Grullon  MR#:  [id number]  :  1950  ACCOUNT #:  [de-identified]  ADMIT DATE:  07/15/2019  DISCHARGE DATE:  08/15/2019      EXPIRATION SUMMARY    EXPIRATION DATE:  08/15/2019    HISTORY OF PRESENT ILLNESS:  A 50-year-old black female with a history of hypertension, fibromyalgia, pulmonary fibrosis due to bleomycin toxicity and Hodgkin's lymphoma. She came to emergency room because of 1-week history of a dry cough, in spite of the use of a Z-Jared. She has had increasing shortness of breath, it was nonproductive. The patient was seen in the emergency room, was subsequently admitted for further evaluation and care. Past medical history, review of systems, and physical examination as noted in the HPI. CONSULTATIONS:  1. Physician assistant, [place]. Impression:  A. Acute on chronic hypoxemic respiratory failure. B.  Abnormal CT.  C.  History of lymphoma. D.  History of pulmonary embolism, on Eliquis. E.  History of asthma. Plan:  Strep, Legionella pending. Add mycoplasma, echo, diuresis, vanc, Levaquin, and Zosyn. O2 titration, on mid flow, but may need high flow. Low threshold to start steroids again. May need a bronch. Request should be . High risk for rapid decline, discussed with family. 2.  Lorenza Eugene, NP, Palliative Care. Palliative diagnoses:  Shortness of breath, hypoxemia, leg swelling, wheelchair-bound. Recommendations were followed. 3.  Elza Read MD, Neurology. Assessment:  Acute change in mental status where she was unresponsive and had fluttering of her eyelids lasting a few minutes. She is awake now, but remains unresponsive. It is possible that she may have had a seizure and is still in postictal state, and metabolic event due to hypoxia would be another possibility or less likely brainstem infarct.   We will complete workup to include MRI of the brain as well as an EEG. Continue Keppra for now. 4.  Megha Ritchie MD, Hematology/Oncology. Impression:  A. Decreased level of  not mental status has changed. We will await neurologic workup. B.  Pulmonary infiltrates, diagnosis of bleomycin toxicity has been made clinically. C.  History of pulmonary embolism, on apixaban. 5.  Gastroenterology, Ari Martins MD  Assessment:  Feeding difficulties. ; Eliquis. Encephalopathy; respiratory failure; Hodgkin's lymphoma. Recommendations were noted. RESULTS REVIEW:  Hemoglobin 7.4, hematocrit 24.3, WBC 13.0. Sodium 139, potassium 4.8, chloride 104, CO2 of 25, glucose 251, BUN 54, creatinine 1.2. The pH 7.398, pCO2 of 36, pO2 of 49, on nonrebreather on 08/10. IMAGING STUDIES:  Portable chest on 08/12, diffuse bilateral airspace disease without significant change. CTA chest:  Overall increased diffuse interlobular septal thickening and bilateral opacities, right greater than left compared to CT from 04/10/2019. Stable presumed mediastinal lymphadenopathy. MRI of brain:  No evidence of abnormal enhancement. Mild chronic microvascular ischemic change; no intracranial mass, hemorrhage or evidence of acute infarction; mild sphenoid and left mastoid sinus disease. HOSPITAL COURSE:  The patient was admitted as stated above. She was seen in consultation by the consultants, who assisted us with her care throughout the entire hospital stay. She was found to have pneumonia on admission, treated with triple antibiotic therapy for a complete course with little, little change in her pulmonary appearance and pulmonary findings. She had acute encephalopathy, unclear etiology. Seizure was initially suspected. EEG did not confirm, she had a 24-hour EEG and was normal.  Therefore, the Keppra, which she had been started for possible seizure, was discontinued. The MRI was negative.   LP was recommended for cytology because of a history of Hodgkin's lymphoma, family declined. She had acute-on-chronic hypoxemic respiratory failure and was placed in the intensive care unit on 2 separate occasions to treat her with high-flow oxygen. She requested a DNR status and no intubation. The hypoxemia responded to high flow oxygen and on each occasion, she was subsequently transferred to the floor. She was found to have bleomycin lung toxicity, was initially on CellCept and prednisolone. Both were mostly discontinued at [facility]. She had consultation with [facility] and apparently at least according to her had a discussion about lung transplant, which she declined. When she was admitted, she was not only on steroids, she was placed on high-dose steroids under the auspices of Pulmonary and CellCept. The CellCept was subsequently discontinued. High-dose steroids were weaned to prednisone, but then she had exacerbation of her hypoxemia. High-dose steroids were reinstituted. The hypoxemia remained a concern throughout the hospital stay. The high-dose steroids did not have a significant effect on her respiratory status. They were subsequently discontinued after her transition to comfort care. The Hodgkin's lymphoma was treated earlier in the year by Dr. Irena Villalta with AB. The choice was truncated due to abrupt onset of bleomycin toxicity. Oncology saw her during the hospital stay and further treatment was not appropriate at this time. She was found to have an urinary tract infection with Enterococcus species, which was treated and then a little later in the course, she was found to have Escherichia coli, which was also treated. After development of her encephalopathy, her mental status did not return to normal, although she had episodes of appropriateness that were transient, witnessed primarily by family members. The palliative care team had a discussion with the family, and after discussion, the transition to palliative care was made by family members.   She was transferred to the medical floor and continued to receive appropriate care, including the morphine drip for the respiratory distress that she accepted. Her condition continued to decline and on 08/15/2019, we were called to see the patient and was found to have no spontaneous activity. Pupils were fixed and dilated, no respiratory activity, no cardiac activity. The patient  at 8:20 a.m. on 08/15/2019. Sister was at bedside and will call other family members. FINAL DIAGNOSES:  1. Pneumonia. 2.  Acute encephalopathy. 3.  Acute-on-chronic hypoxemic respiratory failure. 4.  Bleomycin toxicity. 5.  Diabetes mellitus type 2.  6.  Hodgkin's lymphoma. 7.  Enterococcus species urinary tract infection. 8.  Escherichia coli urinary tract infection.         Cynthia Olivares MD      [id number]  D:  08/15/2019 8:36  T:  08/15/2019 12:21  JOB #:  [id number]

## 2019-09-02 ENCOUNTER — PATIENT OUTREACH (OUTPATIENT)
Dept: INTERNAL MEDICINE CLINIC | Age: 69
End: 2019-09-02

## 2019-09-02 NOTE — PROGRESS NOTES
Patient listed on NN Case Load Report. Patient . Care management goals have been completed at this time.    Episode Resolved  Name from Team List  Goals closed/completed  Case closed

## 2021-03-25 NOTE — PROGRESS NOTES
NUTRITION COMPLETE ASSESSMENT    RECOMMENDATIONS:   1. Diet per SLP if alert and appropriate  2. Continue discussion regarding goals of care   - with poor prognosis question if feeding tube would be in patient's best interest   - if family wants aggressive care recommend placement of NGT for trial of EN  3. Monitor BG while on steroids     Interventions/Plan:   Food/Nutrient Delivery:          Initiate enteral nutrition(pending goals of care)    Assessment:   Reason for Assessment:  [x]Reassessment     Diet: NPO  Supplements: none  Nutritionally Significant Medications: [x] Reviewed & Includes: SSI, Shantel-Q, keppra, solu-medrol, cellcept  Meal Intake:   Patient Vitals for the past 100 hrs:   % Diet Eaten   07/29/19 2106 0 %   07/27/19 1800 0 %   07/27/19 1300 25 %   07/27/19 0900 0 %     Subjective: Pt unresponsive. Objective:  Pt admitted with pneumonia. PMHx: HTN, fibromyalgia, pulmonary fibrosis, Hodgkin's lymphoma. Had been Hospice at one point but since revoked. Code stroke but stroke ruled out. Remains unresponsive on and off with neuro following. EEG results pending. Family declining LP. Poor prognosis noted. Oral intake has been poor for duration of admit with average of less than 50% meals and some Boost shakes. Currently NPO d/t mental status. Seen by Palliative Care with topic of feeding tube discussed if family wanting to continue aggressive care. With poor prognosis do not think PEG tube would be inpatient's best interest. Would recommend trial of NGT to see if helps with mental status if family wanting aggressive care. If needed recommend: Osmolite 1.5 @ 46ml/hr + 1 pkt prosource + 145ml flush q4hr. Provides: 1104ml, 1716kcal, 85g protein, 839ml free fluid + 870ml fluid = 1709ml fluid. Meets 100% energy and protein needs. Will continue to follow for mental status/PO intake, weight status, plan of care.    Estimated Nutrition Needs:   Kcals/day: 3542 Kcals/day(3267-6235 kcal/day (MSJ x 1. 2-1.3))  Protein: 80 g(80-83g/day (1-1.1g/kg))  Fluid:  1700mL/day (1mL/kcal)  Based On: [place] [address] Jeor   Weight Used: Actual wt    Pt expected to meet estimated nutrient needs:  []   Yes     [x]  No - not with current po intake  Nutrition Diagnosis:   1. Unintended weight loss related to fair to poor po intake PTA as evidenced by pt with 25 lb wt loss over past year    2. Inadequate protein-energy intake related to AMS as evidenced by unresponsive, NPO status    Goals:     Discussion regarding [address]; start tube feeds in 1-2 days if family to continue agressive care     Monitoring & Evaluation:    - Total energy intake   - Weight/weight change    Previous Nutrition Goals Met: No  Previous Recommendations:    N/A    Education & Discharge Needs:    [x] None Identified   [] Identified and addressed    [] Participated in care plan, discharge planning, and/or interdisciplinary rounds     Cultural concerns: None  Skin Integrity: []Intact  [x]Other - sacral wound  Edema: []None [x]Other - LUE, RLE, LLE 1+; RUE trace  Last BM: 7/28/2019   Food Allergies: [x]None []Other    Anthropometrics:    Weight Loss Metrics 7/30/2019 4/10/2019 2/9/2019 1/21/2019 11/15/2018 9/13/2018 9/12/2018   Today's Wt 167 lb 4.8 oz - 238 lb 8.6 oz 180 lb 180 lb 198 lb 201 lb 1 oz   BMI 24.71 kg/m2 35.74 kg/m2 36.27 kg/m2 27.37 kg/m2 27.37 kg/m2 30.11 kg/m2 29.69 kg/m2      Last 3 Recorded Weights in this Encounter    07/28/19 0346 07/29/19 0400 07/30/19 0433   Weight: 77.8 kg (171 lb 8 oz) 76.4 kg (168 lb 6.9 oz) 75.9 kg (167 lb 4.8 oz)      Weight Source: Bed  Height: 5' 9\" (175.3 cm),    Body mass index is 24.71 kg/m².      IBW : 65.8 kg (145 lb), % IBW (Calculated): 115.38 %    Labs:    Lab Results   Component Value Date/Time    Sodium 145 07/29/2019 06:03 AM    Potassium 4.0 07/29/2019 06:03 AM    Chloride 106 07/29/2019 06:03 AM    CO2 29 07/29/2019 06:03 AM    Glucose 157 (H) 07/29/2019 06:03 AM    BUN 36 (H) 07/29/2019 06:03 AM    Creatinine 1.07 (H) 07/29/2019 06:03 AM    Calcium 9.2 07/29/2019 06:03 AM    Magnesium 2.5 (H) 07/28/2019 12:16 PM    Phosphorus 2.4 (L) 07/22/2019 03:50 AM    Albumin 2.6 (L) 07/23/2019 04:07 AM     Jaja Bustillo [address] [place] , Pager #[phone] or [phone] Patient's belongings returned

## 2021-09-10 NOTE — PROGRESS NOTES
Problem: Pressure Injury - Risk of 
Goal: *Prevention of pressure injury Document Dwayne Scale and appropriate interventions in the flowsheet. Outcome: Progressing Towards Goal 
Pressure Injury Interventions: 
Sensory Interventions: Keep linens dry and wrinkle-free, Minimize linen layers Moisture Interventions: Absorbent underpads Activity Interventions: Chair cushion, Increase time out of bed, PT/OT evaluation Mobility Interventions: Chair cushion, Float heels, PT/OT evaluation, Turn and reposition approx. every two hours(pillow and wedges) Nutrition Interventions: Document food/fluid/supplement intake, Discuss nutritional consult with provider, Offer support with meals,snacks and hydration Friction and Shear Interventions: Apply protective barrier, creams and emollients, Lift sheet, Lift team/patient mobility team, Minimize layers, Transferring/repositioning devices No difficulties

## 2021-12-16 NOTE — PROGRESS NOTES
CM received update from pt's nurse that pt is not discharging. CM informed [facility] to cancel transport for today and to place it on will call. Pt is not medically stable for discharge. 2pm - Received update from Sunil Bower with [facility] and they won't have a bed for pt this weekend. They will have a bed available on Monday. Ascencion Boyd, [facility] High Dose Vitamin A Pregnancy And Lactation Text: High dose vitamin A therapy is contraindicated during pregnancy and breast feeding.

## 2023-02-28 NOTE — PROGRESS NOTES
ADULT PROTOCOL: JET AEROSOL  REASSESSMENT Patient  Leonela Espinoza     70 y.o.   female     2/27/2019  3:11 PM 
 
Breath Sounds Pre Procedure: Right Breath Sounds: Diminished Left Breath Sounds: Diminished Breath Sounds Post Procedure: Right Breath Sounds: Diminished Left Breath Sounds: Diminished Breathing pattern: Pre procedure Breathing Pattern: Regular Post procedure Breathing Pattern: Regular Heart Rate: Pre procedure Pulse: 77 Post procedure Pulse: 103 Resp Rate: Pre procedure Respirations: 14 Post procedure Respirations: 20 
 
 
 
 
Cough: Pre procedure Cough: Non-productive Post procedure Cough: Non-productive Oxygen: O2 Device: Nasal cannula   Flow rate (L/min) 6 Changed: NO SpO2: Pre procedure SpO2: 92 %   with oxygen Post procedure SpO2: 95 %  with oxygen Nebulizer Therapy: Current medications Aerosolized Medications: DuoNeb Changed: NO Smoking History: never smoker Problem List:  
Patient Active Problem List  
Diagnosis Code  SVT (supraventricular tachycardia) ([facility]) I47.1  Essential hypertension with goal blood pressure less than 140/90 I10  
 Diabetes mellitus type 2, controlled ([facility] [address] E11.9  Dyslipidemia E78.5  Overweight (BMI 25.0-29. 9) E66.3  
 Supraclavicular mass R22.2  Lymphadenopathy R59.1  Acute respiratory failure ([facility]) J96.00  Leg swelling M79.89  
 Hodgkin lymphoma ([facility]) C81.90  
 CAP (community acquired pneumonia) J18.9 Respiratory Therapist: Corinne Cho RT 
 no

## 2024-05-16 NOTE — PROGRESS NOTES
Speech pathology note  Reviewed chart and discussed case with RN who reported patient remains poorly responsive. Agree with NPO at this time. SLP to follow if alertness improves. Thank you.     Suni Braxton., CCC-SLP patient

## (undated) DEVICE — SUTURE ABSORBABLE BRAIDED 3-0 SHB 18 IN UD VICRYL + VCPB864D

## (undated) DEVICE — INFECTION CONTROL KIT SYS

## (undated) DEVICE — NEEDLE HYPO 22GA L1.5IN BLK S STL HUB POLYPR SHLD REG BVL

## (undated) DEVICE — (D)PREP SKN CHLRAPRP APPL 26ML -- CONVERT TO ITEM 371833

## (undated) DEVICE — FALCON® SAMPLE CONTAINER, WITH LID, 4.5OZ (110ML), INDIVIDUALLY WRAPPED, STERILE, 100/CASE: Brand: FALCON A CORNING BRAND

## (undated) DEVICE — KENDALL SCD EXPRESS SLEEVES, KNEE LENGTH, MEDIUM: Brand: KENDALL SCD

## (undated) DEVICE — LIGHT HANDLE: Brand: DEVON

## (undated) DEVICE — SUTURE VCRL SZ 3-0 L54IN ABSRB VLT LIGAPAK REEL NDL J205G

## (undated) DEVICE — INTENDED FOR TISSUE SEPARATION, AND OTHER PROCEDURES THAT REQUIRE A SHARP SURGICAL BLADE TO PUNCTURE OR CUT.: Brand: BARD-PARKER ® CARBON RIB-BACK BLADES

## (undated) DEVICE — Device

## (undated) DEVICE — REM POLYHESIVE ADULT PATIENT RETURN ELECTRODE: Brand: VALLEYLAB

## (undated) DEVICE — SYRINGE MED 20ML STD CLR PLAS LUERLOCK TIP N CTRL DISP

## (undated) DEVICE — SOLUTION LACTATED RINGERS INJECTION USP

## (undated) DEVICE — SHEET, T, LAPAROTOMY, STERILE: Brand: MEDLINE

## (undated) DEVICE — 1200 GUARD II KIT W/5MM TUBE W/O VAC TUBE: Brand: GUARDIAN

## (undated) DEVICE — DEVON™ KNEE AND BODY STRAP 60" X 3" (1.5 M X 7.6 CM): Brand: DEVON

## (undated) DEVICE — SOLUTION IV 500ML 0.9% SOD CHL FLX CONT

## (undated) DEVICE — SUTURE PROL SZ 2-0 L36IN NONABSORBABLE BLU SH L26MM 1/2 CIR 8523H

## (undated) DEVICE — KENDALL RADIOLUCENT FOAM MONITORING ELECTRODE RECTANGULAR SHAPE: Brand: KENDALL

## (undated) DEVICE — SYRINGE MED 20ML STD CLR PLAS LUERSLIP TIP N CTRL DISP

## (undated) DEVICE — 3M™ IOBAN™ 2 ANTIMICROBIAL INCISE DRAPE 6648EZ: Brand: IOBAN™ 2

## (undated) DEVICE — MEDI-VAC NON-CONDUCTIVE SUCTION TUBING: Brand: CARDINAL HEALTH

## (undated) DEVICE — NEEDLE HYPO 18GA L1.5IN PNK S STL HUB POLYPR SHLD REG BVL

## (undated) DEVICE — TOWEL 4 PLY TISS 19X30 SUE WHT

## (undated) DEVICE — GOWN,SIRUS,NONRNF,SETINSLV,XL,20/CS: Brand: MEDLINE

## (undated) DEVICE — APPLICATOR BNDG 1MM ADH PREMIERPRO EXOFIN

## (undated) DEVICE — BASIN EMSIS 16OZ GRAPHITE PLAS KID SHP MOLD GRAD FOR ORAL

## (undated) DEVICE — KENDALL DL ECG CABLE AND LEAD WIRE SYSTEM, 3-LEAD, SINGLE PATIENT USE: Brand: KENDALL

## (undated) DEVICE — MEDI-VAC YANK SUCT HNDL W/TPRD BULBOUS TIP: Brand: CARDINAL HEALTH

## (undated) DEVICE — ROCKER SWITCH PENCIL BLADE ELECTRODE, HOLSTER: Brand: EDGE

## (undated) DEVICE — PACK,EENT,TURBAN DRAPE,PK II: Brand: MEDLINE

## (undated) DEVICE — SYR 3ML LL TIP 1/10ML GRAD --

## (undated) DEVICE — SOLIDIFIER MEDC 1200ML -- CONVERT TO 356117

## (undated) DEVICE — SINGLE USE SUCTION VALVE MAJ-209: Brand: SINGLE USE SUCTION VALVE (STERILE)

## (undated) DEVICE — SINGLE USE BIOPSY VALVE MAJ-210: Brand: SINGLE USE BIOPSY VALVE (STERILE)

## (undated) DEVICE — Z DISCONTINUED PER MEDLINE LINE GAS SAMPLING O2/CO2 LNG AD 13 FT NSL W/ TBNG FILTERLINE

## (undated) DEVICE — 3M™ TEGADERM™ TRANSPARENT FILM DRESSING FRAME STYLE, 1624W, 2-3/8 IN X 2-3/4 IN (6 CM X 7 CM), 100/CT 4CT/CASE: Brand: 3M™ TEGADERM™

## (undated) DEVICE — CATHETER IV 20GA L1.25IN FEP STR HUB TEF INTROCAN SFTY

## (undated) DEVICE — STOPCOCK IV 4 W TRNSPAR

## (undated) DEVICE — SYR 10ML LUER LOK 1/5ML GRAD --

## (undated) DEVICE — HEX-LOCKING BLADE ELECTRODE: Brand: EDGE

## (undated) DEVICE — SUTURE ETHLN SZ 2-0 L18IN NONABSORBABLE BLK L19MM PS-2 PRIM 593H

## (undated) DEVICE — STERILE POLYISOPRENE POWDER-FREE SURGICAL GLOVES: Brand: PROTEXIS

## (undated) DEVICE — NEEDLE HYPO 22GA L1.5IN BLK POLYPR HUB S STL REG BVL STR

## (undated) DEVICE — DRAPE XR C ARM 41X74IN LF --

## (undated) DEVICE — SET ADMIN 16ML TBNG L100IN 2 Y INJ SITE IV PIGGY BK DISP

## (undated) DEVICE — CONTINU-FLO SOLUTION SET, 2 INJECTION SITES, MALE LUER LOCK ADAPTER WITH RETRACTABLE COLLAR, LARGE BORE STOPCOCK WITH ROTATING MALE LUER LOCK EXTENSION SET, 2 INJECTION SITES, MALE LUER LOCK ADAPTER WITH RETRACTABLE COLLAR: Brand: INTERLINK/CONTINU-FLO

## (undated) DEVICE — SOLUTION IV 1000ML 0.9% SOD CHL

## (undated) DEVICE — SUTURE MCRYL SZ 4-0 L27IN ABSRB UD L19MM PS-2 1/2 CIR PRIM Y426H

## (undated) DEVICE — HANDLE LT SNAP ON ULT DURABLE LENS FOR TRUMPF ALC DISPOSABLE

## (undated) DEVICE — NEONATAL-ADULT SPO2 SENSOR: Brand: NELLCOR

## (undated) DEVICE — FCPS BIOP PULM RAD JAW 100CML -- BX/10 M00515180

## (undated) DEVICE — CULTURETTE SGL EVAC TUBE PALL -- 100/CA

## (undated) DEVICE — GLOVE SURG SZ 7.5 L11.2IN THK9.8MIL STRW LTX POLYMER BEAD

## (undated) DEVICE — DRAPE,REIN 53X77,STERILE: Brand: MEDLINE